# Patient Record
Sex: FEMALE | Race: WHITE | NOT HISPANIC OR LATINO | Employment: FULL TIME | URBAN - METROPOLITAN AREA
[De-identification: names, ages, dates, MRNs, and addresses within clinical notes are randomized per-mention and may not be internally consistent; named-entity substitution may affect disease eponyms.]

---

## 2017-01-28 DIAGNOSIS — M06.9 RHEUMATOID ARTHRITIS INVOLVING MULTIPLE SITES, UNSPECIFIED RHEUMATOID FACTOR PRESENCE: ICD-10-CM

## 2017-01-28 DIAGNOSIS — Z11.59 NEED FOR HEPATITIS C SCREENING TEST: ICD-10-CM

## 2017-01-28 LAB
ALT SERPL W P-5'-P-CCNC: 48 U/L (ref 0–50)
AST SERPL W P-5'-P-CCNC: 17 U/L (ref 0–45)
CHOLEST SERPL-MCNC: 184 MG/DL
CREAT SERPL-MCNC: 0.9 MG/DL (ref 0.52–1.04)
GFR SERPL CREATININE-BSD FRML MDRD: 65 ML/MIN/1.7M2
HDLC SERPL-MCNC: 60 MG/DL
LDLC SERPL CALC-MCNC: 101 MG/DL
NONHDLC SERPL-MCNC: 124 MG/DL
TRIGL SERPL-MCNC: 117 MG/DL

## 2017-01-28 PROCEDURE — 82565 ASSAY OF CREATININE: CPT | Performed by: FAMILY MEDICINE

## 2017-01-28 PROCEDURE — 86803 HEPATITIS C AB TEST: CPT | Performed by: FAMILY MEDICINE

## 2017-01-28 PROCEDURE — 84450 TRANSFERASE (AST) (SGOT): CPT | Performed by: FAMILY MEDICINE

## 2017-01-28 PROCEDURE — 36415 COLL VENOUS BLD VENIPUNCTURE: CPT | Performed by: FAMILY MEDICINE

## 2017-01-28 PROCEDURE — 80061 LIPID PANEL: CPT | Performed by: FAMILY MEDICINE

## 2017-01-28 PROCEDURE — 84460 ALANINE AMINO (ALT) (SGPT): CPT | Performed by: FAMILY MEDICINE

## 2017-01-30 LAB — HCV AB SERPL QL IA: NORMAL

## 2017-02-10 ENCOUNTER — TRANSFERRED RECORDS (OUTPATIENT)
Dept: HEALTH INFORMATION MANAGEMENT | Facility: CLINIC | Age: 57
End: 2017-02-10

## 2017-03-26 DIAGNOSIS — M06.9 RHEUMATOID ARTHRITIS INVOLVING MULTIPLE SITES, UNSPECIFIED RHEUMATOID FACTOR PRESENCE: Primary | ICD-10-CM

## 2017-03-29 RX ORDER — HYDROXYCHLOROQUINE SULFATE 200 MG/1
TABLET, FILM COATED ORAL
Qty: 450 TABLET | Refills: 3 | Status: SHIPPED | OUTPATIENT
Start: 2017-03-29 | End: 2019-05-10

## 2017-03-29 NOTE — TELEPHONE ENCOUNTER
hydroxychloroquine (PLAQUENIL) 200 MG tablet      Last Written Prescription Date:  12/20/16  Last Fill Quantity: 225,   # refills: 3  Last Office Visit with [facility], [facility] or [facility] prescribing provider: 12/19/16  Future Office visit:       Routing refill request to provider for review/approval because:  Drug not on the [facility], [facility] or [facility] refill protocol or controlled substance

## 2017-04-13 DIAGNOSIS — L44.8 TINEA AMIANTACEA: ICD-10-CM

## 2017-04-13 RX ORDER — KETOCONAZOLE 20 MG/ML
SHAMPOO TOPICAL
Qty: 120 ML | Refills: 0 | Status: SHIPPED | OUTPATIENT
Start: 2017-04-13 | End: 2017-05-22

## 2017-04-13 RX ORDER — FLUOCINOLONE ACETONIDE 0.11 MG/ML
5 OIL TOPICAL DAILY
Qty: 118 ML | Refills: 0 | Status: SHIPPED | OUTPATIENT
Start: 2017-04-13 | End: 2019-09-05

## 2017-04-13 NOTE — TELEPHONE ENCOUNTER
RN unable to refill as it has been >1 year since last visit.  Also, allergy/contraindication listed for ketoconazole and metronidazole.  Forwarding to Dr. Armijo to review.    Date of last OV: 4/6/16  Reason for visit: tinea amiantacea  Provider seen: Dr. Castrejon  Date last filled: 9/2016  When advised to RTC: 6 weeks.  No follow up appointment scheduled   Labs pertaining to requested refill: n/a  BP Readings from Last 2 Encounters:   12/28/16 105/80   12/19/16 118/64        Pending Prescriptions:                       Disp   Refills    Fluocinolone Acetonide (DERMA-SMOOTHE/FS *118 mL 0            Sig: Externally apply 5 mLs topically daily Please           schedule a follow up appointment for further           refills.    ketoconazole (NIZORAL) 2 % shampoo        120 mL 0            Sig: Apply to the affected area and wash off after 5           minutes.  Please schedule a follow up appointment           for further refills.      Radha Guevara RN

## 2017-04-27 DIAGNOSIS — E78.5 HYPERLIPIDEMIA LDL GOAL <130: ICD-10-CM

## 2017-04-27 NOTE — TELEPHONE ENCOUNTER
Atorvastatin     Last Written Prescription Date: 1/20/17  Last Fill Quantity: 90, # refills: 0  Last Office Visit with [facility], [facility] or [facility] prescribing provider: 12/19/16       Lab Results   Component Value Date    CHOL 184 01/28/2017     Lab Results   Component Value Date    HDL 60 01/28/2017     Lab Results   Component Value Date     01/28/2017     Lab Results   Component Value Date    TRIG 117 01/28/2017     Lab Results   Component Value Date    CHOLHDLRATIO 2.9 05/16/2015     Marni Turner MA 4/27/2017

## 2017-05-02 RX ORDER — ATORVASTATIN CALCIUM 40 MG/1
40 TABLET, FILM COATED ORAL DAILY
Qty: 90 TABLET | Refills: 2 | Status: SHIPPED | OUTPATIENT
Start: 2017-05-02 | End: 2018-01-31

## 2017-05-02 NOTE — TELEPHONE ENCOUNTER
Prescription approved per [facility] Refill Protocol..................QUINN Pacheco

## 2017-05-15 DIAGNOSIS — L44.8 TINEA AMIANTACEA: ICD-10-CM

## 2017-05-15 RX ORDER — FLUOCINOLONE ACETONIDE 0.11 MG/ML
5 OIL TOPICAL DAILY
Qty: 118 ML | Refills: 0 | Status: CANCELLED | OUTPATIENT
Start: 2017-05-15

## 2017-05-15 NOTE — TELEPHONE ENCOUNTER
Pt called regarding refill request below. Courtesy refill given on 4/17/17 with reminder that appt needed for future refills. No future appt noted. RN called pt to inquire if pt had requested refill and if so to notify pt that an appt is needed for further refills. No answer. No VM id. RN left general message to call the [facility] clinic back at [phone]..Erika Webb RN          Flucinolone Acetonide Scalp oil      Last Written Prescription Date: 4/17/17  Last Fill Quantity: 118ml   # refills: 0   Last Office Visit with [facility], [facility] or [facility] prescribing provider: 4/6/16

## 2017-05-22 ENCOUNTER — OFFICE VISIT (OUTPATIENT)
Dept: DERMATOLOGY | Facility: CLINIC | Age: 57
End: 2017-05-22
Payer: COMMERCIAL

## 2017-05-22 DIAGNOSIS — L65.9 ALOPECIA: Primary | ICD-10-CM

## 2017-05-22 DIAGNOSIS — L44.8 TINEA AMIANTACEA: ICD-10-CM

## 2017-05-22 PROCEDURE — 88305 TISSUE EXAM BY PATHOLOGIST: CPT | Performed by: DERMATOLOGY

## 2017-05-22 PROCEDURE — 99212 OFFICE O/P EST SF 10 MIN: CPT | Mod: 25 | Performed by: DERMATOLOGY

## 2017-05-22 PROCEDURE — 11100 HC BIOPSY SKIN/SUBQ/MUC MEM, SINGLE LESION: CPT | Performed by: DERMATOLOGY

## 2017-05-22 PROCEDURE — 11101 HC BIOPSY SKIN/SUBQ/MUC MEM, EACH ADDTL LESION: CPT | Performed by: DERMATOLOGY

## 2017-05-22 RX ORDER — LIDOCAINE HYDROCHLORIDE AND EPINEPHRINE 10; 10 MG/ML; UG/ML
3 INJECTION, SOLUTION INFILTRATION; PERINEURAL ONCE
Qty: 0.5 ML | Refills: 0 | OUTPATIENT
Start: 2017-05-22 | End: 2017-05-22

## 2017-05-22 RX ORDER — KETOCONAZOLE 20 MG/ML
SHAMPOO TOPICAL
Qty: 120 ML | Refills: 11 | Status: SHIPPED | OUTPATIENT
Start: 2017-05-22 | End: 2019-09-05

## 2017-05-22 NOTE — NURSING NOTE
Dermatology Rooming Note    Kesha Zacarias's goals for this visit include:   Chief Complaint   Patient presents with     RECHECK       Is a scribe okay for this visit:YES,     Are records needed for this visit(If yes, obtain release of information): No,      Vitals: LMP 11/22/2011    Referring Provider:  ESTABLISHED PATIENT  No address on file

## 2017-05-22 NOTE — PROGRESS NOTES
[facility] Dermatology Note      Dermatology Problem List:  1. Tinea amiantacea with underlying inflammatory rash. Double punch biopsy of R parietal scalp 5/22/17  -Previous Tx: Dermasmooth oil (initiated 4/6/2016), ketoconazole solution (initiated 4/6/2016)    Encounter Date: May 22, 2017    CC:  Chief Complaint   Patient presents with     RECHECK       History of Present Illness:  Ms. Kesha Zacarias is a 56 year old female who presents as a follow-up for tinea amiantacea. The patient was last seen 4/6/2016 by Dr. Castrejon when dermasmooth oil and ketoconazole solution were continued for tinea amiantacea. Today, the patient reports that she is using dermasmooth oil and ketoconazole shampoo. She used the topicals more frequently in the winter. Notes improvement when using the topical treatment. She washes her hair every 3-4 days. The lesions are primarily on the crown of the scalp and have moved to her ears. Denies a personal history of psoriasis. She regularly undergoes a procedure where she applies the dermasmooth oil and then slowly comb the scales out. She is running out of dermasmooth oil. She doesn't think this is curing the problem. The patient reports no other lesions of concern.    Past Medical History:   Patient Active Problem List   Diagnosis     Female infertility     Major depressive disorder, recurrent episode, mild (H)     Dysplasia of cervix (uteri)     Nonsenile cataract     Other specified glaucoma     HYPERLIPIDEMIA LDL GOAL <130     Menopausal syndrome (hot flashes)     Peroneal tendonitis     Counseling regarding advanced directives     Rheumatoid arthritis involving multiple sites, unspecified rheumatoid factor presence (H)     Past Medical History:   Diagnosis Date     Chronic depressive personality disorder      Dysplasia of cervix (uteri) 1988    Cryotherapy     Female infertility of unspecified origin      Past Surgical History:   Procedure Laterality Date     C  APPENDECTOMY  6-14-03     C REMV CATARACT INTRACAP,INSERT LENS  2-    right     HC INTRODUCE CATH FALLOPIAN TUBE, RE-OPEN/DIAGNOSIS       HERNIA REPAIR, INCISIONAL  11/11/09     Social History:  Not obtained.    Family History:  No family history of psoriasis that she knows of.  Medications:  Current Outpatient Prescriptions   Medication Sig Dispense Refill     atorvastatin (LIPITOR) 40 MG tablet Take 1 tablet (40 mg) by mouth daily 90 tablet 2     Fluocinolone Acetonide (DERMA-SMOOTHE/FS SCALP) 0.01 % OIL Externally apply 5 mLs topically daily Please schedule a follow up appointment for further refills. 118 mL 0     ketoconazole (NIZORAL) 2 % shampoo Apply to the affected area and wash off after 5 minutes.  Please schedule a follow up appointment for further refills. 120 mL 0     hydroxychloroquine (PLAQUENIL) 200 MG tablet TAKE 2 AND 1/2 TABLETS BY MOUTH EVERY  tablet 3     hydroxychloroquine (PLAQUENIL) 200 MG tablet TAKE 2 AND 1/2 TABLETS BY MOUTH EVERY  tablet 3     ibuprofen (ADVIL,MOTRIN) 800 MG tablet TAKE ONE TABLET BY MOUTH EVERY 6 HOURS AS NEEDED FOR PAIN 120 tablet 3     aspirin 81 MG tablet Take 81 mg by mouth daily       cyclobenzaprine (FLEXERIL) 10 MG tablet Take 1 tablet (10 mg) by mouth 3 times daily as needed for muscle spasms 90 tablet 1     COENZYME Q-10 PO Take 1 tablet by mouth every other day       Omega-3 Fatty Acids (FISH OIL) 1200 MG capsule Take 3 capsules by mouth daily.       Cholecalciferol (VITAMIN D) 2000 UNIT tablet Take 1 tablet by mouth daily. 100 tablet 12      Allergies   Allergen Reactions     Ciprofloxacin      hives and was on flagyl too     Metronidazole      hives and was on cipro too     Review of Systems:  -Skin: As above in HPI. No additional skin concerns.  -Const: No fever or chills      Physical exam:  LMP 11/22/2011  GEN: This is a well-nourished, well developed female in no acute distress  NEURO: Alert and oriented  PSYCH: in a pleasant mood,  appropriate affect    SKIN: Focused examination of the scalp was performed.  -Erythema and crusting on the parietal scalp and onto the occipital scalp. Less involvement on the frontal scalp.   -No other lesions of concern on areas examined.     Impression/Plan:  1. Alopecia with Tinea Amiantacea. The tinea amiantacea is a descriptor and doesn't help in figuring out the exam cause of the condition. Will do 2 punch biopsies with vertical and horizontal sectioning to diagnose.  Differential diagnosis includes psoriasis versus seborrheic dermatitis versus LPP versus lupus versus other, same history for both    Start Neutrogena T-Sal shampoo.     Hold dermasmooth oil pending biopsy results.     Continue ketoconazole 2% shampoo. Alternate with the Neutrogena T-Sal shampoo.     Punch biopsy:  After discussion of benefits and risks including but not limited to bleeding/bruising, pain/swelling, infection, scar, incomplete removal, nerve damage/numbness, recurrence, and non-diagnostic biopsy, written consent, verbal consent and photographs were obtained. Time-out was performed. The area was cleaned with isopropyl alcohol. 0.5 mL of 1% lidocaine with epinephrine was injected to obtain adequate anesthesia of the lesion on the right parietal scalp. Two 4 mm punch biopsies were performed. 4-0 prolene sutures were utilized to approximate the epidermal edges.  White petroleum jelly/VaselineTM and a bandage was applied to the wound.  Explicit verbal and written wound care instructions were provided.  The patient left the Dermatology Clinic in good condition. The patient was counseled to follow up for suture removal in approximately 7-10 days.    Follow up pending biopsy, earlier for new or changing lesions.     Staff Involved:  Scribe/Staff    Scribe Disclosure:   IMeghan, am serving as a scribe to document services personally performed by Dr. Rory Lambert, based on data collection and the provider's statements to me.          Provider Disclosure:   I have reviewed the documentation recorded by the scribe and have edited it as needed. I have personally performed the services documented here and the documentation accurately represents those services and the decisions made by me.     Rory Lambert MD, MS    Department of Dermatology  [facility]: Phone: [phone], Fax:[phone]  [facility]: Phone: [phone], Fax: [phone]

## 2017-05-22 NOTE — PATIENT INSTRUCTIONS

## 2017-05-22 NOTE — MR AVS SNAPSHOT
After Visit Summary   5/22/2017    Kesha Zacarias    MRN: [id number]           Patient Information     Date Of Birth          1960        Visit Information        Provider Department      5/22/2017 9:15 AM Rory Lambert MD [facility]        Today's Diagnoses     Alopecia    -  1    Tinea amiantacea          Care Instructions    Wound Care After a Biopsy    What is a skin biopsy?  A skin biopsy allows the doctor to examine a very small piece of tissue under the microscope to determine the diagnosis and the best treatment for the skin condition. A local anesthetic (numbing medicine)  is injected with a very small needle into the skin area to be tested. A small piece of skin is taken from the area. Sometimes a suture (stitch) is used.     What are the risks of a skin biopsy?  I will experience scar, bleeding, swelling, pain, crusting and redness. I may experience incomplete removal or recurrence. Risks of this procedure are excessive bleeding, bruising, infection, nerve damage, numbness, thick (hypertrophic or keloidal) scar and non-diagnostic biopsy.    How should I care for my wound for the first 24 hours?    Keep the wound dry and covered for 24 hours    If it bleeds, hold direct pressure on the area for 15 minutes. If bleeding does not stop then go to the emergency room    Avoid strenuous exercise the first 1-2 days or as your doctor instructs you    How should I care for the wound after 24 hours?    After 24 hours, remove the bandage    You may bathe or shower as normal    If you had a scalp biopsy, you can shampoo as usual and can use shower water to clean the biopsy site daily    Clean the wound twice a day with gentle soap and water    Do not scrub, be gentle    Apply white petroleum/Vaseline after cleaning the wound with a cotton swab or a clean finger, and keep the site covered with a Bandaid /bandage. Bandages are not necessary with a scalp biopsy    If you are  unable to cover the site with a Bandaid /bandage, re-apply ointment 2-3 times a day to keep the site moist. Moisture will help with healing    Avoid strenuous activity for first 1-2 days    Avoid lakes, rivers, pools, and oceans until the stitches are removed or the site is healed    How do I clean my wound?    Wash hands thoroughly with soap or use hand  before all wound care    Clean the wound with gentle soap and water    Apply white petroleum/Vaseline  to wound after it is clean    Replace the Bandaid /bandage to keep the wound covered for the first few days or as instructed by your doctor    If you had a scalp biopsy, warm shower water to the area on a daily basis should suffice    What should I use to clean my wound?     Cotton-tipped applicators (Qtips )    White petroleum jelly (Vaseline ). Use a clean new container and use Q-tips to apply.    Bandaids   as needed    Gentle soap     How should I care for my wound long term?    Do not get your wound dirty    Keep up with wound care for one week or until the area is healed.    A small scab will form and fall off by itself when the area is completely healed. The area will be red and will become pink in color as it heals. Sun protection is very important for how your scar will turn out. Sunscreen with an SPF 30 or greater is recommended once the area is healed.    If you have stitches, stitches need to be removed in 7-10 days. You may return to our clinic for this or you may have it done locally at your doctor s office.    You should have some soreness but it should be mild and slowly go away over several days. Talk to your doctor about using tylenol for pain,    When should I call my doctor?  If you have increased:     Pain or swelling    Pus or drainage (clear or slightly yellow drainage is ok)    Temperature over 100F    Spreading redness or warmth around wound    When will I hear about my results?  The biopsy results can take 2-3 weeks to come back.  The clinic will call you with the results, send you a [employer] message, or have you schedule a follow-up clinic or phone time to discuss the results. Contact our clinics if you do not hear from us in 3 weeks.     Who should I call with questions?    [facility]: [phone]     [facility]: [phone]    For urgent needs outside of business hours call the [facility] at [phone] and ask for the dermatology resident on call        Neutrogena T-Sal Shampoo      [email or web address]          Follow-ups after your visit        Your next 10 appointments already scheduled     Jun 05, 2017 11:00 AM CDT   Nurse Only with NURSE ONLY MG DERM   [facility]    [address] [place] [id number] [phone]              Who to contact     If you have questions or need follow up information about today's clinic visit or your schedule please contact [facility] directly at [phone].  Normal or non-critical lab and imaging results will be communicated to you by MyChart, letter or phone within 4 business days after the clinic has received the results. If you do not hear from us within 7 days, please contact the clinic through [employer] or phone. If you have a critical or abnormal lab result, we will notify you by phone as soon as possible.  Submit refill requests through [employer] or call your pharmacy and they will forward the refill request to us. Please allow 3 business days for your refill to be completed.          Additional Information About Your Visit        [employer] Information     [employer] gives you secure access to your electronic health record. If you see a primary care provider, you can also send messages to your care team and make appointments. If you have questions, please call your primary care clinic.   If you do not have a primary care provider, please call [phone] and they will assist you.      [employer] is an electronic gateway that provides easy, online access to your medical records. With [employer], you can request a clinic appointment, read your test results, renew a prescription or communicate with your care team.     To access your existing account, please contact your [facility] or call [phone] for assistance.        Care EveryWhere ID     This is your Care EveryWhere ID. This could be used by other organizations to access your [place] medical records  [id number]        Your Vitals Were     Last Period                   11/22/2011            Blood Pressure from Last 3 Encounters:   12/28/16 105/80   12/19/16 118/64   10/16/15 126/72    Weight from Last 3 Encounters:   12/28/16 97.5 kg (215 lb)   12/19/16 97.5 kg (215 lb)   10/16/15 102.5 kg (226 lb)              We Performed the Following     BIOPSY SKIN/SUBQ/MUC MEM, EACH ADDTL LESION     BIOPSY SKIN/SUBQ/MUC MEM, SINGLE LESION     Surgical pathology exam          Today's Medication Changes          These changes are accurate as of: 5/22/17  9:57 AM.  If you have any questions, ask your nurse or doctor.               Start taking these medicines.        Dose/Directions    lidocaine 1% with EPINEPHrine 1:100,000 1 %-1:964414 injection   Used for:  Alopecia, Tinea amiantacea        Dose:  3 mL   Inject 3 mLs into the skin once for 1 dose   Quantity:  0.5 mL   Refills:  0         These medicines have changed or have updated prescriptions.        Dose/Directions    ketoconazole 2 % shampoo   Commonly known as:  NIZORAL   This may have changed:  additional instructions   Used for:  Tinea amiantacea, Alopecia        Apply to the affected area and wash off after 5 minutes.  Alternate with T sal shampoo   Quantity:  120 mL   Refills:  11            Where to get your medicines      These medications were sent to [id number] - [place] - [address]  [address] [place] [address]    Hours:  test fax sent successfully 1/20/04  kr Phone:  [phone]     ketoconazole 2 % shampoo         Some of these will need a paper prescription and others can be bought over the counter.  Ask your nurse if you have questions.     You don't need a prescription for these medications     lidocaine 1% with EPINEPHrine 1:100,000 1 %-1:041122 injection                Primary Care Provider Office Phone # Fax #    Gregory G Schoen, [phone]       [facility] [address] [place] [address]        Thank you!     Thank you for choosing [facility]  for your care. Our goal is always to provide you with excellent care. Hearing back from our patients is one way we can continue to improve our services. Please take a few minutes to complete the written survey that you may receive in the mail after your visit with us. Thank you!             Your Updated Medication List - Protect others around you: Learn how to safely use, store and throw away your medicines at www.disposemymeds.org.          This list is accurate as of: 5/22/17  9:57 AM.  Always use your most recent med list.                   Brand Name Dispense Instructions for use    aspirin 81 MG tablet      Take 81 mg by mouth daily       atorvastatin 40 MG tablet    LIPITOR    90 tablet    Take 1 tablet (40 mg) by mouth daily       COENZYME Q-10 PO      Take 1 tablet by mouth every other day       cyclobenzaprine 10 MG tablet    FLEXERIL    90 tablet    Take 1 tablet (10 mg) by mouth 3 times daily as needed for muscle spasms       DERMA-SMOOTHE/FS SCALP 0.01 % Oil     118 mL    Externally apply 5 mLs topically daily Please schedule a follow up appointment for further refills.       * hydroxychloroquine 200 MG tablet    PLAQUENIL    225 tablet    TAKE 2 AND 1/2 TABLETS BY MOUTH EVERY DAY        * hydroxychloroquine 200 MG tablet    PLAQUENIL    450 tablet    TAKE 2 AND 1/2 TABLETS BY MOUTH EVERY DAY       ibuprofen 800 MG tablet    ADVIL/MOTRIN    120 tablet    TAKE ONE TABLET BY MOUTH EVERY 6 HOURS AS NEEDED FOR PAIN       ketoconazole 2 % shampoo    NIZORAL    120 mL    Apply to the affected area and wash off after 5 minutes.  Alternate with T sal shampoo       lidocaine 1% with EPINEPHrine 1:100,000 1 %-1:651156 injection     0.5 mL    Inject 3 mLs into the skin once for 1 dose       omega-3 fatty acids 1200 MG capsule      Take 3 capsules by mouth daily.       vitamin D 2000 UNITS tablet     100 tablet    Take 1 tablet by mouth daily.       * Notice:  This list has 2 medication(s) that are the same as other medications prescribed for you. Read the directions carefully, and ask your doctor or other care provider to review them with you.

## 2017-05-24 LAB — COPATH REPORT: NORMAL

## 2017-05-31 ENCOUNTER — ALLIED HEALTH/NURSE VISIT (OUTPATIENT)
Dept: NURSING | Facility: CLINIC | Age: 57
End: 2017-05-31
Payer: COMMERCIAL

## 2017-05-31 DIAGNOSIS — Z48.02 ENCOUNTER FOR REMOVAL OF SUTURES: Primary | ICD-10-CM

## 2017-05-31 PROCEDURE — 99207 ZZC NO CHARGE NURSE ONLY: CPT

## 2017-05-31 NOTE — NURSING NOTE
Kesha Zacarias comes into clinic today at the request of Dr. Lambert Ordering Provider for suture removal.    Kesha presents to the clinic today for  removal of sutures.  The patient has had the sutures in place for 9 days.    There has been no history of infection or drainage.    O: 2 sutures are seen located on the right scalp.  The wound is healing well with no signs of infection.    A: Suture removal.    P:  All sutures were easily removed today.  Vaseline applied.  Routine wound care discussed.  The patient will follow up as needed.    This service provided today was under the supervising provider of the day Dr. Lambert, who was available if needed.    Radha Guevara RN

## 2017-05-31 NOTE — MR AVS SNAPSHOT
After Visit Summary   5/31/2017    Kesha Zacarias    MRN: [id number]           Patient Information     Date Of Birth          1960        Visit Information        Provider Department      5/31/2017 11:30 AM NURSE ONLY [facility]        Today's Diagnoses     Encounter for removal of sutures    -  1       Follow-ups after your visit        Who to contact     If you have questions or need follow up information about today's clinic visit or your schedule please contact [facility] directly at [phone].  Normal or non-critical lab and imaging results will be communicated to you by [facility], letter or phone within 4 business days after the clinic has received the results. If you do not hear from us within 7 days, please contact the clinic through [employer] or phone. If you have a critical or abnormal lab result, we will notify you by phone as soon as possible.  Submit refill requests through [employer] or call your pharmacy and they will forward the refill request to us. Please allow 3 business days for your refill to be completed.          Additional Information About Your Visit        MyChart Information     [employer] gives you secure access to your electronic health record. If you see a primary care provider, you can also send messages to your care team and make appointments. If you have questions, please call your primary care clinic.  If you do not have a primary care provider, please call [phone] and they will assist you.      [employer] is an electronic gateway that provides easy, online access to your medical records. With [employer], you can request a clinic appointment, read your test results, renew a prescription or communicate with your care team.     To access your existing account, please contact your [facility] or call [phone] for assistance.        Care EveryWhere ID     This is your Care EveryWhere ID. This could  be used by other organizations to access your [place] medical records  [id number]        Your Vitals Were     Last Period                   11/22/2011            Blood Pressure from Last 3 Encounters:   12/28/16 105/80   12/19/16 118/64   10/16/15 126/72    Weight from Last 3 Encounters:   12/28/16 97.5 kg (215 lb)   12/19/16 97.5 kg (215 lb)   10/16/15 102.5 kg (226 lb)              Today, you had the following     No orders found for display       Primary Care Provider Office Phone # Fax #    Gregory G Schoen, [phone]       [facility] [address] [place] [address]        Thank you!     Thank you for choosing [facility]  for your care. Our goal is always to provide you with excellent care. Hearing back from our patients is one way we can continue to improve our services. Please take a few minutes to complete the written survey that you may receive in the mail after your visit with us. Thank you!             Your Updated Medication List - Protect others around you: Learn how to safely use, store and throw away your medicines at www.disposemymeds.org.          This list is accurate as of: 5/31/17 12:28 PM.  Always use your most recent med list.                   Brand Name Dispense Instructions for use    aspirin 81 MG tablet      Take 81 mg by mouth daily       atorvastatin 40 MG tablet    LIPITOR    90 tablet    Take 1 tablet (40 mg) by mouth daily       COENZYME Q-10 PO      Take 1 tablet by mouth every other day       cyclobenzaprine 10 MG tablet    FLEXERIL    90 tablet    Take 1 tablet (10 mg) by mouth 3 times daily as needed for muscle spasms       DERMA-SMOOTHE/FS SCALP 0.01 % Oil     118 mL    Externally apply 5 mLs topically daily Please schedule a follow up appointment for further refills.       * hydroxychloroquine 200 MG tablet    PLAQUENIL    225 tablet    TAKE 2 AND 1/2 TABLETS BY MOUTH EVERY DAY       * hydroxychloroquine 200 MG  tablet    PLAQUENIL    450 tablet    TAKE 2 AND 1/2 TABLETS BY MOUTH EVERY DAY       ibuprofen 800 MG tablet    ADVIL/MOTRIN    120 tablet    TAKE ONE TABLET BY MOUTH EVERY 6 HOURS AS NEEDED FOR PAIN       ketoconazole 2 % shampoo    NIZORAL    120 mL    Apply to the affected area and wash off after 5 minutes.  Alternate with T sal shampoo       omega-3 fatty acids 1200 MG capsule      Take 3 capsules by mouth daily.       vitamin D 2000 UNITS tablet     100 tablet    Take 1 tablet by mouth daily.       * Notice:  This list has 2 medication(s) that are the same as other medications prescribed for you. Read the directions carefully, and ask your doctor or other care provider to review them with you.

## 2017-06-02 ENCOUNTER — TELEPHONE (OUTPATIENT)
Dept: DERMATOLOGY | Facility: CLINIC | Age: 57
End: 2017-06-02

## 2017-06-02 DIAGNOSIS — L40.9 SCALP PSORIASIS: ICD-10-CM

## 2017-06-02 DIAGNOSIS — L40.9 SCALP PSORIASIS: Primary | ICD-10-CM

## 2017-06-02 RX ORDER — CLOBETASOL PROPIONATE 0.05 G/100ML
SHAMPOO TOPICAL
Qty: 1 BOTTLE | Refills: 11 | Status: SHIPPED | OUTPATIENT
Start: 2017-06-02 | End: 2017-06-05

## 2017-06-02 NOTE — TELEPHONE ENCOUNTER
"Writer called and spoke with patient regarding the pathology results received by Dr. Lambert. Patient stated the she has \"really good insurance\" and wants to try to purchase the clobetasol through her regular pharmacy. She asked the prescription to be sent to [facility]. Patient asked when she could expect to see results and after consulting with Dr. Bae who was in clinic, she would begin to notice a difference around the end of two weeks. Writer explained to patient that is possible that results will be seeing with cycles of using product and alternating with t sal shampoo. Writer also explained the [facility] and this would be another option. Patient understood and had no further questions at this time. Routing to Dr. Lambert.    Message  Received: Today       Rory Lambert MD  [facility]                   Please inform Ms. Zacarias that the biopsy shows that she has scalp psoriasis.     I would like to change her treatment to:   1) Clobetasol shampoo 3-4x a week. Apply to dry scalp, let sit for 15 min, then rinse off.   2) On other days, use the T sal shampoo   3) On days not using clobetasol she can use the dermasmooth oil.     This is where we start. We may have to go to a systemic treatment. Also, given the price of Clobetasol shampoo, I will be sending it to the [facility] which will make it more affordable as they have a deal with the . If she doesn't want that I can send it to any other pharmacy. Please let her know how the [facility] pharmacy works and provider her with the phone number so she can register with them.     Rory Lambert MD, MS      Department of Dermatology   [facility]: Phone: [phone], Fax:[phone]   [facility]: Phone: [phone], Fax: [phone]      Chantale Hull LPN    "

## 2017-06-02 NOTE — PROGRESS NOTES
Please inform Ms. Zacarias that the biopsy shows that she has scalp psoriasis.     I would like to change her treatment to:  1) Clobetasol shampoo 3-4x a week. Apply to dry scalp, let sit for 15 min, then rinse off.  2) On other days, use the T sal shampoo  3) On days not using clobetasol she can use the dermasmooth oil.    This is where we start. We may have to go to a systemic treatment. Also, given the price of Clobetasol shampoo, I will be sending it to the [facility] pharmacy which will make it more affordable as they have a deal with the . If she doesn't want that I can send it to any other pharmacy. Please let her know how the [facility] pharmacy works and provider her with the phone number so she can register with them.    Rory Lambert MD, MS    Department of Dermatology  [facility]: Phone: [phone], Fax:[phone]  [facility]: Phone: [phone], Fax: [phone]

## 2017-06-05 RX ORDER — CLOBETASOL PROPIONATE 0.05 G/100ML
SHAMPOO TOPICAL
Qty: 1 BOTTLE | Refills: 11 | Status: SHIPPED | OUTPATIENT
Start: 2017-06-05 | End: 2019-09-05

## 2017-07-05 ENCOUNTER — TRANSFERRED RECORDS (OUTPATIENT)
Dept: HEALTH INFORMATION MANAGEMENT | Facility: CLINIC | Age: 57
End: 2017-07-05

## 2017-07-10 ENCOUNTER — TRANSFERRED RECORDS (OUTPATIENT)
Dept: HEALTH INFORMATION MANAGEMENT | Facility: CLINIC | Age: 57
End: 2017-07-10

## 2017-08-23 ENCOUNTER — TELEPHONE (OUTPATIENT)
Dept: FAMILY MEDICINE | Facility: CLINIC | Age: 57
End: 2017-08-23

## 2017-08-23 NOTE — TELEPHONE ENCOUNTER
Reason for Call:  Same Day Appointment, Requested Provider:  Gregory G. Schoen, M.D.    PCP: Schoen, Gregory G    Reason for visit: Patient stated that she is having surgery on 9/18/17 and needs a pre-op     Duration of symptoms: n/a    Have you been treated for this in the past? No    Additional comments: patient dosen't want to see anyone else because he knows her history.     Can we leave a detailed message on this number? YES    Phone number patient can be reached at: Cell number on file:    Telephone Information:   Mobile [phone]       Best Time: any    Call taken on 8/23/2017 at 11:45 AM by Merari Saenz

## 2017-09-15 ENCOUNTER — OFFICE VISIT (OUTPATIENT)
Dept: FAMILY MEDICINE | Facility: CLINIC | Age: 57
End: 2017-09-15
Payer: COMMERCIAL

## 2017-09-15 VITALS
HEART RATE: 72 BPM | SYSTOLIC BLOOD PRESSURE: 119 MMHG | HEIGHT: 65 IN | RESPIRATION RATE: 16 BRPM | OXYGEN SATURATION: 98 % | WEIGHT: 225 LBS | TEMPERATURE: 96.4 F | DIASTOLIC BLOOD PRESSURE: 64 MMHG | BODY MASS INDEX: 37.49 KG/M2

## 2017-09-15 DIAGNOSIS — Z01.818 PREOP GENERAL PHYSICAL EXAM: Primary | ICD-10-CM

## 2017-09-15 DIAGNOSIS — Z01.810 PRE-OPERATIVE CARDIOVASCULAR EXAMINATION: ICD-10-CM

## 2017-09-15 PROCEDURE — 93000 ELECTROCARDIOGRAM COMPLETE: CPT | Performed by: FAMILY MEDICINE

## 2017-09-15 PROCEDURE — 99214 OFFICE O/P EST MOD 30 MIN: CPT | Performed by: FAMILY MEDICINE

## 2017-09-15 ASSESSMENT — PAIN SCALES - GENERAL: PAINLEVEL: NO PAIN (0)

## 2017-09-15 ASSESSMENT — PATIENT HEALTH QUESTIONNAIRE - PHQ9: SUM OF ALL RESPONSES TO PHQ QUESTIONS 1-9: 0

## 2017-09-15 NOTE — MR AVS SNAPSHOT
After Visit Summary   9/15/2017    Kesha Zacarias    MRN: [id number]           Patient Information     Date Of Birth          1960        Visit Information        Provider Department      9/15/2017 3:30 PM Schoen, Gregory G, MD [facility]        Today's Diagnoses     Preop general physical exam    -  1    Pre-operative cardiovascular examination          Care Instructions      Before Your Surgery      Call your surgeon if there is any change in your health. This includes signs of a cold or flu (such as a sore throat, runny nose, cough, rash or fever).    Do not smoke, drink alcohol or take over the counter medicine (unless your surgeon or primary care doctor tells you to) for the 24 hours before and after surgery.    If you take prescribed drugs: Follow your doctor s orders about which medicines to take and which to stop until after surgery.    Eating and drinking prior to surgery: follow the instructions from your surgeon    Take a shower or bath the night before surgery. Use the soap your surgeon gave you to gently clean your skin. If you do not have soap from your surgeon, use your regular soap. Do not shave or scrub the surgery site.  Wear clean pajamas and have clean sheets on your bed.           Follow-ups after your visit        Who to contact     If you have questions or need follow up information about today's clinic visit or your schedule please contact [facility] directly at [phone].  Normal or non-critical lab and imaging results will be communicated to you by MyChart, letter or phone within 4 business days after the clinic has received the results. If you do not hear from us within 7 days, please contact the clinic through MyChart or phone. If you have a critical or abnormal lab result, we will notify you by phone as soon as possible.  Submit refill requests through [employer] or call your pharmacy and they will forward the refill request to us.  "Please allow 3 business days for your refill to be completed.          Additional Information About Your Visit        MyChart Information     [employer] gives you secure access to your electronic health record. If you see a primary care provider, you can also send messages to your care team and make appointments. If you have questions, please call your primary care clinic.  If you do not have a primary care provider, please call [phone] and they will assist you.        Care EveryWhere ID     This is your Care EveryWhere ID. This could be used by other organizations to access your [place] medical records  [id number]        Your Vitals Were     Pulse Temperature Respirations Height Last Period Pulse Oximetry    72 96.4  F (35.8  C) (Temporal) 16 5' 5\" (1.651 m) 11/22/2011 98%    BMI (Body Mass Index)                   37.44 kg/m2            Blood Pressure from Last 3 Encounters:   09/15/17 119/64   12/28/16 105/80   12/19/16 118/64    Weight from Last 3 Encounters:   09/15/17 225 lb (102.1 kg)   12/28/16 215 lb (97.5 kg)   12/19/16 215 lb (97.5 kg)              We Performed the Following     EKG 12-lead complete w/read - Clinics        Primary Care Provider Office Phone # Fax #    Gregory G Schoen, [phone]       [address] [place] [address]        Equal Access to Services     [facility]: Hadii aad ku hadasho Soomaali, waaxda luqadaha, qaybta kaalmada adeegyada, claudio armstrong hayiesha thayer . So [facility] [phone].    ATENCIÓN: Si habla español, tiene a simeon disposición servicios gratuitos de asistencia lingüística. Llame al [phone].    We comply with applicable federal civil rights laws and [place] laws. We do not discriminate on the basis of race, color, national origin, age, disability sex, sexual orientation or gender identity.            Thank you!     Thank you for choosing [facility]  for your care. Our goal is always to provide you with " excellent care. Hearing back from our patients is one way we can continue to improve our services. Please take a few minutes to complete the written survey that you may receive in the mail after your visit with us. Thank you!             Your Updated Medication List - Protect others around you: Learn how to safely use, store and throw away your medicines at www.disposemymeds.org.          This list is accurate as of: 9/15/17  5:05 PM.  Always use your most recent med list.                   Brand Name Dispense Instructions for use Diagnosis    aspirin 81 MG tablet      Take 81 mg by mouth daily        atorvastatin 40 MG tablet    LIPITOR    90 tablet    Take 1 tablet (40 mg) by mouth daily    Hyperlipidemia LDL goal <130       clobetasol propionate 0.05 % Sham     1 Bottle    Apply sparingly to dry scalp, leave in place for 15 minutes, then lather and rinse thoroughly. Do 3-4x a week.    Scalp psoriasis       COENZYME Q-10 PO      Take 1 tablet by mouth every other day        cyclobenzaprine 10 MG tablet    FLEXERIL    90 tablet    Take 1 tablet (10 mg) by mouth 3 times daily as needed for muscle spasms    RA (rheumatoid arthritis) (H)       DERMA-SMOOTHE/FS SCALP 0.01 % Oil     118 mL    Externally apply 5 mLs topically daily Please schedule a follow up appointment for further refills.    Tinea amiantacea       hydroxychloroquine 200 MG tablet    PLAQUENIL    450 tablet    TAKE 2 AND 1/2 TABLETS BY MOUTH EVERY DAY    Rheumatoid arthritis involving multiple sites, unspecified rheumatoid factor presence (H)       ibuprofen 800 MG tablet    ADVIL/MOTRIN    120 tablet    TAKE ONE TABLET BY MOUTH EVERY 6 HOURS AS NEEDED FOR PAIN    RA (rheumatoid arthritis) (H)       ketoconazole 2 % shampoo    NIZORAL    120 mL    Apply to the affected area and wash off after 5 minutes.  Alternate with T sal shampoo    Tinea amiantacea, Alopecia       omega-3 fatty acids 1200 MG capsule      Take 3 capsules by mouth daily.         vitamin D 2000 UNITS tablet     100 tablet    Take 1 tablet by mouth daily.

## 2017-09-15 NOTE — PROGRESS NOTES
[address] [address]  [phone]  Dept: [phone]    PRE-OP EVALUATION:  Today's date: 9/15/2017    Kesha Zacarias (: 1960) presents for pre-operative evaluation assessment as requested by Dr. Jackson Hull.  She requires evaluation and anesthesia risk assessment prior to undergoing surgery/procedure for treatment of Left knee problem .  Proposed procedure: Left total knee replacement    Date of Surgery/ Procedure: 17  Time of Surgery/ Procedure: 9:40  Hospital/Surgical Facility: [facility]  Fax number for surgical facility: [phone]  Primary Physician: Schoen, Gregory G  Type of Anesthesia Anticipated: General    Patient has a Health Care Directive or Living Will:  NO    1. NO - Do you have a history of heart attack, stroke, stent, bypass or surgery on an artery in the head, neck, heart or legs?  2. NO - Do you ever have any pain or discomfort in your chest?  3. NO - Do you have a history of  Heart Failure?  4. NO - Are you troubled by shortness of breath when: walking on the level, up a slight hill or at night?  5. NO - Do you currently have a cold, bronchitis or other respiratory infection?  6. Yes- Do you have a cough, shortness of breath or wheezing?  7. NO - Do you sometimes get pains in the calves of your legs when you walk?  8. NO - Do you or anyone in your family have previous history of blood clots?  9. NO - Do you or does anyone in your family have a serious bleeding problem such as prolonged bleeding following surgeries or cuts?  10. NO - Have you ever had problems with anemia or been told to take iron pills?  11. NO - Have you had any abnormal blood loss such as black, tarry or bloody stools, or abnormal vaginal bleeding?  12. NO - Have you ever had a blood transfusion?  13. NO - Have you or any of your relatives ever had problems with anesthesia?  14.YES - Do you have sleep apnea, excessive snoring or daytime drowsiness? Snores; no  diagnosis of sleep apnea at  this time.   15. NO - Do you have any prosthetic heart valves?  16. NO - Do you have prosthetic joints?  17. NO - Is there any chance that you may be pregnant?        HPI:                                                      Brief HPI related to upcoming procedure: arthritis progressing over time. Left knee most severe, appropriate for replacement.           MEDICAL HISTORY:                                                    Patient Active Problem List    Diagnosis Date Noted     Rheumatoid arthritis involving multiple sites, unspecified rheumatoid factor presence (H) 03/29/2017     Priority: Medium     Counseling regarding advanced directives 12/19/2016     Priority: Medium     Peroneal tendonitis 08/15/2013     Priority: Medium     Menopausal syndrome (hot flashes) 11/23/2010     Priority: Medium     HYPERLIPIDEMIA LDL GOAL <130 10/31/2010     Priority: Medium     Nonsenile cataract 09/26/2003     Priority: Medium     Problem list name updated by automated process. Provider to review       Other specified glaucoma 09/26/2003     Priority: Medium     Dysplasia of cervix (uteri) 03/05/2003     Priority: Medium     Problem list name updated by automated process. Provider to review and confirm       Female infertility      Priority: Medium     Problem list name updated by automated process. Provider to review       Major depressive disorder, recurrent episode, mild (H)      Priority: Medium      Past Medical History:   Diagnosis Date     Chronic depressive personality disorder      Dysplasia of cervix (uteri) 1988    Cryotherapy     Female infertility of unspecified origin      Past Surgical History:   Procedure Laterality Date     C APPENDECTOMY  6-14-03     C REMV CATARACT INTRACAP,INSERT LENS  2-    right     HC INTRODUCE CATH FALLOPIAN TUBE, RE-OPEN/DIAGNOSIS       HERNIA REPAIR, INCISIONAL  11/11/09     Current Outpatient Prescriptions   Medication Sig Dispense Refill      clobetasol propionate 0.05 % SHAM Apply sparingly to dry scalp, leave in place for 15 minutes, then lather and rinse thoroughly. Do 3-4x a week. 1 Bottle 11     ketoconazole (NIZORAL) 2 % shampoo Apply to the affected area and wash off after 5 minutes.  Alternate with T sal shampoo 120 mL 11     atorvastatin (LIPITOR) 40 MG tablet Take 1 tablet (40 mg) by mouth daily 90 tablet 2     Fluocinolone Acetonide (DERMA-SMOOTHE/FS SCALP) 0.01 % OIL Externally apply 5 mLs topically daily Please schedule a follow up appointment for further refills. 118 mL 0     hydroxychloroquine (PLAQUENIL) 200 MG tablet TAKE 2 AND 1/2 TABLETS BY MOUTH EVERY  tablet 3     hydroxychloroquine (PLAQUENIL) 200 MG tablet TAKE 2 AND 1/2 TABLETS BY MOUTH EVERY  tablet 3     ibuprofen (ADVIL,MOTRIN) 800 MG tablet TAKE ONE TABLET BY MOUTH EVERY 6 HOURS AS NEEDED FOR PAIN 120 tablet 3     aspirin 81 MG tablet Take 81 mg by mouth daily       cyclobenzaprine (FLEXERIL) 10 MG tablet Take 1 tablet (10 mg) by mouth 3 times daily as needed for muscle spasms 90 tablet 1     COENZYME Q-10 PO Take 1 tablet by mouth every other day       Omega-3 Fatty Acids (FISH OIL) 1200 MG capsule Take 3 capsules by mouth daily.       Cholecalciferol (VITAMIN D) 2000 UNIT tablet Take 1 tablet by mouth daily. 100 tablet 12     OTC products: None, except as noted above    Allergies   Allergen Reactions     Ciprofloxacin      hives and was on flagyl too     Metronidazole      hives and was on cipro too      Latex Allergy: NO    Social History   Substance Use Topics     Smoking status: Current Every Day Smoker     Packs/day: 0.50     Years: 25.00     Smokeless tobacco: Never Used      Comment: 2000 quit Quit again 04/2009 started again      Alcohol use Yes      Comment: 1-2 weekly     History   Drug Use No       REVIEW OF SYSTEMS:                                                    C: NEGATIVE for fever, chills, change in weight  I: NEGATIVE for worrisome rashes,  moles or lesions  E: NEGATIVE for vision changes or irritation  E/M: NEGATIVE for ear, mouth and throat problems  R: NEGATIVE for significant cough or SOB  CV: NEGATIVE for chest pain, palpitations or peripheral edema  GI: NEGATIVE for nausea, abdominal pain, heartburn, or change in bowel habits  : NEGATIVE for frequency, dysuria, or hematuria  MUSCULOSKELETAL:knee pain as noted.   N: NEGATIVE for weakness, dizziness or paresthesias  E: NEGATIVE for temperature intolerance, skin/hair changes  H: NEGATIVE for bleeding problems  P: NEGATIVE for changes in mood or affect    EXAM:                                                    LMP 11/22/2011    GENERAL APPEARANCE: healthy, alert and no distress     EYES: EOMI, PERRL     HENT: ear canals and TM's normal and nose and mouth without ulcers or lesions     NECK: no adenopathy, no asymmetry, masses, or scars and thyroid normal to palpation     RESP: lungs clear to auscultation - no rales, rhonchi or wheezes     CV: regular rates and rhythm, normal S1 S2, no S3 or S4 and no murmur, click or rub     ABDOMEN:  soft, nontender, no HSM or masses and bowel sounds normal     MS: extremities normal- no gross deformities noted, no evidence of inflammation in joints, FROM in all extremities.     SKIN: no suspicious lesions or rashes     NEURO: Normal strength and tone, sensory exam grossly normal, mentation intact and speech normal     PSYCH: mentation appears normal. and affect normal/bright    DIAGNOSTICS:                                                    EKG: appears normal, NSR, normal axis, normal intervals, no acute ST/T changes c/w ischemia, no LVH by voltage criteria, unchanged from previous tracings, delayed R wave progression and inverted Twaves in V1/2 are unchanged from EKG in 2005 so no acute issues.  She is able to tolerate over 4 METS activity on a regular basis without any issues.     Recent Labs   Lab Test  01/28/17   0957  07/08/15   1635  07/12/13   6543   09/19/12   1720   HGB   --   13.5   --   13.4   PLT   --   225   --    --    NA   --   141  145*   --    POTASSIUM   --   3.8  4.2   --    CR  0.90  0.89  1.01   --       On no medications that affect renal function or potassium so labs not repeated.   IMPRESSION:                                                    Reason for surgery/procedure: arthritis, left knee with negative impact on quality of life/functionally limiting; assessment for tolerance of procedure and anesthesia  Diagnosis/reason for consult: left knee arthritis;    The proposed surgical procedure is considered INTERMEDIATE risk.    REVISED CARDIAC RISK INDEX  The patient has the following serious cardiovascular risks for perioperative complications such as (MI, PE, VFib and 3  AV Block):  No serious cardiac risks  INTERPRETATION: 0 risks: Class I (very low risk - 0.4% complication rate)    The patient has the following additional risks for perioperative complications:  No identified additional risks        RECOMMENDATIONS:                                                      --Consult hospital rounder / IM to assist post-op medical management; has history of intermittent hypotension    --Patient is to hold all scheduled medications on the day of surgery     APPROVAL GIVEN to proceed with proposed procedure, without further diagnostic evaluation as she is able to tolerate 4 METs activity without any symptoms to suggest angina.    Recommend DVT  prophylaxis per recommendation of Dr. Hull for TKA.        Signed Electronically by: Gregory G. Schoen, MD    Copy of this evaluation report is provided to requesting physician.    [facility] Preop Guidelines

## 2017-09-15 NOTE — NURSING NOTE
"Chief Complaint   Patient presents with     Pre-Op Exam       Initial /64 (BP Location: Right arm, Patient Position: Sitting, Cuff Size: Adult Large)  Pulse 72  Temp 96.4  F (35.8  C) (Temporal)  Resp 16  Ht 5' 5\" (1.651 m)  Wt 225 lb (102.1 kg)  LMP 11/22/2011  SpO2 98%  BMI 37.44 kg/m2 Estimated body mass index is 37.44 kg/(m^2) as calculated from the following:    Height as of this encounter: 5' 5\" (1.651 m).    Weight as of this encounter: 225 lb (102.1 kg).  Medication Reconciliation: complete  "

## 2017-10-05 ENCOUNTER — TRANSFERRED RECORDS (OUTPATIENT)
Dept: HEALTH INFORMATION MANAGEMENT | Facility: CLINIC | Age: 57
End: 2017-10-05

## 2017-11-02 ENCOUNTER — TRANSFERRED RECORDS (OUTPATIENT)
Dept: HEALTH INFORMATION MANAGEMENT | Facility: CLINIC | Age: 57
End: 2017-11-02

## 2017-11-02 DIAGNOSIS — M06.9 RA (RHEUMATOID ARTHRITIS) (H): ICD-10-CM

## 2017-11-02 NOTE — TELEPHONE ENCOUNTER
ibuprofen (ADVIL/MOTRIN) 800 MG tablet      Last Written Prescription Date: 9/8/16  Last Quantity: 120, # refills: 3  Last Office Visit with G, P or [facility] prescribing provider: 9/15/17       Creatinine   Date Value Ref Range Status   01/28/2017 0.90 0.52 - 1.04 mg/dL Final     Lab Results   Component Value Date    AST 17 01/28/2017     Lab Results   Component Value Date    ALT 48 01/28/2017     BP Readings from Last 3 Encounters:   09/15/17 119/64   12/28/16 105/80   12/19/16 118/64

## 2017-11-08 RX ORDER — IBUPROFEN 800 MG/1
TABLET, FILM COATED ORAL
Qty: 120 TABLET | Refills: 2 | Status: SHIPPED | OUTPATIENT
Start: 2017-11-08 | End: 2019-06-19

## 2017-11-08 NOTE — TELEPHONE ENCOUNTER
Prescription approved per [facility] Refill Protocol............QUINN Pacheco  Needs appt and Cr. , ALT check on or after 1/28/18 for cont med use.  Future order placed...........................QUINN Pacheco

## 2017-12-01 ENCOUNTER — HOSPITAL ENCOUNTER (OUTPATIENT)
Dept: MAMMOGRAPHY | Facility: CLINIC | Age: 57
Discharge: HOME | End: 2017-12-01
Attending: FAMILY MEDICINE | Admitting: FAMILY MEDICINE
Payer: COMMERCIAL

## 2017-12-01 DIAGNOSIS — Z12.31 VISIT FOR SCREENING MAMMOGRAM: ICD-10-CM

## 2017-12-01 PROCEDURE — G0202 SCR MAMMO BI INCL CAD: HCPCS

## 2017-12-01 PROCEDURE — 77063 BREAST TOMOSYNTHESIS BI: CPT

## 2018-01-31 DIAGNOSIS — E78.5 HYPERLIPIDEMIA LDL GOAL <130: ICD-10-CM

## 2018-01-31 NOTE — TELEPHONE ENCOUNTER
"Requested Prescriptions   Pending Prescriptions Disp Refills     atorvastatin (LIPITOR) 40 MG tablet [Pharmacy Med Name: ATORVASTATIN 40MG TABLETS] 90 tablet 0     Sig: TAKE 1 TABLET(40 MG) BY MOUTH DAILY    Statins Protocol Failed    1/31/2018  6:56 AM       Failed - LDL on file in past 12 months    Recent Labs   Lab Test  01/28/17   0957   LDL  101*            Passed - No abnormal creatine kinase in past 12 months    No lab results found.         Passed - Recent or future visit with authorizing provider    Patient had office visit in the last year or has a visit in the next 30 days with authorizing provider.  See \"Patient Info\" tab in inbasket, or \"Choose Columns\" in Meds & Orders section of the refill encounter.            Passed - Patient is age 18 or older       Passed - No active pregnancy on record       Passed - No positive pregnancy test in past 12 months          Last Written Prescription Date:  5/2/17  Last Fill Quantity: 90,  # refills: 2   Last Office Visit with [facility] or [facility] prescribing provider:  9-15-17   Future Office Visit:       "

## 2018-02-02 RX ORDER — ATORVASTATIN CALCIUM 40 MG/1
TABLET, FILM COATED ORAL
Qty: 90 TABLET | Refills: 3 | Status: SHIPPED | OUTPATIENT
Start: 2018-02-02 | End: 2019-04-20

## 2018-02-02 NOTE — TELEPHONE ENCOUNTER
Lipitor  Routing refill request to provider for review/approval because:  Drug interaction warning  Labs not current:  Fasting lipids, future labs ordered--routing to schedulers also    Chris Zhao RN, BSN

## 2018-02-03 DIAGNOSIS — M06.9 RA (RHEUMATOID ARTHRITIS) (H): ICD-10-CM

## 2018-02-03 DIAGNOSIS — E78.5 HYPERLIPIDEMIA LDL GOAL <130: ICD-10-CM

## 2018-02-03 LAB
ALT SERPL W P-5'-P-CCNC: 23 U/L (ref 0–50)
CHOLEST SERPL-MCNC: 193 MG/DL
CREAT SERPL-MCNC: 0.86 MG/DL (ref 0.52–1.04)
GFR SERPL CREATININE-BSD FRML MDRD: 68 ML/MIN/1.7M2
HDLC SERPL-MCNC: 80 MG/DL
LDLC SERPL CALC-MCNC: 89 MG/DL
NONHDLC SERPL-MCNC: 113 MG/DL
TRIGL SERPL-MCNC: 121 MG/DL

## 2018-02-03 PROCEDURE — 80061 LIPID PANEL: CPT | Performed by: FAMILY MEDICINE

## 2018-02-03 PROCEDURE — 84460 ALANINE AMINO (ALT) (SGPT): CPT | Performed by: FAMILY MEDICINE

## 2018-02-03 PROCEDURE — 82565 ASSAY OF CREATININE: CPT | Performed by: FAMILY MEDICINE

## 2018-02-03 PROCEDURE — 36415 COLL VENOUS BLD VENIPUNCTURE: CPT | Performed by: FAMILY MEDICINE

## 2018-02-13 ENCOUNTER — TRANSFERRED RECORDS (OUTPATIENT)
Dept: HEALTH INFORMATION MANAGEMENT | Facility: CLINIC | Age: 58
End: 2018-02-13

## 2018-09-28 DIAGNOSIS — M06.9 RHEUMATOID ARTHRITIS INVOLVING MULTIPLE SITES, UNSPECIFIED RHEUMATOID FACTOR PRESENCE: Primary | ICD-10-CM

## 2018-09-28 RX ORDER — HYDROXYCHLOROQUINE SULFATE 200 MG/1
TABLET, FILM COATED ORAL
Qty: 450 TABLET | Refills: 0 | Status: SHIPPED | OUTPATIENT
Start: 2018-09-28 | End: 2019-05-10

## 2018-09-28 NOTE — TELEPHONE ENCOUNTER
Hydroxychloroquine 200 MG       Last Written Prescription Date:  3/29/17  Last Fill Quantity: 450,   # refills: 3  Last Office Visit: 9-15-17  Future Office visit:       Routing refill request to provider for review/approval because:  Drug not on the [facility], P or [facility] refill protocol or controlled substance

## 2018-12-21 ENCOUNTER — HOSPITAL ENCOUNTER (OUTPATIENT)
Dept: MAMMOGRAPHY | Facility: CLINIC | Age: 58
Discharge: HOME | End: 2018-12-21
Attending: FAMILY MEDICINE | Admitting: FAMILY MEDICINE
Payer: COMMERCIAL

## 2018-12-21 DIAGNOSIS — Z12.31 VISIT FOR SCREENING MAMMOGRAM: ICD-10-CM

## 2018-12-21 PROCEDURE — 77067 SCR MAMMO BI INCL CAD: CPT

## 2019-03-22 ENCOUNTER — OFFICE VISIT (OUTPATIENT)
Dept: FAMILY MEDICINE | Facility: CLINIC | Age: 59
End: 2019-03-22
Payer: COMMERCIAL

## 2019-03-22 VITALS
DIASTOLIC BLOOD PRESSURE: 76 MMHG | HEIGHT: 65 IN | TEMPERATURE: 98.4 F | OXYGEN SATURATION: 97 % | RESPIRATION RATE: 14 BRPM | WEIGHT: 241.5 LBS | SYSTOLIC BLOOD PRESSURE: 102 MMHG | HEART RATE: 70 BPM | BODY MASS INDEX: 40.24 KG/M2

## 2019-03-22 DIAGNOSIS — R31.9 URINARY TRACT INFECTION WITH HEMATURIA, SITE UNSPECIFIED: ICD-10-CM

## 2019-03-22 DIAGNOSIS — N39.0 URINARY TRACT INFECTION WITH HEMATURIA, SITE UNSPECIFIED: ICD-10-CM

## 2019-03-22 DIAGNOSIS — R30.0 DYSURIA: Primary | ICD-10-CM

## 2019-03-22 DIAGNOSIS — N89.8 VAGINAL DISCHARGE: ICD-10-CM

## 2019-03-22 PROBLEM — E66.01 MORBID OBESITY (H): Status: ACTIVE | Noted: 2019-03-22

## 2019-03-22 LAB
ALBUMIN UR-MCNC: ABNORMAL MG/DL
APPEARANCE UR: CLEAR
BACTERIA #/AREA URNS HPF: ABNORMAL /HPF
BILIRUB UR QL STRIP: NEGATIVE
COLOR UR AUTO: YELLOW
GLUCOSE UR STRIP-MCNC: NEGATIVE MG/DL
HGB UR QL STRIP: NEGATIVE
KETONES UR STRIP-MCNC: NEGATIVE MG/DL
LEUKOCYTE ESTERASE UR QL STRIP: ABNORMAL
NITRATE UR QL: NEGATIVE
NON-SQ EPI CELLS #/AREA URNS LPF: ABNORMAL /LPF
PH UR STRIP: 5.5 PH (ref 5–7)
RBC #/AREA URNS AUTO: ABNORMAL /HPF
SOURCE: ABNORMAL
SP GR UR STRIP: >1.03 (ref 1–1.03)
SPECIMEN SOURCE: NORMAL
UROBILINOGEN UR STRIP-ACNC: 0.2 EU/DL (ref 0.2–1)
WBC #/AREA URNS AUTO: ABNORMAL /HPF
WET PREP SPEC: NORMAL

## 2019-03-22 PROCEDURE — 87210 SMEAR WET MOUNT SALINE/INK: CPT | Performed by: PHYSICIAN ASSISTANT

## 2019-03-22 PROCEDURE — 87086 URINE CULTURE/COLONY COUNT: CPT | Performed by: PHYSICIAN ASSISTANT

## 2019-03-22 PROCEDURE — 99214 OFFICE O/P EST MOD 30 MIN: CPT | Performed by: PHYSICIAN ASSISTANT

## 2019-03-22 PROCEDURE — 81001 URINALYSIS AUTO W/SCOPE: CPT | Performed by: PHYSICIAN ASSISTANT

## 2019-03-22 RX ORDER — NITROFURANTOIN 25; 75 MG/1; MG/1
100 CAPSULE ORAL 2 TIMES DAILY
Qty: 14 CAPSULE | Refills: 0 | Status: SHIPPED | OUTPATIENT
Start: 2019-03-22 | End: 2019-05-06

## 2019-03-22 ASSESSMENT — ANXIETY QUESTIONNAIRES
7. FEELING AFRAID AS IF SOMETHING AWFUL MIGHT HAPPEN: NOT AT ALL
5. BEING SO RESTLESS THAT IT IS HARD TO SIT STILL: NOT AT ALL
IF YOU CHECKED OFF ANY PROBLEMS ON THIS QUESTIONNAIRE, HOW DIFFICULT HAVE THESE PROBLEMS MADE IT FOR YOU TO DO YOUR WORK, TAKE CARE OF THINGS AT HOME, OR GET ALONG WITH OTHER PEOPLE: NOT DIFFICULT AT ALL
3. WORRYING TOO MUCH ABOUT DIFFERENT THINGS: NOT AT ALL
GAD7 TOTAL SCORE: 0
1. FEELING NERVOUS, ANXIOUS, OR ON EDGE: NOT AT ALL
2. NOT BEING ABLE TO STOP OR CONTROL WORRYING: NOT AT ALL
6. BECOMING EASILY ANNOYED OR IRRITABLE: NOT AT ALL

## 2019-03-22 ASSESSMENT — PATIENT HEALTH QUESTIONNAIRE - PHQ9
SUM OF ALL RESPONSES TO PHQ QUESTIONS 1-9: 0
5. POOR APPETITE OR OVEREATING: NOT AT ALL

## 2019-03-22 ASSESSMENT — MIFFLIN-ST. JEOR: SCORE: 1681.32

## 2019-03-22 NOTE — PATIENT INSTRUCTIONS
Patient Education     Urinary Tract Infections in Women    Urinary tract infections (UTIs) are most often caused by bacteria. These bacteria enter the urinary tract. The bacteria may come from outside the body. Or they may travel from the skin outside the rectum or vagina into the urethra. Female anatomy makes it easy for bacteria from the bowel to enter a woman s urinary tract, which is the most common source of UTI. This means women develop UTIs more often than men. Pain in or around the urinary tract is a common UTI symptom. But the only way to know for sure if you have a UTI for the healthcare provider to test your urine. The two tests that may be done are the urinalysis and urine culture.  Types of UTIs    Cystitis. A bladder infection (cystitis) is the most common UTI in women. You may have urgent or frequent urination. You may also have pain, burning when you urinate, and bloody urine.    Urethritis. This is an inflamed urethra, which is the tube that carries urine from the bladder to outside the body. You may have lower stomach or back pain. You may also have urgent or frequent urination.    Pyelonephritis. This is a kidney infection. If not treated, it can be serious and damage your kidneys. In severe cases, you may need to stay in the hospital. You may have a fever and lower back pain.  Medicines to treat a UTI  Most UTIs are treated with antibiotics. These kill the bacteria. The length of time you need to take them depends on the type of infection. It may be as short as 3 days. If you have repeated UTIs, you may need a low-dose antibiotic for several months. Take antibiotics exactly as directed. Don t stop taking them until all of the medicine is gone. If you stop taking the antibiotic too soon, the infection may not go away. You may also develop a resistance to the antibiotic. This can make it much harder to treat.  Lifestyle changes to treat and prevent UTIs  The lifestyle changes below will help get  rid of your UTI. They may also help prevent future UTIs.    Drink plenty of fluids. This includes water, juice, or other caffeine-free drinks. Fluids help flush bacteria out of your body.    Empty your bladder. Always empty your bladder when you feel the urge to urinate. And always urinate before going to sleep. Urine that stays in your bladder can lead to infection. Try to urinate before and after sex as well.    Practice good personal hygiene. Wipe yourself from front to back after using the toilet. This helps keep bacteria from getting into the urethra.    Use condoms during sex. These help prevent UTIs caused by sexually transmitted bacteria. Also don't use spermicides during sex. These can increase the risk for UTIs. Choose other forms of birth control instead. For women who tend to get UTIs after sex, a low-dose of a preventive antibiotic may be used. Be sure to discuss this option with your healthcare provider.    Follow up with your healthcare provider as directed. He or she may test to make sure the infection has cleared. If needed, more treatment may be started.  Date Last Reviewed: 1/1/2017 2000-2018 [employer]. [address] [place] [address], [place] [address]. All rights reserved. This information is not intended as a substitute for professional medical care. Always follow your healthcare professional's instructions.

## 2019-03-22 NOTE — PROGRESS NOTES
"  SUBJECTIVE:   Kesha Zacarias is a 58 year old female who presents to clinic today for the following health issues:      HPI  Vaginal Symptoms  Onset: 3 months     Description:  Vaginal Discharge: green discharge    Itching (Pruritis): no   Burning sensation:  YES  Odor: YES, painful     Accompanying Signs & Symptoms:  Pain with Urination: YES  Abdominal Pain: no   Fever: no     History:   Sexually active: YES  New Partner: no   Possibility of Pregnancy:  No    Precipitating factors:   Recent Antibiotic Use: no     Alleviating factors:  none    Therapies Tried and outcome: Monistat over the counter a few times   Problem list and histories reviewed & adjusted, as indicated.  Additional history: as documented    Patient has been having pain with urination as well as some greenish vaginal discharge. She says she has been having symptoms off and on for the last 3 months and in the last few days has had increased urinary symptoms.     BP Readings from Last 3 Encounters:   03/22/19 102/76   09/15/17 119/64   12/28/16 105/80    Wt Readings from Last 3 Encounters:   03/22/19 109.5 kg (241 lb 8 oz)   09/15/17 102.1 kg (225 lb)   12/28/16 97.5 kg (215 lb)        ROS:  Constitutional, HEENT, cardiovascular, pulmonary, gi and gu systems are negative, except as otherwise noted.    OBJECTIVE:     /76   Pulse 70   Temp 98.4  F (36.9  C) (Oral)   Resp 14   Ht 1.651 m (5' 5\")   Wt 109.5 kg (241 lb 8 oz)   LMP 11/22/2011 (Exact Date)   SpO2 97%   Breastfeeding? No   BMI 40.19 kg/m    Body mass index is 40.19 kg/m .  GENERAL: healthy, alert and no distress  CV: regular rates and rhythm, peripheral pulses strong and no peripheral edema   (female): normal female external genitalia, normal urethral meatus , vaginal discharge - scant and yellow and normal cervix, adnexae, and uterus without masses.  MS: no gross musculoskeletal defects noted, no edema  SKIN: no suspicious lesions or rashes    Diagnostic Test " Results:  Results for orders placed or performed in visit on 03/22/19   *UA reflex to Microscopic and Culture ([place] and [facility] (except [place] and [place])   Result Value Ref Range    Color Urine Yellow     Appearance Urine Clear     Glucose Urine Negative NEG^Negative mg/dL    Bilirubin Urine Negative NEG^Negative    Ketones Urine Negative NEG^Negative mg/dL    Specific Gravity Urine >1.030 1.003 - 1.035    Blood Urine Negative NEG^Negative    pH Urine 5.5 5.0 - 7.0 pH    Protein Albumin Urine Trace (A) NEG^Negative mg/dL    Urobilinogen Urine 0.2 0.2 - 1.0 EU/dL    Nitrite Urine Negative NEG^Negative    Leukocyte Esterase Urine Moderate (A) NEG^Negative    Source Urine    Urine Microscopic   Result Value Ref Range    WBC Urine 5-10 (A) OTO5^0 - 5 /HPF    RBC Urine O - 2 OTO2^O - 2 /HPF    Squamous Epithelial /LPF Urine Few FEW^Few /LPF    Bacteria Urine Few (A) NEG^Negative /HPF   Wet prep   Result Value Ref Range    Specimen Description Vagina     Wet Prep No Trichomonas seen     Wet Prep No clue cells seen     Wet Prep No yeast seen        ASSESSMENT/PLAN:       ICD-10-CM    1. Dysuria R30.0 *UA reflex to Microscopic and Culture ([place] and [facility] (except [place] and [place])     Urine Microscopic     Urine Culture Aerobic Bacterial   2. Urinary tract infection with hematuria, site unspecified N39.0 nitroFURantoin macrocrystal-monohydrate (MACROBID) 100 MG capsule    R31.9    3. Vaginal discharge N89.8 Wet prep       I will treat for UTI and have her follow up as needed if symptoms persist or worsen.   See Patient Instructions    Alma Resendez PA-C  [facility]

## 2019-03-23 LAB
BACTERIA SPEC CULT: NORMAL
Lab: NORMAL
SPECIMEN SOURCE: NORMAL

## 2019-03-23 ASSESSMENT — ANXIETY QUESTIONNAIRES: GAD7 TOTAL SCORE: 0

## 2019-04-20 DIAGNOSIS — E78.5 HYPERLIPIDEMIA LDL GOAL <130: ICD-10-CM

## 2019-04-20 DIAGNOSIS — M06.9 RHEUMATOID ARTHRITIS INVOLVING MULTIPLE SITES, UNSPECIFIED RHEUMATOID FACTOR PRESENCE: Primary | ICD-10-CM

## 2019-04-20 DIAGNOSIS — M06.9 RA (RHEUMATOID ARTHRITIS) (H): ICD-10-CM

## 2019-04-22 RX ORDER — ATORVASTATIN CALCIUM 40 MG/1
TABLET, FILM COATED ORAL
Qty: 90 TABLET | Refills: 0 | Status: SHIPPED | OUTPATIENT
Start: 2019-04-22 | End: 2019-07-18

## 2019-04-22 RX ORDER — HYDROXYCHLOROQUINE SULFATE 200 MG/1
TABLET, FILM COATED ORAL
Qty: 450 TABLET | Refills: 0 | Status: SHIPPED | OUTPATIENT
Start: 2019-04-22 | End: 2019-09-05

## 2019-04-22 NOTE — TELEPHONE ENCOUNTER
"Routing to PCP for refill if appropriate.   Routing to schedulers for appointment with PCP.     Hydroxychloroquine  Last Written Prescription Date:  09/28/2018  Last Fill Quantity: 450,  # refills: 0   Last office visit: 09/15/2017 with prescribing provider:  Schoen   Future Office Visit:  None  Routing refill request to provider for review/approval because:  Drug not on the [facility] refill protocol     Lipitor  Last Written Prescription Date:  02/02/2018  Last Fill Quantity: 90,  # refills: 3   Last office visit: 09/15/2017 with prescribing provider:  Schoen   Future Office Visit:  None  Routing refill request to provider for review/approval because:  Labs not current:  LDL    Requested Prescriptions   Pending Prescriptions Disp Refills     atorvastatin (LIPITOR) 40 MG tablet [Pharmacy Med Name: ATORVASTATIN 40MG TABLETS] 90 tablet 0     Sig: TAKE ONE TABLET BY MOUTH DAILY       Statins Protocol Failed - 4/20/2019  8:17 AM        Failed - LDL on file in past 12 months     Recent Labs   Lab Test 02/03/18  0818   LDL 89           Failed - Recent (12 mo) or future (30 days) visit within the authorizing provider's specialty     Patient had office visit in the last 12 months or has a visit in the next 30 days with authorizing provider or within the authorizing provider's specialty.  See \"Patient Info\" tab in inbasket, or \"Choose Columns\" in Meds & Orders section of the refill encounter.          Passed - No abnormal creatine kinase in past 12 months     No lab results found.         Passed - Medication is active on med list        Passed - Patient is age 18 or older        Passed - No active pregnancy on record        Passed - No positive pregnancy test in past 12 months        hydroxychloroquine (PLAQUENIL) 200 MG tablet [Pharmacy Med Name: HYDROXYCHLOROQUINE 200MGTABLETS] 450 tablet 0     Sig: TAKE 2 AND 1/2 TABLETS BY MOUTH EVERY DAY       There is no refill protocol information for this order      Ana Lilia Pablo RN   "

## 2019-04-29 ASSESSMENT — ENCOUNTER SYMPTOMS
DIARRHEA: 0
FREQUENCY: 0
HEARTBURN: 0
SHORTNESS OF BREATH: 0
HEMATURIA: 0
PARESTHESIAS: 0
BREAST MASS: 0
NAUSEA: 0
ARTHRALGIAS: 1
WEAKNESS: 0
NERVOUS/ANXIOUS: 0
ABDOMINAL PAIN: 0
CHILLS: 0
CONSTIPATION: 0
DIZZINESS: 0
MYALGIAS: 1
SORE THROAT: 0
PALPITATIONS: 0
FEVER: 0
COUGH: 0
DYSURIA: 0
JOINT SWELLING: 0
HEADACHES: 0
HEMATOCHEZIA: 0
EYE PAIN: 0

## 2019-05-06 ENCOUNTER — OFFICE VISIT (OUTPATIENT)
Dept: FAMILY MEDICINE | Facility: CLINIC | Age: 59
End: 2019-05-06
Payer: COMMERCIAL

## 2019-05-06 VITALS
TEMPERATURE: 97.1 F | DIASTOLIC BLOOD PRESSURE: 72 MMHG | OXYGEN SATURATION: 97 % | HEART RATE: 79 BPM | SYSTOLIC BLOOD PRESSURE: 116 MMHG | BODY MASS INDEX: 40.27 KG/M2 | WEIGHT: 242 LBS

## 2019-05-06 DIAGNOSIS — M06.9 RHEUMATOID ARTHRITIS INVOLVING MULTIPLE SITES, UNSPECIFIED RHEUMATOID FACTOR PRESENCE: ICD-10-CM

## 2019-05-06 DIAGNOSIS — L65.9 HAIR LOSS: ICD-10-CM

## 2019-05-06 DIAGNOSIS — Z00.00 WELL ADULT EXAM: Primary | ICD-10-CM

## 2019-05-06 DIAGNOSIS — E66.01 MORBID OBESITY (H): ICD-10-CM

## 2019-05-06 DIAGNOSIS — E78.5 HYPERLIPIDEMIA LDL GOAL <130: ICD-10-CM

## 2019-05-06 PROCEDURE — 99396 PREV VISIT EST AGE 40-64: CPT | Performed by: FAMILY MEDICINE

## 2019-05-06 ASSESSMENT — ENCOUNTER SYMPTOMS
PALPITATIONS: 0
JOINT SWELLING: 0
HEARTBURN: 0
HEMATURIA: 0
NERVOUS/ANXIOUS: 0
FREQUENCY: 0
DYSURIA: 0
BREAST MASS: 0
HEADACHES: 0
COUGH: 0
CHILLS: 0
WEAKNESS: 0
SHORTNESS OF BREATH: 0
DIARRHEA: 0
EYE PAIN: 0
DIZZINESS: 0
HEMATOCHEZIA: 0
FEVER: 0
ARTHRALGIAS: 1
NAUSEA: 0
ABDOMINAL PAIN: 0
MYALGIAS: 1
SORE THROAT: 0
PARESTHESIAS: 0
CONSTIPATION: 0

## 2019-05-06 ASSESSMENT — PAIN SCALES - GENERAL: PAINLEVEL: NO PAIN (0)

## 2019-05-06 NOTE — PROGRESS NOTES
SUBJECTIVE:   CC: Kesha Zacarias is an 58 year old woman who presents for preventive health visit.     Healthy Habits:     Getting at least 3 servings of Calcium per day:  Yes    Bi-annual eye exam:  Yes    Dental care twice a year:  NO    Sleep apnea or symptoms of sleep apnea:  Excessive snoring    Diet:  Low fat/cholesterol, Carbohydrate counting and Other    Frequency of exercise:  2-3 days/week    Duration of exercise:  30-45 minutes    Taking medications regularly:  Yes    Medication side effects:  Muscle aches    PHQ-2 Total Score: 0    Additional concerns today:  No    Long history of snoring. Doesn't always sleep through the night (leg cramps) and can be poorly rested the next day but if not awakened by cramps, she feels well the next day. She feels she has more of an issue with restless legs than a sleep disorder.    Otherwise states she is doing well overall.  She is frustrated with weight gain after she quit smoking.  States her joint aches are doing fine on current meds (plaquenil). She is also on lipitor and gets some aches from that but is also taking CoQ10 and this helps. She is concerned about thyroid disease as she notes some hair loss and brittle nails.     She is on a 5 year plan for colonoscopy with that being due in 2021.  Pap, as noted below, is due in 2020.  She chooses to get annual mammograms and will arrange for that when due.           Today's PHQ-2 Score:   PHQ-2 ( 1999 Pfizer) 4/29/2019   Q1: Little interest or pleasure in doing things 0   Q2: Feeling down, depressed or hopeless 0   PHQ-2 Score 0   Q1: Little interest or pleasure in doing things Not at all   Q2: Feeling down, depressed or hopeless Not at all   PHQ-2 Score 0       Abuse: Current or Past(Physical, Sexual or Emotional)- No  Do you feel safe in your environment? Yes    Social History     Tobacco Use     Smoking status: Former Smoker     Packs/day: 0.50     Years: 25.00     Pack years: 12.50     Smokeless tobacco: Never  Used     Tobacco comment: quit sept. 2017    Substance Use Topics     Alcohol use: Yes     Comment: 1-2 weekly         Alcohol Use 4/29/2019   Prescreen: >3 drinks/day or >7 drinks/week? No   Prescreen: >3 drinks/day or >7 drinks/week? -           Mammogram Screening: Patient over age 50, mutual decision to screen reflected in health maintenance.    Pertinent mammograms are reviewed under the imaging tab.  History of abnormal Pap smear: NO - age 30-65 PAP every 5 years with negative HPV co-testing recommended  PAP / HPV Latest Ref Rng & Units 10/16/2015 9/19/2012 9/2/2009   PAP - NIL NIL NIL   HPV 16 DNA NEG Negative - -   HPV 18 DNA NEG Negative - -   OTHER HR HPV NEG Negative - -     Reviewed and updated as needed this visit by clinical staff  Tobacco  Allergies  Meds  Med Hx  Surg Hx  Fam Hx  Soc Hx        Reviewed and updated as needed this visit by Provider            Review of Systems   Constitutional: Negative for chills and fever.   HENT: Negative for congestion, ear pain, hearing loss and sore throat.    Eyes: Negative for pain and visual disturbance.   Respiratory: Negative for cough and shortness of breath.    Cardiovascular: Positive for peripheral edema. Negative for chest pain and palpitations.   Gastrointestinal: Negative for abdominal pain, constipation, diarrhea, heartburn, hematochezia and nausea.   Breasts:  Negative for tenderness, breast mass and discharge.   Genitourinary: Positive for vaginal discharge. Negative for dysuria, frequency, genital sores, hematuria, pelvic pain, urgency and vaginal bleeding.   Musculoskeletal: Positive for arthralgias and myalgias. Negative for joint swelling.   Skin: Negative for rash.   Neurological: Negative for dizziness, weakness, headaches and paresthesias.   Psychiatric/Behavioral: Negative for mood changes. The patient is not nervous/anxious.      She has been tested on a couple of occasions for vaginosis with no findings of yeast, BV, etc.        OBJECTIVE:   /72 (Cuff Size: Adult Large)   Pulse 79   Temp 97.1  F (36.2  C) (Temporal)   Wt 109.8 kg (242 lb)   LMP 11/22/2011 (Exact Date)   SpO2 97%   BMI 40.27 kg/m    Physical Exam  GENERAL: healthy, alert and no distress  EYES: Eyes grossly normal to inspection, PERRL and conjunctivae and sclerae normal  HENT: ear canals and TM's normal, nose and mouth without ulcers or lesions  NECK: no adenopathy, no asymmetry, masses, or scars and thyroid normal to palpation  RESP: lungs clear to auscultation - no rales, rhonchi or wheezes  CV: regular rate and rhythm, normal S1 S2, no S3 or S4, no murmur, click or rub, no peripheral edema and peripheral pulses strong  ABDOMEN: soft, nontender, no hepatosplenomegaly, no masses and bowel sounds normal  MS: no gross musculoskeletal defects noted, no edema  SKIN: no suspicious lesions or rashes  NEURO: Normal strength and tone, mentation intact and speech normal  PSYCH: mentation appears normal, affect normal/bright        ASSESSMENT/PLAN:        Well adult exam  Hyperlipidemia LDL goal <130  Rheumatoid arthritis involving multiple sites, unspecified rheumatoid factor presence (H)  Hair loss  Morbid obesity (H)    As noted above, is doing well overall. We discussed diet/exercise and consideration of structured weight loss programs.  She will think about that.    No changes in her current medications.    Will check a TSH with her labs given her concerns about hair loss and brittle nails.    She is up to date on cancer and cervical cancer screening.  She is choosing annual mammography and will make her own arrangements.    DEXA scan recommended and she is scheduled for that.   Discussed shingles vaccine and she declined at this time.   She will return fasting for blood work to assess her lipids as well as check chemistry profile.   Will contact her with results when available.  Annual exams recommended.     COUNSELING:  Reviewed preventive health counseling, as  "reflected in patient instructions       Regular exercise       Healthy diet/nutrition    BP Readings from Last 1 Encounters:   05/06/19 116/72     Estimated body mass index is 40.27 kg/m  as calculated from the following:    Height as of 3/22/19: 1.651 m (5' 5\").    Weight as of this encounter: 109.8 kg (242 lb).      Weight management plan: Discussed healthy diet and exercise guidelines     reports that she has quit smoking. She has a 12.50 pack-year smoking history. She has never used smokeless tobacco.      Counseling Resources:  ATP IV Guidelines  Pooled Cohorts Equation Calculator  Breast Cancer Risk Calculator  FRAX Risk Assessment  ICSI Preventive Guidelines  Dietary Guidelines for Americans, 2010  USDA's MyPlate  ASA Prophylaxis  Lung CA Screening    Gregory G. Schoen, MD  [facility]  "

## 2019-05-09 ENCOUNTER — TELEPHONE (OUTPATIENT)
Dept: FAMILY MEDICINE | Facility: CLINIC | Age: 59
End: 2019-05-09

## 2019-05-09 DIAGNOSIS — G25.81 RESTLESS LEGS SYNDROME: Primary | ICD-10-CM

## 2019-05-09 RX ORDER — CYCLOBENZAPRINE HCL 5 MG
5 TABLET ORAL AT BEDTIME
Qty: 30 TABLET | Refills: 11 | Status: SHIPPED | OUTPATIENT
Start: 2019-05-09 | End: 2020-02-21

## 2019-05-09 NOTE — TELEPHONE ENCOUNTER
Reason for Call:  Other patient information    Detailed comments: patient calling states she saw Dr. Schoen on Monday and was told to start her FLEXERIL and let him know how she was doing. Patient states she has been taking 5 mg at night and said it was working well. Patient would like to get the Rx for 5mg tablets. Patient is aware that provider is out of the office today.    Phone Number Patient can be reached at: Cell number on file:    Telephone Information:   Mobile [phone]       Best Time: any    Can we leave a detailed message on this number? YES    Call taken on 5/9/2019 at 3:04 PM by Mae Lala

## 2019-05-11 DIAGNOSIS — L65.9 HAIR LOSS: ICD-10-CM

## 2019-05-11 DIAGNOSIS — M06.9 RHEUMATOID ARTHRITIS INVOLVING MULTIPLE SITES, UNSPECIFIED RHEUMATOID FACTOR PRESENCE: ICD-10-CM

## 2019-05-11 DIAGNOSIS — E78.5 HYPERLIPIDEMIA LDL GOAL <130: ICD-10-CM

## 2019-05-11 LAB
ALBUMIN SERPL-MCNC: 3.9 G/DL (ref 3.4–5)
ALP SERPL-CCNC: 75 U/L (ref 40–150)
ALT SERPL W P-5'-P-CCNC: 29 U/L (ref 0–50)
ANION GAP SERPL CALCULATED.3IONS-SCNC: 6 MMOL/L (ref 3–14)
AST SERPL W P-5'-P-CCNC: 17 U/L (ref 0–45)
BILIRUB SERPL-MCNC: 0.7 MG/DL (ref 0.2–1.3)
BUN SERPL-MCNC: 17 MG/DL (ref 7–30)
CALCIUM SERPL-MCNC: 8.9 MG/DL (ref 8.5–10.1)
CHLORIDE SERPL-SCNC: 107 MMOL/L (ref 94–109)
CHOLEST SERPL-MCNC: 162 MG/DL
CO2 SERPL-SCNC: 31 MMOL/L (ref 20–32)
CREAT SERPL-MCNC: 0.78 MG/DL (ref 0.52–1.04)
GFR SERPL CREATININE-BSD FRML MDRD: 83 ML/MIN/{1.73_M2}
GLUCOSE SERPL-MCNC: 98 MG/DL (ref 70–99)
HDLC SERPL-MCNC: 61 MG/DL
LDLC SERPL CALC-MCNC: 79 MG/DL
NONHDLC SERPL-MCNC: 101 MG/DL
POTASSIUM SERPL-SCNC: 4.1 MMOL/L (ref 3.4–5.3)
PROT SERPL-MCNC: 7.1 G/DL (ref 6.8–8.8)
SODIUM SERPL-SCNC: 144 MMOL/L (ref 133–144)
TRIGL SERPL-MCNC: 108 MG/DL
TSH SERPL DL<=0.005 MIU/L-ACNC: 1.44 MU/L (ref 0.4–4)

## 2019-05-11 PROCEDURE — 80061 LIPID PANEL: CPT | Performed by: FAMILY MEDICINE

## 2019-05-11 PROCEDURE — 36415 COLL VENOUS BLD VENIPUNCTURE: CPT | Performed by: FAMILY MEDICINE

## 2019-05-11 PROCEDURE — 80053 COMPREHEN METABOLIC PANEL: CPT | Performed by: FAMILY MEDICINE

## 2019-05-11 PROCEDURE — 84443 ASSAY THYROID STIM HORMONE: CPT | Performed by: FAMILY MEDICINE

## 2019-06-19 DIAGNOSIS — M06.9 RHEUMATOID ARTHRITIS INVOLVING MULTIPLE SITES, UNSPECIFIED RHEUMATOID FACTOR PRESENCE: Primary | ICD-10-CM

## 2019-06-19 DIAGNOSIS — M06.9 RA (RHEUMATOID ARTHRITIS) (H): ICD-10-CM

## 2019-06-21 NOTE — TELEPHONE ENCOUNTER
"Ibuprofen  Last Written Prescription Date:  11/08/2017  Last Fill Quantity: 120,  # refills: 2   Last office visit: 5/6/2019 with prescribing provider:  schoen   Future Office Visit:  None  Routing refill request to provider for review/approval because:  Labs not current:  CBC    Requested Prescriptions   Pending Prescriptions Disp Refills     ibuprofen (ADVIL/MOTRIN) 800 MG tablet [Pharmacy Med Name: IBUPROFEN 800MG TABLETS] 120 tablet 0     Sig: TAKE 1 TABLET BY MOUTH EVERY 6 HOURS AS NEEDED FOR PAIN       NSAID Medications Failed - 6/19/2019  8:09 PM        Failed - Normal CBC on file in past 12 months     Recent Labs   Lab Test 07/08/15  1635   WBC 6.1   RBC 4.58   HGB 13.5   HCT 41.7              Passed - Blood pressure under 140/90 in past 12 months     BP Readings from Last 3 Encounters:   05/06/19 116/72   03/22/19 102/76   09/15/17 119/64           Passed - Normal ALT on file in past 12 months     Recent Labs   Lab Test 05/11/19  0823   ALT 29           Passed - Normal AST on file in past 12 months     Recent Labs   Lab Test 05/11/19  0823   AST 17           Passed - Recent (12 mo) or future (30 days) visit within the authorizing provider's specialty     Patient had office visit in the last 12 months or has a visit in the next 30 days with authorizing provider or within the authorizing provider's specialty.  See \"Patient Info\" tab in inbasket, or \"Choose Columns\" in Meds & Orders section of the refill encounter.          Passed - Patient is age 6-64 years        Passed - Medication is active on med list        Passed - No active pregnancy on record        Passed - Normal serum creatinine on file in past 12 months     Recent Labs   Lab Test 05/11/19  0823   CR 0.78           Passed - No positive pregnancy test in past 12 months      Ana Lilia Pablo RN   "

## 2019-06-24 RX ORDER — IBUPROFEN 800 MG/1
TABLET, FILM COATED ORAL
Qty: 120 TABLET | Refills: 0 | Status: SHIPPED | OUTPATIENT
Start: 2019-06-24 | End: 2020-01-21

## 2019-06-27 ENCOUNTER — OFFICE VISIT (OUTPATIENT)
Dept: ORTHOPEDICS | Facility: OTHER | Age: 59
End: 2019-06-27
Payer: COMMERCIAL

## 2019-06-27 ENCOUNTER — ANCILLARY PROCEDURE (OUTPATIENT)
Dept: GENERAL RADIOLOGY | Facility: OTHER | Age: 59
End: 2019-06-27
Attending: ORTHOPAEDIC SURGERY
Payer: COMMERCIAL

## 2019-06-27 VITALS
BODY MASS INDEX: 39.82 KG/M2 | SYSTOLIC BLOOD PRESSURE: 104 MMHG | WEIGHT: 239 LBS | DIASTOLIC BLOOD PRESSURE: 60 MMHG | HEIGHT: 65 IN

## 2019-06-27 DIAGNOSIS — M70.61 TROCHANTERIC BURSITIS OF RIGHT HIP: Primary | ICD-10-CM

## 2019-06-27 DIAGNOSIS — M16.11 PRIMARY OSTEOARTHRITIS OF RIGHT HIP: ICD-10-CM

## 2019-06-27 DIAGNOSIS — M25.551 RIGHT HIP PAIN: ICD-10-CM

## 2019-06-27 PROCEDURE — 20610 DRAIN/INJ JOINT/BURSA W/O US: CPT | Mod: RT | Performed by: ORTHOPAEDIC SURGERY

## 2019-06-27 PROCEDURE — 99203 OFFICE O/P NEW LOW 30 MIN: CPT | Mod: 25 | Performed by: ORTHOPAEDIC SURGERY

## 2019-06-27 PROCEDURE — 73502 X-RAY EXAM HIP UNI 2-3 VIEWS: CPT

## 2019-06-27 RX ORDER — TRIAMCINOLONE ACETONIDE 40 MG/ML
40 INJECTION, SUSPENSION INTRA-ARTICULAR; INTRAMUSCULAR ONCE
Status: COMPLETED | OUTPATIENT
Start: 2019-06-27 | End: 2019-06-27

## 2019-06-27 RX ADMIN — TRIAMCINOLONE ACETONIDE 40 MG: 40 INJECTION, SUSPENSION INTRA-ARTICULAR; INTRAMUSCULAR at 18:01

## 2019-06-27 ASSESSMENT — PAIN SCALES - GENERAL: PAINLEVEL: SEVERE PAIN (6)

## 2019-06-27 ASSESSMENT — MIFFLIN-ST. JEOR: SCORE: 1664.98

## 2019-06-27 NOTE — LETTER
6/27/2019         RE: Kesha Zacarias  [address] [address] [place] [id number]        Dear Colleague,    Thank you for referring your patient, Kesha Zacarias, to the [facility]. Please see a copy of my visit note below.    Prior to injection, verified patient identity using patient's name and date of birth.  Due to injection administration, patient instructed to remain in clinic for 15 minutes  afterwards, and to report any adverse reaction to me immediately.    Joint injection was performed.      Drug Amount Wasted:  None.  Vial/Syringe: Single dose vial  Expiration Date:  1/2021    The following medication was given by Ridge Rendon, APRN, CNP, DNP:     MEDICATION: Kenalog 40mg/1ml  ROUTE: Joint Injection  SITE: right hip  DOSE: 1 mL  LOT #: [id number]  : [employer]  EXPIRATION DATE:  1/2021  NDC: [address]-6                  ORTHOPEDIC CONSULT      Chief Complaint: Kesha Zacarias is a 59 year old female who is being seen for Chief Complaint   Patient presents with     Musculoskeletal Problem     right hip pain     Consult     self       History of Present Illness:   Kesha Zacarias is a 59 year old female who is seen in consultation at the request of self for evaluation of right hip pain.  Mechanism of Injury: No trauma or inciting event. Started about 6-8 weeks ago mild deep hip and groin pain, then about 2-3 weeks got much worse after stepping in a hole.  The pain is described as a severe lateral deep groin pain, lower buttocks. Does not radiate down leg. Sharp aching pain. No numbness/tingling. Worst with laying in bed on that side, also with walking and activity.  Better with position changes, crutch use, activity modification, rest, tylenol flexeril, 800mg q8 IBU.   Treatments tried: position changes, partial weight bearing with crutch use, activity modification, rest, tylenol flexeril, 800mg q8 IBU.   Hx of left total knee replacement in distant past.  No  previous hip surgeries. No traumatic hx to hip.    Patient's past medical, surgical, social and family histories reviewed.     Past Medical History:   Diagnosis Date     Chronic depressive personality disorder      Dysplasia of cervix (uteri) 1988    Cryotherapy     Female infertility of unspecified origin        Past Surgical History:   Procedure Laterality Date     C APPENDECTOMY  6-14-03     C REMV CATARACT INTRACAP,INSERT LENS  2-    right     HC INTRODUCE CATH FALLOPIAN TUBE, RE-OPEN/DIAGNOSIS       HERNIA REPAIR, INCISIONAL  11/11/09       Medications:    Current Outpatient Medications on File Prior to Visit:  aspirin 81 MG tablet Take 81 mg by mouth daily   atorvastatin (LIPITOR) 40 MG tablet TAKE ONE TABLET BY MOUTH DAILY   Cholecalciferol (VITAMIN D) 2000 UNIT tablet Take 1 tablet by mouth daily.   COENZYME Q-10 PO Take 1 tablet by mouth every other day   cyclobenzaprine (FLEXERIL) 5 MG tablet Take 1 tablet (5 mg) by mouth At Bedtime   hydroxychloroquine (PLAQUENIL) 200 MG tablet TAKE 2 AND 1/2 TABLETS BY MOUTH EVERY DAY   ibuprofen (ADVIL/MOTRIN) 800 MG tablet TAKE 1 TABLET BY MOUTH EVERY 6 HOURS AS NEEDED FOR PAIN   Omega-3 Fatty Acids (FISH OIL) 1200 MG capsule Take 3 capsules by mouth daily.   clobetasol propionate 0.05 % SHAM Apply sparingly to dry scalp, leave in place for 15 minutes, then lather and rinse thoroughly. Do 3-4x a week. (Patient not taking: Reported on 6/27/2019)   Fluocinolone Acetonide (DERMA-SMOOTHE/FS SCALP) 0.01 % OIL Externally apply 5 mLs topically daily Please schedule a follow up appointment for further refills. (Patient not taking: Reported on 6/27/2019)   ketoconazole (NIZORAL) 2 % shampoo Apply to the affected area and wash off after 5 minutes.  Alternate with T sal shampoo (Patient not taking: Reported on 6/27/2019)     No current facility-administered medications on file prior to visit.     Allergies   Allergen Reactions     Ciprofloxacin      hives and was on flagyl  "too     Metronidazole      hives and was on cipro too       Social History     Occupational History     Not on file   Tobacco Use     Smoking status: Former Smoker     Packs/day: 0.50     Years: 25.00     Pack years: 12.50     Smokeless tobacco: Never Used     Tobacco comment: quit sept. 2017    Substance and Sexual Activity     Alcohol use: Yes     Comment: 1-2 weekly     Drug use: No     Sexual activity: Yes     Partners: Male     Birth control/protection: None       Family History   Problem Relation Age of Onset     Genitourinary Problems Father         prostate     Genetic Disorder Father         ulcer     Hypertension Father      Lipids Father      Heart Disease Mother      Lipids Mother      Heart Disease Maternal Grandmother      Cerebrovascular Disease Maternal Grandmother      Heart Disease Maternal Grandfather      Heart Disease Maternal Uncle      Heart Disease Maternal Uncle         x  3       REVIEW OF SYSTEMS  10 point review systems performed otherwise negative as noted as per history of present illness.    Physical Exam:  Vitals: /60   Ht 1.651 m (5' 5\")   Wt 108.4 kg (239 lb)   LMP 11/22/2011 (Exact Date)   BMI 39.77 kg/m     BMI= Body mass index is 39.77 kg/m .  Constitutional: healthy, alert and no acute distress   Psychiatric: mentation appears normal and affect normal/bright  NEURO: no focal deficits  RESP: Normal with easy respirations and no use of accessory muscles to breathe, no audible wheezing or retractions  CV: RLE: habitus versus edema         Regular rate and rhythm by palpation  SKIN: No erythema, rashes, excoriation, or breakdown. No evidence of infection.   JOINT/EXTREMITIES: right hip: flexion mildly decreased compared to left, no pain with this. Exquisite pain, recreates groin pain with external rotation and lesser with internal. Unable to fully test BLAS due to pain.  Straight leg raise negative.  Palpation to greater trochanteric recreates lateral hip pain but not " groin pain.  No SI joint tenderness.  Pain with sit to stand.  Distal neurovascular grossly intact.   Lymph: no appreciated RLE lymphedema  GAIT: antalgic and with assistive device (Crutch)    Diagnostic Modalities:  right hip X-ray: No fracture, dislocation and or lesion. Normal alignment. At least Mild joint space. Difficult to fully appreciate joint due to habitus. No appreciable soft tissue abnormality.   Independent visualization of the images was performed.      Impression: right hip osteoarthritis with greater trochanteric bursitis.     Plan:  All of the above pertinent physical exam and imaging modalities findings was reviewed with Kesha exam is consistent with intra-articular pathology and does have some narrowing and degenerative changes. Also having greater trochanteric bursitis. The bursitis is more painful and limiting then the groin type symptoms.  The lateral hip pain was before the groin pain. Discussed this all with the patient and explained bursitis is common with OA especially with abnormal gait.  Discussed options of greater trochanteric versus intra-articular injection, physical therapy.  She would like the greater trochanteric bursa injection and physical therapy.  If the injection does not provide good relief will have her call back in 2 weeks and order an intra-articular injection. Referral for physical therapy provided.      The patient was counseled about an  injection, including discussion of risks (including infection), contents of the injection, rationale for performing the injection, and expected benefits of the injection. The skin was prepped with alcohol and betadine and then utilizing sterile technique an injection of the right hip trochanteric bursa was performed. The injection consisted 1ml of Kenalog (40mg per 1 ml) mixed with 3ml of 0.5% Marcaine. The patient tolerated the injection well, and there were no complications. The injection site was covered with a Band-Aid. The  injection was performed by STEPAN Santiago, CNP, DNP      If any concerns for infection seek immediate medical evaluation either in the clinic or the ED.     Return to clinic call back in 2 weeks if no better, otherwise as needed., or sooner as needed for changes.  Re-x-ray on return: No    Scribed by:  STEPAN Santiago, CNP  7:06 PM  6/27/2019    I attest I have seen and evaluated the patient.  I agree with above impression and plan.  En Larson D.O.    Again, thank you for allowing me to participate in the care of your patient.        Sincerely,        Rubens Larson, DO

## 2019-06-27 NOTE — PROGRESS NOTES
ORTHOPEDIC CONSULT      Chief Complaint: Kesha Zacarias is a 59 year old female who is being seen for Chief Complaint   Patient presents with     Musculoskeletal Problem     right hip pain     Consult     self       History of Present Illness:   Kesha Zacarias is a 59 year old female who is seen in consultation at the request of self for evaluation of right hip pain.  Mechanism of Injury: No trauma or inciting event. Started about 6-8 weeks ago mild deep hip and groin pain, then about 2-3 weeks got much worse after stepping in a hole.  The pain is described as a severe lateral deep groin pain, lower buttocks. Does not radiate down leg. Sharp aching pain. No numbness/tingling. Worst with laying in bed on that side, also with walking and activity.  Better with position changes, crutch use, activity modification, rest, tylenol flexeril, 800mg q8 IBU.   Treatments tried: position changes, partial weight bearing with crutch use, activity modification, rest, tylenol flexeril, 800mg q8 IBU.   Hx of left total knee replacement in distant past.  No previous hip surgeries. No traumatic hx to hip.    Patient's past medical, surgical, social and family histories reviewed.     Past Medical History:   Diagnosis Date     Chronic depressive personality disorder      Dysplasia of cervix (uteri) 1988    Cryotherapy     Female infertility of unspecified origin        Past Surgical History:   Procedure Laterality Date     C APPENDECTOMY  6-14-03     C REMV CATARACT INTRACAP,INSERT LENS  2-    right     HC INTRODUCE CATH FALLOPIAN TUBE, RE-OPEN/DIAGNOSIS       HERNIA REPAIR, INCISIONAL  11/11/09       Medications:    Current Outpatient Medications on File Prior to Visit:  aspirin 81 MG tablet Take 81 mg by mouth daily   atorvastatin (LIPITOR) 40 MG tablet TAKE ONE TABLET BY MOUTH DAILY   Cholecalciferol (VITAMIN D) 2000 UNIT tablet Take 1 tablet by mouth daily.   COENZYME Q-10 PO Take 1 tablet by mouth every other day    cyclobenzaprine (FLEXERIL) 5 MG tablet Take 1 tablet (5 mg) by mouth At Bedtime   hydroxychloroquine (PLAQUENIL) 200 MG tablet TAKE 2 AND 1/2 TABLETS BY MOUTH EVERY DAY   ibuprofen (ADVIL/MOTRIN) 800 MG tablet TAKE 1 TABLET BY MOUTH EVERY 6 HOURS AS NEEDED FOR PAIN   Omega-3 Fatty Acids (FISH OIL) 1200 MG capsule Take 3 capsules by mouth daily.   clobetasol propionate 0.05 % SHAM Apply sparingly to dry scalp, leave in place for 15 minutes, then lather and rinse thoroughly. Do 3-4x a week. (Patient not taking: Reported on 6/27/2019)   Fluocinolone Acetonide (DERMA-SMOOTHE/FS SCALP) 0.01 % OIL Externally apply 5 mLs topically daily Please schedule a follow up appointment for further refills. (Patient not taking: Reported on 6/27/2019)   ketoconazole (NIZORAL) 2 % shampoo Apply to the affected area and wash off after 5 minutes.  Alternate with T sal shampoo (Patient not taking: Reported on 6/27/2019)     No current facility-administered medications on file prior to visit.     Allergies   Allergen Reactions     Ciprofloxacin      hives and was on flagyl too     Metronidazole      hives and was on cipro too       Social History     Occupational History     Not on file   Tobacco Use     Smoking status: Former Smoker     Packs/day: 0.50     Years: 25.00     Pack years: 12.50     Smokeless tobacco: Never Used     Tobacco comment: quit sept. 2017    Substance and Sexual Activity     Alcohol use: Yes     Comment: 1-2 weekly     Drug use: No     Sexual activity: Yes     Partners: Male     Birth control/protection: None       Family History   Problem Relation Age of Onset     Genitourinary Problems Father         prostate     Genetic Disorder Father         ulcer     Hypertension Father      Lipids Father      Heart Disease Mother      Lipids Mother      Heart Disease Maternal Grandmother      Cerebrovascular Disease Maternal Grandmother      Heart Disease Maternal Grandfather      Heart Disease Maternal Uncle      Heart  "Disease Maternal Uncle         x  3       REVIEW OF SYSTEMS  10 point review systems performed otherwise negative as noted as per history of present illness.    Physical Exam:  Vitals: /60   Ht 1.651 m (5' 5\")   Wt 108.4 kg (239 lb)   LMP 11/22/2011 (Exact Date)   BMI 39.77 kg/m    BMI= Body mass index is 39.77 kg/m .  Constitutional: healthy, alert and no acute distress   Psychiatric: mentation appears normal and affect normal/bright  NEURO: no focal deficits  RESP: Normal with easy respirations and no use of accessory muscles to breathe, no audible wheezing or retractions  CV: RLE: habitus versus edema         Regular rate and rhythm by palpation  SKIN: No erythema, rashes, excoriation, or breakdown. No evidence of infection.   JOINT/EXTREMITIES: right hip: flexion mildly decreased compared to left, no pain with this. Exquisite pain, recreates groin pain with external rotation and lesser with internal. Unable to fully test BLAS due to pain.  Straight leg raise negative.  Palpation to greater trochanteric recreates lateral hip pain but not groin pain.  No SI joint tenderness.  Pain with sit to stand.  Distal neurovascular grossly intact.   Lymph: no appreciated RLE lymphedema  GAIT: antalgic and with assistive device (Crutch)    Diagnostic Modalities:  right hip X-ray: No fracture, dislocation and or lesion. Normal alignment. At least Mild joint space. Difficult to fully appreciate joint due to habitus. No appreciable soft tissue abnormality.   Independent visualization of the images was performed.      Impression: right hip osteoarthritis with greater trochanteric bursitis.     Plan:  All of the above pertinent physical exam and imaging modalities findings was reviewed with Kesha exam is consistent with intra-articular pathology and does have some narrowing and degenerative changes. Also having greater trochanteric bursitis. The bursitis is more painful and limiting then the groin type symptoms.  The " lateral hip pain was before the groin pain. Discussed this all with the patient and explained bursitis is common with OA especially with abnormal gait.  Discussed options of greater trochanteric versus intra-articular injection, physical therapy.  She would like the greater trochanteric bursa injection and physical therapy.  If the injection does not provide good relief will have her call back in 2 weeks and order an intra-articular injection. Referral for physical therapy provided.      The patient was counseled about an  injection, including discussion of risks (including infection), contents of the injection, rationale for performing the injection, and expected benefits of the injection. The skin was prepped with alcohol and betadine and then utilizing sterile technique an injection of the right hip trochanteric bursa was performed. The injection consisted 1ml of Kenalog (40mg per 1 ml) mixed with 3ml of 0.5% Marcaine. The patient tolerated the injection well, and there were no complications. The injection site was covered with a Band-Aid. The injection was performed by STEPAN Santiago, CNP, DNP      If any concerns for infection seek immediate medical evaluation either in the clinic or the ED.     Return to clinic call back in 2 weeks if no better, otherwise as needed., or sooner as needed for changes.  Re-x-ray on return: No    Scribed by:  STEPAN Santiago, CNP  7:06 PM  6/27/2019    I attest I have seen and evaluated the patient.  I agree with above impression and plan.  En Larson D.O.

## 2019-06-27 NOTE — PROGRESS NOTES
Prior to injection, verified patient identity using patient's name and date of birth.  Due to injection administration, patient instructed to remain in clinic for 15 minutes  afterwards, and to report any adverse reaction to me immediately.    Joint injection was performed.      Drug Amount Wasted:  None.  Vial/Syringe: Single dose vial  Expiration Date:  1/2021    The following medication was given by Ridge Rendon, STEPAN, CNP, DNP:     MEDICATION: Kenalog 40mg/1ml  ROUTE: Joint Injection  SITE: right hip  DOSE: 1 mL  LOT #: [id number]  : [employer]  EXPIRATION DATE:  1/2021  NDC: [address]-1

## 2019-07-09 ENCOUNTER — TRANSFERRED RECORDS (OUTPATIENT)
Dept: HEALTH INFORMATION MANAGEMENT | Facility: CLINIC | Age: 59
End: 2019-07-09

## 2019-07-10 ENCOUNTER — TRANSFERRED RECORDS (OUTPATIENT)
Dept: HEALTH INFORMATION MANAGEMENT | Facility: CLINIC | Age: 59
End: 2019-07-10

## 2019-07-10 ENCOUNTER — TELEPHONE (OUTPATIENT)
Dept: ORTHOPEDICS | Facility: CLINIC | Age: 59
End: 2019-07-10

## 2019-07-10 NOTE — TELEPHONE ENCOUNTER
[place] physical therapy forms signed, faxed back to [facility].    Copy sent to scanning, copy placed in ortho drawer....................Sariah Lane CMA  ([facility])

## 2019-07-16 ENCOUNTER — TRANSFERRED RECORDS (OUTPATIENT)
Dept: HEALTH INFORMATION MANAGEMENT | Facility: CLINIC | Age: 59
End: 2019-07-16

## 2019-07-18 DIAGNOSIS — M06.9 RA (RHEUMATOID ARTHRITIS) (H): ICD-10-CM

## 2019-07-18 DIAGNOSIS — M06.9 RHEUMATOID ARTHRITIS INVOLVING MULTIPLE SITES, UNSPECIFIED RHEUMATOID FACTOR PRESENCE: Primary | ICD-10-CM

## 2019-07-18 DIAGNOSIS — E78.5 HYPERLIPIDEMIA LDL GOAL <130: ICD-10-CM

## 2019-07-18 RX ORDER — ATORVASTATIN CALCIUM 40 MG/1
TABLET, FILM COATED ORAL
Qty: 90 TABLET | Refills: 3 | Status: SHIPPED | OUTPATIENT
Start: 2019-07-18 | End: 2020-07-13

## 2019-07-18 RX ORDER — HYDROXYCHLOROQUINE SULFATE 200 MG/1
TABLET, FILM COATED ORAL
Qty: 450 TABLET | Refills: 1 | Status: SHIPPED | OUTPATIENT
Start: 2019-07-18 | End: 2020-07-29

## 2019-09-05 ENCOUNTER — OFFICE VISIT (OUTPATIENT)
Dept: ORTHOPEDICS | Facility: OTHER | Age: 59
End: 2019-09-05
Payer: COMMERCIAL

## 2019-09-05 VITALS
HEIGHT: 65 IN | DIASTOLIC BLOOD PRESSURE: 62 MMHG | SYSTOLIC BLOOD PRESSURE: 102 MMHG | BODY MASS INDEX: 39.32 KG/M2 | WEIGHT: 236 LBS

## 2019-09-05 DIAGNOSIS — M16.11 PRIMARY OSTEOARTHRITIS OF RIGHT HIP: Primary | ICD-10-CM

## 2019-09-05 PROCEDURE — 99213 OFFICE O/P EST LOW 20 MIN: CPT | Performed by: ORTHOPAEDIC SURGERY

## 2019-09-05 ASSESSMENT — MIFFLIN-ST. JEOR: SCORE: 1651.37

## 2019-09-05 ASSESSMENT — PAIN SCALES - GENERAL: PAINLEVEL: NO PAIN (1)

## 2019-09-05 NOTE — LETTER
9/5/2019         RE: Kesha Zacarias  [address] [address] [place] [phone]        Dear Colleague,    Thank you for referring your patient, Kehsa Zacarias, to the [facility]. Please see a copy of my visit note below.    Office Visit-Follow up    Chief Complaint: Kesha Zacarias is a 59 year old female who is being seen for   Chief Complaint   Patient presents with     RECHECK     right hip osteoarthritis with greater trochanteric bursitis.        History of Present Illness:   Today's visit:  Turns to discuss her right hip.  Much better than before.  Has some groin constant discomfort.  She also reports stiffness when sitting.  She has difficulties with her shoes and socks it.  On her last visit she received a bursal steroid injection which helped significantly to those symptoms.  She is been doing physical therapy.  Taking occasional ibuprofen.  Overall happy with her progress.    June 27, 2019 visit:  Kesha Zacarias is a 59 year old female who is seen in consultation at the request of self for evaluation of right hip pain.  Mechanism of Injury: No trauma or inciting event. Started about 6-8 weeks ago mild deep hip and groin pain, then about 2-3 weeks got much worse after stepping in a hole.  The pain is described as a severe lateral deep groin pain, lower buttocks. Does not radiate down leg. Sharp aching pain. No numbness/tingling. Worst with laying in bed on that side, also with walking and activity.  Better with position changes, crutch use, activity modification, rest, tylenol flexeril, 800mg q8 IBU.   Treatments tried: position changes, partial weight bearing with crutch use, activity modification, rest, tylenol flexeril, 800mg q8 IBU.   Hx of left total knee replacement in distant past.  No previous hip surgeries. No traumatic hx to hip.       REVIEW OF SYSTEMS  General: negative for, night sweats, dizziness, fatigue  Resp: No shortness of breath and no cough  CV: negative for chest  "pain, syncope or near-syncope  GI: negative for nausea, vomiting and diarrhea  : negative for dysuria and hematuria  Musculoskeletal: as above  Neurologic: negative for syncope   Hematologic: negative for bleeding disorder    Physical Exam:  Vitals: /62   Ht 1.651 m (5' 5\")   Wt 107 kg (236 lb)   LMP 11/22/2011 (Exact Date)   BMI 39.27 kg/m     BMI= Body mass index is 39.27 kg/m .  Constitutional: healthy, alert and no acute distress   Psychiatric: mentation appears normal and affect normal/bright  NEURO: no focal deficits  RESP: Normal with easy respirations and no use of accessory muscles to breathe, no audible wheezing or retractions  CV: RLE: no edema         SKIN: No erythema, rashes, excoriation, or breakdown. No evidence of infection.   JOINT/EXTREMITIES:right hip: No focal areas of tenderness.  Hip flexion to proximal 110 degrees.  No focal areas of weakness.  Internal rotation flexion does cause pain to the hip.  Negative straight leg  GAIT: not tested             Diagnostic Modalities:  None today.  Independent visualization of the images was performed.      Impression: right hip primary osteoarthritis    Plan:  All of the above pertinent physical exam and imaging modalities findings was reviewed with Kesha.    Overall very content with her progress.  Pain is much better although continues to have some stiffness and discomfort.  Current symptoms are related to her underlying hip arthritis.  At this point she is going to continue with over-the-counter medications as needed.  We also discussed weight loss.    I did bring up intra-articular fluoroscopically guided hip injection as well.  At this point she is going to think about it.  Certainly could call at any point and we can order a right hip intra-articular injection by radiology.      Return to clinic 3, month(s), PRN, or sooner as needed for changes.  Re-x-ray on return: No    En Larson D.O.          Again, thank you for allowing me to " participate in the care of your patient.        Sincerely,        Rubens Larson, DO

## 2019-09-06 NOTE — PROGRESS NOTES
Office Visit-Follow up    Chief Complaint: Kesha Zacarias is a 59 year old female who is being seen for   Chief Complaint   Patient presents with     RECHECK     right hip osteoarthritis with greater trochanteric bursitis.        History of Present Illness:   Today's visit:  Turns to discuss her right hip.  Much better than before.  Has some groin constant discomfort.  She also reports stiffness when sitting.  She has difficulties with her shoes and socks it.  On her last visit she received a bursal steroid injection which helped significantly to those symptoms.  She is been doing physical therapy.  Taking occasional ibuprofen.  Overall happy with her progress.    June 27, 2019 visit:  Kesha Zacarias is a 59 year old female who is seen in consultation at the request of self for evaluation of right hip pain.  Mechanism of Injury: No trauma or inciting event. Started about 6-8 weeks ago mild deep hip and groin pain, then about 2-3 weeks got much worse after stepping in a hole.  The pain is described as a severe lateral deep groin pain, lower buttocks. Does not radiate down leg. Sharp aching pain. No numbness/tingling. Worst with laying in bed on that side, also with walking and activity.  Better with position changes, crutch use, activity modification, rest, tylenol flexeril, 800mg q8 IBU.   Treatments tried: position changes, partial weight bearing with crutch use, activity modification, rest, tylenol flexeril, 800mg q8 IBU.   Hx of left total knee replacement in distant past.  No previous hip surgeries. No traumatic hx to hip.       REVIEW OF SYSTEMS  General: negative for, night sweats, dizziness, fatigue  Resp: No shortness of breath and no cough  CV: negative for chest pain, syncope or near-syncope  GI: negative for nausea, vomiting and diarrhea  : negative for dysuria and hematuria  Musculoskeletal: as above  Neurologic: negative for syncope   Hematologic: negative for bleeding disorder    Physical  "Exam:  Vitals: /62   Ht 1.651 m (5' 5\")   Wt 107 kg (236 lb)   LMP 11/22/2011 (Exact Date)   BMI 39.27 kg/m    BMI= Body mass index is 39.27 kg/m .  Constitutional: healthy, alert and no acute distress   Psychiatric: mentation appears normal and affect normal/bright  NEURO: no focal deficits  RESP: Normal with easy respirations and no use of accessory muscles to breathe, no audible wheezing or retractions  CV: RLE: no edema         SKIN: No erythema, rashes, excoriation, or breakdown. No evidence of infection.   JOINT/EXTREMITIES:right hip: No focal areas of tenderness.  Hip flexion to proximal 110 degrees.  No focal areas of weakness.  Internal rotation flexion does cause pain to the hip.  Negative straight leg  GAIT: not tested             Diagnostic Modalities:  None today.  Independent visualization of the images was performed.      Impression: right hip primary osteoarthritis    Plan:  All of the above pertinent physical exam and imaging modalities findings was reviewed with Kesha.    Overall very content with her progress.  Pain is much better although continues to have some stiffness and discomfort.  Current symptoms are related to her underlying hip arthritis.  At this point she is going to continue with over-the-counter medications as needed.  We also discussed weight loss.    I did bring up intra-articular fluoroscopically guided hip injection as well.  At this point she is going to think about it.  Certainly could call at any point and we can order a right hip intra-articular injection by radiology.      Return to clinic 3, month(s), PRN, or sooner as needed for changes.  Re-x-ray on return: No    En Larson D.O.        "

## 2019-09-27 ENCOUNTER — HEALTH MAINTENANCE LETTER (OUTPATIENT)
Age: 59
End: 2019-09-27

## 2019-10-22 ENCOUNTER — ALLIED HEALTH/NURSE VISIT (OUTPATIENT)
Dept: FAMILY MEDICINE | Facility: CLINIC | Age: 59
End: 2019-10-22
Payer: COMMERCIAL

## 2019-10-22 DIAGNOSIS — Z23 NEED FOR PROPHYLACTIC VACCINATION AND INOCULATION AGAINST INFLUENZA: Primary | ICD-10-CM

## 2019-10-22 DIAGNOSIS — Z23 NEED FOR VACCINATION: ICD-10-CM

## 2019-10-22 PROCEDURE — 99207 ZZC NO CHARGE NURSE ONLY: CPT

## 2019-10-22 PROCEDURE — 90471 IMMUNIZATION ADMIN: CPT

## 2019-10-22 PROCEDURE — 90715 TDAP VACCINE 7 YRS/> IM: CPT

## 2019-10-22 PROCEDURE — 90682 RIV4 VACC RECOMBINANT DNA IM: CPT

## 2019-10-22 PROCEDURE — 90472 IMMUNIZATION ADMIN EACH ADD: CPT

## 2019-10-22 NOTE — NURSING NOTE
Prior to immunization administration, verified patients identity using patient s name and date of birth. Please see Immunization Activity for additional information.     Screening Questionnaire for Adult Immunization    Are you sick today?   No   Do you have allergies to medications, food, a vaccine component or latex?   No   Have you ever had a serious reaction after receiving a vaccination?   No   Do you have a long-term health problem with heart disease, lung disease, asthma, kidney disease, metabolic disease (e.g. diabetes), anemia, or other blood disorder?   No   Do you have cancer, leukemia, HIV/AIDS, or any other immune system problem?   No   In the past 3 months, have you taken medications that affect  your immune system, such as prednisone, other steroids, or anticancer drugs; drugs for the treatment of rheumatoid arthritis, Crohn s disease, or psoriasis; or have you had radiation treatments?   No   Have you had a seizure, or a brain or other nervous system problem?   No   During the past year, have you received a transfusion of blood or blood     products, or been given immune (gamma) globulin or antiviral drug?   No   For women: Are you pregnant or is there a chance you could become        pregnant during the next month?   No   Have you received any vaccinations in the past 4 weeks?   No     Immunization questionnaire answers were all negative.        Per orders of Dr. Degroot, injection of Tdap and Flu given by Joan Chase MA. Patient instructed to remain in clinic for 15 minutes afterwards, and to report any adverse reaction to me immediately.       Screening performed by Joan Chase MA on 10/22/2019 at 1:12 PM.

## 2019-10-26 ENCOUNTER — HEALTH MAINTENANCE LETTER (OUTPATIENT)
Age: 59
End: 2019-10-26

## 2019-12-27 ENCOUNTER — HOSPITAL ENCOUNTER (OUTPATIENT)
Dept: MAMMOGRAPHY | Facility: CLINIC | Age: 59
Discharge: HOME | End: 2019-12-27
Attending: FAMILY MEDICINE | Admitting: FAMILY MEDICINE
Payer: COMMERCIAL

## 2019-12-27 DIAGNOSIS — Z12.31 VISIT FOR SCREENING MAMMOGRAM: ICD-10-CM

## 2019-12-27 PROCEDURE — 77063 BREAST TOMOSYNTHESIS BI: CPT

## 2020-02-17 ENCOUNTER — TRANSFERRED RECORDS (OUTPATIENT)
Dept: HEALTH INFORMATION MANAGEMENT | Facility: CLINIC | Age: 60
End: 2020-02-17

## 2020-02-20 DIAGNOSIS — G25.81 RESTLESS LEGS SYNDROME: ICD-10-CM

## 2020-02-21 RX ORDER — CYCLOBENZAPRINE HCL 5 MG
TABLET ORAL
Qty: 90 TABLET | Refills: 3 | Status: SHIPPED | OUTPATIENT
Start: 2020-02-21 | End: 2021-03-15

## 2020-02-21 NOTE — TELEPHONE ENCOUNTER
Requested Prescriptions   Pending Prescriptions Disp Refills     CYCLOBENZAPRINE 5 MG PO tablet [Pharmacy Med Name: CYCLOBENZAPRINE 5MG TABLETS] 90 tablet      Sig: TAKE 1 TABLET(5 MG) BY MOUTH AT BEDTIME     Last Written Prescription Date:  05/09/2019  Last Fill Quantity: 30,   # refills: 11  Last Office Visit: 05/06/2019  Future Office visit:       Routing refill request to provider for review/approval because:  Drug not on the [facility], P or [facility] refill protocol or controlled substance    Meena Alejandro MA

## 2020-04-06 ENCOUNTER — TELEPHONE (OUTPATIENT)
Dept: FAMILY MEDICINE | Facility: CLINIC | Age: 60
End: 2020-04-06

## 2020-04-06 NOTE — TELEPHONE ENCOUNTER
Central Prior Authorization Team   [phone]    PA Initiation    Medication: hydroxychloroquine (PLAQUENIL) 200 MG tablet  Insurance Company: [employer] - Phone [phone] Fax [phone]  Pharmacy Filling the Rx: [facility] #[id number] - [place], [place] - [address]  Filling Pharmacy Phone: [phone]  Filling Pharmacy Fax: [phone]  Start Date: 4/6/2020

## 2020-04-06 NOTE — TELEPHONE ENCOUNTER
Prior Authorization Retail Medication Request    Medication/Dose: hydroxychloroquine (PLAQUENIL) 200 MG tablet  ICD code (if different than what is on RX):    Previously Tried and Failed:    Rationale:      Insurance Name:  [employer]  Insurance ID:  [id number]      Pharmacy Information (if different than what is on RX)  Name:    Phone:

## 2020-04-08 NOTE — TELEPHONE ENCOUNTER
Prior Authorization Approval    Authorization Effective Date: 3/7/2020  Authorization Expiration Date: 4/7/2021  Medication: hydroxychloroquine (PLAQUENIL) 200 MG tablet-PA APPROVED   Approved Dose/Quantity:   Reference #: CASE # [id number]   Insurance Company: [employer] - Phone [phone] Fax [phone]  Expected CoPay:       CoPay Card Available:      Foundation Assistance Needed:    Which Pharmacy is filling the prescription (Not needed for infusion/clinic administered): [facility] #[id number] - [place], [place] - [address]  Pharmacy Notified: Yes-**Instructed pharmacy to notify patient when script is ready to /ship.**   Patient Notified: Yes

## 2020-07-12 DIAGNOSIS — E78.5 HYPERLIPIDEMIA LDL GOAL <130: ICD-10-CM

## 2020-07-13 RX ORDER — ATORVASTATIN CALCIUM 40 MG/1
TABLET, FILM COATED ORAL
Qty: 90 TABLET | Refills: 3 | Status: SHIPPED | OUTPATIENT
Start: 2020-07-13 | End: 2021-08-05

## 2020-07-13 NOTE — TELEPHONE ENCOUNTER
Routing refill request to provider for review/approval because:  Labs not current:  Lipids.     Ana Lilia Pablo RN

## 2020-07-27 DIAGNOSIS — M06.9 RHEUMATOID ARTHRITIS INVOLVING MULTIPLE SITES, UNSPECIFIED RHEUMATOID FACTOR PRESENCE: ICD-10-CM

## 2020-07-27 NOTE — TELEPHONE ENCOUNTER
[place]      Last Written Prescription Date:  07/18/2019  Last Fill Quantity: 450,   # refills: 1  Last Office Visit: 05/06/2019    Routing refill request to provider for review/approval because:  Drug not on the [facility], [facility] or [facility] refill protocol or controlled substance    Ana Lilia Pablo RN

## 2020-07-29 RX ORDER — HYDROXYCHLOROQUINE SULFATE 200 MG/1
TABLET, FILM COATED ORAL
Qty: 450 TABLET | Refills: 1 | Status: SHIPPED | OUTPATIENT
Start: 2020-07-29 | End: 2021-01-18

## 2020-10-20 ENCOUNTER — TELEPHONE (OUTPATIENT)
Dept: FAMILY MEDICINE | Facility: CLINIC | Age: 60
End: 2020-10-20

## 2020-10-20 DIAGNOSIS — M05.9 RHEUMATOID ARTHRITIS WITH POSITIVE RHEUMATOID FACTOR, INVOLVING UNSPECIFIED SITE (H): ICD-10-CM

## 2020-10-20 DIAGNOSIS — E78.5 HYPERLIPIDEMIA LDL GOAL <130: Primary | ICD-10-CM

## 2020-10-24 DIAGNOSIS — E78.5 HYPERLIPIDEMIA LDL GOAL <130: ICD-10-CM

## 2020-10-24 DIAGNOSIS — M05.9 RHEUMATOID ARTHRITIS WITH POSITIVE RHEUMATOID FACTOR, INVOLVING UNSPECIFIED SITE (H): ICD-10-CM

## 2020-10-24 LAB
ALBUMIN SERPL-MCNC: 3.7 G/DL (ref 3.4–5)
ALP SERPL-CCNC: 82 U/L (ref 40–150)
ALT SERPL W P-5'-P-CCNC: 26 U/L (ref 0–50)
ANION GAP SERPL CALCULATED.3IONS-SCNC: 6 MMOL/L (ref 3–14)
AST SERPL W P-5'-P-CCNC: 15 U/L (ref 0–45)
BILIRUB SERPL-MCNC: 0.6 MG/DL (ref 0.2–1.3)
BUN SERPL-MCNC: 20 MG/DL (ref 7–30)
CALCIUM SERPL-MCNC: 9.2 MG/DL (ref 8.5–10.1)
CHLORIDE SERPL-SCNC: 108 MMOL/L (ref 94–109)
CHOLEST SERPL-MCNC: 189 MG/DL
CO2 SERPL-SCNC: 29 MMOL/L (ref 20–32)
CREAT SERPL-MCNC: 0.86 MG/DL (ref 0.52–1.04)
ERYTHROCYTE [DISTWIDTH] IN BLOOD BY AUTOMATED COUNT: 13.4 % (ref 10–15)
GFR SERPL CREATININE-BSD FRML MDRD: 74 ML/MIN/{1.73_M2}
GLUCOSE SERPL-MCNC: 93 MG/DL (ref 70–99)
HCT VFR BLD AUTO: 41.5 % (ref 35–47)
HDLC SERPL-MCNC: 73 MG/DL
HGB BLD-MCNC: 12.9 G/DL (ref 11.7–15.7)
LDLC SERPL CALC-MCNC: 97 MG/DL
MCH RBC QN AUTO: 29.2 PG (ref 26.5–33)
MCHC RBC AUTO-ENTMCNC: 31.1 G/DL (ref 31.5–36.5)
MCV RBC AUTO: 94 FL (ref 78–100)
NONHDLC SERPL-MCNC: 116 MG/DL
PLATELET # BLD AUTO: 238 10E9/L (ref 150–450)
POTASSIUM SERPL-SCNC: 4.1 MMOL/L (ref 3.4–5.3)
PROT SERPL-MCNC: 7.3 G/DL (ref 6.8–8.8)
RBC # BLD AUTO: 4.42 10E12/L (ref 3.8–5.2)
SODIUM SERPL-SCNC: 143 MMOL/L (ref 133–144)
TRIGL SERPL-MCNC: 96 MG/DL
WBC # BLD AUTO: 5.7 10E9/L (ref 4–11)

## 2020-10-24 PROCEDURE — 36415 COLL VENOUS BLD VENIPUNCTURE: CPT | Performed by: FAMILY MEDICINE

## 2020-10-24 PROCEDURE — 85027 COMPLETE CBC AUTOMATED: CPT | Performed by: FAMILY MEDICINE

## 2020-10-24 PROCEDURE — 80061 LIPID PANEL: CPT | Performed by: FAMILY MEDICINE

## 2020-10-24 PROCEDURE — 80053 COMPREHEN METABOLIC PANEL: CPT | Performed by: FAMILY MEDICINE

## 2020-11-17 ENCOUNTER — OFFICE VISIT (OUTPATIENT)
Dept: FAMILY MEDICINE | Facility: CLINIC | Age: 60
End: 2020-11-17
Payer: COMMERCIAL

## 2020-11-17 VITALS
TEMPERATURE: 97.4 F | WEIGHT: 238 LBS | RESPIRATION RATE: 18 BRPM | OXYGEN SATURATION: 98 % | BODY MASS INDEX: 39.65 KG/M2 | HEART RATE: 83 BPM | SYSTOLIC BLOOD PRESSURE: 118 MMHG | DIASTOLIC BLOOD PRESSURE: 74 MMHG | HEIGHT: 65 IN

## 2020-11-17 DIAGNOSIS — Z23 NEED FOR PROPHYLACTIC VACCINATION AND INOCULATION AGAINST INFLUENZA: ICD-10-CM

## 2020-11-17 DIAGNOSIS — E78.5 HYPERLIPIDEMIA LDL GOAL <130: ICD-10-CM

## 2020-11-17 DIAGNOSIS — Z12.4 SCREENING FOR CERVICAL CANCER: ICD-10-CM

## 2020-11-17 DIAGNOSIS — Z00.00 WELL ADULT EXAM: Primary | ICD-10-CM

## 2020-11-17 DIAGNOSIS — N95.2 VAGINAL ATROPHY: ICD-10-CM

## 2020-11-17 DIAGNOSIS — M05.79 RHEUMATOID ARTHRITIS INVOLVING MULTIPLE SITES WITH POSITIVE RHEUMATOID FACTOR (H): ICD-10-CM

## 2020-11-17 PROBLEM — M06.9 RA (RHEUMATOID ARTHRITIS) (H): Status: RESOLVED | Noted: 2018-09-28 | Resolved: 2020-11-17

## 2020-11-17 PROCEDURE — 87624 HPV HI-RISK TYP POOLED RSLT: CPT | Performed by: FAMILY MEDICINE

## 2020-11-17 PROCEDURE — 90682 RIV4 VACC RECOMBINANT DNA IM: CPT | Performed by: FAMILY MEDICINE

## 2020-11-17 PROCEDURE — G0145 SCR C/V CYTO,THINLAYER,RESCR: HCPCS | Performed by: FAMILY MEDICINE

## 2020-11-17 PROCEDURE — 90471 IMMUNIZATION ADMIN: CPT | Performed by: FAMILY MEDICINE

## 2020-11-17 PROCEDURE — 99396 PREV VISIT EST AGE 40-64: CPT | Performed by: FAMILY MEDICINE

## 2020-11-17 ASSESSMENT — PAIN SCALES - GENERAL: PAINLEVEL: NO PAIN (0)

## 2020-11-17 ASSESSMENT — ENCOUNTER SYMPTOMS
NAUSEA: 0
HEMATOCHEZIA: 0
SHORTNESS OF BREATH: 0
MYALGIAS: 0
WEAKNESS: 0
HEARTBURN: 0
PARESTHESIAS: 1
DIARRHEA: 0
DIZZINESS: 0
BREAST MASS: 0
ABDOMINAL PAIN: 0
FEVER: 0
HEADACHES: 0
ARTHRALGIAS: 1
EYE PAIN: 0
NERVOUS/ANXIOUS: 0
COUGH: 0
CONSTIPATION: 0
SORE THROAT: 0
FREQUENCY: 0
PALPITATIONS: 0
DYSURIA: 0
JOINT SWELLING: 1
CHILLS: 0
HEMATURIA: 0

## 2020-11-17 ASSESSMENT — MIFFLIN-ST. JEOR: SCORE: 1655.44

## 2020-11-17 NOTE — PROGRESS NOTES
SUBJECTIVE:   CC: Kesha Zacarias is an 60 year old woman who presents for preventive health visit.       Patient has been advised of split billing requirements and indicates understanding: Yes  Healthy Habits:     Getting at least 3 servings of Calcium per day:  NO    Bi-annual eye exam:  Yes    Dental care twice a year:  NO    Sleep apnea or symptoms of sleep apnea:  Excessive snoring    Diet:  Regular (no restrictions)    Frequency of exercise:  1 day/week    Duration of exercise:  45-60 minutes    Taking medications regularly:  Yes    Medication side effects:  None    PHQ-2 Total Score: 0    Additional concerns today:  No      No specific concerns.   RA seems stable, right knee does flare with weather.  She is noting triggering in both thumbs as well.         Today's PHQ-2 Score:   PHQ-2 ( 1999 Pfizer) 11/17/2020   Q1: Little interest or pleasure in doing things 0   Q2: Feeling down, depressed or hopeless 0   PHQ-2 Score 0   Q1: Little interest or pleasure in doing things Not at all   Q2: Feeling down, depressed or hopeless Not at all   PHQ-2 Score 0       Abuse: Current or Past (Physical, Sexual or Emotional) - No  Do you feel safe in your environment? Yes        Social History     Tobacco Use     Smoking status: Former Smoker     Packs/day: 0.50     Years: 25.00     Pack years: 12.50     Smokeless tobacco: Never Used     Tobacco comment: quit sept. 2017    Substance Use Topics     Alcohol use: Yes     Comment: 1-2 weekly         Alcohol Use 11/17/2020   Prescreen: >3 drinks/day or >7 drinks/week? No   Prescreen: >3 drinks/day or >7 drinks/week? -       Reviewed orders with patient.  Reviewed health maintenance and updated orders accordingly -  Mammogram Screening: Patient over age 50, mutual decision to screen reflected in health maintenance.    Pertinent mammograms are reviewed under the imaging tab.  History of abnormal Pap smear: NO - age 30-65 PAP every 5 years with negative HPV co-testing  "recommended  PAP / HPV Latest Ref Rng & Units 10/16/2015 9/19/2012 9/2/2009   PAP - NIL NIL NIL   HPV 16 DNA NEG Negative - -   HPV 18 DNA NEG Negative - -   OTHER HR HPV NEG Negative - -     Reviewed and updated as needed this visit by clinical staff  Tobacco  Allergies  Meds   Med Hx  Surg Hx  Fam Hx  Soc Hx        Reviewed and updated as needed this visit by Provider                    Review of Systems   Constitutional: Negative for chills and fever.   HENT: Negative for congestion, ear pain, hearing loss and sore throat.    Eyes: Negative for pain and visual disturbance.   Respiratory: Negative for cough and shortness of breath.    Cardiovascular: Positive for peripheral edema. Negative for chest pain and palpitations.   Gastrointestinal: Negative for abdominal pain, constipation, diarrhea, heartburn, hematochezia and nausea.   Breasts:  Negative for tenderness, breast mass and discharge.   Genitourinary: Positive for vaginal discharge. Negative for dysuria, frequency, genital sores, hematuria, pelvic pain, urgency and vaginal bleeding.   Musculoskeletal: Positive for arthralgias and joint swelling. Negative for myalgias.   Skin: Negative for rash.   Neurological: Positive for paresthesias. Negative for dizziness, weakness and headaches.   Psychiatric/Behavioral: Negative for mood changes. The patient is not nervous/anxious.      Does note she has not been sexually active due to painful intercourse.       OBJECTIVE:   /74 (Cuff Size: Adult Large)   Pulse 83   Temp 97.4  F (36.3  C) (Temporal)   Resp 18   Ht 1.651 m (5' 5\")   Wt 108 kg (238 lb)   LMP 11/22/2011 (Exact Date)   SpO2 98%   BMI 39.61 kg/m    Physical Exam  GENERAL APPEARANCE: healthy, alert and no distress  EYES: Eyes grossly normal to inspection, PERRL and conjunctivae and sclerae normal  HENT: ear canals and TM's normal, nose and mouth without ulcers or lesions, oropharynx clear and oral mucous membranes moist  NECK: no " adenopathy, no asymmetry, masses, or scars and thyroid normal to palpation  RESP: lungs clear to auscultation - no rales, rhonchi or wheezes  CV: regular rate and rhythm, normal S1 S2, no S3 or S4, no murmur, click or rub, no peripheral edema and peripheral pulses strong  ABDOMEN: soft, nontender, no hepatosplenomegaly, no masses and bowel sounds normal   (female): body habitus made exam difficult.  External genitalia grossly normal.  Mild urethral caruncle present, vaginal mucosal atrophy noted and the introitus was very tight and would not admit comfortably a well lubricated medium speculum.  This was switched to a small speculum which allowed insertion but was very tight at the introitus and upon opening to visualize the posterior vaginal vault resulted in a stretch/tear in the right vaginal wall at the introitus.  Cervix tip only could be visualized with the smaller speculum inserted as deeply as possible and  it was extremely friable with obtaining pap with brush and broom.  Oozing stopped with simple pressure with a vaginal swab.  Findings were explained to the patient.    MS: no musculoskeletal defects are noted and gait is age appropriate without ataxia  SKIN: no suspicious lesions or rashes  NEURO: Normal strength and tone, sensory exam grossly normal, mentation intact and speech normal  PSYCH: mentation appears normal and affect normal/bright    Component      Latest Ref Rng & Units 10/24/2020   Sodium      133 - 144 mmol/L 143   Potassium      3.4 - 5.3 mmol/L 4.1   Chloride      94 - 109 mmol/L 108   Carbon Dioxide      20 - 32 mmol/L 29   Anion Gap      3 - 14 mmol/L 6   Glucose      70 - 99 mg/dL 93   Urea Nitrogen      7 - 30 mg/dL 20   Creatinine      0.52 - 1.04 mg/dL 0.86   GFR Estimate      >60 mL/min/1.73:m2 74   GFR Estimate If Black      >60 mL/min/1.73:m2 86   Calcium      8.5 - 10.1 mg/dL 9.2   Bilirubin Total      0.2 - 1.3 mg/dL 0.6   Albumin      3.4 - 5.0 g/dL 3.7   Protein Total       6.8 - 8.8 g/dL 7.3   Alkaline Phosphatase      40 - 150 U/L 82   ALT      0 - 50 U/L 26   AST      0 - 45 U/L 15   WBC      4.0 - 11.0 10e9/L 5.7   RBC Count      3.8 - 5.2 10e12/L 4.42   Hemoglobin      11.7 - 15.7 g/dL 12.9   Hematocrit      35.0 - 47.0 % 41.5   MCV      78 - 100 fl 94   MCH      26.5 - 33.0 pg 29.2   MCHC      31.5 - 36.5 g/dL 31.1 (L)   RDW      10.0 - 15.0 % 13.4   Platelet Count      150 - 450 10e9/L 238   Cholesterol      <200 mg/dL 189   Triglycerides      <150 mg/dL 96   HDL Cholesterol      >49 mg/dL 73   LDL Cholesterol Calculated      <100 mg/dL 97   Non HDL Cholesterol      <130 mg/dL 116         ASSESSMENT/PLAN:   (Z00.00) Well adult exam  (primary encounter diagnosis)  Comment: General health maintenance needs reviewed.  Discussed vaccines and she will give consideration to having Shingrix; did agree to flu shot.  Wishes to proceed with annual mammograms.  Due for pap this year.  Colonoscopy due in 2021 based on tubular adenoma in 12/16.    Plan: Annual exams recommended. Annual mammograms, q 5 year paps.     (M05.79) Rheumatoid arthritis involving multiple sites with positive rheumatoid factor (H)  Comment: Stable on current meds.    Plan: Continue on plaquenil, eye exams on regular basis, cbc and chem profile today normal.  She noted a trigger finger in thumbs by history today but did not appear to trigger today.  She will follow up with her orthopedic surgeon regarding this.     (Z12.4) Screening for cervical cancer/vaginal atrophy  Comment: See comments regarding very tight introitus and evidence of estrogen deficiency.    Plan: Discussed waiting for pap results to be sure things were okay with that and then consider topical estrogen cream as an option to manage this.       (E78.5) Hyperlipidemia LDL goal <130  Comment: Well controlled on statin, which she is tolerating without side effect issues. ALT was normal.   Plan: Continue statin therapy. She is on CoQ-10 and wonders if she  "needs to continue that. We discussed a trial off it to see if there was any return of myalgias.  She is noted to be on cyclobenzaprine for restless legs which she states works very well.      Patient has been advised of split billing requirements and indicates understanding: [facility]ANCO  COUNSELING:  Reviewed preventive health counseling, as reflected in patient instructions       Regular exercise       Healthy diet/nutrition    Estimated body mass index is 39.61 kg/m  as calculated from the following:    Height as of this encounter: 1.651 m (5' 5\").    Weight as of this encounter: 108 kg (238 lb).        She reports that she has quit smoking. She has a 12.50 pack-year smoking history. She has never used smokeless tobacco.      Counseling Resources:  ATP IV Guidelines  Pooled Cohorts Equation Calculator  Breast Cancer Risk Calculator  BRCA-Related Cancer Risk Assessment: FHS-7 Tool  FRAX Risk Assessment  ICSI Preventive Guidelines  Dietary Guidelines for Americans, 2010  [employer]'s MyPlate  ASA Prophylaxis  Lung CA Screening    Gregory G. Schoen, MD  [facility]    11/19/2020  Pap report was negative with significant atrophy and did not identify transition zone. All paps since 2000 have been nil.  I feel it is safe to proceed with estrogen cream. There is no family history of breast, uterine and ovarian cancer and should be safe to use low dose estrogen cream without significant risk.  Will have her start with one dose daily for a week and then one dose twice weekly vaginally for a few months and see how she responds.     She does note that when she got home from her appointment her  was ill with fever, cough, shortness of breath and believes he has covid. She remains asymptomatic at this time but has a high risk exposure.  It would be quite unlikely that she would have seroconverted from exposure on the day she was in clinic and shedding and staff as well as self were wearing proper PPE. Discussed " that testing in her right now would be unhelpful in absence of symptoms and recommended isolating at home for two weeks.      Electronically signed by Greg Schoen, MD

## 2020-11-19 LAB
COPATH REPORT: NORMAL
PAP: NORMAL

## 2020-11-23 LAB
FINAL DIAGNOSIS: NORMAL
HPV HR 12 DNA CVX QL NAA+PROBE: NEGATIVE
HPV16 DNA SPEC QL NAA+PROBE: NEGATIVE
HPV18 DNA SPEC QL NAA+PROBE: NEGATIVE
SPECIMEN DESCRIPTION: NORMAL
SPECIMEN SOURCE CVX/VAG CYTO: NORMAL

## 2020-11-24 PROBLEM — Z12.4 CERVICAL CANCER SCREENING: Status: ACTIVE | Noted: 2020-11-24

## 2020-12-16 ENCOUNTER — TRANSFERRED RECORDS (OUTPATIENT)
Dept: HEALTH INFORMATION MANAGEMENT | Facility: CLINIC | Age: 60
End: 2020-12-16

## 2021-01-17 DIAGNOSIS — M05.79 RHEUMATOID ARTHRITIS INVOLVING MULTIPLE SITES WITH POSITIVE RHEUMATOID FACTOR (H): Primary | ICD-10-CM

## 2021-01-18 ENCOUNTER — HOSPITAL ENCOUNTER (OUTPATIENT)
Dept: MAMMOGRAPHY | Facility: CLINIC | Age: 61
Discharge: HOME | End: 2021-01-18
Attending: FAMILY MEDICINE | Admitting: FAMILY MEDICINE
Payer: COMMERCIAL

## 2021-01-18 DIAGNOSIS — Z12.31 VISIT FOR SCREENING MAMMOGRAM: ICD-10-CM

## 2021-01-18 PROCEDURE — 77063 BREAST TOMOSYNTHESIS BI: CPT

## 2021-01-18 RX ORDER — HYDROXYCHLOROQUINE SULFATE 200 MG/1
TABLET, FILM COATED ORAL
Qty: 225 TABLET | Refills: 3 | Status: SHIPPED | OUTPATIENT
Start: 2021-01-18 | End: 2022-02-24

## 2021-01-18 NOTE — TELEPHONE ENCOUNTER
Requested Prescriptions   Pending Prescriptions Disp Refills     hydroxychloroquine (PLAQUENIL) 200 MG tablet [Pharmacy Med Name: HYDROXYCHLOROQUINE 200MG TABLETS] 225 tablet      Sig: TAKE 2 AND 1/2 TABLETS BY MOUTH EVERY DAY     Last Written Prescription Date:  07/29/2020  Last Fill Quantity: 450,   # refills: 1  Last Office Visit: 11/17/2020  Future Office visit:       Routing refill request to provider for review/approval because:  Drug not on the [facility], [facility] or [facility] refill protocol or controlled substance    Meena Alejandro MA

## 2021-03-14 DIAGNOSIS — G25.81 RESTLESS LEGS SYNDROME: ICD-10-CM

## 2021-03-15 RX ORDER — CYCLOBENZAPRINE HCL 5 MG
TABLET ORAL
Qty: 90 TABLET | Refills: 3 | Status: SHIPPED | OUTPATIENT
Start: 2021-03-15 | End: 2022-04-21

## 2021-03-15 NOTE — TELEPHONE ENCOUNTER
Flexeril      Last Written Prescription Date:  2/21/2020  Last Fill Quantity: 90,   # refills: 3  Last Office Visit: 11/17/2020  Future Office visit:       Routing refill request to provider for review/approval because:  Drug not on the [facility], [facility] or [facility] refill protocol or controlled substance

## 2021-08-04 DIAGNOSIS — E78.5 HYPERLIPIDEMIA LDL GOAL <130: ICD-10-CM

## 2021-08-05 RX ORDER — ATORVASTATIN CALCIUM 40 MG/1
TABLET, FILM COATED ORAL
Qty: 90 TABLET | Refills: 0 | Status: SHIPPED | OUTPATIENT
Start: 2021-08-05 | End: 2021-11-23

## 2021-10-23 ENCOUNTER — HEALTH MAINTENANCE LETTER (OUTPATIENT)
Age: 61
End: 2021-10-23

## 2021-10-29 ENCOUNTER — TELEPHONE (OUTPATIENT)
Dept: FAMILY MEDICINE | Facility: CLINIC | Age: 61
End: 2021-10-29

## 2021-10-29 DIAGNOSIS — E78.5 HYPERLIPIDEMIA LDL GOAL <130: Primary | ICD-10-CM

## 2021-10-29 DIAGNOSIS — M05.79 RHEUMATOID ARTHRITIS INVOLVING MULTIPLE SITES WITH POSITIVE RHEUMATOID FACTOR (H): ICD-10-CM

## 2021-10-29 NOTE — TELEPHONE ENCOUNTER
Please notify Kesha that lab orders have been placed.   Electronically signed by Greg Schoen, MD

## 2021-10-29 NOTE — TELEPHONE ENCOUNTER
Patient is calling and requesting all labs be placed for scheduled 12/10/2021 yearly physical prior to visit.  Patient is requesting a message left to know when orders are placed and if needing fasting labs.    Will forward to PCP for review.    Please send all replies to your team pool    Alysia Reyes RN

## 2021-10-31 ENCOUNTER — LAB (OUTPATIENT)
Dept: LAB | Facility: CLINIC | Age: 61
End: 2021-10-31
Payer: COMMERCIAL

## 2021-10-31 DIAGNOSIS — E78.5 HYPERLIPIDEMIA LDL GOAL <130: ICD-10-CM

## 2021-10-31 DIAGNOSIS — M05.79 RHEUMATOID ARTHRITIS INVOLVING MULTIPLE SITES WITH POSITIVE RHEUMATOID FACTOR (H): ICD-10-CM

## 2021-10-31 LAB
ALBUMIN SERPL-MCNC: 3.7 G/DL (ref 3.4–5)
ALP SERPL-CCNC: 68 U/L (ref 40–150)
ALT SERPL W P-5'-P-CCNC: 27 U/L (ref 0–50)
ANION GAP SERPL CALCULATED.3IONS-SCNC: 1 MMOL/L (ref 3–14)
AST SERPL W P-5'-P-CCNC: 14 U/L (ref 0–45)
BILIRUB SERPL-MCNC: 0.5 MG/DL (ref 0.2–1.3)
BUN SERPL-MCNC: 12 MG/DL (ref 7–30)
CALCIUM SERPL-MCNC: 8.9 MG/DL (ref 8.5–10.1)
CHLORIDE BLD-SCNC: 111 MMOL/L (ref 94–109)
CHOLEST SERPL-MCNC: 147 MG/DL
CO2 SERPL-SCNC: 32 MMOL/L (ref 20–32)
CREAT SERPL-MCNC: 0.73 MG/DL (ref 0.52–1.04)
ERYTHROCYTE [DISTWIDTH] IN BLOOD BY AUTOMATED COUNT: 13.2 % (ref 10–15)
FASTING STATUS PATIENT QL REPORTED: YES
GFR SERPL CREATININE-BSD FRML MDRD: 90 ML/MIN/1.73M2
GLUCOSE BLD-MCNC: 100 MG/DL (ref 70–99)
HCT VFR BLD AUTO: 41.4 % (ref 35–47)
HDLC SERPL-MCNC: 61 MG/DL
HGB BLD-MCNC: 12.8 G/DL (ref 11.7–15.7)
LDLC SERPL CALC-MCNC: 76 MG/DL
MCH RBC QN AUTO: 29.3 PG (ref 26.5–33)
MCHC RBC AUTO-ENTMCNC: 30.9 G/DL (ref 31.5–36.5)
MCV RBC AUTO: 95 FL (ref 78–100)
NONHDLC SERPL-MCNC: 86 MG/DL
PLATELET # BLD AUTO: 219 10E3/UL (ref 150–450)
POTASSIUM BLD-SCNC: 4.3 MMOL/L (ref 3.4–5.3)
PROT SERPL-MCNC: 6.6 G/DL (ref 6.8–8.8)
RBC # BLD AUTO: 4.37 10E6/UL (ref 3.8–5.2)
SODIUM SERPL-SCNC: 144 MMOL/L (ref 133–144)
TRIGL SERPL-MCNC: 50 MG/DL
WBC # BLD AUTO: 4.7 10E3/UL (ref 4–11)

## 2021-10-31 PROCEDURE — 80053 COMPREHEN METABOLIC PANEL: CPT

## 2021-10-31 PROCEDURE — 85027 COMPLETE CBC AUTOMATED: CPT

## 2021-10-31 PROCEDURE — 36415 COLL VENOUS BLD VENIPUNCTURE: CPT

## 2021-10-31 PROCEDURE — 80061 LIPID PANEL: CPT

## 2021-11-23 DIAGNOSIS — E78.5 HYPERLIPIDEMIA LDL GOAL <130: ICD-10-CM

## 2021-11-23 RX ORDER — ATORVASTATIN CALCIUM 40 MG/1
TABLET, FILM COATED ORAL
Qty: 90 TABLET | Refills: 0 | Status: SHIPPED | OUTPATIENT
Start: 2021-11-23 | End: 2022-03-08

## 2021-12-09 ASSESSMENT — ENCOUNTER SYMPTOMS
FREQUENCY: 0
NAUSEA: 0
COUGH: 0
PARESTHESIAS: 1
FEVER: 0
DIZZINESS: 0
EYE PAIN: 0
SHORTNESS OF BREATH: 0
PALPITATIONS: 0
HEMATURIA: 0
ARTHRALGIAS: 0
HEMATOCHEZIA: 0
DYSURIA: 0
CONSTIPATION: 1
WEAKNESS: 0
MYALGIAS: 1
HEARTBURN: 0
JOINT SWELLING: 0
SORE THROAT: 0
HEADACHES: 0
CHILLS: 0
BREAST MASS: 0
NERVOUS/ANXIOUS: 0
ABDOMINAL PAIN: 0
DIARRHEA: 0

## 2021-12-10 ENCOUNTER — OFFICE VISIT (OUTPATIENT)
Dept: FAMILY MEDICINE | Facility: CLINIC | Age: 61
End: 2021-12-10
Payer: COMMERCIAL

## 2021-12-10 VITALS
SYSTOLIC BLOOD PRESSURE: 110 MMHG | DIASTOLIC BLOOD PRESSURE: 68 MMHG | HEIGHT: 66 IN | HEART RATE: 80 BPM | BODY MASS INDEX: 28.77 KG/M2 | WEIGHT: 179 LBS | TEMPERATURE: 98.4 F | OXYGEN SATURATION: 97 %

## 2021-12-10 DIAGNOSIS — E78.5 HYPERLIPIDEMIA LDL GOAL <130: ICD-10-CM

## 2021-12-10 DIAGNOSIS — Z00.00 ROUTINE GENERAL MEDICAL EXAMINATION AT A HEALTH CARE FACILITY: Primary | ICD-10-CM

## 2021-12-10 DIAGNOSIS — M05.79 RHEUMATOID ARTHRITIS INVOLVING MULTIPLE SITES WITH POSITIVE RHEUMATOID FACTOR (H): ICD-10-CM

## 2021-12-10 DIAGNOSIS — Z87.891 PERSONAL HISTORY OF TOBACCO USE: ICD-10-CM

## 2021-12-10 DIAGNOSIS — Z12.11 SPECIAL SCREENING FOR MALIGNANT NEOPLASMS, COLON: ICD-10-CM

## 2021-12-10 DIAGNOSIS — R63.4 WEIGHT REDUCTION: ICD-10-CM

## 2021-12-10 DIAGNOSIS — L21.9 SEBORRHEIC DERMATITIS: ICD-10-CM

## 2021-12-10 PROCEDURE — 99396 PREV VISIT EST AGE 40-64: CPT | Performed by: FAMILY MEDICINE

## 2021-12-10 RX ORDER — BETAMETHASONE DIPROPIONATE 0.5 MG/G
LOTION TOPICAL 2 TIMES DAILY
Qty: 60 ML | Refills: 3 | Status: SHIPPED | OUTPATIENT
Start: 2021-12-10

## 2021-12-10 ASSESSMENT — ENCOUNTER SYMPTOMS
DYSURIA: 0
ABDOMINAL PAIN: 0
EYE PAIN: 0
FREQUENCY: 0
CHILLS: 0
PALPITATIONS: 0
DIZZINESS: 0
HEMATOCHEZIA: 0
WEAKNESS: 0
FEVER: 0
CONSTIPATION: 1
NAUSEA: 0
MYALGIAS: 1
PARESTHESIAS: 1
SORE THROAT: 0
COUGH: 0
HEARTBURN: 0
HEMATURIA: 0
DIARRHEA: 0
SHORTNESS OF BREATH: 0
NERVOUS/ANXIOUS: 0
HEADACHES: 0
JOINT SWELLING: 0
ARTHRALGIAS: 0
BREAST MASS: 0

## 2021-12-10 ASSESSMENT — MIFFLIN-ST. JEOR: SCORE: 1398.69

## 2021-12-10 ASSESSMENT — PAIN SCALES - GENERAL: PAINLEVEL: NO PAIN (0)

## 2021-12-10 NOTE — PROGRESS NOTES
SUBJECTIVE:   CC: Kesha Zacarias is an 61 year old woman who presents for preventive health visit.   Patient has been advised of split billing requirements and indicates understanding: Yes  Healthy Habits:     Getting at least 3 servings of Calcium per day:  Yes    Bi-annual eye exam:  Yes    Dental care twice a year:  NO    Sleep apnea or symptoms of sleep apnea:  None    Diet:  Low fat/cholesterol    Frequency of exercise:  4-5 days/week    Duration of exercise:  30-45 minutes    Taking medications regularly:  Yes    Medication side effects:  Muscle aches    PHQ-2 Total Score: 0    Additional concerns today:  Yes    Doing well.  States she has lost 50 pounds in 9 months and still on a weight loss program with [employer]. Is very happy with her progress.      Also reports she has been using a steroid lotion on her scalp prn when she develops scaly lesions.  The last refill came from [facility] several years ago but would like a refill if possible.     Has not been vaccinated for COVID 19 and remains non-interested.  I did discuss risks of vaccine/side effects and risks of COVID infection to provide factual information on which to make a decision.  She does not wish to vaccinate at this time.     Current Outpatient Medications   Medication Sig Dispense Refill     aspirin 81 MG tablet Take 81 mg by mouth daily       atorvastatin (LIPITOR) 40 MG tablet TAKE ONE TABLET BY MOUTH DAILY 90 tablet 0     betamethasone dipropionate (DIPROSONE) 0.05 % external lotion Apply topically 2 times daily 60 mL 3     Cholecalciferol (VITAMIN D) 2000 UNIT tablet Take 1 tablet by mouth daily. 100 tablet 12     COENZYME Q-10 PO Take 1 tablet by mouth every other day       cyclobenzaprine (FLEXERIL) 5 MG tablet TAKE 1 TABLET(5 MG) BY MOUTH AT BEDTIME 90 tablet 3     hydroxychloroquine (PLAQUENIL) 200 MG tablet TAKE 2 AND 1/2 TABLETS BY MOUTH EVERY  tablet 3     ibuprofen (ADVIL/MOTRIN) 800 MG tablet TAKE 1 TABLET BY MOUTH EVERY  6 HOURS AS NEEDED FOR PAIN 120 tablet 3     Multiple Minerals-Vitamins (CALCIUM-MAGNESIUM-ZINC-D3 PO)              Today's PHQ-2 Score:   PHQ-2 (  [employer]) 2021   Q1: Little interest or pleasure in doing things 0   Q2: Feeling down, depressed or hopeless 0   PHQ-2 Score 0   PHQ-2 Total Score (12-17 Years)- Positive if 3 or more points; Administer PHQ-A if positive -   Q1: Little interest or pleasure in doing things Not at all   Q2: Feeling down, depressed or hopeless Not at all   PHQ-2 Score 0       Abuse: Current or Past (Physical, Sexual or Emotional) - No  Do you feel safe in your environment? Yes    Have you ever done Advance Care Planning? (For example, a Health Directive, POLST, or a discussion with a medical provider or your loved ones about your wishes): No, advance care planning information given to patient to review.  Advanced care planning was discussed at today's visit.    Social History     Tobacco Use     Smoking status: Former Smoker     Packs/day: 0.50     Years: 25.00     Pack years: 12.50     Quit date: 2017     Years since quittin.2     Smokeless tobacco: Never Used     Tobacco comment: quit 2017    Substance Use Topics     Alcohol use: Yes     Comment: 1-2 weekly         Alcohol Use 2021   Prescreen: >3 drinks/day or >7 drinks/week? No   Prescreen: >3 drinks/day or >7 drinks/week? -       Breast Cancer Screening:    Breast CA Risk Assessment (FHS-7) 2021   Do you have a family history of breast, colon, or ovarian cancer? No / Unknown     Last mammogram  and normal.  Annual follow ups recommended.     Pertinent mammograms are reviewed under the imaging tab.    History of abnormal Pap smear: NO - age 30-65 PAP every 5 years with negative HPV co-testing recommended  PAP / HPV Latest Ref Rng & Units 2020 10/16/2015 2012   PAP (Historical) - NIL NIL NIL   HPV16 NEG:Negative Negative Negative -   HPV18 NEG:Negative Negative Negative -   HRHPV  "NEG:Negative Negative Negative -     Reviewed and updated as needed this visit by clinical staff  Tobacco  Allergies  Meds   Med Hx  Surg Hx  Fam Hx  Soc Hx       Reviewed and updated as needed this visit by Provider                   Review of Systems   Constitutional: Negative for chills and fever.   HENT: Negative for congestion, ear pain, hearing loss and sore throat.    Eyes: Negative for pain and visual disturbance.   Respiratory: Negative for cough and shortness of breath.    Cardiovascular: Negative for chest pain, palpitations and peripheral edema.   Gastrointestinal: Positive for constipation. Negative for abdominal pain, diarrhea, heartburn, hematochezia and nausea.   Breasts:  Negative for tenderness, breast mass and discharge.   Genitourinary: Negative for dysuria, frequency, genital sores, hematuria, pelvic pain, urgency, vaginal bleeding and vaginal discharge.   Musculoskeletal: Positive for myalgias. Negative for arthralgias and joint swelling.   Skin: Negative for rash.   Neurological: Positive for paresthesias. Negative for dizziness, weakness and headaches.   Psychiatric/Behavioral: Negative for mood changes. The patient is not nervous/anxious.           OBJECTIVE:   /68   Pulse 80   Temp 98.4  F (36.9  C) (Temporal)   Ht 1.676 m (5' 6\")   Wt 81.2 kg (179 lb)   LMP 11/22/2011 (Exact Date)   SpO2 97%   BMI 28.89 kg/m    Physical Exam   GENERAL APPEARANCE: healthy, alert and no distress  EYES: Eyes with some irritation of the right lids, otherwise normal to inspection, PERRL and conjunctivae and sclerae normal, fundi are clear  HENT: ear canals and TM's normal, nose and mouth without ulcers or lesions, oropharynx clear and oral mucous membranes moist  NECK: no adenopathy, no asymmetry, masses, or scars and thyroid normal to palpation  RESP: lungs clear to auscultation - no rales, rhonchi or wheezes  CV: regular rate and rhythm, normal S1 S2, no S3 or S4, no murmur, click or rub, " no peripheral edema and peripheral pulses strong  ABDOMEN: soft, nontender, no hepatosplenomegaly, no masses and bowel sounds normal  MS: no musculoskeletal defects are noted and gait is age appropriate without ataxia  SKIN: no suspicious lesions or rashes  NEURO: Normal strength and tone, sensory exam grossly normal, mentation intact and speech normal  PSYCH: mentation appears normal and affect normal/bright      ASSESSMENT/PLAN:   (Z00.00) Routine general medical examination at a health care facility  (primary encounter diagnosis)  Comment: Overall doing well. Has had a very successful weightloss plan to date and has moved from morbid obesity level to overweight category.   Thinking about flu shot but will wait on that.  Declines COVID vaccine.  Pros/cons and risks of vaccine vs risks of infection discussed.   Plan: Annual exams.  Commended for her weight loss.  Will do comprehensive chem panel and CBC to ensure there are no significant nutritional deficiencies from this weight reduction.     (Z87.891) Personal history of tobacco use  Comment: Patient meets criteria for lung cancer screening based on > 30 pack years and less than 15 years since last cigarette.  However, after we proceeded with entering data in the risk tool for lung cancer, result was less than 1% and patient decided against doing lung cancer screening at this time.   Plan: No CT at this time based on shared decision making discussion.    (M05.79) Rheumatoid arthritis involving multiple sites with positive rheumatoid factor (H)  Comment: States is doing well on hydroxychloroquine. No changes indicated. Does get annual eye exams.   Plan: Due for blood work and annual eye exam; labs ordered and she will follow up with optometrist    (E78.5) Hyperlipidemia LDL goal <130  Comment: tolerating atorvastatin without side effects.   Plan: Will check lipid profile and liver tests    (R63.4) Weight reduction  Comment: 50 pounds in 9 months as noted, within  "the 1-2 pound weekly recommendation.   Plan: Check labs as noted above.           (Z12.11) Special screening for malignant neoplasms, colon  Comment: Due for colonoscopy; referral placed.   Plan: Adult Gastro Ref - Procedure Only            (L21.9) Seborrheic dermatitis  Comment: Chronic scalp dermatitis and his done well with prn use of a steroid lotion.   Plan: betamethasone dipropionate (DIPROSONE) 0.05 %         external lotion        refilled.       Patient has been advised of split billing requirements and indicates understanding: Yes  COUNSELING:  Reviewed preventive health counseling, as reflected in patient instructions       Regular exercise       Healthy diet/nutrition       Vision screening       Colon cancer screening       Consider lung cancer screening for ages 55-80 years and 30 pack-year smoking history     Estimated body mass index is 28.89 kg/m  as calculated from the following:    Height as of this encounter: 1.676 m (5' 6\").    Weight as of this encounter: 81.2 kg (179 lb).    Weight management plan: Discussed healthy diet and exercise guidelines    She reports that she quit smoking about 4 years ago. She has a 12.50 pack-year smoking history. She has never used smokeless tobacco.      Counseling Resources:  ATP IV Guidelines  Pooled Cohorts Equation Calculator  Breast Cancer Risk Calculator  BRCA-Related Cancer Risk Assessment: FHS-7 Tool  FRAX Risk Assessment  ICSI Preventive Guidelines  Dietary Guidelines for Americans, 2010  USDA's MyPlate  ASA Prophylaxis  Lung CA Screening    Gregory G. Schoen, MD  [facility]  Lung Cancer Screening Shared Decision Making Visit     Kesha Zacarias is eligible for lung cancer screening on the basis of the information provided in my signed lung cancer screening order.     I have discussed with patient the risks and benefits of screening for lung cancer with low-dose CT.     The risks include:  radiation exposure: one low dose chest CT " has as much ionizing radiation as about 15 chest x-rays or 6 months of background radiation living in [place]    false positives: 96% of positive findings/nodules are NOT cancer, but some might still require additional diagnostic evaluation, including biopsy  over-diagnosis: some slow growing cancers that might never have been clinically significant will be detected and treated unnecessarily     The benefit of early detection of lung cancer is contingent upon adherence to annual screening or more frequent follow up if indicated.     Furthermore, reaping the benefits of screening requires Keshahollis OrtizDeann to be willing and physically able to undergo diagnostic procedures, if indicated. Although no specific guide is available for determining severity of comorbidities, it is reasonable to withhold screening in patients who have greater mortality risk from other diseases.     We did discuss that the only way to prevent lung cancer is to not smoke. Smoking cessation counseling was not given.      I did offer risk estimation using a calculator such as this one:    ShouldIScreen

## 2021-12-10 NOTE — PATIENT INSTRUCTIONS
Preventive Health Recommendations  Female Ages 50 - 64    Yearly exam: See your health care provider every year in order to  o Review health changes.   o Discuss preventive care.    o Review your medicines if your doctor has prescribed any.      Get a Pap test every three years (unless you have an abnormal result and your provider advises testing more often).    If you get Pap tests with HPV test, you only need to test every 5 years, unless you have an abnormal result.     You do not need a Pap test if your uterus was removed (hysterectomy) and you have not had cancer.    You should be tested each year for STDs (sexually transmitted diseases) if you're at risk.     Have a mammogram every 1 to 2 years.    Have a colonoscopy at age 50, or have a yearly FIT test (stool test). These exams screen for colon cancer.      Have a cholesterol test every 5 years, or more often if advised.    Have a diabetes test (fasting glucose) every three years. If you are at risk for diabetes, you should have this test more often.     If you are at risk for osteoporosis (brittle bone disease), think about having a bone density scan (DEXA).    Shots: Get a flu shot each year. Get a tetanus shot every 10 years.    Nutrition:     Eat at least 5 servings of fruits and vegetables each day.    Eat whole-grain bread, whole-wheat pasta and brown rice instead of white grains and rice.    Get adequate Calcium and Vitamin D.     Lifestyle    Exercise at least 150 minutes a week (30 minutes a day, 5 days a week). This will help you control your weight and prevent disease.    Limit alcohol to one drink per day.    No smoking.     Wear sunscreen to prevent skin cancer.     See your dentist every six months for an exam and cleaning.    See your eye doctor every 1 to 2 years.    Lung Cancer Screening   Frequently Asked Questions  If you are at high-risk for lung cancer, getting screened with low-dose computed tomography (LDCT) every year can help save  your life. This handout offers answers to some of the most common questions about lung cancer screening. If you have other questions, please call [phone] ([phone]).     What is it?  Lung cancer screening uses special X-ray technology to create an image of your lung tissue. The exam is quick and easy and takes less than 10 seconds. We don t give you any medicine or use any needles. You can eat before and after the exam. You don t need to change your clothes as long as the clothing on your chest doesn t contain metal. But, you do need to be able to hold your breath for at least 6 seconds during the exam.    What is the goal of lung cancer screening?  The goal of lung cancer screening is to save lives. Many times, lung cancer is not found until a person starts having physical symptoms. Lung cancer screening can help detect lung cancer in the earliest stages when it may be easier to treat.    Who should be screened for lung cancer?  We suggest lung cancer screening for anyone who is at high-risk for lung cancer. You are in the high-risk group if you:      are between the ages of 55 and 79, and    have smoked at least 1 pack of cigarettes a day for 30 or more years, and    still smoke or have quit within the past 15 years.    However, if you have a new cough or shortness of breath, you should talk to your doctor before being screened.    Some national lung health advocacy groups also recommend screening for people ages 50 to 79 who have smoked an average of 1 pack of cigarettes a day for 20 years. They must also have at least 1 other risk factor for lung cancer, not including exposure to secondhand smoke. Other risk factors are having had cancer in the past, emphysema, pulmonary fibrosis, COPD, a family history of lung cancer, or exposure to certain materials such as arsenic, asbestos, beryllium, cadmium, chromium, diesel fumes, nickel, radon or silica. Your care team can help you know if you have one of  these risk factors.     Why does it matter if I have symptoms?  Certain symptoms can be a sign that you have a condition in your lungs that should be checked and treated by your doctor. These symptoms include fever, chest pain, a new or changing cough, shortness of breath that you have never felt before, coughing up blood or unexplained weight loss. Having any of these symptoms can greatly affect the results of lung cancer screening.       Should all smokers get an LDCT lung cancer screening exam?  It depends. Lung cancer screening is for a very specific group of men and women who have a history of heavy smoking over a long period of time (see  Who should be screened for lung cancer  above).  I am in the high-risk group, but have been diagnosed with cancer in the past. Is LDCT lung cancer screening right for me?  In some cases, you should not have LDCT lung screening, such as when your doctor is already following your cancer with CT scan studies. Your doctor will help you decide if LDCT lung screening is right for you.  Do I need to have a screening exam every year?  Yes. If you are in the high-risk group described earlier, you should get an LDCT lung cancer screening exam every year until you are 79, or are no longer willing or able to undergo screening and possible procedures to diagnose and treat lung cancer.  How effective is LDCT at preventing death from lung cancer?  Studies have shown that LDCT lung cancer screening can lower the risk of death from lung cancer by 20 percent in people who are at high-risk.  What are the risks?  There are some risks and limitations of LDCT lung cancer screening. We want to make sure you understand the risks and benefits, so please let us know if you have any questions. Your doctor may want to talk with you more about these risks.    Radiation exposure: As with any exam that uses radiation, there is a very small increased risk of cancer. The amount of radiation in LDCT is  small--about the same amount a person would get from a mammogram. Your doctor orders the exam when he or she feels the potential benefits outweigh the risks.    False negatives: No test is perfect, including LDCT. It is possible that you may have a medical condition, including lung cancer, that is not found during your exam. This is called a false negative result.    False positives and more testing: LDCT very often finds something in the lung that could be cancer, but in fact is not. This is called a false positive result. False positive tests often cause anxiety. To make sure these findings are not cancer, you may need to have more tests. These tests will be done only if you give us permission. Sometimes patients need a treatment that can have side effects, such as a biopsy. For more information on false positives, see  What can I expect from the results?     Findings not related to lung cancer: Your LDCT exam also takes pictures of areas of your body next to your lungs. In a very small number of cases, the CT scan will show an abnormal finding in one of these areas, such as your kidneys, adrenal glands, liver or thyroid. This finding may not be serious, but you may need more tests. Your doctor can help you decide what other tests you may need, if any.  What can I expect from the results?  About 1 out of 4 LDCT exams will find something that may need more tests. Most of the time, these findings are lung nodules. Lung nodules are very small collections of tissue in the lung. These nodules are very common, and the vast majority--more than 97 percent--are not cancer (benign). Most are normal lymph nodes or small areas of scarring from past infections.  But, if a small lung nodule is found to be cancer, the cancer can be cured more than 90 percent of the time. To know if the nodule is cancer, we may need to get more images before your next yearly screening exam. If the nodule has suspicious features (for example, it  is large, has an odd shape or grows over time), we will refer you to a specialist for further testing.  Will my doctor also get the results?  Yes. Your doctor will get a copy of your results.  Is it okay to keep smoking now that there s a cancer screening exam?  No. Tobacco is one of the strongest cancer-causing agents. It causes not only lung cancer, but other cancers and cardiovascular (heart) diseases as well. The damage caused by smoking builds over time. This means that the longer you smoke, the higher your risk of disease. While it is never too late to quit, the sooner you quit, the better.  Where can I find help to quit smoking?  The best way to prevent lung cancer is to stop smoking. If you have already quit smoking, congratulations and keep it up! For help on quitting smoking, please call [employer] at 9-034-968-OYHO ([phone]) or the American Cancer Society at 1-459.870.6064 to find local resources near you.  One-on-one health coaching:  If you d prefer to work individually with a health care provider on tobacco cessation, we offer:      Medication Therapy Management:  Our specially trained pharmacists work closely with you and your doctor to help you quit smoking.  Call [phone] or [phone] (toll free).     Can Do: Health coaching offered by [facility].  [email or web address]

## 2021-12-12 PROBLEM — E66.01 MORBID OBESITY (H): Status: RESOLVED | Noted: 2019-03-22 | Resolved: 2021-12-12

## 2021-12-15 ENCOUNTER — TELEPHONE (OUTPATIENT)
Dept: FAMILY MEDICINE | Facility: CLINIC | Age: 61
End: 2021-12-15
Payer: COMMERCIAL

## 2021-12-16 ENCOUNTER — TELEPHONE (OUTPATIENT)
Dept: GASTROENTEROLOGY | Facility: CLINIC | Age: 61
End: 2021-12-16
Payer: COMMERCIAL

## 2021-12-16 NOTE — TELEPHONE ENCOUNTER
GI made one attempt to schedule patient for colonoscopy and was not successful. Please contact patient and work with GI to set up a colonoscopy.    Electronically signed by Greg Schoen, MD

## 2021-12-16 NOTE — TELEPHONE ENCOUNTER
Screening Questions  1. Are you active on mychart? Y    2. What insurance is in the chart? [employer]    2.  Ordering/Referring Provider: Schoen, Gregory G,    3. BMI 28.9 , If greater than 40 review exclusion criteria    4.  Respiratory Screening (If yes to any of the following Hospital setting only):     Do you use daily home oxygen? N  Do you have mod to severe Obstructive Sleep Apnea? N   Do you have Pulmonary Hypertension? N   Do you have UNCONTROLLED asthma? N    5. Have you had a heart or lung transplant (If yes, please review exclusion criteria) ? N    6. Are you currently on dialysis or have chronic kidney disease? N    7. Have you had a stroke or Transient ischemic attack (TIA) within 6 months? N    8. In the past 6 months, have you had any heart related issues including cardiomyopathy or heart attack? N                 If yes, did it require cardiac stenting or other implantable device?N      9. Do you have any implantable devices in your body (pacemaker, defib, LVAD)? N    10. Do you take nitroglycerin? If yes, how often? N    11. Are you currently taking any blood thinners?N    12. Are you a diabetic? N    13. (Females) Are you currently pregnant? N  If yes, how many weeks?      15. Are you taking any prescription pain medications on a routine schedule? N If yes, MAC sedation.    16. Do you have any chemical dependencies such as alcohol, street drugs, or methadone? N If yes, MAC sedation.    17. Do you have any history of post-traumatic stress syndrome, severe anxiety or history of psychosis? N    18. Do you transfer independently? Y    19.  Do you have any issues with constipation? N    20. Preferred Pharmacy for Pre Prescription [id number] - [place]    Scheduling Details    Which Colonoscopy Prep was Sent?: Miralax  Procedure Scheduled: Colon  Surgeon: Constantine  Date of Procedure: 1-14  Location:   Caller (Please ask for phone number if not scheduled by patient): Kesha      Sedation  Type: MAC  Conscious Sedation- Needs  for 6 hours after the procedure  MAC/General-Needs  for 24 hours after procedure    Pre-op Required at [facility] [place], [place] and OR for MAC sedation:   (if yes advise patient they will need a pre-op prior to procedure)      Is patient on blood thinners? -N (If yes- inform patient to follow up with PCP or provider for follow up instructions)     Informed patient they will need an adult  Y  Cannot take any type of public or medical transportation alone    Pre-Procedure Covid test to be completed at [facility] or Externally: Y  lab 1-11     Confirmed Nurse will call to complete assessment Y    Additional comments:

## 2021-12-20 DIAGNOSIS — Z11.59 ENCOUNTER FOR SCREENING FOR OTHER VIRAL DISEASES: ICD-10-CM

## 2022-01-11 ENCOUNTER — LAB (OUTPATIENT)
Dept: LAB | Facility: CLINIC | Age: 62
End: 2022-01-11
Payer: COMMERCIAL

## 2022-01-11 DIAGNOSIS — Z11.59 ENCOUNTER FOR SCREENING FOR OTHER VIRAL DISEASES: ICD-10-CM

## 2022-01-11 PROCEDURE — U0005 INFEC AGEN DETEC AMPLI PROBE: HCPCS

## 2022-01-11 PROCEDURE — U0003 INFECTIOUS AGENT DETECTION BY NUCLEIC ACID (DNA OR RNA); SEVERE ACUTE RESPIRATORY SYNDROME CORONAVIRUS 2 (SARS-COV-2) (CORONAVIRUS DISEASE [COVID-19]), AMPLIFIED PROBE TECHNIQUE, MAKING USE OF HIGH THROUGHPUT TECHNOLOGIES AS DESCRIBED BY CMS-2020-01-R: HCPCS

## 2022-01-12 LAB — SARS-COV-2 RNA RESP QL NAA+PROBE: NEGATIVE

## 2022-01-13 ENCOUNTER — ANESTHESIA EVENT (OUTPATIENT)
Dept: GASTROENTEROLOGY | Facility: CLINIC | Age: 62
End: 2022-01-13
Payer: COMMERCIAL

## 2022-01-13 ASSESSMENT — LIFESTYLE VARIABLES: TOBACCO_USE: 1

## 2022-01-13 NOTE — H&P
[facility] History and Physical    Kesha Zacarias MRN# [id number]   Age: 61 year old YOB: 1960     Date of Admission:  (Not on file)    Home clinic: [facility]  Primary care provider: Schoen, Gregory G          Impression and Plan:   Impression:   Special screening for malignant neoplasms, colon [Z12.11]  Last colonoscopy 2016-multiple polyps and evidence of a cecal resection ( perf appendicitis).      Plan:   Proceed to Colonoscopy as planned.  The procedure, risks(bleeding, perforation), benefits and alternatives were discussed and the patient agrees to proceed. Cleared for Anesthesia             Chief Complaint:   Special screening for malignant neoplasms, colon [Z12.11]    History is obtained from the patient          History of Present Illness:   This 61 year old female is being seen at this time for evaluation for colonoscopy. No complaints or family hx of colon ca.           Past Medical History:     Past Medical History:   Diagnosis Date     Arthritis      Chronic depressive personality disorder      Dysplasia of cervix (uteri) 1988    Cryotherapy     Female infertility of unspecified origin             Past Surgical History:     Past Surgical History:   Procedure Laterality Date     COLONOSCOPY       [facility] INTRODUCE CATH FALLOPIAN TUBE, RE-OPEN/DIAGNOSIS       HERNIA REPAIR, INCISIONAL  09     ORTHOPEDIC SURGERY  2017     [facility] APPENDECTOMY  03     [facility] REMV CATARACT INTRACAP,INSERT LENS  2003    right            Social History:     Social History     Tobacco Use     Smoking status: Former Smoker     Packs/day: 0.50     Years: 25.00     Pack years: 12.50     Quit date: 2017     Years since quittin.3     Smokeless tobacco: Never Used     Tobacco comment: quit 2017    Substance Use Topics     Alcohol use: Yes     Comment: 1-2 weekly            Family History:     Family History   Problem Relation Age of Onset      Genitourinary Problems Father         prostate     Genetic Disorder Father         ulcer     Hypertension Father      Lipids Father      Prostate Cancer Father      Heart Disease Mother      Lipids Mother      Hyperlipidemia Mother      Heart Disease Maternal Grandmother      Cerebrovascular Disease Maternal Grandmother      Heart Disease Maternal Grandfather      Heart Disease Maternal Uncle      Heart Disease Maternal Uncle         x  3     Hyperlipidemia Sister      Hyperlipidemia Brother      Other Cancer Brother             Immunizations:     VACCINE/DOSE   Diptheria   DPT   DTAP   HBIG   Hepatitis A   Hepatitis B   HIB   Influenza   Measles   Meningococcal   MMR   Mumps   Pneumococcal   Polio   Rubella   Small Pox   TDAP   Varicella   Zoster            Allergies:     Allergies   Allergen Reactions     Ciprofloxacin      hives and was on flagyl too     Metronidazole      hives and was on cipro too            Medications:     No current facility-administered medications for this encounter.     Current Outpatient Medications   Medication Sig     aspirin 81 MG tablet Take 81 mg by mouth daily     atorvastatin (LIPITOR) 40 MG tablet TAKE ONE TABLET BY MOUTH DAILY     betamethasone dipropionate (DIPROSONE) 0.05 % external lotion Apply topically 2 times daily     Cholecalciferol (VITAMIN D) 2000 UNIT tablet Take 1 tablet by mouth daily.     COENZYME Q-10 PO Take 1 tablet by mouth every other day     conjugated estrogens (PREMARIN) 0.625 MG/GM vaginal cream Place 0.5 g vaginally twice a week     cyclobenzaprine (FLEXERIL) 5 MG tablet TAKE 1 TABLET(5 MG) BY MOUTH AT BEDTIME     hydroxychloroquine (PLAQUENIL) 200 MG tablet TAKE 2 AND 1/2 TABLETS BY MOUTH EVERY DAY     ibuprofen (ADVIL/MOTRIN) 800 MG tablet TAKE 1 TABLET BY MOUTH EVERY 6 HOURS AS NEEDED FOR PAIN     Multiple Minerals-Vitamins (CALCIUM-MAGNESIUM-ZINC-D3 PO)              Review of Systems:   The review of systems was positive for the following findings.   None.  The remainder of the review of systems was unremarkable.          Physical Exam:   All vitals have been reviewed  Last menstrual period 11/22/2011, not currently breastfeeding.  No intake or output data in the 24 hours ending 01/13/22 0748  SHEENT examination revealed NC/AT, EOMI.  Examination of the chest revealed CTA.  Examination of the heart revealed RRR.  Examination of the abdomen revealed soft, non tender.  The neuromuscular examination was NL.          Data:   All laboratory data reviewed  No results found for any visits on 01/14/22.  -     Gagandeep Patterson MD, FACS

## 2022-01-14 ENCOUNTER — ANESTHESIA (OUTPATIENT)
Dept: GASTROENTEROLOGY | Facility: CLINIC | Age: 62
End: 2022-01-14
Payer: COMMERCIAL

## 2022-01-14 ENCOUNTER — HOSPITAL ENCOUNTER (OUTPATIENT)
Facility: CLINIC | Age: 62
Discharge: HOME | End: 2022-01-14
Attending: SPECIALIST | Admitting: SPECIALIST
Payer: COMMERCIAL

## 2022-01-14 VITALS
WEIGHT: 174 LBS | TEMPERATURE: 98.5 F | BODY MASS INDEX: 27.97 KG/M2 | RESPIRATION RATE: 16 BRPM | HEIGHT: 66 IN | DIASTOLIC BLOOD PRESSURE: 34 MMHG | OXYGEN SATURATION: 98 % | SYSTOLIC BLOOD PRESSURE: 82 MMHG

## 2022-01-14 LAB — COLONOSCOPY: NORMAL

## 2022-01-14 PROCEDURE — 88305 TISSUE EXAM BY PATHOLOGIST: CPT | Mod: TC | Performed by: SPECIALIST

## 2022-01-14 PROCEDURE — 258N000003 HC RX IP 258 OP 636: Performed by: NURSE ANESTHETIST, CERTIFIED REGISTERED

## 2022-01-14 PROCEDURE — 250N000011 HC RX IP 250 OP 636: Performed by: NURSE ANESTHETIST, CERTIFIED REGISTERED

## 2022-01-14 PROCEDURE — 45380 COLONOSCOPY AND BIOPSY: CPT | Mod: PT | Performed by: SPECIALIST

## 2022-01-14 PROCEDURE — 250N000009 HC RX 250: Performed by: NURSE ANESTHETIST, CERTIFIED REGISTERED

## 2022-01-14 PROCEDURE — 370N000017 HC ANESTHESIA TECHNICAL FEE, PER MIN: Performed by: SPECIALIST

## 2022-01-14 RX ORDER — LIDOCAINE HYDROCHLORIDE 20 MG/ML
INJECTION, SOLUTION INFILTRATION; PERINEURAL PRN
Status: DISCONTINUED | OUTPATIENT
Start: 2022-01-14 | End: 2022-01-14

## 2022-01-14 RX ORDER — SODIUM CHLORIDE, SODIUM LACTATE, POTASSIUM CHLORIDE, CALCIUM CHLORIDE 600; 310; 30; 20 MG/100ML; MG/100ML; MG/100ML; MG/100ML
INJECTION, SOLUTION INTRAVENOUS CONTINUOUS PRN
Status: DISCONTINUED | OUTPATIENT
Start: 2022-01-14 | End: 2022-01-14

## 2022-01-14 RX ORDER — SODIUM CHLORIDE, SODIUM LACTATE, POTASSIUM CHLORIDE, CALCIUM CHLORIDE 600; 310; 30; 20 MG/100ML; MG/100ML; MG/100ML; MG/100ML
INJECTION, SOLUTION INTRAVENOUS CONTINUOUS
Status: DISCONTINUED | OUTPATIENT
Start: 2022-01-14 | End: 2022-01-14 | Stop reason: HOSPADM

## 2022-01-14 RX ORDER — LIDOCAINE 40 MG/G
CREAM TOPICAL
Status: DISCONTINUED | OUTPATIENT
Start: 2022-01-14 | End: 2022-01-14 | Stop reason: HOSPADM

## 2022-01-14 RX ORDER — PROPOFOL 10 MG/ML
INJECTION, EMULSION INTRAVENOUS PRN
Status: DISCONTINUED | OUTPATIENT
Start: 2022-01-14 | End: 2022-01-14

## 2022-01-14 RX ORDER — PROPOFOL 10 MG/ML
INJECTION, EMULSION INTRAVENOUS CONTINUOUS PRN
Status: DISCONTINUED | OUTPATIENT
Start: 2022-01-14 | End: 2022-01-14

## 2022-01-14 RX ADMIN — LIDOCAINE HYDROCHLORIDE 60 MG: 20 INJECTION, SOLUTION INFILTRATION; PERINEURAL at 12:23

## 2022-01-14 RX ADMIN — PROPOFOL 150 MCG/KG/MIN: 10 INJECTION, EMULSION INTRAVENOUS at 12:23

## 2022-01-14 RX ADMIN — PROPOFOL 100 MG: 10 INJECTION, EMULSION INTRAVENOUS at 12:23

## 2022-01-14 RX ADMIN — SODIUM CHLORIDE, POTASSIUM CHLORIDE, SODIUM LACTATE AND CALCIUM CHLORIDE: 600; 310; 30; 20 INJECTION, SOLUTION INTRAVENOUS at 11:50

## 2022-01-14 RX ADMIN — SODIUM CHLORIDE, SODIUM LACTATE, POTASSIUM CHLORIDE, CALCIUM CHLORIDE: 600; 310; 30; 20 INJECTION, SOLUTION INTRAVENOUS at 12:19

## 2022-01-14 RX ADMIN — PROPOFOL 100 MG: 10 INJECTION, EMULSION INTRAVENOUS at 12:34

## 2022-01-14 ASSESSMENT — MIFFLIN-ST. JEOR: SCORE: 1376.01

## 2022-01-14 NOTE — DISCHARGE INSTRUCTIONS
[facility]    Home Care Following Endoscopy          Activity:    You have just undergone an endoscopic procedure usually performed with conscious sedation.  Do not work or operate machinery (including a car) for at least 12 hours.      I encourage you to walk and attempt to pass this air as soon as possible.    Diet:    Return to the diet you were on before your procedure but eat lightly for the first 12-24 hours.    Drink plenty of water.    Resume any regular medications unless otherwise advised by your physician.  Please begin any new medication prescribed as a result of your procedure as directed by your physician.     If you had any biopsy or polyp removed please refrain from aspirin or aspirin products for 2 days.  If on Coumadin please restart as instructed by your physician.   Pain:    You may take Tylenol as needed for pain.  Expected Recovery:    You can expect some mild abdominal fullness and/or discomfort due to the air used to inflate your intestinal tract. It is also normal to have a mild sore throat after upper endoscopy.    Call Your Physician if You Have:  o     After Colonoscopy:  o Worsening persisting abdominal pain which is worse with activity.  o Fevers (>101 degrees F), chills or shakes.  o Passage of continued blood with bowel movements.   Any questions or concerns about your recovery, please call [phone] or after hours [phone] Nurse Advice Line.    Follow-up Care:    You should receive a call or letter with your results within 1 week. Please call if you have not received a notification of your results.  If asked to return to clinic please make an appointment 1 week after your procedure.  Call [phone].

## 2022-01-14 NOTE — ANESTHESIA CARE TRANSFER NOTE
Patient: Kesha Zacarias    Procedure: Procedure(s):  COLONOSCOPY, WITH POLYPECTOMY AND BIOPSY       Diagnosis: Special screening for malignant neoplasms, colon [Z12.11]  Diagnosis Additional Information: No value filed.    Anesthesia Type:   MAC     Note:    Oropharynx: spontaneously breathing  Level of Consciousness: awake  Oxygen Supplementation: room air    Independent Airway: airway patency satisfactory and stable  Dentition: dentition unchanged  Vital Signs Stable: post-procedure vital signs reviewed and stable  Report to RN Given: handoff report given  Patient transferred to: Phase II    Handoff Report: Identifed the Patient, Identified the Reponsible Provider, Reviewed the pertinent medical history, Discussed the surgical course, Reviewed Intra-OP anesthesia mangement and issues during anesthesia, Set expectations for post-procedure period and Allowed opportunity for questions and acknowledgement of understanding      Vitals:  Vitals Value Taken Time   BP 82/34 01/14/22 1254   Temp     Pulse 65 01/14/22 1254   Resp     SpO2 95 % 01/14/22 1256   Vitals shown include unvalidated device data.    Electronically Signed By: STEPAN Duque CRNA  January 14, 2022  12:58 PM

## 2022-01-14 NOTE — ANESTHESIA POSTPROCEDURE EVALUATION
Patient: Kesha Zacarias    Procedure: Procedure(s):  COLONOSCOPY, WITH POLYPECTOMY AND BIOPSY       Diagnosis:Special screening for malignant neoplasms, colon [Z12.11]  Diagnosis Additional Information: No value filed.    Anesthesia Type:  MAC    Note:  Disposition: Outpatient   Postop Pain Control: Uneventful            Sign Out: Well controlled pain   PONV: No   Neuro/Psych: Uneventful            Sign Out: Acceptable/Baseline neuro status   Airway/Respiratory: Uneventful            Sign Out: AIRWAY IN SITU/Resp. Support   CV/Hemodynamics: Uneventful            Sign Out: Acceptable CV status   Other NRE: NONE   DID A NON-ROUTINE EVENT OCCUR? No    Event details/Postop Comments:  Pt was happy with anesthesia care.  No complications.  I will follow up with the pt if needed.           Last vitals:  Vitals Value Taken Time   BP 82/34 01/14/22 1254   Temp     Pulse 65 01/14/22 1254   Resp     SpO2 95 % 01/14/22 1256   Vitals shown include unvalidated device data.    Electronically Signed By: STEPAN Duque CRNA  January 14, 2022  12:58 PM

## 2022-01-14 NOTE — ANESTHESIA PREPROCEDURE EVALUATION
Anesthesia Pre-Procedure Evaluation    Patient: Kesha Zacarias   MRN: [id number] : 1960        Preoperative Diagnosis: Special screening for malignant neoplasms, colon [Z12.11]    Procedure : Procedure(s):  COLONOSCOPY          Past Medical History:   Diagnosis Date     Arthritis 2006     Chronic depressive personality disorder      Dysplasia of cervix (uteri)     Cryotherapy     Female infertility of unspecified origin       Past Surgical History:   Procedure Laterality Date     COLONOSCOPY       [facility] INTRODUCE CATH FALLOPIAN TUBE, RE-OPEN/DIAGNOSIS       HERNIA REPAIR, INCISIONAL  09     ORTHOPEDIC SURGERY  2017     [facility] APPENDECTOMY  03     [facility] REMV CATARACT INTRACAP,INSERT LENS  2003    right      Allergies   Allergen Reactions     Ciprofloxacin      hives and was on flagyl too     Metronidazole      hives and was on cipro too      Social History     Tobacco Use     Smoking status: Former Smoker     Packs/day: 0.50     Years: 25.00     Pack years: 12.50     Quit date: 2017     Years since quittin.3     Smokeless tobacco: Never Used     Tobacco comment: quit 2017    Substance Use Topics     Alcohol use: Yes     Comment: 1-2 weekly      Wt Readings from Last 1 Encounters:   12/10/21 81.2 kg (179 lb)        Anesthesia Evaluation   Pt has had prior anesthetic. Type: MAC and General.    No history of anesthetic complications       ROS/MED HX  ENT/Pulmonary: Comment: Quit Smokin17    (+) tobacco use, Past use, 13  Pack-Year Hx,      Neurologic:  - neg neurologic ROS     Cardiovascular:  - neg cardiovascular ROS     METS/Exercise Tolerance:     Hematologic:  - neg hematologic  ROS     Musculoskeletal: Comment: Rheumatoid arthritis   (+) arthritis,     GI/Hepatic:  - neg GI/hepatic ROS     Renal/Genitourinary:  - neg Renal ROS     Endo:  - neg endo ROS     Psychiatric/Substance Use:     (+) psychiatric history depression     Infectious Disease:  - neg  infectious disease ROS     Malignancy:  - neg malignancy ROS     Other:  - neg other ROS          Physical Exam    Airway  airway exam normal      Mallampati: II   TM distance: > 3 FB   Neck ROM: full   Mouth opening: > 3 cm    Respiratory Devices and Support         Dental       (+) upper dentures and lower dentures      Cardiovascular   cardiovascular exam normal       Rhythm and rate: regular and normal     Pulmonary   pulmonary exam normal        breath sounds clear to auscultation           OUTSIDE LABS:  CBC:   Lab Results   Component Value Date    WBC 4.7 10/31/2021    WBC 5.7 10/24/2020    HGB 12.8 10/31/2021    HGB 12.9 10/24/2020    HCT 41.4 10/31/2021    HCT 41.5 10/24/2020     10/31/2021     10/24/2020     BMP:   Lab Results   Component Value Date     10/31/2021     10/24/2020    POTASSIUM 4.3 10/31/2021    POTASSIUM 4.1 10/24/2020    CHLORIDE 111 (H) 10/31/2021    CHLORIDE 108 10/24/2020    CO2 32 10/31/2021    CO2 29 10/24/2020    BUN 12 10/31/2021    BUN 20 10/24/2020    CR 0.73 10/31/2021    CR 0.86 10/24/2020     (H) 10/31/2021    GLC 93 10/24/2020     COAGS:   Lab Results   Component Value Date    PTT 25 05/27/2005    INR 1.00 05/27/2005     POC:   Lab Results   Component Value Date    HCG  11/11/2009     Negative   This test provides a presumptive diagnosis of pregnancy or non-pregnancy. A   confirmed pregnancy diagnosis should only be made by a physician after all   clinical and laboratory findings have been evaluated.     HEPATIC:   Lab Results   Component Value Date    ALBUMIN 3.7 10/31/2021    PROTTOTAL 6.6 (L) 10/31/2021    ALT 27 10/31/2021    AST 14 10/31/2021    ALKPHOS 68 10/31/2021    BILITOTAL 0.5 10/31/2021    BILIDIRECT 0.1 02/17/2003     OTHER:   Lab Results   Component Value Date    PH 8.0 (H) 03/05/2003    NITISH 8.9 10/31/2021    MAG 1.9 02/03/2005    LIPASE 105 02/03/2005    TSH 1.44 05/11/2019    SED 11 07/31/2009       Anesthesia Plan    ASA  Status:  3   NPO Status:  NPO Appropriate    Anesthesia Type: MAC.     - Reason for MAC: straight local not clinically adequate   Induction: Intravenous, Propofol.   Maintenance: TIVA.        Consents    Anesthesia Plan(s) and associated risks, benefits, and realistic alternatives discussed. Questions answered and patient/representative(s) expressed understanding.    - Discussed:     - Discussed with:  Patient      - Extended Intubation/Ventilatory Support Discussed: No.      - Patient is DNR/DNI Status: No    Use of blood products discussed: No .     Postoperative Care    Pain management: IV analgesics.        Comments:    Other Comments: The risks and benefits of anesthesia, and the alternatives where applicable, have been discussed with the patient, and they wish to proceed.            STEPAN Duque CRNA

## 2022-01-18 LAB
PATH REPORT.COMMENTS IMP SPEC: NORMAL
PATH REPORT.COMMENTS IMP SPEC: NORMAL
PATH REPORT.FINAL DX SPEC: NORMAL
PATH REPORT.GROSS SPEC: NORMAL
PATH REPORT.MICROSCOPIC SPEC OTHER STN: NORMAL
PHOTO IMAGE: NORMAL

## 2022-01-18 PROCEDURE — 88305 TISSUE EXAM BY PATHOLOGIST: CPT | Mod: 26 | Performed by: PATHOLOGY

## 2022-01-31 ENCOUNTER — HOSPITAL ENCOUNTER (OUTPATIENT)
Dept: MAMMOGRAPHY | Facility: CLINIC | Age: 62
Discharge: HOME | End: 2022-01-31
Attending: FAMILY MEDICINE | Admitting: FAMILY MEDICINE
Payer: COMMERCIAL

## 2022-01-31 DIAGNOSIS — Z12.31 VISIT FOR SCREENING MAMMOGRAM: ICD-10-CM

## 2022-01-31 PROCEDURE — 77067 SCR MAMMO BI INCL CAD: CPT

## 2022-02-17 PROBLEM — M06.9 RHEUMATOID ARTHRITIS INVOLVING MULTIPLE SITES (H): Status: ACTIVE | Noted: 2017-03-29

## 2022-02-22 DIAGNOSIS — M05.79 RHEUMATOID ARTHRITIS INVOLVING MULTIPLE SITES WITH POSITIVE RHEUMATOID FACTOR (H): ICD-10-CM

## 2022-02-23 NOTE — TELEPHONE ENCOUNTER
Pending Prescriptions:                       Disp   Refills    hydroxychloroquine (PLAQUENIL) 200 MG tabl*225 ta*3        Sig: TAKE 2 AND 1/2 TABLETS BY MOUTH EVERY DAY    Routing refill request to provider for review/approval because:  Drug not on the [facility] refill protocol     hydroxychloroquine (PLAQUENIL) 200 MG tablet 225 tablet 3 1/18/2021  No   Sig: TAKE 2 AND 1/2 TABLETS BY MOUTH EVERY DAY   Sent to pharmacy as: Hydroxychloroquine Sulfate 200 MG Oral Tablet (PLAQUENIL)   Class: E-Prescribe   Order: [id number]   E-Prescribing Status: Receipt confirmed by pharmacy (1/18/2021  2:00 PM CST)

## 2022-02-24 RX ORDER — HYDROXYCHLOROQUINE SULFATE 200 MG/1
TABLET, FILM COATED ORAL
Qty: 225 TABLET | Refills: 3 | Status: SHIPPED | OUTPATIENT
Start: 2022-02-24 | End: 2023-02-27

## 2022-03-08 DIAGNOSIS — E78.5 HYPERLIPIDEMIA LDL GOAL <130: ICD-10-CM

## 2022-03-08 RX ORDER — ATORVASTATIN CALCIUM 40 MG/1
TABLET, FILM COATED ORAL
Qty: 90 TABLET | Refills: 1 | Status: SHIPPED | OUTPATIENT
Start: 2022-03-08 | End: 2022-10-07

## 2022-04-20 DIAGNOSIS — G25.81 RESTLESS LEGS SYNDROME: ICD-10-CM

## 2022-04-21 RX ORDER — CYCLOBENZAPRINE HCL 5 MG
TABLET ORAL
Qty: 90 TABLET | Refills: 3 | Status: SHIPPED | OUTPATIENT
Start: 2022-04-21 | End: 2023-05-17

## 2022-04-21 NOTE — TELEPHONE ENCOUNTER
Cyclobenzaprine      Last Written Prescription Date:  03/15/2021  Last Fill Quantity: 90,   # refills: 3  Last Office Visit: 12/10/2021  Future Office visit:   None  Routing refill request to provider for review/approval because:  Drug not on the [facility], [facility] or [facility] refill protocol or controlled substance    Ana Lilia Pablo RN

## 2022-08-30 DIAGNOSIS — E78.5 HYPERLIPIDEMIA LDL GOAL <130: ICD-10-CM

## 2022-09-02 RX ORDER — ATORVASTATIN CALCIUM 40 MG/1
TABLET, FILM COATED ORAL
Qty: 90 TABLET | Refills: 1 | OUTPATIENT
Start: 2022-09-02

## 2022-09-02 NOTE — TELEPHONE ENCOUNTER
Rx was sent 3/8/2022 for 3 months and 1 refills. Patient should have medication available through 10/2022  Pharmacy notified via E-prescribe refusal

## 2022-10-05 DIAGNOSIS — E78.5 HYPERLIPIDEMIA LDL GOAL <130: ICD-10-CM

## 2022-10-07 RX ORDER — ATORVASTATIN CALCIUM 40 MG/1
TABLET, FILM COATED ORAL
Qty: 90 TABLET | Refills: 0 | Status: SHIPPED | OUTPATIENT
Start: 2022-10-07 | End: 2023-01-17

## 2022-10-09 ENCOUNTER — HEALTH MAINTENANCE LETTER (OUTPATIENT)
Age: 62
End: 2022-10-09

## 2023-02-12 ENCOUNTER — HEALTH MAINTENANCE LETTER (OUTPATIENT)
Age: 63
End: 2023-02-12

## 2023-02-26 DIAGNOSIS — M05.79 RHEUMATOID ARTHRITIS INVOLVING MULTIPLE SITES WITH POSITIVE RHEUMATOID FACTOR (H): ICD-10-CM

## 2023-02-27 RX ORDER — HYDROXYCHLOROQUINE SULFATE 200 MG/1
TABLET, FILM COATED ORAL
Qty: 225 TABLET | Refills: 3 | Status: SHIPPED | OUTPATIENT
Start: 2023-02-27 | End: 2024-02-09

## 2023-03-13 ENCOUNTER — HOSPITAL ENCOUNTER (OUTPATIENT)
Dept: MAMMOGRAPHY | Facility: CLINIC | Age: 63
Discharge: HOME | End: 2023-03-13
Attending: FAMILY MEDICINE | Admitting: FAMILY MEDICINE
Payer: COMMERCIAL

## 2023-03-13 DIAGNOSIS — Z12.31 VISIT FOR SCREENING MAMMOGRAM: ICD-10-CM

## 2023-03-13 PROCEDURE — 77067 SCR MAMMO BI INCL CAD: CPT

## 2023-05-16 DIAGNOSIS — G25.81 RESTLESS LEGS SYNDROME: ICD-10-CM

## 2023-05-17 RX ORDER — CYCLOBENZAPRINE HCL 5 MG
TABLET ORAL
Qty: 90 TABLET | Refills: 3 | Status: SHIPPED | OUTPATIENT
Start: 2023-05-17 | End: 2024-04-28

## 2023-08-25 ENCOUNTER — TELEPHONE (OUTPATIENT)
Dept: FAMILY MEDICINE | Facility: CLINIC | Age: 63
End: 2023-08-25
Payer: COMMERCIAL

## 2023-08-25 NOTE — TELEPHONE ENCOUNTER
Reason for Call:  Appointment Request    Patient requesting this type of appt:  OV    Requested provider: Schoen, Gregory G    Reason patient unable to be scheduled: Not within requested timeframe    When does patient want to be seen/preferred time: Same day    Comments: Patient was in the shower and felt a mass next to her nipple, patient scheduled an appt with Dr. Boykin on 09-13-23 but would like to be seen as soon as possible with any provider.  Possible change of appearance      Could we send this information to you in [place] or would you prefer to receive a phone call?:   Patient would prefer a phone call   Okay to leave a detailed message?: Yes at Home number on file [phone] (home)    Call taken on 8/25/2023 at 7:40 AM by Kristin Gage

## 2023-08-28 ENCOUNTER — OFFICE VISIT (OUTPATIENT)
Dept: FAMILY MEDICINE | Facility: CLINIC | Age: 63
End: 2023-08-28
Payer: COMMERCIAL

## 2023-08-28 VITALS
TEMPERATURE: 98.5 F | HEIGHT: 66 IN | SYSTOLIC BLOOD PRESSURE: 118 MMHG | BODY MASS INDEX: 30.7 KG/M2 | OXYGEN SATURATION: 100 % | RESPIRATION RATE: 18 BRPM | HEART RATE: 78 BPM | DIASTOLIC BLOOD PRESSURE: 72 MMHG | WEIGHT: 191 LBS

## 2023-08-28 DIAGNOSIS — N63.42 SUBAREOLAR MASS OF LEFT BREAST: Primary | ICD-10-CM

## 2023-08-28 DIAGNOSIS — M05.79 RHEUMATOID ARTHRITIS INVOLVING MULTIPLE SITES WITH POSITIVE RHEUMATOID FACTOR (H): ICD-10-CM

## 2023-08-28 DIAGNOSIS — E78.5 HYPERLIPIDEMIA LDL GOAL <130: ICD-10-CM

## 2023-08-28 PROCEDURE — 99213 OFFICE O/P EST LOW 20 MIN: CPT | Performed by: FAMILY MEDICINE

## 2023-08-28 ASSESSMENT — PAIN SCALES - GENERAL: PAINLEVEL: NO PAIN (0)

## 2023-08-28 NOTE — PROGRESS NOTES
Assessment & Plan     Subareolar mass of left breast  This is a new occurrence in the past month, with patient not noting this with exam last month.  Use of low dose estrogen cream vaginally should not elevate risk factors for breast cancer but noting that she has been this for about 20 months.  No other risk factors identified.  Will proceed with diagnostic mammogram and discussed with patient that radiologist would be involved at that visit.  Follow up pending those results and request to proceed with next steps as directed by radiology.   - MA Diagnostic Digital Left; Future    Hyperlipidemia LDL goal <130  Due for labs in October so order placed at this time. Patient prefers to get draw while fasting for lipids and ALT.   - Lipid panel reflex to direct LDL Fasting; Future  - ALT; Future    Rheumatoid arthritis involving multiple sites with positive rheumatoid factor (H)  On plaquenil.  Will check labs.    - CBC with platelets and differential; Future  - Basic metabolic panel  (Ca, Cl, CO2, Creat, Gluc, K, Na, BUN); Future       Gregory G. Schoen, MD  [facility]        Yonatan Rebolledo is a 63 year old, presenting for the following health issues:  Breast Mass (Left)      8/28/2023     1:09 PM   Additional Questions   Roomed by Marni EUCEDA       History of Present Illness       Reason for visit:  Abnormal breast lump  Symptom onset:  1-3 days ago  Symptoms include:  Observed difference  Symptom intensity:  Mild  Symptom progression:  Staying the same  Had these symptoms before:  No  What makes it worse:  No  What makes it better:  NoShe consumes 0 sweetened beverage(s) daily.She exercises with enough effort to increase her heart rate 20 to 29 minutes per day.  She exercises with enough effort to increase her heart rate 4 days per week. She is missing 1 dose(s) of medications per week.  She is not taking prescribed medications regularly due to remembering to take.     Patient states she  "checks self monthly and did not have any findings but last Thursday noted a lump under the left nipple that was tender and caused the nipple to be more indented.  No discharge noted and did not have any abnormalities on her annual mammogram on 3/13/23.  No redness, fever or chills.  No family history of breast cancer and no prior abnormalities on mammography. She is post-menopausal and still has hot flashes.  She does use premarin vaginal cream twice weekly for vaginosis since  12/22/21.      Current Outpatient Medications   Medication Sig Dispense Refill    aspirin 81 MG tablet Take 81 mg by mouth daily      atorvastatin (LIPITOR) 40 MG tablet TAKE 1 TABLET BY MOUTH DAILY 90 tablet 3    betamethasone dipropionate (DIPROSONE) 0.05 % external lotion Apply topically 2 times daily 60 mL 3    COENZYME Q-10 PO Take 1 tablet by mouth every other day      conjugated estrogens (PREMARIN) 0.625 MG/GM vaginal cream Place 0.5 g vaginally twice a week 30 g 1    cyclobenzaprine (FLEXERIL) 5 MG tablet TAKE 1 TABLET(5 MG) BY MOUTH AT BEDTIME 90 tablet 3    hydroxychloroquine (PLAQUENIL) 200 MG tablet TAKE 2 AND 1/2 TABLETS BY MOUTH EVERY  tablet 3    Multiple Minerals-Vitamins (CALCIUM-MAGNESIUM-ZINC-D3 PO)       Cholecalciferol (VITAMIN D) 2000 UNIT tablet Take 1 tablet by mouth daily. (Patient not taking: Reported on 8/28/2023) 100 tablet 12    ibuprofen (ADVIL/MOTRIN) 800 MG tablet TAKE 1 TABLET BY MOUTH EVERY 6 HOURS AS NEEDED FOR PAIN (Patient not taking: Reported on 8/28/2023) 120 tablet 3             Objective    /72   Pulse 78   Temp 98.5  F (36.9  C) (Temporal)   Resp 18   Ht 1.664 m (5' 5.5\")   Wt 86.6 kg (191 lb)   LMP 11/22/2011 (Exact Date)   SpO2 100%   BMI 31.30 kg/m    Body mass index is 31.3 kg/m .  Physical Exam   Exam limited to breast exam.  Chaperone present in room for exam.   Left breast shows flattening/retraction of the areola and scaling of the area immediately adjacent to the nipple " from 3-6 oclock.  There is a firmness to the tissue beneath the areola that is about 2.5 x 2.5cm in size and is adherent to the overlying skin/nipple/areola but freely mobile to the underlying breast tissue and chest wall.  There is also some tenderness noted in the area inferomedial to this without any palpable abnormalities.  No discharge could be expressed.  Axillary tail and axilla showed no abnormalities.  Right breast exam was completely normal.

## 2023-09-13 ENCOUNTER — HOSPITAL ENCOUNTER (OUTPATIENT)
Dept: ULTRASOUND IMAGING | Facility: CLINIC | Age: 63
Discharge: HOME | End: 2023-09-13
Attending: FAMILY MEDICINE
Payer: COMMERCIAL

## 2023-09-13 ENCOUNTER — HOSPITAL ENCOUNTER (OUTPATIENT)
Dept: MAMMOGRAPHY | Facility: CLINIC | Age: 63
Discharge: HOME | End: 2023-09-13
Attending: FAMILY MEDICINE
Payer: COMMERCIAL

## 2023-09-13 ENCOUNTER — LAB (OUTPATIENT)
Dept: LAB | Facility: CLINIC | Age: 63
End: 2023-09-13
Payer: COMMERCIAL

## 2023-09-13 DIAGNOSIS — N63.42 SUBAREOLAR MASS OF LEFT BREAST: ICD-10-CM

## 2023-09-13 DIAGNOSIS — N63.42 SUBAREOLAR MASS OF LEFT BREAST: Primary | ICD-10-CM

## 2023-09-13 DIAGNOSIS — M05.79 RHEUMATOID ARTHRITIS INVOLVING MULTIPLE SITES WITH POSITIVE RHEUMATOID FACTOR (H): ICD-10-CM

## 2023-09-13 DIAGNOSIS — E78.5 HYPERLIPIDEMIA LDL GOAL <130: ICD-10-CM

## 2023-09-13 LAB
ALT SERPL W P-5'-P-CCNC: 20 U/L (ref 0–50)
ANION GAP SERPL CALCULATED.3IONS-SCNC: 8 MMOL/L (ref 7–15)
BASOPHILS # BLD AUTO: 0 10E3/UL (ref 0–0.2)
BASOPHILS NFR BLD AUTO: 1 %
BUN SERPL-MCNC: 21.3 MG/DL (ref 8–23)
CALCIUM SERPL-MCNC: 9.2 MG/DL (ref 8.8–10.2)
CHLORIDE SERPL-SCNC: 102 MMOL/L (ref 98–107)
CHOLEST SERPL-MCNC: 172 MG/DL
CREAT SERPL-MCNC: 1.01 MG/DL (ref 0.51–0.95)
DEPRECATED HCO3 PLAS-SCNC: 29 MMOL/L (ref 22–29)
EGFRCR SERPLBLD CKD-EPI 2021: 63 ML/MIN/1.73M2
EOSINOPHIL # BLD AUTO: 0.1 10E3/UL (ref 0–0.7)
EOSINOPHIL NFR BLD AUTO: 3 %
ERYTHROCYTE [DISTWIDTH] IN BLOOD BY AUTOMATED COUNT: 13.3 % (ref 10–15)
GLUCOSE SERPL-MCNC: 97 MG/DL (ref 70–99)
HCT VFR BLD AUTO: 41 % (ref 35–47)
HDLC SERPL-MCNC: 67 MG/DL
HGB BLD-MCNC: 13 G/DL (ref 11.7–15.7)
IMM GRANULOCYTES # BLD: 0 10E3/UL
IMM GRANULOCYTES NFR BLD: 0 %
LDLC SERPL CALC-MCNC: 84 MG/DL
LYMPHOCYTES # BLD AUTO: 2.2 10E3/UL (ref 0.8–5.3)
LYMPHOCYTES NFR BLD AUTO: 42 %
MCH RBC QN AUTO: 29.3 PG (ref 26.5–33)
MCHC RBC AUTO-ENTMCNC: 31.7 G/DL (ref 31.5–36.5)
MCV RBC AUTO: 92 FL (ref 78–100)
MONOCYTES # BLD AUTO: 0.5 10E3/UL (ref 0–1.3)
MONOCYTES NFR BLD AUTO: 9 %
NEUTROPHILS # BLD AUTO: 2.4 10E3/UL (ref 1.6–8.3)
NEUTROPHILS NFR BLD AUTO: 45 %
NONHDLC SERPL-MCNC: 105 MG/DL
NRBC # BLD AUTO: 0 10E3/UL
NRBC BLD AUTO-RTO: 0 /100
PLATELET # BLD AUTO: 187 10E3/UL (ref 150–450)
POTASSIUM SERPL-SCNC: 4.6 MMOL/L (ref 3.4–5.3)
RBC # BLD AUTO: 4.44 10E6/UL (ref 3.8–5.2)
SODIUM SERPL-SCNC: 139 MMOL/L (ref 136–145)
TRIGL SERPL-MCNC: 103 MG/DL
WBC # BLD AUTO: 5.2 10E3/UL (ref 4–11)

## 2023-09-13 PROCEDURE — 80048 BASIC METABOLIC PNL TOTAL CA: CPT

## 2023-09-13 PROCEDURE — 84460 ALANINE AMINO (ALT) (SGPT): CPT

## 2023-09-13 PROCEDURE — 88377 M/PHMTRC ALYS ISHQUANT/SEMIQ: CPT | Performed by: FAMILY MEDICINE

## 2023-09-13 PROCEDURE — 88377 M/PHMTRC ALYS ISHQUANT/SEMIQ: CPT | Mod: 26 | Performed by: MEDICAL GENETICS

## 2023-09-13 PROCEDURE — 77065 DX MAMMO INCL CAD UNI: CPT | Mod: LT

## 2023-09-13 PROCEDURE — 76642 ULTRASOUND BREAST LIMITED: CPT | Mod: LT

## 2023-09-13 PROCEDURE — 88341 IMHCHEM/IMCYTCHM EA ADD ANTB: CPT | Mod: 26 | Performed by: STUDENT IN AN ORGANIZED HEALTH CARE EDUCATION/TRAINING PROGRAM

## 2023-09-13 PROCEDURE — 88342 IMHCHEM/IMCYTCHM 1ST ANTB: CPT | Mod: TC,XU | Performed by: FAMILY MEDICINE

## 2023-09-13 PROCEDURE — 80061 LIPID PANEL: CPT

## 2023-09-13 PROCEDURE — 88305 TISSUE EXAM BY PATHOLOGIST: CPT | Mod: 26 | Performed by: STUDENT IN AN ORGANIZED HEALTH CARE EDUCATION/TRAINING PROGRAM

## 2023-09-13 PROCEDURE — A4648 IMPLANTABLE TISSUE MARKER: HCPCS

## 2023-09-13 PROCEDURE — 38505 NEEDLE BIOPSY LYMPH NODES: CPT | Mod: LT

## 2023-09-13 PROCEDURE — 88360 TUMOR IMMUNOHISTOCHEM/MANUAL: CPT | Mod: 26 | Performed by: STUDENT IN AN ORGANIZED HEALTH CARE EDUCATION/TRAINING PROGRAM

## 2023-09-13 PROCEDURE — 250N000009 HC RX 250: Performed by: FAMILY MEDICINE

## 2023-09-13 PROCEDURE — 77061 BREAST TOMOSYNTHESIS UNI: CPT | Mod: LT

## 2023-09-13 PROCEDURE — 88342 IMHCHEM/IMCYTCHM 1ST ANTB: CPT | Mod: 26 | Performed by: STUDENT IN AN ORGANIZED HEALTH CARE EDUCATION/TRAINING PROGRAM

## 2023-09-13 PROCEDURE — 85025 COMPLETE CBC W/AUTO DIFF WBC: CPT

## 2023-09-13 PROCEDURE — 36415 COLL VENOUS BLD VENIPUNCTURE: CPT

## 2023-09-13 PROCEDURE — 999N000065 MA POST PROCEDURE LEFT

## 2023-09-13 RX ORDER — LIDOCAINE HYDROCHLORIDE 10 MG/ML
10 INJECTION, SOLUTION EPIDURAL; INFILTRATION; INTRACAUDAL; PERINEURAL ONCE
Status: COMPLETED | OUTPATIENT
Start: 2023-09-13 | End: 2023-09-13

## 2023-09-13 RX ADMIN — LIDOCAINE HYDROCHLORIDE 5 ML: 10 INJECTION, SOLUTION EPIDURAL; INFILTRATION; INTRACAUDAL; PERINEURAL at 14:08

## 2023-09-18 DIAGNOSIS — C50.912 MALIGNANT NEOPLASM OF LEFT BREAST IN FEMALE, ESTROGEN RECEPTOR POSITIVE, UNSPECIFIED SITE OF BREAST (H): Primary | ICD-10-CM

## 2023-09-18 DIAGNOSIS — Z17.0 MALIGNANT NEOPLASM OF LEFT BREAST IN FEMALE, ESTROGEN RECEPTOR POSITIVE, UNSPECIFIED SITE OF BREAST (H): Primary | ICD-10-CM

## 2023-09-18 NOTE — PROGRESS NOTES
Malignant Path:  Pathology report reviewed with our breast radiologist Dr. Dedrick Aguilar, who confirmed the recent breast imaging is concordant with the final surgical pathology results below.    I phoned Ms. Kesha Zacarias, confirmed her full name, date of birth, and notified patient of Ultrasound Guided Left Breast Biopsy results showing Lobular Carcinoma in Situ (LCIS), no evidence of invasive carcinoma.  Ultrasound Guided Left Axillary Lymph Node Needle Biopsy shows (+)Positive for Metastatic Carcinoma.  Estrogen/ Progesterone Receptors are (+), and HER2 is still pending.    Patient states no problems with biopsy sites.  Recommended follow up is Medical Oncology and Surgery Consultations.    I have placed an order for referral to Medical Oncology and Surgery for consultations and a Cancer Care Team  will be reaching out to patient within the next 24 - 48 hours to assist in getting her scheduled at the [facility] location.    Patient has contact phone number if needed.    Questions were answered and she has my phone number if she has further questions.  Patient verbalized understanding and agrees with the plan of care.  Ordering provider- Dr. Gregory Schoen has been notified of the results, recommendations for follow up: medical oncology consultation and scheduled surgical consultation.  I will forward this note, along with the pathology results.   Cris Jennings RN, BSN  Breast Care Nurse Coordinator  [facility] [place]  [phone]        Kesha Zacarias [id number]  F, 1960  Surgical Pathology Report (Final result) [id number]  Authorizing Provider: Schoen, Gregory G, MD Ordering Provider: Schoen, Gregory G, MD  Ordering Location: [facility]  Imaging  Collected: 09/13/2023 02:04 PM  Pathologist: Michelle Feldman MD Received: 09/13/2023 02:56 PM  .  Specimens  A Breast, Left  B Lymph Node(s), Axillary,  Left  .  .  Final Diagnosis  A. Breast, left, 12:00, biopsy:  -Lobular carcinoma in situ.  -Invasive carcinoma is not identified.  B. Lymph node, left axillary, biopsy:  -Positive for metastatic carcinoma, see comment.  -Largest metastatic focus is 7 mm.  -Negative for extranodal extension.  -Breast ancillary testing:  -Estrogen receptor: Positive (91 to 100%, strong)  -Progesterone receptor: Positive (91 to 100%, strong)  -HER2 IHC: Equivocal (score 2+)  -HER2 FISH: Pending, will be reported by cytogenetics.  Electronically signed by Michelle Feldman MD on 9/18/2023 at 10:28 AM

## 2023-09-19 ENCOUNTER — PATIENT OUTREACH (OUTPATIENT)
Dept: ONCOLOGY | Facility: CLINIC | Age: 63
End: 2023-09-19
Payer: COMMERCIAL

## 2023-09-19 NOTE — PROGRESS NOTES
New Patient Oncology Nurse Navigator Note     Referring provider: Gregory Schoen MD      Referring Clinic/Organization: [facility]      Referred to (specialty:) Medical Oncology and Cancer Surgery    Please call patient to schedule consults at [facility].    Requested provider (if applicable): NA     Date Referral Received: September 18, 2023     Evaluation for:    C50.912, Z17.0 (ICD-10-CM) - Malignant neoplasm of left breast in female, estrogen receptor positive, unspecified site of breast (H)     Clinical History (per Nurse review of records provided):      Patient had palpable abnormality felt in the retro areolar region of the left breast.     Diagnostic Mammogram/Ultrasound was done on 9/13/23 which showed:  FINDINGS:    Left diagnostic mammogram with tomosynthesis: At the site of the palpable abnormality there is focal dense tissue with some likely mild architectural distortion. Some anterior skin thickening is noted as well. Ultrasound will be obtained.  Left breast ultrasound: At the site of the palpable abnormality there is an ill-defined shadowing hypoechoic mass measuring 3.0 x 1.7 x 3.9 cm. Tissue diagnosis is recommended. In the left axilla there are a few small lymph nodes. One normal-sized lymph node shows cortical thickening and the node itself measures roughly 7 x 6 mm. Tissue diagnosis is recommended for this.     IMPRESSION:   Core biopsy recommended for hypoechoic mass at the site  of the palpable abnormality as well as for a normal-sized left  axillary lymph node that shows cortical thickening.    Biopsy was done showing:  Final Diagnosis   A.  Breast, left, 12:00, biopsy:  -Lobular carcinoma in situ.  -Invasive carcinoma is not identified.     B.  Lymph node, left axillary, biopsy:  -Positive for metastatic carcinoma, see comment.  -Largest metastatic focus is 7 mm.  -Negative for extranodal extension.  -Breast ancillary testing:                 -Estrogen receptor: Positive (91 to 100%, strong)               -Progesterone receptor: Positive (91 to 100%, strong)                -HER2 IHC: Equivocal (score 2+)                -HER2 FISH: Pending, will be reported by cytogenetics.   Electronically signed by Michelle Feldman MD on 9/18/2023 at 10:28 AM       Records Location: See Bookmarked material     Records Needed: HER2 is pending      Additional testing needed prior to consult: NA    Payor: [employer] / Plan: [employer] OPEN ACCESS / Product Type: [facility] [place] /     September 19, 2023    Called patient to introduced myself and role as nurse navigator with [facility] Hematology/Oncology department and to inform them that we have received the referral for a diagnosis of breast cancer from Dr Schoen. Patient confirms they are aware of the referral and ready to schedule. Patient is able to do video consult if necessary.  Provided them with contact information for NPS and encourage them to call with any questions. Patient verbalized understanding of plan. Patient transferred to NPS to schedule.     Haley Peraza, RN, BSN  [facility] Hematology/Oncology Nurse Navigator  [phone]

## 2023-09-21 LAB — INTERPRETATION: NORMAL

## 2023-09-26 ENCOUNTER — OFFICE VISIT (OUTPATIENT)
Dept: SURGERY | Facility: CLINIC | Age: 63
End: 2023-09-26
Attending: FAMILY MEDICINE
Payer: COMMERCIAL

## 2023-09-26 VITALS
WEIGHT: 192 LBS | DIASTOLIC BLOOD PRESSURE: 72 MMHG | OXYGEN SATURATION: 97 % | BODY MASS INDEX: 30.86 KG/M2 | SYSTOLIC BLOOD PRESSURE: 120 MMHG | RESPIRATION RATE: 18 BRPM | HEART RATE: 82 BPM | TEMPERATURE: 97.3 F | HEIGHT: 66 IN

## 2023-09-26 DIAGNOSIS — Z17.0 MALIGNANT NEOPLASM OF LEFT BREAST IN FEMALE, ESTROGEN RECEPTOR POSITIVE, UNSPECIFIED SITE OF BREAST (H): Primary | ICD-10-CM

## 2023-09-26 DIAGNOSIS — C50.912 MALIGNANT NEOPLASM OF LEFT BREAST IN FEMALE, ESTROGEN RECEPTOR POSITIVE, UNSPECIFIED SITE OF BREAST (H): Primary | ICD-10-CM

## 2023-09-26 PROCEDURE — 99205 OFFICE O/P NEW HI 60 MIN: CPT | Performed by: SURGERY

## 2023-09-26 ASSESSMENT — PAIN SCALES - GENERAL: PAINLEVEL: NO PAIN (0)

## 2023-09-26 NOTE — PROGRESS NOTES
"  Assessment & Plan   Problem List Items Addressed This Visit    None  Visit Diagnoses       Malignant neoplasm of left breast in female, estrogen receptor positive, unspecified site of breast (H)    -  Primary    Relevant Orders    MR Breast Bilateral w/o & w Contrast    BMI 31.0-31.9,adult               62 yo F with biopsy proven left breast LCIS with no invasive portion noted on pathology but noted ILC on lymph node biopsy   reviewed the imaging, diagnosis, staging, and management (including surgery, chemotherapy, radiation therapy, and endocrine therapy) of invasive lobular carcinoma with Kesha and her SO (Bandar). A copy of the biopsy pathology report was also provided.    I explained the surgical options: breast conservative therapy vs simple mastectomy with sentinel lymph node biopsy.    The treatment for resectable breast cancer is surgical resection, in the form of either breast conservation (segmental mastectomy plus radiation) or mastectomy.  We reviewed that the two strategies are equivalent in terms of overall survival.  The advantages and disadvantages of each were discussed.       Kesha is a candidate for breast conservation therapy.  We discussed that this involves two necessary components: the lumpectomy (or \"segmental mastectomy\"), and several weeks of whole breast radiation therapy.  We discussed that the overall survival after breast conservation therapy is identical to mastectomy and that local recurrence rates are significantly higher if segmental mastectomy was performed without subsequent radiation.  We also discussed the significance of clear margins and that a subsequent procedure may be necessary to achieve this.     Alternatively, we also discussed the various types of mastectomy, including total, skin-sparing, and nipple-sparing mastectomy.  Kesha is a candidate for a simple mastectomy or skin-sparing mastectomy due to the subareolar location of the cancer.      Discussed that that lymph node " biopsy is recommended whenever we are dealing with invasive breast cancer and described the procedure for sentinel lymph node biopsy.  This is recommended even though we already know a single lymph node is positive for cancer.  We talked about the risk for lymphedema (7%) which is small with removal of only a few nodes.    We talked about level 1 and level 2 axillary lymph node dissection; risk for lymphedema is up to 20-30%%.  We talked about the indications for the axillary dissection.  I reviewed the data regarding lumpectomy and radiation vs mastectomy that shows that the local recurrence risk is slightly higher for lumpectomy and radiation vs mastectomy (5-10% vs. 1-2%), but the survival at 20 years is the same.     There is a 1-2% chance of patients whose sentinel lymph nodes do not map despite dual tracer (radiocolloid and lymphazurin). Should this be the case, we discussed that I would proceed with an axillary lymph node dissection at the index procedure.  The higher risks of an axillary lymph node dissection were also reviewed, including lymphedema (20-30%), bleeding, wound infection, wound dehiscence, seroma formation, nerve injury, limited arm range of motion and paresthesias. We discussed that a drain would be placed intra-operatively should an axillary lymph node dissection be performed.      We explain additional steps if patient were to undergo BCT : 1 MRI since this is a lobular carcinoma and the fact that her initial LCIS and no invasive cancer.      Pt is not ready to make any decisions regarding her surgery today.  She has a breast MRI scheduled for 10/1/2023 at 8AM.  She will come back to see me next Tuesday to discuss the MRI result and her surgical decisions.      Face to Face/patient Contact total time: 50 minutes  Pre Charting time: 10 minutes; Post charting time, communication and other activities: 20 minutes;   Total time:  80 minutes       BMI:   Estimated body mass index is 31.46 kg/m  as  "calculated from the following:    Height as of this encounter: 1.664 m (5' 5.5\").    Weight as of this encounter: 87.1 kg (192 lb).       No follow-ups on file.    Zackery Urbina MD  [facility] DAYA Rebolledo is a 63 year old, presenting for the following health issues:  Consult    Screening in march; was negative at the time  But found a palpable mass 3-4 weeks ago  Some discomfort; no nipple discharge; the nipple is hard and rough which is different from other side.    Noted that when she noted the mass.  The skin around mass is not pulled or thickened  Went to see Dr. Schoen, and more images were ordered;  Thus got to this point.   Last mammogram was in 2023 - read as BIRAD 1 at the time.    Intermittent cigar use.   Not on blood thinner; never MI; never CVA.      BREAST-SPECIFIC HISTORY:  Prior breast biopsies: none  Prior breast surgeries: none  Prior radiation history: none  Hormone replacement therapy: estrogen cream for at least 5 years; but not used daily.   Bra size: 38C  Dominant hand: right     GYN HISTORY:  [id number]; one adopted daughter  Age at 1st pregnancy: none  Age at menarche: 13  Breastfeeding history: none  Menopausal? post     Reproductive PSH includes: none    Cancer history in self: none      FAMILY HISTORY:  Breast ca: none  Ovarian ca: none  Pancreatic ca: none  Gastric ca: none   Melanoma: none  Colon ca: none  Other cancer: neck cancer (HPV), brother 66; prostate cancer dad, 58              Review of Systems   Constitutional, HEENT, cardiovascular, pulmonary, GI, , musculoskeletal, neuro, skin, endocrine and psych systems are negative, except as otherwise noted.      Objective    /72   Pulse 82   Temp 97.3  F (36.3  C) (Temporal)   Resp 18   Ht 1.664 m (5' 5.5\")   Wt 87.1 kg (192 lb)   LMP 2011 (Exact Date)   SpO2 97%   BMI 31.46 kg/m    Body mass index is 31.46 kg/m .  Physical Exam  Vitals reviewed.   Eyes:      " Conjunctiva/sclera: Conjunctivae normal.   Pulmonary:      Effort: Pulmonary effort is normal.   Chest:   Breasts:     Right: Normal.      Left: Mass and skin change present. No swelling, bleeding, inverted nipple or nipple discharge.          Comments: Palpable firmness.  No skin changes.  The NAC is somewhat taught and more retracted compared to the right.  Noted ecchymosis at the previous biopsy site.  Area palpable about 4-5cm at the subareolar.    Abdominal:      Palpations: Abdomen is soft.   Musculoskeletal:         General: Normal range of motion.   Lymphadenopathy:      Upper Body:      Right upper body: No supraclavicular, axillary or pectoral adenopathy.      Left upper body: No supraclavicular, axillary or pectoral adenopathy.   Skin:     General: Skin is warm.   Neurological:      General: No focal deficit present.      Mental Status: She is alert.   Psychiatric:         Mood and Affect: Mood normal.        ULTRASOUND-GUIDED LEFT BREAST CORE BIOPSY;   CLIP PLACEMENT;   POSTPROCEDURE DIGITAL MAMMOGRAM LEFT BREAST  9/13/2023 2:37 PM     INDICATION FOR PROCEDURE: Ill-defined hypoechoic mass in the 12:00  position of the left breast.     PROCEDURE: Approximately 5 mL lidocaine without epinephrine was  infiltrated for local anesthetic and a 13-gauge trocar was introduced  via lateral approach.  The needle tip was placed adjacent to the  lesion. A series of 4 samples were obtained with a 14-gauge  core-cutting needle. A clip was then deployed to sergio the lesion.   There was less than 5 cc of blood loss.     Postbiopsy unilateral digital mammogram of the left breast showed the  clip to be at the expected biopsy site.  The patient tolerated the  procedure without difficulty and there was no significant pain or  immediate complication at the end of the procedure.                                                                       IMPRESSION: Successful left breast ultrasound-guided core biopsy and  clip  placement.  Final pathology is pending.       JEANETTE MARTEL MD     DIAGNOSTIC MAMMOGRAM, LEFT, WITH CHAD;   ULTRASOUND LEFT BREAST LIMITED 1-3 QUADRANTS  9/13/2023 9:59 AM     HISTORY:  Palpable abnormality felt in the retroareolar region of the  left breast.     COMPARISON:  3/13/2023, 1/31/2022, and 1/18/2021.     BREAST DENSITY: Scattered fibroglandular densities.     FINDINGS:    Left diagnostic mammogram with tomosynthesis: At the site of the  palpable abnormality there is focal dense tissue with some likely mild  architectural distortion. Some anterior skin thickening is noted as  well. Ultrasound will be obtained.     Left breast ultrasound: At the site of the palpable abnormality there  is an ill-defined shadowing hypoechoic mass measuring 3.0 x 1.7 x 3.9  cm. Tissue diagnosis is recommended. In the left axilla there are a  few small lymph nodes. One normal-sized lymph node shows cortical  thickening and the node itself measures roughly 7 x 6 mm. Tissue  diagnosis is recommended for this.                                                                      IMPRESSION:  Core biopsy recommended for hypoechoic mass at the site  of the palpable abnormality as well as for a normal-sized left  axillary lymph node that shows cortical thickening.     BI-RADS CATEGORY: 4 - Suspicious.     RECOMMENDED FOLLOW-UP: Core biopsy of the left breast and left axilla.     JEANETTE MARTEL MD         This FISH analysis is performed in follow up to the reported equivocal (2+) HER2 findings by immunohistochemistry ([id number]).     RESULTS:     Ratio of HER2/CÉSAR-17 signals  Kesha Zacarias:  1.0 (EVER Negative)                              Avg. number HER2 signals/nucleus:  1.9                                                                                    Avg. number CÉSAR-17 signals/nucleus:  1.8     Addendum   This addendum is to report E-cadherin immunohistochemical stain.  Immunohistochemical stain for E-cadherin is  performed on block B1 with appropriate control and shows loss of membranous staining supporting a diagnosis of metastatic lobular carcinoma.   Addendum electronically signed by Michelle Feldman MD on 9/19/2023 at 10:16 AM   Final Diagnosis   A.  Breast, left, 12:00, biopsy:  -Lobular carcinoma in situ.  -Invasive carcinoma is not identified.     B.  Lymph node, left axillary, biopsy:  -Positive for metastatic carcinoma, see comment.  -Largest metastatic focus is 7 mm.  -Negative for extranodal extension.  -Breast ancillary testing:                -Estrogen receptor: Positive (91 to 100%, strong)               -Progesterone receptor: Positive (91 to 100%, strong)                -HER2 IHC: Equivocal (score 2+)                -HER2 FISH: Pending, will be reported by cytogenetics.

## 2023-09-26 NOTE — LETTER
"    9/26/2023         RE: Kesha Zacarias  [address] [address] [place] [id number]        Dear Colleague,    Thank you for referring your patient, Kesha Zacarias, to the [facility]. Please see a copy of my visit note below.      Assessment & Plan   Problem List Items Addressed This Visit    None  Visit Diagnoses       Malignant neoplasm of left breast in female, estrogen receptor positive, unspecified site of breast (H)    -  Primary    Relevant Orders    MR Breast Bilateral w/o & w Contrast    BMI 31.0-31.9,adult               64 yo F with biopsy proven left breast LCIS with no invasive portion noted on pathology but noted ILC on lymph node biopsy   reviewed the imaging, diagnosis, staging, and management (including surgery, chemotherapy, radiation therapy, and endocrine therapy) of invasive lobular carcinoma with Kesha and her SO (Bandar). A copy of the biopsy pathology report was also provided.    I explained the surgical options: breast conservative therapy vs simple mastectomy with sentinel lymph node biopsy.    The treatment for resectable breast cancer is surgical resection, in the form of either breast conservation (segmental mastectomy plus radiation) or mastectomy.  We reviewed that the two strategies are equivalent in terms of overall survival.  The advantages and disadvantages of each were discussed.       Kesha is a candidate for breast conservation therapy.  We discussed that this involves two necessary components: the lumpectomy (or \"segmental mastectomy\"), and several weeks of whole breast radiation therapy.  We discussed that the overall survival after breast conservation therapy is identical to mastectomy and that local recurrence rates are significantly higher if segmental mastectomy was performed without subsequent radiation.  We also discussed the significance of clear margins and that a subsequent procedure may be necessary to achieve this.     Alternatively, we also " discussed the various types of mastectomy, including total, skin-sparing, and nipple-sparing mastectomy.  Kesha is a candidate for a simple mastectomy or skin-sparing mastectomy due to the subareolar location of the cancer.      Discussed that that lymph node biopsy is recommended whenever we are dealing with invasive breast cancer and described the procedure for sentinel lymph node biopsy.  This is recommended even though we already know a single lymph node is positive for cancer.  We talked about the risk for lymphedema (7%) which is small with removal of only a few nodes.    We talked about level 1 and level 2 axillary lymph node dissection; risk for lymphedema is up to 20-30%%.  We talked about the indications for the axillary dissection.  I reviewed the data regarding lumpectomy and radiation vs mastectomy that shows that the local recurrence risk is slightly higher for lumpectomy and radiation vs mastectomy (5-10% vs. 1-2%), but the survival at 20 years is the same.     There is a 1-2% chance of patients whose sentinel lymph nodes do not map despite dual tracer (radiocolloid and lymphazurin). Should this be the case, we discussed that I would proceed with an axillary lymph node dissection at the index procedure.  The higher risks of an axillary lymph node dissection were also reviewed, including lymphedema (20-30%), bleeding, wound infection, wound dehiscence, seroma formation, nerve injury, limited arm range of motion and paresthesias. We discussed that a drain would be placed intra-operatively should an axillary lymph node dissection be performed.      We explain additional steps if patient were to undergo BCT : 1 MRI since this is a lobular carcinoma and the fact that her initial LCIS and no invasive cancer.      Pt is not ready to make any decisions regarding her surgery today.  She has a breast MRI scheduled for 10/1/2023 at 8AM.  She will come back to see me next Tuesday to discuss the MRI result and her  "surgical decisions.      Face to Face/patient Contact total time: 50 minutes  Pre Charting time: 10 minutes; Post charting time, communication and other activities: 20 minutes;   Total time:  80 minutes       BMI:   Estimated body mass index is 31.46 kg/m  as calculated from the following:    Height as of this encounter: 1.664 m (5' 5.5\").    Weight as of this encounter: 87.1 kg (192 lb).       No follow-ups on file.    Zackery Urbina MD  [facility]    Yonatan Rebolledo is a 63 year old, presenting for the following health issues:  Consult    Screening in march; was negative at the time  But found a palpable mass 3-4 weeks ago  Some discomfort; no nipple discharge; the nipple is hard and rough which is different from other side.    Noted that when she noted the mass.  The skin around mass is not pulled or thickened  Went to see Dr. Schoen, and more images were ordered;  Thus got to this point.   Last mammogram was in 2023 - read as BIRAD 1 at the time.    Intermittent cigar use.   Not on blood thinner; never MI; never CVA.      BREAST-SPECIFIC HISTORY:  Prior breast biopsies: none  Prior breast surgeries: none  Prior radiation history: none  Hormone replacement therapy: estrogen cream for at least 5 years; but not used daily.   Bra size: 38C  Dominant hand: right     GYN HISTORY:  [id number]; one adopted daughter  Age at 1st pregnancy: none  Age at menarche: 13  Breastfeeding history: none  Menopausal? post     Reproductive PSH includes: none    Cancer history in self: none      FAMILY HISTORY:  Breast ca: none  Ovarian ca: none  Pancreatic ca: none  Gastric ca: none   Melanoma: none  Colon ca: none  Other cancer: neck cancer (HPV), brother 66; prostate cancer dad, 58              Review of Systems   Constitutional, HEENT, cardiovascular, pulmonary, GI, , musculoskeletal, neuro, skin, endocrine and psych systems are negative, except as otherwise noted.      Objective    /72   " "Pulse 82   Temp 97.3  F (36.3  C) (Temporal)   Resp 18   Ht 1.664 m (5' 5.5\")   Wt 87.1 kg (192 lb)   LMP 11/22/2011 (Exact Date)   SpO2 97%   BMI 31.46 kg/m    Body mass index is 31.46 kg/m .  Physical Exam  Vitals reviewed.   Eyes:      Conjunctiva/sclera: Conjunctivae normal.   Pulmonary:      Effort: Pulmonary effort is normal.   Chest:   Breasts:     Right: Normal.      Left: Mass and skin change present. No swelling, bleeding, inverted nipple or nipple discharge.          Comments: Palpable firmness.  No skin changes.  The NAC is somewhat taught and more retracted compared to the right.  Noted ecchymosis at the previous biopsy site.  Area palpable about 4-5cm at the subareolar.    Abdominal:      Palpations: Abdomen is soft.   Musculoskeletal:         General: Normal range of motion.   Lymphadenopathy:      Upper Body:      Right upper body: No supraclavicular, axillary or pectoral adenopathy.      Left upper body: No supraclavicular, axillary or pectoral adenopathy.   Skin:     General: Skin is warm.   Neurological:      General: No focal deficit present.      Mental Status: She is alert.   Psychiatric:         Mood and Affect: Mood normal.        ULTRASOUND-GUIDED LEFT BREAST CORE BIOPSY;   CLIP PLACEMENT;   POSTPROCEDURE DIGITAL MAMMOGRAM LEFT BREAST  9/13/2023 2:37 PM     INDICATION FOR PROCEDURE: Ill-defined hypoechoic mass in the 12:00  position of the left breast.     PROCEDURE: Approximately 5 mL lidocaine without epinephrine was  infiltrated for local anesthetic and a 13-gauge trocar was introduced  via lateral approach.  The needle tip was placed adjacent to the  lesion. A series of 4 samples were obtained with a 14-gauge  core-cutting needle. A clip was then deployed to sergio the lesion.   There was less than 5 cc of blood loss.     Postbiopsy unilateral digital mammogram of the left breast showed the  clip to be at the expected biopsy site.  The patient tolerated the  procedure without " difficulty and there was no significant pain or  immediate complication at the end of the procedure.                                                                       IMPRESSION: Successful left breast ultrasound-guided core biopsy and  clip placement.  Final pathology is pending.       JEANETTE MARTEL MD     DIAGNOSTIC MAMMOGRAM, LEFT, WITH CHAD;   ULTRASOUND LEFT BREAST LIMITED 1-3 QUADRANTS  9/13/2023 9:59 AM     HISTORY:  Palpable abnormality felt in the retroareolar region of the  left breast.     COMPARISON:  3/13/2023, 1/31/2022, and 1/18/2021.     BREAST DENSITY: Scattered fibroglandular densities.     FINDINGS:    Left diagnostic mammogram with tomosynthesis: At the site of the  palpable abnormality there is focal dense tissue with some likely mild  architectural distortion. Some anterior skin thickening is noted as  well. Ultrasound will be obtained.     Left breast ultrasound: At the site of the palpable abnormality there  is an ill-defined shadowing hypoechoic mass measuring 3.0 x 1.7 x 3.9  cm. Tissue diagnosis is recommended. In the left axilla there are a  few small lymph nodes. One normal-sized lymph node shows cortical  thickening and the node itself measures roughly 7 x 6 mm. Tissue  diagnosis is recommended for this.                                                                      IMPRESSION:  Core biopsy recommended for hypoechoic mass at the site  of the palpable abnormality as well as for a normal-sized left  axillary lymph node that shows cortical thickening.     BI-RADS CATEGORY: 4 - Suspicious.     RECOMMENDED FOLLOW-UP: Core biopsy of the left breast and left axilla.     JEANETTE MARTEL MD         This FISH analysis is performed in follow up to the reported equivocal (2+) HER2 findings by immunohistochemistry ([id number]).     RESULTS:     Ratio of HER2/CÉSAR-17 signals  Kesha Zacarias:  1.0 (EVER Negative)                              Avg. number HER2 signals/nucleus:  1.9                                                                                     Avg. number CÉSAR-17 signals/nucleus:  1.8     Addendum   This addendum is to report E-cadherin immunohistochemical stain.  Immunohistochemical stain for E-cadherin is performed on block B1 with appropriate control and shows loss of membranous staining supporting a diagnosis of metastatic lobular carcinoma.   Addendum electronically signed by Michelle Feldman MD on 9/19/2023 at 10:16 AM   Final Diagnosis   A.  Breast, left, 12:00, biopsy:  -Lobular carcinoma in situ.  -Invasive carcinoma is not identified.     B.  Lymph node, left axillary, biopsy:  -Positive for metastatic carcinoma, see comment.  -Largest metastatic focus is 7 mm.  -Negative for extranodal extension.  -Breast ancillary testing:                -Estrogen receptor: Positive (91 to 100%, strong)               -Progesterone receptor: Positive (91 to 100%, strong)                -HER2 IHC: Equivocal (score 2+)                -HER2 FISH: Pending, will be reported by cytogenetics.         Again, thank you for allowing me to participate in the care of your patient.        Sincerely,        Zackery Urbina MD

## 2023-10-01 ENCOUNTER — ANCILLARY PROCEDURE (OUTPATIENT)
Dept: MRI IMAGING | Facility: CLINIC | Age: 63
End: 2023-10-01
Attending: SURGERY
Payer: COMMERCIAL

## 2023-10-01 DIAGNOSIS — C50.912 MALIGNANT NEOPLASM OF LEFT BREAST IN FEMALE, ESTROGEN RECEPTOR POSITIVE, UNSPECIFIED SITE OF BREAST (H): ICD-10-CM

## 2023-10-01 DIAGNOSIS — Z17.0 MALIGNANT NEOPLASM OF LEFT BREAST IN FEMALE, ESTROGEN RECEPTOR POSITIVE, UNSPECIFIED SITE OF BREAST (H): ICD-10-CM

## 2023-10-01 PROCEDURE — A9585 GADOBUTROL INJECTION: HCPCS | Mod: JZ | Performed by: RADIOLOGY

## 2023-10-01 PROCEDURE — 77049 MRI BREAST C-+ W/CAD BI: CPT | Performed by: RADIOLOGY

## 2023-10-01 RX ORDER — GADOBUTROL 604.72 MG/ML
10 INJECTION INTRAVENOUS ONCE
Status: COMPLETED | OUTPATIENT
Start: 2023-10-01 | End: 2023-10-01

## 2023-10-01 RX ADMIN — GADOBUTROL 9 ML: 604.72 INJECTION INTRAVENOUS at 08:35

## 2023-10-01 NOTE — DISCHARGE INSTRUCTIONS
MRI Contrast Discharge Instructions    The IV contrast you received today will pass out of your body in your  urine. This will happen in the next 24 hours. You will not feel this process.  Your urine will not change color.    Drink at least 4 extra glasses of water or juice today (unless your doctor  has restricted your fluids). This reduces the stress on your kidneys.  You may take your regular medicines.    If you are on dialysis: It is best to have dialysis today.    If you have a reaction: Most reactions happen right away. If you have  any new symptoms after leaving the hospital (such as hives or swelling),  call your hospital at the correct number below. Or call your family doctor.  If you have breathing distress or wheezing, call 911.    Special instructions: ***    I have read and understand the above information.    Signature:______________________________________ Date:___________    Staff:__________________________________________ Date:___________     Time:__________    [facility] Departments:    ___[place]: [phone]  ___[facility]: [phone]  ___[place]: [phone] ___[facility]: [phone]  ___[facility]: [phone]  ___[facility]: [phone]  ___[phone]  ___[facility]: [phone]  ___[phone]

## 2023-10-02 ENCOUNTER — TELEPHONE (OUTPATIENT)
Dept: MAMMOGRAPHY | Facility: CLINIC | Age: 63
End: 2023-10-02
Payer: COMMERCIAL

## 2023-10-02 NOTE — TELEPHONE ENCOUNTER
Left a message for Kesha regarding the need for a biopsy based on her MRI done 10/1/2023.  Awaiting return phone call. Call back number left was [phone] or [phone].

## 2023-10-03 ENCOUNTER — OFFICE VISIT (OUTPATIENT)
Dept: SURGERY | Facility: CLINIC | Age: 63
End: 2023-10-03
Payer: COMMERCIAL

## 2023-10-03 VITALS
WEIGHT: 192 LBS | SYSTOLIC BLOOD PRESSURE: 116 MMHG | HEART RATE: 88 BPM | DIASTOLIC BLOOD PRESSURE: 70 MMHG | HEIGHT: 66 IN | BODY MASS INDEX: 30.86 KG/M2 | OXYGEN SATURATION: 100 % | RESPIRATION RATE: 18 BRPM | TEMPERATURE: 97.5 F

## 2023-10-03 DIAGNOSIS — C50.912 INVASIVE LOBULAR CARCINOMA OF BREAST, STAGE 3, LEFT (H): Primary | ICD-10-CM

## 2023-10-03 PROCEDURE — 99215 OFFICE O/P EST HI 40 MIN: CPT | Performed by: SURGERY

## 2023-10-03 ASSESSMENT — PAIN SCALES - GENERAL: PAINLEVEL: NO PAIN (0)

## 2023-10-03 NOTE — TELEPHONE ENCOUNTER
RECORDS STATUS - BREAST    RECORDS REQUESTED FROM: [facility] - Internal Records   DATE REQUESTED: 10/3

## 2023-10-03 NOTE — PROGRESS NOTES
Video-Visit Details     Video Start Time: 10:47AM     Type of service:  Video Visit     Video End Time: 11:29AM    Originating Location (pt. Location): Home     Distant Location (provider location):   [employer] off site     Platform used for Video Visit: Pixtr     [facility]    Hematology/Oncology New Patient Note      Today's Date: October 4, 2023     Referring provider:Schoen, Gregory G, MD   Reason for Consultation: treatment plan for metastatic Left Breast cancer.  (+) Lymph node biopsy.  Left Breast Biopsy:  LCIS, no invasive CA.     HISTORY OF PRESENT ILLNESS: Kesha Zacarias is a 63 year old female who was referred to the Hematology/Oncology Clinic for treatment plan for metastatic Left Breast cancer.  (+) Lymph node biopsy.  Left Breast Biopsy:  LCIS, no invasive CA.     Patient has medical history including rheumatoid arthritis, hyperlipidemia, osteoarthritis, bilateral cataracts and glaucoma s/p surgery, endocervical polyp s/p resection, vaginal atrophy with conjugated estrogen vaginal cream use, colon polyp (hyperplastic polyp and tubular adenoma), seasonal depression, history of tobacco use ([age] y/o, about 1 ppd).    Regarding RA:  -dx over a decade ago, on plaquenil, managed by PCP  - arthritis is most severe in hands>knees, intermittent       - 3/23 bilateral screening mammogram [facility]  - a few months ago, noted nipple retraction  - 9/23 patient palpated mass in retroareolar region of left breast and w/nipple retraction, no discharge, skin thickening, no dimpling, no erythema  - 9/23 diagnostic LEFT breast mammogram: Focal dense tissue with some likely mild architectural distortion, some anterior skin thickening is noted  - 9/23 targeted LEFT breast ultrasound: Ill-defined shadowing hypoechoic mass, 3.0 x 1.7 x 3.9 cm.  In left axilla-few small lymph nodes, 1 normal-sized lymph node with cortical thickening, 0.7 x 0.6 cm.  -9/23 ultrasound-guided LEFT breast core biopsy  "of 12:00 lesion with clip placement, \"Postbiopsy unilateral digital mammogram of the left breast showed the clip to be at the expected biopsy site\" AND ultrasound-guided left axillary lymph node biopsy of lymph node with cortical thickening  PATHOLOGY  A.  Breast, left, 12:00, biopsy:  -Lobular carcinoma in situ.-Multiple foci  -Invasive carcinoma is not identified.     B.  Lymph node, left axillary, biopsy:  -Positive for metastatic lobular carcinoma, grade 2  -Largest metastatic focus is 7 mm.  -Negative for extranodal extension.  -Breast ancillary testing:  -Estrogen receptor: Positive (91 to 100%, strong)  -Progesterone receptor: Positive (91 to 100%, strong)  -HER2 2+ IHC, FISH negative    -10/23 MRI bilateral breast:  LEFT breast:  - Heterogeneously enhancing irregular mass in the subareolar left breast, 5.0 x 4.9 x 4.9 cm, with associated nipple retraction and nipple/areolar involvement.  This mass extends superiorly through 11: positions, from anterior to mid depth.  The HydroMARK breast biopsy clip (which showed LCIS) is 1.5 cm posterosuperior to this mass.  - Multiple suspicious left level 1 axillary lymph node noted, including biopsy-proven metastatic node with indwelling biopsy marker, biopsied node measures 1.3 x 1.0 cm  - Heterogeneous marrow appearance of sternum and ribs with heterogeneous enhancement and a peripheral enhancing focus within the mid body.  IMPRESSION:  1. Upon further review of previous imaging and pathology results,  recommend repeat ultrasound guided large core-needle biopsy of the  discordant dominant left breast mass given metastatic left axillary  lymph node and superoposteriorly displaced left breast biopsy marker.   2. Nonspecific heterogeneous enhancement of the sternum and ribs.  Consider nuclear medicine bone scan    Lifetime estrogen exposure:  Menarche: 13   Last menstrual period: 49   Age of first pregnancy: 18   Number of pregnancies: 2 (no living children) "   Weight gain: 20lb weight gain within a year  Exposure to exogenous estrogen: vaginal atrophy with conjugated estrogen vaginal cream use x4 years (intermittent), has not used it since 9/23. Birth control for about 13-15 years from her 20s-30s.      Family history of:  1.  Breast cancer including male breast cancer: negative   2. Ovarian cancer: negative  3.  Pancreatic cancer: negative  4.  Prostate cancer: father- ?in his 50s, other details unknown  5. Diffuse gastric cancer (if lobular breast CA): negative  6. Uterine cancer: negative  7. Colon cancer:  negative  6. Brother- head and neck, HPV +, had surgery, clinical trial and now cancer free      Pt reports 55lb weight loss in 1.5 years, this was intentional, was following weight watchers program (23 points available per day) 230lb->170lb->190lb (gained about 20lbs). Pt was walking on the treadmill and outside daily, about 30 mins to 1.5 miles.          REVIEW OF SYSTEMS:   A 14 point ROS was reviewed with pertinent positives and negatives in the HPI.        HOME MEDICATIONS:  Current Outpatient Medications   Medication Sig Dispense Refill    aspirin 81 MG tablet Take 81 mg by mouth daily      atorvastatin (LIPITOR) 40 MG tablet TAKE 1 TABLET BY MOUTH DAILY 90 tablet 3    betamethasone dipropionate (DIPROSONE) 0.05 % external lotion Apply topically 2 times daily 60 mL 3    Cholecalciferol (VITAMIN D) 2000 UNIT tablet Take 1 tablet by mouth daily 100 tablet 12    COENZYME Q-10 PO Take 1 tablet by mouth every other day      conjugated estrogens (PREMARIN) 0.625 MG/GM vaginal cream Place 0.5 g vaginally twice a week 30 g 1    cyclobenzaprine (FLEXERIL) 5 MG tablet TAKE 1 TABLET(5 MG) BY MOUTH AT BEDTIME 90 tablet 3    hydroxychloroquine (PLAQUENIL) 200 MG tablet TAKE 2 AND 1/2 TABLETS BY MOUTH EVERY  tablet 3    ibuprofen (ADVIL/MOTRIN) 800 MG tablet TAKE 1 TABLET BY MOUTH EVERY 6 HOURS AS NEEDED FOR PAIN 120 tablet 3    Multiple Minerals-Vitamins  (CALCIUM-MAGNESIUM-ZINC-D3 PO)            ALLERGIES:  Allergies   Allergen Reactions    Ciprofloxacin      hives and was on flagyl too    Metronidazole      hives and was on cipro too         PAST MEDICAL HISTORY:  Past Medical History:   Diagnosis Date    Arthritis 2006    Chronic depressive personality disorder     Dysplasia of cervix (uteri)     Cryotherapy    Female infertility of unspecified origin          PAST SURGICAL HISTORY:  Past Surgical History:   Procedure Laterality Date    COLONOSCOPY      COLONOSCOPY N/A 2022    Procedure: COLONOSCOPY, WITH POLYPECTOMY AND BIOPSY;  Surgeon: Gagandeep Patterson MD;  Location:  GI    HC INTRODUCE CATH FALLOPIAN TUBE, RE-OPEN/DIAGNOSIS      HERNIA REPAIR, INCISIONAL  09    ORTHOPEDIC SURGERY  2017    ZZC APPENDECTOMY  03    ZZC REMV CATARACT INTRACAP,INSERT LENS  2003    right         SOCIAL HISTORY:  Social History     Socioeconomic History    Marital status:      Spouse name: Bandar    Number of children: 1    Years of education: Not on file    Highest education level: Not on file   Occupational History    Not on file   Tobacco Use    Smoking status: Former     Packs/day: 0.50     Years: 25.00     Pack years: 12.50     Types: Cigarettes     Quit date: 2017     Years since quittin.0    Smokeless tobacco: Never    Tobacco comments:     quit 2017    Vaping Use    Vaping Use: Never used   Substance and Sexual Activity    Alcohol use: Yes     Comment: 1-2 weekly    Drug use: No    Sexual activity: Yes     Partners: Male     Birth control/protection: None   Other Topics Concern    Parent/sibling w/ CABG, MI or angioplasty before 65F 55M? Yes   Social History Narrative    Not on file     Social Determinants of Health     Financial Resource Strain: Not on file   Food Insecurity: Not on file   Transportation Needs: Not on file   Physical Activity: Not on file   Stress: Not on file   Social Connections: Not on file    Interpersonal Safety: Not on file   Housing Stability: Not on file         FAMILY HISTORY:  Family History   Problem Relation Age of Onset    Genitourinary Problems Father         prostate    Genetic Disorder Father         ulcer    Hypertension Father     Lipids Father     Prostate Cancer Father     Heart Disease Mother     Lipids Mother     Hyperlipidemia Mother     Heart Disease Maternal Grandmother     Cerebrovascular Disease Maternal Grandmother     Heart Disease Maternal Grandfather     Heart Disease Maternal Uncle     Heart Disease Maternal Uncle         x  3    Hyperlipidemia Sister     Hyperlipidemia Brother     Other Cancer Brother          PHYSICAL EXAM:  Vital signs:  LMP 11/22/2011 (Exact Date)        LABS:   Latest Reference Range & Units 09/13/23 08:27   Sodium 136 - 145 mmol/L 139   Potassium 3.4 - 5.3 mmol/L 4.6   Chloride 98 - 107 mmol/L 102   Carbon Dioxide (CO2) 22 - 29 mmol/L 29   Urea Nitrogen 8.0 - 23.0 mg/dL 21.3   Creatinine 0.51 - 0.95 mg/dL 1.01 (H)   GFR Estimate >60 mL/min/1.73m2 63   Calcium 8.8 - 10.2 mg/dL 9.2   Anion Gap 7 - 15 mmol/L 8   ALT 0 - 50 U/L 20   Cholesterol <200 mg/dL 172   Glucose 70 - 99 mg/dL 97   HDL Cholesterol >=50 mg/dL 67   LDL Cholesterol Calculated <=100 mg/dL 84   Non HDL Cholesterol <130 mg/dL 105   Triglycerides <150 mg/dL 103   WBC 4.0 - 11.0 10e3/uL 5.2   Hemoglobin 11.7 - 15.7 g/dL 13.0   Hematocrit 35.0 - 47.0 % 41.0   Platelet Count 150 - 450 10e3/uL 187   RBC Count 3.80 - 5.20 10e6/uL 4.44   MCV 78 - 100 fL 92   MCH 26.5 - 33.0 pg 29.3   MCHC 31.5 - 36.5 g/dL 31.7   RDW 10.0 - 15.0 % 13.3   % Neutrophils % 45   % Lymphocytes % 42   % Monocytes % 9   % Eosinophils % 3   % Basophils % 1   Absolute Basophils 0.0 - 0.2 10e3/uL 0.0   Absolute Eosinophils 0.0 - 0.7 10e3/uL 0.1   Absolute Immature Granulocytes <=0.4 10e3/uL 0.0   Absolute Lymphocytes 0.8 - 5.3 10e3/uL 2.2   Absolute Monocytes 0.0 - 1.3 10e3/uL 0.5   % Immature Granulocytes % 0   Absolute  "Neutrophils 1.6 - 8.3 10e3/uL 2.4   Absolute NRBCs 10e3/uL 0.0   NRBCs per 100 WBC <1 /100 0   (H): Data is abnormally high    PATHOLOGY:  As above    IMAGING:  As above    ASSESSMENT/PLAN:  Kesha Zacarias is a 63 year old female with:    # clinical stage 2A left breast invasive lobular carcinoma, ER positive, [place] positive, her2 negative on FISH  -9/23 diagnostic left breast mammogram, left breast targeted ultrasound showed 3.0 x 1.7 x 3.9 ill-defined shadowing hypoechoic mass, few small lymph nodes in the left axilla, one with cortical thickening measuring 0.7 x 0.6 cm  -9/23 ultrasound-guided LEFT breast core biopsy of 12:00 lesion with clip placement, \"Postbiopsy unilateral digital mammogram of the left breast showed the clip to be at the expected biopsy site\" AND ultrasound-guided left axillary lymph node biopsy of lymph node with cortical thickening, PATHOLOGY:  A.  Breast, left, 12:00, biopsy:Lobular carcinoma in situ, Multiple foci. Invasive carcinoma is not identified.   B.  Lymph node, left axillary, biopsy:  -Positive for metastatic lobular carcinoma, grade 2, 0.7 cm, negative GREGG, Estrogen receptor: Positive (91 to 100%, strong), Progesterone receptor: Positive (91 to 100%, strong), HER2 2+ IHC, FISH negative  -10/23 MRI bilateral breast:  - Heterogeneously enhancing irregular mass in the subareolar left breast, 5.0 x 4.9 x 4.9 cm, with associated nipple retraction and nipple/areolar involvement.  This mass extends superiorly through 11: positions, from anterior to mid depth.  The [employer] breast biopsy clip (which showed LCIS) is 1.5 cm posterosuperior to this mass.  - Multiple suspicious left level 1 axillary lymph node noted, including biopsy-proven metastatic node with indwelling biopsy marker, biopsied node measures 1.3 x 1.0 cm  - Heterogeneous marrow appearance of sternum and ribs with heterogeneous enhancement and a peripheral enhancing focus within the mid body.  -AJCC 8th clinical " prognostic staging stage 2A cT2 (5cm) cN1     PLAN:  -The left breast biopsy was negative for invasive carcinoma and the associated marker was 1.5 cm posterior superior to the mass that was suspicious.  I do not think that biopsying the left breast mass would add more information to what we have available at this time given the left axillary lymph node biopsy showing metastatic lobular carcinoma.  I discussed this with the patient and Dr Urbina, we will hold off on additional breast biopsy at this time  - Check baseline CMP, CA 15-3  - Check PET/CT STAT to confirm stage, rule out metastatic disease given MRI findings of heterogeneous enhancement related to sternum and ribs and biopsy-proven involvement of lymph nodes  - Genetic counseling     #post menopausal  #vaginal dryness  - Pts breast biopsy pathology results explained in detail. Pt is aware her tumor is estrogen positive. Pt educated to avoid all estrogen-containing products including but not limited to birth control, vaginal creams etc.     #RA  - on plaquenil       RTC 7-10 days for follow-up with me (after PET completed)-next visit must be in person so I can examine this patient      Mary Maravilla DO  Hematology/Oncology  [facility]    Future Appointments   Date Time Provider Department Center   10/3/2023  3:45 PM Zackery Urbina MD [facility]   10/4/2023 10:45 AM Mary Maravilla DO [facility]

## 2023-10-03 NOTE — LETTER
"    10/3/2023         RE: Kesha Zacarias  [address] [address] [place] [id number]        Dear Colleague,    Thank you for referring your patient, Kesha Zacarias, to the [facility]. Please see a copy of my visit note below.      Assessment & Plan  Problem List Items Addressed This Visit    None  Visit Diagnoses       Invasive lobular carcinoma of breast, stage 3, left (H)    -  Primary    BMI 30.0-30.9,adult               64 yo F with locally advance left breast cancer based on MRI.   -no surgical intervention for now  -pt will be meeting with med/onc for further work-up to r/o metastatic disease  -Patient will likely need a powerport for neoadjuvant chemotherapy  -I will keep in touch with Dr. Maravilla for the next week.   -all of her questions were answered.       Face to Face/patient Contact total time: 20 minutes  Pre Charting time: 5 minutes; Post charting time, communication and other activities: 15 minutes;   Total time:  40 minutes       BMI:   Estimated body mass index is 31.46 kg/m  as calculated from the following:    Height as of this encounter: 1.664 m (5' 5.5\").    Weight as of this encounter: 87.1 kg (192 lb).       No follow-ups on file.    Zackery Urbina MD  [facility]    Subjective  Kesha is a 63 year old, presenting for the following health issues:  RECHECK    Pt s/p MRI.    Here to discuss result  No changes otherwise  She has an appointment with med/onc soon.         Review of Systems   Constitutional, HEENT, cardiovascular, pulmonary, gi and gu systems are negative, except as otherwise noted.      Objective   /70   Pulse 88   Temp 97.5  F (36.4  C) (Temporal)   Resp 18   Ht 1.664 m (5' 5.5\")   Wt 87.1 kg (192 lb)   LMP 11/22/2011 (Exact Date)   SpO2 100%   BMI 31.46 kg/m    Body mass index is 31.46 kg/m .  Physical Exam   N/a as this is a discussion appt.     EXAMINATION: MR BREAST BILATERAL W/O & W CONTRAST, 10/1/2023  8:54 AM    "   HISTORY/FAMILY HISTORY:  Left breast LCIS with positive left axillary  node containing invasive lobular carcinoma.     COMPARISON: 9/13/2023, 3/13/2023, 12/1/2017     TECHNIQUE: Axial T2 images with fat suppression and axial T1 without  fat saturation images were obtained of both breasts prior to  gadolinium administration. Following the uneventful administration of  weight-based gadolinium intravenously, high-resolution dynamic imaging  of both breasts was performed in the axial plane. Dynamic images are  reviewed with subtraction technique. Axial and coronal maximal  intensity projection images are displayed.  Kinetic analysis was  performed using a separate station. Contrast: 9 ml Gadavist     FINDINGS:  Breast composition: Heterogeneous fibroglandular tissue  Background parenchymal enhancement: Mild     Heterogeneously enhancing irregular mass in the subareolar left breast  measuring approximately 5.0 x 4.9 x 4.9 cm with associated nipple  retraction and nipple/areolar involvement. This mass extends  superiorly through the 11:00-2:30 positions from anterior to mid  depth. The [employer] breast biopsy clip (LCIS) is approximately 1.5 cm  posterosuperior to this mass. This mass demonstrates plateau and  washout kinetics.     No convincing suspicious right breast enhancement.     Multiple suspicious left level 1 axillary lymph nodes are noted,  including the biopsy-proven metastatic node with indwelling biopsy  marker (for example series 7 image 129, sagittal image 135).  The  biopsied noed measures 1.3 x 1 cm.     Heterogeneous marrow appearance of the sternum and ribs with  heterogeneous enhancement and a peripherally-enhancing focus within  the mid body (for example series 14 image 82).                                                                      IMPRESSION: BI-RADS CATEGORY: 6 - Known Biopsy-Proven Malignancy.     RECOMMENDED FOLLOW-UP: Biopsy.  1. Upon further review of previous imaging and pathology  results,  recommend repeat ultrasound guided large core-needle biopsy of the  discordant dominant left breast mass given metastatic left axillary  lymph node and superoposteriorly displaced left breast biopsy marker.   2. Nonspecific heterogeneous enhancement of the sternum and ribs.  Consider nuclear medicine bone scan.     I have personally reviewed the examination and initial interpretation  and I agree with the findings.     MADAN VELEZ MD                    Again, thank you for allowing me to participate in the care of your patient.        Sincerely,        RamoneJono Urbina MD

## 2023-10-04 ENCOUNTER — VIRTUAL VISIT (OUTPATIENT)
Dept: ONCOLOGY | Facility: CLINIC | Age: 63
End: 2023-10-04
Attending: FAMILY MEDICINE
Payer: COMMERCIAL

## 2023-10-04 ENCOUNTER — PATIENT OUTREACH (OUTPATIENT)
Dept: ONCOLOGY | Facility: CLINIC | Age: 63
End: 2023-10-04

## 2023-10-04 ENCOUNTER — PRE VISIT (OUTPATIENT)
Dept: ONCOLOGY | Facility: CLINIC | Age: 63
End: 2023-10-04

## 2023-10-04 ENCOUNTER — TELEPHONE (OUTPATIENT)
Dept: ONCOLOGY | Facility: CLINIC | Age: 63
End: 2023-10-04

## 2023-10-04 VITALS — WEIGHT: 192 LBS | HEIGHT: 66 IN | BODY MASS INDEX: 30.86 KG/M2

## 2023-10-04 DIAGNOSIS — C50.912 MALIGNANT NEOPLASM OF LEFT BREAST IN FEMALE, ESTROGEN RECEPTOR POSITIVE, UNSPECIFIED SITE OF BREAST (H): Primary | ICD-10-CM

## 2023-10-04 DIAGNOSIS — M06.9 RHEUMATOID ARTHRITIS INVOLVING MULTIPLE SITES, UNSPECIFIED WHETHER RHEUMATOID FACTOR PRESENT (H): ICD-10-CM

## 2023-10-04 DIAGNOSIS — N95.1 MENOPAUSAL SYNDROME (HOT FLASHES): ICD-10-CM

## 2023-10-04 DIAGNOSIS — Z17.0 MALIGNANT NEOPLASM OF LEFT BREAST IN FEMALE, ESTROGEN RECEPTOR POSITIVE, UNSPECIFIED SITE OF BREAST (H): Primary | ICD-10-CM

## 2023-10-04 PROCEDURE — 99204 OFFICE O/P NEW MOD 45 MIN: CPT | Mod: VID | Performed by: INTERNAL MEDICINE

## 2023-10-04 ASSESSMENT — PAIN SCALES - GENERAL: PAINLEVEL: NO PAIN (0)

## 2023-10-04 NOTE — LETTER
10/4/2023         RE: Kesha Zacarias  [address] [place] [id number]        Dear Colleague,    Thank you for referring your patient, Kesha Zacarias, [facility]. Please see a copy of my visit note below.    Video-Visit Details     Video Start Time: 10:47AM     Type of service:  Video Visit     Video End Time: 11:29AM    Originating Location (pt. Location): Home     Distant Location (provider location[facility]     Platform used for Video Visit: Well     [facility]    Hematology/Oncology New Patient Note      Today's Date: October 4, 2023     Referring provider:Schoen, Gregory G, MD   Reason for Consultation: treatment plan for metastatic Left Breast cancer.  (+) Lymph node biopsy.  Left Breast Biopsy:  LCIS, no invasive CA.     HISTORY OF PRESENT ILLNESS: Kesha Zacarias is a 63 year old female who was referred to the Hematology/Oncology Clinic for treatment plan for metastatic Left Breast cancer.  (+) Lymph node biopsy.  Left Breast Biopsy:  LCIS, no invasive CA.     Patient has medical history including rheumatoid arthritis, hyperlipidemia, osteoarthritis, bilateral cataracts and glaucoma s/p surgery, endocervical polyp s/p resection, vaginal atrophy with conjugated estrogen vaginal cream use, colon polyp (hyperplastic polyp and tubular adenoma), seasonal depression, history of tobacco use ([age] y/o, about 1 ppd).    Regarding RA:  -dx over a decade ago, on plaquenil, managed by PCP  - arthritis is most severe in hands>knees, intermittent       - 3/23 bilateral screening mammogram [facility]  - a few months ago, noted nipple retraction  - 9/23 patient palpated mass in retroareolar region of left breast and w/nipple retraction, no discharge, skin thickening, no dimpling, no erythema  - 9/23 diagnostic LEFT breast mammogram: Focal dense tissue with some likely mild architectural distortion, some anterior skin thickening  "is noted  - 9/23 targeted LEFT breast ultrasound: Ill-defined shadowing hypoechoic mass, 3.0 x 1.7 x 3.9 cm.  In left axilla-few small lymph nodes, 1 normal-sized lymph node with cortical thickening, 0.7 x 0.6 cm.  -9/23 ultrasound-guided LEFT breast core biopsy of 12:00 lesion with clip placement, \"Postbiopsy unilateral digital mammogram of the left breast showed the clip to be at the expected biopsy site\" AND ultrasound-guided left axillary lymph node biopsy of lymph node with cortical thickening  PATHOLOGY  A.  Breast, left, 12:00, biopsy:  -Lobular carcinoma in situ.-Multiple foci  -Invasive carcinoma is not identified.     B.  Lymph node, left axillary, biopsy:  -Positive for metastatic lobular carcinoma, grade 2  -Largest metastatic focus is 7 mm.  -Negative for extranodal extension.  -Breast ancillary testing:  -Estrogen receptor: Positive (91 to 100%, strong)  -Progesterone receptor: Positive (91 to 100%, strong)  -HER2 2+ IHC, FISH negative    -10/23 MRI bilateral breast:  LEFT breast:  - Heterogeneously enhancing irregular mass in the subareolar left breast, 5.0 x 4.9 x 4.9 cm, with associated nipple retraction and nipple/areolar involvement.  This mass extends superiorly through 11: positions, from anterior to mid depth.  The HydroMARK breast biopsy clip (which showed LCIS) is 1.5 cm posterosuperior to this mass.  - Multiple suspicious left level 1 axillary lymph node noted, including biopsy-proven metastatic node with indwelling biopsy marker, biopsied node measures 1.3 x 1.0 cm  - Heterogeneous marrow appearance of sternum and ribs with heterogeneous enhancement and a peripheral enhancing focus within the mid body.  IMPRESSION:  1. Upon further review of previous imaging and pathology results,  recommend repeat ultrasound guided large core-needle biopsy of the  discordant dominant left breast mass given metastatic left axillary  lymph node and superoposteriorly displaced left breast biopsy marker. "   2. Nonspecific heterogeneous enhancement of the sternum and ribs.  Consider nuclear medicine bone scan    Lifetime estrogen exposure:  Menarche: 13   Last menstrual period: 49   Age of first pregnancy: 18   Number of pregnancies: 2 (no living children)   Weight gain: 20lb weight gain within a year  Exposure to exogenous estrogen: vaginal atrophy with conjugated estrogen vaginal cream use x4 years (intermittent), has not used it since 9/23. Birth control for about 13-15 years from her 20s-30s.      Family history of:  1.  Breast cancer including male breast cancer: negative   2. Ovarian cancer: negative  3.  Pancreatic cancer: negative  4.  Prostate cancer: father- ?in his 50s, other details unknown  5. Diffuse gastric cancer (if lobular breast CA): negative  6. Uterine cancer: negative  7. Colon cancer:  negative  6. Brother- head and neck, HPV +, had surgery, clinical trial and now cancer free      Pt reports 55lb weight loss in 1.5 years, this was intentional, was following weight watchers program (23 points available per day) 230lb->170lb->190lb (gained about 20lbs). Pt was walking on the treadmill and outside daily, about 30 mins to 1.5 miles.          REVIEW OF SYSTEMS:   A 14 point ROS was reviewed with pertinent positives and negatives in the HPI.        HOME MEDICATIONS:  Current Outpatient Medications   Medication Sig Dispense Refill     aspirin 81 MG tablet Take 81 mg by mouth daily       atorvastatin (LIPITOR) 40 MG tablet TAKE 1 TABLET BY MOUTH DAILY 90 tablet 3     betamethasone dipropionate (DIPROSONE) 0.05 % external lotion Apply topically 2 times daily 60 mL 3     Cholecalciferol (VITAMIN D) 2000 UNIT tablet Take 1 tablet by mouth daily 100 tablet 12     COENZYME Q-10 PO Take 1 tablet by mouth every other day       conjugated estrogens (PREMARIN) 0.625 MG/GM vaginal cream Place 0.5 g vaginally twice a week 30 g 1     cyclobenzaprine (FLEXERIL) 5 MG tablet TAKE 1 TABLET(5 MG) BY MOUTH AT BEDTIME 90  tablet 3     hydroxychloroquine (PLAQUENIL) 200 MG tablet TAKE 2 AND 1/2 TABLETS BY MOUTH EVERY  tablet 3     ibuprofen (ADVIL/MOTRIN) 800 MG tablet TAKE 1 TABLET BY MOUTH EVERY 6 HOURS AS NEEDED FOR PAIN 120 tablet 3     Multiple Minerals-Vitamins (CALCIUM-MAGNESIUM-ZINC-D3 PO)            ALLERGIES:  Allergies   Allergen Reactions     Ciprofloxacin      hives and was on flagyl too     Metronidazole      hives and was on cipro too         PAST MEDICAL HISTORY:  Past Medical History:   Diagnosis Date     Arthritis      Chronic depressive personality disorder      Dysplasia of cervix (uteri)     Cryotherapy     Female infertility of unspecified origin          PAST SURGICAL HISTORY:  Past Surgical History:   Procedure Laterality Date     COLONOSCOPY       COLONOSCOPY N/A 2022    Procedure: COLONOSCOPY, WITH POLYPECTOMY AND BIOPSY;  Surgeon: Gagandeep Patterson MD;  Location: [facility]     [facility] INTRODUCE CATH FALLOPIAN TUBE, RE-OPEN/DIAGNOSIS       HERNIA REPAIR, INCISIONAL  09     ORTHOPEDIC SURGERY  2017     ZZC APPENDECTOMY  03     ZZC REMV CATARACT INTRACAP,INSERT LENS  2003    right         SOCIAL HISTORY:  Social History     Socioeconomic History     Marital status:      Spouse name: Bandar     Number of children: 1     Years of education: Not on file     Highest education level: Not on file   Occupational History     Not on file   Tobacco Use     Smoking status: Former     Packs/day: 0.50     Years: 25.00     Pack years: 12.50     Types: Cigarettes     Quit date: 2017     Years since quittin.0     Smokeless tobacco: Never     Tobacco comments:     quit 2017    Vaping Use     Vaping Use: Never used   Substance and Sexual Activity     Alcohol use: Yes     Comment: 1-2 weekly     Drug use: No     Sexual activity: Yes     Partners: Male     Birth control/protection: None   Other Topics Concern     Parent/sibling w/ CABG, MI or angioplasty before 65F 55M? Yes    Social History Narrative     Not on file     Social Determinants of Health     Financial Resource Strain: Not on file   Food Insecurity: Not on file   Transportation Needs: Not on file   Physical Activity: Not on file   Stress: Not on file   Social Connections: Not on file   Interpersonal Safety: Not on file   Housing Stability: Not on file         FAMILY HISTORY:  Family History   Problem Relation Age of Onset     Genitourinary Problems Father         prostate     Genetic Disorder Father         ulcer     Hypertension Father      Lipids Father      Prostate Cancer Father      Heart Disease Mother      Lipids Mother      Hyperlipidemia Mother      Heart Disease Maternal Grandmother      Cerebrovascular Disease Maternal Grandmother      Heart Disease Maternal Grandfather      Heart Disease Maternal Uncle      Heart Disease Maternal Uncle         x  3     Hyperlipidemia Sister      Hyperlipidemia Brother      Other Cancer Brother          PHYSICAL EXAM:  Vital signs:  LMP 11/22/2011 (Exact Date)        LABS:   Latest Reference Range & Units 09/13/23 08:27   Sodium 136 - 145 mmol/L 139   Potassium 3.4 - 5.3 mmol/L 4.6   Chloride 98 - 107 mmol/L 102   Carbon Dioxide (CO2) 22 - 29 mmol/L 29   Urea Nitrogen 8.0 - 23.0 mg/dL 21.3   Creatinine 0.51 - 0.95 mg/dL 1.01 (H)   GFR Estimate >60 mL/min/1.73m2 63   Calcium 8.8 - 10.2 mg/dL 9.2   Anion Gap 7 - 15 mmol/L 8   ALT 0 - 50 U/L 20   Cholesterol <200 mg/dL 172   Glucose 70 - 99 mg/dL 97   HDL Cholesterol >=50 mg/dL 67   LDL Cholesterol Calculated <=100 mg/dL 84   Non HDL Cholesterol <130 mg/dL 105   Triglycerides <150 mg/dL 103   WBC 4.0 - 11.0 10e3/uL 5.2   Hemoglobin 11.7 - 15.7 g/dL 13.0   Hematocrit 35.0 - 47.0 % 41.0   Platelet Count 150 - 450 10e3/uL 187   RBC Count 3.80 - 5.20 10e6/uL 4.44   MCV 78 - 100 fL 92   MCH 26.5 - 33.0 pg 29.3   MCHC 31.5 - 36.5 g/dL 31.7   RDW 10.0 - 15.0 % 13.3   % Neutrophils % 45   % Lymphocytes % 42   % Monocytes % 9   % Eosinophils  "% 3   % Basophils % 1   Absolute Basophils 0.0 - 0.2 10e3/uL 0.0   Absolute Eosinophils 0.0 - 0.7 10e3/uL 0.1   Absolute Immature Granulocytes <=0.4 10e3/uL 0.0   Absolute Lymphocytes 0.8 - 5.3 10e3/uL 2.2   Absolute Monocytes 0.0 - 1.3 10e3/uL 0.5   % Immature Granulocytes % 0   Absolute Neutrophils 1.6 - 8.3 10e3/uL 2.4   Absolute NRBCs 10e3/uL 0.0   NRBCs per 100 WBC <1 /100 0   (H): Data is abnormally high    PATHOLOGY:  As above    IMAGING:  As above    ASSESSMENT/PLAN:  Kesha Zacarias is a 63 year old female with:    # clinical stage 2A left breast invasive lobular carcinoma, ER positive, IL positive, her2 negative on FISH  -9/23 diagnostic left breast mammogram, left breast targeted ultrasound showed 3.0 x 1.7 x 3.9 ill-defined shadowing hypoechoic mass, few small lymph nodes in the left axilla, one with cortical thickening measuring 0.7 x 0.6 cm  -9/23 ultrasound-guided LEFT breast core biopsy of 12:00 lesion with clip placement, \"Postbiopsy unilateral digital mammogram of the left breast showed the clip to be at the expected biopsy site\" AND ultrasound-guided left axillary lymph node biopsy of lymph node with cortical thickening, PATHOLOGY:  A.  Breast, left, 12:00, biopsy:Lobular carcinoma in situ, Multiple foci. Invasive carcinoma is not identified.   B.  Lymph node, left axillary, biopsy:  -Positive for metastatic lobular carcinoma, grade 2, 0.7 cm, negative GREGG, Estrogen receptor: Positive (91 to 100%, strong), Progesterone receptor: Positive (91 to 100%, strong), HER2 2+ IHC, FISH negative  -10/23 MRI bilateral breast:  - Heterogeneously enhancing irregular mass in the subareolar left breast, 5.0 x 4.9 x 4.9 cm, with associated nipple retraction and nipple/areolar involvement.  This mass extends superiorly through 11: positions, from anterior to mid depth.  The HydroMARK breast biopsy clip (which showed LCIS) is 1.5 cm posterosuperior to this mass.  - Multiple suspicious left level 1 axillary " lymph node noted, including biopsy-proven metastatic node with indwelling biopsy marker, biopsied node measures 1.3 x 1.0 cm  - Heterogeneous marrow appearance of sternum and ribs with heterogeneous enhancement and a peripheral enhancing focus within the mid body.  -AJCC 8th clinical prognostic staging stage 2A cT2 (5cm) cN1     PLAN:  -The left breast biopsy was negative for invasive carcinoma and the associated marker was 1.5 cm posterior superior to the mass that was suspicious.  I do not think that biopsying the left breast mass would add more information to what we have available at this time given the left axillary lymph node biopsy showing metastatic lobular carcinoma.  I discussed this with the patient and Dr Urbina, we will hold off on additional breast biopsy at this time  - Check baseline CMP, CA 15-3  - Check PET/CT STAT to confirm stage, rule out metastatic disease given MRI findings of heterogeneous enhancement related to sternum and ribs and biopsy-proven involvement of lymph nodes  - Genetic counseling     #post menopausal  #vaginal dryness  - Pts breast biopsy pathology results explained in detail. Pt is aware her tumor is estrogen positive. Pt educated to avoid all estrogen-containing products including but not limited to birth control, vaginal creams etc.     #RA  - on plaquenil       RTC 7-10 days for follow-up with me (after PET completed)-next visit must be in person so I can examine this patient      Mary Maravilla DO  Hematology/Oncology  [facility]    Future Appointments   Date Time Provider Department Center   10/3/2023  3:45 PM Zackery Urbina MD [facility]   10/4/2023 10:45 AM Mary Maravilla DO [facility]        Video-Visit Details     Video Start Time: 10:47AM     Type of service:  Video Visit     Video End Time: 11:29AM    Originating Location (pt. Location): Home     Distant Location (provider location):  "[employer] [place] off site     Platform  used for Video Visit: AmWell       Again, thank you for allowing me to participate in the care of your patient.        Sincerely,        MILLER ALTAMIRANO, DO

## 2023-10-04 NOTE — TELEPHONE ENCOUNTER
I have attempted to contact this patient by phone with the following results: left message to return my call on answering machine.    I scheduled the pet scan that Dr. Maravilla ordered. That was the next available appt. Pt needs instructions relayed to her. She also needs appt with phoebe for follow up on 10/12 at  to review results (in person).    Jackelyn RAMIREZ [facility]  [phone]

## 2023-10-04 NOTE — NURSING NOTE
Is the patient currently in the state of [place]? YES    Visit mode:VIDEO    If the visit is dropped, the patient can be reconnected by: VIDEO VISIT: Text to cell phone:   Telephone Information:   Mobile [phone]       Will anyone else be joining the visit? NO  (If patient encounters technical issues they should call [phone]    How would you like to obtain your AVS? MyChart    Are changes needed to the allergy or medication list? Pt stated no changes to allergies and Pt stated no med changes    Reason for visit: Oncology Clinic Visit (Breast ca- new pt (records in epic)), Video Visit, and Consult    Danielle RIVERO

## 2023-10-04 NOTE — NURSING NOTE
DISCHARGE PLAN:  Next appointments: See patient instruction section  Departure Mode: video  Accompanied by:    minutes for nursing discharge (face to face time)     Kesha Zacarias is here today for video onc follow up.  Patient was not seen by writing nurse at time of appointment.   Appointments scheduled for everything requested. I called pt and coordinated appts and informed her prep instructions for Pet scan. She is aware of all of her appts and looks at mychart. See patient instructions and Oncologist's Progress note for further details. Questions and concerns addressed to patient's satisfaction. Patient verbalized and demonstrated understanding of plan.  Contact information provided and patient is encouraged to call with any that arise in the interim of care.    Jackelyn San  [facility]  [phone]  10/4/2023, 3:18 PM

## 2023-10-04 NOTE — PROGRESS NOTES
Video-Visit Details     Video Start Time: 10:47AM     Type of service:  Video Visit     Video End Time: 11:29AM    Originating Location (pt. Location): Home     Distant Location (provider location):  [facility] off site     Platform used for Video Visit: Negrita

## 2023-10-04 NOTE — PROGRESS NOTES
Writer received referral to Cancer Risk Management/Genetic Counseling.    Referred for:     Personal hx of breast cancer; father and brother w/ cancer        Referral reviewed for appropriate plan, and sent to New Patient Scheduling for completion.    Anna Dawson, RN, BSN  Oncology New Patient Nurse Navigator   [facility]  [phone]

## 2023-10-04 NOTE — TELEPHONE ENCOUNTER
Pt called back and I relayed all information for her.    Jackelyn RAMIREZ [facility]  [phone]

## 2023-10-06 ENCOUNTER — HOSPITAL ENCOUNTER (OUTPATIENT)
Dept: PET IMAGING | Facility: CLINIC | Age: 63
Setting detail: NUCLEAR MEDICINE
Discharge: HOME | End: 2023-10-06
Attending: INTERNAL MEDICINE | Admitting: INTERNAL MEDICINE
Payer: COMMERCIAL

## 2023-10-06 DIAGNOSIS — C50.912 MALIGNANT NEOPLASM OF LEFT BREAST IN FEMALE, ESTROGEN RECEPTOR POSITIVE, UNSPECIFIED SITE OF BREAST (H): ICD-10-CM

## 2023-10-06 DIAGNOSIS — Z17.0 MALIGNANT NEOPLASM OF LEFT BREAST IN FEMALE, ESTROGEN RECEPTOR POSITIVE, UNSPECIFIED SITE OF BREAST (H): ICD-10-CM

## 2023-10-06 PROCEDURE — 78815 PET IMAGE W/CT SKULL-THIGH: CPT | Mod: 26 | Performed by: RADIOLOGY

## 2023-10-06 PROCEDURE — A9552 F18 FDG: HCPCS | Performed by: INTERNAL MEDICINE

## 2023-10-06 PROCEDURE — 78815 PET IMAGE W/CT SKULL-THIGH: CPT | Mod: PI

## 2023-10-06 PROCEDURE — 71260 CT THORAX DX C+: CPT | Mod: 26 | Performed by: RADIOLOGY

## 2023-10-06 PROCEDURE — 250N000011 HC RX IP 250 OP 636: Performed by: INTERNAL MEDICINE

## 2023-10-06 PROCEDURE — 343N000001 HC RX 343: Performed by: INTERNAL MEDICINE

## 2023-10-06 PROCEDURE — 74177 CT ABD & PELVIS W/CONTRAST: CPT | Mod: 26 | Performed by: RADIOLOGY

## 2023-10-06 RX ORDER — IOPAMIDOL 755 MG/ML
1-135 INJECTION, SOLUTION INTRAVASCULAR ONCE
Status: COMPLETED | OUTPATIENT
Start: 2023-10-06 | End: 2023-10-06

## 2023-10-06 RX ADMIN — IOPAMIDOL 118 ML: 755 INJECTION, SOLUTION INTRAVENOUS at 15:15

## 2023-10-06 RX ADMIN — FLUDEOXYGLUCOSE F-18 13.9 MILLICURIE: 500 INJECTION, SOLUTION INTRAVENOUS at 14:11

## 2023-10-10 NOTE — PROGRESS NOTES
"  Assessment & Plan   Problem List Items Addressed This Visit    None  Visit Diagnoses       Invasive lobular carcinoma of breast, stage 3, left (H)    -  Primary    BMI 30.0-30.9,adult               64 yo F with locally advance left breast cancer based on MRI.   -no surgical intervention for now  -pt will be meeting with med/onc for further work-up to r/o metastatic disease  -Patient will likely need a powerport for neoadjuvant chemotherapy  -I will keep in touch with Dr. Maravilla for the next week.   -all of her questions were answered.       Face to Face/patient Contact total time: 20 minutes  Pre Charting time: 5 minutes; Post charting time, communication and other activities: 15 minutes;   Total time:  40 minutes       BMI:   Estimated body mass index is 31.46 kg/m  as calculated from the following:    Height as of this encounter: 1.664 m (5' 5.5\").    Weight as of this encounter: 87.1 kg (192 lb).       No follow-ups on file.    Ramone-Francy Urbina MD  [facility]    Yonatan Rebolledo is a 63 year old, presenting for the following health issues:  RECHECK    Pt s/p MRI.    Here to discuss result  No changes otherwise  She has an appointment with med/onc soon.         Review of Systems   Constitutional, HEENT, cardiovascular, pulmonary, gi and gu systems are negative, except as otherwise noted.      Objective    /70   Pulse 88   Temp 97.5  F (36.4  C) (Temporal)   Resp 18   Ht 1.664 m (5' 5.5\")   Wt 87.1 kg (192 lb)   LMP 11/22/2011 (Exact Date)   SpO2 100%   BMI 31.46 kg/m    Body mass index is 31.46 kg/m .  Physical Exam   N/a as this is a discussion appt.     EXAMINATION: MR BREAST BILATERAL W/O & W CONTRAST, 10/1/2023  8:54 AM      HISTORY/FAMILY HISTORY:  Left breast LCIS with positive left axillary  node containing invasive lobular carcinoma.     COMPARISON: 9/13/2023, 3/13/2023, 12/1/2017     TECHNIQUE: Axial T2 images with fat suppression and axial T1 without  fat saturation " images were obtained of both breasts prior to  gadolinium administration. Following the uneventful administration of  weight-based gadolinium intravenously, high-resolution dynamic imaging  of both breasts was performed in the axial plane. Dynamic images are  reviewed with subtraction technique. Axial and coronal maximal  intensity projection images are displayed.  Kinetic analysis was  performed using a separate station. Contrast: 9 ml Gadavist     FINDINGS:  Breast composition: Heterogeneous fibroglandular tissue  Background parenchymal enhancement: Mild     Heterogeneously enhancing irregular mass in the subareolar left breast  measuring approximately 5.0 x 4.9 x 4.9 cm with associated nipple  retraction and nipple/areolar involvement. This mass extends  superiorly through the 11:00-2:30 positions from anterior to mid  depth. The Touch[employer] breast biopsy clip (LCIS) is approximately 1.5 cm  posterosuperior to this mass. This mass demonstrates plateau and  washout kinetics.     No convincing suspicious right breast enhancement.     Multiple suspicious left level 1 axillary lymph nodes are noted,  including the biopsy-proven metastatic node with indwelling biopsy  marker (for example series 7 image 129, sagittal image 135).  The  biopsied noed measures 1.3 x 1 cm.     Heterogeneous marrow appearance of the sternum and ribs with  heterogeneous enhancement and a peripherally-enhancing focus within  the mid body (for example series 14 image 82).                                                                      IMPRESSION: BI-RADS CATEGORY: 6 - Known Biopsy-Proven Malignancy.     RECOMMENDED FOLLOW-UP: Biopsy.  1. Upon further review of previous imaging and pathology results,  recommend repeat ultrasound guided large core-needle biopsy of the  discordant dominant left breast mass given metastatic left axillary  lymph node and superoposteriorly displaced left breast biopsy marker.   2. Nonspecific heterogeneous  enhancement of the sternum and ribs.  Consider nuclear medicine bone scan.     I have personally reviewed the examination and initial interpretation  and I agree with the findings.     MADAN VELEZ MD

## 2023-10-12 ENCOUNTER — ONCOLOGY VISIT (OUTPATIENT)
Dept: ONCOLOGY | Facility: CLINIC | Age: 63
End: 2023-10-12
Payer: COMMERCIAL

## 2023-10-12 VITALS
HEIGHT: 65 IN | RESPIRATION RATE: 18 BRPM | DIASTOLIC BLOOD PRESSURE: 72 MMHG | SYSTOLIC BLOOD PRESSURE: 112 MMHG | WEIGHT: 192.4 LBS | HEART RATE: 63 BPM | OXYGEN SATURATION: 97 % | BODY MASS INDEX: 32.06 KG/M2

## 2023-10-12 DIAGNOSIS — C79.51 CARCINOMA OF LEFT BREAST METASTATIC TO BONE (H): Primary | ICD-10-CM

## 2023-10-12 DIAGNOSIS — Z17.0 MALIGNANT NEOPLASM OF LEFT BREAST IN FEMALE, ESTROGEN RECEPTOR POSITIVE, UNSPECIFIED SITE OF BREAST (H): ICD-10-CM

## 2023-10-12 DIAGNOSIS — R74.01 ELEVATED ALANINE AMINOTRANSFERASE (ALT) LEVEL: ICD-10-CM

## 2023-10-12 DIAGNOSIS — C50.912 MALIGNANT NEOPLASM OF LEFT BREAST IN FEMALE, ESTROGEN RECEPTOR POSITIVE, UNSPECIFIED SITE OF BREAST (H): ICD-10-CM

## 2023-10-12 DIAGNOSIS — C50.912 CARCINOMA OF LEFT BREAST METASTATIC TO BONE (H): Primary | ICD-10-CM

## 2023-10-12 DIAGNOSIS — R74.01 ELEVATED AST (SGOT): ICD-10-CM

## 2023-10-12 PROBLEM — C50.919 BREAST CANCER METASTASIZED TO BONE (H): Status: ACTIVE | Noted: 2023-10-12

## 2023-10-12 PROCEDURE — 99214 OFFICE O/P EST MOD 30 MIN: CPT | Performed by: INTERNAL MEDICINE

## 2023-10-12 RX ORDER — LACTOBACILLUS RHAMNOSUS GG 10B CELL
1 CAPSULE ORAL 2 TIMES DAILY
COMMUNITY
End: 2024-01-05

## 2023-10-12 RX ORDER — MULTIVITAMIN WITH IRON
1 TABLET ORAL DAILY
COMMUNITY

## 2023-10-12 RX ORDER — MULTIVIT-MIN/IRON/FOLIC ACID/K 18-600-40
CAPSULE ORAL
COMMUNITY
End: 2023-10-31

## 2023-10-12 RX ORDER — CHLORAL HYDRATE 500 MG
2 CAPSULE ORAL DAILY
COMMUNITY

## 2023-10-12 ASSESSMENT — PAIN SCALES - GENERAL: PAINLEVEL: NO PAIN (0)

## 2023-10-12 NOTE — NURSING NOTE
"Oncology Rooming Note    October 12, 2023 4:15 PM   Kesha Zacarias is a 63 year old female who presents for:    Chief Complaint   Patient presents with    Oncology Clinic Visit     Follow up- PET results     Initial Vitals: /72 (BP Location: Left arm)   Pulse 63   Resp 18   Ht 1.651 m (5' 5\")   Wt 87.3 kg (192 lb 6.4 oz)   LMP 11/22/2011 (Exact Date)   SpO2 97%   BMI 32.02 kg/m   Estimated body mass index is 32.02 kg/m  as calculated from the following:    Height as of this encounter: 1.651 m (5' 5\").    Weight as of this encounter: 87.3 kg (192 lb 6.4 oz). Body surface area is 2 meters squared.  No Pain (0) Comment: Data Unavailable   Patient's last menstrual period was 11/22/2011 (exact date).  Allergies reviewed: Yes  Medications reviewed: Yes    Medications: Medication refills not needed today.  Pharmacy name entered into [facility]:    [facility] [place], [place] - [address] [facility] [place], [place] - [address] [id number] - [place] [address]  [id number] - [place] - [address] AT [facility] [address]  [facility] - [facility] [address]    Clinical concerns: results       Zuleima Guidry LPN              "

## 2023-10-12 NOTE — LETTER
"    10/12/2023         RE: Kesha Zacarias  [address] [address] [place] [id number]        Dear Colleague,    Thank you for referring your patient, Kesha Zacarias, to the [facility]. Please see a copy of my visit note below.    [facility]    Hematology/Oncology Established Patient Follow Up Note      Today's Date: October 12, 2023     Reason for follow up: breast cancer    HISTORY OF PRESENT ILLNESS: Kesha Zacarias is a 63 year old female who presents for follow up    Patient has medical history including rheumatoid arthritis, hyperlipidemia, osteoarthritis, bilateral cataracts and glaucoma s/p surgery, endocervical polyp s/p resection, vaginal atrophy with conjugated estrogen vaginal cream use, colon polyp (hyperplastic polyp and tubular adenoma), seasonal depression, history of tobacco use ([age] y/o, about 1 ppd).    Regarding RA:  -dx over a decade ago, on plaquenil, managed by PCP  - arthritis is most severe in hands>knees, intermittent       - 3/23 bilateral screening mammogram [facility]  - a few months ago, noted nipple retraction  - 9/23 patient palpated mass in retroareolar region of left breast and w/nipple retraction, no discharge, skin thickening, no dimpling, no erythema  - 9/23 diagnostic LEFT breast mammogram: Focal dense tissue with some likely mild architectural distortion, some anterior skin thickening is noted  - 9/23 targeted LEFT breast ultrasound: Ill-defined shadowing hypoechoic mass, 3.0 x 1.7 x 3.9 cm.  In left axilla-few small lymph nodes, 1 normal-sized lymph node with cortical thickening, 0.7 x 0.6 cm.  -9/23 ultrasound-guided LEFT breast core biopsy of 12:00 lesion with clip placement, \"Postbiopsy unilateral digital mammogram of the left breast showed the clip to be at the expected biopsy site\" AND ultrasound-guided left axillary lymph node biopsy of lymph node with cortical thickening  PATHOLOGY  A.  Breast, left, 12:00, " biopsy:  -Lobular carcinoma in situ.-Multiple foci  -Invasive carcinoma is not identified.     B.  Lymph node, left axillary, biopsy:  -Positive for metastatic lobular carcinoma, grade 2  -Largest metastatic focus is 7 mm.  -Negative for extranodal extension.  -Breast ancillary testing:  -Estrogen receptor: Positive (91 to 100%, strong)  -Progesterone receptor: Positive (91 to 100%, strong)  -HER2 2+ IHC, FISH negative    -10/23 MRI bilateral breast:  LEFT breast:  - Heterogeneously enhancing irregular mass in the subareolar left breast, 5.0 x 4.9 x 4.9 cm, with associated nipple retraction and nipple/areolar involvement.  This mass extends superiorly through 11: positions, from anterior to mid depth.  The AV [employer] breast biopsy clip (which showed LCIS) is 1.5 cm posterosuperior to this mass.  - Multiple suspicious left level 1 axillary lymph node noted, including biopsy-proven metastatic node with indwelling biopsy marker, biopsied node measures 1.3 x 1.0 cm  - Heterogeneous marrow appearance of sternum and ribs with heterogeneous enhancement and a peripheral enhancing focus within the mid body.  IMPRESSION:  1. Upon further review of previous imaging and pathology results,  recommend repeat ultrasound guided large core-needle biopsy of the  discordant dominant left breast mass given metastatic left axillary  lymph node and superoposteriorly displaced left breast biopsy marker.   2. Nonspecific heterogeneous enhancement of the sternum and ribs.  Consider nuclear medicine bone scan    Lifetime estrogen exposure:  Menarche: 13   Last menstrual period: 49   Age of first pregnancy: 18   Number of pregnancies: 2 (no living children)   Weight gain: 20lb weight gain within a year  Exposure to exogenous estrogen: vaginal atrophy with conjugated estrogen vaginal cream use x4 years (intermittent), has not used it since 9/23. Birth control for about 13-15 years from her 20s-30s.      Pt reports 55lb weight loss in 1.5  years, this was intentional, was following weight watchers program (23 points available per day) 230lb->170lb->190lb (gained about 20lbs). Pt was walking on the treadmill and outside daily, about 30 mins to 1.5 miles.      INTERIM HISTORY:    10/23 labs:  AST 79, ALT 91,   CA 15-3 261    10/23 PET/CT:  Innumerable foci of FDG avid lesions are seen throughout the osseous  structures of the head and neck, most pronounced on the calvarium and  cervical spine, with prominently sclerotic appearance on CT correlate.  For example:  -Right frontal bone, SUV max 5.31.  -Left lateral mass of the atlas, SUV max 15.0  -Angle of the left mandible, SUV max 9.6  -C7 vertebral body, SUV max 17.7    Innumerable variable sized FDG avid lesions throughout the axial and  appendicular spine, including but not limited to the cervical,  thoracic, and lumbar spine, multilevel bilateral ribs, sternum,  bilateral scapula, bilateral humeri, clavicles, pelvis, and bilateral  femurs. A few of these lesions are described below:  -Large FDG avid sclerotic 3.4 cm lesion within the proximal left  humeral head, SUV max 17.24  -Sternal body, SUV max 20.35  -L2 vertebral body, SUV max 14.3 without  -Large ill-defined sclerotic lesion in the sacral body measuring up to  at least 5.9 cm, SUV max 16.84  -FDG avid focus within the left femoral head, SUV  max 13.65 without CT correlate.    Large irregular soft tissue FDG avid mass within the left breast   measuring approximately 3.2 x 2.7 cm with  extension into the superficial soft tissues and associated left nipple  retraction, SUV max 12.36 additional FDG avid. Left axillary lymph  nodes, not definitively enlarged by short axis size criteria, for  example, SUV max 5.93.    The liver is unremarkable.     Solid 3 mm non-FDG avid pulmonary nodule within the  right middle lobe    REVIEW OF SYSTEMS:   A 14 point ROS was reviewed with pertinent positives and negatives in the HPI.        HOME  MEDICATIONS:  Current Outpatient Medications   Medication Sig Dispense Refill     aspirin 81 MG tablet Take 81 mg by mouth daily       betamethasone dipropionate (DIPROSONE) 0.05 % external lotion Apply topically 2 times daily 60 mL 3     COENZYME Q-10 PO Take 1 tablet by mouth every other day       cyclobenzaprine (FLEXERIL) 5 MG tablet TAKE 1 TABLET(5 MG) BY MOUTH AT BEDTIME 90 tablet 3     fish oil-omega-3 fatty acids 1000 MG capsule Take 2 g by mouth daily       hydroxychloroquine (PLAQUENIL) 200 MG tablet TAKE 2 AND 1/2 TABLETS BY MOUTH EVERY  tablet 3     lactobacillus rhamnosus, GG, (CULTURELL) capsule Take 1 capsule by mouth 2 times daily       magnesium 250 MG tablet Take 1 tablet by mouth daily       docusate sodium (COLACE) 100 MG capsule Take 1 capsule (100 mg) by mouth 3 times daily as needed for constipation 90 capsule 3     letrozole (FEMARA) 2.5 MG tablet Take 1 tablet (2.5 mg) by mouth every morning for 28 days 28 tablet 11     loperamide (IMODIUM A-D) 2 MG tablet When diarrhea starts take 2 tabs (4mg), then with each additional episode of diarrhea take 1 additional tab and if needing more than 8 tabs in 24 hrs call oncology 30 tablet 3     ondansetron (ZOFRAN) 4 MG tablet Take 1 tablet (4 mg) by mouth every 6 hours as needed for nausea 30 tablet 3     prochlorperazine (COMPAZINE) 10 MG tablet Take 1 tablet (10 mg) by mouth every 6 hours as needed for nausea or vomiting 30 tablet 2     ribociclib (KISQALI) 200 MG tablet Take 3 tablets (600 mg) by mouth every morning for 21 days , then off for 7 days. 63 tablet 0     sennosides (SENOKOT) 8.6 MG tablet Take 1 tablet by mouth daily 60 tablet 3         ALLERGIES:  Allergies   Allergen Reactions     Ciprofloxacin      hives and was on flagyl too     Metronidazole      hives and was on cipro too         PAST MEDICAL HISTORY:  Past Medical History:   Diagnosis Date     Arthritis 2006     Breast cancer metastasized to bone (H) 10/12/2023     Chronic  depressive personality disorder      Dysplasia of cervix (uteri)     Cryotherapy     Female infertility of unspecified origin          PAST SURGICAL HISTORY:  Past Surgical History:   Procedure Laterality Date     COLONOSCOPY       COLONOSCOPY N/A 2022    Procedure: COLONOSCOPY, WITH POLYPECTOMY AND BIOPSY;  Surgeon: Gagandeep Patterson MD;  Location:  GI     HC INTRODUCE CATH FALLOPIAN TUBE, RE-OPEN/DIAGNOSIS       HERNIA REPAIR, INCISIONAL  09     ORTHOPEDIC SURGERY  2017     ZZC APPENDECTOMY  03     ZC REMV CATARACT INTRACAP,INSERT LENS  2003    right         SOCIAL HISTORY:  Social History     Socioeconomic History     Marital status:      Spouse name: Bandar     Number of children: 1     Years of education: Not on file     Highest education level: Not on file   Occupational History     Not on file   Tobacco Use     Smoking status: Former     Packs/day: 0.50     Years: 25.00     Additional pack years: 0.00     Total pack years: 12.50     Types: Cigarettes     Quit date: 2017     Years since quittin.1     Smokeless tobacco: Never     Tobacco comments:     quit 2017    Vaping Use     Vaping Use: Never used   Substance and Sexual Activity     Alcohol use: Yes     Comment: 1-2 weekly     Drug use: No     Sexual activity: Yes     Partners: Male     Birth control/protection: None   Other Topics Concern     Parent/sibling w/ CABG, MI or angioplasty before 65F 55M? Yes   Social History Narrative     Not on file     Social Determinants of Health     Financial Resource Strain: Not on file   Food Insecurity: Not on file   Transportation Needs: Not on file   Physical Activity: Not on file   Stress: Not on file   Social Connections: Not on file   Interpersonal Safety: Not on file   Housing Stability: Not on file         FAMILY HISTORY:  Family History   Problem Relation Age of Onset     Genitourinary Problems Father         prostate     Genetic Disorder Father          "ulcer     Hypertension Father      Lipids Father      Prostate Cancer Father      Heart Disease Mother      Lipids Mother      Hyperlipidemia Mother      Heart Disease Maternal Grandmother      Cerebrovascular Disease Maternal Grandmother      Heart Disease Maternal Grandfather      Heart Disease Maternal Uncle      Heart Disease Maternal Uncle         x  3     Hyperlipidemia Sister      Hyperlipidemia Brother      Other Cancer Brother        Family history of:  1.  Breast cancer including male breast cancer: negative   2. Ovarian cancer: negative  3.  Pancreatic cancer: negative  4.  Prostate cancer: father- ?in his 50s, other details unknown  5. Diffuse gastric cancer (if lobular breast CA): negative  6. Uterine cancer: negative  7. Colon cancer:  negative  6. Brother- head and neck, HPV +, had surgery, clinical trial and now cancer free      PHYSICAL EXAM:  Vital signs:  /72 (BP Location: Left arm)   Pulse 63   Resp 18   Ht 1.651 m (5' 5\")   Wt 87.3 kg (192 lb 6.4 oz)   LMP 2011 (Exact Date)   SpO2 97%   BMI 32.02 kg/m         ECO  GENERAL/CONSTITUTIONAL: tearful  EYES: Pupils are equal and round. Extraocular movements intact without nystagmus.  No scleral icterus.  RESPIRATORY: Equal chest rise.   MUSCULOSKELETAL: Warm and well-perfused, no cyanosis  NEUROLOGIC: Cranial nerves are grossly intact. Alert, oriented to person, place and time, answers questions appropriately.  INTEGUMENTARY: No rashes or jaundice.  GAIT: Steady, does not use assistive device    LABS:   Latest Reference Range & Units 10/07/23 09:15   Sodium 135 - 145 mmol/L 140   Potassium 3.4 - 5.3 mmol/L 4.6   Chloride 98 - 107 mmol/L 100   Carbon Dioxide (CO2) 22 - 29 mmol/L 25   Urea Nitrogen 8.0 - 23.0 mg/dL 20.7   Creatinine 0.51 - 0.95 mg/dL 1.03 (H)   GFR Estimate >60 mL/min/1.73m2 61   Calcium 8.8 - 10.2 mg/dL 9.6   Anion Gap 7 - 15 mmol/L 15   Albumin 3.5 - 5.2 g/dL 4.4   Protein Total 6.4 - 8.3 g/dL 7.5   Alkaline " Phosphatase 35 - 104 U/L 116 (H)   ALT 0 - 50 U/L 91 (H)   AST 0 - 45 U/L 79 (H)   Bilirubin Total <=1.2 mg/dL 0.5   Cancer Antigen 15-3 <=25 U/mL 261 (H)   Glucose 70 - 99 mg/dL 100 (H)   (H): Data is abnormally high    PATHOLOGY:    IMAGING:  Combined Report of: PET and CT on 10/6/2023 3:49 PM:     1. PET of the neck, chest, abdomen, and pelvis.  2. PET CT Fusion for Attenuation Correction and Anatomical  Localization.  3. Diagnostic CT of the chest, abdomen and pelvis with intravenous  contrast obtained for diagnostic interpretation.  4. 3D MIP and PET-CT fused images were processed on an independent  workstation and archived to PACS and reviewed by a radiologist.     Technique:     1. PET: The patient received 13.9 mCi of F-18-FDG. The serum glucose  was 89 md/dL prior to administration. Body weight was 87.1 kg. Images  were evaluated in the axial, sagittal, and coronal planes as well as  the rotational whole body MIP. Images were acquired from the cranial  vertex to the feet.     UPTAKE WAS MEASURED AT 68 MINUTES.      BACKGROUND: Liver SUV max = , Aorta Blood SUV Max = .      2. CT: Volumetric acquisition for clinical interpretation of the  chest, abdomen, and pelvis acquired at 3 mm sections. The chest,  abdomen, and pelvis were evaluated at 5 mm sections in bone, soft  tissue, and lung windows.     Contrast and Medications:  IV contrast: 118 mL of Isovue 370 intravenously.  PO contrast: none.  Additional Medications: None.     3. 3D MIP and PET-CT fused images were processed on an independent  workstation and archived to PACS and reviewed by a radiologist.     INDICATION: lobular breast cancer w/mets to LN, MRI breast showed  indeterminate bone lesions in sternum and ribs. staging CT, r/o  metastatic disease.; Malignant neoplasm of left breast in female,  estrogen receptor positive, unspecified site of breast (H); Malignant  neoplasm of left breast in female, estrogen receptor positive,  unspecified site of  breast (H).     ADDITIONAL INFORMATION OBTAINED FROM EMR: 63-year-old female with new  diagnosis of metastatic lobular carcinoma left breast cancer,  9/23/2023 with positive breast and left axillary lymph node biopsy.  PET for staging.     COMPARISON: MR breast 10/1/2023     FINDINGS:      HEAD/NECK:  Innumerable foci of FDG avid lesions are seen throughout the osseous  structures of the head and neck, most pronounced on the calvarium and  cervical spine, with prominently sclerotic appearance on CT correlate.  For example:  -Right frontal bone (series 4 image 14), SUV max 5.31.  -Left lateral mass of the atlas (series 4 image 57), SUV max 15.0  -Angle of the left mandible (series 4 image 55), SUV max 9.6  -C7 vertebral body (series 4 image 82), SUV max 17.7     Partially aplastic appearance the left maxillary sinus. Remainder the  paranasal sinuses are clear. The mastoid air cells are clear.      No hypermetabolic lymph nodes are demonstrated in the neck.      The mucosal and deep spaces of the neck are unremarkable. The major  salivary glands are unremarkable. The thyroid is unremarkable. The  major vasculature of the neck are patent.     CHEST:  Large irregular soft tissue FDG avid mass within the left breast  (series 4 image 137) measuring approximately 3.2 x 2.7 cm with  extension into the superficial soft tissues and associated left nipple  retraction, SUV max 12.36 additional FDG avid. Left axillary lymph  nodes, not definitively enlarged by short axis size criteria, for  example, (series 4 image 114), SUV max 5.93.     The central tracheobronchial tree is clear. No pleural effusion or  pneumothorax. Solid 3 mm non-FDG avid pulmonary nodule within the  right middle lobe (series 7 image 55). Subsegmental atelectasis in the  left lower lobe.     No hypermetabolic lymph nodes are demonstrated in the chest.     Heart size is within normal limits. No pericardial effusion. Thoracic  aorta is dilated measuring up to  4.1 cm. Main pulmonary artery is  dilated measuring up to 3.8 cm. The esophagus is unremarkable.      ABDOMEN AND PELVIS:  Postsurgical changes of the ventral abdominal wall with mesh placement  with mildly FDG avid appearance, for example (series 4 image 144), SUV  max 3.78, likely postsurgical. Diastases of the rectus abdominis  musculature.     The liver is unremarkable. The gallbladder is unremarkable. No  intrahepatic or extrahepatic biliary ductal dilatation. The pancreas  is unremarkable. No pancreatic ductal dilatation. The spleen is  unremarkable. The adrenal glands are unremarkable.     Symmetric enhancement of the kidneys. No hydronephrosis. The urinary  bladder is unremarkable. The reproductive organs are unremarkable.     No hypermetabolic lymph nodes are demonstrated in the abdomen or  pelvis.     Normal caliber of the small and large bowel. FDG avid uptake  throughout the small and large bowel, presumably physiologic. No free  air, free fluid, or fluid collection. Normal caliber of the abdominal  aorta.     LOWER EXTREMITIES:   Innumerable FDG avid lesions seen throughout the chest portions of the  lower extremities. Please see further details and bone section.     BONES:   Innumerable variable sized FDG avid lesions throughout the axial and  appendicular spine, including but not limited to the cervical,  thoracic, and lumbar spine, multilevel bilateral ribs, sternum,  bilateral scapula, bilateral humeri, clavicles, pelvis, and bilateral  femurs. A few of these lesions are described below:  -Large FDG avid sclerotic 3.4 cm lesion within the proximal left  humeral head (series 4 image 92), SUV max 17.24  -Sternal body (series 4 image 135), SUV max 20.35  -L2 vertebral body (series 4 image 184), SUV max 14.3 without  -Large ill-defined sclerotic lesion in the sacral body measuring up to  at least 5.9 cm (series 4 image 229), SUV max 16.84  -FDG avid focus within the left femoral head (series 4 image  "267), SUV  max 13.65 without CT correlate.     Context: patient with lobular left breast cancer                                                                      IMPRESSION: In summary: Hypermetabolic breast mass, axillary nodes,  and diffuse bone metastases.     1. Large irregular soft tissue FDG avid 3.2 cm mass within the left  breast with extension into the superficial soft tissues and associated  left nipple retraction, consistent with biopsy-proven invasive lobular  carcinoma.  2. Innumerable diffuse FDG avid lesions throughout the axial and  appendicular spine and FDG avid left axillary lymph nodes, consistent  with metastatic disease.     I have personally reviewed the examination and initial interpretation  and I agree with the findings.    ASSESSMENT/PLAN:  Kesha Zacarias is a 63 year old female with:    # metastatic left breast invasive lobular carcinoma, ER positive, IL positive, her2 negative on FISH  -9/23 diagnostic left breast mammogram, left breast targeted ultrasound showed 3.0 x 1.7 x 3.9 ill-defined shadowing hypoechoic mass, few small lymph nodes in the left axilla, one with cortical thickening measuring 0.7 x 0.6 cm  -9/23 ultrasound-guided LEFT breast core biopsy of 12:00 lesion with clip placement, \"Postbiopsy unilateral digital mammogram of the left breast showed the clip to be at the expected biopsy site\" AND ultrasound-guided left axillary lymph node biopsy of lymph node with cortical thickening, PATHOLOGY:  A.  Breast, left, 12:00, biopsy:Lobular carcinoma in situ, Multiple foci. Invasive carcinoma is not identified.   B.  Lymph node, left axillary, biopsy:  -Positive for metastatic lobular carcinoma, grade 2, 0.7 cm, negative GREGG, Estrogen receptor: Positive (91 to 100%, strong), Progesterone receptor: Positive (91 to 100%, strong), HER2 2+ IHC, FISH negative  -10/23 MRI bilateral breast:  - Heterogeneously enhancing irregular mass in the subareolar left breast, 5.0 x 4.9 x 4.9 cm, " with associated nipple retraction and nipple/areolar involvement.  This mass extends superiorly through 11: positions, from anterior to mid depth.  The [employer] breast biopsy clip (which showed LCIS) is 1.5 cm posterosuperior to this mass.  - Multiple suspicious left level 1 axillary lymph node noted, including biopsy-proven metastatic node with indwelling biopsy marker, biopsied node measures 1.3 x 1.0 cm  - Heterogeneous marrow appearance of sternum and ribs with heterogeneous enhancement and a peripheral enhancing focus within the mid body.    - 10/23 PET/CT:  Innumerable foci of FDG avid lesions are seen throughout the osseous structures of the head and neck, most pronounced on the calvarium and cervical spine, with prominently sclerotic appearance on CT correlate.  For example:  -Right frontal bone, SUV max 5.31.  -Left lateral mass of the atlas, SUV max 15.0  -Angle of the left mandible, SUV max 9.6  -C7 vertebral body, SUV max 17.7    Innumerable variable sized FDG avid lesions throughout the axial and appendicular spine, including but not limited to the cervical,  thoracic, and lumbar spine, multilevel bilateral ribs, sternum,  bilateral scapula, bilateral humeri, clavicles, pelvis, and bilateral  femurs. A few of these lesions are described below:  -Large FDG avid sclerotic 3.4 cm lesion within the proximal left  humeral head, SUV max 17.24  -Sternal body, SUV max 20.35  -L2 vertebral body, SUV max 14.3 without  -Large ill-defined sclerotic lesion in the sacral body measuring up to  at least 5.9 cm, SUV max 16.84  -FDG avid focus within the left femoral head, SUV  max 13.65 without CT correlate.    Large irregular soft tissue FDG avid mass within the left breast   measuring approximately 3.2 x 2.7 cm with extension into the superficial soft tissues and associated left nipple  retraction, SUV max 12.36 additional FDG avid. Left axillary lymph  nodes, not definitively enlarged by short axis size  criteria, for  example, SUV max 5.93.      - 10/23 labs:  AST 79, ALT 91,  (No liver mets on PET CT)  CA 15-3 261    PLAN:  - pt has de tavon metastatic invasive lobular breast cancer, ER positive, OK positive, her2 negative. Pt does not have visceral crisis.  - check MRI brain STAT  - orthopedic surgery consultation to determine stability of left femur and fracture risk given presence of mets in left femoral head   - will start letrozole and ribociclib, reading materials to be sent via [employer]. I have contacted our pharmacy team about this to expedite. Risks, benefits, expected AE, survival data discussed, pt would like to proceed with treatment.  - check baseline EKG today   - check baseline DEXA  - plan to start zometa q4 weeks after dental clearance, once disease stabilized will transition to 4mg q12 weeks  - Genetic counseling referral, pt had initially not scheduled but will call back now to schedule appt    #post menopausal  #vaginal dryness  - Pts breast biopsy pathology results explained in detail. Pt is aware her tumor is estrogen positive. Pt educated to avoid all estrogen-containing products including but not limited to birth control, vaginal creams etc.     #RA  - on plaquenil       RTC 2 weeks for follow up with me      Miller Altamirano DO  Hematology/Oncology  [facility]      Again, thank you for allowing me to participate in the care of your patient.        Sincerely,        MILLER ALTAMIRANO DO

## 2023-10-12 NOTE — PROGRESS NOTES
"[facility]    Hematology/Oncology Established Patient Follow Up Note      Today's Date: October 12, 2023     Reason for follow up: breast cancer    HISTORY OF PRESENT ILLNESS: Kesha Zacarias is a 63 year old female who presents for follow up    Patient has medical history including rheumatoid arthritis, hyperlipidemia, osteoarthritis, bilateral cataracts and glaucoma s/p surgery, endocervical polyp s/p resection, vaginal atrophy with conjugated estrogen vaginal cream use, colon polyp (hyperplastic polyp and tubular adenoma), seasonal depression, history of tobacco use ([age] y/o, about 1 ppd).    Regarding RA:  -dx over a decade ago, on plaquenil, managed by PCP  - arthritis is most severe in hands>knees, intermittent       - 3/23 bilateral screening mammogram [facility]  - a few months ago, noted nipple retraction  - 9/23 patient palpated mass in retroareolar region of left breast and w/nipple retraction, no discharge, skin thickening, no dimpling, no erythema  - 9/23 diagnostic LEFT breast mammogram: Focal dense tissue with some likely mild architectural distortion, some anterior skin thickening is noted  - 9/23 targeted LEFT breast ultrasound: Ill-defined shadowing hypoechoic mass, 3.0 x 1.7 x 3.9 cm.  In left axilla-few small lymph nodes, 1 normal-sized lymph node with cortical thickening, 0.7 x 0.6 cm.  -9/23 ultrasound-guided LEFT breast core biopsy of 12:00 lesion with clip placement, \"Postbiopsy unilateral digital mammogram of the left breast showed the clip to be at the expected biopsy site\" AND ultrasound-guided left axillary lymph node biopsy of lymph node with cortical thickening  PATHOLOGY  A.  Breast, left, 12:00, biopsy:  -Lobular carcinoma in situ.-Multiple foci  -Invasive carcinoma is not identified.     B.  Lymph node, left axillary, biopsy:  -Positive for metastatic lobular carcinoma, grade 2  -Largest metastatic focus is 7 mm.  -Negative for extranodal extension.  -Breast " ancillary testing:  -Estrogen receptor: Positive (91 to 100%, strong)  -Progesterone receptor: Positive (91 to 100%, strong)  -HER2 2+ IHC, FISH negative    -10/23 MRI bilateral breast:  LEFT breast:  - Heterogeneously enhancing irregular mass in the subareolar left breast, 5.0 x 4.9 x 4.9 cm, with associated nipple retraction and nipple/areolar involvement.  This mass extends superiorly through 11: positions, from anterior to mid depth.  The HydroMARK breast biopsy clip (which showed LCIS) is 1.5 cm posterosuperior to this mass.  - Multiple suspicious left level 1 axillary lymph node noted, including biopsy-proven metastatic node with indwelling biopsy marker, biopsied node measures 1.3 x 1.0 cm  - Heterogeneous marrow appearance of sternum and ribs with heterogeneous enhancement and a peripheral enhancing focus within the mid body.  IMPRESSION:  1. Upon further review of previous imaging and pathology results,  recommend repeat ultrasound guided large core-needle biopsy of the  discordant dominant left breast mass given metastatic left axillary  lymph node and superoposteriorly displaced left breast biopsy marker.   2. Nonspecific heterogeneous enhancement of the sternum and ribs.  Consider nuclear medicine bone scan    Lifetime estrogen exposure:  Menarche: 13   Last menstrual period: 49   Age of first pregnancy: 18   Number of pregnancies: 2 (no living children)   Weight gain: 20lb weight gain within a year  Exposure to exogenous estrogen: vaginal atrophy with conjugated estrogen vaginal cream use x4 years (intermittent), has not used it since 9/23. Birth control for about 13-15 years from her 20s-30s.      Pt reports 55lb weight loss in 1.5 years, this was intentional, was following weight watchers program (23 points available per day) 230lb->170lb->190lb (gained about 20lbs). Pt was walking on the treadmill and outside daily, about 30 mins to 1.5 miles.      INTERIM HISTORY:    10/23 labs:  AST 79, ALT  91,   CA 15-3 261    10/23 PET/CT:  Innumerable foci of FDG avid lesions are seen throughout the osseous  structures of the head and neck, most pronounced on the calvarium and  cervical spine, with prominently sclerotic appearance on CT correlate.  For example:  -Right frontal bone, SUV max 5.31.  -Left lateral mass of the atlas, SUV max 15.0  -Angle of the left mandible, SUV max 9.6  -C7 vertebral body, SUV max 17.7    Innumerable variable sized FDG avid lesions throughout the axial and  appendicular spine, including but not limited to the cervical,  thoracic, and lumbar spine, multilevel bilateral ribs, sternum,  bilateral scapula, bilateral humeri, clavicles, pelvis, and bilateral  femurs. A few of these lesions are described below:  -Large FDG avid sclerotic 3.4 cm lesion within the proximal left  humeral head, SUV max 17.24  -Sternal body, SUV max 20.35  -L2 vertebral body, SUV max 14.3 without  -Large ill-defined sclerotic lesion in the sacral body measuring up to  at least 5.9 cm, SUV max 16.84  -FDG avid focus within the left femoral head, SUV  max 13.65 without CT correlate.    Large irregular soft tissue FDG avid mass within the left breast   measuring approximately 3.2 x 2.7 cm with  extension into the superficial soft tissues and associated left nipple  retraction, SUV max 12.36 additional FDG avid. Left axillary lymph  nodes, not definitively enlarged by short axis size criteria, for  example, SUV max 5.93.    The liver is unremarkable.     Solid 3 mm non-FDG avid pulmonary nodule within the  right middle lobe    REVIEW OF SYSTEMS:   A 14 point ROS was reviewed with pertinent positives and negatives in the HPI.        HOME MEDICATIONS:  Current Outpatient Medications   Medication Sig Dispense Refill    aspirin 81 MG tablet Take 81 mg by mouth daily      betamethasone dipropionate (DIPROSONE) 0.05 % external lotion Apply topically 2 times daily 60 mL 3    COENZYME Q-10 PO Take 1 tablet by mouth  every other day      cyclobenzaprine (FLEXERIL) 5 MG tablet TAKE 1 TABLET(5 MG) BY MOUTH AT BEDTIME 90 tablet 3    fish oil-omega-3 fatty acids 1000 MG capsule Take 2 g by mouth daily      hydroxychloroquine (PLAQUENIL) 200 MG tablet TAKE 2 AND 1/2 TABLETS BY MOUTH EVERY  tablet 3    lactobacillus rhamnosus, GG, (CULTURELL) capsule Take 1 capsule by mouth 2 times daily      magnesium 250 MG tablet Take 1 tablet by mouth daily      docusate sodium (COLACE) 100 MG capsule Take 1 capsule (100 mg) by mouth 3 times daily as needed for constipation 90 capsule 3    letrozole (FEMARA) 2.5 MG tablet Take 1 tablet (2.5 mg) by mouth every morning for 28 days 28 tablet 11    loperamide (IMODIUM A-D) 2 MG tablet When diarrhea starts take 2 tabs (4mg), then with each additional episode of diarrhea take 1 additional tab and if needing more than 8 tabs in 24 hrs call oncology 30 tablet 3    ondansetron (ZOFRAN) 4 MG tablet Take 1 tablet (4 mg) by mouth every 6 hours as needed for nausea 30 tablet 3    prochlorperazine (COMPAZINE) 10 MG tablet Take 1 tablet (10 mg) by mouth every 6 hours as needed for nausea or vomiting 30 tablet 2    ribociclib (KISQALI) 200 MG tablet Take 3 tablets (600 mg) by mouth every morning for 21 days , then off for 7 days. 63 tablet 0    sennosides (SENOKOT) 8.6 MG tablet Take 1 tablet by mouth daily 60 tablet 3         ALLERGIES:  Allergies   Allergen Reactions    Ciprofloxacin      hives and was on flagyl too    Metronidazole      hives and was on cipro too         PAST MEDICAL HISTORY:  Past Medical History:   Diagnosis Date    Arthritis 2006    Breast cancer metastasized to bone (H) 10/12/2023    Chronic depressive personality disorder     Dysplasia of cervix (uteri) 1988    Cryotherapy    Female infertility of unspecified origin          PAST SURGICAL HISTORY:  Past Surgical History:   Procedure Laterality Date    COLONOSCOPY      COLONOSCOPY N/A 1/14/2022    Procedure: COLONOSCOPY, WITH  POLYPECTOMY AND BIOPSY;  Surgeon: Gagandeep Patterson MD;  Location: [facility]    [facility] INTRODUCE CATH FALLOPIAN TUBE, RE-OPEN/DIAGNOSIS      HERNIA REPAIR, INCISIONAL  09    ORTHOPEDIC SURGERY  2017    [facility] APPENDECTOMY  03    [facility] REMV CATARACT INTRACAP,INSERT LENS  2003    right         SOCIAL HISTORY:  Social History     Socioeconomic History    Marital status:      Spouse name: Bandar    Number of children: 1    Years of education: Not on file    Highest education level: Not on file   Occupational History    Not on file   Tobacco Use    Smoking status: Former     Packs/day: 0.50     Years: 25.00     Additional pack years: 0.00     Total pack years: 12.50     Types: Cigarettes     Quit date: 2017     Years since quittin.1    Smokeless tobacco: Never    Tobacco comments:     quit 2017    Vaping Use    Vaping Use: Never used   Substance and Sexual Activity    Alcohol use: Yes     Comment: 1-2 weekly    Drug use: No    Sexual activity: Yes     Partners: Male     Birth control/protection: None   Other Topics Concern    Parent/sibling w/ CABG, MI or angioplasty before 65F 55M? Yes   Social History Narrative    Not on file     Social Determinants of Health     Financial Resource Strain: Not on file   Food Insecurity: Not on file   Transportation Needs: Not on file   Physical Activity: Not on file   Stress: Not on file   Social Connections: Not on file   Interpersonal Safety: Not on file   Housing Stability: Not on file         FAMILY HISTORY:  Family History   Problem Relation Age of Onset    Genitourinary Problems Father         prostate    Genetic Disorder Father         ulcer    Hypertension Father     Lipids Father     Prostate Cancer Father     Heart Disease Mother     Lipids Mother     Hyperlipidemia Mother     Heart Disease Maternal Grandmother     Cerebrovascular Disease Maternal Grandmother     Heart Disease Maternal Grandfather     Heart Disease Maternal Uncle     Heart  "Disease Maternal Uncle         x  3    Hyperlipidemia Sister     Hyperlipidemia Brother     Other Cancer Brother        Family history of:  1.  Breast cancer including male breast cancer: negative   2. Ovarian cancer: negative  3.  Pancreatic cancer: negative  4.  Prostate cancer: father- ?in his 50s, other details unknown  5. Diffuse gastric cancer (if lobular breast CA): negative  6. Uterine cancer: negative  7. Colon cancer:  negative  6. Brother- head and neck, HPV +, had surgery, clinical trial and now cancer free      PHYSICAL EXAM:  Vital signs:  /72 (BP Location: Left arm)   Pulse 63   Resp 18   Ht 1.651 m (5' 5\")   Wt 87.3 kg (192 lb 6.4 oz)   LMP 2011 (Exact Date)   SpO2 97%   BMI 32.02 kg/m         ECO  GENERAL/CONSTITUTIONAL: tearful  EYES: Pupils are equal and round. Extraocular movements intact without nystagmus.  No scleral icterus.  RESPIRATORY: Equal chest rise.   MUSCULOSKELETAL: Warm and well-perfused, no cyanosis  NEUROLOGIC: Cranial nerves are grossly intact. Alert, oriented to person, place and time, answers questions appropriately.  INTEGUMENTARY: No rashes or jaundice.  GAIT: Steady, does not use assistive device    LABS:   Latest Reference Range & Units 10/07/23 09:15   Sodium 135 - 145 mmol/L 140   Potassium 3.4 - 5.3 mmol/L 4.6   Chloride 98 - 107 mmol/L 100   Carbon Dioxide (CO2) 22 - 29 mmol/L 25   Urea Nitrogen 8.0 - 23.0 mg/dL 20.7   Creatinine 0.51 - 0.95 mg/dL 1.03 (H)   GFR Estimate >60 mL/min/1.73m2 61   Calcium 8.8 - 10.2 mg/dL 9.6   Anion Gap 7 - 15 mmol/L 15   Albumin 3.5 - 5.2 g/dL 4.4   Protein Total 6.4 - 8.3 g/dL 7.5   Alkaline Phosphatase 35 - 104 U/L 116 (H)   ALT 0 - 50 U/L 91 (H)   AST 0 - 45 U/L 79 (H)   Bilirubin Total <=1.2 mg/dL 0.5   Cancer Antigen 15-3 <=25 U/mL 261 (H)   Glucose 70 - 99 mg/dL 100 (H)   (H): Data is abnormally high    PATHOLOGY:    IMAGING:  Combined Report of: PET and CT on 10/6/2023 3:49 PM:     1. PET of the neck, chest, " abdomen, and pelvis.  2. PET CT Fusion for Attenuation Correction and Anatomical  Localization.  3. Diagnostic CT of the chest, abdomen and pelvis with intravenous  contrast obtained for diagnostic interpretation.  4. 3D MIP and PET-CT fused images were processed on an independent  workstation and archived to PACS and reviewed by a radiologist.     Technique:     1. PET: The patient received 13.9 mCi of F-18-FDG. The serum glucose  was 89 md/dL prior to administration. Body weight was 87.1 kg. Images  were evaluated in the axial, sagittal, and coronal planes as well as  the rotational whole body MIP. Images were acquired from the cranial  vertex to the feet.     UPTAKE WAS MEASURED AT 68 MINUTES.      BACKGROUND: Liver SUV max = , Aorta Blood SUV Max = .      2. CT: Volumetric acquisition for clinical interpretation of the  chest, abdomen, and pelvis acquired at 3 mm sections. The chest,  abdomen, and pelvis were evaluated at 5 mm sections in bone, soft  tissue, and lung windows.     Contrast and Medications:  IV contrast: 118 mL of Isovue 370 intravenously.  PO contrast: none.  Additional Medications: None.     3. 3D MIP and PET-CT fused images were processed on an independent  workstation and archived to PACS and reviewed by a radiologist.     INDICATION: lobular breast cancer w/mets to LN, MRI breast showed  indeterminate bone lesions in sternum and ribs. staging CT, r/o  metastatic disease.; Malignant neoplasm of left breast in female,  estrogen receptor positive, unspecified site of breast (H); Malignant  neoplasm of left breast in female, estrogen receptor positive,  unspecified site of breast (H).     ADDITIONAL INFORMATION OBTAINED FROM EMR: 63-year-old female with new  diagnosis of metastatic lobular carcinoma left breast cancer,  9/23/2023 with positive breast and left axillary lymph node biopsy.  PET for staging.     COMPARISON: MR breast 10/1/2023     FINDINGS:      HEAD/NECK:  Innumerable foci of FDG  avid lesions are seen throughout the osseous  structures of the head and neck, most pronounced on the calvarium and  cervical spine, with prominently sclerotic appearance on CT correlate.  For example:  -Right frontal bone (series 4 image 14), SUV max 5.31.  -Left lateral mass of the atlas (series 4 image 57), SUV max 15.0  -Angle of the left mandible (series 4 image 55), SUV max 9.6  -C7 vertebral body (series 4 image 82), SUV max 17.7     Partially aplastic appearance the left maxillary sinus. Remainder the  paranasal sinuses are clear. The mastoid air cells are clear.      No hypermetabolic lymph nodes are demonstrated in the neck.      The mucosal and deep spaces of the neck are unremarkable. The major  salivary glands are unremarkable. The thyroid is unremarkable. The  major vasculature of the neck are patent.     CHEST:  Large irregular soft tissue FDG avid mass within the left breast  (series 4 image 137) measuring approximately 3.2 x 2.7 cm with  extension into the superficial soft tissues and associated left nipple  retraction, SUV max 12.36 additional FDG avid. Left axillary lymph  nodes, not definitively enlarged by short axis size criteria, for  example, (series 4 image 114), SUV max 5.93.     The central tracheobronchial tree is clear. No pleural effusion or  pneumothorax. Solid 3 mm non-FDG avid pulmonary nodule within the  right middle lobe (series 7 image 55). Subsegmental atelectasis in the  left lower lobe.     No hypermetabolic lymph nodes are demonstrated in the chest.     Heart size is within normal limits. No pericardial effusion. Thoracic  aorta is dilated measuring up to 4.1 cm. Main pulmonary artery is  dilated measuring up to 3.8 cm. The esophagus is unremarkable.      ABDOMEN AND PELVIS:  Postsurgical changes of the ventral abdominal wall with mesh placement  with mildly FDG avid appearance, for example (series 4 image 144), SUV  max 3.78, likely postsurgical. Diastases of the rectus  abdominis  musculature.     The liver is unremarkable. The gallbladder is unremarkable. No  intrahepatic or extrahepatic biliary ductal dilatation. The pancreas  is unremarkable. No pancreatic ductal dilatation. The spleen is  unremarkable. The adrenal glands are unremarkable.     Symmetric enhancement of the kidneys. No hydronephrosis. The urinary  bladder is unremarkable. The reproductive organs are unremarkable.     No hypermetabolic lymph nodes are demonstrated in the abdomen or  pelvis.     Normal caliber of the small and large bowel. FDG avid uptake  throughout the small and large bowel, presumably physiologic. No free  air, free fluid, or fluid collection. Normal caliber of the abdominal  aorta.     LOWER EXTREMITIES:   Innumerable FDG avid lesions seen throughout the chest portions of the  lower extremities. Please see further details and bone section.     BONES:   Innumerable variable sized FDG avid lesions throughout the axial and  appendicular spine, including but not limited to the cervical,  thoracic, and lumbar spine, multilevel bilateral ribs, sternum,  bilateral scapula, bilateral humeri, clavicles, pelvis, and bilateral  femurs. A few of these lesions are described below:  -Large FDG avid sclerotic 3.4 cm lesion within the proximal left  humeral head (series 4 image 92), SUV max 17.24  -Sternal body (series 4 image 135), SUV max 20.35  -L2 vertebral body (series 4 image 184), SUV max 14.3 without  -Large ill-defined sclerotic lesion in the sacral body measuring up to  at least 5.9 cm (series 4 image 229), SUV max 16.84  -FDG avid focus within the left femoral head (series 4 image 267), SUV  max 13.65 without CT correlate.     Context: patient with lobular left breast cancer                                                                      IMPRESSION: In summary: Hypermetabolic breast mass, axillary nodes,  and diffuse bone metastases.     1. Large irregular soft tissue FDG avid 3.2 cm mass within  "the left  breast with extension into the superficial soft tissues and associated  left nipple retraction, consistent with biopsy-proven invasive lobular  carcinoma.  2. Innumerable diffuse FDG avid lesions throughout the axial and  appendicular spine and FDG avid left axillary lymph nodes, consistent  with metastatic disease.     I have personally reviewed the examination and initial interpretation  and I agree with the findings.    ASSESSMENT/PLAN:  Kesha Zacarias is a 63 year old female with:    # metastatic left breast invasive lobular carcinoma, ER positive, MD positive, her2 negative on FISH  -9/23 diagnostic left breast mammogram, left breast targeted ultrasound showed 3.0 x 1.7 x 3.9 ill-defined shadowing hypoechoic mass, few small lymph nodes in the left axilla, one with cortical thickening measuring 0.7 x 0.6 cm  -9/23 ultrasound-guided LEFT breast core biopsy of 12:00 lesion with clip placement, \"Postbiopsy unilateral digital mammogram of the left breast showed the clip to be at the expected biopsy site\" AND ultrasound-guided left axillary lymph node biopsy of lymph node with cortical thickening, PATHOLOGY:  A.  Breast, left, 12:00, biopsy:Lobular carcinoma in situ, Multiple foci. Invasive carcinoma is not identified.   B.  Lymph node, left axillary, biopsy:  -Positive for metastatic lobular carcinoma, grade 2, 0.7 cm, negative GREGG, Estrogen receptor: Positive (91 to 100%, strong), Progesterone receptor: Positive (91 to 100%, strong), HER2 2+ IHC, FISH negative  -10/23 MRI bilateral breast:  - Heterogeneously enhancing irregular mass in the subareolar left breast, 5.0 x 4.9 x 4.9 cm, with associated nipple retraction and nipple/areolar involvement.  This mass extends superiorly through 11: positions, from anterior to mid depth.  The go[employer]MARK breast biopsy clip (which showed LCIS) is 1.5 cm posterosuperior to this mass.  - Multiple suspicious left level 1 axillary lymph node noted, including " biopsy-proven metastatic node with indwelling biopsy marker, biopsied node measures 1.3 x 1.0 cm  - Heterogeneous marrow appearance of sternum and ribs with heterogeneous enhancement and a peripheral enhancing focus within the mid body.    - 10/23 PET/CT:  Innumerable foci of FDG avid lesions are seen throughout the osseous structures of the head and neck, most pronounced on the calvarium and cervical spine, with prominently sclerotic appearance on CT correlate.  For example:  -Right frontal bone, SUV max 5.31.  -Left lateral mass of the atlas, SUV max 15.0  -Angle of the left mandible, SUV max 9.6  -C7 vertebral body, SUV max 17.7    Innumerable variable sized FDG avid lesions throughout the axial and appendicular spine, including but not limited to the cervical,  thoracic, and lumbar spine, multilevel bilateral ribs, sternum,  bilateral scapula, bilateral humeri, clavicles, pelvis, and bilateral  femurs. A few of these lesions are described below:  -Large FDG avid sclerotic 3.4 cm lesion within the proximal left  humeral head, SUV max 17.24  -Sternal body, SUV max 20.35  -L2 vertebral body, SUV max 14.3 without  -Large ill-defined sclerotic lesion in the sacral body measuring up to  at least 5.9 cm, SUV max 16.84  -FDG avid focus within the left femoral head, SUV  max 13.65 without CT correlate.    Large irregular soft tissue FDG avid mass within the left breast   measuring approximately 3.2 x 2.7 cm with extension into the superficial soft tissues and associated left nipple  retraction, SUV max 12.36 additional FDG avid. Left axillary lymph  nodes, not definitively enlarged by short axis size criteria, for  example, SUV max 5.93.      - 10/23 labs:  AST 79, ALT 91,  (No liver mets on PET CT)  CA 15-3 261    PLAN:  - pt has de tavon metastatic invasive lobular breast cancer, ER positive, MD positive, her2 negative. Pt does not have visceral crisis.  - check MRI brain STAT  - orthopedic surgery consultation  to determine stability of left femur and fracture risk given presence of mets in left femoral head   - will start letrozole and ribociclib, reading materials to be sent via [facility]. I have contacted our pharmacy team about this to expedite. Risks, benefits, expected AE, survival data discussed, pt would like to proceed with treatment.  - check baseline EKG today   - check baseline DEXA  - plan to start zometa q4 weeks after dental clearance, once disease stabilized will transition to 4mg q12 weeks  - Genetic counseling referral, pt had initially not scheduled but will call back now to schedule appt    #post menopausal  #vaginal dryness  - Pts breast biopsy pathology results explained in detail. Pt is aware her tumor is estrogen positive. Pt educated to avoid all estrogen-containing products including but not limited to birth control, vaginal creams etc.     #RA  - on plaquenil       RTC 2 weeks for follow up with chanda Maravilla DO  Hematology/Oncology  [facility]

## 2023-10-13 ENCOUNTER — TELEPHONE (OUTPATIENT)
Dept: ONCOLOGY | Facility: CLINIC | Age: 63
End: 2023-10-13
Payer: COMMERCIAL

## 2023-10-13 ENCOUNTER — TELEPHONE (OUTPATIENT)
Dept: PHARMACY | Facility: CLINIC | Age: 63
End: 2023-10-13
Payer: COMMERCIAL

## 2023-10-13 ENCOUNTER — TELEPHONE (OUTPATIENT)
Dept: ORTHOPEDICS | Facility: CLINIC | Age: 63
End: 2023-10-13
Payer: COMMERCIAL

## 2023-10-13 NOTE — TELEPHONE ENCOUNTER
[facility] Call Center    Phone Message    May a detailed message be left on voicemail: yes     Reason for Call: Other: Red flag needs visit     Action Taken: Other: csc    Travel Screening: Not Applicable

## 2023-10-13 NOTE — TELEPHONE ENCOUNTER
PA Initiation    Medication: KISQALI 200 DOSE PO  Ref.# [id number]  Insurance Company: [employer] - Phone [phone] Fax [phone]  Pharmacy Filling the Rx: [facility] - [place], [place] - [address]  Filling Pharmacy Phone:    Filling Pharmacy Fax:    Start Date: 10/13/2023

## 2023-10-13 NOTE — ORAL ONC MGMT
"Oral Chemotherapy Monitoring Program    Subjective/Objective:  Kesha Zacarias is a 63 year old female contacted by phone for an initial visit for oral chemotherapy education.           No data to display                Last PHQ-2 Score on record:       8/27/2023     5:34 PM 12/9/2021    11:17 AM   PHQ-2 ( 1999 [employer])   Q1: Little interest or pleasure in doing things 0 0   Q2: Feeling down, depressed or hopeless 0 0   PHQ-2 Score 0 0   Q1: Little interest or pleasure in doing things Not at all Not at all   Q2: Feeling down, depressed or hopeless Not at all Not at all   PHQ-2 Score 0 0       Vitals:  BP:   BP Readings from Last 1 Encounters:   10/12/23 112/72     Wt Readings from Last 1 Encounters:   10/12/23 87.3 kg (192 lb 6.4 oz)     Estimated body surface area is 2 meters squared as calculated from the following:    Height as of 10/12/23: 1.651 m (5' 5\").    Weight as of 10/12/23: 87.3 kg (192 lb 6.4 oz).    Labs:  _  Result Component Current Result Ref Range   Sodium 140 (10/7/2023) 135 - 145 mmol/L     _  Result Component Current Result Ref Range   Potassium 4.6 (10/7/2023) 3.4 - 5.3 mmol/L     _  Result Component Current Result Ref Range   Calcium 9.6 (10/7/2023) 8.8 - 10.2 mg/dL     No results found for Mag within last 30 days.     No results found for Phos within last 30 days.     _  Result Component Current Result Ref Range   Albumin 4.4 (10/7/2023) 3.5 - 5.2 g/dL     _  Result Component Current Result Ref Range   Urea Nitrogen 20.7 (10/7/2023) 8.0 - 23.0 mg/dL     _  Result Component Current Result Ref Range   Creatinine 1.03 (H) (10/7/2023) 0.51 - 0.95 mg/dL     _  Result Component Current Result Ref Range   AST 79 (H) (10/7/2023) 0 - 45 U/L     _  Result Component Current Result Ref Range   ALT 91 (H) (10/7/2023) 0 - 50 U/L     _  Result Component Current Result Ref Range   Bilirubin Total 0.5 (10/7/2023) <=1.2 mg/dL     No results found for WBC within last 30 days.     No results found for HGB within " last 30 days.     No results found for PLT within last 30 days.     No results found for ANC within last 30 days.     No results found for ANC within last 30 days.        Assessment:  Patient is appropriate to start therapy.    Plan:  Basic chemotherapy teaching was reviewed with the patient including indication, start date of therapy, dose, administration, adverse effects, missed doses, food and drug interactions, monitoring, side effect management, office contact information, and safe handling. Written materials were provided and all questions answered.    Follow-Up:  1 week following therapy start     Audie [facility]  Oncology Pharmacy Resident  October 13, 2023

## 2023-10-16 ENCOUNTER — HOSPITAL ENCOUNTER (OUTPATIENT)
Dept: BONE DENSITY | Facility: CLINIC | Age: 63
Discharge: HOME | End: 2023-10-16
Attending: INTERNAL MEDICINE | Admitting: INTERNAL MEDICINE
Payer: COMMERCIAL

## 2023-10-16 DIAGNOSIS — C50.912 CARCINOMA OF LEFT BREAST METASTATIC TO BONE (H): Primary | ICD-10-CM

## 2023-10-16 DIAGNOSIS — C50.912 CARCINOMA OF LEFT BREAST METASTATIC TO BONE (H): ICD-10-CM

## 2023-10-16 DIAGNOSIS — C79.51 CARCINOMA OF LEFT BREAST METASTATIC TO BONE (H): ICD-10-CM

## 2023-10-16 DIAGNOSIS — C79.51 CARCINOMA OF LEFT BREAST METASTATIC TO BONE (H): Primary | ICD-10-CM

## 2023-10-16 PROCEDURE — 77080 DXA BONE DENSITY AXIAL: CPT

## 2023-10-16 RX ORDER — LETROZOLE 2.5 MG/1
2.5 TABLET, FILM COATED ORAL EVERY MORNING
Qty: 28 TABLET | Refills: 11 | Status: SHIPPED | OUTPATIENT
Start: 2023-10-19 | End: 2023-12-22

## 2023-10-16 RX ORDER — PROCHLORPERAZINE MALEATE 10 MG
10 TABLET ORAL EVERY 6 HOURS PRN
Qty: 30 TABLET | Refills: 2 | Status: SHIPPED | OUTPATIENT
Start: 2023-10-18

## 2023-10-16 NOTE — TELEPHONE ENCOUNTER
Prior Authorization Approval    Medication: KISQALI 200 DOSE PO  Authorization Effective Date: 9/13/2023  Authorization Expiration Date: 4/13/2024  Approved Dose/Quantity: 63/21 days  Reference #: [id number]   Insurance Company: [employer] - Phone [phone] Fax [phone]  Expected CoPay: $ 40.64  CoPay Card Available:      Financial Assistance Needed: not needed  Which Pharmacy is filling the prescription: [facility] - [place], [place] - [address]  Pharmacy Notified: yes  Patient Notified: yes

## 2023-10-19 ENCOUNTER — PATIENT OUTREACH (OUTPATIENT)
Dept: ONCOLOGY | Facility: CLINIC | Age: 63
End: 2023-10-19
Payer: COMMERCIAL

## 2023-10-19 ENCOUNTER — MYC MEDICAL ADVICE (OUTPATIENT)
Dept: ONCOLOGY | Facility: CLINIC | Age: 63
End: 2023-10-19
Payer: COMMERCIAL

## 2023-10-23 ENCOUNTER — HOSPITAL ENCOUNTER (OUTPATIENT)
Dept: MRI IMAGING | Facility: CLINIC | Age: 63
Discharge: HOME | End: 2023-10-23
Attending: INTERNAL MEDICINE | Admitting: INTERNAL MEDICINE
Payer: COMMERCIAL

## 2023-10-23 DIAGNOSIS — C50.912 CARCINOMA OF LEFT BREAST METASTATIC TO BONE (H): ICD-10-CM

## 2023-10-23 DIAGNOSIS — C79.51 CARCINOMA OF LEFT BREAST METASTATIC TO BONE (H): ICD-10-CM

## 2023-10-23 PROCEDURE — 255N000002 HC RX 255 OP 636: Mod: JZ | Performed by: INTERNAL MEDICINE

## 2023-10-23 PROCEDURE — A9585 GADOBUTROL INJECTION: HCPCS | Mod: JZ | Performed by: INTERNAL MEDICINE

## 2023-10-23 PROCEDURE — 70553 MRI BRAIN STEM W/O & W/DYE: CPT

## 2023-10-23 RX ORDER — GADOBUTROL 604.72 MG/ML
10 INJECTION INTRAVENOUS ONCE
Status: COMPLETED | OUTPATIENT
Start: 2023-10-23 | End: 2023-10-23

## 2023-10-23 RX ADMIN — GADOBUTROL 8.5 ML: 604.72 INJECTION INTRAVENOUS at 08:56

## 2023-10-25 NOTE — TELEPHONE ENCOUNTER
DIAGNOSIS: Left Femoral Head  Carcinoma of left breast metastatic to bone (H) [C50.912, C79.51]  - Primary   APPOINTMENT DATE: 10/31/2023   NOTES STATUS DETAILS   OFFICE NOTE from referring provider Internal Maravilla, Mary DO - [facility]     OPERATIVE REPORT Care Everywhere 09/18/2017 - LT TKA   MEDICATION LIST Internal    DEXA PACS Internal   PET/CT SCAN PACS Internal   XRAYS (IMAGES & REPORTS) PACS Internal

## 2023-10-26 ENCOUNTER — ONCOLOGY VISIT (OUTPATIENT)
Dept: ONCOLOGY | Facility: CLINIC | Age: 63
End: 2023-10-26
Payer: COMMERCIAL

## 2023-10-26 VITALS
BODY MASS INDEX: 31.99 KG/M2 | WEIGHT: 192 LBS | DIASTOLIC BLOOD PRESSURE: 76 MMHG | RESPIRATION RATE: 18 BRPM | HEART RATE: 66 BPM | HEIGHT: 65 IN | OXYGEN SATURATION: 97 % | SYSTOLIC BLOOD PRESSURE: 116 MMHG

## 2023-10-26 DIAGNOSIS — R79.89 LFT ELEVATION: ICD-10-CM

## 2023-10-26 DIAGNOSIS — C50.912 CARCINOMA OF LEFT BREAST METASTATIC TO BONE (H): Primary | ICD-10-CM

## 2023-10-26 DIAGNOSIS — C79.51 CARCINOMA OF LEFT BREAST METASTATIC TO BONE (H): Primary | ICD-10-CM

## 2023-10-26 DIAGNOSIS — C79.51 MALIGNANT NEOPLASM METASTATIC TO BONE (H): ICD-10-CM

## 2023-10-26 LAB
ALBUMIN SERPL BCG-MCNC: 4.3 G/DL (ref 3.5–5.2)
ALP SERPL-CCNC: 152 U/L (ref 35–104)
ALT SERPL W P-5'-P-CCNC: 277 U/L (ref 0–50)
ANION GAP SERPL CALCULATED.3IONS-SCNC: 13 MMOL/L (ref 7–15)
AST SERPL W P-5'-P-CCNC: 215 U/L (ref 0–45)
BASOPHILS # BLD AUTO: 0 10E3/UL (ref 0–0.2)
BASOPHILS NFR BLD AUTO: 1 %
BILIRUB SERPL-MCNC: 0.5 MG/DL
BUN SERPL-MCNC: 24.3 MG/DL (ref 8–23)
CALCIUM SERPL-MCNC: 9.6 MG/DL (ref 8.8–10.2)
CHLORIDE SERPL-SCNC: 101 MMOL/L (ref 98–107)
CREAT SERPL-MCNC: 1.17 MG/DL (ref 0.51–0.95)
DEPRECATED HCO3 PLAS-SCNC: 26 MMOL/L (ref 22–29)
EGFRCR SERPLBLD CKD-EPI 2021: 53 ML/MIN/1.73M2
EOSINOPHIL # BLD AUTO: 0.1 10E3/UL (ref 0–0.7)
EOSINOPHIL NFR BLD AUTO: 2 %
ERYTHROCYTE [DISTWIDTH] IN BLOOD BY AUTOMATED COUNT: 13.1 % (ref 10–15)
GLUCOSE SERPL-MCNC: 106 MG/DL (ref 70–99)
HCT VFR BLD AUTO: 37.9 % (ref 35–47)
HGB BLD-MCNC: 12.2 G/DL (ref 11.7–15.7)
IMM GRANULOCYTES # BLD: 0.1 10E3/UL
IMM GRANULOCYTES NFR BLD: 1 %
LYMPHOCYTES # BLD AUTO: 1.9 10E3/UL (ref 0.8–5.3)
LYMPHOCYTES NFR BLD AUTO: 28 %
MAGNESIUM SERPL-MCNC: 2.4 MG/DL (ref 1.7–2.3)
MCH RBC QN AUTO: 29.7 PG (ref 26.5–33)
MCHC RBC AUTO-ENTMCNC: 32.2 G/DL (ref 31.5–36.5)
MCV RBC AUTO: 92 FL (ref 78–100)
MONOCYTES # BLD AUTO: 0.6 10E3/UL (ref 0–1.3)
MONOCYTES NFR BLD AUTO: 9 %
NEUTROPHILS # BLD AUTO: 4.2 10E3/UL (ref 1.6–8.3)
NEUTROPHILS NFR BLD AUTO: 59 %
NRBC # BLD AUTO: 0 10E3/UL
NRBC BLD AUTO-RTO: 0 /100
PLATELET # BLD AUTO: 155 10E3/UL (ref 150–450)
POTASSIUM SERPL-SCNC: 4.3 MMOL/L (ref 3.4–5.3)
PROT SERPL-MCNC: 7.6 G/DL (ref 6.4–8.3)
RBC # BLD AUTO: 4.11 10E6/UL (ref 3.8–5.2)
SODIUM SERPL-SCNC: 140 MMOL/L (ref 135–145)
WBC # BLD AUTO: 7 10E3/UL (ref 4–11)

## 2023-10-26 PROCEDURE — 80053 COMPREHEN METABOLIC PANEL: CPT | Performed by: INTERNAL MEDICINE

## 2023-10-26 PROCEDURE — 36415 COLL VENOUS BLD VENIPUNCTURE: CPT | Performed by: INTERNAL MEDICINE

## 2023-10-26 PROCEDURE — 85025 COMPLETE CBC W/AUTO DIFF WBC: CPT | Performed by: INTERNAL MEDICINE

## 2023-10-26 PROCEDURE — 83735 ASSAY OF MAGNESIUM: CPT | Performed by: INTERNAL MEDICINE

## 2023-10-26 PROCEDURE — 99214 OFFICE O/P EST MOD 30 MIN: CPT | Performed by: INTERNAL MEDICINE

## 2023-10-26 RX ORDER — ONDANSETRON 4 MG/1
4 TABLET, FILM COATED ORAL EVERY 6 HOURS PRN
Qty: 30 TABLET | Refills: 3 | Status: SHIPPED | OUTPATIENT
Start: 2023-10-26

## 2023-10-26 RX ORDER — DOCUSATE SODIUM 100 MG/1
100 CAPSULE, LIQUID FILLED ORAL 3 TIMES DAILY PRN
Qty: 90 CAPSULE | Refills: 3 | Status: SHIPPED | OUTPATIENT
Start: 2023-10-26 | End: 2024-07-11

## 2023-10-26 RX ORDER — LOPERAMIDE HYDROCHLORIDE 2 MG/1
TABLET ORAL
Qty: 30 TABLET | Refills: 3 | Status: SHIPPED | OUTPATIENT
Start: 2023-10-26 | End: 2024-07-11

## 2023-10-26 RX ORDER — SENNOSIDES 8.6 MG
1 TABLET ORAL DAILY
Qty: 60 TABLET | Refills: 3 | Status: SHIPPED | OUTPATIENT
Start: 2023-10-26 | End: 2023-12-15

## 2023-10-26 ASSESSMENT — PAIN SCALES - GENERAL: PAINLEVEL: NO PAIN (0)

## 2023-10-26 NOTE — NURSING NOTE
"Oncology Rooming Note    October 26, 2023 3:11 PM   Kesha Zacarias is a 63 year old female who presents for:    Chief Complaint   Patient presents with    Oncology Clinic Visit     Follow up     Initial Vitals: /76 (BP Location: Left arm)   Pulse 66   Resp 18   Ht 1.651 m (5' 5\")   Wt 87.1 kg (192 lb)   LMP 11/22/2011 (Exact Date)   SpO2 97%   BMI 31.95 kg/m   Estimated body mass index is 31.95 kg/m  as calculated from the following:    Height as of this encounter: 1.651 m (5' 5\").    Weight as of this encounter: 87.1 kg (192 lb). Body surface area is 2 meters squared.  No Pain (0) Comment: Data Unavailable   Patient's last menstrual period was 11/22/2011 (exact date).  Allergies reviewed: Yes  Medications reviewed: Yes    Medications: Medication refills not needed today.  Pharmacy name entered into [facility]:    [facility] [place], [place] - [address] [facility] [place], [place] [address] [id number] [place] - [address] [facility] [address]  & [address] [place] [address] [facility] ST[id number] [place] - [address] [facility] OF  & MA[address]  [facility] - [place], [place] - [address]  [facility] - [place], [place] - [address]    Clinical concerns: headaches x 1 month. But does not have one today. Did have 1 dizzy spell when she bent over to pick something up off the floor. Notices that joints and muscles are more sore when waking in the morning.      Zuleima Guidry LPN              "

## 2023-10-26 NOTE — PROGRESS NOTES
"[facility]    Hematology/Oncology Established Patient Follow Up Note      Today's Date: October 26, 2023     Reason for follow up: breast cancer    HISTORY OF PRESENT ILLNESS: Kesha Zacarias is a 63 year old female who presents for follow up    Patient has medical history including rheumatoid arthritis, hyperlipidemia, osteoarthritis, bilateral cataracts and glaucoma s/p surgery, endocervical polyp s/p resection, vaginal atrophy with conjugated estrogen vaginal cream use, colon polyp (hyperplastic polyp and tubular adenoma), seasonal depression, history of tobacco use ([age] y/o, about 1 ppd).    Regarding RA:  -dx over a decade ago, on plaquenil, managed by PCP  - arthritis is most severe in hands>knees, intermittent       - 3/23 bilateral screening mammogram [facility]  - a few months ago, noted nipple retraction  - 9/23 patient palpated mass in retroareolar region of left breast and w/nipple retraction, no discharge, skin thickening, no dimpling, no erythema  - 9/23 diagnostic LEFT breast mammogram: Focal dense tissue with some likely mild architectural distortion, some anterior skin thickening is noted  - 9/23 targeted LEFT breast ultrasound: Ill-defined shadowing hypoechoic mass, 3.0 x 1.7 x 3.9 cm.  In left axilla-few small lymph nodes, 1 normal-sized lymph node with cortical thickening, 0.7 x 0.6 cm.  -9/23 ultrasound-guided LEFT breast core biopsy of 12:00 lesion with clip placement, \"Postbiopsy unilateral digital mammogram of the left breast showed the clip to be at the expected biopsy site\" AND ultrasound-guided left axillary lymph node biopsy of lymph node with cortical thickening  PATHOLOGY  A.  Breast, left, 12:00, biopsy:  -Lobular carcinoma in situ.-Multiple foci  -Invasive carcinoma is not identified.     B.  Lymph node, left axillary, biopsy:  -Positive for metastatic lobular carcinoma, grade 2  -Largest metastatic focus is 7 mm.  -Negative for extranodal extension.  -Breast " ancillary testing:  -Estrogen receptor: Positive (91 to 100%, strong)  -Progesterone receptor: Positive (91 to 100%, strong)  -HER2 2+ IHC, FISH negative    -10/23 MRI bilateral breast:  LEFT breast:  - Heterogeneously enhancing irregular mass in the subareolar left breast, 5.0 x 4.9 x 4.9 cm, with associated nipple retraction and nipple/areolar involvement.  This mass extends superiorly through 11: positions, from anterior to mid depth.  The HydroMARK breast biopsy clip (which showed LCIS) is 1.5 cm posterosuperior to this mass.  - Multiple suspicious left level 1 axillary lymph node noted, including biopsy-proven metastatic node with indwelling biopsy marker, biopsied node measures 1.3 x 1.0 cm  - Heterogeneous marrow appearance of sternum and ribs with heterogeneous enhancement and a peripheral enhancing focus within the mid body.  IMPRESSION:  1. Upon further review of previous imaging and pathology results,  recommend repeat ultrasound guided large core-needle biopsy of the  discordant dominant left breast mass given metastatic left axillary  lymph node and superoposteriorly displaced left breast biopsy marker.   2. Nonspecific heterogeneous enhancement of the sternum and ribs.  Consider nuclear medicine bone scan    Lifetime estrogen exposure:  Menarche: 13   Last menstrual period: 49   Age of first pregnancy: 18   Number of pregnancies: 2 (no living children)   Weight gain: 20lb weight gain within a year  Exposure to exogenous estrogen: vaginal atrophy with conjugated estrogen vaginal cream use x4 years (intermittent), has not used it since 9/23. Birth control for about 13-15 years from her 20s-30s.      Pt reports 55lb weight loss in 1.5 years, this was intentional, was following weight watchers program (23 points available per day) 230lb->170lb->190lb (gained about 20lbs). Pt was walking on the treadmill and outside daily, about 30 mins to 1.5 miles.    10/23 labs:  AST 79, ALT 91,   CA 15-3  261    10/23 PET/CT:  Innumerable foci of FDG avid lesions are seen throughout the osseous  structures of the head and neck, most pronounced on the calvarium and  cervical spine, with prominently sclerotic appearance on CT correlate.  For example:  -Right frontal bone, SUV max 5.31.  -Left lateral mass of the atlas, SUV max 15.0  -Angle of the left mandible, SUV max 9.6  -C7 vertebral body, SUV max 17.7    Innumerable variable sized FDG avid lesions throughout the axial and  appendicular spine, including but not limited to the cervical,  thoracic, and lumbar spine, multilevel bilateral ribs, sternum,  bilateral scapula, bilateral humeri, clavicles, pelvis, and bilateral  femurs. A few of these lesions are described below:  -Large FDG avid sclerotic 3.4 cm lesion within the proximal left  humeral head, SUV max 17.24  -Sternal body, SUV max 20.35  -L2 vertebral body, SUV max 14.3 without  -Large ill-defined sclerotic lesion in the sacral body measuring up to  at least 5.9 cm, SUV max 16.84  -FDG avid focus within the left femoral head, SUV  max 13.65 without CT correlate.    Large irregular soft tissue FDG avid mass within the left breast   measuring approximately 3.2 x 2.7 cm with  extension into the superficial soft tissues and associated left nipple  retraction, SUV max 12.36 additional FDG avid. Left axillary lymph  nodes, not definitively enlarged by short axis size criteria, for  example, SUV max 5.93.    The liver is unremarkable.     Solid 3 mm non-FDG avid pulmonary nodule within the  right middle lobe    INTERIM HISTORY:  - 10/23 MRI brain:  - extensive osseous metastatic disease involving the calvarium, skull base w/involvement of occipital condyles, C1 and C2 vertebral bodies, and likely the mandible  -  Dominant calvarial lesions are located in the right frontal calvarium and right temporal calvarium without definite breach of the  inner or outer tables of the calvarium. There is a suggestion of mild  asymmetric linear extra-axial enhancement deep to the dominant right temporal calvarial lesion along the dural margin, though this may be vascular in etiology.  - No abnormal parenchymal or leptomeningeal enhancement.   - sequelae of mild chronic microvascular ischemic disease. Mild generalized cerebral atrophy.     Pt has received her ribociclib 10/19/23   Pt is having daily HA, mostly b/l temporal regions, no sensitivity to light or sound, no N/V, takes tylenol w/o improvement  Reports blurry vision b/l, last saw eye doctor 3/23 and has trifocal glasses. She has dry eyes and uses lubricating eye drops.     REVIEW OF SYSTEMS:   A 14 point ROS was reviewed with pertinent positives and negatives in the HPI.        HOME MEDICATIONS:  Current Outpatient Medications   Medication Sig Dispense Refill    Ascorbic Acid (VITAMIN C) 500 MG CAPS       aspirin 81 MG tablet Take 81 mg by mouth daily      atorvastatin (LIPITOR) 40 MG tablet TAKE 1 TABLET BY MOUTH DAILY 90 tablet 3    betamethasone dipropionate (DIPROSONE) 0.05 % external lotion Apply topically 2 times daily 60 mL 3    COENZYME Q-10 PO Take 1 tablet by mouth every other day      cyclobenzaprine (FLEXERIL) 5 MG tablet TAKE 1 TABLET(5 MG) BY MOUTH AT BEDTIME 90 tablet 3    fish oil-omega-3 fatty acids 1000 MG capsule Take 2 g by mouth daily      hydroxychloroquine (PLAQUENIL) 200 MG tablet TAKE 2 AND 1/2 TABLETS BY MOUTH EVERY  tablet 3    ibuprofen (ADVIL/MOTRIN) 800 MG tablet TAKE 1 TABLET BY MOUTH EVERY 6 HOURS AS NEEDED FOR PAIN 120 tablet 3    lactobacillus rhamnosus, GG, (CULTURELL) capsule Take 1 capsule by mouth 2 times daily      letrozole (FEMARA) 2.5 MG tablet Take 1 tablet (2.5 mg) by mouth every morning for 28 days 28 tablet 11    magnesium 250 MG tablet Take 1 tablet by mouth daily      prochlorperazine (COMPAZINE) 10 MG tablet Take 1 tablet (10 mg) by mouth every 6 hours as needed for nausea or vomiting 30 tablet 2    ribociclib (KISQALI) 200  MG tablet Take 3 tablets (600 mg) by mouth every morning for 21 days , then off for 7 days. 63 tablet 0    Cholecalciferol (VITAMIN D) 2000 UNIT tablet Take 1 tablet by mouth daily (Patient not taking: Reported on 10/12/2023) 100 tablet 12    Multiple Minerals-Vitamins (CALCIUM-MAGNESIUM-ZINC-D3 PO)            ALLERGIES:  Allergies   Allergen Reactions    Ciprofloxacin      hives and was on flagyl too    Metronidazole      hives and was on cipro too         PAST MEDICAL HISTORY:  Past Medical History:   Diagnosis Date    Arthritis     Breast cancer metastasized to bone (H) 10/12/2023    Chronic depressive personality disorder     Dysplasia of cervix (uteri)     Cryotherapy    Female infertility of unspecified origin          PAST SURGICAL HISTORY:  Past Surgical History:   Procedure Laterality Date    COLONOSCOPY      COLONOSCOPY N/A 2022    Procedure: COLONOSCOPY, WITH POLYPECTOMY AND BIOPSY;  Surgeon: Gagandeep Patterson MD;  Location:  GI    HC INTRODUCE CATH FALLOPIAN TUBE, RE-OPEN/DIAGNOSIS      HERNIA REPAIR, INCISIONAL  09    ORTHOPEDIC SURGERY  2017    ZZC APPENDECTOMY  03    [facility] REMV CATARACT INTRACAP,INSERT LENS  2003    right         SOCIAL HISTORY:  Social History     Socioeconomic History    Marital status:      Spouse name: Bandar    Number of children: 1    Years of education: Not on file    Highest education level: Not on file   Occupational History    Not on file   Tobacco Use    Smoking status: Former     Packs/day: 0.50     Years: 25.00     Additional pack years: 0.00     Total pack years: 12.50     Types: Cigarettes     Quit date: 2017     Years since quittin.1    Smokeless tobacco: Never    Tobacco comments:     quit 2017    Vaping Use    Vaping Use: Never used   Substance and Sexual Activity    Alcohol use: Yes     Comment: 1-2 weekly    Drug use: No    Sexual activity: Yes     Partners: Male     Birth control/protection: None   Other  "Topics Concern    Parent/sibling w/ CABG, MI or angioplasty before 65F 55M? Yes   Social History Narrative    Not on file     Social Determinants of Health     Financial Resource Strain: Not on file   Food Insecurity: Not on file   Transportation Needs: Not on file   Physical Activity: Not on file   Stress: Not on file   Social Connections: Not on file   Interpersonal Safety: Not on file   Housing Stability: Not on file         FAMILY HISTORY:  Family History   Problem Relation Age of Onset    Genitourinary Problems Father         prostate    Genetic Disorder Father         ulcer    Hypertension Father     Lipids Father     Prostate Cancer Father     Heart Disease Mother     Lipids Mother     Hyperlipidemia Mother     Heart Disease Maternal Grandmother     Cerebrovascular Disease Maternal Grandmother     Heart Disease Maternal Grandfather     Heart Disease Maternal Uncle     Heart Disease Maternal Uncle         x  3    Hyperlipidemia Sister     Hyperlipidemia Brother     Other Cancer Brother        Family history of:  1.  Breast cancer including male breast cancer: negative   2. Ovarian cancer: negative  3.  Pancreatic cancer: negative  4.  Prostate cancer: father- ?in his 50s, other details unknown  5. Diffuse gastric cancer (if lobular breast CA): negative  6. Uterine cancer: negative  7. Colon cancer:  negative  6. Brother- head and neck, HPV +, had surgery, clinical trial and now cancer free      PHYSICAL EXAM:  Vital signs:  /76 (BP Location: Left arm)   Pulse 66   Resp 18   Ht 1.651 m (5' 5\")   Wt 87.1 kg (192 lb)   LMP 2011 (Exact Date)   SpO2 97%   BMI 31.95 kg/m         ECO  GENERAL/CONSTITUTIONAL: No acute distress.  EYES: Pupils are equal and round. Extraocular movements intact without nystagmus.  No scleral icterus.  RESPIRATORY: Equal chest rise.   MUSCULOSKELETAL: Warm and well-perfused, no cyanosis, clubbing, or edema.   NEUROLOGIC: Cranial nerves are grossly intact. Alert, " oriented to person, place and time, answers questions appropriately.  INTEGUMENTARY: No rashes or jaundice.  GAIT: Steady, does not use assistive device    LABS:  Pending   PATHOLOGY:    IMAGING:  Narrative & Impression   EXAM: MR BRAIN W/O & W CONTRAST  10/23/2023 9:35 AM      HISTORY: de tavon metastatic breast cancer, r/o brain mets (PET CT  showed skull mets); Carcinoma of left breast metastatic to bone (H);  Carcinoma of left breast metastatic to bone (H)        COMPARISON: PET CT: 10/6/2023.     TECHNIQUE: Multiplanar, multisequence MR imaging of the head obtained  prior to, and after, intravenous contrast administration     CONTRAST: 8.5 mL Gadavist.     FINDINGS:     Calvarium/skull base: Numerous T1 hypointense and low diffusivity  lesions are seen throughout the calvarium, many of which demonstrate  faint contrast enhancement. The most conspicuous lesions are located  in the right frontal calvarium (series 3/image 74) as well as the  right temporal calvarium (series 3/image 62), which correspond with  areas of hypermetabolic uptake on prior PET/CT. No definite breach of  the inner or outer tables of the calvarium. There is a suggestion of  mild asymmetric linear enhancement/thickening deep to the dominant  right temporal calvarial lesion (for example series 100/image 53 and  series 101/image 48). Additionally, there are abnormal T1 hypointense  marrow replacing lesions involving the skull base with involvement of  the occipital condyles. There is also abnormal T1 hypointense signal  and faint enhancement involving the C1 and C2 vertebral bodies. Areas  of T1 hypointense signal within the left mandibular condyle and  bilateral mandibular rami, which may represent additional sites of  disease.     Orbits: Within normal limits accounting for technique.     Paranasal sinuses: No substantial paranasal sinus disease.     Brain: No restricted diffusion involving the parenchyma. No evidence  of acute intracranial  "hemorrhage. No hydrocephalus. Basal cisterns are  patent. Normal positioning and morphology of the cerebellar tonsils.  No confluent parenchymal edema. Scattered tiny T2/FLAIR hyperintense  lesions within the periventricular and subcortical white matter of  both cerebral hemispheres, most likely representing sequelae of mild  chronic microvascular ischemic disease. Mild generalized cerebral  atrophy. Normal flow related signal within the major intracranial  arteries and venous structures. No pathologic parenchymal or  leptomeningeal enhancement.                                                                      IMPRESSION:     1.  Findings suggestive of extensive osseous metastatic disease  involving the calvarium, skull base, C1 and C2 vertebral bodies, and  likely the mandible, further described above.  2.  Dominant calvarial lesions are located in the right frontal  calvarium and right temporal calvarium without definite breach of the  inner or outer tables of the calvarium. There is a suggestion of mild  asymmetric linear extra-axial enhancement deep to the dominant right  temporal calvarial lesion along the dural margin, though this may be  vascular in etiology. Close attention is advised on follow-up imaging.  3.  No abnormal parenchymal or leptomeningeal enhancement.  4.  No acute intracranial abnormality.     DEBBIE PAGE MD      ASSESSMENT/PLAN:  Kesha Zacarias is a 63 year old female with:    # metastatic left breast invasive lobular carcinoma, ER positive, CT positive, her2 negative on FISH  -9/23 diagnostic left breast mammogram, left breast targeted ultrasound showed 3.0 x 1.7 x 3.9 ill-defined shadowing hypoechoic mass, few small lymph nodes in the left axilla, one with cortical thickening measuring 0.7 x 0.6 cm  -9/23 ultrasound-guided LEFT breast core biopsy of 12:00 lesion with clip placement, \"Postbiopsy unilateral digital mammogram of the left breast showed the clip to be at the expected " "biopsy site\" AND ultrasound-guided left axillary lymph node biopsy of lymph node with cortical thickening, PATHOLOGY:  A.  Breast, left, 12:00, biopsy:Lobular carcinoma in situ, Multiple foci. Invasive carcinoma is not identified.   B.  Lymph node, left axillary, biopsy:  -Positive for metastatic lobular carcinoma, grade 2, 0.7 cm, negative GREGG, Estrogen receptor: Positive (91 to 100%, strong), Progesterone receptor: Positive (91 to 100%, strong), HER2 2+ IHC, FISH negative  -10/23 MRI bilateral breast:  - Heterogeneously enhancing irregular mass in the subareolar left breast, 5.0 x 4.9 x 4.9 cm, with associated nipple retraction and nipple/areolar involvement.  This mass extends superiorly through 11: positions, from anterior to mid depth.  The InnoPadMARK breast biopsy clip (which showed LCIS) is 1.5 cm posterosuperior to this mass.  - Multiple suspicious left level 1 axillary lymph node noted, including biopsy-proven metastatic node with indwelling biopsy marker, biopsied node measures 1.3 x 1.0 cm  - Heterogeneous marrow appearance of sternum and ribs with heterogeneous enhancement and a peripheral enhancing focus within the mid body.    - 10/23 PET/CT:  Innumerable foci of FDG avid lesions are seen throughout the osseous structures of the head and neck, most pronounced on the calvarium and cervical spine, with prominently sclerotic appearance on CT correlate.  For example:  -Right frontal bone, SUV max 5.31.  -Left lateral mass of the atlas, SUV max 15.0  -Angle of the left mandible, SUV max 9.6  -C7 vertebral body, SUV max 17.7    Innumerable variable sized FDG avid lesions throughout the axial and appendicular spine, including but not limited to the cervical, thoracic, and lumbar spine, multilevel bilateral ribs, sternum, bilateral scapula, bilateral humeri, clavicles, pelvis, and bilateral femurs. A few of these lesions are described below:  -Large FDG avid sclerotic 3.4 cm lesion within the proximal " left humeral head, SUV max 17.24  -Sternal body, SUV max 20.35  -L2 vertebral body, SUV max 14.3 without  -Large ill-defined sclerotic lesion in the sacral body measuring up to at least 5.9 cm, SUV max 16.84  -FDG avid focus within the left femoral head, SUV max 13.65 without CT correlate.    Large irregular soft tissue FDG avid mass within the left breast measuring approximately 3.2 x 2.7 cm with  extension into the superficial soft tissues and associated left nipple retraction, SUV max 12.36 additional FDG avid. Left axillary lymph nodes, not definitively enlarged by short axis size criteria, for example, SUV max 5.93.    - 10/23 MRI brain:  - extensive osseous metastatic disease involving the calvarium, skull base w/involvement of occipital condyles, C1 and C2 vertebral bodies, and likely the mandible  -  Dominant calvarial lesions are located in the right frontal calvarium and right temporal calvarium without definite breach of the inner or outer tables of the calvarium. There is a suggestion of mild asymmetric linear extra-axial enhancement deep to the dominant right temporal calvarial lesion along the dural margin, though this may be vascular in etiology.  - No abnormal parenchymal or leptomeningeal enhancement.   - sequelae of mild chronic microvascular ischemic disease. Mild generalized cerebral atrophy.     -10/23 DEXA: osteopenia (T score -2.0 lumbar spine, -2.0 left hip femoral neck, The 10 year probability of major osteoporotic fracture is 12.5%, and of hip fracture is 1.8%, based on left femoral neck BMD)    - 10/23 labs:  AST 79, ALT 91,  (No liver mets on PET CT)  CA 15-3 261    REGIMEN:  Ribociclib+letrozole    Ribociclib 600mg PO daily day 1-21 q28 days  Letrozole 2.5mg PO daily  Emetic risk:  Febrile neutropenia risk: neutropenia (69% to 78%; grade 3: 46% to 55%; grade 4: 7% to 10%), Febrile neutropenia (1%)    C1D1 planned for 10/30/23 pending labs today     PLAN:  - pt has de tavon  metastatic invasive lobular breast cancer, ER positive, [place] positive, her2 negative. Pt does not have visceral crisis, she mainly has extensive bone metastases and LN metastases   -  10/31/23 orthopedic surgery consultation to determine stability of left femur and fracture risk given presence of mets in left femoral head   - plan to start zometa q4 weeks after dental clearance, once disease stabilized will transition to 4mg q12 weeks (pt will see dentist next week)  - Genetic counseling referral, pt had initially not scheduled but will call back now to schedule appt  - repeat labs and EKG in q2 weeks for cycle 1 and 2 (pt on plaquenil, watch Qtc closely)  - repeat PET in 3 months- to be ordered at next visit     #post menopausal  #vaginal dryness  - Pts breast biopsy pathology results explained in detail. Pt is aware her tumor is estrogen positive. Pt educated to avoid all estrogen-containing products including but not limited to birth control, vaginal creams etc.     #RA  - on plaquenil     RTC 1st week of November for follow up with JUAN (toxicity check)  RTC 11/13/23 for labs, EKG  RTC 11/27/23 for follow up with JUAN, labs  RTC 12/22/23 for follow up with me, labs     ADDENDUM:  10/26/23- pt has not started tx yet, LFTS have increased  10/7/23 AST 79->10/26/23   10/7/23 ALT 91->10/26/23   10/7/23 alk phos 116->10/26/23 alk phos 152  10/7/23 t bili 0.5, same on 10/26/23    10/6/23 PET negative for liver mets    Plan was for pt to start ribociclib 10/30/23  Will have pt come in for labs 10/30/23 AM at [facility], repeat CMP, check coags to make sure not lab error  If LFTs <3x ULN, start with ribociclib 400mg instead of 600mg and repeat LFTs on Friday  If LFTs >3x ULN, do not start ribociclib yet, await liver MRI results  STAT liver MRI ordered  Stop atorvastatin   Either way, pt should start letrozole  I called the patient and explained the above, she knows not to take ribociclib until she gets a call  from pharmacy, she has no new concerning sx  I called pharmacy and we discussed the above, they will call the patient with instructions on Monday  I have made both Chavo and  [facility]CC aware and MG scheduling team aware    Mary Maravilla DO  Hematology/Oncology  [facility]

## 2023-10-26 NOTE — LETTER
"    10/26/2023         RE: Kesah Zacarias  [id number] [address] [place] [id number]        Dear Colleague,    Thank you for referring your patient, Kesha Zacarias, to the [facility]. Please see a copy of my visit note below.    [facility]    Hematology/Oncology Established Patient Follow Up Note      Today's Date: October 26, 2023     Reason for follow up: breast cancer    HISTORY OF PRESENT ILLNESS: Kesha Zacarias is a 63 year old female who presents for follow up    Patient has medical history including rheumatoid arthritis, hyperlipidemia, osteoarthritis, bilateral cataracts and glaucoma s/p surgery, endocervical polyp s/p resection, vaginal atrophy with conjugated estrogen vaginal cream use, colon polyp (hyperplastic polyp and tubular adenoma), seasonal depression, history of tobacco use ([age] y/o, about 1 ppd).    Regarding RA:  -dx over a decade ago, on plaquenil, managed by PCP  - arthritis is most severe in hands>knees, intermittent       - 3/23 bilateral screening mammogram [facility]  - a few months ago, noted nipple retraction  - 9/23 patient palpated mass in retroareolar region of left breast and w/nipple retraction, no discharge, skin thickening, no dimpling, no erythema  - 9/23 diagnostic LEFT breast mammogram: Focal dense tissue with some likely mild architectural distortion, some anterior skin thickening is noted  - 9/23 targeted LEFT breast ultrasound: Ill-defined shadowing hypoechoic mass, 3.0 x 1.7 x 3.9 cm.  In left axilla-few small lymph nodes, 1 normal-sized lymph node with cortical thickening, 0.7 x 0.6 cm.  -9/23 ultrasound-guided LEFT breast core biopsy of 12:00 lesion with clip placement, \"Postbiopsy unilateral digital mammogram of the left breast showed the clip to be at the expected biopsy site\" AND ultrasound-guided left axillary lymph node biopsy of lymph node with cortical thickening  PATHOLOGY  A.  Breast, left, 12:00, " biopsy:  -Lobular carcinoma in situ.-Multiple foci  -Invasive carcinoma is not identified.     B.  Lymph node, left axillary, biopsy:  -Positive for metastatic lobular carcinoma, grade 2  -Largest metastatic focus is 7 mm.  -Negative for extranodal extension.  -Breast ancillary testing:  -Estrogen receptor: Positive (91 to 100%, strong)  -Progesterone receptor: Positive (91 to 100%, strong)  -HER2 2+ IHC, FISH negative    -10/23 MRI bilateral breast:  LEFT breast:  - Heterogeneously enhancing irregular mass in the subareolar left breast, 5.0 x 4.9 x 4.9 cm, with associated nipple retraction and nipple/areolar involvement.  This mass extends superiorly through 11: positions, from anterior to mid depth.  The KlickThruMARK breast biopsy clip (which showed LCIS) is 1.5 cm posterosuperior to this mass.  - Multiple suspicious left level 1 axillary lymph node noted, including biopsy-proven metastatic node with indwelling biopsy marker, biopsied node measures 1.3 x 1.0 cm  - Heterogeneous marrow appearance of sternum and ribs with heterogeneous enhancement and a peripheral enhancing focus within the mid body.  IMPRESSION:  1. Upon further review of previous imaging and pathology results,  recommend repeat ultrasound guided large core-needle biopsy of the  discordant dominant left breast mass given metastatic left axillary  lymph node and superoposteriorly displaced left breast biopsy marker.   2. Nonspecific heterogeneous enhancement of the sternum and ribs.  Consider nuclear medicine bone scan    Lifetime estrogen exposure:  Menarche: 13   Last menstrual period: 49   Age of first pregnancy: 18   Number of pregnancies: 2 (no living children)   Weight gain: 20lb weight gain within a year  Exposure to exogenous estrogen: vaginal atrophy with conjugated estrogen vaginal cream use x4 years (intermittent), has not used it since 9/23. Birth control for about 13-15 years from her 20s-30s.      Pt reports 55lb weight loss in 1.5  years, this was intentional, was following weight watchers program (23 points available per day) 230lb->170lb->190lb (gained about 20lbs). Pt was walking on the treadmill and outside daily, about 30 mins to 1.5 miles.    10/23 labs:  AST 79, ALT 91,   CA 15-3 261    10/23 PET/CT:  Innumerable foci of FDG avid lesions are seen throughout the osseous  structures of the head and neck, most pronounced on the calvarium and  cervical spine, with prominently sclerotic appearance on CT correlate.  For example:  -Right frontal bone, SUV max 5.31.  -Left lateral mass of the atlas, SUV max 15.0  -Angle of the left mandible, SUV max 9.6  -C7 vertebral body, SUV max 17.7    Innumerable variable sized FDG avid lesions throughout the axial and  appendicular spine, including but not limited to the cervical,  thoracic, and lumbar spine, multilevel bilateral ribs, sternum,  bilateral scapula, bilateral humeri, clavicles, pelvis, and bilateral  femurs. A few of these lesions are described below:  -Large FDG avid sclerotic 3.4 cm lesion within the proximal left  humeral head, SUV max 17.24  -Sternal body, SUV max 20.35  -L2 vertebral body, SUV max 14.3 without  -Large ill-defined sclerotic lesion in the sacral body measuring up to  at least 5.9 cm, SUV max 16.84  -FDG avid focus within the left femoral head, SUV  max 13.65 without CT correlate.    Large irregular soft tissue FDG avid mass within the left breast   measuring approximately 3.2 x 2.7 cm with  extension into the superficial soft tissues and associated left nipple  retraction, SUV max 12.36 additional FDG avid. Left axillary lymph  nodes, not definitively enlarged by short axis size criteria, for  example, SUV max 5.93.    The liver is unremarkable.     Solid 3 mm non-FDG avid pulmonary nodule within the  right middle lobe    INTERIM HISTORY:  - 10/23 MRI brain:  - extensive osseous metastatic disease involving the calvarium, skull base w/involvement of occipital  condyles, C1 and C2 vertebral bodies, and likely the mandible  -  Dominant calvarial lesions are located in the right frontal calvarium and right temporal calvarium without definite breach of the  inner or outer tables of the calvarium. There is a suggestion of mild asymmetric linear extra-axial enhancement deep to the dominant right temporal calvarial lesion along the dural margin, though this may be vascular in etiology.  - No abnormal parenchymal or leptomeningeal enhancement.   - sequelae of mild chronic microvascular ischemic disease. Mild generalized cerebral atrophy.     Pt has received her ribociclib 10/19/23   Pt is having daily HA, mostly b/l temporal regions, no sensitivity to light or sound, no N/V, takes tylenol w/o improvement  Reports blurry vision b/l, last saw eye doctor 3/23 and has trifocal glasses. She has dry eyes and uses lubricating eye drops.     REVIEW OF SYSTEMS:   A 14 point ROS was reviewed with pertinent positives and negatives in the HPI.        HOME MEDICATIONS:  Current Outpatient Medications   Medication Sig Dispense Refill     Ascorbic Acid (VITAMIN C) 500 MG CAPS        aspirin 81 MG tablet Take 81 mg by mouth daily       atorvastatin (LIPITOR) 40 MG tablet TAKE 1 TABLET BY MOUTH DAILY 90 tablet 3     betamethasone dipropionate (DIPROSONE) 0.05 % external lotion Apply topically 2 times daily 60 mL 3     COENZYME Q-10 PO Take 1 tablet by mouth every other day       cyclobenzaprine (FLEXERIL) 5 MG tablet TAKE 1 TABLET(5 MG) BY MOUTH AT BEDTIME 90 tablet 3     fish oil-omega-3 fatty acids 1000 MG capsule Take 2 g by mouth daily       hydroxychloroquine (PLAQUENIL) 200 MG tablet TAKE 2 AND 1/2 TABLETS BY MOUTH EVERY  tablet 3     ibuprofen (ADVIL/MOTRIN) 800 MG tablet TAKE 1 TABLET BY MOUTH EVERY 6 HOURS AS NEEDED FOR PAIN 120 tablet 3     lactobacillus rhamnosus, GG, (CULTURELL) capsule Take 1 capsule by mouth 2 times daily       letrozole (FEMARA) 2.5 MG tablet Take 1 tablet  (2.5 mg) by mouth every morning for 28 days 28 tablet 11     magnesium 250 MG tablet Take 1 tablet by mouth daily       prochlorperazine (COMPAZINE) 10 MG tablet Take 1 tablet (10 mg) by mouth every 6 hours as needed for nausea or vomiting 30 tablet 2     ribociclib (KISQALI) 200 MG tablet Take 3 tablets (600 mg) by mouth every morning for 21 days , then off for 7 days. 63 tablet 0     Cholecalciferol (VITAMIN D) 2000 UNIT tablet Take 1 tablet by mouth daily (Patient not taking: Reported on 10/12/2023) 100 tablet 12     Multiple Minerals-Vitamins (CALCIUM-MAGNESIUM-ZINC-D3 PO)            ALLERGIES:  Allergies   Allergen Reactions     Ciprofloxacin      hives and was on flagyl too     Metronidazole      hives and was on cipro too         PAST MEDICAL HISTORY:  Past Medical History:   Diagnosis Date     Arthritis 2006     Breast cancer metastasized to bone (H) 10/12/2023     Chronic depressive personality disorder      Dysplasia of cervix (uteri) 1988    Cryotherapy     Female infertility of unspecified origin          PAST SURGICAL HISTORY:  Past Surgical History:   Procedure Laterality Date     COLONOSCOPY       COLONOSCOPY N/A 1/14/2022    Procedure: COLONOSCOPY, WITH POLYPECTOMY AND BIOPSY;  Surgeon: Gagandeep Patterson MD;  Location:  GI      INTRODUCE CATH FALLOPIAN TUBE, RE-OPEN/DIAGNOSIS       HERNIA REPAIR, INCISIONAL  11/11/09     ORTHOPEDIC SURGERY  September 2017     [facility] APPENDECTOMY  6-14-03     [facility] REMV CATARACT INTRACAP,INSERT LENS  2-    right         SOCIAL HISTORY:  Social History     Socioeconomic History     Marital status:      Spouse name: Bandar     Number of children: 1     Years of education: Not on file     Highest education level: Not on file   Occupational History     Not on file   Tobacco Use     Smoking status: Former     Packs/day: 0.50     Years: 25.00     Additional pack years: 0.00     Total pack years: 12.50     Types: Cigarettes     Quit date: 9/18/2017     Years since  "quittin.1     Smokeless tobacco: Never     Tobacco comments:     quit 2017    Vaping Use     Vaping Use: Never used   Substance and Sexual Activity     Alcohol use: Yes     Comment: 1-2 weekly     Drug use: No     Sexual activity: Yes     Partners: Male     Birth control/protection: None   Other Topics Concern     Parent/sibling w/ CABG, MI or angioplasty before 65F 55M? Yes   Social History Narrative     Not on file     Social Determinants of Health     Financial Resource Strain: Not on file   Food Insecurity: Not on file   Transportation Needs: Not on file   Physical Activity: Not on file   Stress: Not on file   Social Connections: Not on file   Interpersonal Safety: Not on file   Housing Stability: Not on file         FAMILY HISTORY:  Family History   Problem Relation Age of Onset     Genitourinary Problems Father         prostate     Genetic Disorder Father         ulcer     Hypertension Father      Lipids Father      Prostate Cancer Father      Heart Disease Mother      Lipids Mother      Hyperlipidemia Mother      Heart Disease Maternal Grandmother      Cerebrovascular Disease Maternal Grandmother      Heart Disease Maternal Grandfather      Heart Disease Maternal Uncle      Heart Disease Maternal Uncle         x  3     Hyperlipidemia Sister      Hyperlipidemia Brother      Other Cancer Brother        Family history of:  1.  Breast cancer including male breast cancer: negative   2. Ovarian cancer: negative  3.  Pancreatic cancer: negative  4.  Prostate cancer: father- ?in his 50s, other details unknown  5. Diffuse gastric cancer (if lobular breast CA): negative  6. Uterine cancer: negative  7. Colon cancer:  negative  6. Brother- head and neck, HPV +, had surgery, clinical trial and now cancer free      PHYSICAL EXAM:  Vital signs:  /76 (BP Location: Left arm)   Pulse 66   Resp 18   Ht 1.651 m (5' 5\")   Wt 87.1 kg (192 lb)   LMP 2011 (Exact Date)   SpO2 97%   BMI 31.95 kg/m   "       ECO  GENERAL/CONSTITUTIONAL: No acute distress.  EYES: Pupils are equal and round. Extraocular movements intact without nystagmus.  No scleral icterus.  RESPIRATORY: Equal chest rise.   MUSCULOSKELETAL: Warm and well-perfused, no cyanosis, clubbing, or edema.   NEUROLOGIC: Cranial nerves are grossly intact. Alert, oriented to person, place and time, answers questions appropriately.  INTEGUMENTARY: No rashes or jaundice.  GAIT: Steady, does not use assistive device    LABS:  Pending   PATHOLOGY:    IMAGING:  Narrative & Impression   EXAM: MR BRAIN W/O & W CONTRAST  10/23/2023 9:35 AM      HISTORY: de tavon metastatic breast cancer, r/o brain mets (PET CT  showed skull mets); Carcinoma of left breast metastatic to bone (H);  Carcinoma of left breast metastatic to bone (H)        COMPARISON: PET CT: 10/6/2023.     TECHNIQUE: Multiplanar, multisequence MR imaging of the head obtained  prior to, and after, intravenous contrast administration     CONTRAST: 8.5 mL Gadavist.     FINDINGS:     Calvarium/skull base: Numerous T1 hypointense and low diffusivity  lesions are seen throughout the calvarium, many of which demonstrate  faint contrast enhancement. The most conspicuous lesions are located  in the right frontal calvarium (series 3/image 74) as well as the  right temporal calvarium (series 3/image 62), which correspond with  areas of hypermetabolic uptake on prior PET/CT. No definite breach of  the inner or outer tables of the calvarium. There is a suggestion of  mild asymmetric linear enhancement/thickening deep to the dominant  right temporal calvarial lesion (for example series 100/image 53 and  series 101/image 48). Additionally, there are abnormal T1 hypointense  marrow replacing lesions involving the skull base with involvement of  the occipital condyles. There is also abnormal T1 hypointense signal  and faint enhancement involving the C1 and C2 vertebral bodies. Areas  of T1 hypointense signal within the  left mandibular condyle and  bilateral mandibular rami, which may represent additional sites of  disease.     Orbits: Within normal limits accounting for technique.     Paranasal sinuses: No substantial paranasal sinus disease.     Brain: No restricted diffusion involving the parenchyma. No evidence  of acute intracranial hemorrhage. No hydrocephalus. Basal cisterns are  patent. Normal positioning and morphology of the cerebellar tonsils.  No confluent parenchymal edema. Scattered tiny T2/FLAIR hyperintense  lesions within the periventricular and subcortical white matter of  both cerebral hemispheres, most likely representing sequelae of mild  chronic microvascular ischemic disease. Mild generalized cerebral  atrophy. Normal flow related signal within the major intracranial  arteries and venous structures. No pathologic parenchymal or  leptomeningeal enhancement.                                                                      IMPRESSION:     1.  Findings suggestive of extensive osseous metastatic disease  involving the calvarium, skull base, C1 and C2 vertebral bodies, and  likely the mandible, further described above.  2.  Dominant calvarial lesions are located in the right frontal  calvarium and right temporal calvarium without definite breach of the  inner or outer tables of the calvarium. There is a suggestion of mild  asymmetric linear extra-axial enhancement deep to the dominant right  temporal calvarial lesion along the dural margin, though this may be  vascular in etiology. Close attention is advised on follow-up imaging.  3.  No abnormal parenchymal or leptomeningeal enhancement.  4.  No acute intracranial abnormality.     DEBBIE PAGE MD      ASSESSMENT/PLAN:  Kesha Zacarias is a 63 year old female with:    # metastatic left breast invasive lobular carcinoma, ER positive, [place] positive, her2 negative on FISH  -9/23 diagnostic left breast mammogram, left breast targeted ultrasound showed 3.0 x 1.7  "x 3.9 ill-defined shadowing hypoechoic mass, few small lymph nodes in the left axilla, one with cortical thickening measuring 0.7 x 0.6 cm  -9/23 ultrasound-guided LEFT breast core biopsy of 12:00 lesion with clip placement, \"Postbiopsy unilateral digital mammogram of the left breast showed the clip to be at the expected biopsy site\" AND ultrasound-guided left axillary lymph node biopsy of lymph node with cortical thickening, PATHOLOGY:  A.  Breast, left, 12:00, biopsy:Lobular carcinoma in situ, Multiple foci. Invasive carcinoma is not identified.   B.  Lymph node, left axillary, biopsy:  -Positive for metastatic lobular carcinoma, grade 2, 0.7 cm, negative GREGG, Estrogen receptor: Positive (91 to 100%, strong), Progesterone receptor: Positive (91 to 100%, strong), HER2 2+ IHC, FISH negative  -10/23 MRI bilateral breast:  - Heterogeneously enhancing irregular mass in the subareolar left breast, 5.0 x 4.9 x 4.9 cm, with associated nipple retraction and nipple/areolar involvement.  This mass extends superiorly through 11: positions, from anterior to mid depth.  The HydroMARK breast biopsy clip (which showed LCIS) is 1.5 cm posterosuperior to this mass.  - Multiple suspicious left level 1 axillary lymph node noted, including biopsy-proven metastatic node with indwelling biopsy marker, biopsied node measures 1.3 x 1.0 cm  - Heterogeneous marrow appearance of sternum and ribs with heterogeneous enhancement and a peripheral enhancing focus within the mid body.    - 10/23 PET/CT:  Innumerable foci of FDG avid lesions are seen throughout the osseous structures of the head and neck, most pronounced on the calvarium and cervical spine, with prominently sclerotic appearance on CT correlate.  For example:  -Right frontal bone, SUV max 5.31.  -Left lateral mass of the atlas, SUV max 15.0  -Angle of the left mandible, SUV max 9.6  -C7 vertebral body, SUV max 17.7    Innumerable variable sized FDG avid lesions throughout the " axial and appendicular spine, including but not limited to the cervical, thoracic, and lumbar spine, multilevel bilateral ribs, sternum, bilateral scapula, bilateral humeri, clavicles, pelvis, and bilateral femurs. A few of these lesions are described below:  -Large FDG avid sclerotic 3.4 cm lesion within the proximal left humeral head, SUV max 17.24  -Sternal body, SUV max 20.35  -L2 vertebral body, SUV max 14.3 without  -Large ill-defined sclerotic lesion in the sacral body measuring up to at least 5.9 cm, SUV max 16.84  -FDG avid focus within the left femoral head, SUV max 13.65 without CT correlate.    Large irregular soft tissue FDG avid mass within the left breast measuring approximately 3.2 x 2.7 cm with  extension into the superficial soft tissues and associated left nipple retraction, SUV max 12.36 additional FDG avid. Left axillary lymph nodes, not definitively enlarged by short axis size criteria, for example, SUV max 5.93.    - 10/23 MRI brain:  - extensive osseous metastatic disease involving the calvarium, skull base w/involvement of occipital condyles, C1 and C2 vertebral bodies, and likely the mandible  -  Dominant calvarial lesions are located in the right frontal calvarium and right temporal calvarium without definite breach of the inner or outer tables of the calvarium. There is a suggestion of mild asymmetric linear extra-axial enhancement deep to the dominant right temporal calvarial lesion along the dural margin, though this may be vascular in etiology.  - No abnormal parenchymal or leptomeningeal enhancement.   - sequelae of mild chronic microvascular ischemic disease. Mild generalized cerebral atrophy.     -10/23 DEXA: osteopenia (T score -2.0 lumbar spine, -2.0 left hip femoral neck, The 10 year probability of major osteoporotic fracture is 12.5%, and of hip fracture is 1.8%, based on left femoral neck BMD)    - 10/23 labs:  AST 79, ALT 91,  (No liver mets on PET CT)  CA 15-3  261    REGIMEN:  Ribociclib+letrozole    Ribociclib 600mg PO daily day 1-21 q28 days  Letrozole 2.5mg PO daily  Emetic risk:  Febrile neutropenia risk: neutropenia (69% to 78%; grade 3: 46% to 55%; grade 4: 7% to 10%), Febrile neutropenia (1%)    C1D1 planned for 10/30/23 pending labs today     PLAN:  - pt has de tavon metastatic invasive lobular breast cancer, ER positive, [place] positive, her2 negative. Pt does not have visceral crisis, she mainly has extensive bone metastases and LN metastases   -  10/31/23 orthopedic surgery consultation to determine stability of left femur and fracture risk given presence of mets in left femoral head   - plan to start zometa q4 weeks after dental clearance, once disease stabilized will transition to 4mg q12 weeks (pt will see dentist next week)  - Genetic counseling referral, pt had initially not scheduled but will call back now to schedule appt  - repeat labs and EKG in q2 weeks for cycle 1 and 2 (pt on plaquenil, watch Qtc closely)  - repeat PET in 3 months- to be ordered at next visit     #post menopausal  #vaginal dryness  - Pts breast biopsy pathology results explained in detail. Pt is aware her tumor is estrogen positive. Pt educated to avoid all estrogen-containing products including but not limited to birth control, vaginal creams etc.     #RA  - on plaquenil     RTC 1st week of November for follow up with JUAN (toxicity check)  RTC 11/13/23 for labs, EKG  RTC 11/27/23 for follow up with JUAN, labs  RTC 12/22/23 for follow up with me, labs       Miller Altamirano DO  Hematology/Oncology  [facility]      Again, thank you for allowing me to participate in the care of your patient.        Sincerely,        MILLER ALTAMIRANO DO

## 2023-10-30 ENCOUNTER — LAB (OUTPATIENT)
Dept: LAB | Facility: CLINIC | Age: 63
End: 2023-10-30
Payer: COMMERCIAL

## 2023-10-30 ENCOUNTER — TELEPHONE (OUTPATIENT)
Dept: ONCOLOGY | Facility: CLINIC | Age: 63
End: 2023-10-30

## 2023-10-30 DIAGNOSIS — C79.51 CARCINOMA OF LEFT BREAST METASTATIC TO BONE (H): ICD-10-CM

## 2023-10-30 DIAGNOSIS — C50.912 CARCINOMA OF LEFT BREAST METASTATIC TO BONE (H): ICD-10-CM

## 2023-10-30 DIAGNOSIS — R79.89 LFT ELEVATION: ICD-10-CM

## 2023-10-30 LAB
ALBUMIN SERPL BCG-MCNC: 4.1 G/DL (ref 3.5–5.2)
ALP SERPL-CCNC: 178 U/L (ref 35–104)
ALT SERPL W P-5'-P-CCNC: 395 U/L (ref 0–50)
ANION GAP SERPL CALCULATED.3IONS-SCNC: 14 MMOL/L (ref 7–15)
APTT PPP: 24 SECONDS (ref 22–38)
AST SERPL W P-5'-P-CCNC: 189 U/L (ref 0–45)
BASOPHILS # BLD AUTO: 0.1 10E3/UL (ref 0–0.2)
BASOPHILS NFR BLD AUTO: 1 %
BILIRUB SERPL-MCNC: 0.5 MG/DL
BUN SERPL-MCNC: 26 MG/DL (ref 8–23)
CALCIUM SERPL-MCNC: 9 MG/DL (ref 8.8–10.2)
CHLORIDE SERPL-SCNC: 100 MMOL/L (ref 98–107)
CREAT SERPL-MCNC: 1.27 MG/DL (ref 0.51–0.95)
DEPRECATED HCO3 PLAS-SCNC: 25 MMOL/L (ref 22–29)
EGFRCR SERPLBLD CKD-EPI 2021: 48 ML/MIN/1.73M2
EOSINOPHIL # BLD AUTO: 0.2 10E3/UL (ref 0–0.7)
EOSINOPHIL NFR BLD AUTO: 3 %
ERYTHROCYTE [DISTWIDTH] IN BLOOD BY AUTOMATED COUNT: 13.2 % (ref 10–15)
FIBRINOGEN PPP-MCNC: 594 MG/DL (ref 170–490)
GLUCOSE SERPL-MCNC: 127 MG/DL (ref 70–99)
HCT VFR BLD AUTO: 38.7 % (ref 35–47)
HGB BLD-MCNC: 12.3 G/DL (ref 11.7–15.7)
IMM GRANULOCYTES # BLD: 0 10E3/UL
IMM GRANULOCYTES NFR BLD: 1 %
INR PPP: 0.99 (ref 0.85–1.15)
LYMPHOCYTES # BLD AUTO: 2.3 10E3/UL (ref 0.8–5.3)
LYMPHOCYTES NFR BLD AUTO: 38 %
MAGNESIUM SERPL-MCNC: 2.2 MG/DL (ref 1.7–2.3)
MCH RBC QN AUTO: 29.2 PG (ref 26.5–33)
MCHC RBC AUTO-ENTMCNC: 31.8 G/DL (ref 31.5–36.5)
MCV RBC AUTO: 92 FL (ref 78–100)
MONOCYTES # BLD AUTO: 0.5 10E3/UL (ref 0–1.3)
MONOCYTES NFR BLD AUTO: 9 %
NEUTROPHILS # BLD AUTO: 2.9 10E3/UL (ref 1.6–8.3)
NEUTROPHILS NFR BLD AUTO: 48 %
NRBC # BLD AUTO: 0 10E3/UL
NRBC BLD AUTO-RTO: 0 /100
PHOSPHATE SERPL-MCNC: 4.3 MG/DL (ref 2.5–4.5)
PLATELET # BLD AUTO: 178 10E3/UL (ref 150–450)
POTASSIUM SERPL-SCNC: 4.2 MMOL/L (ref 3.4–5.3)
PROT SERPL-MCNC: 7.1 G/DL (ref 6.4–8.3)
RBC # BLD AUTO: 4.21 10E6/UL (ref 3.8–5.2)
SODIUM SERPL-SCNC: 139 MMOL/L (ref 135–145)
WBC # BLD AUTO: 5.9 10E3/UL (ref 4–11)

## 2023-10-30 PROCEDURE — 84100 ASSAY OF PHOSPHORUS: CPT | Performed by: INTERNAL MEDICINE

## 2023-10-30 PROCEDURE — 80053 COMPREHEN METABOLIC PANEL: CPT | Performed by: INTERNAL MEDICINE

## 2023-10-30 PROCEDURE — 85025 COMPLETE CBC W/AUTO DIFF WBC: CPT | Performed by: INTERNAL MEDICINE

## 2023-10-30 PROCEDURE — 85610 PROTHROMBIN TIME: CPT | Performed by: INTERNAL MEDICINE

## 2023-10-30 PROCEDURE — 85384 FIBRINOGEN ACTIVITY: CPT | Performed by: INTERNAL MEDICINE

## 2023-10-30 PROCEDURE — 83735 ASSAY OF MAGNESIUM: CPT | Performed by: INTERNAL MEDICINE

## 2023-10-30 PROCEDURE — 36415 COLL VENOUS BLD VENIPUNCTURE: CPT

## 2023-10-30 PROCEDURE — 85730 THROMBOPLASTIN TIME PARTIAL: CPT | Performed by: INTERNAL MEDICINE

## 2023-10-30 NOTE — ORAL ONC MGMT
Oral Chemotherapy Program    Placed call to patient in follow up of repeat CMP. Labs are remarkable for elevated ALT (grade 3, up from 277 on 10/26/23) and AST (grade 2, down from 215 on 10/26/23). Per plan discussed with Dr. Maravilla, will HOLD ribociclib but continue letrozole (started 10/27/23).      Joshua Brown, PharmD  Hematology/Oncology Clinical Pharmacist  [facility]  [phone]

## 2023-10-31 ENCOUNTER — PRE VISIT (OUTPATIENT)
Dept: ORTHOPEDICS | Facility: CLINIC | Age: 63
End: 2023-10-31

## 2023-10-31 ENCOUNTER — VIRTUAL VISIT (OUTPATIENT)
Dept: ORTHOPEDICS | Facility: CLINIC | Age: 63
End: 2023-10-31
Payer: COMMERCIAL

## 2023-10-31 VITALS — HEIGHT: 65 IN | BODY MASS INDEX: 31.99 KG/M2 | WEIGHT: 192 LBS

## 2023-10-31 DIAGNOSIS — C79.51 CARCINOMA OF LEFT BREAST METASTATIC TO BONE (H): ICD-10-CM

## 2023-10-31 DIAGNOSIS — C50.912 CARCINOMA OF LEFT BREAST METASTATIC TO BONE (H): ICD-10-CM

## 2023-10-31 PROCEDURE — 99203 OFFICE O/P NEW LOW 30 MIN: CPT | Mod: VID | Performed by: ORTHOPAEDIC SURGERY

## 2023-10-31 ASSESSMENT — PAIN SCALES - GENERAL: PAINLEVEL: NO PAIN (0)

## 2023-10-31 NOTE — NURSING NOTE
Is the patient currently in the state of [place]? YES    Visit mode:VIDEO    If the visit is dropped, the patient can be reconnected by: VIDEO VISIT: Text to cell phone:   Telephone Information:   Mobile [phone]       Will anyone else be joining the visit? NO  (If patient encounters technical issues they should call [phone]    How would you like to obtain your AVS? MyChart    Are changes needed to the allergy or medication list? No    Reason for visit: Consult (Referral from Dr. Mary Maravilla)      Meena RIVERO

## 2023-10-31 NOTE — PATIENT INSTRUCTIONS
Assessment: Newly diagnosed breast cancer with widely metastatic skeletal disease.    Plan: 1.  She is currently asymptomatic and shows no imaging characteristics of impending fracture we will not recommend any surgery at this time.  2.  I reassured her that we are here in the future if she does develop skeletal pain or her imaging studies change demonstrating that she is at increased risk for fracture.

## 2023-10-31 NOTE — LETTER
10/31/2023         RE: Kesha Zacarias  [id number] [address] [place] [id number]        Dear Colleague,    Thank you for referring your patient, Kesha Zacarias, to the [facility] [place]. Please see a copy of my visit note below.    Virtual Visit Details    Type of service:  Video Visit     Assessment: Newly diagnosed breast cancer with widely metastatic skeletal disease.    Plan: 1.  She is currently asymptomatic and shows no imaging characteristics of impending fracture we will not recommend any surgery at this time.  2.  I reassured her that we are here in the future if she does develop skeletal pain or her imaging studies change demonstrating that she is at increased risk for fracture.    Patient is a 63-year-old female who recently in September felt of breast nodule.  She subsequently diagnosed with metastatic breast cancer with extensive involvement of the skeleton.    On today's interview she denies any symptoms of skeletal pain.  She reports she has had joint pain throughout her life but this has not changed recently significantly.  She is active and that she is walking on a treadmill for exercise and experiences no discomfort.    I reviewed the bony portion of her body CT as well as her PET/CT.  As described by the radiologist she has involvement of essentially her entire skeleton with PET avid tumors.  With the CT I do not see any areas of impending cortical erosion or sites of high risk for fracture.  To provide more detail she had involvement of essentially every vertebral body, every rib, both hips, both sides of her pelvis, both femoral and both proximal humeri.    We will proceed with observation as outlined above.    This was a video visit with the patient at home in [place] and the provider's office on the medical [facility] campus.  The visit began at 2:34 PM and ended at 2:54 PM total visit was 20 minutes.        Naman Mead MD

## 2023-10-31 NOTE — PROGRESS NOTES
Relevant Problems   No relevant active problems       Anesthetic History   No history of anesthetic complications            Review of Systems / Medical History  Patient summary reviewed and pertinent labs reviewed    Pulmonary          Shortness of breath         Neuro/Psych   Within defined limits           Cardiovascular            Dysrhythmias : atrial fibrillation and atrial flutter      Exercise tolerance: <4 METS  Comments: EF 55%  LVH   GI/Hepatic/Renal               Comments: H/O PANCREATITIS Endo/Other        Morbid obesity     Other Findings              Physical Exam    Airway  Mallampati: III  TM Distance: > 6 cm  Neck ROM: normal range of motion   Mouth opening: Normal     Cardiovascular  Regular rate and rhythm,  S1 and S2 normal,  no murmur, click, rub, or gallop             Dental  No notable dental hx       Pulmonary  Breath sounds clear to auscultation               Abdominal  GI exam deferred       Other Findings            Anesthetic Plan    ASA: 4  Anesthesia type: general      Post-op pain plan if not by surgeon: IV PCA    Induction: Intravenous  Anesthetic plan and risks discussed with: Patient Virtual Visit Details    Type of service:  Video Visit     Assessment: Newly diagnosed breast cancer with widely metastatic skeletal disease.    Plan: 1.  She is currently asymptomatic and shows no imaging characteristics of impending fracture we will not recommend any surgery at this time.  2.  I reassured her that we are here in the future if she does develop skeletal pain or her imaging studies change demonstrating that she is at increased risk for fracture.    Patient is a 63-year-old female who recently in September felt of breast nodule.  She subsequently diagnosed with metastatic breast cancer with extensive involvement of the skeleton.    On today's interview she denies any symptoms of skeletal pain.  She reports she has had joint pain throughout her life but this has not changed recently significantly.  She is active and that she is walking on a treadmill for exercise and experiences no discomfort.    I reviewed the bony portion of her body CT as well as her PET/CT.  As described by the radiologist she has involvement of essentially her entire skeleton with PET avid tumors.  With the CT I do not see any areas of impending cortical erosion or sites of high risk for fracture.  To provide more detail she had involvement of essentially every vertebral body, every rib, both hips, both sides of her pelvis, both femoral and both proximal humeri.    We will proceed with observation as outlined above.    This was a video visit with the patient at home in [place] and the provider's office [facility] the medical [facility] campus.  The visit began at 2:34 PM and ended at 2:54 PM total visit was 20 minutes.

## 2023-11-07 ENCOUNTER — LAB (OUTPATIENT)
Dept: LAB | Facility: CLINIC | Age: 63
End: 2023-11-07
Payer: COMMERCIAL

## 2023-11-07 ENCOUNTER — HOSPITAL ENCOUNTER (OUTPATIENT)
Dept: MRI IMAGING | Facility: CLINIC | Age: 63
Discharge: HOME | End: 2023-11-07
Attending: INTERNAL MEDICINE | Admitting: INTERNAL MEDICINE
Payer: COMMERCIAL

## 2023-11-07 DIAGNOSIS — C79.51 CARCINOMA OF LEFT BREAST METASTATIC TO BONE (H): ICD-10-CM

## 2023-11-07 DIAGNOSIS — C50.912 CARCINOMA OF LEFT BREAST METASTATIC TO BONE (H): ICD-10-CM

## 2023-11-07 DIAGNOSIS — R79.89 LFT ELEVATION: ICD-10-CM

## 2023-11-07 LAB
ALBUMIN SERPL BCG-MCNC: 4 G/DL (ref 3.5–5.2)
ALP SERPL-CCNC: 169 U/L (ref 35–104)
ALT SERPL W P-5'-P-CCNC: 216 U/L (ref 0–50)
ANION GAP SERPL CALCULATED.3IONS-SCNC: 13 MMOL/L (ref 7–15)
AST SERPL W P-5'-P-CCNC: 96 U/L (ref 0–45)
BASOPHILS # BLD AUTO: 0.1 10E3/UL (ref 0–0.2)
BASOPHILS NFR BLD AUTO: 1 %
BILIRUB DIRECT SERPL-MCNC: <0.2 MG/DL (ref 0–0.3)
BILIRUB SERPL-MCNC: 0.4 MG/DL
BUN SERPL-MCNC: 22.6 MG/DL (ref 8–23)
CALCIUM SERPL-MCNC: 9.1 MG/DL (ref 8.8–10.2)
CHLORIDE SERPL-SCNC: 103 MMOL/L (ref 98–107)
CREAT SERPL-MCNC: 1.16 MG/DL (ref 0.51–0.95)
DEPRECATED HCO3 PLAS-SCNC: 25 MMOL/L (ref 22–29)
EGFRCR SERPLBLD CKD-EPI 2021: 53 ML/MIN/1.73M2
EOSINOPHIL # BLD AUTO: 0.2 10E3/UL (ref 0–0.7)
EOSINOPHIL NFR BLD AUTO: 3 %
ERYTHROCYTE [DISTWIDTH] IN BLOOD BY AUTOMATED COUNT: 13.7 % (ref 10–15)
GLUCOSE SERPL-MCNC: 99 MG/DL (ref 70–99)
HCT VFR BLD AUTO: 38.5 % (ref 35–47)
HGB BLD-MCNC: 12.1 G/DL (ref 11.7–15.7)
IMM GRANULOCYTES # BLD: 0 10E3/UL
IMM GRANULOCYTES NFR BLD: 0 %
LYMPHOCYTES # BLD AUTO: 2.3 10E3/UL (ref 0.8–5.3)
LYMPHOCYTES NFR BLD AUTO: 47 %
MCH RBC QN AUTO: 29.2 PG (ref 26.5–33)
MCHC RBC AUTO-ENTMCNC: 31.4 G/DL (ref 31.5–36.5)
MCV RBC AUTO: 93 FL (ref 78–100)
MONOCYTES # BLD AUTO: 0.4 10E3/UL (ref 0–1.3)
MONOCYTES NFR BLD AUTO: 9 %
NEUTROPHILS # BLD AUTO: 2 10E3/UL (ref 1.6–8.3)
NEUTROPHILS NFR BLD AUTO: 40 %
NRBC # BLD AUTO: 0 10E3/UL
NRBC BLD AUTO-RTO: 0 /100
PLATELET # BLD AUTO: 190 10E3/UL (ref 150–450)
POTASSIUM SERPL-SCNC: 4.3 MMOL/L (ref 3.4–5.3)
PROT SERPL-MCNC: 6.8 G/DL (ref 6.4–8.3)
RBC # BLD AUTO: 4.14 10E6/UL (ref 3.8–5.2)
SODIUM SERPL-SCNC: 141 MMOL/L (ref 135–145)
WBC # BLD AUTO: 5 10E3/UL (ref 4–11)

## 2023-11-07 PROCEDURE — 80053 COMPREHEN METABOLIC PANEL: CPT | Performed by: INTERNAL MEDICINE

## 2023-11-07 PROCEDURE — 255N000002 HC RX 255 OP 636: Mod: JZ | Performed by: INTERNAL MEDICINE

## 2023-11-07 PROCEDURE — A9585 GADOBUTROL INJECTION: HCPCS | Mod: JZ | Performed by: INTERNAL MEDICINE

## 2023-11-07 PROCEDURE — 74183 MRI ABD W/O CNTR FLWD CNTR: CPT

## 2023-11-07 PROCEDURE — 85025 COMPLETE CBC W/AUTO DIFF WBC: CPT | Performed by: INTERNAL MEDICINE

## 2023-11-07 PROCEDURE — 82248 BILIRUBIN DIRECT: CPT | Performed by: INTERNAL MEDICINE

## 2023-11-07 PROCEDURE — 36415 COLL VENOUS BLD VENIPUNCTURE: CPT

## 2023-11-07 RX ORDER — GADOBUTROL 604.72 MG/ML
10 INJECTION INTRAVENOUS ONCE
Status: COMPLETED | OUTPATIENT
Start: 2023-11-07 | End: 2023-11-07

## 2023-11-07 RX ADMIN — GADOBUTROL 8.5 ML: 604.72 INJECTION INTRAVENOUS at 08:16

## 2023-11-09 ENCOUNTER — VIRTUAL VISIT (OUTPATIENT)
Dept: ONCOLOGY | Facility: CLINIC | Age: 63
End: 2023-11-09
Attending: INTERNAL MEDICINE
Payer: COMMERCIAL

## 2023-11-09 VITALS — WEIGHT: 191 LBS | BODY MASS INDEX: 31.82 KG/M2 | HEIGHT: 65 IN

## 2023-11-09 DIAGNOSIS — C50.912 CARCINOMA OF LEFT BREAST METASTATIC TO BONE (H): Primary | ICD-10-CM

## 2023-11-09 DIAGNOSIS — R79.89 LFT ELEVATION: ICD-10-CM

## 2023-11-09 DIAGNOSIS — C79.51 CARCINOMA OF LEFT BREAST METASTATIC TO BONE (H): Primary | ICD-10-CM

## 2023-11-09 PROCEDURE — 99214 OFFICE O/P EST MOD 30 MIN: CPT | Mod: VID | Performed by: INTERNAL MEDICINE

## 2023-11-09 NOTE — PROGRESS NOTES
Video-Visit Details     Video Start Time: 3:22PM     Type of service:  Video Visit     Video End Time: 3:40PM    Originating Location (pt. Location): Home     Distant Location (provider location):  [facility] on site     Platform used for Video Visit: Negr[employer]

## 2023-11-09 NOTE — NURSING NOTE
Is the patient currently in the state of [place]? YES    Visit mode:VIDEO    If the visit is dropped, the patient can be reconnected by: VIDEO VISIT: Send to e-mail at: [email or web address]    Will anyone else be joining the visit?   (If patient encounters technical issues they should call [phone]    How would you like to obtain your AVS? MyChart    Are changes needed to the allergy or medication list? No    Reason for visit: Video Visit (Follow Up )    Elena RIVERO

## 2023-11-09 NOTE — PROGRESS NOTES
"Video-Visit Details     Video Start Time: 3:22PM     Type of service:  Video Visit     Video End Time: 3:40PM    Originating Location (pt. Location): Home     Distant Location (provider location):  [facility] [place] on site     Platform used for Video Visit: [employer]gr[employer]     [facility]    Hematology/Oncology Established Patient Follow Up Note      Today's Date: 11/9/2023     Reason for follow up: breast cancer    HISTORY OF PRESENT ILLNESS: Kesha Zacarias is a 63 year old female who presents for follow up    Patient has medical history including rheumatoid arthritis, hyperlipidemia, osteoarthritis, bilateral cataracts and glaucoma s/p surgery, endocervical polyp s/p resection, vaginal atrophy with conjugated estrogen vaginal cream use, colon polyp (hyperplastic polyp and tubular adenoma), seasonal depression, history of tobacco use ([age] y/o, about 1 ppd).    Regarding RA:  -dx over a decade ago, on plaquenil, managed by PCP  - arthritis is most severe in hands>knees, intermittent       - 3/23 bilateral screening mammogram [facility]  - a few months ago, noted nipple retraction  - 9/23 patient palpated mass in retroareolar region of left breast and w/nipple retraction, no discharge, skin thickening, no dimpling, no erythema  - 9/23 diagnostic LEFT breast mammogram: Focal dense tissue with some likely mild architectural distortion, some anterior skin thickening is noted  - 9/23 targeted LEFT breast ultrasound: Ill-defined shadowing hypoechoic mass, 3.0 x 1.7 x 3.9 cm.  In left axilla-few small lymph nodes, 1 normal-sized lymph node with cortical thickening, 0.7 x 0.6 cm.  -9/23 ultrasound-guided LEFT breast core biopsy of 12:00 lesion with clip placement, \"Postbiopsy unilateral digital mammogram of the left breast showed the clip to be at the expected biopsy site\" AND ultrasound-guided left axillary lymph node biopsy of lymph node with cortical thickening  PATHOLOGY  A.  Breast, left, 12:00, " biopsy:  -Lobular carcinoma in situ.-Multiple foci  -Invasive carcinoma is not identified.     B.  Lymph node, left axillary, biopsy:  -Positive for metastatic lobular carcinoma, grade 2  -Largest metastatic focus is 7 mm.  -Negative for extranodal extension.  -Breast ancillary testing:  -Estrogen receptor: Positive (91 to 100%, strong)  -Progesterone receptor: Positive (91 to 100%, strong)  -HER2 2+ IHC, FISH negative    -10/23 MRI bilateral breast:  LEFT breast:  - Heterogeneously enhancing irregular mass in the subareolar left breast, 5.0 x 4.9 x 4.9 cm, with associated nipple retraction and nipple/areolar involvement.  This mass extends superiorly through 11: positions, from anterior to mid depth.  [employer] breast biopsy clip (which showed LCIS) is 1.5 cm posterosuperior to this mass.  - Multiple suspicious left level 1 axillary lymph node noted, including biopsy-proven metastatic node with indwelling biopsy marker, biopsied node measures 1.3 x 1.0 cm  - Heterogeneous marrow appearance of sternum and ribs with heterogeneous enhancement and a peripheral enhancing focus within the mid body.  IMPRESSION:  1. Upon further review of previous imaging and pathology results,  recommend repeat ultrasound guided large core-needle biopsy of the  discordant dominant left breast mass given metastatic left axillary  lymph node and superoposteriorly displaced left breast biopsy marker.   2. Nonspecific heterogeneous enhancement of the sternum and ribs.  Consider nuclear medicine bone scan    Lifetime estrogen exposure:  Menarche: 13   Last menstrual period: 49   Age of first pregnancy: 18   Number of pregnancies: 2 (no living children)   Weight gain: 20lb weight gain within a year  Exposure to exogenous estrogen: vaginal atrophy with conjugated estrogen vaginal cream use x4 years (intermittent), has not used it since 9/23. Birth control for about 13-15 years from her 20s-30s.      Pt reports 55lb weight loss in 1.5  years, this was intentional, was following weight watchers program (23 points available per day) 230lb->170lb->190lb (gained about 20lbs). Pt was walking on the treadmill and outside daily, about 30 mins to 1.5 miles.    10/23 labs:  AST 79, ALT 91,   CA 15-3 261    10/23 PET/CT:  Innumerable foci of FDG avid lesions are seen throughout the osseous  structures of the head and neck, most pronounced on the calvarium and  cervical spine, with prominently sclerotic appearance on CT correlate.  For example:  -Right frontal bone, SUV max 5.31.  -Left lateral mass of the atlas, SUV max 15.0  -Angle of the left mandible, SUV max 9.6  -C7 vertebral body, SUV max 17.7    Innumerable variable sized FDG avid lesions throughout the axial and  appendicular spine, including but not limited to the cervical,  thoracic, and lumbar spine, multilevel bilateral ribs, sternum,  bilateral scapula, bilateral humeri, clavicles, pelvis, and bilateral  femurs. A few of these lesions are described below:  -Large FDG avid sclerotic 3.4 cm lesion within the proximal left  humeral head, SUV max 17.24  -Sternal body, SUV max 20.35  -L2 vertebral body, SUV max 14.3 without  -Large ill-defined sclerotic lesion in the sacral body measuring up to  at least 5.9 cm, SUV max 16.84  -FDG avid focus within the left femoral head, SUV  max 13.65 without CT correlate.    Large irregular soft tissue FDG avid mass within the left breast   measuring approximately 3.2 x 2.7 cm with  extension into the superficial soft tissues and associated left nipple  retraction, SUV max 12.36 additional FDG avid. Left axillary lymph  nodes, not definitively enlarged by short axis size criteria, for  example, SUV max 5.93.    The liver is unremarkable.     Solid 3 mm non-FDG avid pulmonary nodule within the  right middle lobe    - 10/23 MRI brain:  - extensive osseous metastatic disease involving the calvarium, skull base w/involvement of occipital condyles, C1 and C2  vertebral bodies, and likely the mandible  -  Dominant calvarial lesions are located in the right frontal calvarium and right temporal calvarium without definite breach of the  inner or outer tables of the calvarium. There is a suggestion of mild asymmetric linear extra-axial enhancement deep to the dominant right temporal calvarial lesion along the dural margin, though this may be vascular in etiology.  - No abnormal parenchymal or leptomeningeal enhancement.   - sequelae of mild chronic microvascular ischemic disease. Mild generalized cerebral atrophy.     INTERIM HISTORY:  Pt has received her ribociclib 10/19/23 and was supposed to start ribociclib 10/30/23 however, noted to have significantly elevated LFTS (10/26/23 , , alk phos 152)    10/17/23: AST 79, ALT 91, alk phos 116, t bili 0.5  10/26/23 , , alk phos 152  10/30/23: , , alk phos 178, coags WNL, ribociclib was not started, letrozole started, atorvastatin held  11/7/23: AST 96, , alk phos 169    11/7/23 MRI liver: Suggestion of mild hepatic steatosis. No focal hepatic lesion identified. Postcontrast images show no specific abnormality of the liver.     Pt does report that she had some kind of viral infection between 10/17/23 and 10/26/23, was taking dayquil and nyquil and tylenol of headache infrequently, did not exceed even 2000mg of acetaminophen/day. She is taking probiotics for constipation and Mg for leg cramps. No other new meds.     Pt is having daily HA, mostly b/l temporal regions, no sensitivity to light or sound, no N/V, takes tylenol w/o improvement  Reports blurry vision b/l, last saw eye doctor 3/23 and has trifocal glasses. She has dry eyes and uses lubricating eye drops.     - 11/23 orthopedic consult to determine stability of left femur and fracture risk given presence of mets in left femoral head: do not see any areas of impending cortical erosion or sites of high risk for fracture,  observe    REVIEW OF SYSTEMS:   A 14 point ROS was reviewed with pertinent positives and negatives in the HPI.        HOME MEDICATIONS:  Current Outpatient Medications   Medication Sig Dispense Refill    aspirin 81 MG tablet Take 81 mg by mouth daily      betamethasone dipropionate (DIPROSONE) 0.05 % external lotion Apply topically 2 times daily 60 mL 3    COENZYME Q-10 PO Take 1 tablet by mouth every other day      cyclobenzaprine (FLEXERIL) 5 MG tablet TAKE 1 TABLET(5 MG) BY MOUTH AT BEDTIME 90 tablet 3    docusate sodium (COLACE) 100 MG capsule Take 1 capsule (100 mg) by mouth 3 times daily as needed for constipation 90 capsule 3    fish oil-omega-3 fatty acids 1000 MG capsule Take 2 g by mouth daily      hydroxychloroquine (PLAQUENIL) 200 MG tablet TAKE 2 AND 1/2 TABLETS BY MOUTH EVERY  tablet 3    lactobacillus rhamnosus, GG, (CULTURELL) capsule Take 1 capsule by mouth 2 times daily      letrozole (FEMARA) 2.5 MG tablet Take 1 tablet (2.5 mg) by mouth every morning for 28 days 28 tablet 11    loperamide (IMODIUM A-D) 2 MG tablet When diarrhea starts take 2 tabs (4mg), then with each additional episode of diarrhea take 1 additional tab and if needing more than 8 tabs in 24 hrs call oncology 30 tablet 3    magnesium 250 MG tablet Take 1 tablet by mouth daily      ondansetron (ZOFRAN) 4 MG tablet Take 1 tablet (4 mg) by mouth every 6 hours as needed for nausea 30 tablet 3    prochlorperazine (COMPAZINE) 10 MG tablet Take 1 tablet (10 mg) by mouth every 6 hours as needed for nausea or vomiting 30 tablet 2    ribociclib (KISQALI) 200 MG tablet Take 3 tablets (600 mg) by mouth every morning for 21 days , then off for 7 days. 63 tablet 0    sennosides (SENOKOT) 8.6 MG tablet Take 1 tablet by mouth daily 60 tablet 3         ALLERGIES:  Allergies   Allergen Reactions    Ciprofloxacin      hives and was on flagyl too    Metronidazole      hives and was on cipro too         PAST MEDICAL HISTORY:  Past Medical  History:   Diagnosis Date    Arthritis 2006    Breast cancer metastasized to bone (H) 10/12/2023    Chronic depressive personality disorder     Dysplasia of cervix (uteri)     Cryotherapy    Female infertility of unspecified origin          PAST SURGICAL HISTORY:  Past Surgical History:   Procedure Laterality Date    COLONOSCOPY      COLONOSCOPY N/A 2022    Procedure: COLONOSCOPY, WITH POLYPECTOMY AND BIOPSY;  Surgeon: Gagandeep Patterson MD;  Location:  GI    HC INTRODUCE CATH FALLOPIAN TUBE, RE-OPEN/DIAGNOSIS      HERNIA REPAIR, INCISIONAL  09    ORTHOPEDIC SURGERY  2017    ZZC APPENDECTOMY  03    [facility] REMV CATARACT INTRACAP,INSERT LENS  2003    right         SOCIAL HISTORY:  Social History     Socioeconomic History    Marital status:      Spouse name: Bandar    Number of children: 1    Years of education: Not on file    Highest education level: Not on file   Occupational History    Not on file   Tobacco Use    Smoking status: Former     Packs/day: 0.50     Years: 25.00     Additional pack years: 0.00     Total pack years: 12.50     Types: Cigarettes     Quit date: 2017     Years since quittin.1    Smokeless tobacco: Never    Tobacco comments:     quit 2017    Vaping Use    Vaping Use: Never used   Substance and Sexual Activity    Alcohol use: Yes     Comment: 1-2 weekly    Drug use: No    Sexual activity: Yes     Partners: Male     Birth control/protection: None   Other Topics Concern    Parent/sibling w/ CABG, MI or angioplasty before 65F 55M? Yes   Social History Narrative    Not on file     Social Determinants of Health     Financial Resource Strain: Not on file   Food Insecurity: Not on file   Transportation Needs: Not on file   Physical Activity: Not on file   Stress: Not on file   Social Connections: Not on file   Interpersonal Safety: Not on file   Housing Stability: Not on file         FAMILY HISTORY:  Family History   Problem Relation Age of Onset     Genitourinary Problems Father         prostate    Genetic Disorder Father         ulcer    Hypertension Father     Lipids Father     Prostate Cancer Father     Heart Disease Mother     Lipids Mother     Hyperlipidemia Mother     Heart Disease Maternal Grandmother     Cerebrovascular Disease Maternal Grandmother     Heart Disease Maternal Grandfather     Heart Disease Maternal Uncle     Heart Disease Maternal Uncle         x  3    Hyperlipidemia Sister     Hyperlipidemia Brother     Other Cancer Brother        Family history of:  1.  Breast cancer including male breast cancer: negative   2. Ovarian cancer: negative  3.  Pancreatic cancer: negative  4.  Prostate cancer: father- ?in his 50s, other details unknown  5. Diffuse gastric cancer (if lobular breast CA): negative  6. Uterine cancer: negative  7. Colon cancer:  negative  6. Brother- head and neck, HPV +, had surgery, clinical trial and now cancer free      PHYSICAL EXAM:  Vital signs:  LMP 11/22/2011 (Exact Date)        LABS:   Latest Reference Range & Units 10/26/23 16:32 10/30/23 07:24 11/07/23 07:30   Sodium 135 - 145 mmol/L 140 139 141   Potassium 3.4 - 5.3 mmol/L 4.3 4.2 4.3   Chloride 98 - 107 mmol/L 101 100 103   Carbon Dioxide (CO2) 22 - 29 mmol/L 26 25 25   Urea Nitrogen 8.0 - 23.0 mg/dL 24.3 (H) 26.0 (H) 22.6   Creatinine 0.51 - 0.95 mg/dL 1.17 (H) 1.27 (H) 1.16 (H)   GFR Estimate >60 mL/min/1.73m2 53 (L) 48 (L) 53 (L)   Calcium 8.8 - 10.2 mg/dL 9.6 9.0 9.1   Anion Gap 7 - 15 mmol/L 13 14 13   Magnesium 1.7 - 2.3 mg/dL 2.4 (H) 2.2    Phosphorus 2.5 - 4.5 mg/dL  4.3    Albumin 3.5 - 5.2 g/dL 4.3 4.1 4.0   Protein Total 6.4 - 8.3 g/dL 7.6 7.1 6.8   Alkaline Phosphatase 35 - 104 U/L 152 (H) 178 (H) 169 (H)   ALT 0 - 50 U/L 277 (H) 395 (H) 216 (H)   AST 0 - 45 U/L 215 (H) 189 (H) 96 (H)   Bilirubin Direct 0.00 - 0.30 mg/dL   <0.20   Bilirubin Total <=1.2 mg/dL 0.5 0.5 0.4   Glucose 70 - 99 mg/dL 106 (H) 127 (H) 99   WBC 4.0 - 11.0 10e3/uL 7.0 5.9 5.0  "  Hemoglobin 11.7 - 15.7 g/dL 12.2 12.3 12.1   Hematocrit 35.0 - 47.0 % 37.9 38.7 38.5   Platelet Count 150 - 450 10e3/uL 155 178 190   RBC Count 3.80 - 5.20 10e6/uL 4.11 4.21 4.14   MCV 78 - 100 fL 92 92 93   MCH 26.5 - 33.0 pg 29.7 29.2 29.2   MCHC 31.5 - 36.5 g/dL 32.2 31.8 31.4 (L)   RDW 10.0 - 15.0 % 13.1 13.2 13.7   % Neutrophils % 59 48 40   % Lymphocytes % 28 38 47   % Monocytes % 9 9 9   % Eosinophils % 2 3 3   % Basophils % 1 1 1   Absolute Basophils 0.0 - 0.2 10e3/uL 0.0 0.1 0.1   Absolute Eosinophils 0.0 - 0.7 10e3/uL 0.1 0.2 0.2   Absolute Immature Granulocytes <=0.4 10e3/uL 0.1 0.0 0.0   Absolute Lymphocytes 0.8 - 5.3 10e3/uL 1.9 2.3 2.3   Absolute Monocytes 0.0 - 1.3 10e3/uL 0.6 0.5 0.4   % Immature Granulocytes % 1 1 0   Absolute Neutrophils 1.6 - 8.3 10e3/uL 4.2 2.9 2.0   Absolute NRBCs 10e3/uL 0.0 0.0 0.0   NRBCs per 100 WBC <1 /100 0 0 0   INR 0.85 - 1.15   0.99    PTT 22 - 38 Seconds  24    Fibrinogen 170 - 490 mg/dL  594 (H)    (H): Data is abnormally high  (L): Data is abnormally low  PATHOLOGY:    IMAGING:    ASSESSMENT/PLAN:  Kesha Zacarias is a 63 year old female with:    # metastatic left breast invasive lobular carcinoma, ER positive, NV positive, her2 negative on FISH  -9/23 diagnostic left breast mammogram, left breast targeted ultrasound showed 3.0 x 1.7 x 3.9 ill-defined shadowing hypoechoic mass, few small lymph nodes in the left axilla, one with cortical thickening measuring 0.7 x 0.6 cm  -9/23 ultrasound-guided LEFT breast core biopsy of 12:00 lesion with clip placement, \"Postbiopsy unilateral digital mammogram of the left breast showed the clip to be at the expected biopsy site\" AND ultrasound-guided left axillary lymph node biopsy of lymph node with cortical thickening, PATHOLOGY:  A.  Breast, left, 12:00, biopsy:Lobular carcinoma in situ, Multiple foci. Invasive carcinoma is not identified.   B.  Lymph node, left axillary, biopsy:  -Positive for metastatic lobular carcinoma, " grade 2, 0.7 cm, negative GREGG, Estrogen receptor: Positive (91 to 100%, strong), Progesterone receptor: Positive (91 to 100%, strong), HER2 2+ IHC, FISH negative  -10/23 MRI bilateral breast:  - Heterogeneously enhancing irregular mass in the subareolar left breast, 5.0 x 4.9 x 4.9 cm, with associated nipple retraction and nipple/areolar involvement.  This mass extends superiorly through 11: positions, from anterior to mid depth.  The [employer] breast biopsy clip (which showed LCIS) is 1.5 cm posterosuperior to this mass.  - Multiple suspicious left level 1 axillary lymph node noted, including biopsy-proven metastatic node with indwelling biopsy marker, biopsied node measures 1.3 x 1.0 cm  - Heterogeneous marrow appearance of sternum and ribs with heterogeneous enhancement and a peripheral enhancing focus within the mid body.    - 10/23 PET/CT:  Innumerable foci of FDG avid lesions are seen throughout the osseous structures of the head and neck, most pronounced on the calvarium and cervical spine, with prominently sclerotic appearance on CT correlate.  For example:  -Right frontal bone, SUV max 5.31.  -Left lateral mass of the atlas, SUV max 15.0  -Angle of the left mandible, SUV max 9.6  -C7 vertebral body, SUV max 17.7    Innumerable variable sized FDG avid lesions throughout the axial and appendicular spine, including but not limited to the cervical, thoracic, and lumbar spine, multilevel bilateral ribs, sternum, bilateral scapula, bilateral humeri, clavicles, pelvis, and bilateral femurs. A few of these lesions are described below:  -Large FDG avid sclerotic 3.4 cm lesion within the proximal left humeral head, SUV max 17.24  -Sternal body, SUV max 20.35  -L2 vertebral body, SUV max 14.3 without  -Large ill-defined sclerotic lesion in the sacral body measuring up to at least 5.9 cm, SUV max 16.84  -FDG avid focus within the left femoral head, SUV max 13.65 without CT correlate.    Large irregular soft  tissue FDG avid mass within the left breast measuring approximately 3.2 x 2.7 cm with  extension into the superficial soft tissues and associated left nipple retraction, SUV max 12.36 additional FDG avid. Left axillary lymph nodes, not definitively enlarged by short axis size criteria, for example, SUV max 5.93.    - 10/23 MRI brain:  - extensive osseous metastatic disease involving the calvarium, skull base w/involvement of occipital condyles, C1 and C2 vertebral bodies, and likely the mandible  -  Dominant calvarial lesions are located in the right frontal calvarium and right temporal calvarium without definite breach of the inner or outer tables of the calvarium. There is a suggestion of mild asymmetric linear extra-axial enhancement deep to the dominant right temporal calvarial lesion along the dural margin, though this may be vascular in etiology.  - No abnormal parenchymal or leptomeningeal enhancement.   - sequelae of mild chronic microvascular ischemic disease. Mild generalized cerebral atrophy.     -10/23 DEXA: osteopenia (T score -2.0 lumbar spine, -2.0 left hip femoral neck, The 10 year probability of major osteoporotic fracture is 12.5%, and of hip fracture is 1.8%, based on left femoral neck BMD)    - 11/23 orthopedic consult to determine stability of left femur and fracture risk given presence of mets in left femoral head: do not see any areas of impending cortical erosion or sites of high risk for fracture, observe    - 10/23 labs:  AST 79, ALT 91,  (No liver mets on PET CT)  CA 15-3 261    REGIMEN:  Ribociclib+letrozole    Ribociclib 600mg PO daily day 1-21 q28 days- ON HOLD, PENDING IMPROVEMENT OF LFTS  Letrozole 2.5mg PO daily (started 10/30/23)  Emetic risk:  Febrile neutropenia risk: neutropenia (69% to 78%; grade 3: 46% to 55%; grade 4: 7% to 10%), Febrile neutropenia (1%)    -supposed to start ribociclib 10/30/23 however, noted to have significantly elevated LFTS (10/26/23 , ALT  227, alk phos 152)    10/17/23: AST 79, ALT 91, alk phos 116, t bili 0.5  10/26/23 , , alk phos 152  10/30/23: , , alk phos 178, coags WNL, ribociclib was not started, letrozole started, atorvastatin held  11/7/23: AST 96, , alk phos 169    11/7/23 MRI liver: Suggestion of mild hepatic steatosis. No focal hepatic lesion identified. Postcontrast images show no specific abnormality of the liver.     PLAN:  - pt has de tavon metastatic invasive lobular breast cancer, ER positive, OH positive, her2 negative. Pt does not have visceral crisis, she mainly has extensive bone metastases and LN metastases   - thankfully MRI liver did not show mets, LFTs improving spontaneously (may have spiked 2/2 viral illness)  - when LFTs <3x ULN, will start ribociclib 400mg daily instead of 600mg daily (orders updated for 11/20/23)  - plan to start zometa q4 weeks after dental clearance, once disease stabilized will transition to 4mg q12 weeks (pt has upcoming dental work next Wednesday)   - Genetic counseling referral, pt had initially not scheduled but will call back now to schedule appt  - repeat labs and EKG in q2 weeks for cycle 1 and 2 (watch LFTs closely; pt on plaquenil, watch Qtc closely)  - repeat PET in 3 months- to be ordered at next visit     #post menopausal  #vaginal dryness  - Pts breast biopsy pathology results explained in detail. Pt is aware her tumor is estrogen positive. Pt educated to avoid all estrogen-containing products including but not limited to birth control, vaginal creams etc.     #RA  - on plaquenil       Cancel 11/13/23 for labs, EKG  RTC 11/20/23 for follow up with JUAN, labs, EKG  RTC 12/4 for labs, EKG  RTC 12/15/23 for follow up with me, labs, EKG      Mary Maravilla DO  Hematology/Oncology  [facility]

## 2023-11-09 NOTE — LETTER
11/9/2023         RE: Kesha Zacarias  [address] [address] [place] [id number]        Dear Colleague,    Thank you for referring your patient, Kesha Zacarias, to the [facility] [facility]. Please see a copy of my visit note below.    Video-Visit Details     Video Start Time: 3:22PM     Type of service:  Video Visit     Video End Time: 3:40PM    Originating Location (pt. Location): Home     Distant Location (provider location):  [facility] on site     Platform used for Video Visit: [facility]    Hematology/Oncology Established Patient Follow Up Note      Today's Date: 11/9/2023     Reason for follow up: breast cancer    HISTORY OF PRESENT ILLNESS: Kesha Zacarias is a 63 year old female who presents for follow up    Patient has medical history including rheumatoid arthritis, hyperlipidemia, osteoarthritis, bilateral cataracts and glaucoma s/p surgery, endocervical polyp s/p resection, vaginal atrophy with conjugated estrogen vaginal cream use, colon polyp (hyperplastic polyp and tubular adenoma), seasonal depression, history of tobacco use ([age] y/o, about 1 ppd).    Regarding RA:  -dx over a decade ago, on plaquenil, managed by PCP  - arthritis is most severe in hands>knees, intermittent       - 3/23 bilateral screening mammogram [facility]  - a few months ago, noted nipple retraction  - 9/23 patient palpated mass in retroareolar region of left breast and w/nipple retraction, no discharge, skin thickening, no dimpling, no erythema  - 9/23 diagnostic LEFT breast mammogram: Focal dense tissue with some likely mild architectural distortion, some anterior skin thickening is noted  - 9/23 targeted LEFT breast ultrasound: Ill-defined shadowing hypoechoic mass, 3.0 x 1.7 x 3.9 cm.  In left axilla-few small lymph nodes, 1 normal-sized lymph node with cortical thickening, 0.7 x 0.6 cm.  -9/23 ultrasound-guided LEFT breast core biopsy of 12:00 lesion with clip  "placement, \"Postbiopsy unilateral digital mammogram of the left breast showed the clip to be at the expected biopsy site\" AND ultrasound-guided left axillary lymph node biopsy of lymph node with cortical thickening  PATHOLOGY  A.  Breast, left, 12:00, biopsy:  -Lobular carcinoma in situ.-Multiple foci  -Invasive carcinoma is not identified.     B.  Lymph node, left axillary, biopsy:  -Positive for metastatic lobular carcinoma, grade 2  -Largest metastatic focus is 7 mm.  -Negative for extranodal extension.  -Breast ancillary testing:  -Estrogen receptor: Positive (91 to 100%, strong)  -Progesterone receptor: Positive (91 to 100%, strong)  -HER2 2+ IHC, FISH negative    -10/23 MRI bilateral breast:  LEFT breast:  - Heterogeneously enhancing irregular mass in the subareolar left breast, 5.0 x 4.9 x 4.9 cm, with associated nipple retraction and nipple/areolar involvement.  This mass extends superiorly through 11: positions, from anterior to mid depth.  The HydroMARK breast biopsy clip (which showed LCIS) is 1.5 cm posterosuperior to this mass.  - Multiple suspicious left level 1 axillary lymph node noted, including biopsy-proven metastatic node with indwelling biopsy marker, biopsied node measures 1.3 x 1.0 cm  - Heterogeneous marrow appearance of sternum and ribs with heterogeneous enhancement and a peripheral enhancing focus within the mid body.  IMPRESSION:  1. Upon further review of previous imaging and pathology results,  recommend repeat ultrasound guided large core-needle biopsy of the  discordant dominant left breast mass given metastatic left axillary  lymph node and superoposteriorly displaced left breast biopsy marker.   2. Nonspecific heterogeneous enhancement of the sternum and ribs.  Consider nuclear medicine bone scan    Lifetime estrogen exposure:  Menarche: 13   Last menstrual period: 49   Age of first pregnancy: 18   Number of pregnancies: 2 (no living children)   Weight gain: 20lb weight gain " within a year  Exposure to exogenous estrogen: vaginal atrophy with conjugated estrogen vaginal cream use x4 years (intermittent), has not used it since 9/23. Birth control for about 13-15 years from her 20s-30s.      Pt reports 55lb weight loss in 1.5 years, this was intentional, was following weight watchers program (23 points available per day) 230lb->170lb->190lb (gained about 20lbs). Pt was walking on the treadmill and outside daily, about 30 mins to 1.5 miles.    10/23 labs:  AST 79, ALT 91,   CA 15-3 261    10/23 PET/CT:  Innumerable foci of FDG avid lesions are seen throughout the osseous  structures of the head and neck, most pronounced on the calvarium and  cervical spine, with prominently sclerotic appearance on CT correlate.  For example:  -Right frontal bone, SUV max 5.31.  -Left lateral mass of the atlas, SUV max 15.0  -Angle of the left mandible, SUV max 9.6  -C7 vertebral body, SUV max 17.7    Innumerable variable sized FDG avid lesions throughout the axial and  appendicular spine, including but not limited to the cervical,  thoracic, and lumbar spine, multilevel bilateral ribs, sternum,  bilateral scapula, bilateral humeri, clavicles, pelvis, and bilateral  femurs. A few of these lesions are described below:  -Large FDG avid sclerotic 3.4 cm lesion within the proximal left  humeral head, SUV max 17.24  -Sternal body, SUV max 20.35  -L2 vertebral body, SUV max 14.3 without  -Large ill-defined sclerotic lesion in the sacral body measuring up to  at least 5.9 cm, SUV max 16.84  -FDG avid focus within the left femoral head, SUV  max 13.65 without CT correlate.    Large irregular soft tissue FDG avid mass within the left breast   measuring approximately 3.2 x 2.7 cm with  extension into the superficial soft tissues and associated left nipple  retraction, SUV max 12.36 additional FDG avid. Left axillary lymph  nodes, not definitively enlarged by short axis size criteria, for  example, SUV max  5.93.    The liver is unremarkable.     Solid 3 mm non-FDG avid pulmonary nodule within the  right middle lobe    - 10/23 MRI brain:  - extensive osseous metastatic disease involving the calvarium, skull base w/involvement of occipital condyles, C1 and C2 vertebral bodies, and likely the mandible  -  Dominant calvarial lesions are located in the right frontal calvarium and right temporal calvarium without definite breach of the  inner or outer tables of the calvarium. There is a suggestion of mild asymmetric linear extra-axial enhancement deep to the dominant right temporal calvarial lesion along the dural margin, though this may be vascular in etiology.  - No abnormal parenchymal or leptomeningeal enhancement.   - sequelae of mild chronic microvascular ischemic disease. Mild generalized cerebral atrophy.     INTERIM HISTORY:  Pt has received her ribociclib 10/19/23 and was supposed to start ribociclib 10/30/23 however, noted to have significantly elevated LFTS (10/26/23 , , alk phos 152)    10/17/23: AST 79, ALT 91, alk phos 116, t bili 0.5  10/26/23 , , alk phos 152  10/30/23: , , alk phos 178, coags WNL, ribociclib was not started, letrozole started, atorvastatin held  11/7/23: AST 96, , alk phos 169    11/7/23 MRI liver: Suggestion of mild hepatic steatosis. No focal hepatic lesion identified. Postcontrast images show no specific abnormality of the liver.     Pt does report that she had some kind of viral infection between 10/17/23 and 10/26/23, was taking dayquil and nyquil and tylenol of headache infrequently, did not exceed even 2000mg of acetaminophen/day. She is taking probiotics for constipation and Mg for leg cramps. No other new meds.     Pt is having daily HA, mostly b/l temporal regions, no sensitivity to light or sound, no N/V, takes tylenol w/o improvement  Reports blurry vision b/l, last saw eye doctor 3/23 and has trifocal glasses. She has dry eyes  and uses lubricating eye drops.     - 11/23 orthopedic consult to determine stability of left femur and fracture risk given presence of mets in left femoral head: do not see any areas of impending cortical erosion or sites of high risk for fracture, observe    REVIEW OF SYSTEMS:   A 14 point ROS was reviewed with pertinent positives and negatives in the HPI.        HOME MEDICATIONS:  Current Outpatient Medications   Medication Sig Dispense Refill     aspirin 81 MG tablet Take 81 mg by mouth daily       betamethasone dipropionate (DIPROSONE) 0.05 % external lotion Apply topically 2 times daily 60 mL 3     COENZYME Q-10 PO Take 1 tablet by mouth every other day       cyclobenzaprine (FLEXERIL) 5 MG tablet TAKE 1 TABLET(5 MG) BY MOUTH AT BEDTIME 90 tablet 3     docusate sodium (COLACE) 100 MG capsule Take 1 capsule (100 mg) by mouth 3 times daily as needed for constipation 90 capsule 3     fish oil-omega-3 fatty acids 1000 MG capsule Take 2 g by mouth daily       hydroxychloroquine (PLAQUENIL) 200 MG tablet TAKE 2 AND 1/2 TABLETS BY MOUTH EVERY  tablet 3     lactobacillus rhamnosus, GG, (CULTURELL) capsule Take 1 capsule by mouth 2 times daily       letrozole (FEMARA) 2.5 MG tablet Take 1 tablet (2.5 mg) by mouth every morning for 28 days 28 tablet 11     loperamide (IMODIUM A-D) 2 MG tablet When diarrhea starts take 2 tabs (4mg), then with each additional episode of diarrhea take 1 additional tab and if needing more than 8 tabs in 24 hrs call oncology 30 tablet 3     magnesium 250 MG tablet Take 1 tablet by mouth daily       ondansetron (ZOFRAN) 4 MG tablet Take 1 tablet (4 mg) by mouth every 6 hours as needed for nausea 30 tablet 3     prochlorperazine (COMPAZINE) 10 MG tablet Take 1 tablet (10 mg) by mouth every 6 hours as needed for nausea or vomiting 30 tablet 2     ribociclib (KISQALI) 200 MG tablet Take 3 tablets (600 mg) by mouth every morning for 21 days , then off for 7 days. 63 tablet 0     sennosides  (SENOKOT) 8.6 MG tablet Take 1 tablet by mouth daily 60 tablet 3         ALLERGIES:  Allergies   Allergen Reactions     Ciprofloxacin      hives and was on flagyl too     Metronidazole      hives and was on cipro too         PAST MEDICAL HISTORY:  Past Medical History:   Diagnosis Date     Arthritis 2006     Breast cancer metastasized to bone (H) 10/12/2023     Chronic depressive personality disorder      Dysplasia of cervix (uteri)     Cryotherapy     Female infertility of unspecified origin          PAST SURGICAL HISTORY:  Past Surgical History:   Procedure Laterality Date     COLONOSCOPY       COLONOSCOPY N/A 2022    Procedure: COLONOSCOPY, WITH POLYPECTOMY AND BIOPSY;  Surgeon: Gagandeep Patterson MD;  Location:  GI     [facility] INTRODUCE CATH FALLOPIAN TUBE, RE-OPEN/DIAGNOSIS       HERNIA REPAIR, INCISIONAL  09     ORTHOPEDIC SURGERY  2017     [facility] APPENDECTOMY  03     [facility] REMV CATARACT INTRACAP,INSERT LENS  2003    right         SOCIAL HISTORY:  Social History     Socioeconomic History     Marital status:      Spouse name: Bandar     Number of children: 1     Years of education: Not on file     Highest education level: Not on file   Occupational History     Not on file   Tobacco Use     Smoking status: Former     Packs/day: 0.50     Years: 25.00     Additional pack years: 0.00     Total pack years: 12.50     Types: Cigarettes     Quit date: 2017     Years since quittin.1     Smokeless tobacco: Never     Tobacco comments:     quit 2017    Vaping Use     Vaping Use: Never used   Substance and Sexual Activity     Alcohol use: Yes     Comment: 1-2 weekly     Drug use: No     Sexual activity: Yes     Partners: Male     Birth control/protection: None   Other Topics Concern     Parent/sibling w/ CABG, MI or angioplasty before 65F 55M? Yes   Social History Narrative     Not on file     Social Determinants of Health     Financial Resource Strain: Not on file   Food  Insecurity: Not on file   Transportation Needs: Not on file   Physical Activity: Not on file   Stress: Not on file   Social Connections: Not on file   Interpersonal Safety: Not on file   Housing Stability: Not on file         FAMILY HISTORY:  Family History   Problem Relation Age of Onset     Genitourinary Problems Father         prostate     Genetic Disorder Father         ulcer     Hypertension Father      Lipids Father      Prostate Cancer Father      Heart Disease Mother      Lipids Mother      Hyperlipidemia Mother      Heart Disease Maternal Grandmother      Cerebrovascular Disease Maternal Grandmother      Heart Disease Maternal Grandfather      Heart Disease Maternal Uncle      Heart Disease Maternal Uncle         x  3     Hyperlipidemia Sister      Hyperlipidemia Brother      Other Cancer Brother        Family history of:  1.  Breast cancer including male breast cancer: negative   2. Ovarian cancer: negative  3.  Pancreatic cancer: negative  4.  Prostate cancer: father- ?in his 50s, other details unknown  5. Diffuse gastric cancer (if lobular breast CA): negative  6. Uterine cancer: negative  7. Colon cancer:  negative  6. Brother- head and neck, HPV +, had surgery, clinical trial and now cancer free      PHYSICAL EXAM:  Vital signs:  LMP 11/22/2011 (Exact Date)        LABS:   Latest Reference Range & Units 10/26/23 16:32 10/30/23 07:24 11/07/23 07:30   Sodium 135 - 145 mmol/L 140 139 141   Potassium 3.4 - 5.3 mmol/L 4.3 4.2 4.3   Chloride 98 - 107 mmol/L 101 100 103   Carbon Dioxide (CO2) 22 - 29 mmol/L 26 25 25   Urea Nitrogen 8.0 - 23.0 mg/dL 24.3 (H) 26.0 (H) 22.6   Creatinine 0.51 - 0.95 mg/dL 1.17 (H) 1.27 (H) 1.16 (H)   GFR Estimate >60 mL/min/1.73m2 53 (L) 48 (L) 53 (L)   Calcium 8.8 - 10.2 mg/dL 9.6 9.0 9.1   Anion Gap 7 - 15 mmol/L 13 14 13   Magnesium 1.7 - 2.3 mg/dL 2.4 (H) 2.2    Phosphorus 2.5 - 4.5 mg/dL  4.3    Albumin 3.5 - 5.2 g/dL 4.3 4.1 4.0   Protein Total 6.4 - 8.3 g/dL 7.6 7.1 6.8  "  Alkaline Phosphatase 35 - 104 U/L 152 (H) 178 (H) 169 (H)   ALT 0 - 50 U/L 277 (H) 395 (H) 216 (H)   AST 0 - 45 U/L 215 (H) 189 (H) 96 (H)   Bilirubin Direct 0.00 - 0.30 mg/dL   <0.20   Bilirubin Total <=1.2 mg/dL 0.5 0.5 0.4   Glucose 70 - 99 mg/dL 106 (H) 127 (H) 99   WBC 4.0 - 11.0 10e3/uL 7.0 5.9 5.0   Hemoglobin 11.7 - 15.7 g/dL 12.2 12.3 12.1   Hematocrit 35.0 - 47.0 % 37.9 38.7 38.5   Platelet Count 150 - 450 10e3/uL 155 178 190   RBC Count 3.80 - 5.20 10e6/uL 4.11 4.21 4.14   MCV 78 - 100 fL 92 92 93   MCH 26.5 - 33.0 pg 29.7 29.2 29.2   MCHC 31.5 - 36.5 g/dL 32.2 31.8 31.4 (L)   RDW 10.0 - 15.0 % 13.1 13.2 13.7   % Neutrophils % 59 48 40   % Lymphocytes % 28 38 47   % Monocytes % 9 9 9   % Eosinophils % 2 3 3   % Basophils % 1 1 1   Absolute Basophils 0.0 - 0.2 10e3/uL 0.0 0.1 0.1   Absolute Eosinophils 0.0 - 0.7 10e3/uL 0.1 0.2 0.2   Absolute Immature Granulocytes <=0.4 10e3/uL 0.1 0.0 0.0   Absolute Lymphocytes 0.8 - 5.3 10e3/uL 1.9 2.3 2.3   Absolute Monocytes 0.0 - 1.3 10e3/uL 0.6 0.5 0.4   % Immature Granulocytes % 1 1 0   Absolute Neutrophils 1.6 - 8.3 10e3/uL 4.2 2.9 2.0   Absolute NRBCs 10e3/uL 0.0 0.0 0.0   NRBCs per 100 WBC <1 /100 0 0 0   INR 0.85 - 1.15   0.99    PTT 22 - 38 Seconds  24    Fibrinogen 170 - 490 mg/dL  594 (H)    (H): Data is abnormally high  (L): Data is abnormally low  PATHOLOGY:    IMAGING:    ASSESSMENT/PLAN:  Kesha Zacarias is a 63 year old female with:    # metastatic left breast invasive lobular carcinoma, ER positive, HI positive, her2 negative on FISH  -9/23 diagnostic left breast mammogram, left breast targeted ultrasound showed 3.0 x 1.7 x 3.9 ill-defined shadowing hypoechoic mass, few small lymph nodes in the left axilla, one with cortical thickening measuring 0.7 x 0.6 cm  -9/23 ultrasound-guided LEFT breast core biopsy of 12:00 lesion with clip placement, \"Postbiopsy unilateral digital mammogram of the left breast showed the clip to be at the expected biopsy site\" " AND ultrasound-guided left axillary lymph node biopsy of lymph node with cortical thickening, PATHOLOGY:  A.  Breast, left, 12:00, biopsy:Lobular carcinoma in situ, Multiple foci. Invasive carcinoma is not identified.   B.  Lymph node, left axillary, biopsy:  -Positive for metastatic lobular carcinoma, grade 2, 0.7 cm, negative GREGG, Estrogen receptor: Positive (91 to 100%, strong), Progesterone receptor: Positive (91 to 100%, strong), HER2 2+ IHC, FISH negative  -10/23 MRI bilateral breast:  - Heterogeneously enhancing irregular mass in the subareolar left breast, 5.0 x 4.9 x 4.9 cm, with associated nipple retraction and nipple/areolar involvement.  This mass extends superiorly through 11: positions, from anterior to mid depth.  The HydroMARK breast biopsy clip (which showed LCIS) is 1.5 cm posterosuperior to this mass.  - Multiple suspicious left level 1 axillary lymph node noted, including biopsy-proven metastatic node with indwelling biopsy marker, biopsied node measures 1.3 x 1.0 cm  - Heterogeneous marrow appearance of sternum and ribs with heterogeneous enhancement and a peripheral enhancing focus within the mid body.    - 10/23 PET/CT:  Innumerable foci of FDG avid lesions are seen throughout the osseous structures of the head and neck, most pronounced on the calvarium and cervical spine, with prominently sclerotic appearance on CT correlate.  For example:  -Right frontal bone, SUV max 5.31.  -Left lateral mass of the atlas, SUV max 15.0  -Angle of the left mandible, SUV max 9.6  -C7 vertebral body, SUV max 17.7    Innumerable variable sized FDG avid lesions throughout the axial and appendicular spine, including but not limited to the cervical, thoracic, and lumbar spine, multilevel bilateral ribs, sternum, bilateral scapula, bilateral humeri, clavicles, pelvis, and bilateral femurs. A few of these lesions are described below:  -Large FDG avid sclerotic 3.4 cm lesion within the proximal left humeral  head, SUV max 17.24  -Sternal body, SUV max 20.35  -L2 vertebral body, SUV max 14.3 without  -Large ill-defined sclerotic lesion in the sacral body measuring up to at least 5.9 cm, SUV max 16.84  -FDG avid focus within the left femoral head, SUV max 13.65 without CT correlate.    Large irregular soft tissue FDG avid mass within the left breast measuring approximately 3.2 x 2.7 cm with  extension into the superficial soft tissues and associated left nipple retraction, SUV max 12.36 additional FDG avid. Left axillary lymph nodes, not definitively enlarged by short axis size criteria, for example, SUV max 5.93.    - 10/23 MRI brain:  - extensive osseous metastatic disease involving the calvarium, skull base w/involvement of occipital condyles, C1 and C2 vertebral bodies, and likely the mandible  -  Dominant calvarial lesions are located in the right frontal calvarium and right temporal calvarium without definite breach of the inner or outer tables of the calvarium. There is a suggestion of mild asymmetric linear extra-axial enhancement deep to the dominant right temporal calvarial lesion along the dural margin, though this may be vascular in etiology.  - No abnormal parenchymal or leptomeningeal enhancement.   - sequelae of mild chronic microvascular ischemic disease. Mild generalized cerebral atrophy.     -10/23 DEXA: osteopenia (T score -2.0 lumbar spine, -2.0 left hip femoral neck, The 10 year probability of major osteoporotic fracture is 12.5%, and of hip fracture is 1.8%, based on left femoral neck BMD)    - 11/23 orthopedic consult to determine stability of left femur and fracture risk given presence of mets in left femoral head: do not see any areas of impending cortical erosion or sites of high risk for fracture, observe    - 10/23 labs:  AST 79, ALT 91,  (No liver mets on PET CT)  CA 15-3 261    REGIMEN:  Ribociclib+letrozole    Ribociclib 600mg PO daily day 1-21 q28 days- ON HOLD, PENDING IMPROVEMENT  OF LFTS  Letrozole 2.5mg PO daily (started 10/30/23)  Emetic risk:  Febrile neutropenia risk: neutropenia (69% to 78%; grade 3: 46% to 55%; grade 4: 7% to 10%), Febrile neutropenia (1%)    -supposed to start ribociclib 10/30/23 however, noted to have significantly elevated LFTS (10/26/23 , , alk phos 152)    10/17/23: AST 79, ALT 91, alk phos 116, t bili 0.5  10/26/23 , , alk phos 152  10/30/23: , , alk phos 178, coags WNL, ribociclib was not started, letrozole started, atorvastatin held  11/7/23: AST 96, , alk phos 169    11/7/23 MRI liver: Suggestion of mild hepatic steatosis. No focal hepatic lesion identified. Postcontrast images show no specific abnormality of the liver.     PLAN:  - pt has de tavon metastatic invasive lobular breast cancer, ER positive, GA positive, her2 negative. Pt does not have visceral crisis, she mainly has extensive bone metastases and LN metastases   - thankfully MRI liver did not show mets, LFTs improving spontaneously (may have spiked 2/2 viral illness)  - when LFTs <3x ULN, will start ribociclib 400mg daily instead of 600mg daily (orders updated for 11/20/23)  - plan to start zometa q4 weeks after dental clearance, once disease stabilized will transition to 4mg q12 weeks (pt has upcoming dental work next Wednesday)   - Genetic counseling referral, pt had initially not scheduled but will call back now to schedule appt  - repeat labs and EKG in q2 weeks for cycle 1 and 2 (watch LFTs closely; pt on plaquenil, watch Qtc closely)  - repeat PET in 3 months- to be ordered at next visit     #post menopausal  #vaginal dryness  - Pts breast biopsy pathology results explained in detail. Pt is aware her tumor is estrogen positive. Pt educated to avoid all estrogen-containing products including but not limited to birth control, vaginal creams etc.     #RA  - on plaquenil       Cancel 11/13/23 for labs, EKG  RTC 11/20/23 for follow up with JUAN, labs,  EKG  RTC 12/4 for labs, EKG  RTC 12/15/23 for follow up with me, labs, EKG      Miller Altamirano DO  Hematology/Oncology  [facility]    Video-Visit Details     Video Start Time: 3:22PM     Type of service:  Video Visit     Video End Time: 3:40PM    Originating Location (pt. Location): Home     Distant Location (provider location):  [facility] on site     Platform used for Video Visit: [employer]       Again, thank you for allowing me to participate in the care of your patient.        Sincerely,        MILLER ALTAMIRANO DO

## 2023-11-13 DIAGNOSIS — C79.51 CARCINOMA OF LEFT BREAST METASTATIC TO BONE (H): Primary | ICD-10-CM

## 2023-11-13 DIAGNOSIS — C50.912 CARCINOMA OF LEFT BREAST METASTATIC TO BONE (H): Primary | ICD-10-CM

## 2023-11-14 ENCOUNTER — PATIENT OUTREACH (OUTPATIENT)
Dept: ONCOLOGY | Facility: CLINIC | Age: 63
End: 2023-11-14
Payer: COMMERCIAL

## 2023-11-14 NOTE — PROGRESS NOTES
Spoke to patient and relayed recommendations below. She is to see her dentist week. Fax number provided. Will confirm with Dr. Maravilla prior to starting bisphosphonate therapy.    Krissy Flores, RN, BSN  RN Care Coordinator - Oncology  [facility]

## 2023-11-14 NOTE — TELEPHONE ENCOUNTER
----- Message from Mary Maravilla DO sent at 11/14/2023 11:34 AM CST -----  Krissy, would you feel comfortable calling her?  Please have the pt continue her letrozole  She is not to start her ribociclib until she sees JUAN on 11/20 and has repeat LFTs done    TY  SK      ----- Message -----  From: Kandace Hilliard  Sent: 11/14/2023  10:57 AM CST  To: Mary Maravilla DO; Adele Flores RN    Hi Kesha Todd has some questions about her oral therapy and when to start it. She is finishing up dental work on Wed. Can you please call her?? [phone].    Thanks,   Kandace

## 2023-11-16 DIAGNOSIS — C50.919 BREAST CANCER METASTASIZED TO BONE (H): Primary | ICD-10-CM

## 2023-11-16 DIAGNOSIS — C79.51 BREAST CANCER METASTASIZED TO BONE (H): Primary | ICD-10-CM

## 2023-11-17 ENCOUNTER — LAB (OUTPATIENT)
Dept: LAB | Facility: CLINIC | Age: 63
End: 2023-11-17
Payer: COMMERCIAL

## 2023-11-17 ENCOUNTER — HOSPITAL ENCOUNTER (OUTPATIENT)
Dept: CARDIOLOGY | Facility: CLINIC | Age: 63
Discharge: HOME | End: 2023-11-17
Attending: FAMILY MEDICINE | Admitting: FAMILY MEDICINE
Payer: COMMERCIAL

## 2023-11-17 DIAGNOSIS — C50.912 CARCINOMA OF LEFT BREAST METASTATIC TO BONE (H): ICD-10-CM

## 2023-11-17 DIAGNOSIS — C79.51 CARCINOMA OF LEFT BREAST METASTATIC TO BONE (H): ICD-10-CM

## 2023-11-17 LAB
ALBUMIN SERPL BCG-MCNC: 4.2 G/DL (ref 3.5–5.2)
ALP SERPL-CCNC: 163 U/L (ref 40–150)
ALT SERPL W P-5'-P-CCNC: 158 U/L (ref 0–50)
ANION GAP SERPL CALCULATED.3IONS-SCNC: 10 MMOL/L (ref 7–15)
AST SERPL W P-5'-P-CCNC: 81 U/L (ref 0–45)
BASOPHILS # BLD AUTO: 0.1 10E3/UL (ref 0–0.2)
BASOPHILS NFR BLD AUTO: 1 %
BILIRUB SERPL-MCNC: 0.3 MG/DL
BUN SERPL-MCNC: 21 MG/DL (ref 8–23)
CALCIUM SERPL-MCNC: 9 MG/DL (ref 8.8–10.2)
CHLORIDE SERPL-SCNC: 103 MMOL/L (ref 98–107)
CREAT SERPL-MCNC: 1.18 MG/DL (ref 0.51–0.95)
DEPRECATED HCO3 PLAS-SCNC: 27 MMOL/L (ref 22–29)
EGFRCR SERPLBLD CKD-EPI 2021: 52 ML/MIN/1.73M2
EOSINOPHIL # BLD AUTO: 0.1 10E3/UL (ref 0–0.7)
EOSINOPHIL NFR BLD AUTO: 3 %
ERYTHROCYTE [DISTWIDTH] IN BLOOD BY AUTOMATED COUNT: 13.8 % (ref 10–15)
GLUCOSE SERPL-MCNC: 90 MG/DL (ref 70–99)
HCT VFR BLD AUTO: 38.9 % (ref 35–47)
HGB BLD-MCNC: 12.2 G/DL (ref 11.7–15.7)
IMM GRANULOCYTES # BLD: 0 10E3/UL
IMM GRANULOCYTES NFR BLD: 0 %
LYMPHOCYTES # BLD AUTO: 1.9 10E3/UL (ref 0.8–5.3)
LYMPHOCYTES NFR BLD AUTO: 39 %
MAGNESIUM SERPL-MCNC: 2.1 MG/DL (ref 1.7–2.3)
MCH RBC QN AUTO: 29.5 PG (ref 26.5–33)
MCHC RBC AUTO-ENTMCNC: 31.4 G/DL (ref 31.5–36.5)
MCV RBC AUTO: 94 FL (ref 78–100)
MONOCYTES # BLD AUTO: 0.5 10E3/UL (ref 0–1.3)
MONOCYTES NFR BLD AUTO: 11 %
NEUTROPHILS # BLD AUTO: 2.2 10E3/UL (ref 1.6–8.3)
NEUTROPHILS NFR BLD AUTO: 46 %
NRBC # BLD AUTO: 0 10E3/UL
NRBC BLD AUTO-RTO: 0 /100
PHOSPHATE SERPL-MCNC: 4.1 MG/DL (ref 2.5–4.5)
PLATELET # BLD AUTO: 184 10E3/UL (ref 150–450)
POTASSIUM SERPL-SCNC: 4.3 MMOL/L (ref 3.4–5.3)
PROT SERPL-MCNC: 7 G/DL (ref 6.4–8.3)
RBC # BLD AUTO: 4.14 10E6/UL (ref 3.8–5.2)
SODIUM SERPL-SCNC: 140 MMOL/L (ref 135–145)
WBC # BLD AUTO: 4.8 10E3/UL (ref 4–11)

## 2023-11-17 PROCEDURE — 85025 COMPLETE CBC W/AUTO DIFF WBC: CPT | Performed by: INTERNAL MEDICINE

## 2023-11-17 PROCEDURE — 93005 ELECTROCARDIOGRAM TRACING: CPT | Performed by: INTERNAL MEDICINE

## 2023-11-17 PROCEDURE — 36415 COLL VENOUS BLD VENIPUNCTURE: CPT

## 2023-11-17 PROCEDURE — 84100 ASSAY OF PHOSPHORUS: CPT | Performed by: INTERNAL MEDICINE

## 2023-11-17 PROCEDURE — 80053 COMPREHEN METABOLIC PANEL: CPT | Performed by: INTERNAL MEDICINE

## 2023-11-17 PROCEDURE — 83735 ASSAY OF MAGNESIUM: CPT | Performed by: INTERNAL MEDICINE

## 2023-11-20 ENCOUNTER — VIRTUAL VISIT (OUTPATIENT)
Dept: ONCOLOGY | Facility: CLINIC | Age: 63
End: 2023-11-20
Attending: PHYSICIAN ASSISTANT
Payer: COMMERCIAL

## 2023-11-20 ENCOUNTER — MYC MEDICAL ADVICE (OUTPATIENT)
Dept: ONCOLOGY | Facility: CLINIC | Age: 63
End: 2023-11-20

## 2023-11-20 ENCOUNTER — TELEPHONE (OUTPATIENT)
Dept: ONCOLOGY | Facility: CLINIC | Age: 63
End: 2023-11-20
Payer: COMMERCIAL

## 2023-11-20 VITALS — WEIGHT: 191 LBS | BODY MASS INDEX: 31.78 KG/M2

## 2023-11-20 DIAGNOSIS — R79.89 LFT ELEVATION: ICD-10-CM

## 2023-11-20 DIAGNOSIS — C79.51 CARCINOMA OF LEFT BREAST METASTATIC TO BONE (H): Primary | ICD-10-CM

## 2023-11-20 DIAGNOSIS — C50.912 CARCINOMA OF LEFT BREAST METASTATIC TO BONE (H): Primary | ICD-10-CM

## 2023-11-20 DIAGNOSIS — M54.42 ACUTE LEFT-SIDED LOW BACK PAIN WITH LEFT-SIDED SCIATICA: ICD-10-CM

## 2023-11-20 PROCEDURE — 99215 OFFICE O/P EST HI 40 MIN: CPT | Mod: VID | Performed by: PHYSICIAN ASSISTANT

## 2023-11-20 ASSESSMENT — PAIN SCALES - GENERAL: PAINLEVEL: SEVERE PAIN (7)

## 2023-11-20 NOTE — LETTER
11/20/2023         RE: Kesha Zacarias  [address] [address] [place] [id number]        Dear Colleague,    Thank you for referring your patient, Kesha Zacarias, to the [facility]. Please see a copy of my visit note below.    Oncology/Hematology Visit Note    Nov 20, 2023    Reason for visit: Follow up metastatic breast cancer    Oncology HPI: Kesha Zacarias is a 63 year old female with left-sided breast cancer.  In September 2023, she noticed a mass with nipple retraction and mammogram revealed focal dense tissue with anterior skin thickening.  Ultrasound with 3 x 1.7 x 3.9 cm mass, biopsy with multifocal lobular carcinoma in situ and left axillary lymph node positive for metastatic lobular carcinoma.  ER/AK positive, HER2 negative.  PET scan in October 23 with multiple FDG avid lesions in the bony structures of the head and neck.  Brain MRI with calvarial lesions.    Dr. Maravilla recommended ribociclib 400 mg daily with Zometa monthly.  Video visit today for close follow-up, EKG and labs.    Interval History: Kesha is over video today and she has had a few rough days with back pain and left leg pain.  She was doing well until Friday, she was walking through the grocery store and had a significant pain in her back and now the pain is shooting down her left leg.  She is able to walk, but it is painful.  She has been sitting on a heating pad with some relief.  She took some ibuprofen yesterday, but she is trying to limit ibuprofen and Tylenol due to her kidney and liver function.  No recent fall or injury.  She has arrival cyclo-40 mg at home and waiting to get the greenlight to restart.  She is pretty anxious and bummed that she has not been able to restart lately.  No other complaints.    Review of Systems: See interval hx. Denies fevers, chills, HA, dizziness, changes in vision, cough, sore throat, CP, SOB, abdominal pain, N/V, diarrhea, bleeding, bruising.     PMHx and Social  Hx reviewed per EPIC.      Medications:  Current Outpatient Medications   Medication Sig Dispense Refill     aspirin 81 MG tablet Take 81 mg by mouth daily       betamethasone dipropionate (DIPROSONE) 0.05 % external lotion Apply topically 2 times daily 60 mL 3     COENZYME Q-10 PO Take 1 tablet by mouth every other day       cyclobenzaprine (FLEXERIL) 5 MG tablet TAKE 1 TABLET(5 MG) BY MOUTH AT BEDTIME 90 tablet 3     docusate sodium (COLACE) 100 MG capsule Take 1 capsule (100 mg) by mouth 3 times daily as needed for constipation 90 capsule 3     fish oil-omega-3 fatty acids 1000 MG capsule Take 2 g by mouth daily       hydroxychloroquine (PLAQUENIL) 200 MG tablet TAKE 2 AND 1/2 TABLETS BY MOUTH EVERY  tablet 3     lactobacillus rhamnosus, GG, (CULTURELL) capsule Take 1 capsule by mouth 2 times daily       letrozole (FEMARA) 2.5 MG tablet Take 1 tablet (2.5 mg) by mouth every morning for 28 days 28 tablet 11     loperamide (IMODIUM A-D) 2 MG tablet When diarrhea starts take 2 tabs (4mg), then with each additional episode of diarrhea take 1 additional tab and if needing more than 8 tabs in 24 hrs call oncology 30 tablet 3     magnesium 250 MG tablet Take 1 tablet by mouth daily       ondansetron (ZOFRAN) 4 MG tablet Take 1 tablet (4 mg) by mouth every 6 hours as needed for nausea 30 tablet 3     prochlorperazine (COMPAZINE) 10 MG tablet Take 1 tablet (10 mg) by mouth every 6 hours as needed for nausea or vomiting 30 tablet 2     [START ON 11/20/2023] ribociclib (KISQALI) 200 MG tablet Take 2 tablets (400 mg) by mouth every morning for 21 days , then off for 7 days. 42 tablet 0     sennosides (SENOKOT) 8.6 MG tablet Take 1 tablet by mouth daily 60 tablet 3       Allergies   Allergen Reactions     Ciprofloxacin      hives and was on flagyl too     Metronidazole      hives and was on cipro too         EXAM:    LMP 11/22/2011 (Exact Date)  Video, no VS.     GENERAL:  Female, in no acute distress.  Alert and  oriented x3. Well groomed.   HEENT:  Normocephalic, atraumatic. No conjunctival injection or eye swelling.   LUNGS:  Nonlabored breathing, no cough or audible wheezing, able to speak full sentences.  MSK: Full ROM UE.    SKIN: No rash on exposed skin.   NEURO: CN grossly intact, speech normal  PSYCH: Mentation appears normal, insight and judgement intact      Labs:    11/17/23 11:52   Sodium 140   Potassium 4.3   Chloride 103   Carbon Dioxide (CO2) 27   Urea Nitrogen 21.0   Creatinine 1.18 (H)   GFR Estimate 52 (L)   Calcium 9.0   Anion Gap 10   Magnesium 2.1   Phosphorus 4.1   Albumin 4.2   Protein Total 7.0   Alkaline Phosphatase 163 (H)    (H)   AST 81 (H)   Bilirubin Total 0.3     Imaging: [email or web address]    Impression/Plan: Kesha Zacarias is a 63 year old female with metastatic breast cancer, ER/FL positive, HER2 negative, osseous lesions and will be starting ribociclib.    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  Dr. Maravilla recommended starting 600 mg of ribociclib, but LFTs were high, therefore dose of ribociclib was reduced to 400 mg, but she has not started yet.  We are waiting for her LFTs to improve.  Today, , still slightly above 3 times ULN and spoke to [facility], we will continue to hold for another week and then recheck.  EKG reviewed and QTc okay. Hopefully next week, we will be able to start ribociclib 400 mg daily.  -Repeat CMP in 1 week  -12/15 Dr. Maravilla    Back pain: PET scan on 10/6/2023 with multiple lesions in the spine, largest sclerotic lesion in the sacral body.  She now has new back pain with radiation down the left leg, concerning for sciatica, but given her history, I will obtain a stat MRI of the lumbar spine.  We will continue to limit ibuprofen and Tylenol due to her renal and liver function.    LFTs: Improving, see  plan above.      Chart documentation with Dragon Voice recognition Software. Although reviewed after completion, some words and grammatical errors may remain.    40 minutes spent on the date of the encounter doing chart review, review of test results, interpretation of tests, patient visit, documentation, and discussion with other provider(s)     Aurora Coker PA-C  Hematology/Oncology  [facility]                  Again, thank you for allowing me to participate in the care of your patient.        Sincerely,        Aurora Coker PA-C

## 2023-11-20 NOTE — NURSING NOTE
Is the patient currently in the state of [place]? YES    Visit mode:VIDEO    If the visit is dropped, the patient can be reconnected by: VIDEO VISIT: Text to cell phone:   Telephone Information:   Mobile [phone]       Will anyone else be joining the visit? NO  (If patient encounters technical issues they should call [phone]    How would you like to obtain your AVS? MyChart    Are changes needed to the allergy or medication list? Pt stated no med changes    Reason for visit: RECHECK    No other vitals to report per pt    Wanda Llanes VVF

## 2023-11-20 NOTE — PROGRESS NOTES
Oncology/Hematology Visit Note    Nov 20, 2023    Reason for visit: Follow up metastatic breast cancer    Oncology HPI: Kesha Zacarias is a 63 year old female with left-sided breast cancer.  In September 2023, she noticed a mass with nipple retraction and mammogram revealed focal dense tissue with anterior skin thickening.  Ultrasound with 3 x 1.7 x 3.9 cm mass, biopsy with multifocal lobular carcinoma in situ and left axillary lymph node positive for metastatic lobular carcinoma.  ER/IA positive, HER2 negative.  PET scan in October 23 with multiple FDG avid lesions in the bony structures of the head and neck.  Brain MRI with calvarial lesions.    Dr. Maravilla recommended ribociclib 400 mg daily with Zometa monthly.  Video visit today for close follow-up, EKG and labs.    Interval History: Kesha is over video today and she has had a few rough days with back pain and left leg pain.  She was doing well until Friday, she was walking through the grocery store and had a significant pain in her back and now the pain is shooting down her left leg.  She is able to walk, but it is painful.  She has been sitting on a heating pad with some relief.  She took some ibuprofen yesterday, but she is trying to limit ibuprofen and Tylenol due to her kidney and liver function.  No recent fall or injury.  She has arrival cyclo-40 mg at home and waiting to get the greenlight to restart.  She is pretty anxious and bummed that she has not been able to restart lately.  No other complaints.    Review of Systems: See interval hx. Denies fevers, chills, HA, dizziness, changes in vision, cough, sore throat, CP, SOB, abdominal pain, N/V, diarrhea, bleeding, bruising.     PMHx and Social Hx reviewed per EPIC.      Medications:  Current Outpatient Medications   Medication Sig Dispense Refill    aspirin 81 MG tablet Take 81 mg by mouth daily      betamethasone dipropionate (DIPROSONE) 0.05 % external lotion Apply topically 2 times daily 60 mL 3     COENZYME Q-10 PO Take 1 tablet by mouth every other day      cyclobenzaprine (FLEXERIL) 5 MG tablet TAKE 1 TABLET(5 MG) BY MOUTH AT BEDTIME 90 tablet 3    docusate sodium (COLACE) 100 MG capsule Take 1 capsule (100 mg) by mouth 3 times daily as needed for constipation 90 capsule 3    fish oil-omega-3 fatty acids 1000 MG capsule Take 2 g by mouth daily      hydroxychloroquine (PLAQUENIL) 200 MG tablet TAKE 2 AND 1/2 TABLETS BY MOUTH EVERY  tablet 3    lactobacillus rhamnosus, GG, (CULTURELL) capsule Take 1 capsule by mouth 2 times daily      letrozole (FEMARA) 2.5 MG tablet Take 1 tablet (2.5 mg) by mouth every morning for 28 days 28 tablet 11    loperamide (IMODIUM A-D) 2 MG tablet When diarrhea starts take 2 tabs (4mg), then with each additional episode of diarrhea take 1 additional tab and if needing more than 8 tabs in 24 hrs call oncology 30 tablet 3    magnesium 250 MG tablet Take 1 tablet by mouth daily      ondansetron (ZOFRAN) 4 MG tablet Take 1 tablet (4 mg) by mouth every 6 hours as needed for nausea 30 tablet 3    prochlorperazine (COMPAZINE) 10 MG tablet Take 1 tablet (10 mg) by mouth every 6 hours as needed for nausea or vomiting 30 tablet 2    [START ON 11/20/2023] ribociclib (KISQALI) 200 MG tablet Take 2 tablets (400 mg) by mouth every morning for 21 days , then off for 7 days. 42 tablet 0    sennosides (SENOKOT) 8.6 MG tablet Take 1 tablet by mouth daily 60 tablet 3       Allergies   Allergen Reactions    Ciprofloxacin      hives and was on flagyl too    Metronidazole      hives and was on cipro too         EXAM:    LMP 11/22/2011 (Exact Date)  Video, no VS.     GENERAL:  Female, in no acute distress.  Alert and oriented x3. Well groomed.   HEENT:  Normocephalic, atraumatic. No conjunctival injection or eye swelling.   LUNGS:  Nonlabored breathing, no cough or audible wheezing, able to speak full sentences.  MSK: Full ROM UE.    SKIN: No rash on exposed skin.   NEURO: CN grossly intact,  speech normal  PSYCH: Mentation appears normal, insight and judgement intact      Labs:    11/17/23 11:52   Sodium 140   Potassium 4.3   Chloride 103   Carbon Dioxide (CO2) 27   Urea Nitrogen 21.0   Creatinine 1.18 (H)   GFR Estimate 52 (L)   Calcium 9.0   Anion Gap 10   Magnesium 2.1   Phosphorus 4.1   Albumin 4.2   Protein Total 7.0   Alkaline Phosphatase 163 (H)    (H)   AST 81 (H)   Bilirubin Total 0.3     Imaging:   EXAM: MR LUMBAR SPINE W/O and W CONTRAST  LOCATION: [facility]  DATE: 11/30/2023     INDICATION: low back pain, radiating left leg pain, known breast cancer and bony metastasis  COMPARISON: None.  CONTRAST: 8.5mL Gadavist  TECHNIQUE: Routine Lumbar Spine MRI without and with IV contrast.     FINDINGS:   Nomenclature is based on 5 lumbar type vertebral bodies. Diffuse osseous metastatic disease. L1 vertebral body mild to moderate chronic compression deformity possibly pathologic. Remaining vertebral body heights within normal limits.  Mild presacral   enhancement measuring 4 mm suspect extraosseous extension of bone metastases. No extraosseous extension into the spinal canal or neural foramina. Normal distal spinal cord and cauda equina with conus medullaris at L2-L3. No intrathecal pathologic   enhancement.  No significant subluxations.     Multilevel degenerative disc disease. Disc space heights are essentially maintained. Multilevel facet arthropathy.     T12-L1: No significant bulge or posterior disc protrusion. No significant thecal sac stenosis. No significant neural foraminal stenosis.       L1-L2: Mild bulge. No significant thecal sac stenosis. No significant neural foraminal stenosis.     L2-L3: Mild bulge. No significant thecal sac stenosis. No significant neural foraminal stenosis.     L3-L4: Mild bulge. Mild hypertrophic facet arthropathy. No significant thecal sac stenosis. No significant neural foraminal stenosis.     L4-L5: Mild bulge.  Hypertrophic facet arthropathy. Mild to moderate thecal sac stenosis. Mild bilateral foraminal stenosis.     L5-S1: Mild bulge. Hypertrophic facet arthropathy. No significant thecal sac stenosis. Mild bilateral foraminal stenosis.     EXTRASPINAL FINDINGS:  No significant findings.                                                                       IMPRESSION:  1.  Diffuse osseous metastatic disease.      2.  L1 vertebral body mild to moderate chronic compression deformity possibly pathologic.      3.  Remaining vertebral body heights within normal limits.      4.  Mild presacral enhancement suspect extraosseous extension of bone metastases.      5.  No extraosseous extension into the spinal canal or neural foramina.      6.  No intrathecal pathologic enhancement.      7.  Multilevel spondylosis described above.     8.  No critical thecal sac stenosis.     9.  No severe high-grade neural foraminal stenosis.    Impression/Plan: Kesha Zacarias is a 63 year old female with metastatic breast cancer, ER/VT positive, HER2 negative, osseous lesions and will be starting ribociclib.    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  Dr. Maravilla recommended starting 600 mg of ribociclib, but LFTs were high, therefore dose of ribociclib was reduced to 400 mg, but she has not started yet.  We are waiting for her LFTs to improve.  Today, , still slightly above 3 times ULN and spoke to [facility], we will continue to hold for another week and then recheck.  EKG reviewed and QTc okay. Hopefully next week, we will be able to start ribociclib 400 mg daily.  -Repeat CMP in 1 week  -12/15 Dr. Maravilla    Back pain: PET scan on 10/6/2023 with multiple lesions in the spine, largest sclerotic lesion in the sacral body.  She now has new back pain with radiation down the left leg,  concerning for sciatica, but given her history, I will obtain a stat MRI of the lumbar spine.  We will continue to limit ibuprofen and Tylenol due to her renal and liver function.    Addendum 12/1: MRI lumbar spine on 11/30/2023 with diffuse osseous metastatic disease.  Possible pathologic compression deformity at L1.  I will put in radiation oncology and neurosurgery consults and have updated the patient via [employer].    LFTs: Improving, see plan above.      Chart documentation with Dragon Voice recognition Software. Although reviewed after completion, some words and grammatical errors may remain.    40 minutes spent on the date of the encounter doing chart review, review of test results, interpretation of tests, patient visit, documentation, and discussion with other provider(s)     Aurora Coker PA-C  Hematology/Oncology  [facility]

## 2023-11-20 NOTE — LETTER
11/20/2023         RE: Kesha Zacarias  [address] [address] [place] [id number]        Dear Colleague,    Thank you for referring your patient, Kesha Zacarias, to the [facility]. Please see a copy of my visit note below.    Oncology/Hematology Visit Note    Nov 20, 2023    Reason for visit: Follow up metastatic breast cancer    Oncology HPI: Kesha Zacarias is a 63 year old female with left-sided breast cancer.  In September 2023, she noticed a mass with nipple retraction and mammogram revealed focal dense tissue with anterior skin thickening.  Ultrasound with 3 x 1.7 x 3.9 cm mass, biopsy with multifocal lobular carcinoma in situ and left axillary lymph node positive for metastatic lobular carcinoma.  ER/MO positive, HER2 negative.  PET scan in October 23 with multiple FDG avid lesions in the bony structures of the head and neck.  Brain MRI with calvarial lesions.    Dr. Maravilla recommended ribociclib 400 mg daily with Zometa monthly.  Video visit today for close follow-up, EKG and labs.    Interval History: Kesha is over video today and she has had a few rough days with back pain and left leg pain.  She was doing well until Friday, she was walking through the grocery store and had a significant pain in her back and now the pain is shooting down her left leg.  She is able to walk, but it is painful.  She has been sitting on a heating pad with some relief.  She took some ibuprofen yesterday, but she is trying to limit ibuprofen and Tylenol due to her kidney and liver function.  No recent fall or injury.  She has arrival cyclo-40 mg at home and waiting to get the greenlight to restart.  She is pretty anxious and bummed that she has not been able to restart lately.  No other complaints.    Review of Systems: See interval hx. Denies fevers, chills, HA, dizziness, changes in vision, cough, sore throat, CP, SOB, abdominal pain, N/V, diarrhea, bleeding, bruising.     PMHx and Social  Hx reviewed per EPIC.      Medications:  Current Outpatient Medications   Medication Sig Dispense Refill     aspirin 81 MG tablet Take 81 mg by mouth daily       betamethasone dipropionate (DIPROSONE) 0.05 % external lotion Apply topically 2 times daily 60 mL 3     COENZYME Q-10 PO Take 1 tablet by mouth every other day       cyclobenzaprine (FLEXERIL) 5 MG tablet TAKE 1 TABLET(5 MG) BY MOUTH AT BEDTIME 90 tablet 3     docusate sodium (COLACE) 100 MG capsule Take 1 capsule (100 mg) by mouth 3 times daily as needed for constipation 90 capsule 3     fish oil-omega-3 fatty acids 1000 MG capsule Take 2 g by mouth daily       hydroxychloroquine (PLAQUENIL) 200 MG tablet TAKE 2 AND 1/2 TABLETS BY MOUTH EVERY  tablet 3     lactobacillus rhamnosus, GG, (CULTURELL) capsule Take 1 capsule by mouth 2 times daily       letrozole (FEMARA) 2.5 MG tablet Take 1 tablet (2.5 mg) by mouth every morning for 28 days 28 tablet 11     loperamide (IMODIUM A-D) 2 MG tablet When diarrhea starts take 2 tabs (4mg), then with each additional episode of diarrhea take 1 additional tab and if needing more than 8 tabs in 24 hrs call oncology 30 tablet 3     magnesium 250 MG tablet Take 1 tablet by mouth daily       ondansetron (ZOFRAN) 4 MG tablet Take 1 tablet (4 mg) by mouth every 6 hours as needed for nausea 30 tablet 3     prochlorperazine (COMPAZINE) 10 MG tablet Take 1 tablet (10 mg) by mouth every 6 hours as needed for nausea or vomiting 30 tablet 2     [START ON 11/20/2023] ribociclib (KISQALI) 200 MG tablet Take 2 tablets (400 mg) by mouth every morning for 21 days , then off for 7 days. 42 tablet 0     sennosides (SENOKOT) 8.6 MG tablet Take 1 tablet by mouth daily 60 tablet 3       Allergies   Allergen Reactions     Ciprofloxacin      hives and was on flagyl too     Metronidazole      hives and was on cipro too         EXAM:    LMP 11/22/2011 (Exact Date)  Video, no VS.     GENERAL:  Female, in no acute distress.  Alert and  oriented x3. Well groomed.   HEENT:  Normocephalic, atraumatic. No conjunctival injection or eye swelling.   LUNGS:  Nonlabored breathing, no cough or audible wheezing, able to speak full sentences.  MSK: Full ROM UE.    SKIN: No rash on exposed skin.   NEURO: CN grossly intact, speech normal  PSYCH: Mentation appears normal, insight and judgement intact      Labs:    11/17/23 11:52   Sodium 140   Potassium 4.3   Chloride 103   Carbon Dioxide (CO2) 27   Urea Nitrogen 21.0   Creatinine 1.18 (H)   GFR Estimate 52 (L)   Calcium 9.0   Anion Gap 10   Magnesium 2.1   Phosphorus 4.1   Albumin 4.2   Protein Total 7.0   Alkaline Phosphatase 163 (H)    (H)   AST 81 (H)   Bilirubin Total 0.3     Imaging: [email or web address]    Impression/Plan: Kesha Zacarias is a 63 year old female with metastatic breast cancer, ER/[place] positive, HER2 negative, osseous lesions and will be starting ribociclib.    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  Dr. Maravilla recommended starting 600 mg of ribociclib, but LFTs were high, therefore dose of ribociclib was reduced to 400 mg, but she has not started yet.  We are waiting for her LFTs to improve.  Today, , still slightly above 3 times ULN and spoke to [facility], we will continue to hold for another week and then recheck.  EKG reviewed and QTc okay. Hopefully next week, we will be able to start ribociclib 400 mg daily.  -Repeat CMP in 1 week  -12/15 Dr. Maravilla    Back pain: PET scan on 10/6/2023 with multiple lesions in the spine, largest sclerotic lesion in the sacral body.  She now has new back pain with radiation down the left leg, concerning for sciatica, but given her history, I will obtain a stat MRI of the lumbar spine.  We will continue to limit ibuprofen and Tylenol due to her renal and liver function.    LFTs: Improving, see  plan above.      Chart documentation with Dragon Voice recognition Software. Although reviewed after completion, some words and grammatical errors may remain.    40 minutes spent on the date of the encounter doing chart review, review of test results, interpretation of tests, patient visit, documentation, and discussion with other provider(s)     Aurora Coker PA-C  Hematology/Oncology  [facility]                  Again, thank you for allowing me to participate in the care of your patient.        Sincerely,        Aurora Coker PA-C

## 2023-11-20 NOTE — TELEPHONE ENCOUNTER
Aurora Hastings PA-C  You; Adele Flores, RNJust now (1:55 PM)     AK  If she is ok with waiting to discuss at our visit, I think it's fine. If she is in severe pain and needs to be evaluated in person, then she should go to the ED.     Patient will wait to discuss at appointment with Aurora. She will go to the ED in the meantime if pain becomes more severe or is intolerable.

## 2023-11-20 NOTE — TELEPHONE ENCOUNTER
Patient called to report that she is having sharp lower back pain on the left side. When she is sitting she is fine, but when she gets up to walk she would rate it 8/10. Using a heating pad which is helping a little. The pain shoots down her left butt cheek and left leg, and she is having difficulty using left leg to walk. She has never had anything like this before. Pain started on Friday but wasn't as severe at first. It has gotten progressively worse over the weekend. She took 600 mg ibuprofen yesterday, which helped, but she has been avoiding ibuprofen and Tylenol because of elevated liver enzymes. After she took it yesterday her pain went down to a 4/10. Denies numbness or tingling in left leg, but states that when she lifts her left the pain shoots down into but cheek and the leg doesn't want to move. Denies weakness in the leg. Denies loss of control of bowels or bladder, chest pain, or numbness/tingling in groin area. States she had some tingling in the left but cheek yesterday, but not today.     The pain is not bothering her much at night. She takes Flexeril at night.     She has an appointment later today with Aurora.    Will route to Aurora for recommendations.    Alayna Arana, RN  Triage Nurse Advisor  [facility]

## 2023-11-21 ENCOUNTER — TELEPHONE (OUTPATIENT)
Dept: ONCOLOGY | Facility: CLINIC | Age: 63
End: 2023-11-21
Payer: COMMERCIAL

## 2023-11-21 ENCOUNTER — PATIENT OUTREACH (OUTPATIENT)
Dept: CARE COORDINATION | Facility: CLINIC | Age: 63
End: 2023-11-21
Payer: COMMERCIAL

## 2023-11-21 NOTE — TELEPHONE ENCOUNTER
Spoke with patient regarding MR lumbar spine availability. Next available was 11/30/23. This RN provided imaging scheduling number to move appointment per her schedule due to the holiday week/weekend and patient going out of town. This RN arranged lab only appointment on Monday 11/27/23. No further questions or concerns.  Felisha Cueto RNCC

## 2023-11-21 NOTE — PROGRESS NOTES
Social Work - Distress Screen Intervention  [facility]    Identified Concern and Score from Distress Screenin. How concerned are you about your ability to eat? 0     2. How concerned are you about unintended weight loss or your current weight? 0     3. How concerned are you about feeling depressed or very sad?  0     4. How concerned are you about feeling anxious or very scared?  0     5. Do you struggle with the loss of meaning and marilu in your life?  Not at all     6. How concerned are you about work and home life issues that may be affected by your cancer?  0     7. How concerned are you about knowing what resources are available to help you?  (!) 10     8. Do you currently have what you would describe as Islam or spiritual struggles? Not at all     9. If you want to be contacted by one of our professionals, I can send a message to them right now.  Oncology Social Worker       Date of Distress Screen:  23  Data: At time of last visit, patient scored positive on distress screening.  outreached to patient today to follow up on elevated distress and introduce psychosocial services and support.  Intervention/Education provided:  contacted patient by phone to discuss distress screening results.  Pt indicated an interest in oncology emotional support resources and financial resources.  Pt currently works and has [employer] insurance.  SW discussed/provided pt with the following resources via [employer]:    Financial:  [employer] [phone]      https://[place]angel.org/financialassistance/     General Sav: Up to $850 (one time only)     Purnima carvalho Fund: $200 (one time only)     Financial Cancer Care: Participate in webinar and receive $250 gift cards for gas or groceries     The [employer] [place] can be used for rent/mortgage payment, home/cell phone bill, gas, electric or water bill, cub food gift cards, gas gift cards.       Pay It Forward Fund  [phone]      https://www.payitforwardfund.net/apply-for-help     Available to patients with a diagnosis of Breast, Ovarian, Uterine, Cervical, and/or Endometrial Cancer. Funds can be used for rent/mortgage payment, home/cell phone bill, gas, electric or water bill.     Hope [employer] ([phone]     https://www.[employer]/get-help/emergency-assistance-grants     Patient s must be a resident of [place] and in active treatment. Hope Chest can assist with basic living expenses such as, mortgage, rent, utility bills, car payments, and licensed childcare.     [employer] Treatment Assistance [phone]     https://www.[employer].f4samurai/treatment-assistance-program/     Patients with Metastatic (stage IV) Breast Cancer will receive $500 and patients with earlier stage Breast Cancer (Stage 0 to III) will receive $300. Awards can be used for rent/housing, utilities/bills, side-effect management medication (pain, anti-nausea, etc.), childcare/elder care, oral treatment medication (chemotherapy, hormone therapy, etc.), transportation to and from treatment, durable medical equipment (oxygen tank, walker, etc.), lymphedema care and supplies, food/groceries, palliative care, home health care    Support Resources:  [employer] [facility] ([phone])     Cancer Support at [facility] ([facility]org)     Social and emotional support for anyone that has been impacted by cancer     Support groups, educational workshops, and variety of activities are held     Provides both in person and virtual programing      See [employer] brochure for more information      Tatum Newsome (1-490.418.9288)     Tatum Newsome     One on one mentoring program     Available to cancer patients and caregivers     Can request mentor online or by phone call     See Tatum Newsome brochure for more information      [employer] (1-751.857.2231)     [employer] Mentoring Program (cc.org)     One on one mentoring  program      Available to cancer patients and caregivers     Can request mentor online or by phone call     [employer] ([phone])     Breast Cancer Mentors: The [employer]     SW also provided pt with ALTON Paiz, contact/availability information.   .   Follow-up Required:   Pt will contact ALTON when she has begun her cancer treatment as to complete [employer] scot application.  ALTON will be available to assist pt with cancer support/resources as needed.    Rom Melvin, Gasper DANG, for ALTON Paiz  [facility]  [phone]

## 2023-11-27 ENCOUNTER — TELEPHONE (OUTPATIENT)
Dept: ONCOLOGY | Facility: CLINIC | Age: 63
End: 2023-11-27

## 2023-11-27 ENCOUNTER — LAB (OUTPATIENT)
Dept: LAB | Facility: CLINIC | Age: 63
End: 2023-11-27
Payer: COMMERCIAL

## 2023-11-27 DIAGNOSIS — C79.51 CARCINOMA OF LEFT BREAST METASTATIC TO BONE (H): ICD-10-CM

## 2023-11-27 DIAGNOSIS — R79.89 LFT ELEVATION: ICD-10-CM

## 2023-11-27 DIAGNOSIS — C50.912 CARCINOMA OF LEFT BREAST METASTATIC TO BONE (H): ICD-10-CM

## 2023-11-27 LAB
ALBUMIN SERPL BCG-MCNC: 3.9 G/DL (ref 3.5–5.2)
ALP SERPL-CCNC: 128 U/L (ref 40–150)
ALT SERPL W P-5'-P-CCNC: 90 U/L (ref 0–50)
ANION GAP SERPL CALCULATED.3IONS-SCNC: 10 MMOL/L (ref 7–15)
AST SERPL W P-5'-P-CCNC: 55 U/L (ref 0–45)
BILIRUB SERPL-MCNC: 0.3 MG/DL
BUN SERPL-MCNC: 28.4 MG/DL (ref 8–23)
CALCIUM SERPL-MCNC: 8.9 MG/DL (ref 8.8–10.2)
CHLORIDE SERPL-SCNC: 105 MMOL/L (ref 98–107)
CREAT SERPL-MCNC: 1.13 MG/DL (ref 0.51–0.95)
DEPRECATED HCO3 PLAS-SCNC: 27 MMOL/L (ref 22–29)
EGFRCR SERPLBLD CKD-EPI 2021: 55 ML/MIN/1.73M2
GLUCOSE SERPL-MCNC: 103 MG/DL (ref 70–99)
POTASSIUM SERPL-SCNC: 4.3 MMOL/L (ref 3.4–5.3)
PROT SERPL-MCNC: 6.4 G/DL (ref 6.4–8.3)
SODIUM SERPL-SCNC: 142 MMOL/L (ref 135–145)

## 2023-11-27 PROCEDURE — 80053 COMPREHEN METABOLIC PANEL: CPT

## 2023-11-27 PROCEDURE — 36415 COLL VENOUS BLD VENIPUNCTURE: CPT

## 2023-11-27 NOTE — TELEPHONE ENCOUNTER
Oral Chemotherapy Program  Lab review     Reviewed labs from 11/27/2023.     Labs are unremarkable, LFTs have improved to <3x ULN and patient may start ribociclib. Patient was contacted via telephone and informed that she may start, she plans to do so today.      Follow-up/plan  12/4/23: One week follow-up call and labs     Yasmin Etienne PharmD, MPH, BCPS  November 27, 2023

## 2023-11-27 NOTE — TELEPHONE ENCOUNTER
Writer called pt to inform her of recommendations from Aurora MARTINEZ.     Aurora Hastings, JULIUS  You; Adele Flores, RN7 minutes ago (9:43 AM)     AK  I wonder why it has taken so long for her to get the stat MRI?    Looks like she has Flexeril at home, she can take that 3 times a day.  I can also send a few tablets of oxycodone for now, if she prefers.     Pt verbalized understanding but stated that at this point she does not want to take either flexeril or oxycodone as they both make her tired. She would like to continue using minimal amounts of ibuprofen at this time as her creatinine levels were down this morning from previous levels. She understands to continue to minimize use of tylenol and ibuprofen. Advised to continue to use heating pad as well. Also offered to assist pt in getting to imaging scheduling line to possibly move MRI sooner if there was a cancellation. Pt is not interested in that at this time. Pt will call back if she changes her mind regarding flexeril or oxycodone.    Will route to provider to update.

## 2023-11-30 ENCOUNTER — HOSPITAL ENCOUNTER (OUTPATIENT)
Dept: MRI IMAGING | Facility: CLINIC | Age: 63
Discharge: HOME | End: 2023-11-30
Attending: PHYSICIAN ASSISTANT | Admitting: PHYSICIAN ASSISTANT
Payer: COMMERCIAL

## 2023-11-30 DIAGNOSIS — M54.42 ACUTE LEFT-SIDED LOW BACK PAIN WITH LEFT-SIDED SCIATICA: ICD-10-CM

## 2023-11-30 PROCEDURE — A9585 GADOBUTROL INJECTION: HCPCS | Mod: JZ | Performed by: PHYSICIAN ASSISTANT

## 2023-11-30 PROCEDURE — 255N000002 HC RX 255 OP 636: Mod: JZ | Performed by: PHYSICIAN ASSISTANT

## 2023-11-30 PROCEDURE — 72158 MRI LUMBAR SPINE W/O & W/DYE: CPT

## 2023-11-30 RX ORDER — GADOBUTROL 604.72 MG/ML
10 INJECTION INTRAVENOUS ONCE
Status: COMPLETED | OUTPATIENT
Start: 2023-11-30 | End: 2023-11-30

## 2023-11-30 RX ADMIN — GADOBUTROL 8.5 ML: 604.72 INJECTION INTRAVENOUS at 17:01

## 2023-12-01 ENCOUNTER — MYC MEDICAL ADVICE (OUTPATIENT)
Dept: ONCOLOGY | Facility: CLINIC | Age: 63
End: 2023-12-01
Payer: COMMERCIAL

## 2023-12-01 ENCOUNTER — PATIENT OUTREACH (OUTPATIENT)
Dept: ONCOLOGY | Facility: CLINIC | Age: 63
End: 2023-12-01
Payer: COMMERCIAL

## 2023-12-01 NOTE — PROGRESS NOTES
New Patient Oncology Nurse Navigator Note     Referring provider: Aurora Coker PA-C      Referring Clinic/Organization: [facility]     Referred to (specialty:) Radiation Oncology     Requested provider (if applicable): NA -Location [place]     Date Referral Received: December 1, 2023     Evaluation for:    M54.42 (ICD-10-CM) - Acute left-sided low back pain with left-sided sciatica   C50.912, C79.51 (ICD-10-CM) - Carcinoma of left breast metastatic to bone (H)     Clinical History (per Nurse review of records provided):      Patient seen in follow up for metastatic breast cancer on 11/20/23.     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  Dr. Maravilla recommended starting 600 mg of ribociclib, but LFTs were high, therefore dose of ribociclib was reduced to 400 mg, but she has not started yet.  We are waiting for her LFTs to improve.  Today, , still slightly above 3 times ULN and spoke to [place] pharmacy, we will continue to hold for another week and then recheck.  EKG reviewed and QTc okay. Hopefully next week, we will be able to start ribociclib 400 mg daily.  -Repeat CMP in 1 week  -12/15 Dr. Maravilla     Back pain: PET scan on 10/6/2023 with multiple lesions in the spine, largest sclerotic lesion in the sacral body.  She now has new back pain with radiation down the left leg, concerning for sciatica, but given her history, I will obtain a stat MRI of the lumbar spine.  We will continue to limit ibuprofen and Tylenol due to her renal and liver function.     Addendum 12/1: MRI lumbar spine on 11/30/2023 with diffuse osseous metastatic disease.  Possible pathologic compression deformity at L1.  I will put in radiation oncology and neurosurgery consults and have updated the patient via  [employer].     Records Location: See Bookmarked material     Records Needed: NA     Additional testing needed prior to consult: NA    Payor: Enbridge / Plan: Enbridge OPEN ACCESS / Product Type: O /     December 1, 2023  Referral received and reviewed.   Transferred to NPS to schedule.     Haley CHEATHAM, RN   Oncology Nurse Navigator   [facility]   [phone] / [phone]     12/27 - Patient informed New Patient Scheduling she wanted to wait to schedule radiation oncology consult until after she met with neurosurgeon. She is scheduled to meet with Martínez Phillips MD on 12/28. Sury Leiva RN on 12/27/2023 at 11:01 AM    12/28 - Telephoned and spoke with Kesha. She would like confirmation Dr. Maravilla wants her to meet with radiation oncology before proceeding with scheduling. Dr. Maravilla is currently out of clinic until 1/2. Ordering provider Aurora Coker is also out of clinic until 1/2. Referral message sent to both providers to confirm rad onc consult still needed. A hold remains on rad onc consult with Dr. Mandujano at  on 1/5.

## 2023-12-04 ENCOUNTER — LAB (OUTPATIENT)
Dept: LAB | Facility: CLINIC | Age: 63
End: 2023-12-04
Payer: COMMERCIAL

## 2023-12-04 ENCOUNTER — HOSPITAL ENCOUNTER (OUTPATIENT)
Dept: CARDIOLOGY | Facility: CLINIC | Age: 63
Discharge: HOME | End: 2023-12-04
Attending: PHYSICIAN ASSISTANT | Admitting: PHYSICIAN ASSISTANT
Payer: COMMERCIAL

## 2023-12-04 ENCOUNTER — TELEPHONE (OUTPATIENT)
Dept: ONCOLOGY | Facility: CLINIC | Age: 63
End: 2023-12-04

## 2023-12-04 DIAGNOSIS — R79.89 LFT ELEVATION: ICD-10-CM

## 2023-12-04 DIAGNOSIS — C79.51 MALIGNANT NEOPLASM METASTATIC TO BONE (H): ICD-10-CM

## 2023-12-04 DIAGNOSIS — C79.51 CARCINOMA OF LEFT BREAST METASTATIC TO BONE (H): ICD-10-CM

## 2023-12-04 DIAGNOSIS — C50.919 CARCINOMA OF BREAST METASTATIC TO BONE, UNSPECIFIED LATERALITY (H): Primary | ICD-10-CM

## 2023-12-04 DIAGNOSIS — C79.51 CARCINOMA OF LEFT BREAST METASTATIC TO BONE (H): Primary | ICD-10-CM

## 2023-12-04 DIAGNOSIS — C50.912 CARCINOMA OF LEFT BREAST METASTATIC TO BONE (H): ICD-10-CM

## 2023-12-04 DIAGNOSIS — M54.42 ACUTE LEFT-SIDED LOW BACK PAIN WITH LEFT-SIDED SCIATICA: ICD-10-CM

## 2023-12-04 DIAGNOSIS — C79.51 CARCINOMA OF BREAST METASTATIC TO BONE, UNSPECIFIED LATERALITY (H): Primary | ICD-10-CM

## 2023-12-04 DIAGNOSIS — C50.912 CARCINOMA OF LEFT BREAST METASTATIC TO BONE (H): Primary | ICD-10-CM

## 2023-12-04 LAB
ALBUMIN SERPL BCG-MCNC: 4 G/DL (ref 3.5–5.2)
ALP SERPL-CCNC: 118 U/L (ref 40–150)
ALT SERPL W P-5'-P-CCNC: 35 U/L (ref 0–50)
ANION GAP SERPL CALCULATED.3IONS-SCNC: 11 MMOL/L (ref 7–15)
AST SERPL W P-5'-P-CCNC: 26 U/L (ref 0–45)
BASOPHILS # BLD AUTO: 0 10E3/UL (ref 0–0.2)
BASOPHILS NFR BLD AUTO: 1 %
BILIRUB SERPL-MCNC: 0.4 MG/DL
BUN SERPL-MCNC: 21.4 MG/DL (ref 8–23)
CALCIUM SERPL-MCNC: 9.1 MG/DL (ref 8.8–10.2)
CHLORIDE SERPL-SCNC: 106 MMOL/L (ref 98–107)
CREAT SERPL-MCNC: 1.52 MG/DL (ref 0.51–0.95)
DEPRECATED HCO3 PLAS-SCNC: 26 MMOL/L (ref 22–29)
EGFRCR SERPLBLD CKD-EPI 2021: 38 ML/MIN/1.73M2
EOSINOPHIL # BLD AUTO: 0.1 10E3/UL (ref 0–0.7)
EOSINOPHIL NFR BLD AUTO: 3 %
ERYTHROCYTE [DISTWIDTH] IN BLOOD BY AUTOMATED COUNT: 14.3 % (ref 10–15)
GLUCOSE SERPL-MCNC: 99 MG/DL (ref 70–99)
HCT VFR BLD AUTO: 37 % (ref 35–47)
HGB BLD-MCNC: 11.7 G/DL (ref 11.7–15.7)
IMM GRANULOCYTES # BLD: 0 10E3/UL
IMM GRANULOCYTES NFR BLD: 0 %
LYMPHOCYTES # BLD AUTO: 1.5 10E3/UL (ref 0.8–5.3)
LYMPHOCYTES NFR BLD AUTO: 44 %
MAGNESIUM SERPL-MCNC: 2.2 MG/DL (ref 1.7–2.3)
MCH RBC QN AUTO: 29.5 PG (ref 26.5–33)
MCHC RBC AUTO-ENTMCNC: 31.6 G/DL (ref 31.5–36.5)
MCV RBC AUTO: 93 FL (ref 78–100)
MONOCYTES # BLD AUTO: 0.2 10E3/UL (ref 0–1.3)
MONOCYTES NFR BLD AUTO: 5 %
NEUTROPHILS # BLD AUTO: 1.6 10E3/UL (ref 1.6–8.3)
NEUTROPHILS NFR BLD AUTO: 47 %
NRBC # BLD AUTO: 0 10E3/UL
NRBC BLD AUTO-RTO: 0 /100
PHOSPHATE SERPL-MCNC: 3.7 MG/DL (ref 2.5–4.5)
PLATELET # BLD AUTO: 203 10E3/UL (ref 150–450)
POTASSIUM SERPL-SCNC: 4.7 MMOL/L (ref 3.4–5.3)
PROT SERPL-MCNC: 6.6 G/DL (ref 6.4–8.3)
RBC # BLD AUTO: 3.96 10E6/UL (ref 3.8–5.2)
SODIUM SERPL-SCNC: 143 MMOL/L (ref 135–145)
WBC # BLD AUTO: 3.5 10E3/UL (ref 4–11)

## 2023-12-04 PROCEDURE — 85025 COMPLETE CBC W/AUTO DIFF WBC: CPT | Performed by: INTERNAL MEDICINE

## 2023-12-04 PROCEDURE — 36415 COLL VENOUS BLD VENIPUNCTURE: CPT

## 2023-12-04 PROCEDURE — 80053 COMPREHEN METABOLIC PANEL: CPT | Performed by: INTERNAL MEDICINE

## 2023-12-04 PROCEDURE — 93005 ELECTROCARDIOGRAM TRACING: CPT | Performed by: REHABILITATION PRACTITIONER

## 2023-12-04 PROCEDURE — 83735 ASSAY OF MAGNESIUM: CPT | Performed by: INTERNAL MEDICINE

## 2023-12-04 PROCEDURE — 84100 ASSAY OF PHOSPHORUS: CPT | Performed by: INTERNAL MEDICINE

## 2023-12-04 RX ORDER — OXYCODONE HYDROCHLORIDE 5 MG/1
5 TABLET ORAL EVERY 6 HOURS PRN
Qty: 30 TABLET | Refills: 0 | Status: SHIPPED | OUTPATIENT
Start: 2023-12-04 | End: 2024-01-26

## 2023-12-04 NOTE — ORAL ONC MGMT
"Oral Chemotherapy Monitoring Program    Primary Oncologist: Dr. Maravilla  Drug: Ribociclib (Kisqali) 400 mg daily, days 1-21 of 28  Start Date: 11/27/23    Subjective/Objective:  Kesha Zacarias is a 63 year old female contacted by phone for a follow-up visit for oral chemotherapy. Kesha is doing well on ribociclib so far with minimal side effects. She has had some constipation and upset stomach but hasn't felt the need to use supportive medications for either of these. She is happy to hear that her LFTs are normal on today's labs.         12/4/2023    10:00 AM   ORAL CHEMOTHERAPY   Assessment Type Initial Follow up   Diagnosis Code Breast Cancer   Providers Dr. Maravilla   Clinic Name/Location [facility]   Drug Name Kisqali (ribociclib)   Dose 400 mg   Current Schedule Daily   Cycle Details 3 weeks on, 1 week off   Start Date of Last Cycle 11/27/2023   Planned next cycle start date 12/25/2023   Doses missed in last 2 weeks 0   Adherence Assessment Adherent       Vitals:  BP:   BP Readings from Last 1 Encounters:   10/26/23 116/76     Wt Readings from Last 1 Encounters:   11/20/23 86.6 kg (191 lb)     Estimated body surface area is 1.99 meters squared as calculated from the following:    Height as of 11/9/23: 1.651 m (5' 5\").    Weight as of 11/20/23: 86.6 kg (191 lb).    Labs:  _  Result Component Current Result Ref Range   Sodium 143 (12/4/2023) 135 - 145 mmol/L     _  Result Component Current Result Ref Range   Potassium 4.7 (12/4/2023) 3.4 - 5.3 mmol/L     _  Result Component Current Result Ref Range   Calcium 9.1 (12/4/2023) 8.8 - 10.2 mg/dL     _  Result Component Current Result Ref Range   Magnesium 2.2 (12/4/2023) 1.7 - 2.3 mg/dL     _  Result Component Current Result Ref Range   Phosphorus 3.7 (12/4/2023) 2.5 - 4.5 mg/dL     _  Result Component Current Result Ref Range   Albumin 4.0 (12/4/2023) 3.5 - 5.2 g/dL     _  Result Component Current Result Ref Range   Urea Nitrogen 21.4 (12/4/2023) 8.0 - 23.0 mg/dL "     _  Result Component Current Result Ref Range   Creatinine 1.52 (H) (12/4/2023) 0.51 - 0.95 mg/dL       _  Result Component Current Result Ref Range   AST 26 (12/4/2023) 0 - 45 U/L     _  Result Component Current Result Ref Range   ALT 35 (12/4/2023) 0 - 50 U/L     _  Result Component Current Result Ref Range   Bilirubin Total 0.4 (12/4/2023) <=1.2 mg/dL       _  Result Component Current Result Ref Range   WBC Count 3.5 (L) (12/4/2023) 4.0 - 11.0 10e3/uL     _  Result Component Current Result Ref Range   Hemoglobin 11.7 (12/4/2023) 11.7 - 15.7 g/dL     _  Result Component Current Result Ref Range   Platelet Count 203 (12/4/2023) 150 - 450 10e3/uL     No results found for ANC within last 30 days.       Assessment/Plan:  Tolerating ribociclib well, will continue current dose for now pending follow up with Dr. Maravilla.     Follow-Up:  12/15: Taiwo appt, labs, EKG    Joshua Brown, PharmD  Hematology/Oncology Clinical Pharmacist  [facility]  [phone]

## 2023-12-04 NOTE — PROGRESS NOTES
Kesha sent a [employer] message and this morning stating that her back pain has been pretty bad over the weekend.  Radiation oncology and neurosurgery referrals were placed on 12/1/2023.  I will send a prescription for oxycodone 5 mg # 30 tablets to see if this helps for pain.  Recommended she also take a stool softener.

## 2023-12-05 NOTE — TELEPHONE ENCOUNTER
RECORDS STATUS - BREAST    RECORDS REQUESTED FROM: [facility] - Internal Records   DATE REQUESTED: 12/5

## 2023-12-05 NOTE — TELEPHONE ENCOUNTER
SPINE PATIENTS - NEW PROTOCOL PREVISIT    RECORDS RECEIVED FROM: Internal   REASON FOR VISIT: back pain, breast cancer with bone mets, compression deformity L1    Date of Appt: 12/28/2023   NOTES (FOR ALL VISITS) STATUS DETAILS   OFFICE NOTE from referring provider Internal 12/04/2023 Dr John Paul Berkowitz [facility]   11/20/2023 Dr John Paul Berkowitz [facility]    OFFICE NOTE from other specialist N/A    DISCHARGE SUMMARY from hospital N/A    DISCHARGE REPORT from ER N/A    OPERATIVE REPORT N/A    EMG REPORT N/A    MEDICATION LIST N/A    IMAGING  (FOR ALL VISITS)     MRI (HEAD, NECK, SPINE) Internal 11/30/2023 lumbar spine   XRAY (SPINE) *NEUROSURGERY* N/A    CT (HEAD, NECK, SPINE) N/A

## 2023-12-11 ENCOUNTER — TRANSFERRED RECORDS (OUTPATIENT)
Dept: HEALTH INFORMATION MANAGEMENT | Facility: CLINIC | Age: 63
End: 2023-12-11
Payer: COMMERCIAL

## 2023-12-15 ENCOUNTER — INFUSION THERAPY VISIT (OUTPATIENT)
Dept: INFUSION THERAPY | Facility: CLINIC | Age: 63
End: 2023-12-15
Payer: COMMERCIAL

## 2023-12-15 ENCOUNTER — ONCOLOGY VISIT (OUTPATIENT)
Dept: ONCOLOGY | Facility: CLINIC | Age: 63
End: 2023-12-15
Attending: INTERNAL MEDICINE
Payer: COMMERCIAL

## 2023-12-15 ENCOUNTER — LAB (OUTPATIENT)
Dept: LAB | Facility: CLINIC | Age: 63
End: 2023-12-15
Payer: COMMERCIAL

## 2023-12-15 VITALS
OXYGEN SATURATION: 100 % | BODY MASS INDEX: 33.49 KG/M2 | HEIGHT: 65 IN | SYSTOLIC BLOOD PRESSURE: 155 MMHG | HEART RATE: 63 BPM | WEIGHT: 201 LBS | DIASTOLIC BLOOD PRESSURE: 73 MMHG | TEMPERATURE: 97.6 F

## 2023-12-15 DIAGNOSIS — C79.51 CARCINOMA OF LEFT BREAST METASTATIC TO BONE (H): ICD-10-CM

## 2023-12-15 DIAGNOSIS — T45.1X5A ANTINEOPLASTIC CHEMOTHERAPY INDUCED PANCYTOPENIA (H): ICD-10-CM

## 2023-12-15 DIAGNOSIS — S32.010S COMPRESSION FRACTURE OF L1 VERTEBRA, SEQUELA: ICD-10-CM

## 2023-12-15 DIAGNOSIS — C50.919 CARCINOMA OF BREAST METASTATIC TO BONE, UNSPECIFIED LATERALITY (H): Primary | ICD-10-CM

## 2023-12-15 DIAGNOSIS — C79.51 CARCINOMA OF BREAST METASTATIC TO BONE, UNSPECIFIED LATERALITY (H): Primary | ICD-10-CM

## 2023-12-15 DIAGNOSIS — F41.1 ANXIETY ASSOCIATED WITH CANCER DIAGNOSIS (H): ICD-10-CM

## 2023-12-15 DIAGNOSIS — C80.1 ANXIETY ASSOCIATED WITH CANCER DIAGNOSIS (H): ICD-10-CM

## 2023-12-15 DIAGNOSIS — M54.42 ACUTE LEFT-SIDED LOW BACK PAIN WITH LEFT-SIDED SCIATICA: ICD-10-CM

## 2023-12-15 DIAGNOSIS — C50.912 CARCINOMA OF LEFT BREAST METASTATIC TO BONE (H): ICD-10-CM

## 2023-12-15 DIAGNOSIS — G89.3 CANCER ASSOCIATED PAIN: ICD-10-CM

## 2023-12-15 DIAGNOSIS — D61.810 ANTINEOPLASTIC CHEMOTHERAPY INDUCED PANCYTOPENIA (H): ICD-10-CM

## 2023-12-15 LAB
ALBUMIN SERPL BCG-MCNC: 4 G/DL (ref 3.5–5.2)
ALP SERPL-CCNC: 92 U/L (ref 40–150)
ALT SERPL W P-5'-P-CCNC: 19 U/L (ref 0–50)
ANION GAP SERPL CALCULATED.3IONS-SCNC: 8 MMOL/L (ref 7–15)
AST SERPL W P-5'-P-CCNC: 27 U/L (ref 0–45)
BASOPHILS # BLD AUTO: 0 10E3/UL (ref 0–0.2)
BASOPHILS NFR BLD AUTO: 1 %
BILIRUB SERPL-MCNC: 0.2 MG/DL
BUN SERPL-MCNC: 21.2 MG/DL (ref 8–23)
CALCIUM SERPL-MCNC: 8.7 MG/DL (ref 8.8–10.2)
CHLORIDE SERPL-SCNC: 106 MMOL/L (ref 98–107)
CREAT SERPL-MCNC: 1.22 MG/DL (ref 0.51–0.95)
DEPRECATED HCO3 PLAS-SCNC: 27 MMOL/L (ref 22–29)
EGFRCR SERPLBLD CKD-EPI 2021: 50 ML/MIN/1.73M2
EOSINOPHIL # BLD AUTO: 0.1 10E3/UL (ref 0–0.7)
EOSINOPHIL NFR BLD AUTO: 2 %
ERYTHROCYTE [DISTWIDTH] IN BLOOD BY AUTOMATED COUNT: 14.7 % (ref 10–15)
GLUCOSE SERPL-MCNC: 96 MG/DL (ref 70–99)
HCT VFR BLD AUTO: 33.5 % (ref 35–47)
HGB BLD-MCNC: 10.7 G/DL (ref 11.7–15.7)
IMM GRANULOCYTES # BLD: 0 10E3/UL
IMM GRANULOCYTES NFR BLD: 0 %
LYMPHOCYTES # BLD AUTO: 1.5 10E3/UL (ref 0.8–5.3)
LYMPHOCYTES NFR BLD AUTO: 44 %
MAGNESIUM SERPL-MCNC: 2.5 MG/DL (ref 1.7–2.3)
MCH RBC QN AUTO: 30.2 PG (ref 26.5–33)
MCHC RBC AUTO-ENTMCNC: 31.9 G/DL (ref 31.5–36.5)
MCV RBC AUTO: 95 FL (ref 78–100)
MONOCYTES # BLD AUTO: 0.2 10E3/UL (ref 0–1.3)
MONOCYTES NFR BLD AUTO: 5 %
NEUTROPHILS # BLD AUTO: 1.6 10E3/UL (ref 1.6–8.3)
NEUTROPHILS NFR BLD AUTO: 48 %
NRBC # BLD AUTO: 0 10E3/UL
NRBC BLD AUTO-RTO: 0 /100
PHOSPHATE SERPL-MCNC: 3.8 MG/DL (ref 2.5–4.5)
PLATELET # BLD AUTO: 143 10E3/UL (ref 150–450)
POTASSIUM SERPL-SCNC: 4.2 MMOL/L (ref 3.4–5.3)
PROT SERPL-MCNC: 6.7 G/DL (ref 6.4–8.3)
RBC # BLD AUTO: 3.54 10E6/UL (ref 3.8–5.2)
SODIUM SERPL-SCNC: 141 MMOL/L (ref 135–145)
WBC # BLD AUTO: 3.4 10E3/UL (ref 4–11)

## 2023-12-15 PROCEDURE — 36415 COLL VENOUS BLD VENIPUNCTURE: CPT

## 2023-12-15 PROCEDURE — 84100 ASSAY OF PHOSPHORUS: CPT

## 2023-12-15 PROCEDURE — 80053 COMPREHEN METABOLIC PANEL: CPT

## 2023-12-15 PROCEDURE — 99214 OFFICE O/P EST MOD 30 MIN: CPT | Mod: 25 | Performed by: INTERNAL MEDICINE

## 2023-12-15 PROCEDURE — 85025 COMPLETE CBC W/AUTO DIFF WBC: CPT

## 2023-12-15 PROCEDURE — 93005 ELECTROCARDIOGRAM TRACING: CPT

## 2023-12-15 PROCEDURE — 83735 ASSAY OF MAGNESIUM: CPT

## 2023-12-15 RX ORDER — SENNOSIDES 8.6 MG
1 TABLET ORAL 2 TIMES DAILY PRN
Qty: 60 TABLET | Refills: 3 | Status: SHIPPED | OUTPATIENT
Start: 2023-12-15 | End: 2024-01-05

## 2023-12-15 ASSESSMENT — PAIN SCALES - GENERAL: PAINLEVEL: SEVERE PAIN (6)

## 2023-12-15 NOTE — NURSING NOTE
"Oncology Rooming Note    December 15, 2023 3:46 PM   Kesha Zacarias is a 63 year old female who presents for:    Chief Complaint   Patient presents with    Nurse Visit     EKG     Initial Vitals: BP (!) 155/73   Pulse 63   Temp 97.6  F (36.4  C)   Wt 91.2 kg (201 lb)   LMP 11/22/2011 (Exact Date)   SpO2 100%   BMI 33.45 kg/m   Estimated body mass index is 33.45 kg/m  as calculated from the following:    Height as of 11/9/23: 1.651 m (5' 5\").    Weight as of this encounter: 91.2 kg (201 lb). Body surface area is 2.05 meters squared.  Severe Pain (6) Comment: Data Unavailable   Patient's last menstrual period was 11/22/2011 (exact date).  Allergies reviewed: Yes  Medications reviewed: Yes    Medications: Medication refills not needed today.  Pharmacy name entered into EPIC:    [facility] [place], [place] - [address] [facility] [place] [place] - [address] [id number] [place] - [address] [facility] [address]  [id number] [place] - [address] AT [facility] [address]  [address]  [facility] - [place], [place] - [address]  [facility] - [place], [place] - [address]    Clinical concerns: No Concerns       Negrita Hull MA            "

## 2023-12-15 NOTE — NURSING NOTE
"Oncology Rooming Note    December 15, 2023 4:05 PM   Kesha Zacarias is a 63 year old female who presents for:    Chief Complaint   Patient presents with    Oncology Clinic Visit     Initial Vitals: BP (!) 155/73   Pulse 63   Temp 97.6  F (36.4  C)   Ht 1.651 m (5' 5\")   Wt 91.2 kg (201 lb)   LMP 11/22/2011 (Exact Date)   SpO2 100%   BMI 33.45 kg/m   Estimated body mass index is 33.45 kg/m  as calculated from the following:    Height as of this encounter: 1.651 m (5' 5\").    Weight as of this encounter: 91.2 kg (201 lb). Body surface area is 2.05 meters squared.  Severe Pain (6) Comment: Data Unavailable   Patient's last menstrual period was 11/22/2011 (exact date).  Allergies reviewed: Yes  Medications reviewed: Yes    Medications: Medication refills not needed today.  Pharmacy name entered into [facility]:    [facility] [place], [place] - [address]   [facility] [place], [place] - [address] [id number]  [place] [address] [facility] [address]  [id number] [place] [address] [facility] [address]  [facility] - [place], [place] - [address]  [facility] - [place], [place] - [address]    Clinical concerns: Patient will discuss concerns with provider.        Negrita Hull MA            "

## 2023-12-15 NOTE — LETTER
"    12/15/2023         RE: Kesha Zacarias  [id number] [address] [place] [id number]        Dear Colleague,    Thank you for referring your patient, Kesha Zacarias, to the [facility]. Please see a copy of my visit note below.    [facility]    Hematology/Oncology Established Patient Follow Up Note      Today's Date: 12/15/2023     Reason for follow up: breast cancer    HISTORY OF PRESENT ILLNESS: Kesha Zacarias is a 63 year old female who presents for follow up    Patient has medical history including rheumatoid arthritis, hyperlipidemia, osteoarthritis, bilateral cataracts and glaucoma s/p surgery, endocervical polyp s/p resection, vaginal atrophy with conjugated estrogen vaginal cream use, colon polyp (hyperplastic polyp and tubular adenoma), seasonal depression, history of tobacco use ([age] y/o, about 1 ppd).    Regarding RA:  -dx over a decade ago, on plaquenil, managed by PCP  - arthritis is most severe in hands>knees, intermittent       - 3/23 bilateral screening mammogram [facility]  - a few months ago, noted nipple retraction  - 9/23 patient palpated mass in retroareolar region of left breast and w/nipple retraction, no discharge, skin thickening, no dimpling, no erythema  - 9/23 diagnostic LEFT breast mammogram: Focal dense tissue with some likely mild architectural distortion, some anterior skin thickening is noted  - 9/23 targeted LEFT breast ultrasound: Ill-defined shadowing hypoechoic mass, 3.0 x 1.7 x 3.9 cm.  In left axilla-few small lymph nodes, 1 normal-sized lymph node with cortical thickening, 0.7 x 0.6 cm.  -9/23 ultrasound-guided LEFT breast core biopsy of 12:00 lesion with clip placement, \"Postbiopsy unilateral digital mammogram of the left breast showed the clip to be at the expected biopsy site\" AND ultrasound-guided left axillary lymph node biopsy of lymph node with cortical thickening  PATHOLOGY  A.  Breast, left, 12:00, " biopsy:  -Lobular carcinoma in situ.-Multiple foci  -Invasive carcinoma is not identified.     B.  Lymph node, left axillary, biopsy:  -Positive for metastatic lobular carcinoma, grade 2  -Largest metastatic focus is 7 mm.  -Negative for extranodal extension.  -Breast ancillary testing:  -Estrogen receptor: Positive (91 to 100%, strong)  -Progesterone receptor: Positive (91 to 100%, strong)  -HER2 2+ IHC, FISH negative    -10/23 MRI bilateral breast:  LEFT breast:  - Heterogeneously enhancing irregular mass in the subareolar left breast, 5.0 x 4.9 x 4.9 cm, with associated nipple retraction and nipple/areolar involvement.  This mass extends superiorly through 11: positions, from anterior to mid depth.  The [employer] breast biopsy clip (which showed LCIS) is 1.5 cm posterosuperior to this mass.  - Multiple suspicious left level 1 axillary lymph node noted, including biopsy-proven metastatic node with indwelling biopsy marker, biopsied node measures 1.3 x 1.0 cm  - Heterogeneous marrow appearance of sternum and ribs with heterogeneous enhancement and a peripheral enhancing focus within the mid body.  IMPRESSION:  1. Upon further review of previous imaging and pathology results,  recommend repeat ultrasound guided large core-needle biopsy of the  discordant dominant left breast mass given metastatic left axillary  lymph node and superoposteriorly displaced left breast biopsy marker.   2. Nonspecific heterogeneous enhancement of the sternum and ribs.  Consider nuclear medicine bone scan    Lifetime estrogen exposure:  Menarche: 13   Last menstrual period: 49   Age of first pregnancy: 18   Number of pregnancies: 2 (no living children)   Weight gain: 20lb weight gain within a year  Exposure to exogenous estrogen: vaginal atrophy with conjugated estrogen vaginal cream use x4 years (intermittent), has not used it since 9/23. Birth control for about 13-15 years from her 20s-30s.      Pt reports 55lb weight loss in 1.5  years, this was intentional, was following weight watchers program (23 points available per day) 230lb->170lb->190lb (gained about 20lbs). Pt was walking on the treadmill and outside daily, about 30 mins to 1.5 miles.    10/23 labs:  AST 79, ALT 91,   CA 15-3 261    10/23 PET/CT:  Innumerable foci of FDG avid lesions are seen throughout the osseous  structures of the head and neck, most pronounced on the calvarium and  cervical spine, with prominently sclerotic appearance on CT correlate.  For example:  -Right frontal bone, SUV max 5.31.  -Left lateral mass of the atlas, SUV max 15.0  -Angle of the left mandible, SUV max 9.6  -C7 vertebral body, SUV max 17.7    Innumerable variable sized FDG avid lesions throughout the axial and  appendicular spine, including but not limited to the cervical,  thoracic, and lumbar spine, multilevel bilateral ribs, sternum,  bilateral scapula, bilateral humeri, clavicles, pelvis, and bilateral  femurs. A few of these lesions are described below:  -Large FDG avid sclerotic 3.4 cm lesion within the proximal left  humeral head, SUV max 17.24  -Sternal body, SUV max 20.35  -L2 vertebral body, SUV max 14.3 without  -Large ill-defined sclerotic lesion in the sacral body measuring up to  at least 5.9 cm, SUV max 16.84  -FDG avid focus within the left femoral head, SUV  max 13.65 without CT correlate.    Large irregular soft tissue FDG avid mass within the left breast   measuring approximately 3.2 x 2.7 cm with  extension into the superficial soft tissues and associated left nipple  retraction, SUV max 12.36 additional FDG avid. Left axillary lymph  nodes, not definitively enlarged by short axis size criteria, for  example, SUV max 5.93.    The liver is unremarkable.     Solid 3 mm non-FDG avid pulmonary nodule within the  right middle lobe    - 10/23 MRI brain:  - extensive osseous metastatic disease involving the calvarium, skull base w/involvement of occipital condyles, C1 and C2  vertebral bodies, and likely the mandible  -  Dominant calvarial lesions are located in the right frontal calvarium and right temporal calvarium without definite breach of the  inner or outer tables of the calvarium. There is a suggestion of mild asymmetric linear extra-axial enhancement deep to the dominant right temporal calvarial lesion along the dural margin, though this may be vascular in etiology.  - No abnormal parenchymal or leptomeningeal enhancement.   - sequelae of mild chronic microvascular ischemic disease. Mild generalized cerebral atrophy.     INTERIM HISTORY:    -supposed to start ribociclib 10/30/23 however, noted to have significantly elevated LFTS (10/26/23 , , alk phos 152)    10/17/23: AST 79, ALT 91, alk phos 116, t bili 0.5  10/26/23 , , alk phos 152  10/30/23: , , alk phos 178, coags WNL, ribociclib was not started, letrozole started, atorvastatin held  11/7/23: AST 96, , alk phos 169  11/17/23: AST 81, , alk phos 163  11/27/23: AST 55, ALT 90, alk phos 128- started ribociclib 400mg  12/4/23: AST 26, ALT 35, alk phos 118    REGIMEN:  Ribociclib+letrozole    Ribociclib 600mg PO daily day 1-21 q28 days (11/27/23 started 400mg PO daily when LFTs improved to <3x ULN, 12/18/23 started 600mg PO daily when LFTs normal)  Letrozole 2.5mg PO daily (started 10/30/23)  Febrile neutropenia risk: neutropenia (69% to 78%; grade 3: 46% to 55%; grade 4: 7% to 10%), Febrile neutropenia (1%)    C1D19 12/15/23  C2D1 12/25/23    Treatment related AE:  - elevated LFTs- improved 11/27/23 to <3x ULN (AST 55, ALT 90, alk phos 128)  to be able to start ribociclib 400mg, 11/23 MRI liver negative for mets, ?cause- possibly viral infection between 10/17/23 and 10/26/23; 12/4/23 LFTs normal, next cycle (cycle 2) ribociclib 600mg rxed  - daily HA- prior to starting tx- mostly b/l temporal regions, no sensitivity to light or sound, no N/V, takes tylenol w/o  "improvement; 10/23 MRI brain: extensive osseous mets w/dominant calvarial lesions w/enhancement deep to dominant right temporal calvarial lesion along dural margin (?vascular etiology), no parenchymal or leptomeningeal enhancement  - blurry vision b/l- last saw eye doctor 3/23 and has trifocal glasses. She has dry eyes and uses lubricating eye drops., 10/23 MRI brain as above  - constipation- probiotics, senna prn, has not used colace  - leg cramps- Mg  - back pain- left lower back, sharp, radiates to left buttock and leg making it hard to walk, worse w/walking, improved w/heating pad and ibuprofen 600mg BID, flexeril 5mg qHS, oxycodone 5mg q6hr prn #30 tabs rx 12/4/23 with stool softner- pt is using at bedtime due feeling \"loopy\" during day- this combination allows pt to be functional (10/10 previously, now 6/10), 11/30/23 MRI lumbar spine with diffuse osseous metastatic disease.  Possible pathologic compression deformity at L1. Rad on consult 12/18/23 and neurosurgery consults 12/28/23 pending  - anxiety- expressed anger at dx and pain, tearful today, has support of family and  who are great advocates for her care          REVIEW OF SYSTEMS:   A 14 point ROS was reviewed with pertinent positives and negatives in the HPI.        HOME MEDICATIONS:  Current Outpatient Medications   Medication Sig Dispense Refill     aspirin 81 MG tablet Take 81 mg by mouth daily       betamethasone dipropionate (DIPROSONE) 0.05 % external lotion Apply topically 2 times daily 60 mL 3     COENZYME Q-10 PO Take 1 tablet by mouth every other day       cyclobenzaprine (FLEXERIL) 5 MG tablet TAKE 1 TABLET(5 MG) BY MOUTH AT BEDTIME 90 tablet 3     fish oil-omega-3 fatty acids 1000 MG capsule Take 2 g by mouth daily       hydroxychloroquine (PLAQUENIL) 200 MG tablet TAKE 2 AND 1/2 TABLETS BY MOUTH EVERY  tablet 3     letrozole (FEMARA) 2.5 MG tablet Take 1 tablet (2.5 mg) by mouth every morning for 28 days 28 tablet 11     " magnesium 250 MG tablet Take 1 tablet by mouth daily       oxyCODONE (ROXICODONE) 5 MG tablet Take 1 tablet (5 mg) by mouth every 6 hours as needed for pain 30 tablet 0     sennosides (SENOKOT) 8.6 MG tablet Take 1 tablet by mouth 2 times daily as needed for constipation 60 tablet 3     docusate sodium (COLACE) 100 MG capsule Take 1 capsule (100 mg) by mouth 3 times daily as needed for constipation 90 capsule 3     lactobacillus rhamnosus, GG, (CULTURELL) capsule Take 1 capsule by mouth 2 times daily       loperamide (IMODIUM A-D) 2 MG tablet When diarrhea starts take 2 tabs (4mg), then with each additional episode of diarrhea take 1 additional tab and if needing more than 8 tabs in 24 hrs call oncology 30 tablet 3     ondansetron (ZOFRAN) 4 MG tablet Take 1 tablet (4 mg) by mouth every 6 hours as needed for nausea 30 tablet 3     prochlorperazine (COMPAZINE) 10 MG tablet Take 1 tablet (10 mg) by mouth every 6 hours as needed for nausea or vomiting 30 tablet 2     [START ON 12/25/2023] ribociclib (KISQALI) 200 MG tablet Take 3 tablets (600 mg) by mouth every morning for 21 days , then off for 7 days. 42 tablet 0         ALLERGIES:  Allergies   Allergen Reactions     Ciprofloxacin      hives and was on flagyl too     Metronidazole      hives and was on cipro too         PAST MEDICAL HISTORY:  Past Medical History:   Diagnosis Date     Arthritis 2006     Breast cancer metastasized to bone (H) 10/12/2023     Chronic depressive personality disorder      Dysplasia of cervix (uteri) 1988    Cryotherapy     Female infertility of unspecified origin          PAST SURGICAL HISTORY:  Past Surgical History:   Procedure Laterality Date     COLONOSCOPY       COLONOSCOPY N/A 1/14/2022    Procedure: COLONOSCOPY, WITH POLYPECTOMY AND BIOPSY;  Surgeon: Gagandeep Patterson MD;  Location:  GI     [facility] INTRODUCE CATH FALLOPIAN TUBE, RE-OPEN/DIAGNOSIS       HERNIA REPAIR, INCISIONAL  11/11/09     ORTHOPEDIC SURGERY  September 2017     [facility]  APPENDECTOMY  03     C REMV CATARACT INTRACAP,INSERT LENS  2003    right         SOCIAL HISTORY:  Social History     Socioeconomic History     Marital status:      Spouse name: Bandar     Number of children: 1     Years of education: Not on file     Highest education level: Not on file   Occupational History     Not on file   Tobacco Use     Smoking status: Former     Packs/day: 0.50     Years: 25.00     Additional pack years: 0.00     Total pack years: 12.50     Types: Cigarettes     Quit date: 2017     Years since quittin.2     Smokeless tobacco: Never     Tobacco comments:     quit 2017    Vaping Use     Vaping Use: Never used   Substance and Sexual Activity     Alcohol use: Yes     Comment: 1-2 weekly     Drug use: No     Sexual activity: Yes     Partners: Male     Birth control/protection: None   Other Topics Concern     Parent/sibling w/ CABG, MI or angioplasty before 65F 55M? Yes   Social History Narrative     Not on file     Social Determinants of Health     Financial Resource Strain: Not on file   Food Insecurity: Not on file   Transportation Needs: Not on file   Physical Activity: Not on file   Stress: Not on file   Social Connections: Not on file   Interpersonal Safety: Not on file   Housing Stability: Not on file         FAMILY HISTORY:  Family History   Problem Relation Age of Onset     Genitourinary Problems Father         prostate     Genetic Disorder Father         ulcer     Hypertension Father      Lipids Father      Prostate Cancer Father      Heart Disease Mother      Lipids Mother      Hyperlipidemia Mother      Heart Disease Maternal Grandmother      Cerebrovascular Disease Maternal Grandmother      Heart Disease Maternal Grandfather      Heart Disease Maternal Uncle      Heart Disease Maternal Uncle         x  3     Hyperlipidemia Sister      Hyperlipidemia Brother      Other Cancer Brother        Family history of:  1.  Breast cancer including male breast  "cancer: negative   2. Ovarian cancer: negative  3.  Pancreatic cancer: negative  4.  Prostate cancer: father- ?in his 50s, other details unknown  5. Diffuse gastric cancer (if lobular breast CA): negative  6. Uterine cancer: negative  7. Colon cancer:  negative  6. Brother- head and neck, HPV +, had surgery, clinical trial and now cancer free      PHYSICAL EXAM:  Vital signs:  BP (!) 155/73   Pulse 63   Temp 97.6  F (36.4  C)   Ht 1.651 m (5' 5\")   Wt 91.2 kg (201 lb)   LMP 11/22/2011 (Exact Date)   SpO2 100%   BMI 33.45 kg/m       GENERAL/CONSTITUTIONAL: appears overwhelmed, expresses anger, tearful throughout visit  EYES: Pupils are equal and round. Extraocular movements intact without nystagmus.  No scleral icterus.  RESPIRATORY: Equal chest rise.   MUSCULOSKELETAL: Warm and well-perfused, no cyanosis, clubbing  NEUROLOGIC: Cranial nerves are grossly intact. Alert, oriented to person, place and time, answers questions appropriately.  INTEGUMENTARY: No rashes or jaundice.      LABS:   Latest Reference Range & Units 12/15/23 15:39   Sodium 135 - 145 mmol/L 141   Potassium 3.4 - 5.3 mmol/L 4.2   Chloride 98 - 107 mmol/L 106   Carbon Dioxide (CO2) 22 - 29 mmol/L 27   Urea Nitrogen 8.0 - 23.0 mg/dL 21.2   Creatinine 0.51 - 0.95 mg/dL 1.22 (H)   GFR Estimate >60 mL/min/1.73m2 50 (L)   Calcium 8.8 - 10.2 mg/dL 8.7 (L)   Anion Gap 7 - 15 mmol/L 8   Magnesium 1.7 - 2.3 mg/dL 2.5 (H)   Phosphorus 2.5 - 4.5 mg/dL 3.8   Albumin 3.5 - 5.2 g/dL 4.0   Protein Total 6.4 - 8.3 g/dL 6.7   Alkaline Phosphatase 40 - 150 U/L 92   ALT 0 - 50 U/L 19   AST 0 - 45 U/L 27   Bilirubin Total <=1.2 mg/dL 0.2   Glucose 70 - 99 mg/dL 96   WBC 4.0 - 11.0 10e3/uL 3.4 (L)   Hemoglobin 11.7 - 15.7 g/dL 10.7 (L)   Hematocrit 35.0 - 47.0 % 33.5 (L)   Platelet Count 150 - 450 10e3/uL 143 (L)   RBC Count 3.80 - 5.20 10e6/uL 3.54 (L)   MCV 78 - 100 fL 95   MCH 26.5 - 33.0 pg 30.2   MCHC 31.5 - 36.5 g/dL 31.9   RDW 10.0 - 15.0 % 14.7   % " "Neutrophils % 48   % Lymphocytes % 44   % Monocytes % 5   % Eosinophils % 2   % Basophils % 1   Absolute Basophils 0.0 - 0.2 10e3/uL 0.0   Absolute Eosinophils 0.0 - 0.7 10e3/uL 0.1   Absolute Immature Granulocytes <=0.4 10e3/uL 0.0   Absolute Lymphocytes 0.8 - 5.3 10e3/uL 1.5   Absolute Monocytes 0.0 - 1.3 10e3/uL 0.2   % Immature Granulocytes % 0   Absolute Neutrophils 1.6 - 8.3 10e3/uL 1.6   Absolute NRBCs 10e3/uL 0.0   NRBCs per 100 WBC <1 /100 0   (H): Data is abnormally high  (L): Data is abnormally low    PATHOLOGY:    IMAGING:    ASSESSMENT/PLAN:  Kesha Zacarias is a 63 year old female with:    # de tavon metastatic left breast invasive lobular carcinoma, ER positive, IN positive, her2 negative on FISH  - pt has de tavon metastatic invasive lobular breast cancer, ER positive, IN positive, her2 negative. Pt does not have visceral crisis, she mainly has extensive bone metastases and LN metastases   -9/23 diagnostic left breast mammogram, left breast targeted ultrasound showed 3.0 x 1.7 x 3.9 ill-defined shadowing hypoechoic mass, few small lymph nodes in the left axilla, one with cortical thickening measuring 0.7 x 0.6 cm  -9/23 ultrasound-guided LEFT breast core biopsy of 12:00 lesion with clip placement, \"Postbiopsy unilateral digital mammogram of the left breast showed the clip to be at the expected biopsy site\" AND ultrasound-guided left axillary lymph node biopsy of lymph node with cortical thickening, PATHOLOGY:  A.  Breast, left, 12:00, biopsy:Lobular carcinoma in situ, Multiple foci. Invasive carcinoma is not identified.   B.  Lymph node, left axillary, biopsy:  -Positive for metastatic lobular carcinoma, grade 2, 0.7 cm, negative GREGG, Estrogen receptor: Positive (91 to 100%, strong), Progesterone receptor: Positive (91 to 100%, strong), HER2 2+ IHC, FISH negative  -10/23 MRI bilateral breast:  - Heterogeneously enhancing irregular mass in the subareolar left breast, 5.0 x 4.9 x 4.9 cm, with associated " nipple retraction and nipple/areolar involvement.  This mass extends superiorly through 11: positions, from anterior to mid depth.  The [employer] breast biopsy clip (which showed LCIS) is 1.5 cm posterosuperior to this mass.  - Multiple suspicious left level 1 axillary lymph node noted, including biopsy-proven metastatic node with indwelling biopsy marker, biopsied node measures 1.3 x 1.0 cm  - Heterogeneous marrow appearance of sternum and ribs with heterogeneous enhancement and a peripheral enhancing focus within the mid body.    - 10/23 PET/CT:  Innumerable foci of FDG avid lesions are seen throughout the osseous structures of the head and neck, most pronounced on the calvarium and cervical spine, with prominently sclerotic appearance on CT correlate.  For example:  -Right frontal bone, SUV max 5.31.  -Left lateral mass of the atlas, SUV max 15.0  -Angle of the left mandible, SUV max 9.6  -C7 vertebral body, SUV max 17.7    Innumerable variable sized FDG avid lesions throughout the axial and appendicular spine, including but not limited to the cervical, thoracic, and lumbar spine, multilevel bilateral ribs, sternum, bilateral scapula, bilateral humeri, clavicles, pelvis, and bilateral femurs. A few of these lesions are described below:  -Large FDG avid sclerotic 3.4 cm lesion within the proximal left humeral head, SUV max 17.24  -Sternal body, SUV max 20.35  -L2 vertebral body, SUV max 14.3 without  -Large ill-defined sclerotic lesion in the sacral body measuring up to at least 5.9 cm, SUV max 16.84  -FDG avid focus within the left femoral head, SUV max 13.65 without CT correlate.    Large irregular soft tissue FDG avid mass within the left breast measuring approximately 3.2 x 2.7 cm with  extension into the superficial soft tissues and associated left nipple retraction, SUV max 12.36 additional FDG avid. Left axillary lymph nodes, not definitively enlarged by short axis size criteria, for example, SUV max  5.93.    - 10/23 MRI brain:  - extensive osseous metastatic disease involving the calvarium, skull base w/involvement of occipital condyles, C1 and C2 vertebral bodies, and likely the mandible  -  Dominant calvarial lesions are located in the right frontal calvarium and right temporal calvarium without definite breach of the inner or outer tables of the calvarium. There is a suggestion of mild asymmetric linear extra-axial enhancement deep to the dominant right temporal calvarial lesion along the dural margin, though this may be vascular in etiology.  - No abnormal parenchymal or leptomeningeal enhancement.   - sequelae of mild chronic microvascular ischemic disease. Mild generalized cerebral atrophy.     -10/23 DEXA: osteopenia (T score -2.0 lumbar spine, -2.0 left hip femoral neck, The 10 year probability of major osteoporotic fracture is 12.5%, and of hip fracture is 1.8%, based on left femoral neck BMD)    - 11/23 orthopedic consult to determine stability of left femur and fracture risk given presence of mets in left femoral head: do not see any areas of impending cortical erosion or sites of high risk for fracture, observe      REGIMEN:  Ribociclib+letrozole    Ribociclib 600mg PO daily day 1-21 q28 days (start delayed 2/2 acutely elevated LFTs, 11/27/23 started 400mg PO daily when LFTs improved to <3x ULN, 12/18/23 started 600mg PO daily when LFTs normal)  Letrozole 2.5mg PO daily (started 10/30/23)  Febrile neutropenia risk: neutropenia (69% to 78%; grade 3: 46% to 55%; grade 4: 7% to 10%), Febrile neutropenia (1%)    C1D19 12/15/23  C2D1 12/25/23 pending labs and follow up with me on 12/22/23    Treatment related AE:  - elevated LFTs- normalized spontaneously, 11/23 MRI liver negative for mets, will increase ribociclib from 400mg to 600mg for next cycle  - back pain- increase ibuprofen 600mg BID to TID w/food (GI bleed alarm sx discussed), add half oxycodone (2.5mg) q6 hrs during day and 5mg at bedtime-  refill not needed today, continue flexeril prn, ok to use heating pad for short periods of time, will push back rad onc appt to be after neurosurgery consult as pt has compression fracture, I discussed this with Dr. Mandujano   - constipation- ok to continue senna- increase to BID, start colace if needed, if no improvement can use miralax, monitor for diarrhea  - HA- stable, preceeded tx and unchanged since then, 10/23 MRI brain negative, alarm sx discussed, low threshold for repeat imaging w/ special attention to right temporal calvarium at dural margin (?vascular etiology)  - blurred vision- stable, no alarms x  - leg cramps- focus on hydration, fine to take Mg prn  - anxiety- defers psychology consult   - pancytopenia- 2/2 tx, monitor    - labs noted  - EKG noted   - repeat CA 15-3 pending prior to cycle 2 (10/23 CA 15-3= 261)  - dental clearance obtained, will start zometa q4 weeks (starting dose 3.5mg 2/2 GFR 50) and calcium and vitamin D, once disease stabilized will transition to 4mg q12 weeks   - Genetic counseling referral, pt had initially not scheduled but will call back now to schedule appt  - repeat labs and EKG in q2 weeks for cycle 1 and 2 (watch LFTs closely; pt on plaquenil, watch Qtc closely)  - repeat PET after 3 months of tx, due end of 2/2024-ordered today    #post menopausal  #vaginal dryness  - Pts breast biopsy pathology results explained in detail. Pt is aware her tumor is estrogen positive. Pt educated to avoid all estrogen-containing products including but not limited to birth control, vaginal creams etc.     #RA  - on plaquenil     RTC 12/22/23 for follow up with me, labs and EKG prior visit (labs and EKG at [place])- virtual visit  RTC 1/8/24 labs, EKG at [place]  RT 1/19/24 follow up with me, labs and EKG prior visit (labs and EKG at [place])- virtual visit      Mary Maravilla DO  Hematology/Oncology  [facility]      Again, thank you for allowing me to  participate in the care of your patient.        Sincerely,        MILLER ALTAMIRANO, DO

## 2023-12-15 NOTE — PROGRESS NOTES
12 lead EKG performed without incident. Results forwarded to ordering provider's inbasket for review. Results available for review in Epic.

## 2023-12-15 NOTE — PROGRESS NOTES
"[facility]    Hematology/Oncology Established Patient Follow Up Note      Today's Date: 12/15/2023     Reason for follow up: breast cancer    HISTORY OF PRESENT ILLNESS: Kesha Zacarias is a 63 year old female who presents for follow up    Patient has medical history including rheumatoid arthritis, hyperlipidemia, osteoarthritis, bilateral cataracts and glaucoma s/p surgery, endocervical polyp s/p resection, vaginal atrophy with conjugated estrogen vaginal cream use, colon polyp (hyperplastic polyp and tubular adenoma), seasonal depression, history of tobacco use ([age] y/o, about 1 ppd).    Regarding RA:  -dx over a decade ago, on plaquenil, managed by PCP  - arthritis is most severe in hands>knees, intermittent       - 3/23 bilateral screening mammogram [facility]  - a few months ago, noted nipple retraction  - 9/23 patient palpated mass in retroareolar region of left breast and w/nipple retraction, no discharge, skin thickening, no dimpling, no erythema  - 9/23 diagnostic LEFT breast mammogram: Focal dense tissue with some likely mild architectural distortion, some anterior skin thickening is noted  - 9/23 targeted LEFT breast ultrasound: Ill-defined shadowing hypoechoic mass, 3.0 x 1.7 x 3.9 cm.  In left axilla-few small lymph nodes, 1 normal-sized lymph node with cortical thickening, 0.7 x 0.6 cm.  -9/23 ultrasound-guided LEFT breast core biopsy of 12:00 lesion with clip placement, \"Postbiopsy unilateral digital mammogram of the left breast showed the clip to be at the expected biopsy site\" AND ultrasound-guided left axillary lymph node biopsy of lymph node with cortical thickening  PATHOLOGY  A.  Breast, left, 12:00, biopsy:  -Lobular carcinoma in situ.-Multiple foci  -Invasive carcinoma is not identified.     B.  Lymph node, left axillary, biopsy:  -Positive for metastatic lobular carcinoma, grade 2  -Largest metastatic focus is 7 mm.  -Negative for extranodal extension.  -Breast ancillary " testing:  -Estrogen receptor: Positive (91 to 100%, strong)  -Progesterone receptor: Positive (91 to 100%, strong)  -HER2 2+ IHC, FISH negative    -10/23 MRI bilateral breast:  LEFT breast:  - Heterogeneously enhancing irregular mass in the subareolar left breast, 5.0 x 4.9 x 4.9 cm, with associated nipple retraction and nipple/areolar involvement.  This mass extends superiorly through 11: positions, from anterior to mid depth.  The HydroMARK breast biopsy clip (which showed LCIS) is 1.5 cm posterosuperior to this mass.  - Multiple suspicious left level 1 axillary lymph node noted, including biopsy-proven metastatic node with indwelling biopsy marker, biopsied node measures 1.3 x 1.0 cm  - Heterogeneous marrow appearance of sternum and ribs with heterogeneous enhancement and a peripheral enhancing focus within the mid body.  IMPRESSION:  1. Upon further review of previous imaging and pathology results,  recommend repeat ultrasound guided large core-needle biopsy of the  discordant dominant left breast mass given metastatic left axillary  lymph node and superoposteriorly displaced left breast biopsy marker.   2. Nonspecific heterogeneous enhancement of the sternum and ribs.  Consider nuclear medicine bone scan    Lifetime estrogen exposure:  Menarche: 13   Last menstrual period: 49   Age of first pregnancy: 18   Number of pregnancies: 2 (no living children)   Weight gain: 20lb weight gain within a year  Exposure to exogenous estrogen: vaginal atrophy with conjugated estrogen vaginal cream use x4 years (intermittent), has not used it since 9/23. Birth control for about 13-15 years from her 20s-30s.      Pt reports 55lb weight loss in 1.5 years, this was intentional, was following weight watchers program (23 points available per day) 230lb->170lb->190lb (gained about 20lbs). Pt was walking on the treadmill and outside daily, about 30 mins to 1.5 miles.    10/23 labs:  AST 79, ALT 91,   CA 15-3 261    10/23  PET/CT:  Innumerable foci of FDG avid lesions are seen throughout the osseous  structures of the head and neck, most pronounced on the calvarium and  cervical spine, with prominently sclerotic appearance on CT correlate.  For example:  -Right frontal bone, SUV max 5.31.  -Left lateral mass of the atlas, SUV max 15.0  -Angle of the left mandible, SUV max 9.6  -C7 vertebral body, SUV max 17.7    Innumerable variable sized FDG avid lesions throughout the axial and  appendicular spine, including but not limited to the cervical,  thoracic, and lumbar spine, multilevel bilateral ribs, sternum,  bilateral scapula, bilateral humeri, clavicles, pelvis, and bilateral  femurs. A few of these lesions are described below:  -Large FDG avid sclerotic 3.4 cm lesion within the proximal left  humeral head, SUV max 17.24  -Sternal body, SUV max 20.35  -L2 vertebral body, SUV max 14.3 without  -Large ill-defined sclerotic lesion in the sacral body measuring up to  at least 5.9 cm, SUV max 16.84  -FDG avid focus within the left femoral head, SUV  max 13.65 without CT correlate.    Large irregular soft tissue FDG avid mass within the left breast   measuring approximately 3.2 x 2.7 cm with  extension into the superficial soft tissues and associated left nipple  retraction, SUV max 12.36 additional FDG avid. Left axillary lymph  nodes, not definitively enlarged by short axis size criteria, for  example, SUV max 5.93.    The liver is unremarkable.     Solid 3 mm non-FDG avid pulmonary nodule within the  right middle lobe    - 10/23 MRI brain:  - extensive osseous metastatic disease involving the calvarium, skull base w/involvement of occipital condyles, C1 and C2 vertebral bodies, and likely the mandible  -  Dominant calvarial lesions are located in the right frontal calvarium and right temporal calvarium without definite breach of the  inner or outer tables of the calvarium. There is a suggestion of mild asymmetric linear extra-axial  enhancement deep to the dominant right temporal calvarial lesion along the dural margin, though this may be vascular in etiology.  - No abnormal parenchymal or leptomeningeal enhancement.   - sequelae of mild chronic microvascular ischemic disease. Mild generalized cerebral atrophy.     INTERIM HISTORY:    -supposed to start ribociclib 10/30/23 however, noted to have significantly elevated LFTS (10/26/23 , , alk phos 152)    10/17/23: AST 79, ALT 91, alk phos 116, t bili 0.5  10/26/23 , , alk phos 152  10/30/23: , , alk phos 178, coags WNL, ribociclib was not started, letrozole started, atorvastatin held  11/7/23: AST 96, , alk phos 169  11/17/23: AST 81, , alk phos 163  11/27/23: AST 55, ALT 90, alk phos 128- started ribociclib 400mg  12/4/23: AST 26, ALT 35, alk phos 118    REGIMEN:  Ribociclib+letrozole    Ribociclib 600mg PO daily day 1-21 q28 days (11/27/23 started 400mg PO daily when LFTs improved to <3x ULN, 12/18/23 started 600mg PO daily when LFTs normal)  Letrozole 2.5mg PO daily (started 10/30/23)  Febrile neutropenia risk: neutropenia (69% to 78%; grade 3: 46% to 55%; grade 4: 7% to 10%), Febrile neutropenia (1%)    C1D19 12/15/23  C2D1 12/25/23    Treatment related AE:  - elevated LFTs- improved 11/27/23 to <3x ULN (AST 55, ALT 90, alk phos 128)  to be able to start ribociclib 400mg, 11/23 MRI liver negative for mets, ?cause- possibly viral infection between 10/17/23 and 10/26/23; 12/4/23 LFTs normal, next cycle (cycle 2) ribociclib 600mg rxed  - daily HA- prior to starting tx- mostly b/l temporal regions, no sensitivity to light or sound, no N/V, takes tylenol w/o improvement; 10/23 MRI brain: extensive osseous mets w/dominant calvarial lesions w/enhancement deep to dominant right temporal calvarial lesion along dural margin (?vascular etiology), no parenchymal or leptomeningeal enhancement  - blurry vision b/l- last saw eye doctor 3/23 and has  "trifocal glasses. She has dry eyes and uses lubricating eye drops., 10/23 MRI brain as above  - constipation- probiotics, senna prn, has not used colace  - leg cramps- Mg  - back pain- left lower back, sharp, radiates to left buttock and leg making it hard to walk, worse w/walking, improved w/heating pad and ibuprofen 600mg BID, flexeril 5mg qHS, oxycodone 5mg q6hr prn #30 tabs rx 12/4/23 with stool softner- pt is using at bedtime due feeling \"loopy\" during day- this combination allows pt to be functional (10/10 previously, now 6/10), 11/30/23 MRI lumbar spine with diffuse osseous metastatic disease.  Possible pathologic compression deformity at L1. Rad on consult 12/18/23 and neurosurgery consults 12/28/23 pending  - anxiety- expressed anger at dx and pain, tearful today, has support of family and  who are great advocates for her care          REVIEW OF SYSTEMS:   A 14 point ROS was reviewed with pertinent positives and negatives in the HPI.        HOME MEDICATIONS:  Current Outpatient Medications   Medication Sig Dispense Refill    aspirin 81 MG tablet Take 81 mg by mouth daily      betamethasone dipropionate (DIPROSONE) 0.05 % external lotion Apply topically 2 times daily 60 mL 3    COENZYME Q-10 PO Take 1 tablet by mouth every other day      cyclobenzaprine (FLEXERIL) 5 MG tablet TAKE 1 TABLET(5 MG) BY MOUTH AT BEDTIME 90 tablet 3    fish oil-omega-3 fatty acids 1000 MG capsule Take 2 g by mouth daily      hydroxychloroquine (PLAQUENIL) 200 MG tablet TAKE 2 AND 1/2 TABLETS BY MOUTH EVERY  tablet 3    letrozole (FEMARA) 2.5 MG tablet Take 1 tablet (2.5 mg) by mouth every morning for 28 days 28 tablet 11    magnesium 250 MG tablet Take 1 tablet by mouth daily      oxyCODONE (ROXICODONE) 5 MG tablet Take 1 tablet (5 mg) by mouth every 6 hours as needed for pain 30 tablet 0    sennosides (SENOKOT) 8.6 MG tablet Take 1 tablet by mouth 2 times daily as needed for constipation 60 tablet 3    docusate " sodium (COLACE) 100 MG capsule Take 1 capsule (100 mg) by mouth 3 times daily as needed for constipation 90 capsule 3    lactobacillus rhamnosus, GG, (CULTURELL) capsule Take 1 capsule by mouth 2 times daily      loperamide (IMODIUM A-D) 2 MG tablet When diarrhea starts take 2 tabs (4mg), then with each additional episode of diarrhea take 1 additional tab and if needing more than 8 tabs in 24 hrs call oncology 30 tablet 3    ondansetron (ZOFRAN) 4 MG tablet Take 1 tablet (4 mg) by mouth every 6 hours as needed for nausea 30 tablet 3    prochlorperazine (COMPAZINE) 10 MG tablet Take 1 tablet (10 mg) by mouth every 6 hours as needed for nausea or vomiting 30 tablet 2    [START ON 12/25/2023] ribociclib (KISQALI) 200 MG tablet Take 3 tablets (600 mg) by mouth every morning for 21 days , then off for 7 days. 42 tablet 0         ALLERGIES:  Allergies   Allergen Reactions    Ciprofloxacin      hives and was on flagyl too    Metronidazole      hives and was on cipro too         PAST MEDICAL HISTORY:  Past Medical History:   Diagnosis Date    Arthritis 2006    Breast cancer metastasized to bone (H) 10/12/2023    Chronic depressive personality disorder     Dysplasia of cervix (uteri) 1988    Cryotherapy    Female infertility of unspecified origin          PAST SURGICAL HISTORY:  Past Surgical History:   Procedure Laterality Date    COLONOSCOPY      COLONOSCOPY N/A 1/14/2022    Procedure: COLONOSCOPY, WITH POLYPECTOMY AND BIOPSY;  Surgeon: Gagandeep Patterson MD;  Location:  GI    [facility] INTRODUCE CATH FALLOPIAN TUBE, RE-OPEN/DIAGNOSIS      HERNIA REPAIR, INCISIONAL  11/11/09    ORTHOPEDIC SURGERY  September 2017    [facility] APPENDECTOMY  6-14-03    [facility] REMV CATARACT INTRACAP,INSERT LENS  2-    right         SOCIAL HISTORY:  Social History     Socioeconomic History    Marital status:      Spouse name: Bandar    Number of children: 1    Years of education: Not on file    Highest education level: Not on file   Occupational  History    Not on file   Tobacco Use    Smoking status: Former     Packs/day: 0.50     Years: 25.00     Additional pack years: 0.00     Total pack years: 12.50     Types: Cigarettes     Quit date: 2017     Years since quittin.2    Smokeless tobacco: Never    Tobacco comments:     quit 2017    Vaping Use    Vaping Use: Never used   Substance and Sexual Activity    Alcohol use: Yes     Comment: 1-2 weekly    Drug use: No    Sexual activity: Yes     Partners: Male     Birth control/protection: None   Other Topics Concern    Parent/sibling w/ CABG, MI or angioplasty before 65F 55M? Yes   Social History Narrative    Not on file     Social Determinants of Health     Financial Resource Strain: Not on file   Food Insecurity: Not on file   Transportation Needs: Not on file   Physical Activity: Not on file   Stress: Not on file   Social Connections: Not on file   Interpersonal Safety: Not on file   Housing Stability: Not on file         FAMILY HISTORY:  Family History   Problem Relation Age of Onset    Genitourinary Problems Father         prostate    Genetic Disorder Father         ulcer    Hypertension Father     Lipids Father     Prostate Cancer Father     Heart Disease Mother     Lipids Mother     Hyperlipidemia Mother     Heart Disease Maternal Grandmother     Cerebrovascular Disease Maternal Grandmother     Heart Disease Maternal Grandfather     Heart Disease Maternal Uncle     Heart Disease Maternal Uncle         x  3    Hyperlipidemia Sister     Hyperlipidemia Brother     Other Cancer Brother        Family history of:  1.  Breast cancer including male breast cancer: negative   2. Ovarian cancer: negative  3.  Pancreatic cancer: negative  4.  Prostate cancer: father- ?in his 50s, other details unknown  5. Diffuse gastric cancer (if lobular breast CA): negative  6. Uterine cancer: negative  7. Colon cancer:  negative  6. Brother- head and neck, HPV +, had surgery, clinical trial and now cancer free   "    PHYSICAL EXAM:  Vital signs:  BP (!) 155/73   Pulse 63   Temp 97.6  F (36.4  C)   Ht 1.651 m (5' 5\")   Wt 91.2 kg (201 lb)   LMP 11/22/2011 (Exact Date)   SpO2 100%   BMI 33.45 kg/m       GENERAL/CONSTITUTIONAL: appears overwhelmed, expresses anger, tearful throughout visit  EYES: Pupils are equal and round. Extraocular movements intact without nystagmus.  No scleral icterus.  RESPIRATORY: Equal chest rise.   MUSCULOSKELETAL: Warm and well-perfused, no cyanosis, clubbing  NEUROLOGIC: Cranial nerves are grossly intact. Alert, oriented to person, place and time, answers questions appropriately.  INTEGUMENTARY: No rashes or jaundice.      LABS:   Latest Reference Range & Units 12/15/23 15:39   Sodium 135 - 145 mmol/L 141   Potassium 3.4 - 5.3 mmol/L 4.2   Chloride 98 - 107 mmol/L 106   Carbon Dioxide (CO2) 22 - 29 mmol/L 27   Urea Nitrogen 8.0 - 23.0 mg/dL 21.2   Creatinine 0.51 - 0.95 mg/dL 1.22 (H)   GFR Estimate >60 mL/min/1.73m2 50 (L)   Calcium 8.8 - 10.2 mg/dL 8.7 (L)   Anion Gap 7 - 15 mmol/L 8   Magnesium 1.7 - 2.3 mg/dL 2.5 (H)   Phosphorus 2.5 - 4.5 mg/dL 3.8   Albumin 3.5 - 5.2 g/dL 4.0   Protein Total 6.4 - 8.3 g/dL 6.7   Alkaline Phosphatase 40 - 150 U/L 92   ALT 0 - 50 U/L 19   AST 0 - 45 U/L 27   Bilirubin Total <=1.2 mg/dL 0.2   Glucose 70 - 99 mg/dL 96   WBC 4.0 - 11.0 10e3/uL 3.4 (L)   Hemoglobin 11.7 - 15.7 g/dL 10.7 (L)   Hematocrit 35.0 - 47.0 % 33.5 (L)   Platelet Count 150 - 450 10e3/uL 143 (L)   RBC Count 3.80 - 5.20 10e6/uL 3.54 (L)   MCV 78 - 100 fL 95   MCH 26.5 - 33.0 pg 30.2   MCHC 31.5 - 36.5 g/dL 31.9   RDW 10.0 - 15.0 % 14.7   % Neutrophils % 48   % Lymphocytes % 44   % Monocytes % 5   % Eosinophils % 2   % Basophils % 1   Absolute Basophils 0.0 - 0.2 10e3/uL 0.0   Absolute Eosinophils 0.0 - 0.7 10e3/uL 0.1   Absolute Immature Granulocytes <=0.4 10e3/uL 0.0   Absolute Lymphocytes 0.8 - 5.3 10e3/uL 1.5   Absolute Monocytes 0.0 - 1.3 10e3/uL 0.2   % Immature Granulocytes % 0 " "  Absolute Neutrophils 1.6 - 8.3 10e3/uL 1.6   Absolute NRBCs 10e3/uL 0.0   NRBCs per 100 WBC <1 /100 0   (H): Data is abnormally high  (L): Data is abnormally low    PATHOLOGY:    IMAGING:    ASSESSMENT/PLAN:  Kesha Zacarias is a 63 year old female with:    # de tavon metastatic left breast invasive lobular carcinoma, ER positive, AK positive, her2 negative on FISH  - pt has de tavon metastatic invasive lobular breast cancer, ER positive, AK positive, her2 negative. Pt does not have visceral crisis, she mainly has extensive bone metastases and LN metastases   -9/23 diagnostic left breast mammogram, left breast targeted ultrasound showed 3.0 x 1.7 x 3.9 ill-defined shadowing hypoechoic mass, few small lymph nodes in the left axilla, one with cortical thickening measuring 0.7 x 0.6 cm  -9/23 ultrasound-guided LEFT breast core biopsy of 12:00 lesion with clip placement, \"Postbiopsy unilateral digital mammogram of the left breast showed the clip to be at the expected biopsy site\" AND ultrasound-guided left axillary lymph node biopsy of lymph node with cortical thickening, PATHOLOGY:  A.  Breast, left, 12:00, biopsy:Lobular carcinoma in situ, Multiple foci. Invasive carcinoma is not identified.   B.  Lymph node, left axillary, biopsy:  -Positive for metastatic lobular carcinoma, grade 2, 0.7 cm, negative GREGG, Estrogen receptor: Positive (91 to 100%, strong), Progesterone receptor: Positive (91 to 100%, strong), HER2 2+ IHC, FISH negative  -10/23 MRI bilateral breast:  - Heterogeneously enhancing irregular mass in the subareolar left breast, 5.0 x 4.9 x 4.9 cm, with associated nipple retraction and nipple/areolar involvement.  This mass extends superiorly through 11: positions, from anterior to mid depth.  The HydroMARK breast biopsy clip (which showed LCIS) is 1.5 cm posterosuperior to this mass.  - Multiple suspicious left level 1 axillary lymph node noted, including biopsy-proven metastatic node with indwelling " biopsy marker, biopsied node measures 1.3 x 1.0 cm  - Heterogeneous marrow appearance of sternum and ribs with heterogeneous enhancement and a peripheral enhancing focus within the mid body.    - 10/23 PET/CT:  Innumerable foci of FDG avid lesions are seen throughout the osseous structures of the head and neck, most pronounced on the calvarium and cervical spine, with prominently sclerotic appearance on CT correlate.  For example:  -Right frontal bone, SUV max 5.31.  -Left lateral mass of the atlas, SUV max 15.0  -Angle of the left mandible, SUV max 9.6  -C7 vertebral body, SUV max 17.7    Innumerable variable sized FDG avid lesions throughout the axial and appendicular spine, including but not limited to the cervical, thoracic, and lumbar spine, multilevel bilateral ribs, sternum, bilateral scapula, bilateral humeri, clavicles, pelvis, and bilateral femurs. A few of these lesions are described below:  -Large FDG avid sclerotic 3.4 cm lesion within the proximal left humeral head, SUV max 17.24  -Sternal body, SUV max 20.35  -L2 vertebral body, SUV max 14.3 without  -Large ill-defined sclerotic lesion in the sacral body measuring up to at least 5.9 cm, SUV max 16.84  -FDG avid focus within the left femoral head, SUV max 13.65 without CT correlate.    Large irregular soft tissue FDG avid mass within the left breast measuring approximately 3.2 x 2.7 cm with  extension into the superficial soft tissues and associated left nipple retraction, SUV max 12.36 additional FDG avid. Left axillary lymph nodes, not definitively enlarged by short axis size criteria, for example, SUV max 5.93.    - 10/23 MRI brain:  - extensive osseous metastatic disease involving the calvarium, skull base w/involvement of occipital condyles, C1 and C2 vertebral bodies, and likely the mandible  -  Dominant calvarial lesions are located in the right frontal calvarium and right temporal calvarium without definite breach of the inner or outer tables  of the calvarium. There is a suggestion of mild asymmetric linear extra-axial enhancement deep to the dominant right temporal calvarial lesion along the dural margin, though this may be vascular in etiology.  - No abnormal parenchymal or leptomeningeal enhancement.   - sequelae of mild chronic microvascular ischemic disease. Mild generalized cerebral atrophy.     -10/23 DEXA: osteopenia (T score -2.0 lumbar spine, -2.0 left hip femoral neck, The 10 year probability of major osteoporotic fracture is 12.5%, and of hip fracture is 1.8%, based on left femoral neck BMD)    - 11/23 orthopedic consult to determine stability of left femur and fracture risk given presence of mets in left femoral head: do not see any areas of impending cortical erosion or sites of high risk for fracture, observe      REGIMEN:  Ribociclib+letrozole    Ribociclib 600mg PO daily day 1-21 q28 days (start delayed 2/2 acutely elevated LFTs, 11/27/23 started 400mg PO daily when LFTs improved to <3x ULN, 12/18/23 started 600mg PO daily when LFTs normal)  Letrozole 2.5mg PO daily (started 10/30/23)  Febrile neutropenia risk: neutropenia (69% to 78%; grade 3: 46% to 55%; grade 4: 7% to 10%), Febrile neutropenia (1%)    C1D19 12/15/23  C2D1 12/25/23 pending labs and follow up with me on 12/22/23    Treatment related AE:  - elevated LFTs- normalized spontaneously, 11/23 MRI liver negative for mets, will increase ribociclib from 400mg to 600mg for next cycle  - back pain- increase ibuprofen 600mg BID to TID w/food (GI bleed alarm sx discussed), add half oxycodone (2.5mg) q6 hrs during day and 5mg at bedtime- refill not needed today, continue flexeril prn, ok to use heating pad for short periods of time, will push back rad onc appt to be after neurosurgery consult as pt has compression fracture, I discussed this with Dr. Mandujano   - constipation- ok to continue senna- increase to BID, start colace if needed, if no improvement can use miralax, monitor for  diarrhea  - HA- stable, preceeded tx and unchanged since then, 10/23 MRI brain negative, alarm sx discussed, low threshold for repeat imaging w/ special attention to right temporal calvarium at dural margin (?vascular etiology)  - blurred vision- stable, no alarms x  - leg cramps- focus on hydration, fine to take Mg prn  - anxiety- defers psychology consult   - pancytopenia- 2/2 tx, monitor    - labs noted  - EKG noted   - repeat CA 15-3 pending prior to cycle 2 (10/23 CA 15-3= 261)  - dental clearance obtained, will start zometa q4 weeks (starting dose 3.5mg 2/2 GFR 50) and calcium and vitamin D, once disease stabilized will transition to 4mg q12 weeks   - Genetic counseling referral, pt had initially not scheduled but will call back now to schedule appt  - repeat labs and EKG in q2 weeks for cycle 1 and 2 (watch LFTs closely; pt on plaquenil, watch Qtc closely)  - repeat PET after 3 months of tx, due end of 2/2024-ordered today    #post menopausal  #vaginal dryness  - Pts breast biopsy pathology results explained in detail. Pt is aware her tumor is estrogen positive. Pt educated to avoid all estrogen-containing products including but not limited to birth control, vaginal creams etc.     #RA  - on plaquenil     RTC 12/22/23 for follow up with me, labs and EKG prior visit (labs and EKG at [place])- virtual visit  RTC 1/8/24 labs, EKG at [place]  RT 1/19/24 follow up with me, labs and EKG prior visit (labs and EKG at [place])- virtual visit      Mary Maravilla DO  Hematology/Oncology  [facility]

## 2023-12-18 ENCOUNTER — PRE VISIT (OUTPATIENT)
Dept: RADIATION ONCOLOGY | Facility: CLINIC | Age: 63
End: 2023-12-18

## 2023-12-18 DIAGNOSIS — C50.912 CARCINOMA OF LEFT BREAST METASTATIC TO BONE (H): Primary | ICD-10-CM

## 2023-12-18 DIAGNOSIS — C79.51 CARCINOMA OF LEFT BREAST METASTATIC TO BONE (H): Primary | ICD-10-CM

## 2023-12-18 RX ORDER — HEPARIN SODIUM,PORCINE 10 UNIT/ML
5-20 VIAL (ML) INTRAVENOUS DAILY PRN
Status: CANCELLED | OUTPATIENT
Start: 2023-12-22

## 2023-12-18 RX ORDER — HEPARIN SODIUM (PORCINE) LOCK FLUSH IV SOLN 100 UNIT/ML 100 UNIT/ML
5 SOLUTION INTRAVENOUS
Status: CANCELLED | OUTPATIENT
Start: 2023-12-22

## 2023-12-20 ENCOUNTER — TELEPHONE (OUTPATIENT)
Dept: NEUROSURGERY | Facility: CLINIC | Age: 63
End: 2023-12-20
Payer: COMMERCIAL

## 2023-12-20 NOTE — TELEPHONE ENCOUNTER
----- Message from Vikram Andrews MD sent at 12/20/2023 10:50 AM CST -----  Regarding: Surgical consultation needed  Hello team.  This patient was mis-scheduled and was intended for a surgical consultation.  Please reschedule with one of the surgeons.  This can be either spine or neurosurgery.

## 2023-12-20 NOTE — TELEPHONE ENCOUNTER
I called patient and rescheduled with Dr. Phillips on 12/28/2023 at 920am. MRI was been completed.

## 2023-12-20 NOTE — TELEPHONE ENCOUNTER
SPINE PATIENTS - NEW PROTOCOL PREVISIT    RECORDS RECEIVED FROM: Internal   REASON FOR VISIT: back pain, breast cancer with bone mets, compression deformity L1    Date of Appt: 12/28/2023   NOTES (FOR ALL VISITS) STATUS DETAILS   OFFICE NOTE from referring provider Internal 12/04/2023 Dr John Paul Berkowitz [facility]   11/20/2023 Dr John Paul Berkowitz [facility]   OFFICE NOTE from other specialist N/A    DISCHARGE SUMMARY from hospital N/A    DISCHARGE REPORT from ER N/A    OPERATIVE REPORT N/A    EMG REPORT N/A    MEDICATION LIST N/A    IMAGING  (FOR ALL VISITS)     MRI (HEAD, NECK, SPINE) Internal 11/30/2023 lumbar spine    XRAY (SPINE) *NEUROSURGERY* N/A    CT (HEAD, NECK, SPINE) N/A

## 2023-12-21 ENCOUNTER — MYC REFILL (OUTPATIENT)
Dept: ONCOLOGY | Facility: CLINIC | Age: 63
End: 2023-12-21

## 2023-12-21 DIAGNOSIS — C79.51 MALIGNANT NEOPLASM METASTATIC TO BONE (H): ICD-10-CM

## 2023-12-21 DIAGNOSIS — C79.52 SECONDARY MALIGNANT NEOPLASM OF BONE AND BONE MARROW (H): Primary | ICD-10-CM

## 2023-12-21 DIAGNOSIS — C79.51 SECONDARY MALIGNANT NEOPLASM OF BONE AND BONE MARROW (H): Primary | ICD-10-CM

## 2023-12-21 DIAGNOSIS — M54.42 ACUTE LEFT-SIDED LOW BACK PAIN WITH LEFT-SIDED SCIATICA: ICD-10-CM

## 2023-12-21 DIAGNOSIS — C50.912 CARCINOMA OF LEFT BREAST METASTATIC TO BONE (H): ICD-10-CM

## 2023-12-21 DIAGNOSIS — C79.51 CARCINOMA OF LEFT BREAST METASTATIC TO BONE (H): ICD-10-CM

## 2023-12-21 PROCEDURE — 93010 ELECTROCARDIOGRAM REPORT: CPT | Performed by: INTERNAL MEDICINE

## 2023-12-21 RX ORDER — OXYCODONE HYDROCHLORIDE 5 MG/1
5 TABLET ORAL EVERY 6 HOURS PRN
Qty: 30 TABLET | Refills: 0 | Status: CANCELLED | OUTPATIENT
Start: 2023-12-21

## 2023-12-22 ENCOUNTER — LAB (OUTPATIENT)
Dept: LAB | Facility: CLINIC | Age: 63
End: 2023-12-22
Payer: COMMERCIAL

## 2023-12-22 ENCOUNTER — HOSPITAL ENCOUNTER (OUTPATIENT)
Dept: CARDIOLOGY | Facility: CLINIC | Age: 63
Discharge: HOME | End: 2023-12-22
Attending: FAMILY MEDICINE | Admitting: FAMILY MEDICINE
Payer: COMMERCIAL

## 2023-12-22 ENCOUNTER — DOCUMENTATION ONLY (OUTPATIENT)
Dept: ONCOLOGY | Facility: CLINIC | Age: 63
End: 2023-12-22

## 2023-12-22 ENCOUNTER — VIRTUAL VISIT (OUTPATIENT)
Dept: ONCOLOGY | Facility: CLINIC | Age: 63
End: 2023-12-22
Attending: INTERNAL MEDICINE
Payer: COMMERCIAL

## 2023-12-22 VITALS — BODY MASS INDEX: 32.14 KG/M2 | WEIGHT: 200 LBS | HEIGHT: 66 IN

## 2023-12-22 DIAGNOSIS — C50.919 CARCINOMA OF BREAST METASTATIC TO BONE, UNSPECIFIED LATERALITY (H): ICD-10-CM

## 2023-12-22 DIAGNOSIS — C79.51 CARCINOMA OF LEFT BREAST METASTATIC TO BONE (H): Primary | ICD-10-CM

## 2023-12-22 DIAGNOSIS — C50.912 CARCINOMA OF LEFT BREAST METASTATIC TO BONE (H): Primary | ICD-10-CM

## 2023-12-22 DIAGNOSIS — S32.010S COMPRESSION FRACTURE OF L1 VERTEBRA, SEQUELA: ICD-10-CM

## 2023-12-22 DIAGNOSIS — G89.3 CANCER ASSOCIATED PAIN: ICD-10-CM

## 2023-12-22 DIAGNOSIS — C79.51 CARCINOMA OF BREAST METASTATIC TO BONE, UNSPECIFIED LATERALITY (H): ICD-10-CM

## 2023-12-22 LAB
ALBUMIN SERPL BCG-MCNC: 4.2 G/DL (ref 3.5–5.2)
ALP SERPL-CCNC: 97 U/L (ref 40–150)
ALT SERPL W P-5'-P-CCNC: 19 U/L (ref 0–50)
ANION GAP SERPL CALCULATED.3IONS-SCNC: 11 MMOL/L (ref 7–15)
AST SERPL W P-5'-P-CCNC: 24 U/L (ref 0–45)
BASOPHILS # BLD AUTO: 0 10E3/UL (ref 0–0.2)
BASOPHILS NFR BLD AUTO: 1 %
BILIRUB SERPL-MCNC: 0.3 MG/DL
BUN SERPL-MCNC: 25.3 MG/DL (ref 8–23)
CALCIUM SERPL-MCNC: 8.9 MG/DL (ref 8.8–10.2)
CANCER AG15-3 SERPL-ACNC: 553 U/ML
CHLORIDE SERPL-SCNC: 108 MMOL/L (ref 98–107)
CREAT SERPL-MCNC: 1.1 MG/DL (ref 0.51–0.95)
DEPRECATED HCO3 PLAS-SCNC: 26 MMOL/L (ref 22–29)
EGFRCR SERPLBLD CKD-EPI 2021: 57 ML/MIN/1.73M2
EOSINOPHIL # BLD AUTO: 0.1 10E3/UL (ref 0–0.7)
EOSINOPHIL NFR BLD AUTO: 2 %
ERYTHROCYTE [DISTWIDTH] IN BLOOD BY AUTOMATED COUNT: 15 % (ref 10–15)
GLUCOSE SERPL-MCNC: 103 MG/DL (ref 70–99)
HCT VFR BLD AUTO: 35.5 % (ref 35–47)
HGB BLD-MCNC: 11.4 G/DL (ref 11.7–15.7)
IMM GRANULOCYTES # BLD: 0 10E3/UL
IMM GRANULOCYTES NFR BLD: 0 %
LYMPHOCYTES # BLD AUTO: 1.6 10E3/UL (ref 0.8–5.3)
LYMPHOCYTES NFR BLD AUTO: 49 %
MAGNESIUM SERPL-MCNC: 2 MG/DL (ref 1.7–2.3)
MCH RBC QN AUTO: 30.2 PG (ref 26.5–33)
MCHC RBC AUTO-ENTMCNC: 32.1 G/DL (ref 31.5–36.5)
MCV RBC AUTO: 94 FL (ref 78–100)
MONOCYTES # BLD AUTO: 0.3 10E3/UL (ref 0–1.3)
MONOCYTES NFR BLD AUTO: 8 %
NEUTROPHILS # BLD AUTO: 1.3 10E3/UL (ref 1.6–8.3)
NEUTROPHILS NFR BLD AUTO: 40 %
NRBC # BLD AUTO: 0 10E3/UL
NRBC BLD AUTO-RTO: 0 /100
PHOSPHATE SERPL-MCNC: 3.7 MG/DL (ref 2.5–4.5)
PLATELET # BLD AUTO: 181 10E3/UL (ref 150–450)
POTASSIUM SERPL-SCNC: 4.6 MMOL/L (ref 3.4–5.3)
PROT SERPL-MCNC: 6.7 G/DL (ref 6.4–8.3)
RBC # BLD AUTO: 3.78 10E6/UL (ref 3.8–5.2)
SODIUM SERPL-SCNC: 145 MMOL/L (ref 135–145)
WBC # BLD AUTO: 3.3 10E3/UL (ref 4–11)

## 2023-12-22 PROCEDURE — 80053 COMPREHEN METABOLIC PANEL: CPT | Performed by: INTERNAL MEDICINE

## 2023-12-22 PROCEDURE — 83735 ASSAY OF MAGNESIUM: CPT | Performed by: INTERNAL MEDICINE

## 2023-12-22 PROCEDURE — 85025 COMPLETE CBC W/AUTO DIFF WBC: CPT | Performed by: INTERNAL MEDICINE

## 2023-12-22 PROCEDURE — 36415 COLL VENOUS BLD VENIPUNCTURE: CPT

## 2023-12-22 PROCEDURE — 93005 ELECTROCARDIOGRAM TRACING: CPT | Performed by: INTERNAL MEDICINE

## 2023-12-22 PROCEDURE — 99214 OFFICE O/P EST MOD 30 MIN: CPT | Mod: VID | Performed by: INTERNAL MEDICINE

## 2023-12-22 PROCEDURE — 86300 IMMUNOASSAY TUMOR CA 15-3: CPT | Performed by: INTERNAL MEDICINE

## 2023-12-22 PROCEDURE — 84100 ASSAY OF PHOSPHORUS: CPT | Performed by: INTERNAL MEDICINE

## 2023-12-22 RX ORDER — LETROZOLE 2.5 MG/1
2.5 TABLET, FILM COATED ORAL EVERY MORNING
Qty: 90 TABLET | Refills: 3 | Status: SHIPPED | OUTPATIENT
Start: 2023-12-22 | End: 2024-06-11

## 2023-12-22 RX ORDER — NAPROXEN 500 MG/1
500 TABLET ORAL 2 TIMES DAILY WITH MEALS
Qty: 60 TABLET | Refills: 1 | Status: SHIPPED | OUTPATIENT
Start: 2023-12-22 | End: 2024-01-22

## 2023-12-22 RX ORDER — OXYCODONE HYDROCHLORIDE 5 MG/1
5 TABLET ORAL EVERY 6 HOURS PRN
Qty: 120 TABLET | Refills: 0 | Status: SHIPPED | OUTPATIENT
Start: 2023-12-22 | End: 2024-01-05 | Stop reason: DRUGHIGH

## 2023-12-22 ASSESSMENT — PAIN SCALES - GENERAL: PAINLEVEL: SEVERE PAIN (6)

## 2023-12-22 NOTE — LETTER
12/22/2023         RE: Kesha Zacarias  [address] [address] [place] [id number]        Dear Colleague,    Thank you for referring your patient, Kesha Zacarias, to the [facility]. Please see a copy of my visit note below.    Video-Visit Details     Video Start Time: 3:51PM     Type of service:  Video Visit     Video End Time: 4:19PM    Originating Location (pt. Location): Home     Distant Location (provider location):  [facility] on site      Platform used for Video Visit: [facility]    Hematology/Oncology Established Patient Follow Up Note      Today's Date: 12/22/2023     Reason for follow up: breast cancer    HISTORY OF PRESENT ILLNESS: Kesha Zacarias is a 63 year old female who presents for follow up    Patient has medical history including rheumatoid arthritis, hyperlipidemia, osteoarthritis, bilateral cataracts and glaucoma s/p surgery, endocervical polyp s/p resection, vaginal atrophy with conjugated estrogen vaginal cream use, colon polyp (hyperplastic polyp and tubular adenoma), seasonal depression, history of tobacco use ([age] y/o, about 1 ppd).    Regarding RA:  -dx over a decade ago, on plaquenil, managed by PCP  - arthritis is most severe in hands>knees, intermittent       - 3/23 bilateral screening mammogram [facility]  - a few months ago, noted nipple retraction  - 9/23 patient palpated mass in retroareolar region of left breast and w/nipple retraction, no discharge, skin thickening, no dimpling, no erythema  - 9/23 diagnostic LEFT breast mammogram: Focal dense tissue with some likely mild architectural distortion, some anterior skin thickening is noted  - 9/23 targeted LEFT breast ultrasound: Ill-defined shadowing hypoechoic mass, 3.0 x 1.7 x 3.9 cm.  In left axilla-few small lymph nodes, 1 normal-sized lymph node with cortical thickening, 0.7 x 0.6 cm.  -9/23 ultrasound-guided LEFT breast core biopsy of 12:00 lesion  "with clip placement, \"Postbiopsy unilateral digital mammogram of the left breast showed the clip to be at the expected biopsy site\" AND ultrasound-guided left axillary lymph node biopsy of lymph node with cortical thickening  PATHOLOGY  A.  Breast, left, 12:00, biopsy:  -Lobular carcinoma in situ.-Multiple foci  -Invasive carcinoma is not identified.     B.  Lymph node, left axillary, biopsy:  -Positive for metastatic lobular carcinoma, grade 2  -Largest metastatic focus is 7 mm.  -Negative for extranodal extension.  -Breast ancillary testing:  -Estrogen receptor: Positive (91 to 100%, strong)  -Progesterone receptor: Positive (91 to 100%, strong)  -HER2 2+ IHC, FISH negative    -10/23 MRI bilateral breast:  LEFT breast:  - Heterogeneously enhancing irregular mass in the subareolar left breast, 5.0 x 4.9 x 4.9 cm, with associated nipple retraction and nipple/areolar involvement.  This mass extends superiorly through 11: positions, from anterior to mid depth.  The CynergenMARK breast biopsy clip (which showed LCIS) is 1.5 cm posterosuperior to this mass.  - Multiple suspicious left level 1 axillary lymph node noted, including biopsy-proven metastatic node with indwelling biopsy marker, biopsied node measures 1.3 x 1.0 cm  - Heterogeneous marrow appearance of sternum and ribs with heterogeneous enhancement and a peripheral enhancing focus within the mid body.  IMPRESSION:  1. Upon further review of previous imaging and pathology results,  recommend repeat ultrasound guided large core-needle biopsy of the  discordant dominant left breast mass given metastatic left axillary  lymph node and superoposteriorly displaced left breast biopsy marker.   2. Nonspecific heterogeneous enhancement of the sternum and ribs.  Consider nuclear medicine bone scan    Lifetime estrogen exposure:  Menarche: 13   Last menstrual period: 49   Age of first pregnancy: 18   Number of pregnancies: 2 (no living children)   Weight gain: 20lb " weight gain within a year  Exposure to exogenous estrogen: vaginal atrophy with conjugated estrogen vaginal cream use x4 years (intermittent), has not used it since 9/23. Birth control for about 13-15 years from her 20s-30s.      Pt reports 55lb weight loss in 1.5 years, this was intentional, was following weight watchers program (23 points available per day) 230lb->170lb->190lb (gained about 20lbs). Pt was walking on the treadmill and outside daily, about 30 mins to 1.5 miles.    10/23 labs:  AST 79, ALT 91,   CA 15-3 261    10/23 PET/CT:  Innumerable foci of FDG avid lesions are seen throughout the osseous  structures of the head and neck, most pronounced on the calvarium and  cervical spine, with prominently sclerotic appearance on CT correlate.  For example:  -Right frontal bone, SUV max 5.31.  -Left lateral mass of the atlas, SUV max 15.0  -Angle of the left mandible, SUV max 9.6  -C7 vertebral body, SUV max 17.7    Innumerable variable sized FDG avid lesions throughout the axial and  appendicular spine, including but not limited to the cervical,  thoracic, and lumbar spine, multilevel bilateral ribs, sternum,  bilateral scapula, bilateral humeri, clavicles, pelvis, and bilateral  femurs. A few of these lesions are described below:  -Large FDG avid sclerotic 3.4 cm lesion within the proximal left  humeral head, SUV max 17.24  -Sternal body, SUV max 20.35  -L2 vertebral body, SUV max 14.3 without  -Large ill-defined sclerotic lesion in the sacral body measuring up to  at least 5.9 cm, SUV max 16.84  -FDG avid focus within the left femoral head, SUV  max 13.65 without CT correlate.    Large irregular soft tissue FDG avid mass within the left breast   measuring approximately 3.2 x 2.7 cm with  extension into the superficial soft tissues and associated left nipple  retraction, SUV max 12.36 additional FDG avid. Left axillary lymph  nodes, not definitively enlarged by short axis size criteria, for  example,  SUV max 5.93.    The liver is unremarkable.     Solid 3 mm non-FDG avid pulmonary nodule within the  right middle lobe    - 10/23 MRI brain:  - extensive osseous metastatic disease involving the calvarium, skull base w/involvement of occipital condyles, C1 and C2 vertebral bodies, and likely the mandible  -  Dominant calvarial lesions are located in the right frontal calvarium and right temporal calvarium without definite breach of the  inner or outer tables of the calvarium. There is a suggestion of mild asymmetric linear extra-axial enhancement deep to the dominant right temporal calvarial lesion along the dural margin, though this may be vascular in etiology.  - No abnormal parenchymal or leptomeningeal enhancement.   - sequelae of mild chronic microvascular ischemic disease. Mild generalized cerebral atrophy.     INTERIM HISTORY:    -supposed to start ribociclib 10/30/23 however, noted to have significantly elevated LFTS (10/26/23 , , alk phos 152)    10/17/23: AST 79, ALT 91, alk phos 116, t bili 0.5  10/26/23 , , alk phos 152  10/30/23: , , alk phos 178, coags WNL, ribociclib was not started, letrozole started, atorvastatin held  11/7/23: AST 96, , alk phos 169  11/17/23: AST 81, , alk phos 163  11/27/23: AST 55, ALT 90, alk phos 128- started ribociclib 400mg  12/4/23: AST 26, ALT 35, alk phos 118    REGIMEN:  Ribociclib+letrozole    Ribociclib 600mg PO daily day 1-21 q28 days (11/27/23 started 400mg PO daily when LFTs improved to <3x ULN)  Letrozole 2.5mg PO daily (started 10/30/23)  Febrile neutropenia risk: neutropenia (69% to 78%; grade 3: 46% to 55%; grade 4: 7% to 10%), Febrile neutropenia (1%)    C1D25 12/22/23  C2D1 12/25/23 planned with full dose ribociclib 600mg    Treatment related AE:  - elevated LFTs- improved 11/27/23 to <3x ULN (AST 55, ALT 90, alk phos 128)  to be able to start ribociclib 400mg, 11/23 MRI liver negative for mets, ?cause-  "possibly viral infection between 10/17/23 and 10/26/23; 12/22/23 LFTs normal, next cycle (cycle 2) ribociclib 600mg rxed  - elevated Cr- increased water to 60oz/day  - daily HA- prior to starting tx- mostly b/l temporal regions, no sensitivity to light or sound, no N/V, takes tylenol w/o improvement; 10/23 MRI brain: extensive osseous mets w/dominant calvarial lesions w/enhancement deep to dominant right temporal calvarial lesion along dural margin (?vascular etiology), no parenchymal or leptomeningeal enhancement. resolved  - blurry vision b/l- last saw eye doctor 3/23 and has trifocal glasses. She has dry eyes and uses lubricating eye drops., 10/23 MRI brain as above. stable  - constipation- probiotics, using senna BID and colace TID; has not added miralax yet, having BM every other day   - leg cramps- Mg  - back pain- left lower back, sharp, radiates to left buttock and leg making it hard to walk, worse w/walking, improved w/heating pad and ibuprofen 800mg BID, flexeril 5mg qHS, oxycodone 5mg q6hr prn #30 tabs rx 12/4/23 with stool softner- pt is using at bedtime due feeling \"loopy\" during day- this combination allows pt to be functional (10/10 previously, now 6/10)->12/23 half oxycodone (2.5mg) q6 hrs during day and 5mg at bedtime and ibuprofen 800mg BID AM and afternoon- without significant change (still 6/10), 11/30/23 MRI lumbar spine with diffuse osseous metastatic disease.  Possible pathologic compression deformity at L1. will push back rad onc appt to be after neurosurgery consult 12/28/23 as pt has compression fracture, I discussed this with Dr. Mandujano   - anxiety- expressed anger at dx and pain, tearful today, has support of family and  who are great advocates for her care  - pancytopenia- thrombocytopenia improved 143K->181;               REVIEW OF SYSTEMS:   A 14 point ROS was reviewed with pertinent positives and negatives in the HPI.        HOME MEDICATIONS:  Current Outpatient Medications "   Medication Sig Dispense Refill     aspirin 81 MG tablet Take 81 mg by mouth daily       betamethasone dipropionate (DIPROSONE) 0.05 % external lotion Apply topically 2 times daily 60 mL 3     COENZYME Q-10 PO Take 1 tablet by mouth every other day       cyclobenzaprine (FLEXERIL) 5 MG tablet TAKE 1 TABLET(5 MG) BY MOUTH AT BEDTIME 90 tablet 3     docusate sodium (COLACE) 100 MG capsule Take 1 capsule (100 mg) by mouth 3 times daily as needed for constipation 90 capsule 3     fish oil-omega-3 fatty acids 1000 MG capsule Take 2 g by mouth daily       hydroxychloroquine (PLAQUENIL) 200 MG tablet TAKE 2 AND 1/2 TABLETS BY MOUTH EVERY  tablet 3     lactobacillus rhamnosus, GG, (CULTURELL) capsule Take 1 capsule by mouth 2 times daily       letrozole (FEMARA) 2.5 MG tablet Take 1 tablet (2.5 mg) by mouth every morning for 28 days 28 tablet 11     loperamide (IMODIUM A-D) 2 MG tablet When diarrhea starts take 2 tabs (4mg), then with each additional episode of diarrhea take 1 additional tab and if needing more than 8 tabs in 24 hrs call oncology 30 tablet 3     magnesium 250 MG tablet Take 1 tablet by mouth daily       ondansetron (ZOFRAN) 4 MG tablet Take 1 tablet (4 mg) by mouth every 6 hours as needed for nausea 30 tablet 3     oxyCODONE (ROXICODONE) 5 MG tablet Take 1 tablet (5 mg) by mouth every 6 hours as needed for pain 30 tablet 0     prochlorperazine (COMPAZINE) 10 MG tablet Take 1 tablet (10 mg) by mouth every 6 hours as needed for nausea or vomiting 30 tablet 2     [START ON 12/25/2023] ribociclib (KISQALI) 200 MG tablet Take 3 tablets (600 mg) by mouth every morning for 21 days , then off for 7 days. 42 tablet 0     sennosides (SENOKOT) 8.6 MG tablet Take 1 tablet by mouth 2 times daily as needed for constipation 60 tablet 3         ALLERGIES:  Allergies   Allergen Reactions     Ciprofloxacin      hives and was on flagyl too     Metronidazole      hives and was on cipro too         PAST MEDICAL  HISTORY:  Past Medical History:   Diagnosis Date     Arthritis 2006     Breast cancer metastasized to bone (H) 10/12/2023     Chronic depressive personality disorder      Dysplasia of cervix (uteri) 1988    Cryotherapy     Female infertility of unspecified origin          PAST SURGICAL HISTORY:  Past Surgical History:   Procedure Laterality Date     COLONOSCOPY       COLONOSCOPY N/A 2022    Procedure: COLONOSCOPY, WITH POLYPECTOMY AND BIOPSY;  Surgeon: Gagandeep Patterson MD;  Location:  GI     HC INTRODUCE CATH FALLOPIAN TUBE, RE-OPEN/DIAGNOSIS       HERNIA REPAIR, INCISIONAL  09     ORTHOPEDIC SURGERY  2017     ZC APPENDECTOMY  03     [facility] REMV CATARACT INTRACAP,INSERT LENS  2003    right         SOCIAL HISTORY:  Social History     Socioeconomic History     Marital status:      Spouse name: Bandar     Number of children: 1     Years of education: Not on file     Highest education level: Not on file   Occupational History     Not on file   Tobacco Use     Smoking status: Former     Packs/day: 0.50     Years: 25.00     Additional pack years: 0.00     Total pack years: 12.50     Types: Cigarettes     Quit date: 2017     Years since quittin.2     Smokeless tobacco: Never     Tobacco comments:     quit 2017    Vaping Use     Vaping Use: Never used   Substance and Sexual Activity     Alcohol use: Yes     Comment: 1-2 weekly     Drug use: No     Sexual activity: Yes     Partners: Male     Birth control/protection: None   Other Topics Concern     Parent/sibling w/ CABG, MI or angioplasty before 65F 55M? Yes   Social History Narrative     Not on file     Social Determinants of Health     Financial Resource Strain: Not on file   Food Insecurity: Not on file   Transportation Needs: Not on file   Physical Activity: Not on file   Stress: Not on file   Social Connections: Not on file   Interpersonal Safety: Not on file   Housing Stability: Not on file         FAMILY  HISTORY:  Family History   Problem Relation Age of Onset     Genitourinary Problems Father         prostate     Genetic Disorder Father         ulcer     Hypertension Father      Lipids Father      Prostate Cancer Father      Heart Disease Mother      Lipids Mother      Hyperlipidemia Mother      Heart Disease Maternal Grandmother      Cerebrovascular Disease Maternal Grandmother      Heart Disease Maternal Grandfather      Heart Disease Maternal Uncle      Heart Disease Maternal Uncle         x  3     Hyperlipidemia Sister      Hyperlipidemia Brother      Other Cancer Brother        Family history of:  1.  Breast cancer including male breast cancer: negative   2. Ovarian cancer: negative  3.  Pancreatic cancer: negative  4.  Prostate cancer: father- ?in his 50s, other details unknown  5. Diffuse gastric cancer (if lobular breast CA): negative  6. Uterine cancer: negative  7. Colon cancer:  negative  6. Brother- head and neck, HPV +, had surgery, clinical trial and now cancer free      PHYSICAL EXAM:  Vital signs:  LMP 11/22/2011 (Exact Date)            LABS:   Latest Reference Range & Units 12/22/23 07:29   Sodium 135 - 145 mmol/L 145   Potassium 3.4 - 5.3 mmol/L 4.6   Chloride 98 - 107 mmol/L 108 (H)   Carbon Dioxide (CO2) 22 - 29 mmol/L 26   Urea Nitrogen 8.0 - 23.0 mg/dL 25.3 (H)   Creatinine 0.51 - 0.95 mg/dL 1.10 (H)   GFR Estimate >60 mL/min/1.73m2 57 (L)   Calcium 8.8 - 10.2 mg/dL 8.9   Anion Gap 7 - 15 mmol/L 11   Magnesium 1.7 - 2.3 mg/dL 2.0   Phosphorus 2.5 - 4.5 mg/dL 3.7   Albumin 3.5 - 5.2 g/dL 4.2   Protein Total 6.4 - 8.3 g/dL 6.7   Alkaline Phosphatase 40 - 150 U/L 97   ALT 0 - 50 U/L 19   AST 0 - 45 U/L 24   Bilirubin Total <=1.2 mg/dL 0.3   Glucose 70 - 99 mg/dL 103 (H)   WBC 4.0 - 11.0 10e3/uL 3.3 (L)   Hemoglobin 11.7 - 15.7 g/dL 11.4 (L)   Hematocrit 35.0 - 47.0 % 35.5   Platelet Count 150 - 450 10e3/uL 181   RBC Count 3.80 - 5.20 10e6/uL 3.78 (L)   MCV 78 - 100 fL 94   MCH 26.5 - 33.0 pg 30.2  "  MCHC 31.5 - 36.5 g/dL 32.1   RDW 10.0 - 15.0 % 15.0   % Neutrophils % 40   % Lymphocytes % 49   % Monocytes % 8   % Eosinophils % 2   % Basophils % 1   Absolute Basophils 0.0 - 0.2 10e3/uL 0.0   Absolute Eosinophils 0.0 - 0.7 10e3/uL 0.1   Absolute Immature Granulocytes <=0.4 10e3/uL 0.0   Absolute Lymphocytes 0.8 - 5.3 10e3/uL 1.6   Absolute Monocytes 0.0 - 1.3 10e3/uL 0.3   % Immature Granulocytes % 0   Absolute Neutrophils 1.6 - 8.3 10e3/uL 1.3 (L)   Absolute NRBCs 10e3/uL 0.0   NRBCs per 100 WBC <1 /100 0   (H): Data is abnormally high  (L): Data is abnormally low      PATHOLOGY:    IMAGING:    ASSESSMENT/PLAN:  Kesha Zacarias is a 63 year old female with:    # de tavon metastatic left breast invasive lobular carcinoma, ER positive, OR positive, her2 negative on FISH  - pt has de tavon metastatic invasive lobular breast cancer, ER positive, OR positive, her2 negative. Pt does not have visceral crisis, she mainly has extensive bone metastases and LN metastases   -9/23 diagnostic left breast mammogram, left breast targeted ultrasound showed 3.0 x 1.7 x 3.9 ill-defined shadowing hypoechoic mass, few small lymph nodes in the left axilla, one with cortical thickening measuring 0.7 x 0.6 cm  -9/23 ultrasound-guided LEFT breast core biopsy of 12:00 lesion with clip placement, \"Postbiopsy unilateral digital mammogram of the left breast showed the clip to be at the expected biopsy site\" AND ultrasound-guided left axillary lymph node biopsy of lymph node with cortical thickening, PATHOLOGY:  A.  Breast, left, 12:00, biopsy:Lobular carcinoma in situ, Multiple foci. Invasive carcinoma is not identified.   B.  Lymph node, left axillary, biopsy:  -Positive for metastatic lobular carcinoma, grade 2, 0.7 cm, negative GREGG, Estrogen receptor: Positive (91 to 100%, strong), Progesterone receptor: Positive (91 to 100%, strong), HER2 2+ IHC, FISH negative  -10/23 MRI bilateral breast:  - Heterogeneously enhancing irregular mass in " the subareolar left breast, 5.0 x 4.9 x 4.9 cm, with associated nipple retraction and nipple/areolar involvement.  This mass extends superiorly through 11: positions, from anterior to mid depth.  The HydroMARK breast biopsy clip (which showed LCIS) is 1.5 cm posterosuperior to this mass.  - Multiple suspicious left level 1 axillary lymph node noted, including biopsy-proven metastatic node with indwelling biopsy marker, biopsied node measures 1.3 x 1.0 cm  - Heterogeneous marrow appearance of sternum and ribs with heterogeneous enhancement and a peripheral enhancing focus within the mid body.    - 10/23 PET/CT:  Innumerable foci of FDG avid lesions are seen throughout the osseous structures of the head and neck, most pronounced on the calvarium and cervical spine, with prominently sclerotic appearance on CT correlate.  For example:  -Right frontal bone, SUV max 5.31.  -Left lateral mass of the atlas, SUV max 15.0  -Angle of the left mandible, SUV max 9.6  -C7 vertebral body, SUV max 17.7    Innumerable variable sized FDG avid lesions throughout the axial and appendicular spine, including but not limited to the cervical, thoracic, and lumbar spine, multilevel bilateral ribs, sternum, bilateral scapula, bilateral humeri, clavicles, pelvis, and bilateral femurs. A few of these lesions are described below:  -Large FDG avid sclerotic 3.4 cm lesion within the proximal left humeral head, SUV max 17.24  -Sternal body, SUV max 20.35  -L2 vertebral body, SUV max 14.3 without  -Large ill-defined sclerotic lesion in the sacral body measuring up to at least 5.9 cm, SUV max 16.84  -FDG avid focus within the left femoral head, SUV max 13.65 without CT correlate.    Large irregular soft tissue FDG avid mass within the left breast measuring approximately 3.2 x 2.7 cm with  extension into the superficial soft tissues and associated left nipple retraction, SUV max 12.36 additional FDG avid. Left axillary lymph nodes, not  definitively enlarged by short axis size criteria, for example, SUV max 5.93.    - 10/23 MRI brain:  - extensive osseous metastatic disease involving the calvarium, skull base w/involvement of occipital condyles, C1 and C2 vertebral bodies, and likely the mandible  -  Dominant calvarial lesions are located in the right frontal calvarium and right temporal calvarium without definite breach of the inner or outer tables of the calvarium. There is a suggestion of mild asymmetric linear extra-axial enhancement deep to the dominant right temporal calvarial lesion along the dural margin, though this may be vascular in etiology.  - No abnormal parenchymal or leptomeningeal enhancement.   - sequelae of mild chronic microvascular ischemic disease. Mild generalized cerebral atrophy.     -10/23 DEXA: osteopenia (T score -2.0 lumbar spine, -2.0 left hip femoral neck, The 10 year probability of major osteoporotic fracture is 12.5%, and of hip fracture is 1.8%, based on left femoral neck BMD)    - 11/23 orthopedic consult to determine stability of left femur and fracture risk given presence of mets in left femoral head: do not see any areas of impending cortical erosion or sites of high risk for fracture, observe      REGIMEN:  Ribociclib+letrozole    Ribociclib 600mg PO daily day 1-21 q28 days (11/27/23 started 400mg PO daily when LFTs improved to <3x ULN)  Letrozole 2.5mg PO daily (started 10/30/23)  Febrile neutropenia risk: neutropenia (69% to 78%; grade 3: 46% to 55%; grade 4: 7% to 10%), Febrile neutropenia (1%)    C1D25 12/22/23  C2D1 12/25/23 planned with full dose ribociclib 600mg    Treatment related AE:  - elevated LFTs- normalized spontaneously, 11/23 MRI liver negative for mets, 12/22/23 LFTs normal, will increase ribociclib from 400mg to 600mg for next cycle/cycle 2  -elevated Cr- improved with increased PO intake, continue oral hydration  - back pain- rx naproxen 500mg BID with food (GI bleed alarm sx discussed),  oxycodone 5mg q6hr prn #120- refilled 12/22/23 and to be refilled 1/19/24,  continue flexeril prn, ok to use heating pad for short periods of time, will push back rad onc appt to be after neurosurgery consult 12/28/23 as pt has compression fracture, I discussed this with Dr. Mandujano   - constipation- continue colace TID and senna BID prn, if no improvement can use miralax, monitor for diarrhea  - HA- stable, preceeded tx and unchanged since then, 10/23 MRI brain negative, alarm sx discussed, low threshold for repeat imaging w/ special attention to right temporal calvarium at dural margin (?vascular etiology)  - blurred vision- stable, no alarms x  - leg cramps- focus on hydration, fine to take Mg prn  - anxiety- defers psychology consult   - pancytopenia- 2/2 tx, plt WNL, ANC 1.3; neutropenic fever precautions discussed, hgb 11.4, monitor    - labs noted  - EKG noted Qtc 445  - repeat CA 15-3 pending prior to cycle 2 (10/23 CA 15-3= 261)  - dental clearance obtained, will start zometa q4 weeks (starting dose 3.5mg 2/2 GFR 50) and calcium and vitamin D, once disease stabilized will transition to 4mg q12 weeks   - Genetic counseling referral, pt had initially not scheduled but will call back now to schedule appt  - repeat labs and EKG in q2 weeks for cycle 1 and 2 (watch LFTs closely; pt on plaquenil, watch Qtc closely)  - repeat PET after 3 months of tx, due end of 2/2024-ordered previously    #post menopausal  #vaginal dryness  - Pts breast biopsy pathology results explained in detail. Pt is aware her tumor is estrogen positive. Pt educated to avoid all estrogen-containing products including but not limited to birth control, vaginal creams etc.     #RA  - on plaquenil       RTC 1/8/24 labs, EKG at [facility]  RT 1/19/24 follow up with me, labs and EKG prior visit (labs and EKG at [facility])- virtual visit      Mary DO Taiwo  Hematology/Oncology  [facility]      Again, thank you for allowing  me to participate in the care of your patient.        Sincerely,        MILLER ALTAMIRANO, DO

## 2023-12-22 NOTE — NURSING NOTE
Received letter from patient's dental office clearing her for bisphosphonate therapy. Dr. Maravilla notified, document placed in scanning.    Krissy Flores, RN, BSN  RN Care Coordinator - Oncology/Hematology  [facility]

## 2023-12-22 NOTE — NURSING NOTE
Is the patient currently in the state of [place]? YES    Visit mode:VIDEO    If the visit is dropped, the patient can be reconnected by: VIDEO VISIT: Text to cell phone:   Telephone Information:   Mobile [phone]       Will anyone else be joining the visit? NO  (If patient encounters technical issues they should call [phone]    How would you like to obtain your AVS? MyChart    Are changes needed to the allergy or medication list? Yes Pt states she is still taking letrozole  and also taking docusate.    Reason for visit: TC CATESF

## 2023-12-22 NOTE — PROGRESS NOTES
"Video-Visit Details     Video Start Time: 3:51PM     Type of service:  Video Visit     Video End Time: 4:19PM    Originating Location (pt. Location): Home     Distant Location (provider location):  [employer] [place] on site      Platform used for Video Visit: Negrita           [facility]    Hematology/Oncology Established Patient Follow Up Note      Today's Date: 12/22/2023     Reason for follow up: breast cancer    HISTORY OF PRESENT ILLNESS: Kesha Zacarias is a 63 year old female who presents for follow up    Patient has medical history including rheumatoid arthritis, hyperlipidemia, osteoarthritis, bilateral cataracts and glaucoma s/p surgery, endocervical polyp s/p resection, vaginal atrophy with conjugated estrogen vaginal cream use, colon polyp (hyperplastic polyp and tubular adenoma), seasonal depression, history of tobacco use ([age] y/o, about 1 ppd).    Regarding RA:  -dx over a decade ago, on plaquenil, managed by PCP  - arthritis is most severe in hands>knees, intermittent       - 3/23 bilateral screening mammogram [facility]  - a few months ago, noted nipple retraction  - 9/23 patient palpated mass in retroareolar region of left breast and w/nipple retraction, no discharge, skin thickening, no dimpling, no erythema  - 9/23 diagnostic LEFT breast mammogram: Focal dense tissue with some likely mild architectural distortion, some anterior skin thickening is noted  - 9/23 targeted LEFT breast ultrasound: Ill-defined shadowing hypoechoic mass, 3.0 x 1.7 x 3.9 cm.  In left axilla-few small lymph nodes, 1 normal-sized lymph node with cortical thickening, 0.7 x 0.6 cm.  -9/23 ultrasound-guided LEFT breast core biopsy of 12:00 lesion with clip placement, \"Postbiopsy unilateral digital mammogram of the left breast showed the clip to be at the expected biopsy site\" AND ultrasound-guided left axillary lymph node biopsy of lymph node with cortical thickening  PATHOLOGY  A.  Breast, left, 12:00, " biopsy:  -Lobular carcinoma in situ.-Multiple foci  -Invasive carcinoma is not identified.     B.  Lymph node, left axillary, biopsy:  -Positive for metastatic lobular carcinoma, grade 2  -Largest metastatic focus is 7 mm.  -Negative for extranodal extension.  -Breast ancillary testing:  -Estrogen receptor: Positive (91 to 100%, strong)  -Progesterone receptor: Positive (91 to 100%, strong)  -HER2 2+ IHC, FISH negative    -10/23 MRI bilateral breast:  LEFT breast:  - Heterogeneously enhancing irregular mass in the subareolar left breast, 5.0 x 4.9 x 4.9 cm, with associated nipple retraction and nipple/areolar involvement.  This mass extends superiorly through 11: positions, from anterior to mid depth.  The [employer] breast biopsy clip (which showed LCIS) is 1.5 cm posterosuperior to this mass.  - Multiple suspicious left level 1 axillary lymph node noted, including biopsy-proven metastatic node with indwelling biopsy marker, biopsied node measures 1.3 x 1.0 cm  - Heterogeneous marrow appearance of sternum and ribs with heterogeneous enhancement and a peripheral enhancing focus within the mid body.  IMPRESSION:  1. Upon further review of previous imaging and pathology results,  recommend repeat ultrasound guided large core-needle biopsy of the  discordant dominant left breast mass given metastatic left axillary  lymph node and superoposteriorly displaced left breast biopsy marker.   2. Nonspecific heterogeneous enhancement of the sternum and ribs.  Consider nuclear medicine bone scan    Lifetime estrogen exposure:  Menarche: 13   Last menstrual period: 49   Age of first pregnancy: 18   Number of pregnancies: 2 (no living children)   Weight gain: 20lb weight gain within a year  Exposure to exogenous estrogen: vaginal atrophy with conjugated estrogen vaginal cream use x4 years (intermittent), has not used it since 9/23. Birth control for about 13-15 years from her 20s-30s.      Pt reports 55lb weight loss in 1.5  years, this was intentional, was following weight watchers program (23 points available per day) 230lb->170lb->190lb (gained about 20lbs). Pt was walking on the treadmill and outside daily, about 30 mins to 1.5 miles.    10/23 labs:  AST 79, ALT 91,   CA 15-3 261    10/23 PET/CT:  Innumerable foci of FDG avid lesions are seen throughout the osseous  structures of the head and neck, most pronounced on the calvarium and  cervical spine, with prominently sclerotic appearance on CT correlate.  For example:  -Right frontal bone, SUV max 5.31.  -Left lateral mass of the atlas, SUV max 15.0  -Angle of the left mandible, SUV max 9.6  -C7 vertebral body, SUV max 17.7    Innumerable variable sized FDG avid lesions throughout the axial and  appendicular spine, including but not limited to the cervical,  thoracic, and lumbar spine, multilevel bilateral ribs, sternum,  bilateral scapula, bilateral humeri, clavicles, pelvis, and bilateral  femurs. A few of these lesions are described below:  -Large FDG avid sclerotic 3.4 cm lesion within the proximal left  humeral head, SUV max 17.24  -Sternal body, SUV max 20.35  -L2 vertebral body, SUV max 14.3 without  -Large ill-defined sclerotic lesion in the sacral body measuring up to  at least 5.9 cm, SUV max 16.84  -FDG avid focus within the left femoral head, SUV  max 13.65 without CT correlate.    Large irregular soft tissue FDG avid mass within the left breast   measuring approximately 3.2 x 2.7 cm with  extension into the superficial soft tissues and associated left nipple  retraction, SUV max 12.36 additional FDG avid. Left axillary lymph  nodes, not definitively enlarged by short axis size criteria, for  example, SUV max 5.93.    The liver is unremarkable.     Solid 3 mm non-FDG avid pulmonary nodule within the  right middle lobe    - 10/23 MRI brain:  - extensive osseous metastatic disease involving the calvarium, skull base w/involvement of occipital condyles, C1 and C2  vertebral bodies, and likely the mandible  -  Dominant calvarial lesions are located in the right frontal calvarium and right temporal calvarium without definite breach of the  inner or outer tables of the calvarium. There is a suggestion of mild asymmetric linear extra-axial enhancement deep to the dominant right temporal calvarial lesion along the dural margin, though this may be vascular in etiology.  - No abnormal parenchymal or leptomeningeal enhancement.   - sequelae of mild chronic microvascular ischemic disease. Mild generalized cerebral atrophy.     INTERIM HISTORY:    -supposed to start ribociclib 10/30/23 however, noted to have significantly elevated LFTS (10/26/23 , , alk phos 152)    10/17/23: AST 79, ALT 91, alk phos 116, t bili 0.5  10/26/23 , , alk phos 152  10/30/23: , , alk phos 178, coags WNL, ribociclib was not started, letrozole started, atorvastatin held  11/7/23: AST 96, , alk phos 169  11/17/23: AST 81, , alk phos 163  11/27/23: AST 55, ALT 90, alk phos 128- started ribociclib 400mg  12/4/23: AST 26, ALT 35, alk phos 118    REGIMEN:  Ribociclib+letrozole    Ribociclib 600mg PO daily day 1-21 q28 days (11/27/23 started 400mg PO daily when LFTs improved to <3x ULN)  Letrozole 2.5mg PO daily (started 10/30/23)  Febrile neutropenia risk: neutropenia (69% to 78%; grade 3: 46% to 55%; grade 4: 7% to 10%), Febrile neutropenia (1%)    C1D25 12/22/23  C2D1 12/25/23 planned with full dose ribociclib 600mg    Treatment related AE:  - elevated LFTs- improved 11/27/23 to <3x ULN (AST 55, ALT 90, alk phos 128)  to be able to start ribociclib 400mg, 11/23 MRI liver negative for mets, ?cause- possibly viral infection between 10/17/23 and 10/26/23; 12/22/23 LFTs normal, next cycle (cycle 2) ribociclib 600mg rxed  - elevated Cr- increased water to 60oz/day  - daily HA- prior to starting tx- mostly b/l temporal regions, no sensitivity to light or sound, no  "N/V, takes tylenol w/o improvement; 10/23 MRI brain: extensive osseous mets w/dominant calvarial lesions w/enhancement deep to dominant right temporal calvarial lesion along dural margin (?vascular etiology), no parenchymal or leptomeningeal enhancement. resolved  - blurry vision b/l- last saw eye doctor 3/23 and has trifocal glasses. She has dry eyes and uses lubricating eye drops., 10/23 MRI brain as above. stable  - constipation- probiotics, using senna BID and colace TID; has not added miralax yet, having BM every other day   - leg cramps- Mg  - back pain- left lower back, sharp, radiates to left buttock and leg making it hard to walk, worse w/walking, improved w/heating pad and ibuprofen 800mg BID, flexeril 5mg qHS, oxycodone 5mg q6hr prn #30 tabs rx 12/4/23 with stool softner- pt is using at bedtime due feeling \"loopy\" during day- this combination allows pt to be functional (10/10 previously, now 6/10)->12/23 half oxycodone (2.5mg) q6 hrs during day and 5mg at bedtime and ibuprofen 800mg BID AM and afternoon- without significant change (still 6/10), 11/30/23 MRI lumbar spine with diffuse osseous metastatic disease.  Possible pathologic compression deformity at L1. will push back rad onc appt to be after neurosurgery consult 12/28/23 as pt has compression fracture, I discussed this with Dr. Mandujano   - anxiety- expressed anger at dx and pain, tearful today, has support of family and  who are great advocates for her care  - pancytopenia- thrombocytopenia improved 143K->181;               REVIEW OF SYSTEMS:   A 14 point ROS was reviewed with pertinent positives and negatives in the HPI.        HOME MEDICATIONS:  Current Outpatient Medications   Medication Sig Dispense Refill    aspirin 81 MG tablet Take 81 mg by mouth daily      betamethasone dipropionate (DIPROSONE) 0.05 % external lotion Apply topically 2 times daily 60 mL 3    COENZYME Q-10 PO Take 1 tablet by mouth every other day      cyclobenzaprine " (FLEXERIL) 5 MG tablet TAKE 1 TABLET(5 MG) BY MOUTH AT BEDTIME 90 tablet 3    docusate sodium (COLACE) 100 MG capsule Take 1 capsule (100 mg) by mouth 3 times daily as needed for constipation 90 capsule 3    fish oil-omega-3 fatty acids 1000 MG capsule Take 2 g by mouth daily      hydroxychloroquine (PLAQUENIL) 200 MG tablet TAKE 2 AND 1/2 TABLETS BY MOUTH EVERY  tablet 3    lactobacillus rhamnosus, GG, (CULTURELL) capsule Take 1 capsule by mouth 2 times daily      letrozole (FEMARA) 2.5 MG tablet Take 1 tablet (2.5 mg) by mouth every morning for 28 days 28 tablet 11    loperamide (IMODIUM A-D) 2 MG tablet When diarrhea starts take 2 tabs (4mg), then with each additional episode of diarrhea take 1 additional tab and if needing more than 8 tabs in 24 hrs call oncology 30 tablet 3    magnesium 250 MG tablet Take 1 tablet by mouth daily      ondansetron (ZOFRAN) 4 MG tablet Take 1 tablet (4 mg) by mouth every 6 hours as needed for nausea 30 tablet 3    oxyCODONE (ROXICODONE) 5 MG tablet Take 1 tablet (5 mg) by mouth every 6 hours as needed for pain 30 tablet 0    prochlorperazine (COMPAZINE) 10 MG tablet Take 1 tablet (10 mg) by mouth every 6 hours as needed for nausea or vomiting 30 tablet 2    [START ON 12/25/2023] ribociclib (KISQALI) 200 MG tablet Take 3 tablets (600 mg) by mouth every morning for 21 days , then off for 7 days. 42 tablet 0    sennosides (SENOKOT) 8.6 MG tablet Take 1 tablet by mouth 2 times daily as needed for constipation 60 tablet 3         ALLERGIES:  Allergies   Allergen Reactions    Ciprofloxacin      hives and was on flagyl too    Metronidazole      hives and was on cipro too         PAST MEDICAL HISTORY:  Past Medical History:   Diagnosis Date    Arthritis 2006    Breast cancer metastasized to bone (H) 10/12/2023    Chronic depressive personality disorder     Dysplasia of cervix (uteri) 1988    Cryotherapy    Female infertility of unspecified origin          PAST SURGICAL  HISTORY:  Past Surgical History:   Procedure Laterality Date    COLONOSCOPY      COLONOSCOPY N/A 2022    Procedure: COLONOSCOPY, WITH POLYPECTOMY AND BIOPSY;  Surgeon: Gagandeep Patterson MD;  Location:  GI    [facility] INTRODUCE CATH FALLOPIAN TUBE, RE-OPEN/DIAGNOSIS      HERNIA REPAIR, INCISIONAL  09    ORTHOPEDIC SURGERY  2017    [facility] APPENDECTOMY  03    [facility] REMV CATARACT INTRACAP,INSERT LENS  2003    right         SOCIAL HISTORY:  Social History     Socioeconomic History    Marital status:      Spouse name: Bandar    Number of children: 1    Years of education: Not on file    Highest education level: Not on file   Occupational History    Not on file   Tobacco Use    Smoking status: Former     Packs/day: 0.50     Years: 25.00     Additional pack years: 0.00     Total pack years: 12.50     Types: Cigarettes     Quit date: 2017     Years since quittin.2    Smokeless tobacco: Never    Tobacco comments:     quit 2017    Vaping Use    Vaping Use: Never used   Substance and Sexual Activity    Alcohol use: Yes     Comment: 1-2 weekly    Drug use: No    Sexual activity: Yes     Partners: Male     Birth control/protection: None   Other Topics Concern    Parent/sibling w/ CABG, MI or angioplasty before 65F 55M? Yes   Social History Narrative    Not on file     Social Determinants of Health     Financial Resource Strain: Not on file   Food Insecurity: Not on file   Transportation Needs: Not on file   Physical Activity: Not on file   Stress: Not on file   Social Connections: Not on file   Interpersonal Safety: Not on file   Housing Stability: Not on file         FAMILY HISTORY:  Family History   Problem Relation Age of Onset    Genitourinary Problems Father         prostate    Genetic Disorder Father         ulcer    Hypertension Father     Lipids Father     Prostate Cancer Father     Heart Disease Mother     Lipids Mother     Hyperlipidemia Mother     Heart Disease Maternal  Grandmother     Cerebrovascular Disease Maternal Grandmother     Heart Disease Maternal Grandfather     Heart Disease Maternal Uncle     Heart Disease Maternal Uncle         x  3    Hyperlipidemia Sister     Hyperlipidemia Brother     Other Cancer Brother        Family history of:  1.  Breast cancer including male breast cancer: negative   2. Ovarian cancer: negative  3.  Pancreatic cancer: negative  4.  Prostate cancer: father- ?in his 50s, other details unknown  5. Diffuse gastric cancer (if lobular breast CA): negative  6. Uterine cancer: negative  7. Colon cancer:  negative  6. Brother- head and neck, HPV +, had surgery, clinical trial and now cancer free      PHYSICAL EXAM:  Vital signs:  LMP 11/22/2011 (Exact Date)            LABS:   Latest Reference Range & Units 12/22/23 07:29   Sodium 135 - 145 mmol/L 145   Potassium 3.4 - 5.3 mmol/L 4.6   Chloride 98 - 107 mmol/L 108 (H)   Carbon Dioxide (CO2) 22 - 29 mmol/L 26   Urea Nitrogen 8.0 - 23.0 mg/dL 25.3 (H)   Creatinine 0.51 - 0.95 mg/dL 1.10 (H)   GFR Estimate >60 mL/min/1.73m2 57 (L)   Calcium 8.8 - 10.2 mg/dL 8.9   Anion Gap 7 - 15 mmol/L 11   Magnesium 1.7 - 2.3 mg/dL 2.0   Phosphorus 2.5 - 4.5 mg/dL 3.7   Albumin 3.5 - 5.2 g/dL 4.2   Protein Total 6.4 - 8.3 g/dL 6.7   Alkaline Phosphatase 40 - 150 U/L 97   ALT 0 - 50 U/L 19   AST 0 - 45 U/L 24   Bilirubin Total <=1.2 mg/dL 0.3   Glucose 70 - 99 mg/dL 103 (H)   WBC 4.0 - 11.0 10e3/uL 3.3 (L)   Hemoglobin 11.7 - 15.7 g/dL 11.4 (L)   Hematocrit 35.0 - 47.0 % 35.5   Platelet Count 150 - 450 10e3/uL 181   RBC Count 3.80 - 5.20 10e6/uL 3.78 (L)   MCV 78 - 100 fL 94   MCH 26.5 - 33.0 pg 30.2   MCHC 31.5 - 36.5 g/dL 32.1   RDW 10.0 - 15.0 % 15.0   % Neutrophils % 40   % Lymphocytes % 49   % Monocytes % 8   % Eosinophils % 2   % Basophils % 1   Absolute Basophils 0.0 - 0.2 10e3/uL 0.0   Absolute Eosinophils 0.0 - 0.7 10e3/uL 0.1   Absolute Immature Granulocytes <=0.4 10e3/uL 0.0   Absolute Lymphocytes 0.8 - 5.3  "10e3/uL 1.6   Absolute Monocytes 0.0 - 1.3 10e3/uL 0.3   % Immature Granulocytes % 0   Absolute Neutrophils 1.6 - 8.3 10e3/uL 1.3 (L)   Absolute NRBCs 10e3/uL 0.0   NRBCs per 100 WBC <1 /100 0   (H): Data is abnormally high  (L): Data is abnormally low      PATHOLOGY:    IMAGING:    ASSESSMENT/PLAN:  Kesha Zacarias is a 63 year old female with:    # de tavon metastatic left breast invasive lobular carcinoma, ER positive, WA positive, her2 negative on FISH  - pt has de tavon metastatic invasive lobular breast cancer, ER positive, WA positive, her2 negative. Pt does not have visceral crisis, she mainly has extensive bone metastases and LN metastases   -9/23 diagnostic left breast mammogram, left breast targeted ultrasound showed 3.0 x 1.7 x 3.9 ill-defined shadowing hypoechoic mass, few small lymph nodes in the left axilla, one with cortical thickening measuring 0.7 x 0.6 cm  -9/23 ultrasound-guided LEFT breast core biopsy of 12:00 lesion with clip placement, \"Postbiopsy unilateral digital mammogram of the left breast showed the clip to be at the expected biopsy site\" AND ultrasound-guided left axillary lymph node biopsy of lymph node with cortical thickening, PATHOLOGY:  A.  Breast, left, 12:00, biopsy:Lobular carcinoma in situ, Multiple foci. Invasive carcinoma is not identified.   B.  Lymph node, left axillary, biopsy:  -Positive for metastatic lobular carcinoma, grade 2, 0.7 cm, negative GREGG, Estrogen receptor: Positive (91 to 100%, strong), Progesterone receptor: Positive (91 to 100%, strong), HER2 2+ IHC, FISH negative  -10/23 MRI bilateral breast:  - Heterogeneously enhancing irregular mass in the subareolar left breast, 5.0 x 4.9 x 4.9 cm, with associated nipple retraction and nipple/areolar involvement.  This mass extends superiorly through 11: positions, from anterior to mid depth.  The [employer] breast biopsy clip (which showed LCIS) is 1.5 cm posterosuperior to this mass.  - Multiple suspicious left " level 1 axillary lymph node noted, including biopsy-proven metastatic node with indwelling biopsy marker, biopsied node measures 1.3 x 1.0 cm  - Heterogeneous marrow appearance of sternum and ribs with heterogeneous enhancement and a peripheral enhancing focus within the mid body.    - 10/23 PET/CT:  Innumerable foci of FDG avid lesions are seen throughout the osseous structures of the head and neck, most pronounced on the calvarium and cervical spine, with prominently sclerotic appearance on CT correlate.  For example:  -Right frontal bone, SUV max 5.31.  -Left lateral mass of the atlas, SUV max 15.0  -Angle of the left mandible, SUV max 9.6  -C7 vertebral body, SUV max 17.7    Innumerable variable sized FDG avid lesions throughout the axial and appendicular spine, including but not limited to the cervical, thoracic, and lumbar spine, multilevel bilateral ribs, sternum, bilateral scapula, bilateral humeri, clavicles, pelvis, and bilateral femurs. A few of these lesions are described below:  -Large FDG avid sclerotic 3.4 cm lesion within the proximal left humeral head, SUV max 17.24  -Sternal body, SUV max 20.35  -L2 vertebral body, SUV max 14.3 without  -Large ill-defined sclerotic lesion in the sacral body measuring up to at least 5.9 cm, SUV max 16.84  -FDG avid focus within the left femoral head, SUV max 13.65 without CT correlate.    Large irregular soft tissue FDG avid mass within the left breast measuring approximately 3.2 x 2.7 cm with  extension into the superficial soft tissues and associated left nipple retraction, SUV max 12.36 additional FDG avid. Left axillary lymph nodes, not definitively enlarged by short axis size criteria, for example, SUV max 5.93.    - 10/23 MRI brain:  - extensive osseous metastatic disease involving the calvarium, skull base w/involvement of occipital condyles, C1 and C2 vertebral bodies, and likely the mandible  -  Dominant calvarial lesions are located in the right frontal  calvarium and right temporal calvarium without definite breach of the inner or outer tables of the calvarium. There is a suggestion of mild asymmetric linear extra-axial enhancement deep to the dominant right temporal calvarial lesion along the dural margin, though this may be vascular in etiology.  - No abnormal parenchymal or leptomeningeal enhancement.   - sequelae of mild chronic microvascular ischemic disease. Mild generalized cerebral atrophy.     -10/23 DEXA: osteopenia (T score -2.0 lumbar spine, -2.0 left hip femoral neck, The 10 year probability of major osteoporotic fracture is 12.5%, and of hip fracture is 1.8%, based on left femoral neck BMD)    - 11/23 orthopedic consult to determine stability of left femur and fracture risk given presence of mets in left femoral head: do not see any areas of impending cortical erosion or sites of high risk for fracture, observe      REGIMEN:  Ribociclib+letrozole    Ribociclib 600mg PO daily day 1-21 q28 days (11/27/23 started 400mg PO daily when LFTs improved to <3x ULN)  Letrozole 2.5mg PO daily (started 10/30/23)  Febrile neutropenia risk: neutropenia (69% to 78%; grade 3: 46% to 55%; grade 4: 7% to 10%), Febrile neutropenia (1%)    C1D25 12/22/23  C2D1 12/25/23 planned with full dose ribociclib 600mg    Treatment related AE:  - elevated LFTs- normalized spontaneously, 11/23 MRI liver negative for mets, 12/22/23 LFTs normal, will increase ribociclib from 400mg to 600mg for next cycle/cycle 2  -elevated Cr- improved with increased PO intake, continue oral hydration  - back pain- rx naproxen 500mg BID with food (GI bleed alarm sx discussed), oxycodone 5mg q6hr prn #120- refilled 12/22/23 and to be refilled 1/19/24,  continue flexeril prn, ok to use heating pad for short periods of time, will push back rad onc appt to be after neurosurgery consult 12/28/23 as pt has compression fracture, I discussed this with Dr. Mandujano   - constipation- continue colace TID and senna  BID prn, if no improvement can use miralax, monitor for diarrhea  - HA- stable, preceeded tx and unchanged since then, 10/23 MRI brain negative, alarm sx discussed, low threshold for repeat imaging w/ special attention to right temporal calvarium at dural margin (?vascular etiology)  - blurred vision- stable, no alarms x  - leg cramps- focus on hydration, fine to take Mg prn  - anxiety- defers psychology consult   - pancytopenia- 2/2 tx, plt WNL, ANC 1.3; neutropenic fever precautions discussed, hgb 11.4, monitor    - labs noted  - EKG noted Qtc 445  - repeat CA 15-3 pending prior to cycle 2 (10/23 CA 15-3= 261)  - dental clearance obtained, will start zometa q4 weeks (starting dose 3.5mg 2/2 GFR 50) and calcium and vitamin D, once disease stabilized will transition to 4mg q12 weeks   - Genetic counseling referral, pt had initially not scheduled but will call back now to schedule appt  - repeat labs and EKG in q2 weeks for cycle 1 and 2 (watch LFTs closely; pt on plaquenil, watch Qtc closely)  - repeat PET after 3 months of tx, due end of 2/2024-ordered previously    #post menopausal  #vaginal dryness  - Pts breast biopsy pathology results explained in detail. Pt is aware her tumor is estrogen positive. Pt educated to avoid all estrogen-containing products including but not limited to birth control, vaginal creams etc.     #RA  - on plaquenil       RTC 1/8/24 labs, EKG at [place]  RT 1/19/24 follow up with me, labs and EKG prior visit (labs and EKG at [place])- virtual visit      Mary DO Taiwo  Hematology/Oncology  [facility]

## 2023-12-26 DIAGNOSIS — M54.42 ACUTE LEFT-SIDED LOW BACK PAIN WITH LEFT-SIDED SCIATICA: Primary | ICD-10-CM

## 2023-12-28 ENCOUNTER — ANCILLARY PROCEDURE (OUTPATIENT)
Dept: GENERAL RADIOLOGY | Facility: CLINIC | Age: 63
End: 2023-12-28
Attending: STUDENT IN AN ORGANIZED HEALTH CARE EDUCATION/TRAINING PROGRAM
Payer: COMMERCIAL

## 2023-12-28 ENCOUNTER — PRE VISIT (OUTPATIENT)
Dept: NEUROSURGERY | Facility: CLINIC | Age: 63
End: 2023-12-28

## 2023-12-28 ENCOUNTER — OFFICE VISIT (OUTPATIENT)
Dept: NEUROSURGERY | Facility: CLINIC | Age: 63
End: 2023-12-28
Attending: PHYSICIAN ASSISTANT
Payer: COMMERCIAL

## 2023-12-28 VITALS
HEIGHT: 66 IN | WEIGHT: 200 LBS | BODY MASS INDEX: 32.14 KG/M2 | HEART RATE: 62 BPM | DIASTOLIC BLOOD PRESSURE: 61 MMHG | SYSTOLIC BLOOD PRESSURE: 118 MMHG

## 2023-12-28 DIAGNOSIS — M54.42 ACUTE LEFT-SIDED LOW BACK PAIN WITH LEFT-SIDED SCIATICA: ICD-10-CM

## 2023-12-28 DIAGNOSIS — C50.912 CARCINOMA OF LEFT BREAST METASTATIC TO BONE (H): ICD-10-CM

## 2023-12-28 DIAGNOSIS — C79.51 CARCINOMA OF LEFT BREAST METASTATIC TO BONE (H): ICD-10-CM

## 2023-12-28 PROCEDURE — 99204 OFFICE O/P NEW MOD 45 MIN: CPT | Performed by: STUDENT IN AN ORGANIZED HEALTH CARE EDUCATION/TRAINING PROGRAM

## 2023-12-28 PROCEDURE — 72082 X-RAY EXAM ENTIRE SPI 2/3 VW: CPT | Performed by: RADIOLOGY

## 2023-12-28 PROCEDURE — 72120 X-RAY BEND ONLY L-S SPINE: CPT | Mod: XS | Performed by: RADIOLOGY

## 2023-12-28 ASSESSMENT — PAIN SCALES - GENERAL: PAINLEVEL: SEVERE PAIN (6)

## 2023-12-28 NOTE — NURSING NOTE
"Kesha Zacarias's goals for this visit include:   Chief Complaint   Patient presents with    New Patient     Compression deformity L1       She requests these members of her care team be copied on today's visit information: yes    PCP: Schoen, Gregory G    Referring Provider:  Aurora Berkowitz PA-C  [address] [place] [id number]    /61   Pulse 62   Ht 1.676 m (5' 6\")   Wt 90.7 kg (200 lb)   LMP 11/22/2011 (Exact Date)   BMI 32.28 kg/m      Do you need any medication refills at today's visit? No  LORETO Chang, KEESHA ([facility])      "

## 2023-12-28 NOTE — NURSING NOTE
DISCHARGE PLAN:  Next appointments: See patient instruction section  Departure Mode:   Accompanied by:    minutes for medical assistant discharge (face to face time)     Kesha Zacarias is here today for oncology follow up.  Patient was not seen by medical assistant at time of the appointment.   Appointments scheduled for everything requested. Pt fine with coming 01/04 and 01/05 per Christen. Pt is aware of her appts, Christen called her. See patient instructions and Oncologist's Progress note for further details. Questions and concerns addressed to patient's satisfaction. Patient verbalized and demonstrated understanding of plan.  Contact information provided and patient is encouraged to call with any that arise in the interim of care.    Jackelyn San  [facility]  [phone]  12/28/2023, 11:35 AM

## 2023-12-28 NOTE — LETTER
"    12/28/2023         RE: Kesha Zacarias  [id number] [address] [place] [id number]        Dear Colleague,    Thank you for referring your patient, Kesha Zacarias, to the [facility] [place]. Please see a copy of my visit note below.    HPI:  63-year-old female with new onset of low back pain starting about a week prior to Thanksgiving.  Pain started on the left side in the low back and has now transitioned over the right side.  It is worse with standing and laying down and relieved by sitting.  She denies any radicular radiation into the legs.  She does note anti-inflammatory medications significantly helped the pain.  Current Outpatient Medications   Medication     aspirin 81 MG tablet     betamethasone dipropionate (DIPROSONE) 0.05 % external lotion     COENZYME Q-10 PO     cyclobenzaprine (FLEXERIL) 5 MG tablet     docusate sodium (COLACE) 100 MG capsule     fish oil-omega-3 fatty acids 1000 MG capsule     hydroxychloroquine (PLAQUENIL) 200 MG tablet     letrozole (FEMARA) 2.5 MG tablet     magnesium 250 MG tablet     naproxen (NAPROSYN) 500 MG tablet     ondansetron (ZOFRAN) 4 MG tablet     oxyCODONE (ROXICODONE) 5 MG tablet     ribociclib (KISQALI) 200 MG tablet     lactobacillus rhamnosus, GG, (CULTURELL) capsule     loperamide (IMODIUM A-D) 2 MG tablet     oxyCODONE (ROXICODONE) 5 MG tablet     prochlorperazine (COMPAZINE) 10 MG tablet     sennosides (SENOKOT) 8.6 MG tablet     No current facility-administered medications for this visit.      Physical Exam:  Vital signs:      BP: 118/61 Pulse: 62           Height: 167.6 cm (5' 6\") Weight: 90.7 kg (200 lb)  Estimated body mass index is 32.28 kg/m  as calculated from the following:    Height as of this encounter: 1.676 m (5' 6\").    Weight as of this encounter: 90.7 kg (200 lb).  She is full-strength in her bilateral lower extremities.  Sensation is intact light touch throughout.  Patella reflexes on the right are 1+ and " absent due to knee replacement on the left.  Results Reviewed:  I personally viewed the images of an MRI of the lumbar spine with and without contrast.  This shows diffuse osseous mets from metastatic breast cancer.  There is a chronic appearing L1 fracture which shows no current STIR signal change indicating this is completely chronic with no active component.  There is diffuse enhancement in the SI joint more on the right than the left as well.  Standing films show no evidence of significant change at the L1 fracture.  No evidence of dynamic instability.  Assessment:  63-year-old female with low back pain with a chronic appearing L1 fracture.  I suspect this is been longstanding and may not even be related to her diffuse osseous mets.  I think her current pain symptoms are most likely related to myofascial pain or SI joint pain.  With them getting significantly better with anti-inflammatories and where she localizes the pain I do suspect that the SI joint is the primary cause.  Plan:  We discussed conservative management options going forward.  This would entail NSAIDs which may or may not be able to be used.  I will contact her oncologist to see if a prescription for Mobic would be okay.  I will also prescribe physical therapy for her today.  Otherwise we will also refer her for right SI joint injection to see if this improves the pain and also diagnosed it as the site of the pain if it does get better.  For her L1 fracture I do not believe she needs any surgery as this is a completely chronic fracture and is not likely related to any of her current symptoms.  She can otherwise follow-up with neurosurgery as needed going forward.      Again, thank you for allowing me to participate in the care of your patient.        Sincerely,        Martínez Phillips MD

## 2023-12-28 NOTE — PROGRESS NOTES
"HPI:  63-year-old female with new onset of low back pain starting about a week prior to Thanksgiving.  Pain started on the left side in the low back and has now transitioned over the right side.  It is worse with standing and laying down and relieved by sitting.  She denies any radicular radiation into the legs.  She does note anti-inflammatory medications significantly helped the pain.  Current Outpatient Medications   Medication    aspirin 81 MG tablet    betamethasone dipropionate (DIPROSONE) 0.05 % external lotion    COENZYME Q-10 PO    cyclobenzaprine (FLEXERIL) 5 MG tablet    docusate sodium (COLACE) 100 MG capsule    fish oil-omega-3 fatty acids 1000 MG capsule    hydroxychloroquine (PLAQUENIL) 200 MG tablet    letrozole (FEMARA) 2.5 MG tablet    magnesium 250 MG tablet    naproxen (NAPROSYN) 500 MG tablet    ondansetron (ZOFRAN) 4 MG tablet    oxyCODONE (ROXICODONE) 5 MG tablet    ribociclib (KISQALI) 200 MG tablet    lactobacillus rhamnosus, GG, (CULTURELL) capsule    loperamide (IMODIUM A-D) 2 MG tablet    oxyCODONE (ROXICODONE) 5 MG tablet    prochlorperazine (COMPAZINE) 10 MG tablet    sennosides (SENOKOT) 8.6 MG tablet     No current facility-administered medications for this visit.      Physical Exam:  Vital signs:      BP: 118/61 Pulse: 62           Height: 167.6 cm (5' 6\") Weight: 90.7 kg (200 lb)  Estimated body mass index is 32.28 kg/m  as calculated from the following:    Height as of this encounter: 1.676 m (5' 6\").    Weight as of this encounter: 90.7 kg (200 lb).  She is full-strength in her bilateral lower extremities.  Sensation is intact light touch throughout.  Patella reflexes on the right are 1+ and absent due to knee replacement on the left.  Results Reviewed:  I personally viewed the images of an MRI of the lumbar spine with and without contrast.  This shows diffuse osseous mets from metastatic breast cancer.  There is a chronic appearing L1 fracture which shows no current STIR signal " change indicating this is completely chronic with no active component.  There is diffuse enhancement in the SI joint more on the right than the left as well.  Standing films show no evidence of significant change at the L1 fracture.  No evidence of dynamic instability.  Assessment:  63-year-old female with low back pain with a chronic appearing L1 fracture.  I suspect this is been longstanding and may not even be related to her diffuse osseous mets.  I think her current pain symptoms are most likely related to myofascial pain or SI joint pain.  With them getting significantly better with anti-inflammatories and where she localizes the pain I do suspect that the SI joint is the primary cause.  Plan:  We discussed conservative management options going forward.  This would entail NSAIDs which may or may not be able to be used.  I will contact her oncologist to see if a prescription for Mobic would be okay.  I will also prescribe physical therapy for her today.  Otherwise we will also refer her for right SI joint injection to see if this improves the pain and also diagnosed it as the site of the pain if it does get better.  For her L1 fracture I do not believe she needs any surgery as this is a completely chronic fracture and is not likely related to any of her current symptoms.  She can otherwise follow-up with neurosurgery as needed going forward.

## 2024-01-02 ENCOUNTER — TELEPHONE (OUTPATIENT)
Dept: PALLIATIVE MEDICINE | Facility: CLINIC | Age: 64
End: 2024-01-02

## 2024-01-02 DIAGNOSIS — M16.11 PRIMARY OSTEOARTHRITIS OF RIGHT HIP: Primary | ICD-10-CM

## 2024-01-02 RX ORDER — MELOXICAM 7.5 MG/1
7.5 TABLET ORAL DAILY
Qty: 30 TABLET | Refills: 1 | Status: SHIPPED | OUTPATIENT
Start: 2024-01-02 | End: 2024-01-09

## 2024-01-02 NOTE — TELEPHONE ENCOUNTER
Flags:     Awaiting response from Michelle regarding insurance.     Is patient actively being treated for cancer or immunocompromised? Yes - Breast cancer stage 4     Patient is requesting SI joint injection.     Okay to proceed with immunocompromised stauts?     Routing to provider to advise.     Holly Quevedo RN  [facility] [place]  [phone]

## 2024-01-02 NOTE — TELEPHONE ENCOUNTER
"Screening Questions for Radiology Injections:    Injection to be done at which interventional clinic site? [facility] [place]    If choosing [facility] for location, please inform patient:  \"[facility] is a Hospital based clinic. Before your visit, you should check with your insurance about how it covers the charges for facility services in a hospital-based clinic.     Procedure ordered by     Procedure ordered? Right SI joint injection   Transforaminal Cervical DR - Send to [facility] ([facility]) - No [facility] Site providers perform this procedure    What insurance would patient like us to bill for this procedure? [employer]   IF SCHEDULING IN [place] PAIN OR SPINE PLEASE SCHEDULE AT LEAST 7-10 BUSINESS DAYS OUT SO A PA CAN BE OBTAINED  Worker's comp or MVA (motor vehicle accident) -Any injection DO NOT SCHEDULE and route to Corinne Varags.    [employer] insurance - For SI joint injections, DO NOT SCHEDULE and route to Michelle Ye.     ALL BCBS, [employer] and [employer] - DO NOT SCHEDULE and route to Michelle Ye  MEDICA- facet joint injections, route to Michelle Ye    Is patient scheduled at [facility]?    If YES, route every encounter to [facility] SPINE CENTER CARE NAVIGATION POOL [[phone]]    Is an  needed? No     Patient has a  home? (Review Grid) YES: Informed     Any chance of pregnancy? Not Applicable   If YES, do NOT schedule and route to RN pool  - Dr. Perez route to Sena Pruett and PM&R Nurse  [[id number]]      Is patient actively being treated for cancer or immunocompromised? Yes - Breast cancer stage 4  If YES, do NOT schedule and route to RN pool/ Dr. Perez's Team    Does the patient have a bleeding or clotting disorder? No   If YES, okay to schedule AND route to QUINN garcia / Dr. Perez's Team   (For any patients with platelet count <100, RN must forward to provider)    Is patient taking any Blood Thinners OR Antiplatelet medication?  No   If hold " needed, do NOT schedule, route to RN pool/ Dr. Perez's Team  Examples:   Blood Thinners: (Coumadin, Warfarin, Jantoven, Pradaxa, Xarelto, Eliquis, Edoxaban, Enoxaparin, Lovenox, Heparin, Arixtra, Fondaparinux or Fragmin)  Antiplatelet Medications: (Plavix, Brilinta or Effient)     Is patient taking any aspirin products (includes Excedrin and Fiorinal)? No   If more than 325mg/day, OK to schedule; Instruct Pt to decrease to less than 325 mg for 7 days AND route to RN pool/ Dr. Perez's Team   For CERVICAL procedures, hold all aspirin products for 6 days.   Tell Pt that if aspirin product is not held for 6 days, the procedure WILL BE cancelled.     Any allergies to contrast dye, iodine, shellfish, or numbing and steroid medications? No  If YES, schedule and add allergy information to appointment notes AND route to the RN pool/ Dr. Perez's Team  If RD and Contrast Dye / Iodine Allergy? DO NOT SCHEDULE, route to RN pool/ Dr. Perez's Team  Allergies: Ciprofloxacin and Metronidazole     Does patient have an active infection or treated for one within the past week? No  Is patient currently taking any antibiotics or steroid medications?  No   For patients on chronic, preventative, or prophylactic antibiotics, procedures may be scheduled.   For patients on antibiotics for active or recent infection, schedule 4 days after completed.  For patients on steroid medications, schedule 4 days after completed.     Has the patient had a flu shot or any other vaccinations within the past 7 days? No  If yes, explain that for the vaccine to work best they need to:     wait 1 week before and 1 week after getting any Vaccine  wait 1 week before and 2 weeks after getting any Covid Vaccine   If patient has concerns about the timing, send to RN pool/ Dr. Perez's Team    Does patient have an MRI/CT?  Not Applicable Include Date and Check Procedure Scheduling Grid to see if required.  Was the MRI/CT done within the last 3 years?  NA   If  no route to QUINN Mckeon/ Dr. Perez's Team  If yes, where was the MRI/CT done?    Refer to PACS Transmissions list for approved external locations and route to RN Pool High Priority/ Dr. Montillas Team  If MRI was not done at approved external location do NOT schedule and route to RN pool/ Dr. Montillas Team    If patient has an imaging disc, the injection MAY be scheduled but patient must bring disc to appt or appt will be cancelled.    Is patient able to transfer to a procedure table with minimal or no assistance? Yes   If no, do NOT schedule and route to RN Pool/ Dr. Montillas Team    Procedure Specific Instructions:  If celiac plexus block, informed patient NPO for 6 hours and that it is okay to take medications with sips of water, especially blood pressure medications Not Applicable       If this is for a cervical procedure, informed patient that aspirin needs to be held for 6 days.   Not Applicable    Sedation, If Sedation is ordered for any procedure, patient must be NPO for 6 hours prior to procedure Not Applicable    If IV needed:  Do not schedule procedures requiring IV placement in the first appointment of the day or first appointment after lunch. Do NOT schedule at 0745, 0815 or 1245.   Instructed patient to arrive 30 minutes early for IV start if required. (Check Procedure Scheduling Grid)  Not Applicable    Reminders:  If you are started on any steroids or antibiotics between now and your appointment, you must contact us because the procedure may need to be cancelled.  Yes    As a reminder, receiving steroids can decrease your body's ability to fight infection.   Would you still like to move forward with scheduling the injection?  Yes    IV Sedation is not provided for procedures. If oral anti-anxiety medication is needed, the patient should request this from their referring provider.    Instruct patient to arrive as directed prior to the scheduled appointment time:  If IV needed 30 minutes before appointment  time     For patients 85 or older we recommend having an adult stay w/ them for the remainder of the day.     If the patient is Diabetic, remind them to bring their glucometer.      Does the patient have any questions?  NO  Shirley Shultz  [facility] [place]

## 2024-01-02 NOTE — TELEPHONE ENCOUNTER
Communicated PT needs to patient and indicated that referral was placed by Dr. Phillips for this on same date as referral for injection.   Writer provides patient with contact information for scheduling and requests that she update this clinic once scheduled with PT.  Patient agreeable to this and verbalizes understanding.     Holly Quevedo RN  [facility]  [phone]

## 2024-01-02 NOTE — TELEPHONE ENCOUNTER
[facility] SI Joint Medical Policy - Effective Date- 03/2023  Indications that are covered    Initial sacroiliac joint injection    Coverage  Sacroiliac joint injections are generally covered subject to the indications listed below and per your plan documents.  Minimally invasive sacroiliac joint fusion surgery is generally covered subject to the indications listed below and per your plan documents.    Indications that are covered  Initial sacroiliac joint injection  Sacroiliac joint injections (unilateral or bilateral) are considered medically necessary to diagnose or treat sacroiliac (SI) joint pain when the following criteria are met:    Pain limiting activities of daily living for at least 3 months despite physical therapy and other conservative treatments (such as exercise, activity modification or chiropractic care). Documentation of conservative treatments must correspond to the current episode of pain (within 6 months).  Conservative treatments must include physical therapy (PT), at least 4 visits over a course of 6 weeks or less, completed within the current episode of pain (i.e., within the last six months, as noted above). Active muscle conditioning is required as part of physical therapy.  Physical therapist's notes must be submitted, or there must be a physician's statement in the clinical documents that explains why physical therapy is contraindicated.  If a member is unable to complete physical therapy due to progressively worsening pain and disability, documentation in the physical therapist's notes demonstrating this is required.  The requirement for physical therapy will not be met if there is a failure to complete prescribed physical therapy for non-clinical reasons        Routing to review and advise if patient meets criteria above. I dont see PT requirements have been met      Michelle Doan   [facility]

## 2024-01-02 NOTE — TELEPHONE ENCOUNTER
Dedrick Flannery MD  You12 minutes ago (11:45 AM)     JS  Ok to proceed       Awaiting response from Michelle regarding insurance prior to scheduling.     Holly Quevedo RN  [facility]  [phone]

## 2024-01-02 NOTE — PROGRESS NOTES
Telephoned and spoke with Kesha. Dr. Maravilla would like for her to proceed with radiation oncology consult. Writer received referral, reviewed for appropriate plan, and call warm transferred to New Patient Scheduling for completion.

## 2024-01-03 NOTE — TELEPHONE ENCOUNTER
RECORDS STATUS - ALL OTHER DIAGNOSIS      RECORDS RECEIVED FROM: [facility] - Internal Records   DATE RECEIVED: 1/3

## 2024-01-04 ENCOUNTER — INFUSION THERAPY VISIT (OUTPATIENT)
Dept: INFUSION THERAPY | Facility: CLINIC | Age: 64
End: 2024-01-04
Attending: INTERNAL MEDICINE
Payer: COMMERCIAL

## 2024-01-04 VITALS
OXYGEN SATURATION: 97 % | BODY MASS INDEX: 34.49 KG/M2 | RESPIRATION RATE: 16 BRPM | HEART RATE: 58 BPM | TEMPERATURE: 96.5 F | WEIGHT: 207 LBS | DIASTOLIC BLOOD PRESSURE: 44 MMHG | SYSTOLIC BLOOD PRESSURE: 124 MMHG | HEIGHT: 65 IN

## 2024-01-04 DIAGNOSIS — C79.51 CARCINOMA OF BREAST METASTATIC TO BONE, UNSPECIFIED LATERALITY (H): Primary | ICD-10-CM

## 2024-01-04 DIAGNOSIS — C50.919 CARCINOMA OF BREAST METASTATIC TO BONE, UNSPECIFIED LATERALITY (H): Primary | ICD-10-CM

## 2024-01-04 PROCEDURE — 96365 THER/PROPH/DIAG IV INF INIT: CPT

## 2024-01-04 PROCEDURE — 250N000011 HC RX IP 250 OP 636: Mod: JZ | Performed by: INTERNAL MEDICINE

## 2024-01-04 PROCEDURE — 258N000003 HC RX IP 258 OP 636: Performed by: INTERNAL MEDICINE

## 2024-01-04 RX ORDER — ZOLEDRONIC ACID 0.04 MG/ML
4 INJECTION, SOLUTION INTRAVENOUS ONCE
Status: COMPLETED | OUTPATIENT
Start: 2024-01-04 | End: 2024-01-04

## 2024-01-04 RX ORDER — HEPARIN SODIUM (PORCINE) LOCK FLUSH IV SOLN 100 UNIT/ML 100 UNIT/ML
5 SOLUTION INTRAVENOUS
Status: DISCONTINUED | OUTPATIENT
Start: 2024-01-04 | End: 2024-01-04 | Stop reason: HOSPADM

## 2024-01-04 RX ADMIN — ZOLEDRONIC ACID 4 MG: 0.04 INJECTION, SOLUTION INTRAVENOUS at 08:55

## 2024-01-04 RX ADMIN — SODIUM CHLORIDE 250 ML: 9 INJECTION, SOLUTION INTRAVENOUS at 08:54

## 2024-01-04 NOTE — PROGRESS NOTES
Infusion Nursing Note:  Kesha Zacarias presents today for Zometa infusion.    Patient seen by provider today: No   present during visit today: Not Applicable.    Note: Pt here today for her first Zometa infusion.Reviewed with patient possible side effects of medication, written information also sent home with patient.  Patient has chronic low back pain from a fracture in her lower spine.   Pt is taking calcium and Vit D at home.        Intravenous Access:  Peripheral IV placed.    Treatment Conditions:  Lab Results   Component Value Date    HGB 11.4 (L) 12/22/2023    WBC 3.3 (L) 12/22/2023    ANEU 3.7 11/21/2005    ANEUTAUTO 1.3 (L) 12/22/2023     12/22/2023        Lab Results   Component Value Date     12/22/2023    POTASSIUM 4.6 12/22/2023    MAG 2.0 12/22/2023    CR 1.10 (H) 12/22/2023    NITISH 8.9 12/22/2023    BILITOTAL 0.3 12/22/2023    ALBUMIN 4.2 12/22/2023    ALT 19 12/22/2023    AST 24 12/22/2023     Labs met parameters for treatment today.       Post Infusion Assessment:  Patient tolerated infusion without incident.  Site patent and intact, free from redness, edema or discomfort.  Access discontinued per protocol.       Discharge Plan:   Patient discharged in stable condition accompanied by: self.  Departure Mode: Ambulatory.      Poly Holt RN

## 2024-01-04 NOTE — PROGRESS NOTES
Dear Colleagues,  Today Kesha Zacarias was seen in consultation.    IDENTIFICATION: This is a 63-year-old woman prior smoker with RA on Plaquenil recently diagnosed with metastatic breast cancer to the bones with painful upper pelvic pain referred for palliative radiotherapy.      HISTORY OF PRESENT ILLNESS:  Kesha Zacarias is a 63-year-old woman prior smoker with RA on Plaquenil recently diagnosed with metastatic breast cancer to the bones.  She has been on Plaquenil for approximately 10 years administered through her PCP.  Her relevant medical history begins this past summer when she first noted progressive nipple retraction.  By September 2023 she palpated a mass and imaging revealed a left breast mass with associated left axillary lymph nodes.  On September 13, 2023 biopsy of the left axilla was consistent with metastatic hormone receptor positive HER2/jacques negative lobular carcinoma. Breast MRI findings also included nonspecific enhancement in the sternum and ribs.    On October 6, 2023 PET/CT showed a large irregular soft tissue FDG avid 3.2 cm mass within the left breast with extension into the superficial soft tissues and associated left nipple retraction, consistent with biopsy-proven invasive lobular carcinoma. Innumerable diffuse FDG avid lesions throughout the axial and appendicular spine and FDG avid left axillary lymph nodes, consistent with metastatic disease.    On November 27, 2023 she was started on Ribociclib+letrozole.      By mid November 2023 patient started having increasing lower back pain radiating down to the midline pelvis and bilateral buttock.  There is no hip pain.  Pain ranges from 3-10/10 depending on her medications.  She is currently on oxycodone 5mg every 6 hours, mobic 7.5mg QAM.  She doesn't tolerate narcotics well and has had significant constipation despite senna.  Of note she is also on flexoril at night for chronic leg cramps. She also takes marijuana to help with pain  management. She is being seen here today for consideration of palliative radiotherapy.    REVIEW OF SYSTEMS: As per HPI, a 14-point review of system is otherwise negative.  PAST RADIATION THERAPY:  Denies  PAST CTD/PACEMAKER: Denies    Past Medical History:   Diagnosis Date    Arthritis 2006    Breast cancer metastasized to bone (H) 10/12/2023    Chronic depressive personality disorder     Dysplasia of cervix (uteri)     Cryotherapy    Female infertility of unspecified origin     Glaucoma        Past Surgical History:   Procedure Laterality Date    COLONOSCOPY      COLONOSCOPY N/A 2022    Procedure: COLONOSCOPY, WITH POLYPECTOMY AND BIOPSY;  Surgeon: Gagandeep Patterson MD;  Location: [facility]    HC INTRODUCE CATH FALLOPIAN TUBE, RE-OPEN/DIAGNOSIS      HERNIA REPAIR, INCISIONAL  2009    JOINT REPLACEMENT Right 2017    TONSILLECTOMY      ZZC APPENDECTOMY  2003    ZZC REMV CATARACT INTRACAP,INSERT LENS  2003    right       Family History   Problem Relation Age of Onset    Genitourinary Problems Father         prostate    Genetic Disorder Father         ulcer    Hypertension Father     Lipids Father     Prostate Cancer Father     Heart Disease Mother     Lipids Mother     Hyperlipidemia Mother     Heart Disease Maternal Grandmother     Cerebrovascular Disease Maternal Grandmother     Heart Disease Maternal Grandfather     Heart Disease Maternal Uncle     Heart Disease Maternal Uncle         x  3    Hyperlipidemia Sister     Hyperlipidemia Brother     Other Cancer Brother        Social History     Tobacco Use    Smoking status: Former     Packs/day: 0.50     Years: 25.00     Additional pack years: 0.00     Total pack years: 12.50     Types: Cigarettes     Quit date: 2017     Years since quittin.3    Smokeless tobacco: Never   Substance Use Topics    Alcohol use: Not Currently       Allergies   Allergen Reactions    Ciprofloxacin      hives and was on flagyl too    Metronidazole       hives and was on cipro too         PHYSICAL EXAMINATION:  /77 (BP Location: Right arm, Patient Position: Chair, Cuff Size: Adult Large)   Pulse 60   Temp 98.6  F (37  C) (Oral)   Resp 18   Wt 93.9 kg (207 lb)   LMP 11/22/2011 (Exact Date)   SpO2 94%   BMI 34.45 kg/m    GENERAL Well-appearing woman in no acute distress.  HEENT Normocephalic, atraumatic.  Sclerae anicteric.  CVR  Regular rate and rhythm.  No murmurs, rubs, or gallops.  LUNGS Clear to auscultation bilaterally.  ABDOMEN Soft.    EXT  No CCE.    NEURO No gross  MSK  Good ROM.   SKIN  Warm and well perfused.    PSYCH Alert and oriented x 3    ECOG PERFORMANCE STATUS: 1        IMAGING: I personally reviewed the relevant imaging for this case as stated in the HPI.      IMPRESSION/PLAN:  Kesha Zacarias is a 63-year-old woman prior smoker with RA on Plaquenil recently diagnosed with metastatic breast cancer to the bones with painful upper pelvic pain referred for palliative radiotherapy.  She is currently on Ribociclib+letrozole.  I recommend a palliative dose of radiation therapy to the sacrum to improve local control and decrease the patient's pain symptoms.    The risks, benefits, treatment rationale and regimen of radiation therapy to the pelvis were discussed. Risks include but are not limited to fatigue, skin erythema/changes, nausea, vomiting, erratic stools,  symptoms such as urgency or dysuria, bony fracture, nerve damage and secondary malignancy. Pt signed consent and will start treatment within the next week. 10 day treatment (30Gy in 10fx).    Additional problem list to be addressed in the following manner:    Systemic Treatment: Ribociclib+letrozole. Data shows that concurrent treatment with ribociclib and radiation can cause neutropenia, excess diarrhea and vomiting (Pb MCNAMARA et al The Breast 2018; Jenna et al Breast 2019).  Will hold Ribociclib while on treatment.    Ok to continue with plaquenil while receiving  XRT    There was ample time for questions and all were answered to the patient's satisfaction. Thank you for allowing me to participate in the care of this pleasant patient. If you have any questions, please do not hesitate to contact my office.     Sincerely,  Landon Mandujano MD    [phone] Pager   [phone] [facility]   [phone] Cinthia Alarcon  [phone] [place]

## 2024-01-05 ENCOUNTER — PRE VISIT (OUTPATIENT)
Dept: RADIATION ONCOLOGY | Facility: CLINIC | Age: 64
End: 2024-01-05

## 2024-01-05 ENCOUNTER — ONCOLOGY VISIT (OUTPATIENT)
Dept: ONCOLOGY | Facility: CLINIC | Age: 64
End: 2024-01-05

## 2024-01-05 ENCOUNTER — OFFICE VISIT (OUTPATIENT)
Dept: RADIATION ONCOLOGY | Facility: CLINIC | Age: 64
End: 2024-01-05
Attending: PHYSICIAN ASSISTANT
Payer: COMMERCIAL

## 2024-01-05 ENCOUNTER — APPOINTMENT (OUTPATIENT)
Dept: RADIATION ONCOLOGY | Facility: CLINIC | Age: 64
End: 2024-01-05
Payer: COMMERCIAL

## 2024-01-05 ENCOUNTER — HOSPITAL ENCOUNTER (OUTPATIENT)
Dept: CARDIOLOGY | Facility: CLINIC | Age: 64
Discharge: HOME | End: 2024-01-05
Attending: FAMILY MEDICINE | Admitting: FAMILY MEDICINE
Payer: COMMERCIAL

## 2024-01-05 ENCOUNTER — LAB (OUTPATIENT)
Dept: LAB | Facility: CLINIC | Age: 64
End: 2024-01-05
Payer: COMMERCIAL

## 2024-01-05 VITALS
WEIGHT: 207 LBS | TEMPERATURE: 98.6 F | OXYGEN SATURATION: 94 % | SYSTOLIC BLOOD PRESSURE: 114 MMHG | RESPIRATION RATE: 18 BRPM | BODY MASS INDEX: 34.45 KG/M2 | DIASTOLIC BLOOD PRESSURE: 77 MMHG | HEART RATE: 60 BPM

## 2024-01-05 DIAGNOSIS — C50.912 CARCINOMA OF LEFT BREAST METASTATIC TO BONE (H): ICD-10-CM

## 2024-01-05 DIAGNOSIS — C79.51 MALIGNANT NEOPLASM METASTATIC TO BONE (H): ICD-10-CM

## 2024-01-05 DIAGNOSIS — C79.51 CARCINOMA OF LEFT BREAST METASTATIC TO BONE (H): ICD-10-CM

## 2024-01-05 DIAGNOSIS — C79.51 MALIGNANT NEOPLASM METASTATIC TO BONE (H): Primary | ICD-10-CM

## 2024-01-05 DIAGNOSIS — C79.52 SECONDARY MALIGNANT NEOPLASM OF BONE AND BONE MARROW (H): Primary | ICD-10-CM

## 2024-01-05 DIAGNOSIS — M54.42 ACUTE LEFT-SIDED LOW BACK PAIN WITH LEFT-SIDED SCIATICA: ICD-10-CM

## 2024-01-05 DIAGNOSIS — C79.51 SECONDARY MALIGNANT NEOPLASM OF BONE AND BONE MARROW (H): Primary | ICD-10-CM

## 2024-01-05 LAB
ALBUMIN SERPL BCG-MCNC: 4 G/DL (ref 3.5–5.2)
ALP SERPL-CCNC: 93 U/L (ref 40–150)
ALT SERPL W P-5'-P-CCNC: 16 U/L (ref 0–50)
AST SERPL W P-5'-P-CCNC: 23 U/L (ref 0–45)
BASOPHILS # BLD AUTO: 0 10E3/UL (ref 0–0.2)
BASOPHILS NFR BLD AUTO: 1 %
BILIRUB DIRECT SERPL-MCNC: <0.2 MG/DL (ref 0–0.3)
BILIRUB SERPL-MCNC: 0.5 MG/DL
CANCER AG15-3 SERPL-ACNC: 480 U/ML
EOSINOPHIL # BLD AUTO: 0 10E3/UL (ref 0–0.7)
EOSINOPHIL NFR BLD AUTO: 1 %
ERYTHROCYTE [DISTWIDTH] IN BLOOD BY AUTOMATED COUNT: 15.9 % (ref 10–15)
HCT VFR BLD AUTO: 34 % (ref 35–47)
HGB BLD-MCNC: 11.1 G/DL (ref 11.7–15.7)
IMM GRANULOCYTES # BLD: 0 10E3/UL
IMM GRANULOCYTES NFR BLD: 0 %
LYMPHOCYTES # BLD AUTO: 1 10E3/UL (ref 0.8–5.3)
LYMPHOCYTES NFR BLD AUTO: 29 %
MCH RBC QN AUTO: 31.7 PG (ref 26.5–33)
MCHC RBC AUTO-ENTMCNC: 32.6 G/DL (ref 31.5–36.5)
MCV RBC AUTO: 97 FL (ref 78–100)
MONOCYTES # BLD AUTO: 0.1 10E3/UL (ref 0–1.3)
MONOCYTES NFR BLD AUTO: 3 %
NEUTROPHILS # BLD AUTO: 2.3 10E3/UL (ref 1.6–8.3)
NEUTROPHILS NFR BLD AUTO: 66 %
NRBC # BLD AUTO: 0 10E3/UL
NRBC BLD AUTO-RTO: 0 /100
PLATELET # BLD AUTO: 249 10E3/UL (ref 150–450)
PROT SERPL-MCNC: 6.3 G/DL (ref 6.4–8.3)
RBC # BLD AUTO: 3.5 10E6/UL (ref 3.8–5.2)
WBC # BLD AUTO: 3.4 10E3/UL (ref 4–11)

## 2024-01-05 PROCEDURE — 36415 COLL VENOUS BLD VENIPUNCTURE: CPT

## 2024-01-05 PROCEDURE — 80076 HEPATIC FUNCTION PANEL: CPT | Performed by: INTERNAL MEDICINE

## 2024-01-05 PROCEDURE — 77263 THER RADIOLOGY TX PLNG CPLX: CPT | Performed by: RADIOLOGY

## 2024-01-05 PROCEDURE — 77290 THER RAD SIMULAJ FIELD CPLX: CPT | Performed by: RADIOLOGY

## 2024-01-05 PROCEDURE — 86300 IMMUNOASSAY TUMOR CA 15-3: CPT | Performed by: INTERNAL MEDICINE

## 2024-01-05 PROCEDURE — 93005 ELECTROCARDIOGRAM TRACING: CPT

## 2024-01-05 PROCEDURE — 99205 OFFICE O/P NEW HI 60 MIN: CPT | Mod: 25 | Performed by: RADIOLOGY

## 2024-01-05 PROCEDURE — 85025 COMPLETE CBC W/AUTO DIFF WBC: CPT | Performed by: INTERNAL MEDICINE

## 2024-01-05 PROCEDURE — 93010 ELECTROCARDIOGRAM REPORT: CPT | Performed by: INTERNAL MEDICINE

## 2024-01-05 ASSESSMENT — PAIN SCALES - GENERAL: PAINLEVEL: SEVERE PAIN (7)

## 2024-01-05 NOTE — PATIENT INSTRUCTIONS
Please contact [facility] RN with questions or concerns following         today's appointment: [phone] (Judie).    Please feel free to leave a detailed message if your call is not answered.    If your call is not received before 3:00 PM, it may not be returned until the following business day.    If you are receiving radiation treatment and need assistance after 3:00 PM or on the weekends, please call [phone] and ask to speak to the radiation oncologist on-call.    Thank you!    Le BALL

## 2024-01-05 NOTE — LETTER
"    1/5/2024         RE: Kesha Zacarias  [address] [address] [place] [phone]        Dear Colleague,    Thank you for referring your patient, Kesha Zacarias, to the [facility]. Please see a copy of my visit note below.    Medical Oncology Update:    I called the patient on 1/4/24 to discuss her lower back pain, persistent, most day 8/10 and when I spoke to her she was having a \"good day\" with constant pain at 4/10. Taking naproxen 500mg BID (thinks it is less effective than ibuprofen 800mg TID), oxycodone 5mg q6 prn but requiring q6, still using heating pad every night and prn. Has not started mobic yet, knows to stop naproxen when starting mobic. Open to pursuing PT and steroid injection recommended by neurosurgery team.     D/w Dr. Mandujano  Plan to start palliative RT to sacrum 1/9-1/22/24, 10 fractions total  Due to potentially worsening AE including GI AE, will hold ribociclib now (received 12 days out of 21 days for cycle 2) and for duration of RT and restart a few days after RT is completed. She should continue her letrozole during this time. Dr Mandujano is calling the pt to tell her this now  I am scheduled to see the patient 1/19/24, I will ask for this to be rescheduled to 1/26/24 and plan to restart ribociclib 1/29/24  RNCC, scheduling team, pharmacy notified    Repeat CA 15-3 from today pending    Miller Altamirano D.O.  Hematology/Oncology  [facility]      Again, thank you for allowing me to participate in the care of your patient.        Sincerely,        MILLER ALTAMIRANO, DO  "

## 2024-01-05 NOTE — LETTER
1/5/2024         RE: Kesha Zacarias  [address] [address] [place] [id number]        Dear Colleague,    Thank you for referring your patient, Kesha Zacarias, to the [facility]COL[facility] [place]. Please see a copy of my visit note below.    Dear Colleagues,  Today Kesha Zacarias was seen in consultation.    IDENTIFICATION: This is a 63-year-old woman prior smoker with RA on Plaquenil recently diagnosed with metastatic breast cancer to the bones with painful upper pelvic pain referred for palliative radiotherapy.      HISTORY OF PRESENT ILLNESS:  Kesha Zacarias is a 63-year-old woman prior smoker with RA on Plaquenil recently diagnosed with metastatic breast cancer to the bones.  She has been on Plaquenil for approximately 10 years administered through her PCP.  Her relevant medical history begins this past summer when she first noted progressive nipple retraction.  By September 2023 she palpated a mass and imaging revealed a left breast mass with associated left axillary lymph nodes.  On September 13, 2023 biopsy of the left axilla was consistent with metastatic hormone receptor positive HER2/ajcques negative lobular carcinoma. Breast MRI findings also included nonspecific enhancement in the sternum and ribs.    On October 6, 2023 PET/CT showed a large irregular soft tissue FDG avid 3.2 cm mass within the left breast with extension into the superficial soft tissues and associated left nipple retraction, consistent with biopsy-proven invasive lobular carcinoma. Innumerable diffuse FDG avid lesions throughout the axial and appendicular spine and FDG avid left axillary lymph nodes, consistent with metastatic disease.    On November 27, 2023 she was started on Ribociclib+letrozole.      By mid November 2023 patient started having increasing lower back pain radiating down to the midline pelvis and bilateral buttock.  There is no hip pain.  Pain ranges from 3-10/10 depending on her medications.   She is currently on oxycodone 5mg every 6 hours, mobic 7.5mg QAM.  She doesn't tolerate narcotics well and has had significant constipation despite senna.  Of note she is also on flexoril at night for chronic leg cramps. She also takes marijuana to help with pain management. She is being seen here today for consideration of palliative radiotherapy.    REVIEW OF SYSTEMS: As per HPI, a 14-point review of system is otherwise negative.  PAST RADIATION THERAPY:  Denies  PAST CTD/PACEMAKER: Denies    Past Medical History:   Diagnosis Date     Arthritis 2006     Breast cancer metastasized to bone (H) 10/12/2023     Chronic depressive personality disorder      Dysplasia of cervix (uteri) 1988    Cryotherapy     Female infertility of unspecified origin      Glaucoma        Past Surgical History:   Procedure Laterality Date     COLONOSCOPY       COLONOSCOPY N/A 01/14/2022    Procedure: COLONOSCOPY, WITH POLYPECTOMY AND BIOPSY;  Surgeon: Gagandeep Patterson MD;  Location: [facility]     HC INTRODUCE CATH FALLOPIAN TUBE, RE-OPEN/DIAGNOSIS       HERNIA REPAIR, INCISIONAL  11/11/2009     JOINT REPLACEMENT Right 09/2017     TONSILLECTOMY       ZZC APPENDECTOMY  06/14/2003     ZZC REMV CATARACT INTRACAP,INSERT LENS  02/13/2003    right       Family History   Problem Relation Age of Onset     Genitourinary Problems Father         prostate     Genetic Disorder Father         ulcer     Hypertension Father      Lipids Father      Prostate Cancer Father      Heart Disease Mother      Lipids Mother      Hyperlipidemia Mother      Heart Disease Maternal Grandmother      Cerebrovascular Disease Maternal Grandmother      Heart Disease Maternal Grandfather      Heart Disease Maternal Uncle      Heart Disease Maternal Uncle         x  3     Hyperlipidemia Sister      Hyperlipidemia Brother      Other Cancer Brother        Social History     Tobacco Use     Smoking status: Former     Packs/day: 0.50     Years: 25.00     Additional pack years: 0.00      Total pack years: 12.50     Types: Cigarettes     Quit date: 2017     Years since quittin.3     Smokeless tobacco: Never   Substance Use Topics     Alcohol use: Not Currently       Allergies   Allergen Reactions     Ciprofloxacin      hives and was on flagyl too     Metronidazole      hives and was on cipro too         PHYSICAL EXAMINATION:  /77 (BP Location: Right arm, Patient Position: Chair, Cuff Size: Adult Large)   Pulse 60   Temp 98.6  F (37  C) (Oral)   Resp 18   Wt 93.9 kg (207 lb)   LMP 2011 (Exact Date)   SpO2 94%   BMI 34.45 kg/m    GENERAL Well-appearing woman in no acute distress.  HEENT Normocephalic, atraumatic.  Sclerae anicteric.  CVR  Regular rate and rhythm.  No murmurs, rubs, or gallops.  LUNGS Clear to auscultation bilaterally.  ABDOMEN Soft.    EXT  No CCE.    NEURO No gross  MSK  Good ROM.   SKIN  Warm and well perfused.    PSYCH Alert and oriented x 3    ECOG PERFORMANCE STATUS: 1        IMAGING: I personally reviewed the relevant imaging for this case as stated in the HPI.      IMPRESSION/PLAN:  Kesha Zacarias is a 63-year-old woman prior smoker with RA on Plaquenil recently diagnosed with metastatic breast cancer to the bones with painful upper pelvic pain referred for palliative radiotherapy.  She is currently on Ribociclib+letrozole.  I recommend a palliative dose of radiation therapy to the sacrum to improve local control and decrease the patient's pain symptoms.    The risks, benefits, treatment rationale and regimen of radiation therapy to the pelvis were discussed. Risks include but are not limited to fatigue, skin erythema/changes, nausea, vomiting, erratic stools,  symptoms such as urgency or dysuria, bony fracture, nerve damage and secondary malignancy. Pt signed consent and will start treatment within the next week. 10 day treatment (30Gy in 10fx).    Additional problem list to be addressed in the following manner:    Systemic Treatment:  Ribociclib+letrozole. Data shows that concurrent treatment with ribociclib and radiation can cause neutropenia, excess diarrhea and vomiting (Pb I et al The Breast 2018; Jenna et al Breast 2019).  Will hold Ribociclib while on treatment.    Ok to continue with plaquenil while receiving XRT    There was ample time for questions and all were answered to the patient's satisfaction. Thank you for allowing me to participate in the care of this pleasant patient. If you have any questions, please do not hesitate to contact my office.     Sincerely,  Landon Mandujano MD    [phone] Pager   [phone] [facility]   [phone] Cinthia Alarcon  [phone] [place]      Again, thank you for allowing me to participate in the care of your patient.        Sincerely,        LOBO Mandujano MD

## 2024-01-05 NOTE — NURSING NOTE
"INITIAL PATIENT ASSESSMENT      Diagnosis: metastatic breast cancer    Prior radiation therapy: None    Prior chemotherapy/hormonal therapy: patient reports currently taking Ribociclib (started 11/27/2023) and Letrozole as prescribed by Dr. Maravilla.      Pain Eval:  Current history of pain associated with this visit:   Intensity: 7/10  Current: dull, aching, sharp, and shooting  Location: patient reports low back pain \"at waist line\" of both right and left side with radiation of pain into bilateral buttock and bilateral hips.  Treatment: patient reports taking Meloxicam 7.5 mg tablet po one time daily, reports started taking Meloxicam today, 1/5/2024 and Oxycodone 5 mg po one tablet every six hours.  Patient reports taking Flexeril po one tablet at night \"for years\" due to chronic leg cramps.  Patient also reports recently started smoking marijuana for pain relief and anxiety.      Patient reports prior to starting Meloxicam today, was taking Naproxen po one tablet two times daily.    Psychosocial  Living arrangements: lives at home in [place], patient reports she works in [employer]  Fall Risk: independent, patient reports intermittent use of cane with increased pain  MIPS Falls Risk Screening Completed: Yes Result: Negative   referral needs: Not needed    Advanced Directive: No, patient declined information packet  Implantable Cardiac Device: No  Authorization To Share Protected Health Information: YES - Date: 8/28/2023      Onset of menopause: patient reports menopause at age 47  Abnormal vaginal bleeding/discharge: No  Are you pregnant? No  Urine Pregnancy Testing Needed: No      Review of Systems     Constitutional: Negative.    HENT: Negative.     Eyes:  Positive for blurred vision. Negative for double vision, photophobia, pain, discharge and redness.        Patient reports blurred vision at baseline related to glaucoma.   Respiratory: Negative.     Cardiovascular: Negative.    Gastrointestinal:  " Positive for constipation and nausea. Negative for abdominal pain, blood in stool, diarrhea, heartburn, melena and vomiting.        Patient reports intermittent nausea and constipation, patient reports taking Zofran as needed for nausea control and Docusate for constipation.   Genitourinary: Negative.    Musculoskeletal:  Positive for back pain. Negative for falls, joint pain, myalgias and neck pain.        See pain note.   Skin: Negative.    Neurological:  Positive for headaches. Negative for dizziness, tingling, tremors, sensory change, speech change, focal weakness, seizures, loss of consciousness and weakness.        Patient reports intermittent generalized headaches.   Endo/Heme/Allergies: Negative.    Psychiatric/Behavioral:  Negative for depression, hallucinations, memory loss, substance abuse and suicidal ideas. The patient is nervous/anxious. The patient does not have insomnia.         Patient reports nervousness/anxiety with recent diagnosis, reports strong support system and was provided resources through medical oncology and social work.     Nurse face-to-face time: Level 5:  over 15 min face to face time.    Le Felton RN BSN OCN CBCN

## 2024-01-05 NOTE — PROGRESS NOTES
"Medical Oncology Update:    I called the patient on 1/4/24 to discuss her lower back pain, persistent, most day 8/10 and when I spoke to her she was having a \"good day\" with constant pain at 4/10. Taking naproxen 500mg BID (thinks it is less effective than ibuprofen 800mg TID), oxycodone 5mg q6 prn but requiring q6, still using heating pad every night and prn. Has not started mobic yet, knows to stop naproxen when starting mobic. Open to pursuing PT and steroid injection recommended by neurosurgery team.     D/w Dr. Mandujano  Plan to start palliative RT to sacrum 1/9-1/22/24, 10 fractions total  Due to potentially worsening AE including GI AE, will hold ribociclib now (received 12 days out of 21 days for cycle 2) and for duration of RT and restart a few days after RT is completed. She should continue her letrozole during this time. Dr Mandujano is calling the pt to tell her this now  I am scheduled to see the patient 1/19/24, I will ask for this to be rescheduled to 1/26/24 and plan to restart ribociclib 1/29/24  RNCC, scheduling team, pharmacy notified    Repeat CA 15-3 from today pending    Mary DICKSON Maravilla.  Hematology/Oncology  [facility]    "

## 2024-01-09 ENCOUNTER — OFFICE VISIT (OUTPATIENT)
Dept: RADIATION ONCOLOGY | Facility: CLINIC | Age: 64
End: 2024-01-09
Payer: COMMERCIAL

## 2024-01-09 ENCOUNTER — APPOINTMENT (OUTPATIENT)
Dept: RADIATION ONCOLOGY | Facility: CLINIC | Age: 64
End: 2024-01-09
Payer: COMMERCIAL

## 2024-01-09 VITALS
HEART RATE: 61 BPM | DIASTOLIC BLOOD PRESSURE: 72 MMHG | RESPIRATION RATE: 16 BRPM | OXYGEN SATURATION: 94 % | BODY MASS INDEX: 33.88 KG/M2 | SYSTOLIC BLOOD PRESSURE: 127 MMHG | TEMPERATURE: 98.8 F | WEIGHT: 203.6 LBS

## 2024-01-09 DIAGNOSIS — C79.51 CARCINOMA OF LEFT BREAST METASTATIC TO BONE (H): Primary | ICD-10-CM

## 2024-01-09 DIAGNOSIS — C50.912 CARCINOMA OF LEFT BREAST METASTATIC TO BONE (H): Primary | ICD-10-CM

## 2024-01-09 PROCEDURE — 77412 RADIATION TX DELIVERY LVL 3: CPT | Performed by: RADIOLOGY

## 2024-01-09 PROCEDURE — 99207 PR DROP WITH A PROCEDURE: CPT | Performed by: RADIOLOGY

## 2024-01-09 PROCEDURE — 77300 RADIATION THERAPY DOSE PLAN: CPT | Performed by: RADIOLOGY

## 2024-01-09 PROCEDURE — 77334 RADIATION TREATMENT AID(S): CPT | Performed by: RADIOLOGY

## 2024-01-09 PROCEDURE — 77014 PR CT GUIDE FOR PLACEMENT RADIATION THERAPY FIELDS: CPT | Mod: 59 | Performed by: RADIOLOGY

## 2024-01-09 PROCEDURE — 77295 3-D RADIOTHERAPY PLAN: CPT | Performed by: RADIOLOGY

## 2024-01-09 ASSESSMENT — PAIN SCALES - GENERAL: PAINLEVEL: MODERATE PAIN (5)

## 2024-01-09 NOTE — LETTER
2024         RE: Kesha Zacarias  [address] [address] [place] [phone]        Dear Colleague,    Thank you for referring your patient, Kesha Zacarias, to the [facility] RADIATION [facility]COL[facility] [place]. Please see a copy of my visit note below.    [facility]  SPECIALIZING IN BREAKTHROUGHS  Radiation Oncology    On Treatment Visit Note      Kesha Zacarias      Date: 2024   MRN: [id number]   : 1960  Diagnosis: Metastatic breast cancer      Reason for Visit:  On Radiation Treatment Visit     Treatment Summary to Date  Treatment Site: Sacrum Current Dose: 300/3000 cGy Fractions: 1/10      Chemotherapy  Chemo concurrent with radx?: No (Dr. Maravilla- Ribociclib held during XRT)    Subjective:     1st day of treament went well.  No complaints.  Pt is not taking Ribociclib at this time.    Nursing ROS:   Nutrition Alteration  Diet Type: Patient's Preference  Skin  Skin Reaction: 0 - No changes           Gastrointestinal  Nausea: 0 - None     Psychosocial  Psychosocial Note: Patient denies fatigue  Pain Assessment  0-10 Pain Scale: 5  Pain Note: Low back, patient taking Oxycodone 5 mg every 6 hours and Naproxen 500 mg BID.      Objective:   /72   Pulse 61   Temp 98.8  F (37.1  C) (Oral)   Resp 16   Wt 92.4 kg (203 lb 9.6 oz)   LMP 2011 (Exact Date)   SpO2 94%   BMI 33.88 kg/m    Gen: Appears well, in no acute distress    Labs:  CBC RESULTS:   Recent Labs   Lab Test 24  0740   WBC 3.4*   RBC 3.50*   HGB 11.1*   HCT 34.0*   MCV 97   MCH 31.7   MCHC 32.6   RDW 15.9*        ELECTROLYTES:  Recent Labs   Lab Test 23  0729      POTASSIUM 4.6   CHLORIDE 108*   NITISH 8.9   CO2 26   BUN 25.3*   CR 1.10*   *       Assessment:    Tolerating radiation therapy well.  All questions and concerns addressed.    Plan:   Continue current therapy.        Zingiq chart and setup information reviewed  MVCT/IGRT images checked    Medication  Review  Med list reviewed with patient?: Yes    Educational Topic Discussed  Additional Instructions: Follow up with Dr. Maravilla 1/25/24  Education Instructions: Monitoring pain, fatigue        Landon Mandujano MD    Please do not send letter to referring physician.        Again, thank you for allowing me to participate in the care of your patient.        Sincerely,        LOBO Mandujano MD

## 2024-01-10 ENCOUNTER — APPOINTMENT (OUTPATIENT)
Dept: RADIATION ONCOLOGY | Facility: CLINIC | Age: 64
End: 2024-01-10
Payer: COMMERCIAL

## 2024-01-10 PROCEDURE — 77014 PR CT GUIDE FOR PLACEMENT RADIATION THERAPY FIELDS: CPT | Performed by: RADIOLOGY

## 2024-01-10 PROCEDURE — 77412 RADIATION TX DELIVERY LVL 3: CPT | Performed by: RADIOLOGY

## 2024-01-11 ENCOUNTER — APPOINTMENT (OUTPATIENT)
Dept: RADIATION ONCOLOGY | Facility: CLINIC | Age: 64
End: 2024-01-11
Payer: COMMERCIAL

## 2024-01-11 PROCEDURE — 77412 RADIATION TX DELIVERY LVL 3: CPT | Performed by: RADIOLOGY

## 2024-01-11 PROCEDURE — 77014 PR CT GUIDE FOR PLACEMENT RADIATION THERAPY FIELDS: CPT | Performed by: RADIOLOGY

## 2024-01-11 NOTE — PROGRESS NOTES
[facility]  SPECIALIZING IN BREAKTHROUGHS  Radiation Oncology    On Treatment Visit Note      Kesha Zacarias      Date: 2024   MRN: [id number]   : 1960  Diagnosis: Metastatic breast cancer      Reason for Visit:  On Radiation Treatment Visit     Treatment Summary to Date  Treatment Site: Sacrum Current Dose: 300/3000 cGy Fractions: 1/10      Chemotherapy  Chemo concurrent with radx?: No (Dr. Maravilla- Ribociclib held during XRT)    Subjective:     1st day of treament went well.  No complaints.  Pt is not taking Ribociclib at this time.    Nursing ROS:   Nutrition Alteration  Diet Type: Patient's Preference  Skin  Skin Reaction: 0 - No changes           Gastrointestinal  Nausea: 0 - None     Psychosocial  Psychosocial Note: Patient denies fatigue  Pain Assessment  0-10 Pain Scale: 5  Pain Note: Low back, patient taking Oxycodone 5 mg every 6 hours and Naproxen 500 mg BID.      Objective:   /72   Pulse 61   Temp 98.8  F (37.1  C) (Oral)   Resp 16   Wt 92.4 kg (203 lb 9.6 oz)   LMP 2011 (Exact Date)   SpO2 94%   BMI 33.88 kg/m    Gen: Appears well, in no acute distress    Labs:  CBC RESULTS:   Recent Labs   Lab Test 24  0740   WBC 3.4*   RBC 3.50*   HGB 11.1*   HCT 34.0*   MCV 97   MCH 31.7   MCHC 32.6   RDW 15.9*        ELECTROLYTES:  Recent Labs   Lab Test 23  0729      POTASSIUM 4.6   CHLORIDE 108*   NITISH 8.9   CO2 26   BUN 25.3*   CR 1.10*   *       Assessment:    Tolerating radiation therapy well.  All questions and concerns addressed.    Plan:   Continue current therapy.        Mosaiq chart and setup information reviewed  MVCT/IGRT images checked    Medication Review  Med list reviewed with patient?: Yes    Educational Topic Discussed  Additional Instructions: Follow up with Dr. Maravilla 24  Education Instructions: Monitoring pain, fatigue        Landon Mandujano MD    Please do not send letter to referring physician.

## 2024-01-12 ENCOUNTER — APPOINTMENT (OUTPATIENT)
Dept: RADIATION ONCOLOGY | Facility: CLINIC | Age: 64
End: 2024-01-12
Payer: COMMERCIAL

## 2024-01-12 PROCEDURE — 77412 RADIATION TX DELIVERY LVL 3: CPT | Performed by: RADIOLOGY

## 2024-01-12 PROCEDURE — 77014 PR CT GUIDE FOR PLACEMENT RADIATION THERAPY FIELDS: CPT | Performed by: RADIOLOGY

## 2024-01-15 ENCOUNTER — APPOINTMENT (OUTPATIENT)
Dept: RADIATION ONCOLOGY | Facility: CLINIC | Age: 64
End: 2024-01-15
Payer: COMMERCIAL

## 2024-01-15 PROCEDURE — 77014 PR CT GUIDE FOR PLACEMENT RADIATION THERAPY FIELDS: CPT | Performed by: RADIOLOGY

## 2024-01-15 PROCEDURE — 77412 RADIATION TX DELIVERY LVL 3: CPT | Performed by: RADIOLOGY

## 2024-01-15 PROCEDURE — 77336 RADIATION PHYSICS CONSULT: CPT | Performed by: RADIOLOGY

## 2024-01-15 PROCEDURE — 77427 RADIATION TX MANAGEMENT X5: CPT | Performed by: RADIOLOGY

## 2024-01-16 ENCOUNTER — OFFICE VISIT (OUTPATIENT)
Dept: RADIATION ONCOLOGY | Facility: CLINIC | Age: 64
End: 2024-01-16
Payer: COMMERCIAL

## 2024-01-16 ENCOUNTER — APPOINTMENT (OUTPATIENT)
Dept: RADIATION ONCOLOGY | Facility: CLINIC | Age: 64
End: 2024-01-16
Payer: COMMERCIAL

## 2024-01-16 VITALS
SYSTOLIC BLOOD PRESSURE: 135 MMHG | RESPIRATION RATE: 16 BRPM | BODY MASS INDEX: 33.85 KG/M2 | TEMPERATURE: 98.5 F | DIASTOLIC BLOOD PRESSURE: 78 MMHG | HEART RATE: 67 BPM | WEIGHT: 203.4 LBS | OXYGEN SATURATION: 95 %

## 2024-01-16 DIAGNOSIS — C50.912 CARCINOMA OF LEFT BREAST METASTATIC TO BONE (H): Primary | ICD-10-CM

## 2024-01-16 DIAGNOSIS — C79.51 CARCINOMA OF LEFT BREAST METASTATIC TO BONE (H): Primary | ICD-10-CM

## 2024-01-16 PROCEDURE — 99207 PR DROP WITH A PROCEDURE: CPT | Performed by: RADIOLOGY

## 2024-01-16 PROCEDURE — 77412 RADIATION TX DELIVERY LVL 3: CPT | Performed by: RADIOLOGY

## 2024-01-16 PROCEDURE — 77014 PR CT GUIDE FOR PLACEMENT RADIATION THERAPY FIELDS: CPT | Performed by: RADIOLOGY

## 2024-01-16 ASSESSMENT — PAIN SCALES - GENERAL: PAINLEVEL: MILD PAIN (3)

## 2024-01-16 NOTE — PROGRESS NOTES
[facility] PHYSICIANS  SPECIALIZING IN BREAKTHROUGHS  Radiation Oncology    On Treatment Visit Note      Kesha Zacarias      Date: 2024   MRN: [id number]   : 1960  Diagnosis: Metastatic breast cancer      Reason for Visit:  On Radiation Treatment Visit     Treatment Summary to Date  Treatment Site: Sacrum Current Dose: 1800/3000 cGy Fractions: 6/10      Chemotherapy  Chemo concurrent with radx?: No (Dr. Maravilla- Ribociclib held during XRT)    Subjective:     Pain significantly improved since starting radiation treatment.  She now rates her pain a 2 or 3 out of 10 and has not been taking oxycodone    Nursing ROS:   Nutrition Alteration  Diet Type: Patient's Preference  Skin  Skin Reaction: 0 - No changes           Gastrointestinal  Nausea: 1 - One to two episodes of nausea/24  Diarrhea: 0 - None  GI Note: Patient having nausea that comes and goes, she is taking Zofran and Compazine as needed and feels this helps.     Psychosocial  Psychosocial Note: Patient denies fatigue  Pain Assessment  0-10 Pain Scale: 3  Pain Note: Low back, patient reports pain has improved, has not needed oxy for 3 days. She continues to take naproxen for pain control.      Objective:   /78   Pulse 67   Temp 98.5  F (36.9  C) (Oral)   Resp 16   Wt 92.3 kg (203 lb 6.4 oz)   LMP 2011 (Exact Date)   SpO2 95%   BMI 33.85 kg/m    Gen: Appears well, in no acute distress    Labs:  CBC RESULTS:   Recent Labs   Lab Test 24  0740   WBC 3.4*   RBC 3.50*   HGB 11.1*   HCT 34.0*   MCV 97   MCH 31.7   MCHC 32.6   RDW 15.9*        ELECTROLYTES:  Recent Labs   Lab Test 23  0729      POTASSIUM 4.6   CHLORIDE 108*   NITISH 8.9   CO2 26   BUN 25.3*   CR 1.10*   *       Assessment:    Tolerating radiation therapy well.  All questions and concerns addressed.    Toxicities:  Nausea: Grade 1: Loss of appetite without alteration in eating habits    Plan:   Continue current therapy.     Antiemetics as above  Pain medication to be weaned by medical oncology.  After completing radiation treatment follow-up with us as needed  Follow-up with medical oncology as previously directed      Mosaiq chart and setup information reviewed  MVCT/IGRT images checked    Medication Review  Med list reviewed with patient?: Yes    Educational Topic Discussed  Additional Instructions: Follow up with Dr. Maravilla 1/25/24. Follow up with Dr. Delbert ESPINOZA.  Education Instructions: Monitoring pain, fatigue        Landon Mandujano MD    Please do not send letter to referring physician.

## 2024-01-16 NOTE — LETTER
2024         RE: Kesha Zacarias  [address] [address] [place] [id number]        Dear Colleague,    Thank you for referring your patient, Kesha Zacarias, to the [facility] RADIATION ONCOLOGY [place]. Please see a copy of my visit note below.    [facility] PHYSICIANS  SPECIALIZING IN BREAKTHROUGHS  Radiation Oncology    On Treatment Visit Note      Kesha Zacarias      Date: 2024   MRN: [id number]   : 1960  Diagnosis: Metastatic breast cancer      Reason for Visit:  On Radiation Treatment Visit     Treatment Summary to Date  Treatment Site: Sacrum Current Dose: 1800/3000 cGy Fractions: 6/10      Chemotherapy  Chemo concurrent with radx?: No (Dr. Maravilla- Ribociclib held during XRT)    Subjective:     Pain significantly improved since starting radiation treatment.  She now rates her pain a 2 or 3 out of 10 and has not been taking oxycodone    Nursing ROS:   Nutrition Alteration  Diet Type: Patient's Preference  Skin  Skin Reaction: 0 - No changes           Gastrointestinal  Nausea: 1 - One to two episodes of nausea/24  Diarrhea: 0 - None  GI Note: Patient having nausea that comes and goes, she is taking Zofran and Compazine as needed and feels this helps.     Psychosocial  Psychosocial Note: Patient denies fatigue  Pain Assessment  0-10 Pain Scale: 3  Pain Note: Low back, patient reports pain has improved, has not needed oxy for 3 days. She continues to take naproxen for pain control.      Objective:   /78   Pulse 67   Temp 98.5  F (36.9  C) (Oral)   Resp 16   Wt 92.3 kg (203 lb 6.4 oz)   LMP 2011 (Exact Date)   SpO2 95%   BMI 33.85 kg/m    Gen: Appears well, in no acute distress    Labs:  CBC RESULTS:   Recent Labs   Lab Test 24  0740   WBC 3.4*   RBC 3.50*   HGB 11.1*   HCT 34.0*   MCV 97   MCH 31.7   MCHC 32.6   RDW 15.9*        ELECTROLYTES:  Recent Labs   Lab Test 23  0729      POTASSIUM 4.6   CHLORIDE 108*   NITISH 8.9    CO2 26   BUN 25.3*   CR 1.10*   *       Assessment:    Tolerating radiation therapy well.  All questions and concerns addressed.    Toxicities:  Nausea: Grade 1: Loss of appetite without alteration in eating habits    Plan:   Continue current therapy.    Antiemetics as above  Pain medication to be weaned by medical oncology.  After completing radiation treatment follow-up with us as needed  Follow-up with medical oncology as previously directed      Mosaiq chart and setup information reviewed  MVCT/IGRT images checked    Medication Review  Med list reviewed with patient?: Yes    Educational Topic Discussed  Additional Instructions: Follow up with Dr. Maravilla 1/25/24. Follow up with Dr. Mandujano PRSILVIANO.  Education Instructions: Monitoring pain, fatigue        Landon Mandujano MD    Please do not send letter to referring physician.        Again, thank you for allowing me to participate in the care of your patient.        Sincerely,        LOBO Mandujano MD

## 2024-01-17 ENCOUNTER — APPOINTMENT (OUTPATIENT)
Dept: RADIATION ONCOLOGY | Facility: CLINIC | Age: 64
End: 2024-01-17
Payer: COMMERCIAL

## 2024-01-17 PROCEDURE — 77412 RADIATION TX DELIVERY LVL 3: CPT | Performed by: RADIOLOGY

## 2024-01-17 PROCEDURE — 77014 PR CT GUIDE FOR PLACEMENT RADIATION THERAPY FIELDS: CPT | Performed by: RADIOLOGY

## 2024-01-18 ENCOUNTER — APPOINTMENT (OUTPATIENT)
Dept: RADIATION ONCOLOGY | Facility: CLINIC | Age: 64
End: 2024-01-18
Payer: COMMERCIAL

## 2024-01-18 PROCEDURE — 77412 RADIATION TX DELIVERY LVL 3: CPT | Performed by: SURGERY

## 2024-01-18 PROCEDURE — 77014 PR CT GUIDE FOR PLACEMENT RADIATION THERAPY FIELDS: CPT | Performed by: SURGERY

## 2024-01-19 ENCOUNTER — APPOINTMENT (OUTPATIENT)
Dept: RADIATION ONCOLOGY | Facility: CLINIC | Age: 64
End: 2024-01-19
Payer: COMMERCIAL

## 2024-01-19 ENCOUNTER — TRANSFERRED RECORDS (OUTPATIENT)
Dept: HEALTH INFORMATION MANAGEMENT | Facility: CLINIC | Age: 64
End: 2024-01-19

## 2024-01-19 ENCOUNTER — HOSPITAL ENCOUNTER (OUTPATIENT)
Dept: CARDIOLOGY | Facility: CLINIC | Age: 64
Discharge: HOME | End: 2024-01-19
Attending: INTERNAL MEDICINE | Admitting: INTERNAL MEDICINE
Payer: COMMERCIAL

## 2024-01-19 PROCEDURE — 77014 PR CT GUIDE FOR PLACEMENT RADIATION THERAPY FIELDS: CPT | Performed by: SURGERY

## 2024-01-19 PROCEDURE — 93005 ELECTROCARDIOGRAM TRACING: CPT | Performed by: INTERNAL MEDICINE

## 2024-01-19 PROCEDURE — 77412 RADIATION TX DELIVERY LVL 3: CPT | Performed by: SURGERY

## 2024-01-22 ENCOUNTER — ONCOLOGY VISIT (OUTPATIENT)
Dept: ONCOLOGY | Facility: CLINIC | Age: 64
End: 2024-01-22
Attending: NURSE PRACTITIONER
Payer: COMMERCIAL

## 2024-01-22 ENCOUNTER — HOSPITAL ENCOUNTER (OUTPATIENT)
Dept: ULTRASOUND IMAGING | Facility: CLINIC | Age: 64
Discharge: HOME | End: 2024-01-22
Attending: NURSE PRACTITIONER | Admitting: NURSE PRACTITIONER
Payer: COMMERCIAL

## 2024-01-22 ENCOUNTER — APPOINTMENT (OUTPATIENT)
Dept: RADIATION ONCOLOGY | Facility: CLINIC | Age: 64
End: 2024-01-22
Payer: COMMERCIAL

## 2024-01-22 VITALS
HEART RATE: 65 BPM | OXYGEN SATURATION: 99 % | BODY MASS INDEX: 33.48 KG/M2 | SYSTOLIC BLOOD PRESSURE: 112 MMHG | RESPIRATION RATE: 18 BRPM | WEIGHT: 201.2 LBS | DIASTOLIC BLOOD PRESSURE: 76 MMHG | TEMPERATURE: 97.9 F

## 2024-01-22 DIAGNOSIS — C50.912 CARCINOMA OF LEFT BREAST METASTATIC TO BONE (H): ICD-10-CM

## 2024-01-22 DIAGNOSIS — C79.52 SECONDARY MALIGNANT NEOPLASM OF BONE AND BONE MARROW (H): ICD-10-CM

## 2024-01-22 DIAGNOSIS — C79.51 CARCINOMA OF LEFT BREAST METASTATIC TO BONE (H): ICD-10-CM

## 2024-01-22 DIAGNOSIS — C79.51 SECONDARY MALIGNANT NEOPLASM OF BONE AND BONE MARROW (H): ICD-10-CM

## 2024-01-22 DIAGNOSIS — M79.89 LEFT LEG SWELLING: ICD-10-CM

## 2024-01-22 DIAGNOSIS — I82.402 ACUTE DEEP VEIN THROMBOSIS (DVT) OF LEFT LOWER EXTREMITY, UNSPECIFIED VEIN (H): ICD-10-CM

## 2024-01-22 DIAGNOSIS — M79.89 LEFT LEG SWELLING: Primary | ICD-10-CM

## 2024-01-22 PROCEDURE — 77336 RADIATION PHYSICS CONSULT: CPT | Performed by: RADIOLOGY

## 2024-01-22 PROCEDURE — 93971 EXTREMITY STUDY: CPT | Mod: LT

## 2024-01-22 PROCEDURE — 77412 RADIATION TX DELIVERY LVL 3: CPT | Performed by: RADIOLOGY

## 2024-01-22 PROCEDURE — 77427 RADIATION TX MANAGEMENT X5: CPT | Performed by: RADIOLOGY

## 2024-01-22 PROCEDURE — 99214 OFFICE O/P EST MOD 30 MIN: CPT | Performed by: NURSE PRACTITIONER

## 2024-01-22 PROCEDURE — 77014 PR CT GUIDE FOR PLACEMENT RADIATION THERAPY FIELDS: CPT | Performed by: RADIOLOGY

## 2024-01-22 RX ORDER — APIXABAN 5 MG (74)
KIT ORAL
Qty: 74 EACH | Refills: 0 | Status: SHIPPED | OUTPATIENT
Start: 2024-01-22 | End: 2024-02-21

## 2024-01-22 ASSESSMENT — PAIN SCALES - GENERAL: PAINLEVEL: SEVERE PAIN (6)

## 2024-01-22 NOTE — PROGRESS NOTES
Addendum 3:30 PM telephone call from [facility]: Positive for DVT to left lower leg in the distal vein.    Talked with patient and discussed that she needs to be on new medication for treatment-will start Eliquis starter pack for first month.  Had her discontinue her aspirin and will talk to her about the naproxen use which may increase chance of bleeding.  Patient educated on administration of the DOAC and his side effects and educated to call if there is increased shortness of breath or new pains or progressive swelling to the the lower left leg as well.  Patient stated understanding. Kristina Acosta,CNp

## 2024-01-22 NOTE — LETTER
1/22/2024         RE: Kesha Zacarias  [address] [address] [place] [id number]        Dear Colleague,    Thank you for referring your patient, Kesha Zacarias, to the [facility]. Please see a copy of my visit note below.    Oncology Follow Up Visit: January 22, 2024    Oncologist: Dr Mary Maravilla  PCP: Schoen, Gregory G    Diagnosis: Metastatic breast cancer- here for new progressive left ankle swelling since 1/19/2024.  Kseha Zacarias is a 63 year old female with left-sided breast cancer.  In September 2023, she noticed a mass with nipple retraction and mammogram revealed focal dense tissue with anterior skin thickening.  Ultrasound with 3 x 1.7 x 3.9 cm mass, biopsy with multifocal lobular carcinoma in situ and left axillary lymph node positive for metastatic lobular carcinoma.  ER/NC positive, HER2 negative.  PET scan in October 23 with multiple FDG avid lesions in the bony structures of the head and neck.  Brain MRI with calvarial lesions.  Treatment:   Letrozole daily  Ribociclib 400 mg daily with Zometa monthly.     Interval History:Ms Zacarias comes to clinic for radiation therapy but shared she had developed a swollen ankle quite suddenly on evening of 1/19/2024- no injury or redness noted with this. SHe has some pain from left hip down due to the metastatic cancer in the back but shares the ankle or lower leg is also having a pain. Treated with ace wrap and feels it did help with the swelling but still having pain. Never had this pain or swelling before. Is taking 81 mg of ASA daily. Denies fevers, chest pain or breathing difficulties or cough.   Rest of comprehensive and complete ROS is reviewed and is negative.   Past Medical History:   Diagnosis Date     Arthritis 2006     Breast cancer metastasized to bone (H) 10/12/2023     Chronic depressive personality disorder      Dysplasia of cervix (uteri) 1988    Cryotherapy     Female infertility of unspecified origin       Glaucoma      Rheumatoid arthritis (H)      Current Outpatient Medications   Medication     aspirin 81 MG tablet     betamethasone dipropionate (DIPROSONE) 0.05 % external lotion     COENZYME Q-10 PO     cyclobenzaprine (FLEXERIL) 5 MG tablet     docusate sodium (COLACE) 100 MG capsule     fish oil-omega-3 fatty acids 1000 MG capsule     hydroxychloroquine (PLAQUENIL) 200 MG tablet     letrozole (FEMARA) 2.5 MG tablet     loperamide (IMODIUM A-D) 2 MG tablet     magnesium 250 MG tablet     naproxen (NAPROSYN) 500 MG tablet     ondansetron (ZOFRAN) 4 MG tablet     oxyCODONE (ROXICODONE) 5 MG tablet     prochlorperazine (COMPAZINE) 10 MG tablet     No current facility-administered medications for this visit.     Allergies   Allergen Reactions     Ciprofloxacin      hives and was on flagyl too     Metronidazole      hives and was on cipro too       Physical Exam:/76   Pulse 65   Temp 97.9  F (36.6  C)   Resp 18   Wt 91.3 kg (201 lb 3.2 oz)   LMP 11/22/2011 (Exact Date)   SpO2 99%   BMI 33.48 kg/m     ECOG PS- 0  Constitutional: Alert and in no obvious distress.   ENT: Eyes bright, no cold symptoms  Cardiac: Heart rate and rhythm is regular and strong without murmur  Respiratory: Breathing easy. No cough.   GI: Abdomen is soft, non-tender, BS normal. No masses or organomegaly  MS: Muscle tone normal, Left ankle up to lower left leg with swelling noted- larger than right.   Is limping to left leg but has been doing this  since finding mets in back but ankle pain is also noted and may be affecting this. Homens was negative.  Skin: No suspicious lesions or rashes  Neuro: Sensory grossly WNL, gait - as above.   Lymph: Normal ant/post cervical, axillary, supraclavicular nodes  Psych: Mentation appears normal and affect normal/bright with good conversation.     Laboratory/Imaging Results:   No labs today.     Assessment and Plan:   New Left ankle and leg swelling- Pt has new symptoms of sudden swelling and  pain to ankle and lower leg but had some pain previous down leg from sciatic nerve pain with known mets to back from her breast cancer.   - Will get left leg US to help with Rule out of blood clot. Pt also could have some new site of cancer to pelvis that would cause some swelling to occur but this is not seen with 10/6/2023 PET - has next imaging set for 2/17 but may need to move up as necessary if no other source noted with US.   - pt already on 81 mg ASA- will need to move to DOAC if positive for blood clot.   - no sign of infection noted to the leg  - may continue to wrap for support.   Breast cancer with bone metastasis- finishing radiation therapy to the low back today.   Set to see Dr Maravilla on 1/25 for follow up after radiation therapy and planning for further treatment.     The total time of this encounter amounted to  30 minutes. This time included face to face time spent with the patient, prep work, ordering tests, and performing post visit documentation.  Kristina Acosta Cnp      Again, thank you for allowing me to participate in the care of your patient.        Sincerely,        Kristina Acosta, NP, APRN CNP

## 2024-01-22 NOTE — NURSING NOTE
"Oncology Rooming Note    January 22, 2024 11:14 AM   Kesha Zacarias is a 63 year old female who presents for:    Chief Complaint   Patient presents with    Oncology Clinic Visit     Follow Up     Initial Vitals: /76   Pulse 65   Temp 97.9  F (36.6  C)   Resp 18   Wt 91.3 kg (201 lb 3.2 oz)   LMP 11/22/2011 (Exact Date)   SpO2 99%   BMI 33.48 kg/m   Estimated body mass index is 33.48 kg/m  as calculated from the following:    Height as of 1/4/24: 1.651 m (5' 5\").    Weight as of this encounter: 91.3 kg (201 lb 3.2 oz). Body surface area is 2.05 meters squared.  Severe Pain (6) Comment: Data Unavailable   Patient's last menstrual period was 11/22/2011 (exact date).  Allergies reviewed: Yes  Medications reviewed: Yes    Medications: Medication refills not needed today.  Pharmacy name entered into [employer]:    [facility] - [address] [id number]  [place] - [address]  [facility] - [place], [place] - [address]    Frailty Screening:   Is the patient here for a new oncology consult visit in cancer care? 2. No      Clinical concerns: Patient will discuss concerns with provider       Negrita Hull MA            "

## 2024-01-22 NOTE — PROGRESS NOTES
Oncology Follow Up Visit: January 22, 2024    Oncologist: Dr Mary Maravilla  PCP: Schoen, Gregory G    Diagnosis: Metastatic breast cancer- here for new progressive left ankle swelling since 1/19/2024.  Kesha Zacarias is a 63 year old female with left-sided breast cancer.  In September 2023, she noticed a mass with nipple retraction and mammogram revealed focal dense tissue with anterior skin thickening.  Ultrasound with 3 x 1.7 x 3.9 cm mass, biopsy with multifocal lobular carcinoma in situ and left axillary lymph node positive for metastatic lobular carcinoma.  ER/SC positive, HER2 negative.  PET scan in October 23 with multiple FDG avid lesions in the bony structures of the head and neck.  Brain MRI with calvarial lesions.  Treatment:   Letrozole daily  Ribociclib 400 mg daily with Zometa monthly.     Interval History:Ms Zacarias comes to clinic for radiation therapy but shared she had developed a swollen ankle quite suddenly on evening of 1/19/2024- no injury or redness noted with this. SHe has some pain from left hip down due to the metastatic cancer in the back but shares the ankle or lower leg is also having a pain. Treated with ace wrap and feels it did help with the swelling but still having pain. Never had this pain or swelling before. Is taking 81 mg of ASA daily. Denies fevers, chest pain or breathing difficulties or cough.   Rest of comprehensive and complete ROS is reviewed and is negative.   Past Medical History:   Diagnosis Date    Arthritis 2006    Breast cancer metastasized to bone (H) 10/12/2023    Chronic depressive personality disorder     Dysplasia of cervix (uteri) 1988    Cryotherapy    Female infertility of unspecified origin     Glaucoma     Rheumatoid arthritis (H)      Current Outpatient Medications   Medication    aspirin 81 MG tablet    betamethasone dipropionate (DIPROSONE) 0.05 % external lotion    COENZYME Q-10 PO    cyclobenzaprine (FLEXERIL) 5 MG tablet    docusate sodium (COLACE)  100 MG capsule    fish oil-omega-3 fatty acids 1000 MG capsule    hydroxychloroquine (PLAQUENIL) 200 MG tablet    letrozole (FEMARA) 2.5 MG tablet    loperamide (IMODIUM A-D) 2 MG tablet    magnesium 250 MG tablet    naproxen (NAPROSYN) 500 MG tablet    ondansetron (ZOFRAN) 4 MG tablet    oxyCODONE (ROXICODONE) 5 MG tablet    prochlorperazine (COMPAZINE) 10 MG tablet     No current facility-administered medications for this visit.     Allergies   Allergen Reactions    Ciprofloxacin      hives and was on flagyl too    Metronidazole      hives and was on cipro too       Physical Exam:/76   Pulse 65   Temp 97.9  F (36.6  C)   Resp 18   Wt 91.3 kg (201 lb 3.2 oz)   LMP 11/22/2011 (Exact Date)   SpO2 99%   BMI 33.48 kg/m     ECOG PS- 0  Constitutional: Alert and in no obvious distress.   ENT: Eyes bright, no cold symptoms  Cardiac: Heart rate and rhythm is regular and strong without murmur  Respiratory: Breathing easy. No cough.   GI: Abdomen is soft, non-tender, BS normal. No masses or organomegaly  MS: Muscle tone normal, Left ankle up to lower left leg with swelling noted- larger than right.   Is limping to left leg but has been doing this  since finding mets in back but ankle pain is also noted and may be affecting this. Homens was negative.  Skin: No suspicious lesions or rashes  Neuro: Sensory grossly WNL, gait - as above.   Lymph: Normal ant/post cervical, axillary, supraclavicular nodes  Psych: Mentation appears normal and affect normal/bright with good conversation.     Laboratory/Imaging Results:   No labs today.     Assessment and Plan:   New Left ankle and leg swelling- Pt has new symptoms of sudden swelling and pain to ankle and lower leg but had some pain previous down leg from sciatic nerve pain with known mets to back from her breast cancer.   - Will get left leg US to help with Rule out of blood clot. Pt also could have some new site of cancer to pelvis that would cause some swelling to  occur but this is not seen with 10/6/2023 PET - has next imaging set for 2/17 but may need to move up as necessary if no other source noted with US.   - pt already on 81 mg ASA- will need to move to DOAC if positive for blood clot.   - no sign of infection noted to the leg  - may continue to wrap for support.   Breast cancer with bone metastasis- finishing radiation therapy to the low back today.   Set to see Dr Maravilla on 1/25 for follow up after radiation therapy and planning for further treatment.     The total time of this encounter amounted to  30 minutes. This time included face to face time spent with the patient, prep work, ordering tests, and performing post visit documentation.  Kristina Acosta,Cnp

## 2024-01-25 ENCOUNTER — MYC MEDICAL ADVICE (OUTPATIENT)
Dept: ONCOLOGY | Facility: CLINIC | Age: 64
End: 2024-01-25

## 2024-01-25 ENCOUNTER — LAB (OUTPATIENT)
Dept: LAB | Facility: CLINIC | Age: 64
End: 2024-01-25
Attending: INTERNAL MEDICINE
Payer: COMMERCIAL

## 2024-01-25 ENCOUNTER — VIRTUAL VISIT (OUTPATIENT)
Dept: ONCOLOGY | Facility: CLINIC | Age: 64
End: 2024-01-25
Attending: SURGERY
Payer: COMMERCIAL

## 2024-01-25 VITALS — WEIGHT: 201 LBS | BODY MASS INDEX: 32.3 KG/M2 | HEIGHT: 66 IN

## 2024-01-25 DIAGNOSIS — C50.912 CARCINOMA OF LEFT BREAST METASTATIC TO BONE (H): Primary | ICD-10-CM

## 2024-01-25 DIAGNOSIS — G89.3 CANCER ASSOCIATED PAIN: ICD-10-CM

## 2024-01-25 DIAGNOSIS — D64.81 ANEMIA ASSOCIATED WITH CHEMOTHERAPY: ICD-10-CM

## 2024-01-25 DIAGNOSIS — C50.912 CARCINOMA OF LEFT BREAST METASTATIC TO BONE (H): ICD-10-CM

## 2024-01-25 DIAGNOSIS — C79.51 CARCINOMA OF LEFT BREAST METASTATIC TO BONE (H): ICD-10-CM

## 2024-01-25 DIAGNOSIS — C79.51 CARCINOMA OF LEFT BREAST METASTATIC TO BONE (H): Primary | ICD-10-CM

## 2024-01-25 DIAGNOSIS — T45.1X5A ANEMIA ASSOCIATED WITH CHEMOTHERAPY: ICD-10-CM

## 2024-01-25 DIAGNOSIS — C79.51 MALIGNANT NEOPLASM METASTATIC TO BONE (H): Primary | ICD-10-CM

## 2024-01-25 DIAGNOSIS — I82.402 ACUTE DEEP VEIN THROMBOSIS (DVT) OF LEFT LOWER EXTREMITY, UNSPECIFIED VEIN (H): ICD-10-CM

## 2024-01-25 DIAGNOSIS — C79.51 MALIGNANT NEOPLASM METASTATIC TO BONE (H): ICD-10-CM

## 2024-01-25 LAB
ALBUMIN SERPL BCG-MCNC: 4 G/DL (ref 3.5–5.2)
ALP SERPL-CCNC: 88 U/L (ref 40–150)
ALT SERPL W P-5'-P-CCNC: 24 U/L (ref 0–50)
ANION GAP SERPL CALCULATED.3IONS-SCNC: 10 MMOL/L (ref 7–15)
AST SERPL W P-5'-P-CCNC: 25 U/L (ref 0–45)
BASOPHILS # BLD AUTO: 0.1 10E3/UL (ref 0–0.2)
BASOPHILS NFR BLD AUTO: 2 %
BILIRUB SERPL-MCNC: 0.3 MG/DL
BUN SERPL-MCNC: 21.5 MG/DL (ref 8–23)
CALCIUM SERPL-MCNC: 8.3 MG/DL (ref 8.8–10.2)
CHLORIDE SERPL-SCNC: 106 MMOL/L (ref 98–107)
CREAT SERPL-MCNC: 0.84 MG/DL (ref 0.51–0.95)
DEPRECATED HCO3 PLAS-SCNC: 24 MMOL/L (ref 22–29)
EGFRCR SERPLBLD CKD-EPI 2021: 78 ML/MIN/1.73M2
EOSINOPHIL # BLD AUTO: 0.3 10E3/UL (ref 0–0.7)
EOSINOPHIL NFR BLD AUTO: 8 %
ERYTHROCYTE [DISTWIDTH] IN BLOOD BY AUTOMATED COUNT: 15.7 % (ref 10–15)
FERRITIN SERPL-MCNC: 552 NG/ML (ref 11–328)
GLUCOSE SERPL-MCNC: 103 MG/DL (ref 70–99)
HCT VFR BLD AUTO: 35.6 % (ref 35–47)
HGB BLD-MCNC: 11.6 G/DL (ref 11.7–15.7)
HOLD SPECIMEN: NORMAL
HOLD SPECIMEN: NORMAL
IMM GRANULOCYTES # BLD: 0 10E3/UL
IMM GRANULOCYTES NFR BLD: 1 %
IRON BINDING CAPACITY (ROCHE): 272 UG/DL (ref 240–430)
IRON SATN MFR SERPL: 34 % (ref 15–46)
IRON SERPL-MCNC: 93 UG/DL (ref 37–145)
LYMPHOCYTES # BLD AUTO: 0.9 10E3/UL (ref 0.8–5.3)
LYMPHOCYTES NFR BLD AUTO: 27 %
MAGNESIUM SERPL-MCNC: 2.1 MG/DL (ref 1.7–2.3)
MCH RBC QN AUTO: 31.4 PG (ref 26.5–33)
MCHC RBC AUTO-ENTMCNC: 32.6 G/DL (ref 31.5–36.5)
MCV RBC AUTO: 96 FL (ref 78–100)
MONOCYTES # BLD AUTO: 0.4 10E3/UL (ref 0–1.3)
MONOCYTES NFR BLD AUTO: 12 %
NEUTROPHILS # BLD AUTO: 1.6 10E3/UL (ref 1.6–8.3)
NEUTROPHILS NFR BLD AUTO: 50 %
NRBC # BLD AUTO: 0 10E3/UL
NRBC BLD AUTO-RTO: 0 /100
PHOSPHATE SERPL-MCNC: 3.2 MG/DL (ref 2.5–4.5)
PLATELET # BLD AUTO: 233 10E3/UL (ref 150–450)
POTASSIUM SERPL-SCNC: 4.3 MMOL/L (ref 3.4–5.3)
PROT SERPL-MCNC: 6.5 G/DL (ref 6.4–8.3)
RBC # BLD AUTO: 3.7 10E6/UL (ref 3.8–5.2)
SODIUM SERPL-SCNC: 140 MMOL/L (ref 135–145)
VIT B12 SERPL-MCNC: 405 PG/ML (ref 232–1245)
WBC # BLD AUTO: 3.1 10E3/UL (ref 4–11)

## 2024-01-25 PROCEDURE — 36415 COLL VENOUS BLD VENIPUNCTURE: CPT

## 2024-01-25 PROCEDURE — 82728 ASSAY OF FERRITIN: CPT | Performed by: INTERNAL MEDICINE

## 2024-01-25 PROCEDURE — 85025 COMPLETE CBC W/AUTO DIFF WBC: CPT | Performed by: INTERNAL MEDICINE

## 2024-01-25 PROCEDURE — 83735 ASSAY OF MAGNESIUM: CPT | Performed by: INTERNAL MEDICINE

## 2024-01-25 PROCEDURE — 99214 OFFICE O/P EST MOD 30 MIN: CPT | Mod: 95 | Performed by: INTERNAL MEDICINE

## 2024-01-25 PROCEDURE — 80053 COMPREHEN METABOLIC PANEL: CPT | Performed by: INTERNAL MEDICINE

## 2024-01-25 PROCEDURE — 83550 IRON BINDING TEST: CPT | Performed by: INTERNAL MEDICINE

## 2024-01-25 PROCEDURE — 82607 VITAMIN B-12: CPT | Performed by: INTERNAL MEDICINE

## 2024-01-25 PROCEDURE — 84100 ASSAY OF PHOSPHORUS: CPT | Performed by: INTERNAL MEDICINE

## 2024-01-25 PROCEDURE — 83540 ASSAY OF IRON: CPT | Performed by: INTERNAL MEDICINE

## 2024-01-25 ASSESSMENT — PAIN SCALES - GENERAL: PAINLEVEL: MODERATE PAIN (4)

## 2024-01-25 NOTE — NURSING NOTE
Is the patient currently in the state of [place]? YES    Visit mode:VIDEO    If the visit is dropped, the patient can be reconnected by: VIDEO VISIT: Text to cell phone:   Telephone Information:   Mobile [phone]       Will anyone else be joining the visit? NO  (If patient encounters technical issues they should call [phone])    How would you like to obtain your AVS? MyChart    Are changes needed to the allergy or medication list? Pt declined med review    Reason for visit: TC RIVERO

## 2024-01-25 NOTE — PROGRESS NOTES
"Video-Visit Details     Video Start Time: 2:14PM     Type of service:  Video Visit     Video End Time: 2:53PM    Originating Location (pt. Location): Home     Distant Location (provider location):   [employer] on site      Platform used for Video Visit: Negrita       [facility]    Hematology/Oncology Established Patient Follow Up Note      Today's Date: 1/25/24    Reason for follow up: breast cancer    HISTORY OF PRESENT ILLNESS: Kesha Zacarias is a 63 year old female who presents for follow up    Patient has medical history including rheumatoid arthritis, hyperlipidemia, osteoarthritis, bilateral cataracts and glaucoma s/p surgery, endocervical polyp s/p resection, vaginal atrophy with conjugated estrogen vaginal cream use, colon polyp (hyperplastic polyp and tubular adenoma), seasonal depression, history of tobacco use ([age] y/o, about 1 ppd).    Regarding RA:  -dx over a decade ago, on plaquenil, managed by PCP  - arthritis is most severe in hands>knees, intermittent       - 3/23 bilateral screening mammogram [facility]  - a few months ago, noted nipple retraction  - 9/23 patient palpated mass in retroareolar region of left breast and w/nipple retraction, no discharge, skin thickening, no dimpling, no erythema  - 9/23 diagnostic LEFT breast mammogram: Focal dense tissue with some likely mild architectural distortion, some anterior skin thickening is noted  - 9/23 targeted LEFT breast ultrasound: Ill-defined shadowing hypoechoic mass, 3.0 x 1.7 x 3.9 cm.  In left axilla-few small lymph nodes, 1 normal-sized lymph node with cortical thickening, 0.7 x 0.6 cm.  -9/23 ultrasound-guided LEFT breast core biopsy of 12:00 lesion with clip placement, \"Postbiopsy unilateral digital mammogram of the left breast showed the clip to be at the expected biopsy site\" AND ultrasound-guided left axillary lymph node biopsy of lymph node with cortical thickening  PATHOLOGY  A.  Breast, left, 12:00, " biopsy:  -Lobular carcinoma in situ.-Multiple foci  -Invasive carcinoma is not identified.     B.  Lymph node, left axillary, biopsy:  -Positive for metastatic lobular carcinoma, grade 2  -Largest metastatic focus is 7 mm.  -Negative for extranodal extension.  -Breast ancillary testing:  -Estrogen receptor: Positive (91 to 100%, strong)  -Progesterone receptor: Positive (91 to 100%, strong)  -HER2 2+ IHC, FISH negative    -10/23 MRI bilateral breast:  LEFT breast:  - Heterogeneously enhancing irregular mass in the subareolar left breast, 5.0 x 4.9 x 4.9 cm, with associated nipple retraction and nipple/areolar involvement.  This mass extends superiorly through 11: positions, from anterior to mid depth.  The [employer] breast biopsy clip (which showed LCIS) is 1.5 cm posterosuperior to this mass.  - Multiple suspicious left level 1 axillary lymph node noted, including biopsy-proven metastatic node with indwelling biopsy marker, biopsied node measures 1.3 x 1.0 cm  - Heterogeneous marrow appearance of sternum and ribs with heterogeneous enhancement and a peripheral enhancing focus within the mid body.  IMPRESSION:  1. Upon further review of previous imaging and pathology results,  recommend repeat ultrasound guided large core-needle biopsy of the  discordant dominant left breast mass given metastatic left axillary  lymph node and superoposteriorly displaced left breast biopsy marker.   2. Nonspecific heterogeneous enhancement of the sternum and ribs.  Consider nuclear medicine bone scan    Lifetime estrogen exposure:  Menarche: 13   Last menstrual period: 49   Age of first pregnancy: 18   Number of pregnancies: 2 (no living children)   Weight gain: 20lb weight gain within a year  Exposure to exogenous estrogen: vaginal atrophy with conjugated estrogen vaginal cream use x4 years (intermittent), has not used it since 9/23. Birth control for about 13-15 years from her 20s-30s.      Pt reports 55lb weight loss in 1.5  years, this was intentional, was following weight watchers program (23 points available per day) 230lb->170lb->190lb (gained about 20lbs). Pt was walking on the treadmill and outside daily, about 30 mins to 1.5 miles.    10/23 labs:  AST 79, ALT 91,   CA 15-3 261    10/23 PET/CT:  Innumerable foci of FDG avid lesions are seen throughout the osseous  structures of the head and neck, most pronounced on the calvarium and  cervical spine, with prominently sclerotic appearance on CT correlate.  For example:  -Right frontal bone, SUV max 5.31.  -Left lateral mass of the atlas, SUV max 15.0  -Angle of the left mandible, SUV max 9.6  -C7 vertebral body, SUV max 17.7    Innumerable variable sized FDG avid lesions throughout the axial and  appendicular spine, including but not limited to the cervical,  thoracic, and lumbar spine, multilevel bilateral ribs, sternum,  bilateral scapula, bilateral humeri, clavicles, pelvis, and bilateral  femurs. A few of these lesions are described below:  -Large FDG avid sclerotic 3.4 cm lesion within the proximal left  humeral head, SUV max 17.24  -Sternal body, SUV max 20.35  -L2 vertebral body, SUV max 14.3 without  -Large ill-defined sclerotic lesion in the sacral body measuring up to  at least 5.9 cm, SUV max 16.84  -FDG avid focus within the left femoral head, SUV  max 13.65 without CT correlate.    Large irregular soft tissue FDG avid mass within the left breast   measuring approximately 3.2 x 2.7 cm with  extension into the superficial soft tissues and associated left nipple  retraction, SUV max 12.36 additional FDG avid. Left axillary lymph  nodes, not definitively enlarged by short axis size criteria, for  example, SUV max 5.93.    The liver is unremarkable.     Solid 3 mm non-FDG avid pulmonary nodule within the  right middle lobe    - 10/23 MRI brain:  - extensive osseous metastatic disease involving the calvarium, skull base w/involvement of occipital condyles, C1 and C2  vertebral bodies, and likely the mandible  -  Dominant calvarial lesions are located in the right frontal calvarium and right temporal calvarium without definite breach of the  inner or outer tables of the calvarium. There is a suggestion of mild asymmetric linear extra-axial enhancement deep to the dominant right temporal calvarial lesion along the dural margin, though this may be vascular in etiology.  - No abnormal parenchymal or leptomeningeal enhancement.   - sequelae of mild chronic microvascular ischemic disease. Mild generalized cerebral atrophy.       -supposed to start ribociclib 10/30/23 however, noted to have significantly elevated LFTS (10/26/23 , , alk phos 152)    10/17/23: AST 79, ALT 91, alk phos 116, t bili 0.5  10/26/23 , , alk phos 152  10/30/23: , , alk phos 178, coags WNL, ribociclib was not started, letrozole started, atorvastatin held  11/7/23: AST 96, , alk phos 169, MRI liver negative for mets  11/17/23: AST 81, , alk phos 163  11/27/23: AST 55, ALT 90, alk phos 128- started ribociclib 400mg  12/4/23: AST 26, ALT 35, alk phos 118    INTERIM HISTORY:    REGIMEN:  Ribociclib+letrozole  C1D1 11/27/23 ribociclib 400mg, C2D1 12/25/23 ribociclib 600mg (day 1-12 only 2/2 starting RT)  Ribociclib 600mg PO daily day 1-21 q28 days (11/27/23 started 400mg PO daily when LFTs improved to <3x ULN)  Letrozole 2.5mg PO daily (started 10/30/23)  Febrile neutropenia risk: neutropenia (69% to 78%; grade 3: 46% to 55%; grade 4: 7% to 10%), Febrile neutropenia (1%)    1/5/24 last day of ribociclib  1/9/24- 1/22/24 3000cGy in 10 fractions, palliative RT to sacrum w/Dr. Mandujano    Treatment related AE:  - Left posterior tibial DVT- 1/19/24 sx started- Left foot pain and swelling, 1/22/24 pt called in, 1/22/24 left LE doppler: DVT in 1 of 2 left distal posterior tibial veins, started on eliquis and stopped naproxen but continues to take Mobic for sciatica.  -  "blurry vision b/l- last saw eye doctor 3/23 and has trifocal glasses. She has dry eyes and uses lubricating eye drops., 10/23 MRI brain as above. Stable, pending eye doctor visit 3/24  - constipation- probiotics, using senna BID and colace TID; has not added miralax yet, having BM every other day   - leg cramps- Mg  - back pain- left lower back, sharp, radiates to left buttock and leg making it hard to walk, worse w/walking, improved w/heating pad and ibuprofen 800mg BID, flexeril 5mg qHS, oxycodone 5mg q6hr prn #30 tabs rx 12/4/23 with stool softner- pt is using at bedtime due feeling \"loopy\" during day- this combination allows pt to be functional (10/10 previously, now 6/10)->12/23 half oxycodone (2.5mg) q6 hrs during day and 5mg at bedtime and ibuprofen 800mg BID AM and afternoon- without significant change (still 6/10), 11/30/23 MRI lumbar spine with diffuse osseous metastatic disease.  Possible pathologic compression deformity at L1. neurosurgery consult 12/28/23 w/Dr. Perez- SI joint dysfunction- recommend mobic and PT, compression fracture is chronic. Rad onc consult 1/5/24 w/Dr. Mandujano- 1/9/24- 1/22/24 3000cGy in 10 fractions, palliative RT to sacrum (ribociclib held 1/5/24, plaquenil continued); LBP improved, not actively requiring oxycodone after 1st few fractions of RT. Pt has mild chronic sciatica only, her severe back pain has resolved, pt is able to ambulate. Pt is holding off on PT for at least 1 month after completing RT, per Dr. Mandujano. She uses mobic at bedtime, flexeril 5mg at bedtime (makes her sleepy), was off oxycodone for 5 days  but then restarted 2/2 left LE pain.  - anxiety-has support of family and  who are great advocates for her care, pt deferred, psychology referral  - pancytopenia- thrombocytopenia improved,   - hypocalcemia- taking calcium 600mg BID and vitamin D 1000IU    RESOLVED:  - elevated LFTs- improved 11/27/23 to <3x ULN (AST 55, ALT 90, alk phos 128)  11/27/24 started " ribociclib 400mg, 11/23 MRI liver negative for mets, ?cause- possibly viral infection between 10/17/23 and 10/26/23; 12/22/23 LFTs normal, cycle 2 started ribociclib 600mg; 1/24 LFTs WNL  - elevated Cr- increased water to 60oz/day, 1/24 Cr WNL  - daily HA- prior to starting tx- mostly b/l temporal regions, no sensitivity to light or sound, no N/V, takes tylenol w/o improvement; 10/23 MRI brain: extensive osseous mets w/dominant calvarial lesions w/enhancement deep to dominant right temporal calvarial lesion along dural margin (?vascular etiology), no parenchymal or leptomeningeal enhancement. resolved    REVIEW OF SYSTEMS:   A 14 point ROS was reviewed with pertinent positives and negatives in the HPI.        HOME MEDICATIONS:  Current Outpatient Medications   Medication Sig Dispense Refill    Apixaban Starter Pack (ELIQUIS DVT/PE STARTER PACK) 5 MG TBPK Take 10 mg by mouth 2 times daily for 7 days, THEN 5 mg 2 times daily for 23 days. 74 each 0    betamethasone dipropionate (DIPROSONE) 0.05 % external lotion Apply topically 2 times daily 60 mL 3    COENZYME Q-10 PO Take 1 tablet by mouth every other day      cyclobenzaprine (FLEXERIL) 5 MG tablet TAKE 1 TABLET(5 MG) BY MOUTH AT BEDTIME 90 tablet 3    docusate sodium (COLACE) 100 MG capsule Take 1 capsule (100 mg) by mouth 3 times daily as needed for constipation 90 capsule 3    fish oil-omega-3 fatty acids 1000 MG capsule Take 2 g by mouth daily      hydroxychloroquine (PLAQUENIL) 200 MG tablet TAKE 2 AND 1/2 TABLETS BY MOUTH EVERY  tablet 3    letrozole (FEMARA) 2.5 MG tablet Take 1 tablet (2.5 mg) by mouth every morning 90 tablet 3    loperamide (IMODIUM A-D) 2 MG tablet When diarrhea starts take 2 tabs (4mg), then with each additional episode of diarrhea take 1 additional tab and if needing more than 8 tabs in 24 hrs call oncology 30 tablet 3    magnesium 250 MG tablet Take 1 tablet by mouth daily      ondansetron (ZOFRAN) 4 MG tablet Take 1 tablet (4 mg)  by mouth every 6 hours as needed for nausea 30 tablet 3    oxyCODONE (ROXICODONE) 5 MG tablet Take 1 tablet (5 mg) by mouth every 6 hours as needed for pain 30 tablet 0    prochlorperazine (COMPAZINE) 10 MG tablet Take 1 tablet (10 mg) by mouth every 6 hours as needed for nausea or vomiting 30 tablet 2         ALLERGIES:  Allergies   Allergen Reactions    Ciprofloxacin      hives and was on flagyl too    Metronidazole      hives and was on cipro too         PAST MEDICAL HISTORY:  Past Medical History:   Diagnosis Date    Arthritis 2006    Breast cancer metastasized to bone (H) 10/12/2023    Chronic depressive personality disorder     Dysplasia of cervix (uteri)     Cryotherapy    Female infertility of unspecified origin     Glaucoma     Rheumatoid arthritis (H)          PAST SURGICAL HISTORY:  Past Surgical History:   Procedure Laterality Date    COLONOSCOPY      COLONOSCOPY N/A 2022    Procedure: COLONOSCOPY, WITH POLYPECTOMY AND BIOPSY;  Surgeon: Gagandeep Patterson MD;  Location: [facility]    HC INTRODUCE CATH FALLOPIAN TUBE, RE-OPEN/DIAGNOSIS      HERNIA REPAIR, INCISIONAL  2009    JOINT REPLACEMENT Right 2017    knee    TONSILLECTOMY      ZZC APPENDECTOMY  2003    ZZC REMV CATARACT INTRACAP,INSERT LENS  2003    right         SOCIAL HISTORY:  Social History     Socioeconomic History    Marital status:      Spouse name: Bandar    Number of children: 1    Years of education: Not on file    Highest education level: Not on file   Occupational History    Not on file   Tobacco Use    Smoking status: Former     Packs/day: 0.50     Years: 25.00     Additional pack years: 0.00     Total pack years: 12.50     Types: Cigarettes     Quit date: 2017     Years since quittin.3    Smokeless tobacco: Never   Vaping Use    Vaping Use: Never used   Substance and Sexual Activity    Alcohol use: Not Currently    Drug use: Yes     Types: Marijuana    Sexual activity: Yes     Partners: Male      Birth control/protection: None   Other Topics Concern    Parent/sibling w/ CABG, MI or angioplasty before 65F 55M? Yes   Social History Narrative    Not on file     Social Determinants of Health     Financial Resource Strain: Not on file   Food Insecurity: Not on file   Transportation Needs: Not on file   Physical Activity: Not on file   Stress: Not on file   Social Connections: Not on file   Interpersonal Safety: Not on file   Housing Stability: Not on file         FAMILY HISTORY:  Family History   Problem Relation Age of Onset    Heart Disease Mother     Lipids Mother     Hyperlipidemia Mother     Genitourinary Problems Father         prostate    Genetic Disorder Father         ulcer    Hypertension Father     Lipids Father     Prostate Cancer Father     Hyperlipidemia Sister     Hyperlipidemia Brother     Other Cancer Brother         Neck Cancer HPV (+)    Heart Disease Maternal Grandmother     Cerebrovascular Disease Maternal Grandmother     Heart Disease Maternal Grandfather     Heart Disease Maternal Uncle     Heart Disease Maternal Uncle         x  3    Cancer Nephew         Sister's Son       Family history of:  1.  Breast cancer including male breast cancer: negative   2. Ovarian cancer: negative  3.  Pancreatic cancer: negative  4.  Prostate cancer: father- ?in his 50s, other details unknown  5. Diffuse gastric cancer (if lobular breast CA): negative  6. Uterine cancer: negative  7. Colon cancer:  negative  6. Brother- head and neck, HPV +, had surgery, clinical trial and now cancer free      PHYSICAL EXAM:  Vital signs:  LMP 11/22/2011 (Exact Date)            LABS:   Latest Reference Range & Units 01/25/24 08:02   Sodium 135 - 145 mmol/L 140   Potassium 3.4 - 5.3 mmol/L 4.3   Chloride 98 - 107 mmol/L 106   Carbon Dioxide (CO2) 22 - 29 mmol/L 24   Urea Nitrogen 8.0 - 23.0 mg/dL 21.5   Creatinine 0.51 - 0.95 mg/dL 0.84   GFR Estimate >60 mL/min/1.73m2 78   Calcium 8.8 - 10.2 mg/dL 8.3 (L)   Anion Gap 7 - 15  "mmol/L 10   Magnesium 1.7 - 2.3 mg/dL 2.1   Phosphorus 2.5 - 4.5 mg/dL 3.2   Albumin 3.5 - 5.2 g/dL 4.0   Protein Total 6.4 - 8.3 g/dL 6.5   Alkaline Phosphatase 40 - 150 U/L 88   ALT 0 - 50 U/L 24   AST 0 - 45 U/L 25   Bilirubin Total <=1.2 mg/dL 0.3   Glucose 70 - 99 mg/dL 103 (H)   WBC 4.0 - 11.0 10e3/uL 3.1 (L)   Hemoglobin 11.7 - 15.7 g/dL 11.6 (L)   Hematocrit 35.0 - 47.0 % 35.6   Platelet Count 150 - 450 10e3/uL 233   RBC Count 3.80 - 5.20 10e6/uL 3.70 (L)   MCV 78 - 100 fL 96   MCH 26.5 - 33.0 pg 31.4   MCHC 31.5 - 36.5 g/dL 32.6   RDW 10.0 - 15.0 % 15.7 (H)   % Neutrophils % 50   % Lymphocytes % 27   % Monocytes % 12   % Eosinophils % 8   % Basophils % 2   Absolute Basophils 0.0 - 0.2 10e3/uL 0.1   Absolute Eosinophils 0.0 - 0.7 10e3/uL 0.3   Absolute Immature Granulocytes <=0.4 10e3/uL 0.0   Absolute Lymphocytes 0.8 - 5.3 10e3/uL 0.9   Absolute Monocytes 0.0 - 1.3 10e3/uL 0.4   % Immature Granulocytes % 1   Absolute Neutrophils 1.6 - 8.3 10e3/uL 1.6   Absolute NRBCs 10e3/uL 0.0   NRBCs per 100 WBC <1 /100 0   (L): Data is abnormally low  (H): Data is abnormally high    PATHOLOGY:    IMAGING:    ASSESSMENT/PLAN:  Kesha Zacarias is a 63 year old female with:    # de tavon metastatic left breast invasive lobular carcinoma, ER positive, SD positive, her2 negative on FISH  - pt has de tavon metastatic invasive lobular breast cancer, ER positive, SD positive, her2 negative. Pt does not have visceral crisis, she mainly has extensive bone metastases and LN metastases   -9/23 diagnostic left breast mammogram, left breast targeted ultrasound showed 3.0 x 1.7 x 3.9 ill-defined shadowing hypoechoic mass, few small lymph nodes in the left axilla, one with cortical thickening measuring 0.7 x 0.6 cm  -9/23 ultrasound-guided LEFT breast core biopsy of 12:00 lesion with clip placement, \"Postbiopsy unilateral digital mammogram of the left breast showed the clip to be at the expected biopsy site\" AND ultrasound-guided left " axillary lymph node biopsy of lymph node with cortical thickening, PATHOLOGY:  A.  Breast, left, 12:00, biopsy:Lobular carcinoma in situ, Multiple foci. Invasive carcinoma is not identified.   B.  Lymph node, left axillary, biopsy:  -Positive for metastatic lobular carcinoma, grade 2, 0.7 cm, negative GREGG, Estrogen receptor: Positive (91 to 100%, strong), Progesterone receptor: Positive (91 to 100%, strong), HER2 2+ IHC, FISH negative  -10/23 MRI bilateral breast:  - Heterogeneously enhancing irregular mass in the subareolar left breast, 5.0 x 4.9 x 4.9 cm, with associated nipple retraction and nipple/areolar involvement.  This mass extends superiorly through 11: positions, from anterior to mid depth.  The [employer] breast biopsy clip (which showed LCIS) is 1.5 cm posterosuperior to this mass.  - Multiple suspicious left level 1 axillary lymph node noted, including biopsy-proven metastatic node with indwelling biopsy marker, biopsied node measures 1.3 x 1.0 cm  - Heterogeneous marrow appearance of sternum and ribs with heterogeneous enhancement and a peripheral enhancing focus within the mid body.    - 10/23 PET/CT:  Innumerable foci of FDG avid lesions are seen throughout the osseous structures of the head and neck, most pronounced on the calvarium and cervical spine, with prominently sclerotic appearance on CT correlate.  For example:  -Right frontal bone, SUV max 5.31.  -Left lateral mass of the atlas, SUV max 15.0  -Angle of the left mandible, SUV max 9.6  -C7 vertebral body, SUV max 17.7    Innumerable variable sized FDG avid lesions throughout the axial and appendicular spine, including but not limited to the cervical, thoracic, and lumbar spine, multilevel bilateral ribs, sternum, bilateral scapula, bilateral humeri, clavicles, pelvis, and bilateral femurs. A few of these lesions are described below:  -Large FDG avid sclerotic 3.4 cm lesion within the proximal left humeral head, SUV max 17.24  -Sternal  body, SUV max 20.35  -L2 vertebral body, SUV max 14.3 without  -Large ill-defined sclerotic lesion in the sacral body measuring up to at least 5.9 cm, SUV max 16.84  -FDG avid focus within the left femoral head, SUV max 13.65 without CT correlate.    Large irregular soft tissue FDG avid mass within the left breast measuring approximately 3.2 x 2.7 cm with  extension into the superficial soft tissues and associated left nipple retraction, SUV max 12.36 additional FDG avid. Left axillary lymph nodes, not definitively enlarged by short axis size criteria, for example, SUV max 5.93.    - 10/23 MRI brain:  - extensive osseous metastatic disease involving the calvarium, skull base w/involvement of occipital condyles, C1 and C2 vertebral bodies, and likely the mandible  -  Dominant calvarial lesions are located in the right frontal calvarium and right temporal calvarium without definite breach of the inner or outer tables of the calvarium. There is a suggestion of mild asymmetric linear extra-axial enhancement deep to the dominant right temporal calvarial lesion along the dural margin, though this may be vascular in etiology.  - No abnormal parenchymal or leptomeningeal enhancement.   - sequelae of mild chronic microvascular ischemic disease. Mild generalized cerebral atrophy.     -10/23 DEXA: osteopenia (T score -2.0 lumbar spine, -2.0 left hip femoral neck, The 10 year probability of major osteoporotic fracture is 12.5%, and of hip fracture is 1.8%, based on left femoral neck BMD)    - 11/23 orthopedic consult to determine stability of left femur and fracture risk given presence of mets in left femoral head: do not see any areas of impending cortical erosion or sites of high risk for fracture, observe      REGIMEN:  Ribociclib+letrozole  C1D1 11/27/23 ribociclib 400mg, C2D1 12/25/23 ribociclib 600mg (day 1-12 only 2/2 starting RT)  Ribociclib 600mg PO daily day 1-21 q28 days (11/27/23 started 400mg PO daily when LFTs  improved to <3x ULN)  Letrozole 2.5mg PO daily (started 10/30/23)  Febrile neutropenia risk: neutropenia (69% to 78%; grade 3: 46% to 55%; grade 4: 7% to 10%), Febrile neutropenia (1%)    1/5/24 last day of ribociclib  1/9/24- 1/22/24 3000cGy in 10 fractions, palliative RT to sacrum w/Dr. Mandujano  1/30/24 will restart ribociclib (on Tuesday so pt can see me in [place] on Tuesdays), pharmacy notified to expedite drug delivery    - labs noted  - 1/19/24 EKG noted Qtc 431, pt on plaquenil, watch Qtc closely    Treatment related AE:  - DVT- continue eliquis, avoid NSAIDs, ok to use oxycodone 5mg prn (using q12 hr, filled #120 tabs 12/22/23)- hold on refilling at this time   - back pain- 1/9/24- 1/22/24 3000cGy in 10 fractions, palliative RT to sacrum w/Dr. Mandujano,  fine to continue flexeril 5mg at bedtime at night   - constipation- continue colace TID and senna BID prn, if no improvement can use miralax, monitor for diarrhea  - HA- stable, preceeded tx and unchanged since then, 10/23 MRI brain negative, alarm sx discussed, low threshold for repeat imaging w/ special attention to right temporal calvarium at dural margin (?vascular etiology)  - blurred vision- stable, no alarms sx, eye doctor visit 3/24  - leg cramps- focus on hydration, fine to take Mg prn  - anxiety- defers psychology consult   - pancytopenia- 2/2 tx, check ferritin, iron studies, B12, RBC folate, monitor for neutropenia  - hypocalcemia- mild, continue BID calcium and daily vitamin D     IMAGING:  PET pending 2/17/24    TUMOR MARKERS:  10/23 CA 15-3= 261  12/23 CA 15-3= 553  1/24 CA 15-3= 480  Repeat CA 15-3 w/PET 2/17/24    OTHER:  - continue zometa q4 weeks - zometa #1 1/4/24, next due 2/1/24;  once disease stabilized will transition to 4mg q12 weeks   - continue calcium and vitamin D  - Genetic counseling referral, pt had initially not scheduled but will call back now to schedule appt    #left posterior tibial DVT  -1/19/24 sx started- Left foot pain  and swelling  -1/22/24 pt called in, 1/22/24 left LE doppler: DVT in 1 of 2 left distal posterior tibial veins  -started on eliquis and stopped aspirin  - rx for maintenance dose given today    #post menopausal  #vaginal dryness  - Pts breast biopsy pathology results explained in detail. Pt is aware her tumor is estrogen positive. Pt educated to avoid all estrogen-containing products including but not limited to birth control, vaginal creams etc.     #RA  - on plaquenil     Cancel q2 week EKGS including 2/2 and 2/16 EKG onwards  Pt to have EKG on day of appt  RTC 2/20 for follow up with me, labs and EKG prior visit (after PET)      Mary DO [place]  Hematology/Oncology  [facility]

## 2024-01-25 NOTE — LETTER
1/25/2024         RE: Kesha Zacarias  [id number] [address] [place] [id number]        Dear Colleague,    Thank you for referring your patient, Kesha Zacarias, to the [facility]. Please see a copy of my visit note below.    Video-Visit Details     Video Start Time: 2:14PM     Type of service:  Video Visit     Video End Time: 2:53PM    Originating Location (pt. Location): Home     Distant Location (provider location):  [facility] on site      Platform used for Video Visit: [facility]    Hematology/Oncology Established Patient Follow Up Note      Today's Date: 1/25/24    Reason for follow up: breast cancer    HISTORY OF PRESENT ILLNESS: Kesha Zacarias is a 63 year old female who presents for follow up    Patient has medical history including rheumatoid arthritis, hyperlipidemia, osteoarthritis, bilateral cataracts and glaucoma s/p surgery, endocervical polyp s/p resection, vaginal atrophy with conjugated estrogen vaginal cream use, colon polyp (hyperplastic polyp and tubular adenoma), seasonal depression, history of tobacco use ([age] y/o, about 1 ppd).    Regarding RA:  -dx over a decade ago, on plaquenil, managed by PCP  - arthritis is most severe in hands>knees, intermittent       - 3/23 bilateral screening mammogram [facility]  - a few months ago, noted nipple retraction  - 9/23 patient palpated mass in retroareolar region of left breast and w/nipple retraction, no discharge, skin thickening, no dimpling, no erythema  - 9/23 diagnostic LEFT breast mammogram: Focal dense tissue with some likely mild architectural distortion, some anterior skin thickening is noted  - 9/23 targeted LEFT breast ultrasound: Ill-defined shadowing hypoechoic mass, 3.0 x 1.7 x 3.9 cm.  In left axilla-few small lymph nodes, 1 normal-sized lymph node with cortical thickening, 0.7 x 0.6 cm.  -9/23 ultrasound-guided LEFT breast core biopsy of 12:00 lesion with clip  "placement, \"Postbiopsy unilateral digital mammogram of the left breast showed the clip to be at the expected biopsy site\" AND ultrasound-guided left axillary lymph node biopsy of lymph node with cortical thickening  PATHOLOGY  A.  Breast, left, 12:00, biopsy:  -Lobular carcinoma in situ.-Multiple foci  -Invasive carcinoma is not identified.     B.  Lymph node, left axillary, biopsy:  -Positive for metastatic lobular carcinoma, grade 2  -Largest metastatic focus is 7 mm.  -Negative for extranodal extension.  -Breast ancillary testing:  -Estrogen receptor: Positive (91 to 100%, strong)  -Progesterone receptor: Positive (91 to 100%, strong)  -HER2 2+ IHC, FISH negative    -10/23 MRI bilateral breast:  LEFT breast:  - Heterogeneously enhancing irregular mass in the subareolar left breast, 5.0 x 4.9 x 4.9 cm, with associated nipple retraction and nipple/areolar involvement.  This mass extends superiorly through 11: positions, from anterior to mid depth.  The [employer] breast biopsy clip (which showed LCIS) is 1.5 cm posterosuperior to this mass.  - Multiple suspicious left level 1 axillary lymph node noted, including biopsy-proven metastatic node with indwelling biopsy marker, biopsied node measures 1.3 x 1.0 cm  - Heterogeneous marrow appearance of sternum and ribs with heterogeneous enhancement and a peripheral enhancing focus within the mid body.  IMPRESSION:  1. Upon further review of previous imaging and pathology results,  recommend repeat ultrasound guided large core-needle biopsy of the  discordant dominant left breast mass given metastatic left axillary  lymph node and superoposteriorly displaced left breast biopsy marker.   2. Nonspecific heterogeneous enhancement of the sternum and ribs.  Consider nuclear medicine bone scan    Lifetime estrogen exposure:  Menarche: 13   Last menstrual period: 49   Age of first pregnancy: 18   Number of pregnancies: 2 (no living children)   Weight gain: 20lb weight gain " within a year  Exposure to exogenous estrogen: vaginal atrophy with conjugated estrogen vaginal cream use x4 years (intermittent), has not used it since 9/23. Birth control for about 13-15 years from her 20s-30s.      Pt reports 55lb weight loss in 1.5 years, this was intentional, was following weight watchers program (23 points available per day) 230lb->170lb->190lb (gained about 20lbs). Pt was walking on the treadmill and outside daily, about 30 mins to 1.5 miles.    10/23 labs:  AST 79, ALT 91,   CA 15-3 261    10/23 PET/CT:  Innumerable foci of FDG avid lesions are seen throughout the osseous  structures of the head and neck, most pronounced on the calvarium and  cervical spine, with prominently sclerotic appearance on CT correlate.  For example:  -Right frontal bone, SUV max 5.31.  -Left lateral mass of the atlas, SUV max 15.0  -Angle of the left mandible, SUV max 9.6  -C7 vertebral body, SUV max 17.7    Innumerable variable sized FDG avid lesions throughout the axial and  appendicular spine, including but not limited to the cervical,  thoracic, and lumbar spine, multilevel bilateral ribs, sternum,  bilateral scapula, bilateral humeri, clavicles, pelvis, and bilateral  femurs. A few of these lesions are described below:  -Large FDG avid sclerotic 3.4 cm lesion within the proximal left  humeral head, SUV max 17.24  -Sternal body, SUV max 20.35  -L2 vertebral body, SUV max 14.3 without  -Large ill-defined sclerotic lesion in the sacral body measuring up to  at least 5.9 cm, SUV max 16.84  -FDG avid focus within the left femoral head, SUV  max 13.65 without CT correlate.    Large irregular soft tissue FDG avid mass within the left breast   measuring approximately 3.2 x 2.7 cm with  extension into the superficial soft tissues and associated left nipple  retraction, SUV max 12.36 additional FDG avid. Left axillary lymph  nodes, not definitively enlarged by short axis size criteria, for  example, SUV max  5.93.    The liver is unremarkable.     Solid 3 mm non-FDG avid pulmonary nodule within the  right middle lobe    - 10/23 MRI brain:  - extensive osseous metastatic disease involving the calvarium, skull base w/involvement of occipital condyles, C1 and C2 vertebral bodies, and likely the mandible  -  Dominant calvarial lesions are located in the right frontal calvarium and right temporal calvarium without definite breach of the  inner or outer tables of the calvarium. There is a suggestion of mild asymmetric linear extra-axial enhancement deep to the dominant right temporal calvarial lesion along the dural margin, though this may be vascular in etiology.  - No abnormal parenchymal or leptomeningeal enhancement.   - sequelae of mild chronic microvascular ischemic disease. Mild generalized cerebral atrophy.       -supposed to start ribociclib 10/30/23 however, noted to have significantly elevated LFTS (10/26/23 , , alk phos 152)    10/17/23: AST 79, ALT 91, alk phos 116, t bili 0.5  10/26/23 , , alk phos 152  10/30/23: , , alk phos 178, coags WNL, ribociclib was not started, letrozole started, atorvastatin held  11/7/23: AST 96, , alk phos 169, MRI liver negative for mets  11/17/23: AST 81, , alk phos 163  11/27/23: AST 55, ALT 90, alk phos 128- started ribociclib 400mg  12/4/23: AST 26, ALT 35, alk phos 118    INTERIM HISTORY:    REGIMEN:  Ribociclib+letrozole  C1D1 11/27/23 ribociclib 400mg, C2D1 12/25/23 ribociclib 600mg (day 1-12 only 2/2 starting RT)  Ribociclib 600mg PO daily day 1-21 q28 days (11/27/23 started 400mg PO daily when LFTs improved to <3x ULN)  Letrozole 2.5mg PO daily (started 10/30/23)  Febrile neutropenia risk: neutropenia (69% to 78%; grade 3: 46% to 55%; grade 4: 7% to 10%), Febrile neutropenia (1%)    1/5/24 last day of ribociclib  1/9/24- 1/22/24 3000cGy in 10 fractions, palliative RT to sacrum w/Dr. Mandujano    Treatment related AE:  - Left  "posterior tibial DVT- 1/19/24 sx started- Left foot pain and swelling, 1/22/24 pt called in, 1/22/24 left LE doppler: DVT in 1 of 2 left distal posterior tibial veins, started on eliquis and stopped naproxen but continues to take Mobic for sciatica.  - blurry vision b/l- last saw eye doctor 3/23 and has trifocal glasses. She has dry eyes and uses lubricating eye drops., 10/23 MRI brain as above. Stable, pending eye doctor visit 3/24  - constipation- probiotics, using senna BID and colace TID; has not added miralax yet, having BM every other day   - leg cramps- Mg  - back pain- left lower back, sharp, radiates to left buttock and leg making it hard to walk, worse w/walking, improved w/heating pad and ibuprofen 800mg BID, flexeril 5mg qHS, oxycodone 5mg q6hr prn #30 tabs rx 12/4/23 with stool softner- pt is using at bedtime due feeling \"loopy\" during day- this combination allows pt to be functional (10/10 previously, now 6/10)->12/23 half oxycodone (2.5mg) q6 hrs during day and 5mg at bedtime and ibuprofen 800mg BID AM and afternoon- without significant change (still 6/10), 11/30/23 MRI lumbar spine with diffuse osseous metastatic disease.  Possible pathologic compression deformity at L1. neurosurgery consult 12/28/23 w/Dr. Perez- SI joint dysfunction- recommend mobic and PT, compression fracture is chronic. Rad onc consult 1/5/24 w/Dr. Mandujano- 1/9/24- 1/22/24 3000cGy in 10 fractions, palliative RT to sacrum (ribociclib held 1/5/24, plaquenil continued); LBP improved, not actively requiring oxycodone after 1st few fractions of RT. Pt has mild chronic sciatica only, her severe back pain has resolved, pt is able to ambulate. Pt is holding off on PT for at least 1 month after completing RT, per Dr. Mandujano. She uses mobic at bedtime, flexeril 5mg at bedtime (makes her sleepy), was off oxycodone for 5 days  but then restarted 2/2 left LE pain.  - anxiety-has support of family and  who are great advocates for her care, " pt deferred, psychology referral  - pancytopenia- thrombocytopenia improved,   - hypocalcemia- taking calcium 600mg BID and vitamin D 1000IU    RESOLVED:  - elevated LFTs- improved 11/27/23 to <3x ULN (AST 55, ALT 90, alk phos 128)  11/27/24 started ribociclib 400mg, 11/23 MRI liver negative for mets, ?cause- possibly viral infection between 10/17/23 and 10/26/23; 12/22/23 LFTs normal, cycle 2 started ribociclib 600mg; 1/24 LFTs WNL  - elevated Cr- increased water to 60oz/day, 1/24 Cr WNL  - daily HA- prior to starting tx- mostly b/l temporal regions, no sensitivity to light or sound, no N/V, takes tylenol w/o improvement; 10/23 MRI brain: extensive osseous mets w/dominant calvarial lesions w/enhancement deep to dominant right temporal calvarial lesion along dural margin (?vascular etiology), no parenchymal or leptomeningeal enhancement. resolved    REVIEW OF SYSTEMS:   A 14 point ROS was reviewed with pertinent positives and negatives in the HPI.        HOME MEDICATIONS:  Current Outpatient Medications   Medication Sig Dispense Refill     Apixaban Starter Pack (ELIQUIS DVT/PE STARTER PACK) 5 MG TBPK Take 10 mg by mouth 2 times daily for 7 days, THEN 5 mg 2 times daily for 23 days. 74 each 0     betamethasone dipropionate (DIPROSONE) 0.05 % external lotion Apply topically 2 times daily 60 mL 3     COENZYME Q-10 PO Take 1 tablet by mouth every other day       cyclobenzaprine (FLEXERIL) 5 MG tablet TAKE 1 TABLET(5 MG) BY MOUTH AT BEDTIME 90 tablet 3     docusate sodium (COLACE) 100 MG capsule Take 1 capsule (100 mg) by mouth 3 times daily as needed for constipation 90 capsule 3     fish oil-omega-3 fatty acids 1000 MG capsule Take 2 g by mouth daily       hydroxychloroquine (PLAQUENIL) 200 MG tablet TAKE 2 AND 1/2 TABLETS BY MOUTH EVERY  tablet 3     letrozole (FEMARA) 2.5 MG tablet Take 1 tablet (2.5 mg) by mouth every morning 90 tablet 3     loperamide (IMODIUM A-D) 2 MG tablet When diarrhea starts take 2  tabs (4mg), then with each additional episode of diarrhea take 1 additional tab and if needing more than 8 tabs in 24 hrs call oncology 30 tablet 3     magnesium 250 MG tablet Take 1 tablet by mouth daily       ondansetron (ZOFRAN) 4 MG tablet Take 1 tablet (4 mg) by mouth every 6 hours as needed for nausea 30 tablet 3     oxyCODONE (ROXICODONE) 5 MG tablet Take 1 tablet (5 mg) by mouth every 6 hours as needed for pain 30 tablet 0     prochlorperazine (COMPAZINE) 10 MG tablet Take 1 tablet (10 mg) by mouth every 6 hours as needed for nausea or vomiting 30 tablet 2         ALLERGIES:  Allergies   Allergen Reactions     Ciprofloxacin      hives and was on flagyl too     Metronidazole      hives and was on cipro too         PAST MEDICAL HISTORY:  Past Medical History:   Diagnosis Date     Arthritis 2006     Breast cancer metastasized to bone (H) 10/12/2023     Chronic depressive personality disorder      Dysplasia of cervix (uteri) 1988    Cryotherapy     Female infertility of unspecified origin      Glaucoma      Rheumatoid arthritis (H)          PAST SURGICAL HISTORY:  Past Surgical History:   Procedure Laterality Date     COLONOSCOPY       COLONOSCOPY N/A 01/14/2022    Procedure: COLONOSCOPY, WITH POLYPECTOMY AND BIOPSY;  Surgeon: Gagandeep Patterson MD;  Location: [facility]     [facility] INTRODUCE CATH FALLOPIAN TUBE, RE-OPEN/DIAGNOSIS       HERNIA REPAIR, INCISIONAL  11/11/2009     JOINT REPLACEMENT Right 09/2017    knee     TONSILLECTOMY       [facility] APPENDECTOMY  06/14/2003     [facility] REMV CATARACT INTRACAP,INSERT LENS  02/13/2003    right         SOCIAL HISTORY:  Social History     Socioeconomic History     Marital status:      Spouse name: Bandar     Number of children: 1     Years of education: Not on file     Highest education level: Not on file   Occupational History     Not on file   Tobacco Use     Smoking status: Former     Packs/day: 0.50     Years: 25.00     Additional pack years: 0.00     Total pack years: 12.50      Types: Cigarettes     Quit date: 2017     Years since quittin.3     Smokeless tobacco: Never   Vaping Use     Vaping Use: Never used   Substance and Sexual Activity     Alcohol use: Not Currently     Drug use: Yes     Types: Marijuana     Sexual activity: Yes     Partners: Male     Birth control/protection: None   Other Topics Concern     Parent/sibling w/ CABG, MI or angioplasty before 65F 55M? Yes   Social History Narrative     Not on file     Social Determinants of Health     Financial Resource Strain: Not on file   Food Insecurity: Not on file   Transportation Needs: Not on file   Physical Activity: Not on file   Stress: Not on file   Social Connections: Not on file   Interpersonal Safety: Not on file   Housing Stability: Not on file         FAMILY HISTORY:  Family History   Problem Relation Age of Onset     Heart Disease Mother      Lipids Mother      Hyperlipidemia Mother      Genitourinary Problems Father         prostate     Genetic Disorder Father         ulcer     Hypertension Father      Lipids Father      Prostate Cancer Father      Hyperlipidemia Sister      Hyperlipidemia Brother      Other Cancer Brother         Neck Cancer HPV (+)     Heart Disease Maternal Grandmother      Cerebrovascular Disease Maternal Grandmother      Heart Disease Maternal Grandfather      Heart Disease Maternal Uncle      Heart Disease Maternal Uncle         x  3     Cancer Nephew         Sister's Son       Family history of:  1.  Breast cancer including male breast cancer: negative   2. Ovarian cancer: negative  3.  Pancreatic cancer: negative  4.  Prostate cancer: father- ?in his 50s, other details unknown  5. Diffuse gastric cancer (if lobular breast CA): negative  6. Uterine cancer: negative  7. Colon cancer:  negative  6. Brother- head and neck, HPV +, had surgery, clinical trial and now cancer free      PHYSICAL EXAM:  Vital signs:  LMP 2011 (Exact Date)            LABS:   Latest Reference Range & Units  01/25/24 08:02   Sodium 135 - 145 mmol/L 140   Potassium 3.4 - 5.3 mmol/L 4.3   Chloride 98 - 107 mmol/L 106   Carbon Dioxide (CO2) 22 - 29 mmol/L 24   Urea Nitrogen 8.0 - 23.0 mg/dL 21.5   Creatinine 0.51 - 0.95 mg/dL 0.84   GFR Estimate >60 mL/min/1.73m2 78   Calcium 8.8 - 10.2 mg/dL 8.3 (L)   Anion Gap 7 - 15 mmol/L 10   Magnesium 1.7 - 2.3 mg/dL 2.1   Phosphorus 2.5 - 4.5 mg/dL 3.2   Albumin 3.5 - 5.2 g/dL 4.0   Protein Total 6.4 - 8.3 g/dL 6.5   Alkaline Phosphatase 40 - 150 U/L 88   ALT 0 - 50 U/L 24   AST 0 - 45 U/L 25   Bilirubin Total <=1.2 mg/dL 0.3   Glucose 70 - 99 mg/dL 103 (H)   WBC 4.0 - 11.0 10e3/uL 3.1 (L)   Hemoglobin 11.7 - 15.7 g/dL 11.6 (L)   Hematocrit 35.0 - 47.0 % 35.6   Platelet Count 150 - 450 10e3/uL 233   RBC Count 3.80 - 5.20 10e6/uL 3.70 (L)   MCV 78 - 100 fL 96   MCH 26.5 - 33.0 pg 31.4   MCHC 31.5 - 36.5 g/dL 32.6   RDW 10.0 - 15.0 % 15.7 (H)   % Neutrophils % 50   % Lymphocytes % 27   % Monocytes % 12   % Eosinophils % 8   % Basophils % 2   Absolute Basophils 0.0 - 0.2 10e3/uL 0.1   Absolute Eosinophils 0.0 - 0.7 10e3/uL 0.3   Absolute Immature Granulocytes <=0.4 10e3/uL 0.0   Absolute Lymphocytes 0.8 - 5.3 10e3/uL 0.9   Absolute Monocytes 0.0 - 1.3 10e3/uL 0.4   % Immature Granulocytes % 1   Absolute Neutrophils 1.6 - 8.3 10e3/uL 1.6   Absolute NRBCs 10e3/uL 0.0   NRBCs per 100 WBC <1 /100 0   (L): Data is abnormally low  (H): Data is abnormally high    PATHOLOGY:    IMAGING:    ASSESSMENT/PLAN:  Kesha Zacarias is a 63 year old female with:    # de tavon metastatic left breast invasive lobular carcinoma, ER positive, IL positive, her2 negative on FISH  - pt has de tavon metastatic invasive lobular breast cancer, ER positive, IL positive, her2 negative. Pt does not have visceral crisis, she mainly has extensive bone metastases and LN metastases   -9/23 diagnostic left breast mammogram, left breast targeted ultrasound showed 3.0 x 1.7 x 3.9 ill-defined shadowing hypoechoic mass, few  "small lymph nodes in the left axilla, one with cortical thickening measuring 0.7 x 0.6 cm  -9/23 ultrasound-guided LEFT breast core biopsy of 12:00 lesion with clip placement, \"Postbiopsy unilateral digital mammogram of the left breast showed the clip to be at the expected biopsy site\" AND ultrasound-guided left axillary lymph node biopsy of lymph node with cortical thickening, PATHOLOGY:  A.  Breast, left, 12:00, biopsy:Lobular carcinoma in situ, Multiple foci. Invasive carcinoma is not identified.   B.  Lymph node, left axillary, biopsy:  -Positive for metastatic lobular carcinoma, grade 2, 0.7 cm, negative GREGG, Estrogen receptor: Positive (91 to 100%, strong), Progesterone receptor: Positive (91 to 100%, strong), HER2 2+ IHC, FISH negative  -10/23 MRI bilateral breast:  - Heterogeneously enhancing irregular mass in the subareolar left breast, 5.0 x 4.9 x 4.9 cm, with associated nipple retraction and nipple/areolar involvement.  This mass extends superiorly through 11: positions, from anterior to mid depth.  The [employer] breast biopsy clip (which showed LCIS) is 1.5 cm posterosuperior to this mass.  - Multiple suspicious left level 1 axillary lymph node noted, including biopsy-proven metastatic node with indwelling biopsy marker, biopsied node measures 1.3 x 1.0 cm  - Heterogeneous marrow appearance of sternum and ribs with heterogeneous enhancement and a peripheral enhancing focus within the mid body.    - 10/23 PET/CT:  Innumerable foci of FDG avid lesions are seen throughout the osseous structures of the head and neck, most pronounced on the calvarium and cervical spine, with prominently sclerotic appearance on CT correlate.  For example:  -Right frontal bone, SUV max 5.31.  -Left lateral mass of the atlas, SUV max 15.0  -Angle of the left mandible, SUV max 9.6  -C7 vertebral body, SUV max 17.7    Innumerable variable sized FDG avid lesions throughout the axial and appendicular spine, including but not " limited to the cervical, thoracic, and lumbar spine, multilevel bilateral ribs, sternum, bilateral scapula, bilateral humeri, clavicles, pelvis, and bilateral femurs. A few of these lesions are described below:  -Large FDG avid sclerotic 3.4 cm lesion within the proximal left humeral head, SUV max 17.24  -Sternal body, SUV max 20.35  -L2 vertebral body, SUV max 14.3 without  -Large ill-defined sclerotic lesion in the sacral body measuring up to at least 5.9 cm, SUV max 16.84  -FDG avid focus within the left femoral head, SUV max 13.65 without CT correlate.    Large irregular soft tissue FDG avid mass within the left breast measuring approximately 3.2 x 2.7 cm with  extension into the superficial soft tissues and associated left nipple retraction, SUV max 12.36 additional FDG avid. Left axillary lymph nodes, not definitively enlarged by short axis size criteria, for example, SUV max 5.93.    - 10/23 MRI brain:  - extensive osseous metastatic disease involving the calvarium, skull base w/involvement of occipital condyles, C1 and C2 vertebral bodies, and likely the mandible  -  Dominant calvarial lesions are located in the right frontal calvarium and right temporal calvarium without definite breach of the inner or outer tables of the calvarium. There is a suggestion of mild asymmetric linear extra-axial enhancement deep to the dominant right temporal calvarial lesion along the dural margin, though this may be vascular in etiology.  - No abnormal parenchymal or leptomeningeal enhancement.   - sequelae of mild chronic microvascular ischemic disease. Mild generalized cerebral atrophy.     -10/23 DEXA: osteopenia (T score -2.0 lumbar spine, -2.0 left hip femoral neck, The 10 year probability of major osteoporotic fracture is 12.5%, and of hip fracture is 1.8%, based on left femoral neck BMD)    - 11/23 orthopedic consult to determine stability of left femur and fracture risk given presence of mets in left femoral head: do  not see any areas of impending cortical erosion or sites of high risk for fracture, observe      REGIMEN:  Ribociclib+letrozole  C1D1 11/27/23 ribociclib 400mg, C2D1 12/25/23 ribociclib 600mg (day 1-12 only 2/2 starting RT)  Ribociclib 600mg PO daily day 1-21 q28 days (11/27/23 started 400mg PO daily when LFTs improved to <3x ULN)  Letrozole 2.5mg PO daily (started 10/30/23)  Febrile neutropenia risk: neutropenia (69% to 78%; grade 3: 46% to 55%; grade 4: 7% to 10%), Febrile neutropenia (1%)    1/5/24 last day of ribociclib  1/9/24- 1/22/24 3000cGy in 10 fractions, palliative RT to sacrum w/Dr. Mandujano  1/30/24 will restart ribociclib (on Tuesday so pt can see me in [place] on Tuesdays), pharmacy notified to expedite drug delivery    - labs noted  - 1/19/24 EKG noted Qtc 431, pt on plaquenil, watch Qtc closely    Treatment related AE:  - DVT- continue eliquis, avoid NSAIDs, ok to use oxycodone 5mg prn (using q12 hr, filled #120 tabs 12/22/23)- hold on refilling at this time   - back pain- 1/9/24- 1/22/24 3000cGy in 10 fractions, palliative RT to sacrum w/Dr. Mandujano,  fine to continue flexeril 5mg at bedtime at night   - constipation- continue colace TID and senna BID prn, if no improvement can use miralax, monitor for diarrhea  - HA- stable, preceeded tx and unchanged since then, 10/23 MRI brain negative, alarm sx discussed, low threshold for repeat imaging w/ special attention to right temporal calvarium at dural margin (?vascular etiology)  - blurred vision- stable, no alarms sx, eye doctor visit 3/24  - leg cramps- focus on hydration, fine to take Mg prn  - anxiety- defers psychology consult   - pancytopenia- 2/2 tx, check ferritin, iron studies, B12, RBC folate, monitor for neutropenia  - hypocalcemia- mild, continue BID calcium and daily vitamin D     IMAGING:  PET pending 2/17/24    TUMOR MARKERS:  10/23 CA 15-3= 261  12/23 CA 15-3= 553  1/24 CA 15-3= 480  Repeat CA 15-3 w/PET 2/17/24    OTHER:  - continue zometa  q4 weeks - zometa #1 1/4/24, next due 2/1/24;  once disease stabilized will transition to 4mg q12 weeks   - continue calcium and vitamin D  - Genetic counseling referral, pt had initially not scheduled but will call back now to schedule appt    #left posterior tibial DVT  -1/19/24 sx started- Left foot pain and swelling  -1/22/24 pt called in, 1/22/24 left LE doppler: DVT in 1 of 2 left distal posterior tibial veins  -started on eliquis and stopped aspirin  - rx for maintenance dose given today    #post menopausal  #vaginal dryness  - Pts breast biopsy pathology results explained in detail. Pt is aware her tumor is estrogen positive. Pt educated to avoid all estrogen-containing products including but not limited to birth control, vaginal creams etc.     #RA  - on plaquenil     Cancel q2 week EKGS including 2/2 and 2/16 EKG onwards  Pt to have EKG on day of appt  RTC 2/20 for follow up with me, labs and EKG prior visit (after PET)      Miller Altamirano DO  Hematology/Oncology  [facility]      Again, thank you for allowing me to participate in the care of your patient.        Sincerely,        MILLER ALTAMIRANO DO

## 2024-01-26 DIAGNOSIS — C50.912 CARCINOMA OF LEFT BREAST METASTATIC TO BONE (H): Primary | ICD-10-CM

## 2024-01-26 DIAGNOSIS — C79.51 CARCINOMA OF LEFT BREAST METASTATIC TO BONE (H): Primary | ICD-10-CM

## 2024-01-31 RX ORDER — ZOLEDRONIC ACID 0.04 MG/ML
4 INJECTION, SOLUTION INTRAVENOUS
Status: CANCELLED | OUTPATIENT
Start: 2024-01-31

## 2024-01-31 RX ORDER — ZOLEDRONIC ACID 0.04 MG/ML
4 INJECTION, SOLUTION INTRAVENOUS ONCE
Status: CANCELLED | OUTPATIENT
Start: 2024-01-31 | End: 2024-01-31

## 2024-02-01 ENCOUNTER — INFUSION THERAPY VISIT (OUTPATIENT)
Dept: INFUSION THERAPY | Facility: CLINIC | Age: 64
End: 2024-02-01
Attending: INTERNAL MEDICINE
Payer: COMMERCIAL

## 2024-02-01 VITALS
OXYGEN SATURATION: 95 % | WEIGHT: 204.7 LBS | RESPIRATION RATE: 18 BRPM | SYSTOLIC BLOOD PRESSURE: 114 MMHG | TEMPERATURE: 97.8 F | BODY MASS INDEX: 33.04 KG/M2 | DIASTOLIC BLOOD PRESSURE: 65 MMHG | HEART RATE: 60 BPM

## 2024-02-01 DIAGNOSIS — C50.919 CARCINOMA OF BREAST METASTATIC TO BONE, UNSPECIFIED LATERALITY (H): Primary | ICD-10-CM

## 2024-02-01 DIAGNOSIS — C79.51 CARCINOMA OF BREAST METASTATIC TO BONE, UNSPECIFIED LATERALITY (H): Primary | ICD-10-CM

## 2024-02-01 PROCEDURE — 250N000011 HC RX IP 250 OP 636: Mod: JZ | Performed by: INTERNAL MEDICINE

## 2024-02-01 PROCEDURE — 96365 THER/PROPH/DIAG IV INF INIT: CPT

## 2024-02-01 PROCEDURE — 258N000003 HC RX IP 258 OP 636: Performed by: INTERNAL MEDICINE

## 2024-02-01 RX ORDER — ZOLEDRONIC ACID 0.04 MG/ML
4 INJECTION, SOLUTION INTRAVENOUS
Status: DISCONTINUED | OUTPATIENT
Start: 2024-02-01 | End: 2024-02-01 | Stop reason: HOSPADM

## 2024-02-01 RX ORDER — ZOLEDRONIC ACID 0.04 MG/ML
4 INJECTION, SOLUTION INTRAVENOUS
Status: DISCONTINUED | OUTPATIENT
Start: 2024-02-02 | End: 2024-02-01

## 2024-02-01 RX ADMIN — ZOLEDRONIC ACID 4 MG: 0.04 INJECTION, SOLUTION INTRAVENOUS at 09:23

## 2024-02-01 RX ADMIN — SODIUM CHLORIDE 250 ML: 9 INJECTION, SOLUTION INTRAVENOUS at 09:16

## 2024-02-01 ASSESSMENT — PAIN SCALES - GENERAL: PAINLEVEL: NO PAIN (0)

## 2024-02-01 NOTE — PROGRESS NOTES
Infusion Nursing Note:  Kesha Zacarias presents today for Zometa.    Patient seen by provider today: No   present during visit today: Not Applicable.    Note: Had radiation to sacrum for severe back pain. Much better now, but still bothered by nausea. Taking oral anti-emetics. Two weeks ago she discovered a blood clot in her left foot. Was put on blood thinners. Resolved.      Intravenous Access:  Peripheral IV placed.    Treatment Conditions:  Lab Results   Component Value Date     01/25/2024    POTASSIUM 4.3 01/25/2024    MAG 2.1 01/25/2024    CR 0.84 01/25/2024    NITISH 8.3 (L) 01/25/2024    BILITOTAL 0.3 01/25/2024    ALBUMIN 4.0 01/25/2024    ALT 24 01/25/2024    AST 25 01/25/2024       Results reviewed, labs MET treatment parameters, ok to proceed with treatment.      Post Infusion Assessment:  Patient tolerated infusion without incident.       Discharge Plan:   Patient discharged in stable condition accompanied by: self.  Departure Mode: Ambulatory.      Rochelle Francisco RN

## 2024-02-09 DIAGNOSIS — M05.79 RHEUMATOID ARTHRITIS INVOLVING MULTIPLE SITES WITH POSITIVE RHEUMATOID FACTOR (H): ICD-10-CM

## 2024-02-09 RX ORDER — HYDROXYCHLOROQUINE SULFATE 200 MG/1
TABLET, FILM COATED ORAL
Qty: 225 TABLET | Refills: 3 | Status: SHIPPED | OUTPATIENT
Start: 2024-02-09

## 2024-02-14 ENCOUNTER — PATIENT OUTREACH (OUTPATIENT)
Dept: CARE COORDINATION | Facility: CLINIC | Age: 64
End: 2024-02-14
Payer: COMMERCIAL

## 2024-02-14 NOTE — PROGRESS NOTES
"Social Work - Intervention  [facility]  Data/Intervention:  Kesha has metastatic breast cancer followed by Dr. Maarvilla.   Patient Name: Kesha Zacarias Goes By: Kesha    /Age: 1960 (63 year old)     Visit Type: telephone  Referral Source: self   Reason for Referral: resource/support     Psychosocial Information/Concerns:  Financial Resources/Emotional support     Intervention/Education/Resources Provided:  -SW sent message to Dr. Maravilla's nurse about referral to dietician.  -PIFF - SW applied on patient behalf for $1500.00 scot  -Cancer Legal Care- Referral for support in applying for SSDI.  -Support Resources:  Otilio Bartlett, Oncology psychologist [phone] (Scheduling) for 1:1 support.  Breast Cancer Mentors: [facility]  Virtual Support groups:  [facility] Metastatic Breast Cancer - meeting virtually  When:  and  of the Month  Time: 12-1:30pm  Contact: [email or web address]  [facility]- Breast Cancer  When:  (unless holiday)  Time: 5-6:30pm (Virtual Only)  Contact: Pratima Lopez at [email or web address] to register  Please include your name, email address and phone number  [facility]- Metastatic Breast Cancer - meets virtually   When:  and  of the Month  Time: 10:30am-12pm  Contact: Sury Candelario at  [email or web address] or [phone] if you would like to join the virtual group. Please include your name, email address, home address, and phone number.    Assessment/Plan:  Kesha verbalized she was having a tough time emotionally and that its \"all catching up\". SW offered emotional support and active listening. Validated feelings and normalized.     Plan is for SW to reach out in two weeks.     Provided patient/family with contact information and availability.    CORAL Mcqueen, [facility]   Adult Oncology - [facility]  [phone]  [facility] on " Tuesdays   *Please note does not work on Thursdays.

## 2024-02-15 DIAGNOSIS — C79.51 CARCINOMA OF LEFT BREAST METASTATIC TO BONE (H): Primary | ICD-10-CM

## 2024-02-15 DIAGNOSIS — C50.912 CARCINOMA OF LEFT BREAST METASTATIC TO BONE (H): Primary | ICD-10-CM

## 2024-02-15 DIAGNOSIS — C79.51 MALIGNANT NEOPLASM METASTATIC TO BONE (H): ICD-10-CM

## 2024-02-16 ENCOUNTER — LAB (OUTPATIENT)
Dept: LAB | Facility: CLINIC | Age: 64
End: 2024-02-16
Payer: COMMERCIAL

## 2024-02-16 ENCOUNTER — HOSPITAL ENCOUNTER (OUTPATIENT)
Dept: CARDIOLOGY | Facility: CLINIC | Age: 64
Discharge: HOME | End: 2024-02-16
Attending: INTERNAL MEDICINE | Admitting: INTERNAL MEDICINE
Payer: COMMERCIAL

## 2024-02-16 DIAGNOSIS — C79.51 CARCINOMA OF LEFT BREAST METASTATIC TO BONE (H): ICD-10-CM

## 2024-02-16 DIAGNOSIS — C50.912 CARCINOMA OF LEFT BREAST METASTATIC TO BONE (H): ICD-10-CM

## 2024-02-16 DIAGNOSIS — C79.51 SECONDARY MALIGNANT NEOPLASM OF BONE AND BONE MARROW (H): Primary | ICD-10-CM

## 2024-02-16 DIAGNOSIS — C79.52 SECONDARY MALIGNANT NEOPLASM OF BONE AND BONE MARROW (H): Primary | ICD-10-CM

## 2024-02-16 LAB
ALBUMIN SERPL BCG-MCNC: 4 G/DL (ref 3.5–5.2)
ALP SERPL-CCNC: 76 U/L (ref 40–150)
ALT SERPL W P-5'-P-CCNC: 18 U/L (ref 0–50)
ANION GAP SERPL CALCULATED.3IONS-SCNC: 11 MMOL/L (ref 7–15)
AST SERPL W P-5'-P-CCNC: 21 U/L (ref 0–45)
BASOPHILS # BLD AUTO: 0 10E3/UL (ref 0–0.2)
BASOPHILS NFR BLD AUTO: 1 %
BILIRUB SERPL-MCNC: 0.3 MG/DL
BUN SERPL-MCNC: 19.8 MG/DL (ref 8–23)
CALCIUM SERPL-MCNC: 8.6 MG/DL (ref 8.8–10.2)
CHLORIDE SERPL-SCNC: 106 MMOL/L (ref 98–107)
CREAT SERPL-MCNC: 1.1 MG/DL (ref 0.51–0.95)
DEPRECATED HCO3 PLAS-SCNC: 26 MMOL/L (ref 22–29)
EGFRCR SERPLBLD CKD-EPI 2021: 57 ML/MIN/1.73M2
EOSINOPHIL # BLD AUTO: 0.1 10E3/UL (ref 0–0.7)
EOSINOPHIL NFR BLD AUTO: 4 %
ERYTHROCYTE [DISTWIDTH] IN BLOOD BY AUTOMATED COUNT: 15.2 % (ref 10–15)
GLUCOSE SERPL-MCNC: 97 MG/DL (ref 70–99)
HCT VFR BLD AUTO: 34.6 % (ref 35–47)
HGB BLD-MCNC: 11.3 G/DL (ref 11.7–15.7)
IMM GRANULOCYTES # BLD: 0 10E3/UL
IMM GRANULOCYTES NFR BLD: 1 %
LYMPHOCYTES # BLD AUTO: 0.7 10E3/UL (ref 0.8–5.3)
LYMPHOCYTES NFR BLD AUTO: 33 %
MAGNESIUM SERPL-MCNC: 2.2 MG/DL (ref 1.7–2.3)
MCH RBC QN AUTO: 32.7 PG (ref 26.5–33)
MCHC RBC AUTO-ENTMCNC: 32.7 G/DL (ref 31.5–36.5)
MCV RBC AUTO: 100 FL (ref 78–100)
MONOCYTES # BLD AUTO: 0.1 10E3/UL (ref 0–1.3)
MONOCYTES NFR BLD AUTO: 7 %
NEUTROPHILS # BLD AUTO: 1.2 10E3/UL (ref 1.6–8.3)
NEUTROPHILS NFR BLD AUTO: 54 %
NRBC # BLD AUTO: 0 10E3/UL
NRBC BLD AUTO-RTO: 0 /100
PHOSPHATE SERPL-MCNC: 3.7 MG/DL (ref 2.5–4.5)
PLATELET # BLD AUTO: 123 10E3/UL (ref 150–450)
POTASSIUM SERPL-SCNC: 4.5 MMOL/L (ref 3.4–5.3)
PROT SERPL-MCNC: 6.9 G/DL (ref 6.4–8.3)
RBC # BLD AUTO: 3.46 10E6/UL (ref 3.8–5.2)
SODIUM SERPL-SCNC: 143 MMOL/L (ref 135–145)
WBC # BLD AUTO: 2.2 10E3/UL (ref 4–11)

## 2024-02-16 PROCEDURE — 83735 ASSAY OF MAGNESIUM: CPT | Performed by: INTERNAL MEDICINE

## 2024-02-16 PROCEDURE — 84100 ASSAY OF PHOSPHORUS: CPT | Performed by: INTERNAL MEDICINE

## 2024-02-16 PROCEDURE — 85025 COMPLETE CBC W/AUTO DIFF WBC: CPT | Performed by: INTERNAL MEDICINE

## 2024-02-16 PROCEDURE — 80053 COMPREHEN METABOLIC PANEL: CPT | Performed by: INTERNAL MEDICINE

## 2024-02-16 PROCEDURE — 36415 COLL VENOUS BLD VENIPUNCTURE: CPT

## 2024-02-16 PROCEDURE — 93005 ELECTROCARDIOGRAM TRACING: CPT | Performed by: INTERNAL MEDICINE

## 2024-02-17 ENCOUNTER — HOSPITAL ENCOUNTER (OUTPATIENT)
Dept: PET IMAGING | Facility: CLINIC | Age: 64
Discharge: HOME | End: 2024-02-17
Attending: INTERNAL MEDICINE | Admitting: INTERNAL MEDICINE
Payer: COMMERCIAL

## 2024-02-17 DIAGNOSIS — C79.51 CARCINOMA OF BREAST METASTATIC TO BONE, UNSPECIFIED LATERALITY (H): ICD-10-CM

## 2024-02-17 DIAGNOSIS — C50.919 CARCINOMA OF BREAST METASTATIC TO BONE, UNSPECIFIED LATERALITY (H): ICD-10-CM

## 2024-02-17 PROCEDURE — 343N000001 HC RX 343: Performed by: INTERNAL MEDICINE

## 2024-02-17 PROCEDURE — 71260 CT THORAX DX C+: CPT

## 2024-02-17 PROCEDURE — A9552 F18 FDG: HCPCS | Performed by: INTERNAL MEDICINE

## 2024-02-17 PROCEDURE — 250N000011 HC RX IP 250 OP 636: Performed by: INTERNAL MEDICINE

## 2024-02-17 PROCEDURE — 78815 PET IMAGE W/CT SKULL-THIGH: CPT | Mod: PS

## 2024-02-17 RX ORDER — IOPAMIDOL 755 MG/ML
10-135 INJECTION, SOLUTION INTRAVASCULAR ONCE
Status: COMPLETED | OUTPATIENT
Start: 2024-02-17 | End: 2024-02-17

## 2024-02-17 RX ADMIN — FLUDEOXYGLUCOSE F-18 13.32 MILLICURIE: 500 INJECTION, SOLUTION INTRAVENOUS at 13:03

## 2024-02-17 RX ADMIN — IOPAMIDOL 123 ML: 755 INJECTION, SOLUTION INTRAVENOUS at 13:03

## 2024-02-19 ENCOUNTER — ONCOLOGY VISIT (OUTPATIENT)
Dept: ONCOLOGY | Facility: CLINIC | Age: 64
End: 2024-02-19
Attending: INTERNAL MEDICINE
Payer: COMMERCIAL

## 2024-02-19 VITALS
RESPIRATION RATE: 18 BRPM | DIASTOLIC BLOOD PRESSURE: 71 MMHG | OXYGEN SATURATION: 98 % | SYSTOLIC BLOOD PRESSURE: 110 MMHG | WEIGHT: 209 LBS | HEART RATE: 62 BPM | BODY MASS INDEX: 33.73 KG/M2

## 2024-02-19 DIAGNOSIS — T45.1X5A CHEMOTHERAPY-INDUCED NEUTROPENIA (H): ICD-10-CM

## 2024-02-19 DIAGNOSIS — T45.1X5A ANEMIA ASSOCIATED WITH CHEMOTHERAPY: ICD-10-CM

## 2024-02-19 DIAGNOSIS — I82.402 ACUTE DEEP VEIN THROMBOSIS (DVT) OF LEFT LOWER EXTREMITY, UNSPECIFIED VEIN (H): ICD-10-CM

## 2024-02-19 DIAGNOSIS — C50.912 CARCINOMA OF LEFT BREAST METASTATIC TO BONE (H): ICD-10-CM

## 2024-02-19 DIAGNOSIS — D70.1 CHEMOTHERAPY-INDUCED NEUTROPENIA (H): ICD-10-CM

## 2024-02-19 DIAGNOSIS — T45.1X5A CHEMOTHERAPY-INDUCED THROMBOCYTOPENIA: ICD-10-CM

## 2024-02-19 DIAGNOSIS — C79.51 CARCINOMA OF LEFT BREAST METASTATIC TO BONE (H): ICD-10-CM

## 2024-02-19 DIAGNOSIS — D64.81 ANEMIA ASSOCIATED WITH CHEMOTHERAPY: ICD-10-CM

## 2024-02-19 DIAGNOSIS — D69.59 CHEMOTHERAPY-INDUCED THROMBOCYTOPENIA: ICD-10-CM

## 2024-02-19 DIAGNOSIS — C79.51 MALIGNANT NEOPLASM METASTATIC TO BONE (H): Primary | ICD-10-CM

## 2024-02-19 LAB
HCT VFR BLD AUTO: 33.6 % (ref 35–47)
HOLD SPECIMEN: NORMAL

## 2024-02-19 PROCEDURE — 85014 HEMATOCRIT: CPT | Performed by: INTERNAL MEDICINE

## 2024-02-19 PROCEDURE — 99213 OFFICE O/P EST LOW 20 MIN: CPT | Performed by: INTERNAL MEDICINE

## 2024-02-19 PROCEDURE — 36415 COLL VENOUS BLD VENIPUNCTURE: CPT | Performed by: INTERNAL MEDICINE

## 2024-02-19 PROCEDURE — 86300 IMMUNOASSAY TUMOR CA 15-3: CPT | Performed by: INTERNAL MEDICINE

## 2024-02-19 PROCEDURE — 82747 ASSAY OF FOLIC ACID RBC: CPT | Performed by: INTERNAL MEDICINE

## 2024-02-19 PROCEDURE — 99214 OFFICE O/P EST MOD 30 MIN: CPT | Performed by: INTERNAL MEDICINE

## 2024-02-19 ASSESSMENT — PAIN SCALES - GENERAL: PAINLEVEL: NO PAIN (0)

## 2024-02-19 NOTE — PROGRESS NOTES
"[facility]    Hematology/Oncology Established Patient Follow Up Note      Today's Date: 2/19/24    Reason for follow up: breast cancer    HISTORY OF PRESENT ILLNESS: Kesha Zacarias is a 63 year old female who presents for follow up    Patient has medical history including rheumatoid arthritis, hyperlipidemia, osteoarthritis, bilateral cataracts and glaucoma s/p surgery, endocervical polyp s/p resection, vaginal atrophy with conjugated estrogen vaginal cream use, colon polyp (hyperplastic polyp and tubular adenoma), seasonal depression, history of tobacco use ([age] y/o, about 1 ppd).    Regarding RA:  -dx over a decade ago, on plaquenil, managed by PCP  - arthritis is most severe in hands>knees, intermittent       - 3/23 bilateral screening mammogram [facility]  - a few months ago, noted nipple retraction  - 9/23 patient palpated mass in retroareolar region of left breast and w/nipple retraction, no discharge, skin thickening, no dimpling, no erythema  - 9/23 diagnostic LEFT breast mammogram: Focal dense tissue with some likely mild architectural distortion, some anterior skin thickening is noted  - 9/23 targeted LEFT breast ultrasound: Ill-defined shadowing hypoechoic mass, 3.0 x 1.7 x 3.9 cm.  In left axilla-few small lymph nodes, 1 normal-sized lymph node with cortical thickening, 0.7 x 0.6 cm.  -9/23 ultrasound-guided LEFT breast core biopsy of 12:00 lesion with clip placement, \"Postbiopsy unilateral digital mammogram of the left breast showed the clip to be at the expected biopsy site\" AND ultrasound-guided left axillary lymph node biopsy of lymph node with cortical thickening  PATHOLOGY  A.  Breast, left, 12:00, biopsy:  -Lobular carcinoma in situ.-Multiple foci  -Invasive carcinoma is not identified.     B.  Lymph node, left axillary, biopsy:  -Positive for metastatic lobular carcinoma, grade 2  -Largest metastatic focus is 7 mm.  -Negative for extranodal extension.  -Breast ancillary " testing:  -Estrogen receptor: Positive (91 to 100%, strong)  -Progesterone receptor: Positive (91 to 100%, strong)  -HER2 2+ IHC, FISH negative    -10/23 MRI bilateral breast:  LEFT breast:  - Heterogeneously enhancing irregular mass in the subareolar left breast, 5.0 x 4.9 x 4.9 cm, with associated nipple retraction and nipple/areolar involvement.  This mass extends superiorly through 11: positions, from anterior to mid depth.  The HydroMARK breast biopsy clip (which showed LCIS) is 1.5 cm posterosuperior to this mass.  - Multiple suspicious left level 1 axillary lymph node noted, including biopsy-proven metastatic node with indwelling biopsy marker, biopsied node measures 1.3 x 1.0 cm  - Heterogeneous marrow appearance of sternum and ribs with heterogeneous enhancement and a peripheral enhancing focus within the mid body.  IMPRESSION:  1. Upon further review of previous imaging and pathology results,  recommend repeat ultrasound guided large core-needle biopsy of the  discordant dominant left breast mass given metastatic left axillary  lymph node and superoposteriorly displaced left breast biopsy marker.   2. Nonspecific heterogeneous enhancement of the sternum and ribs.  Consider nuclear medicine bone scan    Lifetime estrogen exposure:  Menarche: 13   Last menstrual period: 49   Age of first pregnancy: 18   Number of pregnancies: 2 (no living children)   Weight gain: 20lb weight gain within a year  Exposure to exogenous estrogen: vaginal atrophy with conjugated estrogen vaginal cream use x4 years (intermittent), has not used it since 9/23. Birth control for about 13-15 years from her 20s-30s.      Pt reports 55lb weight loss in 1.5 years, this was intentional, was following weight watchers program (23 points available per day) 230lb->170lb->190lb (gained about 20lbs). Pt was walking on the treadmill and outside daily, about 30 mins to 1.5 miles.    10/23 labs:  AST 79, ALT 91,   CA 15-3 261    10/23  PET/CT:  Innumerable foci of FDG avid lesions are seen throughout the osseous  structures of the head and neck, most pronounced on the calvarium and  cervical spine, with prominently sclerotic appearance on CT correlate.  For example:  -Right frontal bone, SUV max 5.31.  -Left lateral mass of the atlas, SUV max 15.0  -Angle of the left mandible, SUV max 9.6  -C7 vertebral body, SUV max 17.7    Innumerable variable sized FDG avid lesions throughout the axial and  appendicular spine, including but not limited to the cervical,  thoracic, and lumbar spine, multilevel bilateral ribs, sternum,  bilateral scapula, bilateral humeri, clavicles, pelvis, and bilateral  femurs. A few of these lesions are described below:  -Large FDG avid sclerotic 3.4 cm lesion within the proximal left  humeral head, SUV max 17.24  -Sternal body, SUV max 20.35  -L2 vertebral body, SUV max 14.3 without  -Large ill-defined sclerotic lesion in the sacral body measuring up to  at least 5.9 cm, SUV max 16.84  -FDG avid focus within the left femoral head, SUV  max 13.65 without CT correlate.    Large irregular soft tissue FDG avid mass within the left breast   measuring approximately 3.2 x 2.7 cm with  extension into the superficial soft tissues and associated left nipple  retraction, SUV max 12.36 additional FDG avid. Left axillary lymph  nodes, not definitively enlarged by short axis size criteria, for  example, SUV max 5.93.    The liver is unremarkable.     Solid 3 mm non-FDG avid pulmonary nodule within the  right middle lobe    - 10/23 MRI brain:  - extensive osseous metastatic disease involving the calvarium, skull base w/involvement of occipital condyles, C1 and C2 vertebral bodies, and likely the mandible  -  Dominant calvarial lesions are located in the right frontal calvarium and right temporal calvarium without definite breach of the  inner or outer tables of the calvarium. There is a suggestion of mild asymmetric linear extra-axial  enhancement deep to the dominant right temporal calvarial lesion along the dural margin, though this may be vascular in etiology.  - No abnormal parenchymal or leptomeningeal enhancement.   - sequelae of mild chronic microvascular ischemic disease. Mild generalized cerebral atrophy.       -supposed to start ribociclib 10/30/23 however, noted to have significantly elevated LFTS (10/26/23 , , alk phos 152)    10/17/23: AST 79, ALT 91, alk phos 116, t bili 0.5  10/26/23 , , alk phos 152  10/30/23: , , alk phos 178, coags WNL, ribociclib was not started, letrozole started, atorvastatin held  11/7/23: AST 96, , alk phos 169, MRI liver negative for mets  11/17/23: AST 81, , alk phos 163  11/27/23: AST 55, ALT 90, alk phos 128- started ribociclib 400mg  12/4/23: AST 26, ALT 35, alk phos 118    INTERIM HISTORY:    REGIMEN:  Ribociclib+letrozole  C1D1 11/27/23 ribociclib 400mg, C2D1 12/25/23 ribociclib 600mg (day 1-12 only 2/2 palliative RT to sacrum w/Dr. Mandujano 1/9/24- 1/22/24 3000cGy in 10 fractions)  Ribociclib 600mg PO daily day 1-21 q28 days (11/27/23 started 400mg PO daily when LFTs improved to <3x ULN)  Letrozole 2.5mg PO daily (started 10/30/23)  Febrile neutropenia risk: neutropenia (69% to 78%; grade 3: 46% to 55%; grade 4: 7% to 10%), Febrile neutropenia (1%)    C3D1 1/30/24  C4D1 2/27/24    Treatment related AE:  - nausea- minimal before RT, then frequent with RT, now in AM- mild, uses anti-emetics about every other day, zofran helps immediately, doesn't eat breakfast but trying to have protein shakes  - Left posterior tibial DVT- 1/19/24 sx started- Left foot pain and swelling, 1/22/24 pt called in, 1/22/24 left LE doppler: DVT in 1 of 2 left distal posterior tibial veins, started on eliquis and stopped naproxen but continues to take Mobic for sciatica.  - blurry vision b/l- last saw eye doctor 3/23 and has trifocal glasses. She has dry eyes and uses  "lubricating eye drops., 10/23 MRI brain as above. Stable, pending eye doctor visit 3/24  - constipation- probiotics, previously using senna BID and colace TID but after stopping oxycodone, stools are soft , no diarrhea  - anxiety-has support of family and  who are great advocates for her care, pt deferred, psychology referral  - pancytopenia- thrombocytopenia intermittent, 12/23 plt 143K, 2/24 plt 123K. 1/24 iron studies and B12 WNL  - hypocalcemia- taking calcium 600mg BID and vitamin D 1000IU  - elevated Cr- poor water intake, Cr 1.1 2/16/24 but has increased water to 60-90 oz this week     RESOLVED:  - leg cramps- Mg   - elevated LFTs- improved 11/27/23 to <3x ULN (AST 55, ALT 90, alk phos 128)  11/27/24 started ribociclib 400mg, 11/23 MRI liver negative for mets, ?cause- possibly viral infection between 10/17/23 and 10/26/23; 12/22/23 LFTs normal, cycle 2 started ribociclib 600mg; 1/24 LFTs WNL  - elevated Cr- increased water to 60oz/day, 1/24 Cr WNL  - daily HA- prior to starting tx- mostly b/l temporal regions, no sensitivity to light or sound, no N/V, takes tylenol w/o improvement; 10/23 MRI brain: extensive osseous mets w/dominant calvarial lesions w/enhancement deep to dominant right temporal calvarial lesion along dural margin (?vascular etiology), no parenchymal or leptomeningeal enhancement. Resolved  - back pain- left lower back, sharp, radiates to left buttock and leg making it hard to walk, worse w/walking, improved w/heating pad and ibuprofen 800mg BID, flexeril 5mg qHS, oxycodone 5mg q6hr prn #30 tabs rx 12/4/23 with stool softner- pt is using at bedtime due feeling \"loopy\" during day- this combination allows pt to be functional (10/10 previously, now 6/10)->12/23 half oxycodone (2.5mg) q6 hrs during day and 5mg at bedtime and ibuprofen 800mg BID AM and afternoon- without significant change (still 6/10), 11/30/23 MRI lumbar spine with diffuse osseous metastatic disease.  Possible pathologic " compression deformity at L1. neurosurgery consult 12/28/23 w/Dr. Perez- SI joint dysfunction- recommend mobic and PT, compression fracture is chronic. Rad onc consult 1/5/24 w/Dr. Mandujano- 1/9/24- 1/22/24 3000cGy in 10 fractions, palliative RT to sacrum (ribociclib held 1/5/24, plaquenil continued); LBP improved, while on RT was off oxycodone for 5 days but then restarted 2/2 left LE pain initially had sciatica but 2/24 stopped all oxycodone           - 2/17/24 PET CT CAP  PET/CT FINDINGS: Most of the extensive skeletal metastases have become sclerotic and non-FDG avid and those which remain avid have decreased in activity, for example in the proximal right humerus from SUVmax 19.3 to 13.9, in the proximal right femur from 16.5 to 12.2, and in the L2 vertebral body from 14.3 to 10.7.      CT FINDINGS: Bilateral lens replacements. Calcified atherosclerosis, including moderate right coronary artery disease. Splenule. Cholelithiasis. Retroaortic left renal vein. Pelvic phleboliths. Appendectomy. Ventral hernia repair with mesh. Persistent   metopic suture. T11 vertebral body hemangioma. Mild degenerative change in the spine.                                                       IMPRESSION:  Partial response         REVIEW OF SYSTEMS:   A 14 point ROS was reviewed with pertinent positives and negatives in the HPI.        HOME MEDICATIONS:  Current Outpatient Medications   Medication Sig Dispense Refill    apixaban ANTICOAGULANT (ELIQUIS) 5 MG tablet Take 1 tablet (5 mg) by mouth 2 times daily 60 tablet 3    Apixaban Starter Pack (ELIQUIS DVT/PE STARTER PACK) 5 MG TBPK Take 10 mg by mouth 2 times daily for 7 days, THEN 5 mg 2 times daily for 23 days. 74 each 0    betamethasone dipropionate (DIPROSONE) 0.05 % external lotion Apply topically 2 times daily 60 mL 3    cyclobenzaprine (FLEXERIL) 5 MG tablet TAKE 1 TABLET(5 MG) BY MOUTH AT BEDTIME 90 tablet 3    docusate sodium (COLACE) 100 MG capsule Take 1 capsule (100 mg) by  mouth 3 times daily as needed for constipation 90 capsule 3    fish oil-omega-3 fatty acids 1000 MG capsule Take 2 g by mouth daily      hydroxychloroquine (PLAQUENIL) 200 MG tablet TAKE 2 AND 1/2 TABLETS BY MOUTH EVERY  tablet 3    letrozole (FEMARA) 2.5 MG tablet Take 1 tablet (2.5 mg) by mouth every morning 90 tablet 3    loperamide (IMODIUM A-D) 2 MG tablet When diarrhea starts take 2 tabs (4mg), then with each additional episode of diarrhea take 1 additional tab and if needing more than 8 tabs in 24 hrs call oncology 30 tablet 3    magnesium 250 MG tablet Take 1 tablet by mouth daily      ondansetron (ZOFRAN) 4 MG tablet Take 1 tablet (4 mg) by mouth every 6 hours as needed for nausea 30 tablet 3    prochlorperazine (COMPAZINE) 10 MG tablet Take 1 tablet (10 mg) by mouth every 6 hours as needed for nausea or vomiting 30 tablet 2    ribociclib (KISQALI) 200 MG tablet Take 3 tablets (600 mg) by mouth every morning for 21 days , then off for 7 days. 42 tablet 0    COENZYME Q-10 PO Take 1 tablet by mouth every other day           ALLERGIES:  Allergies   Allergen Reactions    Ciprofloxacin      hives and was on flagyl too    Metronidazole      hives and was on cipro too         PAST MEDICAL HISTORY:  Past Medical History:   Diagnosis Date    Arthritis 2006    Breast cancer metastasized to bone (H) 10/12/2023    Chronic depressive personality disorder     Dysplasia of cervix (uteri) 1988    Cryotherapy    Female infertility of unspecified origin     Glaucoma     Rheumatoid arthritis (H)          PAST SURGICAL HISTORY:  Past Surgical History:   Procedure Laterality Date    COLONOSCOPY      COLONOSCOPY N/A 01/14/2022    Procedure: COLONOSCOPY, WITH POLYPECTOMY AND BIOPSY;  Surgeon: Gagandeep Patterson MD;  Location:  GI    HC INTRODUCE CATH FALLOPIAN TUBE, RE-OPEN/DIAGNOSIS      HERNIA REPAIR, INCISIONAL  11/11/2009    JOINT REPLACEMENT Right 09/2017    knee    TONSILLECTOMY      ZZC APPENDECTOMY  06/14/2003     ZZC REMV CATARACT INTRACAP,INSERT LENS  2003    right         SOCIAL HISTORY:  Social History     Socioeconomic History    Marital status:      Spouse name: Bandar    Number of children: 1    Years of education: Not on file    Highest education level: Not on file   Occupational History    Not on file   Tobacco Use    Smoking status: Former     Packs/day: 0.50     Years: 25.00     Additional pack years: 0.00     Total pack years: 12.50     Types: Cigarettes     Quit date: 2017     Years since quittin.4    Smokeless tobacco: Never   Vaping Use    Vaping Use: Never used   Substance and Sexual Activity    Alcohol use: Not Currently    Drug use: Yes     Types: Marijuana    Sexual activity: Yes     Partners: Male     Birth control/protection: None   Other Topics Concern    Parent/sibling w/ CABG, MI or angioplasty before 65F 55M? Yes   Social History Narrative    Not on file     Social Determinants of Health     Financial Resource Strain: Not on file   Food Insecurity: Not on file   Transportation Needs: Not on file   Physical Activity: Not on file   Stress: Not on file   Social Connections: Not on file   Interpersonal Safety: Not on file   Housing Stability: Not on file         FAMILY HISTORY:  Family History   Problem Relation Age of Onset    Heart Disease Mother     Lipids Mother     Hyperlipidemia Mother     Genitourinary Problems Father         prostate    Genetic Disorder Father         ulcer    Hypertension Father     Lipids Father     Prostate Cancer Father     Hyperlipidemia Sister     Hyperlipidemia Brother     Other Cancer Brother         Neck Cancer HPV (+)    Heart Disease Maternal Grandmother     Cerebrovascular Disease Maternal Grandmother     Heart Disease Maternal Grandfather     Heart Disease Maternal Uncle     Heart Disease Maternal Uncle         x  3    Cancer Nephew         Sister's Son       Family history of:  1.  Breast cancer including male breast cancer: negative   2. Ovarian  cancer: negative  3.  Pancreatic cancer: negative  4.  Prostate cancer: father- ?in his 50s, other details unknown  5. Diffuse gastric cancer (if lobular breast CA): negative  6. Uterine cancer: negative  7. Colon cancer:  negative  6. Brother- head and neck, HPV +, had surgery, clinical trial and now cancer free      PHYSICAL EXAM:  Vital signs:  /71   Pulse 62   Resp 18   Wt 94.8 kg (209 lb)   LMP 11/22/2011 (Exact Date)   SpO2 98%   BMI 33.73 kg/m       GENERAL/CONSTITUTIONAL: No acute distress.  EYES: Pupils are equal and round. Extraocular movements intact without nystagmus.  No scleral icterus.  RESPIRATORY: Equal chest rise.   MUSCULOSKELETAL: Warm and well-perfused, no cyanosis, clubbing, or edema.   NEUROLOGIC: Cranial nerves are grossly intact. Alert, oriented to person, place and time, answers questions appropriately.  INTEGUMENTARY: No rashes or jaundice.  GAIT: Steady, does not use assistive device        LABS:   Latest Reference Range & Units 02/16/24 07:36   Sodium 135 - 145 mmol/L 143   Potassium 3.4 - 5.3 mmol/L 4.5   Chloride 98 - 107 mmol/L 106   Carbon Dioxide (CO2) 22 - 29 mmol/L 26   Urea Nitrogen 8.0 - 23.0 mg/dL 19.8   Creatinine 0.51 - 0.95 mg/dL 1.10 (H)   GFR Estimate >60 mL/min/1.73m2 57 (L)   Calcium 8.8 - 10.2 mg/dL 8.6 (L)   Anion Gap 7 - 15 mmol/L 11   Magnesium 1.7 - 2.3 mg/dL 2.2   Phosphorus 2.5 - 4.5 mg/dL 3.7   Albumin 3.5 - 5.2 g/dL 4.0   Protein Total 6.4 - 8.3 g/dL 6.9   Alkaline Phosphatase 40 - 150 U/L 76   ALT 0 - 50 U/L 18   AST 0 - 45 U/L 21   Bilirubin Total <=1.2 mg/dL 0.3   Glucose 70 - 99 mg/dL 97   WBC 4.0 - 11.0 10e3/uL 2.2 (L)   Hemoglobin 11.7 - 15.7 g/dL 11.3 (L)   Hematocrit 35.0 - 47.0 % 34.6 (L)   Platelet Count 150 - 450 10e3/uL 123 (L)   RBC Count 3.80 - 5.20 10e6/uL 3.46 (L)   MCV 78 - 100 fL 100   MCH 26.5 - 33.0 pg 32.7   MCHC 31.5 - 36.5 g/dL 32.7   RDW 10.0 - 15.0 % 15.2 (H)   % Neutrophils % 54   % Lymphocytes % 33   % Monocytes % 7   %  Eosinophils % 4   % Basophils % 1   Absolute Basophils 0.0 - 0.2 10e3/uL 0.0   Absolute Eosinophils 0.0 - 0.7 10e3/uL 0.1   Absolute Immature Granulocytes <=0.4 10e3/uL 0.0   Absolute Lymphocytes 0.8 - 5.3 10e3/uL 0.7 (L)   Absolute Monocytes 0.0 - 1.3 10e3/uL 0.1   % Immature Granulocytes % 1   Absolute Neutrophils 1.6 - 8.3 10e3/uL 1.2 (L)   Absolute NRBCs 10e3/uL 0.0   NRBCs per 100 WBC <1 /100 0   (H): Data is abnormally high  (L): Data is abnormally low    PATHOLOGY:    IMAGING:  Narrative & Impression   EXAM: PET ONCOLOGY (EYES TO THIGHS), CT CHEST/ABDOMEN/PELVIS W CONTRAST  LOCATION: [facility]  DATE: 2/17/2024     INDICATION: Subsequent treatment strategy for restaging carcinoma of breast metastatic to bone, unspecified laterality, unspecified site of breast   COMPARISON: PET/CT from 10/06/2023   CONTRAST: 123 mL [id number]   TECHNIQUE: Serum glucose level 110  mg/dL. One hour post intravenous administration of 13.3  mCi F-18 FDG, PET imaging was performed from the skull vertex to mid thighs, utilizing attenuation correction with concurrent axial CT and PET/CT image fusion.   Separate diagnostic CT of the chest, abdomen, and pelvis was performed. Dose reduction techniques were used.     PET/CT FINDINGS: Most of the extensive skeletal metastases have become sclerotic and non-FDG avid and those which remain avid have decreased in activity, for example in the proximal right humerus from SUVmax 19.3 to 13.9, in the proximal right femur from   16.5 to 12.2, and in the L2 vertebral body from 14.3 to 10.7.      CT FINDINGS: Bilateral lens replacements. Calcified atherosclerosis, including moderate right coronary artery disease. Splenule. Cholelithiasis. Retroaortic left renal vein. Pelvic phleboliths. Appendectomy. Ventral hernia repair with mesh. Persistent   metopic suture. T11 vertebral body hemangioma. Mild degenerative change in the spine.                                          "                              IMPRESSION:     Partial response        ASSESSMENT/PLAN:  Kesha Zacarias is a 63 year old female with:    # de tavon metastatic left breast invasive lobular carcinoma, ER positive, NM positive, her2 negative on FISH  - pt has de tavon metastatic invasive lobular breast cancer, ER positive, NM positive, her2 negative. Pt does not have visceral crisis, she mainly has extensive bone metastases and LN metastases   -9/23 diagnostic left breast mammogram, left breast targeted ultrasound showed 3.0 x 1.7 x 3.9 ill-defined shadowing hypoechoic mass, few small lymph nodes in the left axilla, one with cortical thickening measuring 0.7 x 0.6 cm  -9/23 ultrasound-guided LEFT breast core biopsy of 12:00 lesion with clip placement, \"Postbiopsy unilateral digital mammogram of the left breast showed the clip to be at the expected biopsy site\" AND ultrasound-guided left axillary lymph node biopsy of lymph node with cortical thickening, PATHOLOGY:  A.  Breast, left, 12:00, biopsy:Lobular carcinoma in situ, Multiple foci. Invasive carcinoma is not identified.   B.  Lymph node, left axillary, biopsy:  -Positive for metastatic lobular carcinoma, grade 2, 0.7 cm, negative GREGG, Estrogen receptor: Positive (91 to 100%, strong), Progesterone receptor: Positive (91 to 100%, strong), HER2 2+ IHC, FISH negative  -10/23 MRI bilateral breast:  - Heterogeneously enhancing irregular mass in the subareolar left breast, 5.0 x 4.9 x 4.9 cm, with associated nipple retraction and nipple/areolar involvement.  This mass extends superiorly through 11: positions, from anterior to mid depth.  The Giveit100MARK breast biopsy clip (which showed LCIS) is 1.5 cm posterosuperior to this mass.  - Multiple suspicious left level 1 axillary lymph node noted, including biopsy-proven metastatic node with indwelling biopsy marker, biopsied node measures 1.3 x 1.0 cm  - Heterogeneous marrow appearance of sternum and ribs with heterogeneous " enhancement and a peripheral enhancing focus within the mid body.    - 10/23 PET/CT:  Innumerable foci of FDG avid lesions are seen throughout the osseous structures of the head and neck, most pronounced on the calvarium and cervical spine, with prominently sclerotic appearance on CT correlate.  For example:  -Right frontal bone, SUV max 5.31.  -Left lateral mass of the atlas, SUV max 15.0  -Angle of the left mandible, SUV max 9.6  -C7 vertebral body, SUV max 17.7    Innumerable variable sized FDG avid lesions throughout the axial and appendicular spine, including but not limited to the cervical, thoracic, and lumbar spine, multilevel bilateral ribs, sternum, bilateral scapula, bilateral humeri, clavicles, pelvis, and bilateral femurs. A few of these lesions are described below:  -Large FDG avid sclerotic 3.4 cm lesion within the proximal left humeral head, SUV max 17.24  -Sternal body, SUV max 20.35  -L2 vertebral body, SUV max 14.3 without  -Large ill-defined sclerotic lesion in the sacral body measuring up to at least 5.9 cm, SUV max 16.84  -FDG avid focus within the left femoral head, SUV max 13.65 without CT correlate.    Large irregular soft tissue FDG avid mass within the left breast measuring approximately 3.2 x 2.7 cm with  extension into the superficial soft tissues and associated left nipple retraction, SUV max 12.36 additional FDG avid. Left axillary lymph nodes, not definitively enlarged by short axis size criteria, for example, SUV max 5.93.    - 10/23 MRI brain:  - extensive osseous metastatic disease involving the calvarium, skull base w/involvement of occipital condyles, C1 and C2 vertebral bodies, and likely the mandible  -  Dominant calvarial lesions are located in the right frontal calvarium and right temporal calvarium without definite breach of the inner or outer tables of the calvarium. There is a suggestion of mild asymmetric linear extra-axial enhancement deep to the dominant right temporal  calvarial lesion along the dural margin, though this may be vascular in etiology.  - No abnormal parenchymal or leptomeningeal enhancement.   - sequelae of mild chronic microvascular ischemic disease. Mild generalized cerebral atrophy.     -10/23 DEXA: osteopenia (T score -2.0 lumbar spine, -2.0 left hip femoral neck, The 10 year probability of major osteoporotic fracture is 12.5%, and of hip fracture is 1.8%, based on left femoral neck BMD)    - 11/23 orthopedic consult to determine stability of left femur and fracture risk given presence of mets in left femoral head: do not see any areas of impending cortical erosion or sites of high risk for fracture, observe      REGIMEN:  Ribociclib+letrozole  C1D1 11/27/23 ribociclib 400mg, C2D1 12/25/23 ribociclib 600mg (day 1-12 only 2/2 palliative RT to sacrum w/Dr. Mandujano 1/9/24- 1/22/24 3000cGy in 10 fractions)  Ribociclib 600mg PO daily day 1-21 q28 days (11/27/23 started 400mg PO daily when LFTs improved to <3x ULN)  Letrozole 2.5mg PO daily (started 10/30/23)  Febrile neutropenia risk: neutropenia (69% to 78%; grade 3: 46% to 55%; grade 4: 7% to 10%), Febrile neutropenia (1%)    C3D1 1/30/24  C4D1 2/27/24 planned pending labs and repeat EKG    - labs noted  - 2/16/24 EKG noted Qtc 457, pt on plaquenil, watch Qtc closely. If Qtc continues to prolong, will hold tx. 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    TUMOR MARKERS:  10/23 CA 15-3= 261  12/23 CA 15-3= 553  1/24 CA 15-3= 480  Repeat CA 15-3 pending    OTHER:  - continue zometa q4 weeks - zometa #1 1/4/24, last zometa 2/1/24, next due 2/29/24; if disease stable on next scan, will transition to 4mg q12 weeks   - continue calcium and vitamin D  - Genetic counseling consult pending, # provided to pt     #left posterior tibial DVT  -1/19/24 sx started- Left foot pain and swelling  -1/22/24 pt called in, 1/22/24 left LE doppler: DVT in 1 of 2 left distal posterior tibial veins  - continue eliquis      #RA  - on plaquenil     RTC 2/27 for follow up with JUAN, labs and EKG prior to visit   RTC 3/26 for follow up with me, labs, EKG prior to visit- [place]  RT 4/23 for follow up with JUAN, labs and EKG prior to visit   RTC 5/21 for follow up with me, labs, EKG prior to visit- [place]    Mary Maravilla DO  Hematology/Oncology  [facility]

## 2024-02-19 NOTE — NURSING NOTE
"Oncology Rooming Note    February 19, 2024 3:40 PM   Kesha Zacarias is a 63 year old female who presents for:    Chief Complaint   Patient presents with    Oncology Clinic Visit     Follow up     Initial Vitals: /71   Pulse 62   Resp 18   Wt 94.8 kg (209 lb)   LMP 11/22/2011 (Exact Date)   SpO2 98%   BMI 33.73 kg/m   Estimated body mass index is 33.73 kg/m  as calculated from the following:    Height as of 1/25/24: 1.676 m (5' 6\").    Weight as of this encounter: 94.8 kg (209 lb). Body surface area is 2.1 meters squared.  No Pain (0) Comment: Data Unavailable   Patient's last menstrual period was 11/22/2011 (exact date).  Allergies reviewed: Yes  Medications reviewed: Yes    Medications: Medication refills not needed today.  Pharmacy name entered into [employer]:    [facility] - [id number] [id number] - [place], [place] - [address] AT [address] OF [facility] [address]  [facility] - [place], [place] - [address]    Frailty Screening:   Is the patient here for a new oncology consult visit in cancer care? 2. No      Clinical concerns: Patient will discuss concerns with provider       Negrita Hull MA            "

## 2024-02-19 NOTE — LETTER
"    2/19/2024         RE: Kesha Zacarias  [address] [address] [place] [id number]        Dear Colleague,    Thank you for referring your patient, Kesha Zacarias, to the [facility]. Please see a copy of my visit note below.    [facility]    Hematology/Oncology Established Patient Follow Up Note      Today's Date: 2/19/24    Reason for follow up: breast cancer    HISTORY OF PRESENT ILLNESS: Kesha Zacarias is a 63 year old female who presents for follow up    Patient has medical history including rheumatoid arthritis, hyperlipidemia, osteoarthritis, bilateral cataracts and glaucoma s/p surgery, endocervical polyp s/p resection, vaginal atrophy with conjugated estrogen vaginal cream use, colon polyp (hyperplastic polyp and tubular adenoma), seasonal depression, history of tobacco use ([age] y/o, about 1 ppd).    Regarding RA:  -dx over a decade ago, on plaquenil, managed by PCP  - arthritis is most severe in hands>knees, intermittent       - 3/23 bilateral screening mammogram FARIDA  - a few months ago, noted nipple retraction  - 9/23 patient palpated mass in retroareolar region of left breast and w/nipple retraction, no discharge, skin thickening, no dimpling, no erythema  - 9/23 diagnostic LEFT breast mammogram: Focal dense tissue with some likely mild architectural distortion, some anterior skin thickening is noted  - 9/23 targeted LEFT breast ultrasound: Ill-defined shadowing hypoechoic mass, 3.0 x 1.7 x 3.9 cm.  In left axilla-few small lymph nodes, 1 normal-sized lymph node with cortical thickening, 0.7 x 0.6 cm.  -9/23 ultrasound-guided LEFT breast core biopsy of 12:00 lesion with clip placement, \"Postbiopsy unilateral digital mammogram of the left breast showed the clip to be at the expected biopsy site\" AND ultrasound-guided left axillary lymph node biopsy of lymph node with cortical thickening  PATHOLOGY  A.  Breast, left, 12:00, biopsy:  -Lobular " carcinoma in situ.-Multiple foci  -Invasive carcinoma is not identified.     B.  Lymph node, left axillary, biopsy:  -Positive for metastatic lobular carcinoma, grade 2  -Largest metastatic focus is 7 mm.  -Negative for extranodal extension.  -Breast ancillary testing:  -Estrogen receptor: Positive (91 to 100%, strong)  -Progesterone receptor: Positive (91 to 100%, strong)  -HER2 2+ IHC, FISH negative    -10/23 MRI bilateral breast:  LEFT breast:  - Heterogeneously enhancing irregular mass in the subareolar left breast, 5.0 x 4.9 x 4.9 cm, with associated nipple retraction and nipple/areolar involvement.  This mass extends superiorly through 11: positions, from anterior to mid depth.  The LessThan3MARK breast biopsy clip (which showed LCIS) is 1.5 cm posterosuperior to this mass.  - Multiple suspicious left level 1 axillary lymph node noted, including biopsy-proven metastatic node with indwelling biopsy marker, biopsied node measures 1.3 x 1.0 cm  - Heterogeneous marrow appearance of sternum and ribs with heterogeneous enhancement and a peripheral enhancing focus within the mid body.  IMPRESSION:  1. Upon further review of previous imaging and pathology results,  recommend repeat ultrasound guided large core-needle biopsy of the  discordant dominant left breast mass given metastatic left axillary  lymph node and superoposteriorly displaced left breast biopsy marker.   2. Nonspecific heterogeneous enhancement of the sternum and ribs.  Consider nuclear medicine bone scan    Lifetime estrogen exposure:  Menarche: 13   Last menstrual period: 49   Age of first pregnancy: 18   Number of pregnancies: 2 (no living children)   Weight gain: 20lb weight gain within a year  Exposure to exogenous estrogen: vaginal atrophy with conjugated estrogen vaginal cream use x4 years (intermittent), has not used it since 9/23. Birth control for about 13-15 years from her 20s-30s.      Pt reports 55lb weight loss in 1.5 years, this was  intentional, was following weight watchers program (23 points available per day) 230lb->170lb->190lb (gained about 20lbs). Pt was walking on the treadmill and outside daily, about 30 mins to 1.5 miles.    10/23 labs:  AST 79, ALT 91,   CA 15-3 261    10/23 PET/CT:  Innumerable foci of FDG avid lesions are seen throughout the osseous  structures of the head and neck, most pronounced on the calvarium and  cervical spine, with prominently sclerotic appearance on CT correlate.  For example:  -Right frontal bone, SUV max 5.31.  -Left lateral mass of the atlas, SUV max 15.0  -Angle of the left mandible, SUV max 9.6  -C7 vertebral body, SUV max 17.7    Innumerable variable sized FDG avid lesions throughout the axial and  appendicular spine, including but not limited to the cervical,  thoracic, and lumbar spine, multilevel bilateral ribs, sternum,  bilateral scapula, bilateral humeri, clavicles, pelvis, and bilateral  femurs. A few of these lesions are described below:  -Large FDG avid sclerotic 3.4 cm lesion within the proximal left  humeral head, SUV max 17.24  -Sternal body, SUV max 20.35  -L2 vertebral body, SUV max 14.3 without  -Large ill-defined sclerotic lesion in the sacral body measuring up to  at least 5.9 cm, SUV max 16.84  -FDG avid focus within the left femoral head, SUV  max 13.65 without CT correlate.    Large irregular soft tissue FDG avid mass within the left breast   measuring approximately 3.2 x 2.7 cm with  extension into the superficial soft tissues and associated left nipple  retraction, SUV max 12.36 additional FDG avid. Left axillary lymph  nodes, not definitively enlarged by short axis size criteria, for  example, SUV max 5.93.    The liver is unremarkable.     Solid 3 mm non-FDG avid pulmonary nodule within the  right middle lobe    - 10/23 MRI brain:  - extensive osseous metastatic disease involving the calvarium, skull base w/involvement of occipital condyles, C1 and C2 vertebral bodies,  and likely the mandible  -  Dominant calvarial lesions are located in the right frontal calvarium and right temporal calvarium without definite breach of the  inner or outer tables of the calvarium. There is a suggestion of mild asymmetric linear extra-axial enhancement deep to the dominant right temporal calvarial lesion along the dural margin, though this may be vascular in etiology.  - No abnormal parenchymal or leptomeningeal enhancement.   - sequelae of mild chronic microvascular ischemic disease. Mild generalized cerebral atrophy.       -supposed to start ribociclib 10/30/23 however, noted to have significantly elevated LFTS (10/26/23 , , alk phos 152)    10/17/23: AST 79, ALT 91, alk phos 116, t bili 0.5  10/26/23 , , alk phos 152  10/30/23: , , alk phos 178, coags WNL, ribociclib was not started, letrozole started, atorvastatin held  11/7/23: AST 96, , alk phos 169, MRI liver negative for mets  11/17/23: AST 81, , alk phos 163  11/27/23: AST 55, ALT 90, alk phos 128- started ribociclib 400mg  12/4/23: AST 26, ALT 35, alk phos 118    INTERIM HISTORY:    REGIMEN:  Ribociclib+letrozole  C1D1 11/27/23 ribociclib 400mg, C2D1 12/25/23 ribociclib 600mg (day 1-12 only 2/2 palliative RT to sacrum w/Dr. Mandujano 1/9/24- 1/22/24 3000cGy in 10 fractions)  Ribociclib 600mg PO daily day 1-21 q28 days (11/27/23 started 400mg PO daily when LFTs improved to <3x ULN)  Letrozole 2.5mg PO daily (started 10/30/23)  Febrile neutropenia risk: neutropenia (69% to 78%; grade 3: 46% to 55%; grade 4: 7% to 10%), Febrile neutropenia (1%)    C3D1 1/30/24  C4D1 2/27/24    Treatment related AE:  - nausea- minimal before RT, then frequent with RT, now in AM- mild, uses anti-emetics about every other day, zofran helps immediately, doesn't eat breakfast but trying to have protein shakes  - Left posterior tibial DVT- 1/19/24 sx started- Left foot pain and swelling, 1/22/24 pt called in,  "1/22/24 left LE doppler: DVT in 1 of 2 left distal posterior tibial veins, started on eliquis and stopped naproxen but continues to take Mobic for sciatica.  - blurry vision b/l- last saw eye doctor 3/23 and has trifocal glasses. She has dry eyes and uses lubricating eye drops., 10/23 MRI brain as above. Stable, pending eye doctor visit 3/24  - constipation- probiotics, previously using senna BID and colace TID but after stopping oxycodone, stools are soft , no diarrhea  - anxiety-has support of family and  who are great advocates for her care, pt deferred, psychology referral  - pancytopenia- thrombocytopenia intermittent, 12/23 plt 143K, 2/24 plt 123K. 1/24 iron studies and B12 WNL  - hypocalcemia- taking calcium 600mg BID and vitamin D 1000IU  - elevated Cr- poor water intake, Cr 1.1 2/16/24 but has increased water to 60-90 oz this week     RESOLVED:  - leg cramps- Mg   - elevated LFTs- improved 11/27/23 to <3x ULN (AST 55, ALT 90, alk phos 128)  11/27/24 started ribociclib 400mg, 11/23 MRI liver negative for mets, ?cause- possibly viral infection between 10/17/23 and 10/26/23; 12/22/23 LFTs normal, cycle 2 started ribociclib 600mg; 1/24 LFTs WNL  - elevated Cr- increased water to 60oz/day, 1/24 Cr WNL  - daily HA- prior to starting tx- mostly b/l temporal regions, no sensitivity to light or sound, no N/V, takes tylenol w/o improvement; 10/23 MRI brain: extensive osseous mets w/dominant calvarial lesions w/enhancement deep to dominant right temporal calvarial lesion along dural margin (?vascular etiology), no parenchymal or leptomeningeal enhancement. Resolved  - back pain- left lower back, sharp, radiates to left buttock and leg making it hard to walk, worse w/walking, improved w/heating pad and ibuprofen 800mg BID, flexeril 5mg qHS, oxycodone 5mg q6hr prn #30 tabs rx 12/4/23 with stool softner- pt is using at bedtime due feeling \"loopy\" during day- this combination allows pt to be functional (10/10 " previously, now 6/10)->12/23 half oxycodone (2.5mg) q6 hrs during day and 5mg at bedtime and ibuprofen 800mg BID AM and afternoon- without significant change (still 6/10), 11/30/23 MRI lumbar spine with diffuse osseous metastatic disease.  Possible pathologic compression deformity at L1. neurosurgery consult 12/28/23 w/Dr. Perez- SI joint dysfunction- recommend mobic and PT, compression fracture is chronic. Rad onc consult 1/5/24 w/Dr. Mandujano- 1/9/24- 1/22/24 3000cGy in 10 fractions, palliative RT to sacrum (ribociclib held 1/5/24, plaquenil continued); LBP improved, while on RT was off oxycodone for 5 days but then restarted 2/2 left LE pain initially had sciatica but 2/24 stopped all oxycodone           - 2/17/24 PET CT CAP  PET/CT FINDINGS: Most of the extensive skeletal metastases have become sclerotic and non-FDG avid and those which remain avid have decreased in activity, for example in the proximal right humerus from SUVmax 19.3 to 13.9, in the proximal right femur from 16.5 to 12.2, and in the L2 vertebral body from 14.3 to 10.7.      CT FINDINGS: Bilateral lens replacements. Calcified atherosclerosis, including moderate right coronary artery disease. Splenule. Cholelithiasis. Retroaortic left renal vein. Pelvic phleboliths. Appendectomy. Ventral hernia repair with mesh. Persistent   metopic suture. T11 vertebral body hemangioma. Mild degenerative change in the spine.                                                       IMPRESSION:  Partial response         REVIEW OF SYSTEMS:   A 14 point ROS was reviewed with pertinent positives and negatives in the HPI.        HOME MEDICATIONS:  Current Outpatient Medications   Medication Sig Dispense Refill     apixaban ANTICOAGULANT (ELIQUIS) 5 MG tablet Take 1 tablet (5 mg) by mouth 2 times daily 60 tablet 3     Apixaban Starter Pack (ELIQUIS DVT/PE STARTER PACK) 5 MG TBPK Take 10 mg by mouth 2 times daily for 7 days, THEN 5 mg 2 times daily for 23 days. 74 each 0      betamethasone dipropionate (DIPROSONE) 0.05 % external lotion Apply topically 2 times daily 60 mL 3     cyclobenzaprine (FLEXERIL) 5 MG tablet TAKE 1 TABLET(5 MG) BY MOUTH AT BEDTIME 90 tablet 3     docusate sodium (COLACE) 100 MG capsule Take 1 capsule (100 mg) by mouth 3 times daily as needed for constipation 90 capsule 3     fish oil-omega-3 fatty acids 1000 MG capsule Take 2 g by mouth daily       hydroxychloroquine (PLAQUENIL) 200 MG tablet TAKE 2 AND 1/2 TABLETS BY MOUTH EVERY  tablet 3     letrozole (FEMARA) 2.5 MG tablet Take 1 tablet (2.5 mg) by mouth every morning 90 tablet 3     loperamide (IMODIUM A-D) 2 MG tablet When diarrhea starts take 2 tabs (4mg), then with each additional episode of diarrhea take 1 additional tab and if needing more than 8 tabs in 24 hrs call oncology 30 tablet 3     magnesium 250 MG tablet Take 1 tablet by mouth daily       ondansetron (ZOFRAN) 4 MG tablet Take 1 tablet (4 mg) by mouth every 6 hours as needed for nausea 30 tablet 3     prochlorperazine (COMPAZINE) 10 MG tablet Take 1 tablet (10 mg) by mouth every 6 hours as needed for nausea or vomiting 30 tablet 2     ribociclib (KISQALI) 200 MG tablet Take 3 tablets (600 mg) by mouth every morning for 21 days , then off for 7 days. 42 tablet 0     COENZYME Q-10 PO Take 1 tablet by mouth every other day           ALLERGIES:  Allergies   Allergen Reactions     Ciprofloxacin      hives and was on flagyl too     Metronidazole      hives and was on cipro too         PAST MEDICAL HISTORY:  Past Medical History:   Diagnosis Date     Arthritis 2006     Breast cancer metastasized to bone (H) 10/12/2023     Chronic depressive personality disorder      Dysplasia of cervix (uteri) 1988    Cryotherapy     Female infertility of unspecified origin      Glaucoma      Rheumatoid arthritis (H)          PAST SURGICAL HISTORY:  Past Surgical History:   Procedure Laterality Date     COLONOSCOPY       COLONOSCOPY N/A 01/14/2022    Procedure:  COLONOSCOPY, WITH POLYPECTOMY AND BIOPSY;  Surgeon: Gagandeep Patterson MD;  Location: [facility]     HC INTRODUCE CATH FALLOPIAN TUBE, RE-OPEN/DIAGNOSIS       HERNIA REPAIR, INCISIONAL  2009     JOINT REPLACEMENT Right 2017    knee     TONSILLECTOMY       ZZC APPENDECTOMY  2003     ZZC REMV CATARACT INTRACAP,INSERT LENS  2003    right         SOCIAL HISTORY:  Social History     Socioeconomic History     Marital status:      Spouse name: Bandar     Number of children: 1     Years of education: Not on file     Highest education level: Not on file   Occupational History     Not on file   Tobacco Use     Smoking status: Former     Packs/day: 0.50     Years: 25.00     Additional pack years: 0.00     Total pack years: 12.50     Types: Cigarettes     Quit date: 2017     Years since quittin.4     Smokeless tobacco: Never   Vaping Use     Vaping Use: Never used   Substance and Sexual Activity     Alcohol use: Not Currently     Drug use: Yes     Types: Marijuana     Sexual activity: Yes     Partners: Male     Birth control/protection: None   Other Topics Concern     Parent/sibling w/ CABG, MI or angioplasty before 65F 55M? Yes   Social History Narrative     Not on file     Social Determinants of Health     Financial Resource Strain: Not on file   Food Insecurity: Not on file   Transportation Needs: Not on file   Physical Activity: Not on file   Stress: Not on file   Social Connections: Not on file   Interpersonal Safety: Not on file   Housing Stability: Not on file         FAMILY HISTORY:  Family History   Problem Relation Age of Onset     Heart Disease Mother      Lipids Mother      Hyperlipidemia Mother      Genitourinary Problems Father         prostate     Genetic Disorder Father         ulcer     Hypertension Father      Lipids Father      Prostate Cancer Father      Hyperlipidemia Sister      Hyperlipidemia Brother      Other Cancer Brother         Neck Cancer HPV (+)     Heart Disease  Maternal Grandmother      Cerebrovascular Disease Maternal Grandmother      Heart Disease Maternal Grandfather      Heart Disease Maternal Uncle      Heart Disease Maternal Uncle         x  3     Cancer Nephew         Sister's Son       Family history of:  1.  Breast cancer including male breast cancer: negative   2. Ovarian cancer: negative  3.  Pancreatic cancer: negative  4.  Prostate cancer: father- ?in his 50s, other details unknown  5. Diffuse gastric cancer (if lobular breast CA): negative  6. Uterine cancer: negative  7. Colon cancer:  negative  6. Brother- head and neck, HPV +, had surgery, clinical trial and now cancer free      PHYSICAL EXAM:  Vital signs:  /71   Pulse 62   Resp 18   Wt 94.8 kg (209 lb)   LMP 11/22/2011 (Exact Date)   SpO2 98%   BMI 33.73 kg/m       GENERAL/CONSTITUTIONAL: No acute distress.  EYES: Pupils are equal and round. Extraocular movements intact without nystagmus.  No scleral icterus.  RESPIRATORY: Equal chest rise.   MUSCULOSKELETAL: Warm and well-perfused, no cyanosis, clubbing, or edema.   NEUROLOGIC: Cranial nerves are grossly intact. Alert, oriented to person, place and time, answers questions appropriately.  INTEGUMENTARY: No rashes or jaundice.  GAIT: Steady, does not use assistive device        LABS:   Latest Reference Range & Units 02/16/24 07:36   Sodium 135 - 145 mmol/L 143   Potassium 3.4 - 5.3 mmol/L 4.5   Chloride 98 - 107 mmol/L 106   Carbon Dioxide (CO2) 22 - 29 mmol/L 26   Urea Nitrogen 8.0 - 23.0 mg/dL 19.8   Creatinine 0.51 - 0.95 mg/dL 1.10 (H)   GFR Estimate >60 mL/min/1.73m2 57 (L)   Calcium 8.8 - 10.2 mg/dL 8.6 (L)   Anion Gap 7 - 15 mmol/L 11   Magnesium 1.7 - 2.3 mg/dL 2.2   Phosphorus 2.5 - 4.5 mg/dL 3.7   Albumin 3.5 - 5.2 g/dL 4.0   Protein Total 6.4 - 8.3 g/dL 6.9   Alkaline Phosphatase 40 - 150 U/L 76   ALT 0 - 50 U/L 18   AST 0 - 45 U/L 21   Bilirubin Total <=1.2 mg/dL 0.3   Glucose 70 - 99 mg/dL 97   WBC 4.0 - 11.0 10e3/uL 2.2 (L)    Hemoglobin 11.7 - 15.7 g/dL 11.3 (L)   Hematocrit 35.0 - 47.0 % 34.6 (L)   Platelet Count 150 - 450 10e3/uL 123 (L)   RBC Count 3.80 - 5.20 10e6/uL 3.46 (L)   MCV 78 - 100 fL 100   MCH 26.5 - 33.0 pg 32.7   MCHC 31.5 - 36.5 g/dL 32.7   RDW 10.0 - 15.0 % 15.2 (H)   % Neutrophils % 54   % Lymphocytes % 33   % Monocytes % 7   % Eosinophils % 4   % Basophils % 1   Absolute Basophils 0.0 - 0.2 10e3/uL 0.0   Absolute Eosinophils 0.0 - 0.7 10e3/uL 0.1   Absolute Immature Granulocytes <=0.4 10e3/uL 0.0   Absolute Lymphocytes 0.8 - 5.3 10e3/uL 0.7 (L)   Absolute Monocytes 0.0 - 1.3 10e3/uL 0.1   % Immature Granulocytes % 1   Absolute Neutrophils 1.6 - 8.3 10e3/uL 1.2 (L)   Absolute NRBCs 10e3/uL 0.0   NRBCs per 100 WBC <1 /100 0   (H): Data is abnormally high  (L): Data is abnormally low    PATHOLOGY:    IMAGING:  Narrative & Impression   EXAM: PET ONCOLOGY (EYES TO THIGHS), CT CHEST/ABDOMEN/PELVIS W CONTRAST  LOCATION: [facility]  DATE: 2/17/2024     INDICATION: Subsequent treatment strategy for restaging carcinoma of breast metastatic to bone, unspecified laterality, unspecified site of breast   COMPARISON: PET/CT from 10/06/2023   CONTRAST: 123 mL [id number]   TECHNIQUE: Serum glucose level 110  mg/dL. One hour post intravenous administration of 13.3  mCi F-18 FDG, PET imaging was performed from the skull vertex to mid thighs, utilizing attenuation correction with concurrent axial CT and PET/CT image fusion.   Separate diagnostic CT of the chest, abdomen, and pelvis was performed. Dose reduction techniques were used.     PET/CT FINDINGS: Most of the extensive skeletal metastases have become sclerotic and non-FDG avid and those which remain avid have decreased in activity, for example in the proximal right humerus from SUVmax 19.3 to 13.9, in the proximal right femur from   16.5 to 12.2, and in the L2 vertebral body from 14.3 to 10.7.      CT FINDINGS: Bilateral lens replacements.  "Calcified atherosclerosis, including moderate right coronary artery disease. Splenule. Cholelithiasis. Retroaortic left renal vein. Pelvic phleboliths. Appendectomy. Ventral hernia repair with mesh. Persistent   metopic suture. T11 vertebral body hemangioma. Mild degenerative change in the spine.                                                                       IMPRESSION:     Partial response        ASSESSMENT/PLAN:  Kesha Zacarias is a 63 year old female with:    # de tavon metastatic left breast invasive lobular carcinoma, ER positive, FL positive, her2 negative on FISH  - pt has de tavon metastatic invasive lobular breast cancer, ER positive, FL positive, her2 negative. Pt does not have visceral crisis, she mainly has extensive bone metastases and LN metastases   -9/23 diagnostic left breast mammogram, left breast targeted ultrasound showed 3.0 x 1.7 x 3.9 ill-defined shadowing hypoechoic mass, few small lymph nodes in the left axilla, one with cortical thickening measuring 0.7 x 0.6 cm  -9/23 ultrasound-guided LEFT breast core biopsy of 12:00 lesion with clip placement, \"Postbiopsy unilateral digital mammogram of the left breast showed the clip to be at the expected biopsy site\" AND ultrasound-guided left axillary lymph node biopsy of lymph node with cortical thickening, PATHOLOGY:  A.  Breast, left, 12:00, biopsy:Lobular carcinoma in situ, Multiple foci. Invasive carcinoma is not identified.   B.  Lymph node, left axillary, biopsy:  -Positive for metastatic lobular carcinoma, grade 2, 0.7 cm, negative GREGG, Estrogen receptor: Positive (91 to 100%, strong), Progesterone receptor: Positive (91 to 100%, strong), HER2 2+ IHC, FISH negative  -10/23 MRI bilateral breast:  - Heterogeneously enhancing irregular mass in the subareolar left breast, 5.0 x 4.9 x 4.9 cm, with associated nipple retraction and nipple/areolar involvement.  This mass extends superiorly through 11: positions, from anterior to mid depth. "  The HydroMARK breast biopsy clip (which showed LCIS) is 1.5 cm posterosuperior to this mass.  - Multiple suspicious left level 1 axillary lymph node noted, including biopsy-proven metastatic node with indwelling biopsy marker, biopsied node measures 1.3 x 1.0 cm  - Heterogeneous marrow appearance of sternum and ribs with heterogeneous enhancement and a peripheral enhancing focus within the mid body.    - 10/23 PET/CT:  Innumerable foci of FDG avid lesions are seen throughout the osseous structures of the head and neck, most pronounced on the calvarium and cervical spine, with prominently sclerotic appearance on CT correlate.  For example:  -Right frontal bone, SUV max 5.31.  -Left lateral mass of the atlas, SUV max 15.0  -Angle of the left mandible, SUV max 9.6  -C7 vertebral body, SUV max 17.7    Innumerable variable sized FDG avid lesions throughout the axial and appendicular spine, including but not limited to the cervical, thoracic, and lumbar spine, multilevel bilateral ribs, sternum, bilateral scapula, bilateral humeri, clavicles, pelvis, and bilateral femurs. A few of these lesions are described below:  -Large FDG avid sclerotic 3.4 cm lesion within the proximal left humeral head, SUV max 17.24  -Sternal body, SUV max 20.35  -L2 vertebral body, SUV max 14.3 without  -Large ill-defined sclerotic lesion in the sacral body measuring up to at least 5.9 cm, SUV max 16.84  -FDG avid focus within the left femoral head, SUV max 13.65 without CT correlate.    Large irregular soft tissue FDG avid mass within the left breast measuring approximately 3.2 x 2.7 cm with  extension into the superficial soft tissues and associated left nipple retraction, SUV max 12.36 additional FDG avid. Left axillary lymph nodes, not definitively enlarged by short axis size criteria, for example, SUV max 5.93.    - 10/23 MRI brain:  - extensive osseous metastatic disease involving the calvarium, skull base w/involvement of occipital  condyles, C1 and C2 vertebral bodies, and likely the mandible  -  Dominant calvarial lesions are located in the right frontal calvarium and right temporal calvarium without definite breach of the inner or outer tables of the calvarium. There is a suggestion of mild asymmetric linear extra-axial enhancement deep to the dominant right temporal calvarial lesion along the dural margin, though this may be vascular in etiology.  - No abnormal parenchymal or leptomeningeal enhancement.   - sequelae of mild chronic microvascular ischemic disease. Mild generalized cerebral atrophy.     -10/23 DEXA: osteopenia (T score -2.0 lumbar spine, -2.0 left hip femoral neck, The 10 year probability of major osteoporotic fracture is 12.5%, and of hip fracture is 1.8%, based on left femoral neck BMD)    - 11/23 orthopedic consult to determine stability of left femur and fracture risk given presence of mets in left femoral head: do not see any areas of impending cortical erosion or sites of high risk for fracture, observe      REGIMEN:  Ribociclib+letrozole  C1D1 11/27/23 ribociclib 400mg, C2D1 12/25/23 ribociclib 600mg (day 1-12 only 2/2 palliative RT to sacrum w/Dr. Mandujano 1/9/24- 1/22/24 3000cGy in 10 fractions)  Ribociclib 600mg PO daily day 1-21 q28 days (11/27/23 started 400mg PO daily when LFTs improved to <3x ULN)  Letrozole 2.5mg PO daily (started 10/30/23)  Febrile neutropenia risk: neutropenia (69% to 78%; grade 3: 46% to 55%; grade 4: 7% to 10%), Febrile neutropenia (1%)    C3D1 1/30/24  C4D1 2/27/24 planned pending labs and repeat EKG    - labs noted  - 2/16/24 EKG noted Qtc 457, pt on plaquenil, watch Qtc closely. If Qtc continues to prolong, will hold tx. Repeat EKG next week    Treatment related AE:  - nausea- grade 1, prn zofran   - DVT- continue eliquis, watch plt  - blurred vision- stable, no alarms sx, eye doctor visit 3/24  - anxiety- defers psychology consult   - pancytopenia- 2/2 tx, monitor for neutropenia and  thrombocytopenia  - hypocalcemia- mild, continue BID calcium and daily vitamin D       IMAGING:  - 2/17/24 PET CT CAP:  Most of the extensive skeletal metastases have become sclerotic and non-FDG avid and those which remain avid have decreased in activity, for example in the proximal right humerus from SUVmax 19.3 to 13.9, in the proximal right femur from 16.5 to 12.2, and in the L2 vertebral body from 14.3 to 10.7.    TUMOR MARKERS:  10/23 CA 15-3= 261  12/23 CA 15-3= 553  1/24 CA 15-3= 480  Repeat CA 15-3 pending    OTHER:  - continue zometa q4 weeks - zometa #1 1/4/24, last zometa 2/1/24, next due 2/29/24; if disease stable on next scan, will transition to 4mg q12 weeks   - continue calcium and vitamin D  - Genetic counseling consult pending, # provided to pt     #left posterior tibial DVT  -1/19/24 sx started- Left foot pain and swelling  -1/22/24 pt called in, 1/22/24 left LE doppler: DVT in 1 of 2 left distal posterior tibial veins  - continue eliquis      #RA  - on plaquenil     RTC 2/27 for follow up with JUAN, labs and EKG prior to visit   RTC 3/26 for follow up with me, labs, EKG prior to visit- [place]  RT 4/23 for follow up with JUAN, labs and EKG prior to visit   RTC 5/21 for follow up with me, labs, EKG prior to visit- [place]    Miller Altamirano DO  Hematology/Oncology  [facility]      Again, thank you for allowing me to participate in the care of your patient.        Sincerely,        MILLER ALTAMIRANO DO

## 2024-02-20 LAB — CANCER AG15-3 SERPL-ACNC: 338 U/ML

## 2024-02-21 LAB — FOLATE RBC-MCNC: 442 NG/ML

## 2024-02-22 ENCOUNTER — ONCOLOGY VISIT (OUTPATIENT)
Dept: ONCOLOGY | Facility: CLINIC | Age: 64
End: 2024-02-22
Attending: DIETITIAN, REGISTERED
Payer: COMMERCIAL

## 2024-02-22 DIAGNOSIS — C50.912 CARCINOMA OF LEFT BREAST METASTATIC TO BONE (H): ICD-10-CM

## 2024-02-22 DIAGNOSIS — C79.51 MALIGNANT NEOPLASM METASTATIC TO BONE (H): ICD-10-CM

## 2024-02-22 DIAGNOSIS — C79.51 CARCINOMA OF LEFT BREAST METASTATIC TO BONE (H): ICD-10-CM

## 2024-02-22 PROCEDURE — 97802 MEDICAL NUTRITION INDIV IN: CPT | Performed by: DIETITIAN, REGISTERED

## 2024-02-22 NOTE — PROGRESS NOTES
CLINICAL NUTRITION SERVICES - ASSESSMENT NOTE    Kesha Zacarias 63 year old referred for MNT related to breast cancer     Time Spent: 60 minutes  Visit Type: initial, in person  Pt accompanied by: self  Referring Physician: Taiwo 2/15/24    NUTRITION HISTORY  Factors affecting nutrition intake include:taste aversions  Current diet/appetite: general diet /good appetite (comes and goes at times)  Chemotherapy: Ribiciclib, Letrozole  Radiation: completed  Surgery history:   Past Surgical History:   Procedure Laterality Date    COLONOSCOPY      COLONOSCOPY N/A 01/14/2022    Procedure: COLONOSCOPY, WITH POLYPECTOMY AND BIOPSY;  Surgeon: Gagandeep Patterson MD;  Location:  GI    HC INTRODUCE CATH FALLOPIAN TUBE, RE-OPEN/DIAGNOSIS      HERNIA REPAIR, INCISIONAL  11/11/2009    JOINT REPLACEMENT Right 09/2017    knee    TONSILLECTOMY      Z APPENDECTOMY  06/14/2003    [facility] REMV CATARACT INTRACAP,INSERT LENS  02/13/2003    right       Kesha presents today to obtain guidance on nutrition and cancer.    She was able to lose 50-60 lbs intentionally with her weight loss program, Weight [employer].   She was then diagnosed with cancer and went through cancer treatment and and this time ended up gaining ~20 lbs.  She relates this to stress eating and eating whatever she wanted to get through cancer treatment.    Now, she is ready to get back on track to healthful eating to prevent further weight gain.  She denies barriers to eating from cancer treatment.  She does have some taste changes that she has been working around.  She tends to suck on lemon drops which helps.   She is working on increasing her water intake towards 64 oz/day.  She also adds lemon to her water.     Diet Recall  Breakfast Protein shake w/ meds ([employer])   Lunch Grab n Go OR WW OR ham sandwiches or hard salami   Dinner Same as lunch   Snacks AM oatmeal, banana, greek yogurt; meat sticks   Beverages Water       Treatment Plan:  Oncology History  "  Breast cancer metastasized to bone (H)   10/12/2023 Initial Diagnosis    Breast cancer metastasized to bone (H)     11/20/2023 -  Chemotherapy    Oral ONC Breast Cancer - Ribociclib / Aromatase Inhibitor  Plan Provider: Mary Maravilla DO  Treatment goal: Palliative  Line of treatment: [facility]     1/4/2024 -  Supportive Treatment    OP ONC Zoledronic Acid (Zometa) MONTHLY  Plan Provider: Mary Maravilla DO  Treatment goal: Palliative  Line of treatment: First Line       Treatment plan has been reviewed.    ANTHROPOMETRICS  Height: 66\"  Weight: 209 lbs/94kg  BMI: 33  Weight Status:  Obesity Grade I BMI 30-34.9  IBW: 130 lb (160%)  Weight History:   Wt Readings from Last 10 Encounters:   02/19/24 94.8 kg (209 lb)   02/01/24 92.9 kg (204 lb 11.2 oz)   01/25/24 91.2 kg (201 lb)   01/22/24 91.3 kg (201 lb 3.2 oz)   01/16/24 92.3 kg (203 lb 6.4 oz)   01/09/24 92.4 kg (203 lb 9.6 oz)   01/05/24 93.9 kg (207 lb)   01/04/24 93.9 kg (207 lb)   12/28/23 90.7 kg (200 lb)   12/22/23 90.7 kg (200 lb)     Dosing Weight: 68kg    Medications/vitamins/minerals/herbals:   Reviewed    Labs:   Labs reviewed    NUTRITION FOCUSED PHYSICAL ASSESSMENT FOR DIAGNOSING MALNUTRITION:  Consult for education only      ASSESSED NUTRITION NEEDS:  Estimated Energy Needs: 1400-21601 kcals (20-25 Kcal/Kg)  Justification: overweight  Estimated Protein Needs: 70 grams protein (1 g pro/Kg)  Justification: maintenance  Estimated Fluid Needs: 1500  mL   Justification: maintenance    NUTRITION DIAGNOSIS:  No nutrition diagnosis identified at this time       INTERVENTIONS  Provided written & verbal education:     - Reviewed nutrition and hydration needs.   Advised pt to aim for at least 1400kcal and 60-70g protein daily.    Advised pt to aim for 6-8 cups non-caffeine containing beverages daily.  - Discussed strategies to improve food choices and prevent further weight gain.    - Provided healthful meal and snack ideas.   - Discussed and encouraged " Mediterranean style diet  - Reviewed vitamin/minerals.  Suggested continue taking Vitamin D, Calcium and Mg.  Suggested taking 500-1000mg of omega 3 fish oil    Provided pt with corresponding education materials/handouts on:   Sources of Protein, Mediterranean diet guidelines, Thrive recipes, Coping with taste changes    Pt verbalize understanding of materials provided during consult.   Patient Understanding: good  Expected patient engagement: good    Goals  1.  Aim for 1400kcal and 60-70g protein/day  2. Weight maintenance /weight loss as able    Follow-Up Plans: Pt has RD contact information for questions.      Lucia James RD, , LD

## 2024-02-22 NOTE — LETTER
2/22/2024         RE: Kesha Zacarias  [address] [address] [place] [id number]        Dear Colleague,    Thank you for referring your patient, Kesha Zacarias, to the [facility]. Please see a copy of my visit note below.    CLINICAL NUTRITION SERVICES - ASSESSMENT NOTE    Kesha Zacarias 63 year old referred for MNT related to breast cancer     Time Spent: 60 minutes  Visit Type: initial, in person  Pt accompanied by: self  Referring Physician: Taiwo 2/15/24    NUTRITION HISTORY  Factors affecting nutrition intake include:taste aversions  Current diet/appetite: general diet /good appetite (comes and goes at times)  Chemotherapy: Ribiciclib, Letrozole  Radiation: completed  Surgery history:   Past Surgical History:   Procedure Laterality Date     COLONOSCOPY       COLONOSCOPY N/A 01/14/2022    Procedure: COLONOSCOPY, WITH POLYPECTOMY AND BIOPSY;  Surgeon: Gagandeep Patterson MD;  Location:  GI     HC INTRODUCE CATH FALLOPIAN TUBE, RE-OPEN/DIAGNOSIS       HERNIA REPAIR, INCISIONAL  11/11/2009     JOINT REPLACEMENT Right 09/2017    knee     TONSILLECTOMY       ZZC APPENDECTOMY  06/14/2003     Z REMV CATARACT INTRACAP,INSERT LENS  02/13/2003    right       Kesha presents today to obtain guidance on nutrition and cancer.    She was able to lose 50-60 lbs intentionally with her weight loss program, Weight [employer].   She was then diagnosed with cancer and went through cancer treatment and and this time ended up gaining ~20 lbs.  She relates this to stress eating and eating whatever she wanted to get through cancer treatment.    Now, she is ready to get back on track to healthful eating to prevent further weight gain.  She denies barriers to eating from cancer treatment.  She does have some taste changes that she has been working around.  She tends to suck on lemon drops which helps.   She is working on increasing her water intake towards 64 oz/day.  She also adds lemon to her  "water.     Diet Recall  Breakfast Protein shake w/ meds ([employer])   Lunch Grab n Go OR WW OR ham sandwiches or hard salami   Dinner Same as lunch   Snacks AM oatmeal, banana, greek yogurt; meat sticks   Beverages Water       Treatment Plan:  Oncology History   Breast cancer metastasized to bone (H)   10/12/2023 Initial Diagnosis    Breast cancer metastasized to bone (H)     11/20/2023 -  Chemotherapy    Oral ONC Breast Cancer - Ribociclib / Aromatase Inhibitor  Plan Provider: Mary Maravilla DO  Treatment goal: Palliative  Line of treatment: First Line     1/4/2024 -  Supportive Treatment    OP ONC Zoledronic Acid (Zometa) MONTHLY  Plan Provider: Mary Maravilla DO  Treatment goal: Palliative  Line of treatment: First Line       Treatment plan has been reviewed.    ANTHROPOMETRICS  Height: 66\"  Weight: 209 lbs/94kg  BMI: 33  Weight Status:  Obesity Grade I BMI 30-34.9  IBW: 130 lb (160%)  Weight History:   Wt Readings from Last 10 Encounters:   02/19/24 94.8 kg (209 lb)   02/01/24 92.9 kg (204 lb 11.2 oz)   01/25/24 91.2 kg (201 lb)   01/22/24 91.3 kg (201 lb 3.2 oz)   01/16/24 92.3 kg (203 lb 6.4 oz)   01/09/24 92.4 kg (203 lb 9.6 oz)   01/05/24 93.9 kg (207 lb)   01/04/24 93.9 kg (207 lb)   12/28/23 90.7 kg (200 lb)   12/22/23 90.7 kg (200 lb)     Dosing Weight: 68kg    Medications/vitamins/minerals/herbals:   Reviewed    Labs:   Labs reviewed    NUTRITION FOCUSED PHYSICAL ASSESSMENT FOR DIAGNOSING MALNUTRITION:  Consult for education only      ASSESSED NUTRITION NEEDS:  Estimated Energy Needs: 1400-56793 kcals (20-25 Kcal/Kg)  Justification: overweight  Estimated Protein Needs: 70 grams protein (1 g pro/Kg)  Justification: maintenance  Estimated Fluid Needs: 1500  mL   Justification: maintenance    NUTRITION DIAGNOSIS:  No nutrition diagnosis identified at this time       INTERVENTIONS  Provided written & verbal education:     - Reviewed nutrition and hydration needs.   Advised pt to aim for at " least 1400kcal and 60-70g protein daily.    Advised pt to aim for 6-8 cups non-caffeine containing beverages daily.  - Discussed strategies to improve food choices and prevent further weight gain.    - Provided healthful meal and snack ideas.   - Discussed and encouraged Mediterranean style diet  - Reviewed vitamin/minerals.  Suggested continue taking Vitamin D, Calcium and Mg.  Suggested taking 500-1000mg of omega 3 fish oil    Provided pt with corresponding education materials/handouts on:   Sources of Protein, Mediterranean diet guidelines, Thrive recipes, Coping with taste changes    Pt verbalize understanding of materials provided during consult.   Patient Understanding: good  Expected patient engagement: good    Goals  1.  Aim for 1400kcal and 60-70g protein/day  2. Weight maintenance /weight loss as able    Follow-Up Plans: Pt has RD contact information for questions.      Lucia James RD, , LD      Again, thank you for allowing me to participate in the care of your patient.        Sincerely,        Lucia James RD

## 2024-02-23 ENCOUNTER — DOCUMENTATION ONLY (OUTPATIENT)
Dept: RADIATION ONCOLOGY | Facility: CLINIC | Age: 64
End: 2024-02-23
Payer: COMMERCIAL

## 2024-02-23 DIAGNOSIS — C79.51 CARCINOMA OF LEFT BREAST METASTATIC TO BONE (H): Primary | ICD-10-CM

## 2024-02-23 DIAGNOSIS — C50.912 CARCINOMA OF LEFT BREAST METASTATIC TO BONE (H): Primary | ICD-10-CM

## 2024-02-23 PROCEDURE — 99207 PR NO BILLABLE SERVICE THIS VISIT: CPT | Performed by: RADIOLOGY

## 2024-02-23 NOTE — PROGRESS NOTES
Radiotherapy Treatment Summary              PATIENT: Kesha Zacarias  MEDICAL RECORD NO: [id number]   : 1960    DIAGNOSIS / ID:  63-year-old woman prior smoker with RA on Plaquenil recently diagnosed with metastatic breast cancer to the bones with painful upper pelvic pain now s/p palliative radiotherapy.        INTENT OF RADIOTHERAPY: Palliative    CONCURRENT SYSTEMIC THERAPY: None          SITE OF TREATMENT: Sacrum    DATES  OF TREATMENT: 2024 to 2024    TOTAL DOSE OF TREATMENT / FRACTIONS: 3000 cGy in 300 cGy daily fractions                                         FOLLOW UP PLAN:  Follow up with Radiation Oncology on as needed basis  Follow up with Medical Oncology as previously directed    CC  Patient Care Team:  Schoen, Gregory G, MD as PCP - General  Schoen, Gregory G, MD as Assigned PCP  Mary Maravilla DO as Physician (Internal Medicine)  Zackery Urbina MD as MD (Surgery)  Zackery Urbina MD as Assigned Surgical Provider  Mary Maravilla DO as Assigned Cancer Care Provider  Naman Mead MD as Assigned Musculoskeletal Provider  Suzy Mandujano MD as MD (Radiation Oncology)  Aurora Hastings PA-C as Physician Assistant (Hematology & Oncology)  Vikram Andrews MD as MD (Occupational Medicine)  Aurora Hastings PA-C as Physician Assistant (Hematology & Oncology)  Suzy Mandujano MD as MD (Radiation Oncology)  Judie Torres RN as Registered Nurse  Martínez Phillips MD as Assigned Neuroscience Provider       Landon Mandujano M.D.  Department of Radiation Oncology  [facility]

## 2024-02-26 NOTE — PROGRESS NOTES
Virtual Visit Details    Type of service:  Video Visit     Originating Location (pt. Location): Home    Distant Location (provider location):  On-site  Platform used for Video Visit: [facility]    Oncology/Hematology Visit Note  Feb 27, 2024    Reason for visit: Follow up metastatic breast cancer     Oncology HPI: Kesha Zacarias is a 63 year old female with left-sided breast cancer.  In September 2023, she noticed a mass with nipple retraction and mammogram revealed focal dense tissue with anterior skin thickening.  Ultrasound with 3 x 1.7 x 3.9 cm mass, biopsy with multifocal lobular carcinoma in situ and left axillary lymph node positive for metastatic lobular carcinoma.  ER/NY positive, HER2 negative.  PET scan in October 23 with multiple FDG avid lesions in the bony structures of the head and neck.  Brain MRI with calvarial lesions.     Dr. Maravilla recommended ribociclib 600 mg daily (21 days on, 7 days off) + letrozole daily with Zometa monthly. She also received palliative RT to sacrum Jan 2024.      PET 2/17/24 with partial response to therapy.     Interval History:  Kesha is overall doing well. Tolerating ribociclib well. She has mild nausea that is manageable. Has had improvement in bone pain, not needing any meds. Denies any fevers.     She does note that yesterday she had 3 10min episodes of chest pressure and difficulty taking a deep breath. Symptoms occurred at rest and resolved on their own. Has not had this before. Outside of these episodes denies any chest pain or SOB. No cardiac history.     Current Outpatient Medications   Medication Sig Dispense Refill    apixaban ANTICOAGULANT (ELIQUIS) 5 MG tablet Take 1 tablet (5 mg) by mouth 2 times daily 60 tablet 3    betamethasone dipropionate (DIPROSONE) 0.05 % external lotion Apply topically 2 times daily 60 mL 3    COENZYME Q-10 PO Take 1 tablet by mouth every other day      cyclobenzaprine (FLEXERIL) 5 MG tablet TAKE 1 TABLET(5 MG) BY MOUTH AT BEDTIME 90  tablet 3    docusate sodium (COLACE) 100 MG capsule Take 1 capsule (100 mg) by mouth 3 times daily as needed for constipation 90 capsule 3    fish oil-omega-3 fatty acids 1000 MG capsule Take 2 g by mouth daily      hydroxychloroquine (PLAQUENIL) 200 MG tablet TAKE 2 AND 1/2 TABLETS BY MOUTH EVERY  tablet 3    letrozole (FEMARA) 2.5 MG tablet Take 1 tablet (2.5 mg) by mouth every morning 90 tablet 3    loperamide (IMODIUM A-D) 2 MG tablet When diarrhea starts take 2 tabs (4mg), then with each additional episode of diarrhea take 1 additional tab and if needing more than 8 tabs in 24 hrs call oncology 30 tablet 3    magnesium 250 MG tablet Take 1 tablet by mouth daily      ondansetron (ZOFRAN) 4 MG tablet Take 1 tablet (4 mg) by mouth every 6 hours as needed for nausea 30 tablet 3    prochlorperazine (COMPAZINE) 10 MG tablet Take 1 tablet (10 mg) by mouth every 6 hours as needed for nausea or vomiting 30 tablet 2    [START ON 2/27/2024] ribociclib (KISQALI) 200 MG tablet Take 3 tablets (600 mg) by mouth every morning for 21 days , then off for 7 days. 63 tablet 0       Past Medical History  Past Medical History:   Diagnosis Date    Arthritis 2006    Breast cancer metastasized to bone (H) 10/12/2023    Chronic depressive personality disorder     Dysplasia of cervix (uteri) 1988    Cryotherapy    Female infertility of unspecified origin     Glaucoma     Rheumatoid arthritis (H)      Past Surgical History:   Procedure Laterality Date    COLONOSCOPY      COLONOSCOPY N/A 01/14/2022    Procedure: COLONOSCOPY, WITH POLYPECTOMY AND BIOPSY;  Surgeon: Gagandeep Patterson MD;  Location:  GI    HC INTRODUCE CATH FALLOPIAN TUBE, RE-OPEN/DIAGNOSIS      HERNIA REPAIR, INCISIONAL  11/11/2009    JOINT REPLACEMENT Right 09/2017    knee    TONSILLECTOMY      Z APPENDECTOMY  06/14/2003    [facility] REMV CATARACT INTRACAP,INSERT LENS  02/13/2003    right     Allergies   Allergen Reactions    Ciprofloxacin      hives and was on flagyl  "too    Metronidazole      hives and was on cipro too     Social History   Social History     Tobacco Use    Smoking status: Former     Packs/day: 0.50     Years: 25.00     Additional pack years: 0.00     Total pack years: 12.50     Types: Cigarettes     Quit date: 2017     Years since quittin.4    Smokeless tobacco: Never   Vaping Use    Vaping Use: Never used   Substance Use Topics    Alcohol use: Not Currently    Drug use: Yes     Types: Marijuana      Past medical history and social history were reviewed.    Physical Examination:  Ht 1.676 m (5' 6\")   Wt 94.8 kg (209 lb)   LMP 2011 (Exact Date)   BMI 33.73 kg/m    Wt Readings from Last 10 Encounters:   24 94.8 kg (209 lb)   24 92.9 kg (204 lb 11.2 oz)   24 91.2 kg (201 lb)   24 91.3 kg (201 lb 3.2 oz)   24 92.3 kg (203 lb 6.4 oz)   24 92.4 kg (203 lb 9.6 oz)   24 93.9 kg (207 lb)   24 93.9 kg (207 lb)   23 90.7 kg (200 lb)   23 90.7 kg (200 lb)     Video physical exam  General: Patient appears well in no acute distress.   Skin: No visualized rash or lesions on visualized skin  Eyes: EOMI, no erythema, sclera icterus or discharge noted  Resp: Appears to be breathing comfortably without accessory muscle usage, speaking in full sentences, no cough  MSK: Appears to have normal range of motion based on visualized movements  Neurologic: No apparent tremors, facial movements symmetric  Psych: affect normal, alert and oriented    Laboratory Data:   Latest Reference Range & Units 24 07:49   Sodium 135 - 145 mmol/L 142   Potassium 3.4 - 5.3 mmol/L 4.3   Chloride 98 - 107 mmol/L 105   Carbon Dioxide (CO2) 22 - 29 mmol/L 28   Urea Nitrogen 8.0 - 23.0 mg/dL 21.7   Creatinine 0.51 - 0.95 mg/dL 1.15 (H)   GFR Estimate >60 mL/min/1.73m2 54 (L)   Calcium 8.8 - 10.2 mg/dL 8.9   Anion Gap 7 - 15 mmol/L 9   Magnesium 1.7 - 2.3 mg/dL 2.0   Phosphorus 2.5 - 4.5 mg/dL 4.0   Albumin 3.5 - 5.2 g/dL 4.1 "   Protein Total 6.4 - 8.3 g/dL 6.8   Alkaline Phosphatase 40 - 150 U/L 69   ALT 0 - 50 U/L 19   AST 0 - 45 U/L 22   Bilirubin Total <=1.2 mg/dL 0.3   Glucose 70 - 99 mg/dL 88   WBC 4.0 - 11.0 10e3/uL 2.0 (L)   Hemoglobin 11.7 - 15.7 g/dL 11.2 (L)   Hematocrit 35.0 - 47.0 % 33.7 (L)   Platelet Count 150 - 450 10e3/uL 155   RBC Count 3.80 - 5.20 10e6/uL 3.37 (L)   MCV 78 - 100 fL 100   MCH 26.5 - 33.0 pg 33.2 (H)   MCHC 31.5 - 36.5 g/dL 33.2   RDW 10.0 - 15.0 % 15.9 (H)   % Neutrophils % 40   % Lymphocytes % 38   % Monocytes % 16   % Eosinophils % 2   % Basophils % 3   Absolute Basophils 0.0 - 0.2 10e3/uL 0.1   Absolute Eosinophils 0.0 - 0.7 10e3/uL 0.0   Absolute Immature Granulocytes <=0.4 10e3/uL 0.0   Absolute Lymphocytes 0.8 - 5.3 10e3/uL 0.8   Absolute Monocytes 0.0 - 1.3 10e3/uL 0.3   % Immature Granulocytes % 1   Absolute Neutrophils 1.6 - 8.3 10e3/uL 0.8 (L)   Absolute NRBCs 10e3/uL 0.0   NRBCs per 100 WBC <1 /100 0   (H): Data is abnormally high  (L): Data is abnormally low      Assessment and Plan:  # Metastatic Breast Cancer   de tavon metastatic left breast invasive lobular carcinoma, ER positive, [place] positive, her2 negative on FISH with bone and LN metastases. Started treatment Nov 2023  - Currently on treatment with Ribociclib 600mg day 1-21 off 7 days + Letrozole 2.5mg daily  - Tolerating treatment well. Due for cycle 4 today  - ANC low at 0.8. DEFER treatment by one week. If ANC >=1 can restart at 600mg dosing. If still low then will need to continue delay in start and consider dose reduction  - EKG QTc improved 409  - MD with EKG and labs in March as scheduled   - Has been referred to genetics   - PET with positive response to treatment. CCA 15-3 has improved     # Chest Pressure  3 isolated episodes of chest pressure, unclear etiology. Given resolution of symptoms will hold off on sending to ED. EKG reviewed, she has had questionable anterior infarct but this has been present on multiple EKG.   -  Stress Echo to r/o CAD, angina  - Discussed going to ED with any recurrent symptoms     # Nausea  Mild, manageable  - Continue Zofran PRN     # Bone Mets  S/p radiation to sacrum Jan 2024. Pain improved  - No pain issues  - Continue Zometa monthly, due this week  - Continue calcium and vit D supplement     # LLE DVT  Diagnosed Jan 2024  - Continue Eliquis     35 minutes spent on the date of the encounter doing chart review, review of test results, interpretation of tests, patient visit, and documentation     Dimas Del Cid PA-C  Department of Hematology and Oncology  [facility]

## 2024-02-27 ENCOUNTER — APPOINTMENT (OUTPATIENT)
Dept: LAB | Facility: CLINIC | Age: 64
End: 2024-02-27
Payer: COMMERCIAL

## 2024-02-27 ENCOUNTER — VIRTUAL VISIT (OUTPATIENT)
Dept: ONCOLOGY | Facility: CLINIC | Age: 64
End: 2024-02-27
Attending: PHYSICIAN ASSISTANT
Payer: COMMERCIAL

## 2024-02-27 ENCOUNTER — HOSPITAL ENCOUNTER (OUTPATIENT)
Dept: CARDIOLOGY | Facility: CLINIC | Age: 64
Discharge: HOME | End: 2024-02-27
Attending: INTERNAL MEDICINE | Admitting: INTERNAL MEDICINE
Payer: COMMERCIAL

## 2024-02-27 VITALS — HEIGHT: 66 IN | WEIGHT: 209 LBS | BODY MASS INDEX: 33.59 KG/M2

## 2024-02-27 DIAGNOSIS — C79.51 MALIGNANT NEOPLASM METASTATIC TO BONE (H): Primary | ICD-10-CM

## 2024-02-27 DIAGNOSIS — C79.51 CARCINOMA OF BREAST METASTATIC TO BONE, UNSPECIFIED LATERALITY (H): Primary | ICD-10-CM

## 2024-02-27 DIAGNOSIS — C50.919 CARCINOMA OF BREAST METASTATIC TO BONE, UNSPECIFIED LATERALITY (H): Primary | ICD-10-CM

## 2024-02-27 DIAGNOSIS — C79.51 SECONDARY MALIGNANT NEOPLASM OF BONE AND BONE MARROW (H): ICD-10-CM

## 2024-02-27 DIAGNOSIS — C79.52 SECONDARY MALIGNANT NEOPLASM OF BONE AND BONE MARROW (H): ICD-10-CM

## 2024-02-27 DIAGNOSIS — R07.89 CHEST PRESSURE: ICD-10-CM

## 2024-02-27 LAB
ALBUMIN SERPL BCG-MCNC: 4.1 G/DL (ref 3.5–5.2)
ALP SERPL-CCNC: 69 U/L (ref 40–150)
ALT SERPL W P-5'-P-CCNC: 19 U/L (ref 0–50)
ANION GAP SERPL CALCULATED.3IONS-SCNC: 9 MMOL/L (ref 7–15)
AST SERPL W P-5'-P-CCNC: 22 U/L (ref 0–45)
BASOPHILS # BLD AUTO: 0.1 10E3/UL (ref 0–0.2)
BASOPHILS NFR BLD AUTO: 3 %
BILIRUB SERPL-MCNC: 0.3 MG/DL
BUN SERPL-MCNC: 21.7 MG/DL (ref 8–23)
CALCIUM SERPL-MCNC: 8.9 MG/DL (ref 8.8–10.2)
CHLORIDE SERPL-SCNC: 105 MMOL/L (ref 98–107)
CREAT SERPL-MCNC: 1.15 MG/DL (ref 0.51–0.95)
DEPRECATED HCO3 PLAS-SCNC: 28 MMOL/L (ref 22–29)
EGFRCR SERPLBLD CKD-EPI 2021: 54 ML/MIN/1.73M2
EOSINOPHIL # BLD AUTO: 0 10E3/UL (ref 0–0.7)
EOSINOPHIL NFR BLD AUTO: 2 %
ERYTHROCYTE [DISTWIDTH] IN BLOOD BY AUTOMATED COUNT: 15.9 % (ref 10–15)
GLUCOSE SERPL-MCNC: 88 MG/DL (ref 70–99)
HCT VFR BLD AUTO: 33.7 % (ref 35–47)
HGB BLD-MCNC: 11.2 G/DL (ref 11.7–15.7)
IMM GRANULOCYTES # BLD: 0 10E3/UL
IMM GRANULOCYTES NFR BLD: 1 %
LYMPHOCYTES # BLD AUTO: 0.8 10E3/UL (ref 0.8–5.3)
LYMPHOCYTES NFR BLD AUTO: 38 %
MAGNESIUM SERPL-MCNC: 2 MG/DL (ref 1.7–2.3)
MCH RBC QN AUTO: 33.2 PG (ref 26.5–33)
MCHC RBC AUTO-ENTMCNC: 33.2 G/DL (ref 31.5–36.5)
MCV RBC AUTO: 100 FL (ref 78–100)
MONOCYTES # BLD AUTO: 0.3 10E3/UL (ref 0–1.3)
MONOCYTES NFR BLD AUTO: 16 %
NEUTROPHILS # BLD AUTO: 0.8 10E3/UL (ref 1.6–8.3)
NEUTROPHILS NFR BLD AUTO: 40 %
NRBC # BLD AUTO: 0 10E3/UL
NRBC BLD AUTO-RTO: 0 /100
PHOSPHATE SERPL-MCNC: 4 MG/DL (ref 2.5–4.5)
PLATELET # BLD AUTO: 155 10E3/UL (ref 150–450)
POTASSIUM SERPL-SCNC: 4.3 MMOL/L (ref 3.4–5.3)
PROT SERPL-MCNC: 6.8 G/DL (ref 6.4–8.3)
RBC # BLD AUTO: 3.37 10E6/UL (ref 3.8–5.2)
SODIUM SERPL-SCNC: 142 MMOL/L (ref 135–145)
WBC # BLD AUTO: 2 10E3/UL (ref 4–11)

## 2024-02-27 PROCEDURE — 85025 COMPLETE CBC W/AUTO DIFF WBC: CPT | Performed by: INTERNAL MEDICINE

## 2024-02-27 PROCEDURE — 82374 ASSAY BLOOD CARBON DIOXIDE: CPT | Performed by: INTERNAL MEDICINE

## 2024-02-27 PROCEDURE — 93005 ELECTROCARDIOGRAM TRACING: CPT | Performed by: REHABILITATION PRACTITIONER

## 2024-02-27 PROCEDURE — 99207 EKG 12-LEAD CLINIC READ: CPT | Performed by: INTERNAL MEDICINE

## 2024-02-27 PROCEDURE — 83735 ASSAY OF MAGNESIUM: CPT | Performed by: INTERNAL MEDICINE

## 2024-02-27 PROCEDURE — 36415 COLL VENOUS BLD VENIPUNCTURE: CPT | Performed by: INTERNAL MEDICINE

## 2024-02-27 PROCEDURE — 84100 ASSAY OF PHOSPHORUS: CPT | Performed by: INTERNAL MEDICINE

## 2024-02-27 PROCEDURE — 99214 OFFICE O/P EST MOD 30 MIN: CPT | Performed by: PHYSICIAN ASSISTANT

## 2024-02-27 RX ORDER — HEPARIN SODIUM,PORCINE 10 UNIT/ML
5-20 VIAL (ML) INTRAVENOUS DAILY PRN
Status: CANCELLED | OUTPATIENT
Start: 2024-02-27

## 2024-02-27 RX ORDER — HEPARIN SODIUM (PORCINE) LOCK FLUSH IV SOLN 100 UNIT/ML 100 UNIT/ML
5 SOLUTION INTRAVENOUS
Status: CANCELLED | OUTPATIENT
Start: 2024-02-27

## 2024-02-27 RX ORDER — ZOLEDRONIC ACID 0.04 MG/ML
4 INJECTION, SOLUTION INTRAVENOUS ONCE
Status: CANCELLED | OUTPATIENT
Start: 2024-02-27 | End: 2024-02-27

## 2024-02-27 NOTE — NURSING NOTE
Is the patient currently in the state of [place]? YES    Visit mode:VIDEO    If the visit is dropped, the patient can be reconnected by: TELEPHONE VISIT: Phone number: [phone]    Will anyone else be joining the visit? NO  (If patient encounters technical issues they should call [phone]    How would you like to obtain your AVS? MyChart    Are changes needed to the allergy or medication list? No    Reason for visit: NUECK    Guerita RIVERO

## 2024-02-27 NOTE — LETTER
2/27/2024         RE: Kesha Zacarias  [address] [address] [place] [id number]        Dear Colleague,    Thank you for referring your patient, Kesha Zacarias, to the [facility]. Please see a copy of my visit note below.    Virtual Visit Details    Type of service:  Video Visit     Originating Location (pt. Location): Home    Distant Location (provider location):  On-site  Platform used for Video Visit: [facility]    Oncology/Hematology Visit Note  Feb 27, 2024    Reason for visit: Follow up metastatic breast cancer     Oncology HPI: Kesha Zacarias is a 63 year old female with left-sided breast cancer.  In September 2023, she noticed a mass with nipple retraction and mammogram revealed focal dense tissue with anterior skin thickening.  Ultrasound with 3 x 1.7 x 3.9 cm mass, biopsy with multifocal lobular carcinoma in situ and left axillary lymph node positive for metastatic lobular carcinoma.  ER/WI positive, HER2 negative.  PET scan in October 23 with multiple FDG avid lesions in the bony structures of the head and neck.  Brain MRI with calvarial lesions.     Dr. Maravilla recommended ribociclib 600 mg daily (21 days on, 7 days off) + letrozole daily with Zometa monthly. She also received palliative RT to sacrum Jan 2024.      PET 2/17/24 with partial response to therapy.     Interval History:  Kesha is overall doing well. Tolerating ribociclib well. She has mild nausea that is manageable. Has had improvement in bone pain, not needing any meds. Denies any fevers.     She does note that yesterday she had 3 10min episodes of chest pressure and difficulty taking a deep breath. Symptoms occurred at rest and resolved on their own. Has not had this before. Outside of these episodes denies any chest pain or SOB. No cardiac history.     Current Outpatient Medications   Medication Sig Dispense Refill     apixaban ANTICOAGULANT (ELIQUIS) 5 MG tablet Take 1 tablet (5 mg) by mouth 2 times daily  60 tablet 3     betamethasone dipropionate (DIPROSONE) 0.05 % external lotion Apply topically 2 times daily 60 mL 3     COENZYME Q-10 PO Take 1 tablet by mouth every other day       cyclobenzaprine (FLEXERIL) 5 MG tablet TAKE 1 TABLET(5 MG) BY MOUTH AT BEDTIME 90 tablet 3     docusate sodium (COLACE) 100 MG capsule Take 1 capsule (100 mg) by mouth 3 times daily as needed for constipation 90 capsule 3     fish oil-omega-3 fatty acids 1000 MG capsule Take 2 g by mouth daily       hydroxychloroquine (PLAQUENIL) 200 MG tablet TAKE 2 AND 1/2 TABLETS BY MOUTH EVERY  tablet 3     letrozole (FEMARA) 2.5 MG tablet Take 1 tablet (2.5 mg) by mouth every morning 90 tablet 3     loperamide (IMODIUM A-D) 2 MG tablet When diarrhea starts take 2 tabs (4mg), then with each additional episode of diarrhea take 1 additional tab and if needing more than 8 tabs in 24 hrs call oncology 30 tablet 3     magnesium 250 MG tablet Take 1 tablet by mouth daily       ondansetron (ZOFRAN) 4 MG tablet Take 1 tablet (4 mg) by mouth every 6 hours as needed for nausea 30 tablet 3     prochlorperazine (COMPAZINE) 10 MG tablet Take 1 tablet (10 mg) by mouth every 6 hours as needed for nausea or vomiting 30 tablet 2     [START ON 2/27/2024] ribociclib (KISQALI) 200 MG tablet Take 3 tablets (600 mg) by mouth every morning for 21 days , then off for 7 days. 63 tablet 0       Past Medical History  Past Medical History:   Diagnosis Date     Arthritis 2006     Breast cancer metastasized to bone (H) 10/12/2023     Chronic depressive personality disorder      Dysplasia of cervix (uteri) 1988    Cryotherapy     Female infertility of unspecified origin      Glaucoma      Rheumatoid arthritis (H)      Past Surgical History:   Procedure Laterality Date     COLONOSCOPY       COLONOSCOPY N/A 01/14/2022    Procedure: COLONOSCOPY, WITH POLYPECTOMY AND BIOPSY;  Surgeon: Gagandeep Patterson MD;  Location: [facility]     HC INTRODUCE CATH FALLOPIAN TUBE, RE-OPEN/DIAGNOSIS  "      HERNIA REPAIR, INCISIONAL  2009     JOINT REPLACEMENT Right 2017    knee     TONSILLECTOMY       Z APPENDECTOMY  2003     [facility] REMV CATARACT INTRACAP,INSERT LENS  2003    right     Allergies   Allergen Reactions     Ciprofloxacin      hives and was on flagyl too     Metronidazole      hives and was on cipro too     Social History   Social History     Tobacco Use     Smoking status: Former     Packs/day: 0.50     Years: 25.00     Additional pack years: 0.00     Total pack years: 12.50     Types: Cigarettes     Quit date: 2017     Years since quittin.4     Smokeless tobacco: Never   Vaping Use     Vaping Use: Never used   Substance Use Topics     Alcohol use: Not Currently     Drug use: Yes     Types: Marijuana      Past medical history and social history were reviewed.    Physical Examination:  Ht 1.676 m (5' 6\")   Wt 94.8 kg (209 lb)   LMP 2011 (Exact Date)   BMI 33.73 kg/m    Wt Readings from Last 10 Encounters:   24 94.8 kg (209 lb)   24 92.9 kg (204 lb 11.2 oz)   24 91.2 kg (201 lb)   24 91.3 kg (201 lb 3.2 oz)   24 92.3 kg (203 lb 6.4 oz)   24 92.4 kg (203 lb 9.6 oz)   24 93.9 kg (207 lb)   24 93.9 kg (207 lb)   23 90.7 kg (200 lb)   23 90.7 kg (200 lb)     Video physical exam  General: Patient appears well in no acute distress.   Skin: No visualized rash or lesions on visualized skin  Eyes: EOMI, no erythema, sclera icterus or discharge noted  Resp: Appears to be breathing comfortably without accessory muscle usage, speaking in full sentences, no cough  MSK: Appears to have normal range of motion based on visualized movements  Neurologic: No apparent tremors, facial movements symmetric  Psych: affect normal, alert and oriented    Laboratory Data:   Latest Reference Range & Units 24 07:49   Sodium 135 - 145 mmol/L 142   Potassium 3.4 - 5.3 mmol/L 4.3   Chloride 98 - 107 mmol/L 105   Carbon Dioxide " (CO2) 22 - 29 mmol/L 28   Urea Nitrogen 8.0 - 23.0 mg/dL 21.7   Creatinine 0.51 - 0.95 mg/dL 1.15 (H)   GFR Estimate >60 mL/min/1.73m2 54 (L)   Calcium 8.8 - 10.2 mg/dL 8.9   Anion Gap 7 - 15 mmol/L 9   Magnesium 1.7 - 2.3 mg/dL 2.0   Phosphorus 2.5 - 4.5 mg/dL 4.0   Albumin 3.5 - 5.2 g/dL 4.1   Protein Total 6.4 - 8.3 g/dL 6.8   Alkaline Phosphatase 40 - 150 U/L 69   ALT 0 - 50 U/L 19   AST 0 - 45 U/L 22   Bilirubin Total <=1.2 mg/dL 0.3   Glucose 70 - 99 mg/dL 88   WBC 4.0 - 11.0 10e3/uL 2.0 (L)   Hemoglobin 11.7 - 15.7 g/dL 11.2 (L)   Hematocrit 35.0 - 47.0 % 33.7 (L)   Platelet Count 150 - 450 10e3/uL 155   RBC Count 3.80 - 5.20 10e6/uL 3.37 (L)   MCV 78 - 100 fL 100   MCH 26.5 - 33.0 pg 33.2 (H)   MCHC 31.5 - 36.5 g/dL 33.2   RDW 10.0 - 15.0 % 15.9 (H)   % Neutrophils % 40   % Lymphocytes % 38   % Monocytes % 16   % Eosinophils % 2   % Basophils % 3   Absolute Basophils 0.0 - 0.2 10e3/uL 0.1   Absolute Eosinophils 0.0 - 0.7 10e3/uL 0.0   Absolute Immature Granulocytes <=0.4 10e3/uL 0.0   Absolute Lymphocytes 0.8 - 5.3 10e3/uL 0.8   Absolute Monocytes 0.0 - 1.3 10e3/uL 0.3   % Immature Granulocytes % 1   Absolute Neutrophils 1.6 - 8.3 10e3/uL 0.8 (L)   Absolute NRBCs 10e3/uL 0.0   NRBCs per 100 WBC <1 /100 0   (H): Data is abnormally high  (L): Data is abnormally low      Assessment and Plan:  # Metastatic Breast Cancer   de tavon metastatic left breast invasive lobular carcinoma, ER positive, SD positive, her2 negative on FISH with bone and LN metastases. Started treatment Nov 2023  - Currently on treatment with Ribociclib 600mg day 1-21 off 7 days + Letrozole 2.5mg daily  - Tolerating treatment well. Due for cycle 4 today  - ANC low at 0.8. DEFER treatment by one week. If ANC >=1 can restart at 600mg dosing. If still low then will need to continue delay in start and consider dose reduction  - EKG QTc improved 409  - MD with EKG and labs in March as scheduled   - Has been referred to genetics   - PET with  positive response to treatment. CCA 15-3 has improved     # Chest Pressure  3 isolated episodes of chest pressure, unclear etiology. Given resolution of symptoms will hold off on sending to ED. EKG reviewed, she has had questionable anterior infarct but this has been present on multiple EKG.   - Stress Echo to r/o CAD, angina  - Discussed going to ED with any recurrent symptoms     # Nausea  Mild, manageable  - Continue Zofran PRN     # Bone Mets  S/p radiation to sacrum Jan 2024. Pain improved  - No pain issues  - Continue Zometa monthly, due this week  - Continue calcium and vit D supplement     # LLE DVT  Diagnosed Jan 2024  - Continue Eliquis     35 minutes spent on the date of the encounter doing chart review, review of test results, interpretation of tests, patient visit, and documentation     Dimas Del Cid PA-C  Department of Hematology and Oncology  [facility]       Again, thank you for allowing me to participate in the care of your patient.        Sincerely,        KASEY Verduzco

## 2024-02-27 NOTE — LETTER
2/27/2024         RE: Kesha Zacarias  [address] [address] [place] [id number]        Dear Colleague,    Thank you for referring your patient, Kesha Zacarias, to the [facility]. Please see a copy of my visit note below.    Virtual Visit Details    Type of service:  Video Visit     Originating Location (pt. Location): Home    Distant Location (provider location):  On-site  Platform used for Video Visit: [facility]    Oncology/Hematology Visit Note  Feb 27, 2024    Reason for visit: Follow up metastatic breast cancer     Oncology HPI: Kesha Zacarias is a 63 year old female with left-sided breast cancer.  In September 2023, she noticed a mass with nipple retraction and mammogram revealed focal dense tissue with anterior skin thickening.  Ultrasound with 3 x 1.7 x 3.9 cm mass, biopsy with multifocal lobular carcinoma in situ and left axillary lymph node positive for metastatic lobular carcinoma.  ER/RI positive, HER2 negative.  PET scan in October 23 with multiple FDG avid lesions in the bony structures of the head and neck.  Brain MRI with calvarial lesions.     Dr. Maravilla recommended ribociclib 600 mg daily (21 days on, 7 days off) + letrozole daily with Zometa monthly. She also received palliative RT to sacrum Jan 2024.      PET 2/17/24 with partial response to therapy.     Interval History:  Kesha is overall doing well. Tolerating ribociclib well. She has mild nausea that is manageable. Has had improvement in bone pain, not needing any meds. Denies any fevers.     She does note that yesterday she had 3 10min episodes of chest pressure and difficulty taking a deep breath. Symptoms occurred at rest and resolved on their own. Has not had this before. Outside of these episodes denies any chest pain or SOB. No cardiac history.     Current Outpatient Medications   Medication Sig Dispense Refill     apixaban ANTICOAGULANT (ELIQUIS) 5 MG tablet Take 1 tablet (5 mg) by mouth 2 times daily  60 tablet 3     betamethasone dipropionate (DIPROSONE) 0.05 % external lotion Apply topically 2 times daily 60 mL 3     COENZYME Q-10 PO Take 1 tablet by mouth every other day       cyclobenzaprine (FLEXERIL) 5 MG tablet TAKE 1 TABLET(5 MG) BY MOUTH AT BEDTIME 90 tablet 3     docusate sodium (COLACE) 100 MG capsule Take 1 capsule (100 mg) by mouth 3 times daily as needed for constipation 90 capsule 3     fish oil-omega-3 fatty acids 1000 MG capsule Take 2 g by mouth daily       hydroxychloroquine (PLAQUENIL) 200 MG tablet TAKE 2 AND 1/2 TABLETS BY MOUTH EVERY  tablet 3     letrozole (FEMARA) 2.5 MG tablet Take 1 tablet (2.5 mg) by mouth every morning 90 tablet 3     loperamide (IMODIUM A-D) 2 MG tablet When diarrhea starts take 2 tabs (4mg), then with each additional episode of diarrhea take 1 additional tab and if needing more than 8 tabs in 24 hrs call oncology 30 tablet 3     magnesium 250 MG tablet Take 1 tablet by mouth daily       ondansetron (ZOFRAN) 4 MG tablet Take 1 tablet (4 mg) by mouth every 6 hours as needed for nausea 30 tablet 3     prochlorperazine (COMPAZINE) 10 MG tablet Take 1 tablet (10 mg) by mouth every 6 hours as needed for nausea or vomiting 30 tablet 2     [START ON 2/27/2024] ribociclib (KISQALI) 200 MG tablet Take 3 tablets (600 mg) by mouth every morning for 21 days , then off for 7 days. 63 tablet 0       Past Medical History  Past Medical History:   Diagnosis Date     Arthritis 2006     Breast cancer metastasized to bone (H) 10/12/2023     Chronic depressive personality disorder      Dysplasia of cervix (uteri) 1988    Cryotherapy     Female infertility of unspecified origin      Glaucoma      Rheumatoid arthritis (H)      Past Surgical History:   Procedure Laterality Date     COLONOSCOPY       COLONOSCOPY N/A 01/14/2022    Procedure: COLONOSCOPY, WITH POLYPECTOMY AND BIOPSY;  Surgeon: Gagandeep Patterson MD;  Location: [facility]     HC INTRODUCE CATH FALLOPIAN TUBE, RE-OPEN/DIAGNOSIS  "      HERNIA REPAIR, INCISIONAL  2009     JOINT REPLACEMENT Right 2017    knee     TONSILLECTOMY       Z APPENDECTOMY  2003     [facility] REMV CATARACT INTRACAP,INSERT LENS  2003    right     Allergies   Allergen Reactions     Ciprofloxacin      hives and was on flagyl too     Metronidazole      hives and was on cipro too     Social History   Social History     Tobacco Use     Smoking status: Former     Packs/day: 0.50     Years: 25.00     Additional pack years: 0.00     Total pack years: 12.50     Types: Cigarettes     Quit date: 2017     Years since quittin.4     Smokeless tobacco: Never   Vaping Use     Vaping Use: Never used   Substance Use Topics     Alcohol use: Not Currently     Drug use: Yes     Types: Marijuana      Past medical history and social history were reviewed.    Physical Examination:  Ht 1.676 m (5' 6\")   Wt 94.8 kg (209 lb)   LMP 2011 (Exact Date)   BMI 33.73 kg/m    Wt Readings from Last 10 Encounters:   24 94.8 kg (209 lb)   24 92.9 kg (204 lb 11.2 oz)   24 91.2 kg (201 lb)   24 91.3 kg (201 lb 3.2 oz)   24 92.3 kg (203 lb 6.4 oz)   24 92.4 kg (203 lb 9.6 oz)   24 93.9 kg (207 lb)   24 93.9 kg (207 lb)   23 90.7 kg (200 lb)   23 90.7 kg (200 lb)     Video physical exam  General: Patient appears well in no acute distress.   Skin: No visualized rash or lesions on visualized skin  Eyes: EOMI, no erythema, sclera icterus or discharge noted  Resp: Appears to be breathing comfortably without accessory muscle usage, speaking in full sentences, no cough  MSK: Appears to have normal range of motion based on visualized movements  Neurologic: No apparent tremors, facial movements symmetric  Psych: affect normal, alert and oriented    Laboratory Data:   Latest Reference Range & Units 24 07:49   Sodium 135 - 145 mmol/L 142   Potassium 3.4 - 5.3 mmol/L 4.3   Chloride 98 - 107 mmol/L 105   Carbon Dioxide " (CO2) 22 - 29 mmol/L 28   Urea Nitrogen 8.0 - 23.0 mg/dL 21.7   Creatinine 0.51 - 0.95 mg/dL 1.15 (H)   GFR Estimate >60 mL/min/1.73m2 54 (L)   Calcium 8.8 - 10.2 mg/dL 8.9   Anion Gap 7 - 15 mmol/L 9   Magnesium 1.7 - 2.3 mg/dL 2.0   Phosphorus 2.5 - 4.5 mg/dL 4.0   Albumin 3.5 - 5.2 g/dL 4.1   Protein Total 6.4 - 8.3 g/dL 6.8   Alkaline Phosphatase 40 - 150 U/L 69   ALT 0 - 50 U/L 19   AST 0 - 45 U/L 22   Bilirubin Total <=1.2 mg/dL 0.3   Glucose 70 - 99 mg/dL 88   WBC 4.0 - 11.0 10e3/uL 2.0 (L)   Hemoglobin 11.7 - 15.7 g/dL 11.2 (L)   Hematocrit 35.0 - 47.0 % 33.7 (L)   Platelet Count 150 - 450 10e3/uL 155   RBC Count 3.80 - 5.20 10e6/uL 3.37 (L)   MCV 78 - 100 fL 100   MCH 26.5 - 33.0 pg 33.2 (H)   MCHC 31.5 - 36.5 g/dL 33.2   RDW 10.0 - 15.0 % 15.9 (H)   % Neutrophils % 40   % Lymphocytes % 38   % Monocytes % 16   % Eosinophils % 2   % Basophils % 3   Absolute Basophils 0.0 - 0.2 10e3/uL 0.1   Absolute Eosinophils 0.0 - 0.7 10e3/uL 0.0   Absolute Immature Granulocytes <=0.4 10e3/uL 0.0   Absolute Lymphocytes 0.8 - 5.3 10e3/uL 0.8   Absolute Monocytes 0.0 - 1.3 10e3/uL 0.3   % Immature Granulocytes % 1   Absolute Neutrophils 1.6 - 8.3 10e3/uL 0.8 (L)   Absolute NRBCs 10e3/uL 0.0   NRBCs per 100 WBC <1 /100 0   (H): Data is abnormally high  (L): Data is abnormally low      Assessment and Plan:  # Metastatic Breast Cancer   de tavon metastatic left breast invasive lobular carcinoma, ER positive, TN positive, her2 negative on FISH with bone and LN metastases. Started treatment Nov 2023  - Currently on treatment with Ribociclib 600mg day 1-21 off 7 days + Letrozole 2.5mg daily  - Tolerating treatment well. Due for cycle 4 today  - ANC low at 0.8. DEFER treatment by one week. If ANC >=1 can restart at 600mg dosing. If still low then will need to continue delay in start and consider dose reduction  - EKG QTc improved 409  - MD with EKG and labs in March as scheduled   - Has been referred to genetics   - PET with  positive response to treatment. CCA 15-3 has improved     # Chest Pressure  3 isolated episodes of chest pressure, unclear etiology. Given resolution of symptoms will hold off on sending to ED. EKG reviewed, she has had questionable anterior infarct but this has been present on multiple EKG.   - Stress Echo to r/o CAD, angina  - Discussed going to ED with any recurrent symptoms     # Nausea  Mild, manageable  - Continue Zofran PRN     # Bone Mets  S/p radiation to sacrum Jan 2024. Pain improved  - No pain issues  - Continue Zometa monthly, due this week  - Continue calcium and vit D supplement     # LLE DVT  Diagnosed Jan 2024  - Continue Eliquis     35 minutes spent on the date of the encounter doing chart review, review of test results, interpretation of tests, patient visit, and documentation     Dimas Del Cid PA-C  Department of Hematology and Oncology  [facility]       Again, thank you for allowing me to participate in the care of your patient.        Sincerely,        KASEY Verduzco

## 2024-02-28 ENCOUNTER — HOSPITAL ENCOUNTER (OUTPATIENT)
Dept: CARDIOLOGY | Facility: CLINIC | Age: 64
Discharge: HOME | End: 2024-02-28
Attending: PHYSICIAN ASSISTANT | Admitting: PHYSICIAN ASSISTANT
Payer: COMMERCIAL

## 2024-02-28 DIAGNOSIS — R07.89 CHEST PRESSURE: ICD-10-CM

## 2024-02-28 PROCEDURE — 93350 STRESS TTE ONLY: CPT | Mod: 26 | Performed by: INTERNAL MEDICINE

## 2024-02-28 PROCEDURE — 999N000208 ECHO STRESS ECHOCARDIOGRAM

## 2024-02-28 PROCEDURE — 93018 CV STRESS TEST I&R ONLY: CPT | Performed by: INTERNAL MEDICINE

## 2024-02-28 PROCEDURE — 93016 CV STRESS TEST SUPVJ ONLY: CPT | Performed by: INTERNAL MEDICINE

## 2024-02-28 PROCEDURE — 255N000002 HC RX 255 OP 636: Performed by: INTERNAL MEDICINE

## 2024-02-28 PROCEDURE — 93325 DOPPLER ECHO COLOR FLOW MAPG: CPT | Mod: 26 | Performed by: INTERNAL MEDICINE

## 2024-02-28 PROCEDURE — 93321 DOPPLER ECHO F-UP/LMTD STD: CPT | Mod: 26 | Performed by: INTERNAL MEDICINE

## 2024-02-28 RX ADMIN — PERFLUTREN 4 ML: 6.52 INJECTION, SUSPENSION INTRAVENOUS at 09:39

## 2024-02-29 ENCOUNTER — INFUSION THERAPY VISIT (OUTPATIENT)
Dept: INFUSION THERAPY | Facility: CLINIC | Age: 64
End: 2024-02-29
Attending: INTERNAL MEDICINE
Payer: COMMERCIAL

## 2024-02-29 VITALS
RESPIRATION RATE: 16 BRPM | WEIGHT: 210.1 LBS | DIASTOLIC BLOOD PRESSURE: 74 MMHG | BODY MASS INDEX: 33.91 KG/M2 | OXYGEN SATURATION: 98 % | TEMPERATURE: 98 F | SYSTOLIC BLOOD PRESSURE: 133 MMHG | HEART RATE: 81 BPM

## 2024-02-29 DIAGNOSIS — C50.919 CARCINOMA OF BREAST METASTATIC TO BONE, UNSPECIFIED LATERALITY (H): Primary | ICD-10-CM

## 2024-02-29 DIAGNOSIS — C79.51 CARCINOMA OF BREAST METASTATIC TO BONE, UNSPECIFIED LATERALITY (H): Primary | ICD-10-CM

## 2024-02-29 PROCEDURE — 96365 THER/PROPH/DIAG IV INF INIT: CPT

## 2024-02-29 PROCEDURE — 258N000003 HC RX IP 258 OP 636: Performed by: PHYSICIAN ASSISTANT

## 2024-02-29 PROCEDURE — 250N000011 HC RX IP 250 OP 636: Mod: JZ | Performed by: PHYSICIAN ASSISTANT

## 2024-02-29 RX ORDER — ZOLEDRONIC ACID 0.04 MG/ML
4 INJECTION, SOLUTION INTRAVENOUS ONCE
Status: COMPLETED | OUTPATIENT
Start: 2024-02-29 | End: 2024-02-29

## 2024-02-29 RX ADMIN — ZOLEDRONIC ACID 4 MG: 0.04 INJECTION, SOLUTION INTRAVENOUS at 08:31

## 2024-02-29 RX ADMIN — SODIUM CHLORIDE 250 ML: 9 INJECTION, SOLUTION INTRAVENOUS at 08:31

## 2024-02-29 NOTE — PROGRESS NOTES
Infusion Nursing Note:  Kesha Zacarias presents today for Zometa.    Patient seen by provider today: No   present during visit today: Not Applicable.    Note: Kesha has intermittent nausea, controlled with zofran. Denies pain.  Able to continue working.      Intravenous Access:  Peripheral IV placed.    Treatment Conditions:  Lab Results   Component Value Date     02/27/2024    POTASSIUM 4.3 02/27/2024    MAG 2.0 02/27/2024    CR 1.15 (H) 02/27/2024    NITISH 8.9 02/27/2024    BILITOTAL 0.3 02/27/2024    ALBUMIN 4.1 02/27/2024    ALT 19 02/27/2024    AST 22 02/27/2024       Results reviewed, labs MET treatment parameters, ok to proceed with treatment.      Post Infusion Assessment:  Patient tolerated infusion without incident.  Site patent and intact, free from redness, edema or discomfort.  No evidence of extravasations.  Access discontinued per protocol.       Discharge Plan:   Discharge instructions reviewed with: Patient.  Patient and/or family verbalized understanding of discharge instructions and all questions answered.  Patient discharged in stable condition accompanied by: self.  Departure Mode: Ambulatory.      Monica Hernandez RN

## 2024-03-05 ENCOUNTER — LAB (OUTPATIENT)
Dept: LAB | Facility: CLINIC | Age: 64
End: 2024-03-05
Payer: COMMERCIAL

## 2024-03-05 ENCOUNTER — TELEPHONE (OUTPATIENT)
Dept: ONCOLOGY | Facility: CLINIC | Age: 64
End: 2024-03-05

## 2024-03-05 DIAGNOSIS — C79.51 MALIGNANT NEOPLASM METASTATIC TO BONE (H): ICD-10-CM

## 2024-03-05 LAB
BASOPHILS # BLD AUTO: 0.1 10E3/UL (ref 0–0.2)
BASOPHILS NFR BLD AUTO: 2 %
EOSINOPHIL # BLD AUTO: 0 10E3/UL (ref 0–0.7)
EOSINOPHIL NFR BLD AUTO: 1 %
ERYTHROCYTE [DISTWIDTH] IN BLOOD BY AUTOMATED COUNT: 15.6 % (ref 10–15)
HCT VFR BLD AUTO: 35.6 % (ref 35–47)
HGB BLD-MCNC: 11.7 G/DL (ref 11.7–15.7)
IMM GRANULOCYTES # BLD: 0 10E3/UL
IMM GRANULOCYTES NFR BLD: 0 %
LYMPHOCYTES # BLD AUTO: 0.8 10E3/UL (ref 0–5.3)
LYMPHOCYTES NFR BLD AUTO: 30 %
MCH RBC QN AUTO: 33.1 PG (ref 26.5–33)
MCHC RBC AUTO-ENTMCNC: 32.9 G/DL (ref 31.5–36.5)
MCV RBC AUTO: 101 FL (ref 78–100)
MONOCYTES # BLD AUTO: 0.4 10E3/UL (ref 0–1.3)
MONOCYTES NFR BLD AUTO: 13 %
NEUTROPHILS # BLD AUTO: 1.5 10E3/UL (ref 1.6–8.3)
NEUTROPHILS NFR BLD AUTO: 54 %
NRBC # BLD AUTO: 0 10E3/UL
NRBC BLD AUTO-RTO: 0 /100
PLATELET # BLD AUTO: 207 10E3/UL (ref 150–450)
RBC # BLD AUTO: 3.53 10E6/UL (ref 3.8–5.2)
WBC # BLD AUTO: 2.8 10E3/UL (ref 4–11)

## 2024-03-05 PROCEDURE — 36415 COLL VENOUS BLD VENIPUNCTURE: CPT

## 2024-03-05 PROCEDURE — 85025 COMPLETE CBC W/AUTO DIFF WBC: CPT

## 2024-03-05 NOTE — ORAL ONC MGMT
Oral Chemotherapy Program  Lab review     Reviewed labs from 3/5/24     Labs are unremarkable and patient is able to restart ribocicilb  600 mg daily for 21 days, then off for 7 days.     Follow-up/plan  I discussed this with Kesha and she plans to restart tomorrow (3/6/24).     Beto Briceno, PharmD  [facility]  [phone]  March 5, 2024

## 2024-03-13 ENCOUNTER — PATIENT OUTREACH (OUTPATIENT)
Dept: CARE COORDINATION | Facility: CLINIC | Age: 64
End: 2024-03-13
Payer: COMMERCIAL

## 2024-03-13 NOTE — PROGRESS NOTES
"Social Work - Follow-Up  [facility]    Data/Intervention:  Kesha has metastatic breast cancer followed by Dr. Maravilla.  Patient Name: Kesha Zacarias Goes By: Kesha    /Age: 1960 (63 year old)    Reason for Follow-Up:  Checking in after initial intervention    Intervention/Education/Resources Provided:  [employer] was able to award $1500 scot.     Assessment/Plan:  Kesha stated she was taking it \"day by day\" and did not feel she needed anything else today. Will reach out to  as needed.    Previously provided patient/family with writer's contact information and availability.    CORAL Mcqueen, [facility]   Adult Oncology - [place]  [phone]  [facility] [place] on    *Please note does not work on .      "

## 2024-03-16 ENCOUNTER — HEALTH MAINTENANCE LETTER (OUTPATIENT)
Age: 64
End: 2024-03-16

## 2024-03-26 DIAGNOSIS — C79.51 CARCINOMA OF LEFT BREAST METASTATIC TO BONE (H): Primary | ICD-10-CM

## 2024-03-26 DIAGNOSIS — C50.912 CARCINOMA OF LEFT BREAST METASTATIC TO BONE (H): Primary | ICD-10-CM

## 2024-03-26 NOTE — PROGRESS NOTES
"Video-Visit Details     Video Start Time: 9:22AM     Type of service:  Video Visit     Video End Time: 9:43AM    Originating Location (pt. Location): Home     Distant Location (provider location):  [facility] off site     Platform used for Video Visit: [facility]    Hematology/Oncology Established Patient Follow Up Note      Today's Date: 4/3/24    Reason for follow up: metastatic breast cancer    HISTORY OF PRESENT ILLNESS: Kesha Zacarias is a 63 year old female who presents for follow up    Patient has medical history including rheumatoid arthritis, hyperlipidemia, osteoarthritis, bilateral cataracts and glaucoma s/p surgery, endocervical polyp s/p resection, vaginal atrophy with conjugated estrogen vaginal cream use, colon polyp (hyperplastic polyp and tubular adenoma), seasonal depression, history of tobacco use ([age] y/o, about 1 ppd).    Regarding RA:  -dx over a decade ago, on plaquenil, managed by PCP  - arthritis is most severe in hands>knees, intermittent       - 3/23 bilateral screening mammogram [facility]  - a few months ago, noted nipple retraction  - 9/23 patient palpated mass in retroareolar region of left breast and w/nipple retraction, no discharge, skin thickening, no dimpling, no erythema  - 9/23 diagnostic LEFT breast mammogram: Focal dense tissue with some likely mild architectural distortion, some anterior skin thickening is noted  - 9/23 targeted LEFT breast ultrasound: Ill-defined shadowing hypoechoic mass, 3.0 x 1.7 x 3.9 cm.  In left axilla-few small lymph nodes, 1 normal-sized lymph node with cortical thickening, 0.7 x 0.6 cm.  -9/23 ultrasound-guided LEFT breast core biopsy of 12:00 lesion with clip placement, \"Postbiopsy unilateral digital mammogram of the left breast showed the clip to be at the expected biopsy site\" AND ultrasound-guided left axillary lymph node biopsy of lymph node with cortical thickening  PATHOLOGY  A.  Breast, left, 12:00, " biopsy:  -Lobular carcinoma in situ.-Multiple foci  -Invasive carcinoma is not identified.     B.  Lymph node, left axillary, biopsy:  -Positive for metastatic lobular carcinoma, grade 2  -Largest metastatic focus is 7 mm.  -Negative for extranodal extension.  -Breast ancillary testing:  -Estrogen receptor: Positive (91 to 100%, strong)  -Progesterone receptor: Positive (91 to 100%, strong)  -HER2 2+ IHC, FISH negative    -10/23 MRI bilateral breast:  LEFT breast:  - Heterogeneously enhancing irregular mass in the subareolar left breast, 5.0 x 4.9 x 4.9 cm, with associated nipple retraction and nipple/areolar involvement.  This mass extends superiorly through 11: positions, from anterior to mid depth.  The [employer] breast biopsy clip (which showed LCIS) is 1.5 cm posterosuperior to this mass.  - Multiple suspicious left level 1 axillary lymph node noted, including biopsy-proven metastatic node with indwelling biopsy marker, biopsied node measures 1.3 x 1.0 cm  - Heterogeneous marrow appearance of sternum and ribs with heterogeneous enhancement and a peripheral enhancing focus within the mid body.  IMPRESSION:  1. Upon further review of previous imaging and pathology results,  recommend repeat ultrasound guided large core-needle biopsy of the  discordant dominant left breast mass given metastatic left axillary  lymph node and superoposteriorly displaced left breast biopsy marker.   2. Nonspecific heterogeneous enhancement of the sternum and ribs.  Consider nuclear medicine bone scan    Lifetime estrogen exposure:  Menarche: 13   Last menstrual period: 49   Age of first pregnancy: 18   Number of pregnancies: 2 (no living children)   Weight gain: 20lb weight gain within a year  Exposure to exogenous estrogen: vaginal atrophy with conjugated estrogen vaginal cream use x4 years (intermittent), has not used it since 9/23. Birth control for about 13-15 years from her 20s-30s.      Pt reports 55lb weight loss in 1.5  years, this was intentional, was following weight watchers program (23 points available per day) 230lb->170lb->190lb (gained about 20lbs). Pt was walking on the treadmill and outside daily, about 30 mins to 1.5 miles.    10/23 labs:  AST 79, ALT 91,   CA 15-3 261    10/23 PET/CT:  Innumerable foci of FDG avid lesions are seen throughout the osseous  structures of the head and neck, most pronounced on the calvarium and  cervical spine, with prominently sclerotic appearance on CT correlate.  For example:  -Right frontal bone, SUV max 5.31.  -Left lateral mass of the atlas, SUV max 15.0  -Angle of the left mandible, SUV max 9.6  -C7 vertebral body, SUV max 17.7    Innumerable variable sized FDG avid lesions throughout the axial and  appendicular spine, including but not limited to the cervical,  thoracic, and lumbar spine, multilevel bilateral ribs, sternum,  bilateral scapula, bilateral humeri, clavicles, pelvis, and bilateral  femurs. A few of these lesions are described below:  -Large FDG avid sclerotic 3.4 cm lesion within the proximal left  humeral head, SUV max 17.24  -Sternal body, SUV max 20.35  -L2 vertebral body, SUV max 14.3 without  -Large ill-defined sclerotic lesion in the sacral body measuring up to  at least 5.9 cm, SUV max 16.84  -FDG avid focus within the left femoral head, SUV  max 13.65 without CT correlate.    Large irregular soft tissue FDG avid mass within the left breast   measuring approximately 3.2 x 2.7 cm with  extension into the superficial soft tissues and associated left nipple  retraction, SUV max 12.36 additional FDG avid. Left axillary lymph  nodes, not definitively enlarged by short axis size criteria, for  example, SUV max 5.93.    The liver is unremarkable.     Solid 3 mm non-FDG avid pulmonary nodule within the  right middle lobe    - 10/23 MRI brain:  - extensive osseous metastatic disease involving the calvarium, skull base w/involvement of occipital condyles, C1 and C2  vertebral bodies, and likely the mandible  -  Dominant calvarial lesions are located in the right frontal calvarium and right temporal calvarium without definite breach of the  inner or outer tables of the calvarium. There is a suggestion of mild asymmetric linear extra-axial enhancement deep to the dominant right temporal calvarial lesion along the dural margin, though this may be vascular in etiology.  - No abnormal parenchymal or leptomeningeal enhancement.   - sequelae of mild chronic microvascular ischemic disease. Mild generalized cerebral atrophy.       -supposed to start ribociclib 10/30/23 however, noted to have significantly elevated LFTS (10/26/23 , , alk phos 152)    10/17/23: AST 79, ALT 91, alk phos 116, t bili 0.5  10/26/23 , , alk phos 152  10/30/23: , , alk phos 178, coags WNL, ribociclib was not started, letrozole started, atorvastatin held  11/7/23: AST 96, , alk phos 169, MRI liver negative for mets  11/17/23: AST 81, , alk phos 163  11/27/23: AST 55, ALT 90, alk phos 128- started ribociclib 400mg  12/4/23: AST 26, ALT 35, alk phos 118    - 11/27/23 started ribociclib+ letrozole  - 1/9/24-1/22/24 palliative RT to sacrum w/Dr. Mandujano- 3000cGy in 10 fractions    - 2/17/24 PET CT CAP  PET/CT FINDINGS: Most of the extensive skeletal metastases have become sclerotic and non-FDG avid and those which remain avid have decreased in activity, for example in the proximal right humerus from SUVmax 19.3 to 13.9, in the proximal right femur from 16.5 to 12.2, and in the L2 vertebral body from 14.3 to 10.7.      CT FINDINGS: Bilateral lens replacements. Calcified atherosclerosis, including moderate right coronary artery disease. Splenule. Cholelithiasis. Retroaortic left renal vein. Pelvic phleboliths. Appendectomy. Ventral hernia repair with mesh. Persistent   metopic suture. T11 vertebral body hemangioma. Mild degenerative change in the spine.                                                        IMPRESSION:  Partial response     INTERIM HISTORY:    REGIMEN:  Ribociclib+letrozole  C1D1 11/27/23 ribociclib 400mg, C2D1 12/25/23 ribociclib 600mg (day 1-12 only 2/2 palliative RT to sacrum w/Dr. Mandujano 1/9/24- 1/22/24 3000cGy in 10 fractions), C3D1 1/30/24, C4D1 3/6/24 (deferred 1 week 2/2 2/27/24 ANC 0.8->3/5/24 ANC 1.5)  Ribociclib 600mg PO daily day 1-21 q28 days (11/27/23 started 400mg PO daily when LFTs improved to <3x ULN)  Letrozole 2.5mg PO daily (started 10/30/23)  Febrile neutropenia risk: neutropenia (69% to 78%; grade 3: 46% to 55%; grade 4: 7% to 10%), Febrile neutropenia (1%)    C5D1 4/3/24 planned pending labs    Treatment related AE:  - neutropenia- C4 deferred 1 week 2/2 2/27/24 ANC 0.8->3/5/24 ANC 1.5; 4/2/24 ANC 1.0  - thrombocytopenia-  intermittent, 12/23 plt 143K, 2/24 plt 123K, 4/24 plt 147K.   - macrocytic anemia- hgb 11, , 1/24 iron studies and B12 WNL  - hypocalcemia- taking calcium 600mg BID and vitamin D 1000IU  - elevated Cr- Cr 1.1 2/16/24, stable since then. increased water to 60-90 oz this week   - anxiety, mood changes-has support of family and  who are great advocates for her care,  since starting letrozole- feels more emotional with labile moods, more tearful; open to speaking with psychologist   - hot flashes- 10x/day, last a few mins, daily, affecting quality of life including sleep  - Left posterior tibial DVT- 1/19/24 sx started- Left foot pain and swelling, 1/22/24 pt called in, 1/22/24 left LE doppler: DVT in 1 of 2 left distal posterior tibial veins, started on eliquis and stopped naproxen; no issues with bleeding but does have intermitting bruising when she bumps her hand  - blurry vision b/l- last saw eye doctor 3/23 and has trifocal glasses. She has dry eyes and uses lubricating eye drops., 10/23 MRI brain as above. Stable, pending eye doctor visit 4/3/24      RESOLVED:  - constipation- probiotics, previously using  "senna BID and colace TID but after stopping oxycodone, stools are soft , no diarrhea  - chest pressure w/dyspnea: 2/24 three 10 min episodes, at rest, spontaneously resolved; 2/28/24 stress echo WNL, no recurrence of sx  - nausea- minimal before RT, then frequent with RT, 2/24 in AM- mild, uses anti-emetics about every other day, zofran helps immediately, doesn't eat breakfast but trying to have protein shakes  - leg cramps- Mg   - elevated LFTs- improved 11/27/23 to <3x ULN (AST 55, ALT 90, alk phos 128)  11/27/24 started ribociclib 400mg, 11/23 MRI liver negative for mets, ?cause- possibly viral infection between 10/17/23 and 10/26/23; 12/22/23 LFTs normal, cycle 2 started ribociclib 600mg; 1/24 LFTs WNL  - elevated Cr- increased water to 60oz/day, 1/24 Cr WNL  - daily HA- prior to starting tx- mostly b/l temporal regions, no sensitivity to light or sound, no N/V, takes tylenol w/o improvement; 10/23 MRI brain: extensive osseous mets w/dominant calvarial lesions w/enhancement deep to dominant right temporal calvarial lesion along dural margin (?vascular etiology), no parenchymal or leptomeningeal enhancement. Resolved  - back pain- left lower back, sharp, radiates to left buttock and leg making it hard to walk, worse w/walking, improved w/heating pad and ibuprofen 800mg BID, flexeril 5mg qHS, oxycodone 5mg q6hr prn #30 tabs rx 12/4/23 with stool softner- pt is using at bedtime due feeling \"loopy\" during day- this combination allows pt to be functional (10/10 previously, now 6/10)->12/23 half oxycodone (2.5mg) q6 hrs during day and 5mg at bedtime and ibuprofen 800mg BID AM and afternoon- without significant change (still 6/10), 11/30/23 MRI lumbar spine with diffuse osseous metastatic disease.  Possible pathologic compression deformity at L1. neurosurgery consult 12/28/23 w/Dr. Perez- SI joint dysfunction- recommend mobic and PT, compression fracture is chronic. Rad onc consult 1/5/24 w/Dr. Mandujano- 1/9/24- 1/22/24 " 3000cGy in 10 fractions, palliative RT to sacrum (ribociclib held 1/5/24, plaquenil continued); LBP improved, while on RT was off oxycodone for 5 days but then restarted 2/2 left LE pain initially had sciatica but 2/24 stopped all oxycodone                   REVIEW OF SYSTEMS:   A 14 point ROS was reviewed with pertinent positives and negatives in the HPI.        HOME MEDICATIONS:  Current Outpatient Medications   Medication Sig Dispense Refill    apixaban ANTICOAGULANT (ELIQUIS) 5 MG tablet Take 1 tablet (5 mg) by mouth 2 times daily 60 tablet 3    betamethasone dipropionate (DIPROSONE) 0.05 % external lotion Apply topically 2 times daily 60 mL 3    cyclobenzaprine (FLEXERIL) 5 MG tablet TAKE 1 TABLET(5 MG) BY MOUTH AT BEDTIME 90 tablet 3    docusate sodium (COLACE) 100 MG capsule Take 1 capsule (100 mg) by mouth 3 times daily as needed for constipation 90 capsule 3    fish oil-omega-3 fatty acids 1000 MG capsule Take 2 g by mouth daily      hydroxychloroquine (PLAQUENIL) 200 MG tablet TAKE 2 AND 1/2 TABLETS BY MOUTH EVERY  tablet 3    letrozole (FEMARA) 2.5 MG tablet Take 1 tablet (2.5 mg) by mouth every morning 90 tablet 3    loperamide (IMODIUM A-D) 2 MG tablet When diarrhea starts take 2 tabs (4mg), then with each additional episode of diarrhea take 1 additional tab and if needing more than 8 tabs in 24 hrs call oncology 30 tablet 3    magnesium 250 MG tablet Take 1 tablet by mouth daily      ondansetron (ZOFRAN) 4 MG tablet Take 1 tablet (4 mg) by mouth every 6 hours as needed for nausea 30 tablet 3    prochlorperazine (COMPAZINE) 10 MG tablet Take 1 tablet (10 mg) by mouth every 6 hours as needed for nausea or vomiting 30 tablet 2    ribociclib (KISQALI) (600 MG DOSE) 200 MG tablet therapy pack Take 3 tablets (600 mg) by mouth every morning for 21 days , then off for 7 days. 63 tablet 0         ALLERGIES:  Allergies   Allergen Reactions    Ciprofloxacin      hives and was on flagyl too    Metronidazole       hives and was on cipro too         PAST MEDICAL HISTORY:  Past Medical History:   Diagnosis Date    Arthritis 2006    Breast cancer metastasized to bone (H) 10/12/2023    Chronic depressive personality disorder     Dysplasia of cervix (uteri)     Cryotherapy    Female infertility of unspecified origin     Glaucoma     Rheumatoid arthritis (H)          PAST SURGICAL HISTORY:  Past Surgical History:   Procedure Laterality Date    COLONOSCOPY      COLONOSCOPY N/A 2022    Procedure: COLONOSCOPY, WITH POLYPECTOMY AND BIOPSY;  Surgeon: Gagandeep Patterson MD;  Location:  GI    HC INTRODUCE CATH FALLOPIAN TUBE, RE-OPEN/DIAGNOSIS      HERNIA REPAIR, INCISIONAL  2009    JOINT REPLACEMENT Right 2017    knee    TONSILLECTOMY      ZZC APPENDECTOMY  2003    ZC REMV CATARACT INTRACAP,INSERT LENS  2003    right         SOCIAL HISTORY:  Social History     Socioeconomic History    Marital status:      Spouse name: Bandar    Number of children: 1    Years of education: Not on file    Highest education level: Not on file   Occupational History    Not on file   Tobacco Use    Smoking status: Former     Packs/day: 0.50     Years: 25.00     Additional pack years: 0.00     Total pack years: 12.50     Types: Cigarettes     Quit date: 2017     Years since quittin.5    Smokeless tobacco: Never   Vaping Use    Vaping Use: Never used   Substance and Sexual Activity    Alcohol use: Not Currently    Drug use: Yes     Types: Marijuana    Sexual activity: Yes     Partners: Male     Birth control/protection: None   Other Topics Concern    Parent/sibling w/ CABG, MI or angioplasty before 65F 55M? Yes   Social History Narrative    Not on file     Social Determinants of Health     Financial Resource Strain: Not on file   Food Insecurity: Not on file   Transportation Needs: Not on file   Physical Activity: Not on file   Stress: Not on file   Social Connections: Not on file   Interpersonal Safety: Not  on file   Housing Stability: Not on file         FAMILY HISTORY:  Family History   Problem Relation Age of Onset    Heart Disease Mother     Lipids Mother     Hyperlipidemia Mother     Genitourinary Problems Father         prostate    Genetic Disorder Father         ulcer    Hypertension Father     Lipids Father     Prostate Cancer Father     Hyperlipidemia Sister     Hyperlipidemia Brother     Other Cancer Brother         Neck Cancer HPV (+)    Heart Disease Maternal Grandmother     Cerebrovascular Disease Maternal Grandmother     Heart Disease Maternal Grandfather     Heart Disease Maternal Uncle     Heart Disease Maternal Uncle         x  3    Cancer Nephew         Sister's Son       Family history of:  1.  Breast cancer including male breast cancer: negative   2. Ovarian cancer: negative  3.  Pancreatic cancer: negative  4.  Prostate cancer: father- ?in his 50s, other details unknown  5. Diffuse gastric cancer (if lobular breast CA): negative  6. Uterine cancer: negative  7. Colon cancer:  negative  6. Brother- head and neck, HPV +, had surgery, clinical trial and now cancer free      PHYSICAL EXAM:  Vital signs:  LMP 11/22/2011 (Exact Date)      LABS:   Latest Reference Range & Units 04/02/24 07:41   WBC 4.0 - 11.0 10e3/uL 2.3 (L)   Hemoglobin 11.7 - 15.7 g/dL 11.2 (L)   Hematocrit 35.0 - 47.0 % 33.9 (L)   Platelet Count 150 - 450 10e3/uL 147 (L)   RBC Count 3.80 - 5.20 10e6/uL 3.29 (L)   MCV 78 - 100 fL 103 (H)   MCH 26.5 - 33.0 pg 34.0 (H)   MCHC 31.5 - 36.5 g/dL 33.0   RDW 10.0 - 15.0 % 15.7 (H)   % Neutrophils % 41   % Lymphocytes % 36   % Monocytes % 20   % Eosinophils % 1   % Basophils % 2   Absolute Basophils 0.0 - 0.2 10e3/uL 0.1   Absolute Eosinophils 0.0 - 0.7 10e3/uL 0.0   Absolute Immature Granulocytes <=0.4 10e3/uL 0.0   Absolute Lymphocytes 0.8 - 5.3 10e3/uL 0.8   Absolute Monocytes 0.0 - 1.3 10e3/uL 0.5   % Immature Granulocytes % 0   Absolute Neutrophils 1.6 - 8.3 10e3/uL 1.0 (L)   Absolute  "NRBCs 10e3/uL 0.0   NRBCs per 100 WBC <1 /100 0   (L): Data is abnormally low  (H): Data is abnormally high    PATHOLOGY:    IMAGING:      ASSESSMENT/PLAN:  Kesha Zacarias is a 63 year old female with:    # de tavon metastatic left breast invasive lobular carcinoma, ER positive, ND positive, her2 negative on FISH  - pt has de tavon metastatic invasive lobular breast cancer, ER positive, ND positive, her2 negative. Pt does not have visceral crisis, she mainly has extensive bone metastases and LN metastases   -9/23 diagnostic left breast mammogram, left breast targeted ultrasound showed 3.0 x 1.7 x 3.9 ill-defined shadowing hypoechoic mass, few small lymph nodes in the left axilla, one with cortical thickening measuring 0.7 x 0.6 cm  -9/23 ultrasound-guided LEFT breast core biopsy of 12:00 lesion with clip placement, \"Postbiopsy unilateral digital mammogram of the left breast showed the clip to be at the expected biopsy site\" AND ultrasound-guided left axillary lymph node biopsy of lymph node with cortical thickening, PATHOLOGY:  A.  Breast, left, 12:00, biopsy:Lobular carcinoma in situ, Multiple foci. Invasive carcinoma is not identified.   B.  Lymph node, left axillary, biopsy:  -Positive for metastatic lobular carcinoma, grade 2, 0.7 cm, negative GREGG, Estrogen receptor: Positive (91 to 100%, strong), Progesterone receptor: Positive (91 to 100%, strong), HER2 2+ IHC, FISH negative  -10/23 MRI bilateral breast:  - Heterogeneously enhancing irregular mass in the subareolar left breast, 5.0 x 4.9 x 4.9 cm, with associated nipple retraction and nipple/areolar involvement.  This mass extends superiorly through 11: positions, from anterior to mid depth.  The [employer] breast biopsy clip (which showed LCIS) is 1.5 cm posterosuperior to this mass.  - Multiple suspicious left level 1 axillary lymph node noted, including biopsy-proven metastatic node with indwelling biopsy marker, biopsied node measures 1.3 x 1.0 cm  - " Heterogeneous marrow appearance of sternum and ribs with heterogeneous enhancement and a peripheral enhancing focus within the mid body.    - 10/23 PET/CT:  Innumerable foci of FDG avid lesions are seen throughout the osseous structures of the head and neck, most pronounced on the calvarium and cervical spine, with prominently sclerotic appearance on CT correlate.  For example:  -Right frontal bone, SUV max 5.31.  -Left lateral mass of the atlas, SUV max 15.0  -Angle of the left mandible, SUV max 9.6  -C7 vertebral body, SUV max 17.7    Innumerable variable sized FDG avid lesions throughout the axial and appendicular spine, including but not limited to the cervical, thoracic, and lumbar spine, multilevel bilateral ribs, sternum, bilateral scapula, bilateral humeri, clavicles, pelvis, and bilateral femurs. A few of these lesions are described below:  -Large FDG avid sclerotic 3.4 cm lesion within the proximal left humeral head, SUV max 17.24  -Sternal body, SUV max 20.35  -L2 vertebral body, SUV max 14.3 without  -Large ill-defined sclerotic lesion in the sacral body measuring up to at least 5.9 cm, SUV max 16.84  -FDG avid focus within the left femoral head, SUV max 13.65 without CT correlate.    Large irregular soft tissue FDG avid mass within the left breast measuring approximately 3.2 x 2.7 cm with  extension into the superficial soft tissues and associated left nipple retraction, SUV max 12.36 additional FDG avid. Left axillary lymph nodes, not definitively enlarged by short axis size criteria, for example, SUV max 5.93.    - 10/23 MRI brain:  - extensive osseous metastatic disease involving the calvarium, skull base w/involvement of occipital condyles, C1 and C2 vertebral bodies, and likely the mandible  -  Dominant calvarial lesions are located in the right frontal calvarium and right temporal calvarium without definite breach of the inner or outer tables of the calvarium. There is a suggestion of mild  asymmetric linear extra-axial enhancement deep to the dominant right temporal calvarial lesion along the dural margin, though this may be vascular in etiology.  - No abnormal parenchymal or leptomeningeal enhancement.   - sequelae of mild chronic microvascular ischemic disease. Mild generalized cerebral atrophy.     -10/23 DEXA: osteopenia (T score -2.0 lumbar spine, -2.0 left hip femoral neck, The 10 year probability of major osteoporotic fracture is 12.5%, and of hip fracture is 1.8%, based on left femoral neck BMD)    - 11/23 orthopedic consult to determine stability of left femur and fracture risk given presence of mets in left femoral head: do not see any areas of impending cortical erosion or sites of high risk for fracture, observe      REGIMEN:  Ribociclib+letrozole  C1D1 11/27/23 ribociclib 400mg, C2D1 12/25/23 ribociclib 600mg (day 1-12 only 2/2 palliative RT to sacrum w/Dr. Mandujano 1/9/24- 1/22/24 3000cGy in 10 fractions), C3D1 1/30/24, C4D1 3/6/24 (deferred 1 week 2/2 2/27/24 ANC 0.8->3/5/24 ANC 1.5)  Ribociclib 600mg PO daily day 1-21 q28 days (11/27/23 started 400mg PO daily when LFTs improved to <3x ULN)  Letrozole 2.5mg PO daily (started 10/30/23)  Febrile neutropenia risk: neutropenia (69% to 78%; grade 3: 46% to 55%; grade 4: 7% to 10%), Febrile neutropenia (1%)    C5D1 4/3/24 planned     - 4/24 EKG noted Qtc [id number], pt on plaquenil, watch Qtc closely.    Treatment related AE:  - neutropenia- ANC 1.0 4/2/24- ok to continue ribociclib at current dose, if needed- will dose reduce in future, neutropenic fever precautions discussed  - thrombocytopenia- plt 147K monitor for bleeding and bruising while on effexor and eliquis  - macrocytic anemia- hgb 11, stable  - anxiety, mood changes- psychology referral   - hot flashes- start effexor- 37.5mg x1 week, then 75mg thereafter- next refill will be for 75mg capsules  - DVT- continue eliquis  - blurred vision- stable, no alarms sx, eye doctor visit 4/3/24  -  hypocalcemia- mild, continue BID calcium and daily vitamin D       IMAGING:  - next PET due end of 5/2024- ordered today    - 2/17/24 PET CT CAP:  Most of the extensive skeletal metastases have become sclerotic and non-FDG avid and those which remain avid have decreased in activity, for example in the proximal right humerus from SUVmax 19.3 to 13.9, in the proximal right femur from 16.5 to 12.2, and in the L2 vertebral body from 14.3 to 10.7.    TUMOR MARKERS:  10/23 CA 15-3= 261  12/23 CA 15-3= 553  1/24 CA 15-3= 480  2/24 CA 15-3 338  Repeat CA 15-3 pending 5/24    OTHER:  - continue zometa q4 weeks - zometa #1 1/4/24, last zometa 3/28/24, next due 4/25/24; if disease stable on next scan, will transition to 4mg q12 weeks   - continue calcium and vitamin D  - Genetic counseling consult pending, # provided to pt previously    # left posterior tibial DVT  -1/19/24 sx started- Left foot pain and swelling  -1/22/24 pt called in, 1/22/24 left LE doppler: DVT in 1 of 2 left distal posterior tibial veins  - continue eliquis      # RA  - on plaquenil       RTC 4/30 for follow up with JUAN, labs and EKG prior to visit   RTC 5/28 for follow up with me, labs, EKG prior to visit- [place] (after PET)    Mary Maravilla DO  Hematology/Oncology  [facility]

## 2024-03-28 ENCOUNTER — APPOINTMENT (OUTPATIENT)
Dept: LAB | Facility: CLINIC | Age: 64
End: 2024-03-28
Payer: COMMERCIAL

## 2024-03-28 ENCOUNTER — INFUSION THERAPY VISIT (OUTPATIENT)
Dept: INFUSION THERAPY | Facility: CLINIC | Age: 64
End: 2024-03-28
Attending: INTERNAL MEDICINE
Payer: COMMERCIAL

## 2024-03-28 VITALS
DIASTOLIC BLOOD PRESSURE: 59 MMHG | WEIGHT: 213.5 LBS | RESPIRATION RATE: 16 BRPM | BODY MASS INDEX: 34.31 KG/M2 | SYSTOLIC BLOOD PRESSURE: 114 MMHG | TEMPERATURE: 98.3 F | HEART RATE: 62 BPM | HEIGHT: 66 IN | OXYGEN SATURATION: 100 %

## 2024-03-28 DIAGNOSIS — C79.51 CARCINOMA OF BREAST METASTATIC TO BONE, UNSPECIFIED LATERALITY (H): Primary | ICD-10-CM

## 2024-03-28 DIAGNOSIS — C50.919 CARCINOMA OF BREAST METASTATIC TO BONE, UNSPECIFIED LATERALITY (H): Primary | ICD-10-CM

## 2024-03-28 LAB
ALBUMIN SERPL BCG-MCNC: 4.1 G/DL (ref 3.5–5.2)
CALCIUM SERPL-MCNC: 9 MG/DL (ref 8.8–10.2)
CREAT SERPL-MCNC: 1.12 MG/DL (ref 0.51–0.95)
EGFRCR SERPLBLD CKD-EPI 2021: 55 ML/MIN/1.73M2

## 2024-03-28 PROCEDURE — 36415 COLL VENOUS BLD VENIPUNCTURE: CPT | Performed by: INTERNAL MEDICINE

## 2024-03-28 PROCEDURE — 250N000011 HC RX IP 250 OP 636: Mod: JZ | Performed by: INTERNAL MEDICINE

## 2024-03-28 PROCEDURE — 258N000003 HC RX IP 258 OP 636: Performed by: INTERNAL MEDICINE

## 2024-03-28 PROCEDURE — 82565 ASSAY OF CREATININE: CPT | Performed by: INTERNAL MEDICINE

## 2024-03-28 PROCEDURE — 96374 THER/PROPH/DIAG INJ IV PUSH: CPT

## 2024-03-28 PROCEDURE — 82310 ASSAY OF CALCIUM: CPT | Performed by: INTERNAL MEDICINE

## 2024-03-28 PROCEDURE — 82040 ASSAY OF SERUM ALBUMIN: CPT | Performed by: INTERNAL MEDICINE

## 2024-03-28 RX ORDER — HEPARIN SODIUM,PORCINE 10 UNIT/ML
5-20 VIAL (ML) INTRAVENOUS DAILY PRN
Status: CANCELLED | OUTPATIENT
Start: 2024-03-28

## 2024-03-28 RX ORDER — ZOLEDRONIC ACID 0.04 MG/ML
4 INJECTION, SOLUTION INTRAVENOUS ONCE
Status: COMPLETED | OUTPATIENT
Start: 2024-03-28 | End: 2024-03-28

## 2024-03-28 RX ORDER — ZOLEDRONIC ACID 0.04 MG/ML
4 INJECTION, SOLUTION INTRAVENOUS ONCE
Status: CANCELLED | OUTPATIENT
Start: 2024-03-28 | End: 2024-03-28

## 2024-03-28 RX ORDER — HEPARIN SODIUM (PORCINE) LOCK FLUSH IV SOLN 100 UNIT/ML 100 UNIT/ML
5 SOLUTION INTRAVENOUS
Status: CANCELLED | OUTPATIENT
Start: 2024-03-28

## 2024-03-28 RX ADMIN — ZOLEDRONIC ACID 4 MG: 0.04 INJECTION, SOLUTION INTRAVENOUS at 15:57

## 2024-03-28 RX ADMIN — SODIUM CHLORIDE 250 ML: 9 INJECTION, SOLUTION INTRAVENOUS at 15:52

## 2024-03-28 ASSESSMENT — PAIN SCALES - GENERAL: PAINLEVEL: NO PAIN (0)

## 2024-03-28 NOTE — PROGRESS NOTES
Infusion Nursing Note:  Kesha Zacarias presents today for Z[place].    Patient seen by provider today: No   present during visit today: Not Applicable.    Note: Patient doing well. Tired at times. VSS. Denies pain..      Intravenous Access:  Peripheral IV placed.    Treatment Conditions:  Lab Results   Component Value Date     02/27/2024    POTASSIUM 4.3 02/27/2024    MAG 2.0 02/27/2024    CR 1.12 (H) 03/28/2024    NITISH 9.0 03/28/2024    BILITOTAL 0.3 02/27/2024    ALBUMIN 4.1 03/28/2024    ALT 19 02/27/2024    AST 22 02/27/2024       Results reviewed, labs MET treatment parameters, ok to proceed with treatment.  Corrected calcium-8.92.      Post Infusion Assessment:  Patient tolerated infusion without incident.  Site patent and intact, free from redness, edema or discomfort.  Access discontinued per protocol.  Patient requested to get all of the 250ML NS.      Discharge Plan:   Patient discharged in stable condition accompanied by: self.  Departure Mode: Ambulatory.      Sherri Candelario RN

## 2024-04-01 ENCOUNTER — TELEPHONE (OUTPATIENT)
Dept: ONCOLOGY | Facility: CLINIC | Age: 64
End: 2024-04-01
Payer: COMMERCIAL

## 2024-04-02 ENCOUNTER — LAB (OUTPATIENT)
Dept: LAB | Facility: CLINIC | Age: 64
End: 2024-04-02
Payer: COMMERCIAL

## 2024-04-02 ENCOUNTER — HOSPITAL ENCOUNTER (OUTPATIENT)
Dept: CARDIOLOGY | Facility: CLINIC | Age: 64
Discharge: HOME | End: 2024-04-02
Attending: INTERNAL MEDICINE | Admitting: INTERNAL MEDICINE
Payer: COMMERCIAL

## 2024-04-02 DIAGNOSIS — C50.912 CARCINOMA OF LEFT BREAST METASTATIC TO BONE (H): ICD-10-CM

## 2024-04-02 DIAGNOSIS — C79.51 CARCINOMA OF LEFT BREAST METASTATIC TO BONE (H): ICD-10-CM

## 2024-04-02 DIAGNOSIS — C50.919 CARCINOMA OF BREAST METASTATIC TO BONE, UNSPECIFIED LATERALITY (H): ICD-10-CM

## 2024-04-02 DIAGNOSIS — C79.51 CARCINOMA OF BREAST METASTATIC TO BONE, UNSPECIFIED LATERALITY (H): ICD-10-CM

## 2024-04-02 LAB
BASOPHILS # BLD AUTO: 0.1 10E3/UL (ref 0–0.2)
BASOPHILS NFR BLD AUTO: 2 %
EOSINOPHIL # BLD AUTO: 0 10E3/UL (ref 0–0.7)
EOSINOPHIL NFR BLD AUTO: 1 %
ERYTHROCYTE [DISTWIDTH] IN BLOOD BY AUTOMATED COUNT: 15.7 % (ref 10–15)
HCT VFR BLD AUTO: 33.9 % (ref 35–47)
HGB BLD-MCNC: 11.2 G/DL (ref 11.7–15.7)
IMM GRANULOCYTES # BLD: 0 10E3/UL
IMM GRANULOCYTES NFR BLD: 0 %
LYMPHOCYTES # BLD AUTO: 0.8 10E3/UL (ref 0.8–5.3)
LYMPHOCYTES NFR BLD AUTO: 36 %
MCH RBC QN AUTO: 34 PG (ref 26.5–33)
MCHC RBC AUTO-ENTMCNC: 33 G/DL (ref 31.5–36.5)
MCV RBC AUTO: 103 FL (ref 78–100)
MONOCYTES # BLD AUTO: 0.5 10E3/UL (ref 0–1.3)
MONOCYTES NFR BLD AUTO: 20 %
NEUTROPHILS # BLD AUTO: 1 10E3/UL (ref 1.6–8.3)
NEUTROPHILS NFR BLD AUTO: 41 %
NRBC # BLD AUTO: 0 10E3/UL
NRBC BLD AUTO-RTO: 0 /100
PLATELET # BLD AUTO: 147 10E3/UL (ref 150–450)
RBC # BLD AUTO: 3.29 10E6/UL (ref 3.8–5.2)
WBC # BLD AUTO: 2.3 10E3/UL (ref 4–11)

## 2024-04-02 PROCEDURE — 85025 COMPLETE CBC W/AUTO DIFF WBC: CPT | Performed by: INTERNAL MEDICINE

## 2024-04-02 PROCEDURE — 36415 COLL VENOUS BLD VENIPUNCTURE: CPT

## 2024-04-02 PROCEDURE — 99207 EKG 12-LEAD COMPLETE W/READ - CLINICS: CPT | Performed by: INTERNAL MEDICINE

## 2024-04-02 PROCEDURE — 93005 ELECTROCARDIOGRAM TRACING: CPT | Performed by: REHABILITATION PRACTITIONER

## 2024-04-02 NOTE — TELEPHONE ENCOUNTER
Prior Authorization Approval    Medication: KISQALI (600 MG DOSE) 200 MG PO TBPK  Authorization Effective Date: 4/1/2024  Authorization Expiration Date: 4/1/2025  Approved Dose/Quantity: 63/28 days  Reference #: [id number]   Insurance Company: [employer] - Phone [phone] Fax [phone]  Expected CoPay: $    CoPay Card Available:      Financial Assistance Needed: no  Which Pharmacy is filling the prescription: [facility] - [place], [place] - [facility] [address]  Pharmacy Notified: yes  Patient Notified: yes

## 2024-04-03 ENCOUNTER — VIRTUAL VISIT (OUTPATIENT)
Dept: ONCOLOGY | Facility: CLINIC | Age: 64
End: 2024-04-03
Payer: COMMERCIAL

## 2024-04-03 VITALS — WEIGHT: 213 LBS | BODY MASS INDEX: 34.38 KG/M2

## 2024-04-03 DIAGNOSIS — D69.59 CHEMOTHERAPY-INDUCED THROMBOCYTOPENIA: ICD-10-CM

## 2024-04-03 DIAGNOSIS — T45.1X5A CHEMOTHERAPY-INDUCED THROMBOCYTOPENIA: ICD-10-CM

## 2024-04-03 DIAGNOSIS — C50.912 CARCINOMA OF LEFT BREAST METASTATIC TO BONE (H): Primary | ICD-10-CM

## 2024-04-03 DIAGNOSIS — D64.81 ANEMIA ASSOCIATED WITH CHEMOTHERAPY: ICD-10-CM

## 2024-04-03 DIAGNOSIS — N95.1 MENOPAUSAL SYNDROME (HOT FLASHES): ICD-10-CM

## 2024-04-03 DIAGNOSIS — T45.1X5A CHEMOTHERAPY-INDUCED NEUTROPENIA (H): ICD-10-CM

## 2024-04-03 DIAGNOSIS — C79.51 CARCINOMA OF LEFT BREAST METASTATIC TO BONE (H): Primary | ICD-10-CM

## 2024-04-03 DIAGNOSIS — R45.86 MOOD CHANGE: ICD-10-CM

## 2024-04-03 DIAGNOSIS — T45.1X5A ANEMIA ASSOCIATED WITH CHEMOTHERAPY: ICD-10-CM

## 2024-04-03 DIAGNOSIS — D70.1 CHEMOTHERAPY-INDUCED NEUTROPENIA (H): ICD-10-CM

## 2024-04-03 PROCEDURE — 99214 OFFICE O/P EST MOD 30 MIN: CPT | Mod: 95 | Performed by: INTERNAL MEDICINE

## 2024-04-03 RX ORDER — VENLAFAXINE HYDROCHLORIDE 37.5 MG/1
CAPSULE, EXTENDED RELEASE ORAL
Qty: 49 CAPSULE | Refills: 0 | Status: SHIPPED | OUTPATIENT
Start: 2024-04-03 | End: 2024-04-30

## 2024-04-03 ASSESSMENT — PAIN SCALES - GENERAL: PAINLEVEL: NO PAIN (0)

## 2024-04-03 NOTE — LETTER
4/3/2024         RE: Kesha Zacarias  [address] [address] [place] [id number]        Dear Colleague,    Thank you for referring your patient, Kesha Zacarias, to the [facility]. Please see a copy of my visit note below.    Video-Visit Details     Video Start Time: 9:22AM     Type of service:  Video Visit     Video End Time: 9:43AM    Originating Location (pt. Location): Home     Distant Location (provider location):  [facility] off site     Platform used for Video Visit: [facility]    Hematology/Oncology Established Patient Follow Up Note      Today's Date: 4/3/24    Reason for follow up: metastatic breast cancer    HISTORY OF PRESENT ILLNESS: Kesha Zacarias is a 63 year old female who presents for follow up    Patient has medical history including rheumatoid arthritis, hyperlipidemia, osteoarthritis, bilateral cataracts and glaucoma s/p surgery, endocervical polyp s/p resection, vaginal atrophy with conjugated estrogen vaginal cream use, colon polyp (hyperplastic polyp and tubular adenoma), seasonal depression, history of tobacco use ([age] y/o, about 1 ppd).    Regarding RA:  -dx over a decade ago, on plaquenil, managed by PCP  - arthritis is most severe in hands>knees, intermittent       - 3/23 bilateral screening mammogram [facility]  - a few months ago, noted nipple retraction  - 9/23 patient palpated mass in retroareolar region of left breast and w/nipple retraction, no discharge, skin thickening, no dimpling, no erythema  - 9/23 diagnostic LEFT breast mammogram: Focal dense tissue with some likely mild architectural distortion, some anterior skin thickening is noted  - 9/23 targeted LEFT breast ultrasound: Ill-defined shadowing hypoechoic mass, 3.0 x 1.7 x 3.9 cm.  In left axilla-few small lymph nodes, 1 normal-sized lymph node with cortical thickening, 0.7 x 0.6 cm.  -9/23 ultrasound-guided LEFT breast core biopsy of 12:00 lesion with  "clip placement, \"Postbiopsy unilateral digital mammogram of the left breast showed the clip to be at the expected biopsy site\" AND ultrasound-guided left axillary lymph node biopsy of lymph node with cortical thickening  PATHOLOGY  A.  Breast, left, 12:00, biopsy:  -Lobular carcinoma in situ.-Multiple foci  -Invasive carcinoma is not identified.     B.  Lymph node, left axillary, biopsy:  -Positive for metastatic lobular carcinoma, grade 2  -Largest metastatic focus is 7 mm.  -Negative for extranodal extension.  -Breast ancillary testing:  -Estrogen receptor: Positive (91 to 100%, strong)  -Progesterone receptor: Positive (91 to 100%, strong)  -HER2 2+ IHC, FISH negative    -10/23 MRI bilateral breast:  LEFT breast:  - Heterogeneously enhancing irregular mass in the subareolar left breast, 5.0 x 4.9 x 4.9 cm, with associated nipple retraction and nipple/areolar involvement.  This mass extends superiorly through 11: positions, from anterior to mid depth.  The HydroMARK breast biopsy clip (which showed LCIS) is 1.5 cm posterosuperior to this mass.  - Multiple suspicious left level 1 axillary lymph node noted, including biopsy-proven metastatic node with indwelling biopsy marker, biopsied node measures 1.3 x 1.0 cm  - Heterogeneous marrow appearance of sternum and ribs with heterogeneous enhancement and a peripheral enhancing focus within the mid body.  IMPRESSION:  1. Upon further review of previous imaging and pathology results,  recommend repeat ultrasound guided large core-needle biopsy of the  discordant dominant left breast mass given metastatic left axillary  lymph node and superoposteriorly displaced left breast biopsy marker.   2. Nonspecific heterogeneous enhancement of the sternum and ribs.  Consider nuclear medicine bone scan    Lifetime estrogen exposure:  Menarche: 13   Last menstrual period: 49   Age of first pregnancy: 18   Number of pregnancies: 2 (no living children)   Weight gain: 20lb weight " gain within a year  Exposure to exogenous estrogen: vaginal atrophy with conjugated estrogen vaginal cream use x4 years (intermittent), has not used it since 9/23. Birth control for about 13-15 years from her 20s-30s.      Pt reports 55lb weight loss in 1.5 years, this was intentional, was following weight watchers program (23 points available per day) 230lb->170lb->190lb (gained about 20lbs). Pt was walking on the treadmill and outside daily, about 30 mins to 1.5 miles.    10/23 labs:  AST 79, ALT 91,   CA 15-3 261    10/23 PET/CT:  Innumerable foci of FDG avid lesions are seen throughout the osseous  structures of the head and neck, most pronounced on the calvarium and  cervical spine, with prominently sclerotic appearance on CT correlate.  For example:  -Right frontal bone, SUV max 5.31.  -Left lateral mass of the atlas, SUV max 15.0  -Angle of the left mandible, SUV max 9.6  -C7 vertebral body, SUV max 17.7    Innumerable variable sized FDG avid lesions throughout the axial and  appendicular spine, including but not limited to the cervical,  thoracic, and lumbar spine, multilevel bilateral ribs, sternum,  bilateral scapula, bilateral humeri, clavicles, pelvis, and bilateral  femurs. A few of these lesions are described below:  -Large FDG avid sclerotic 3.4 cm lesion within the proximal left  humeral head, SUV max 17.24  -Sternal body, SUV max 20.35  -L2 vertebral body, SUV max 14.3 without  -Large ill-defined sclerotic lesion in the sacral body measuring up to  at least 5.9 cm, SUV max 16.84  -FDG avid focus within the left femoral head, SUV  max 13.65 without CT correlate.    Large irregular soft tissue FDG avid mass within the left breast   measuring approximately 3.2 x 2.7 cm with  extension into the superficial soft tissues and associated left nipple  retraction, SUV max 12.36 additional FDG avid. Left axillary lymph  nodes, not definitively enlarged by short axis size criteria, for  example, SUV max  5.93.    The liver is unremarkable.     Solid 3 mm non-FDG avid pulmonary nodule within the  right middle lobe    - 10/23 MRI brain:  - extensive osseous metastatic disease involving the calvarium, skull base w/involvement of occipital condyles, C1 and C2 vertebral bodies, and likely the mandible  -  Dominant calvarial lesions are located in the right frontal calvarium and right temporal calvarium without definite breach of the  inner or outer tables of the calvarium. There is a suggestion of mild asymmetric linear extra-axial enhancement deep to the dominant right temporal calvarial lesion along the dural margin, though this may be vascular in etiology.  - No abnormal parenchymal or leptomeningeal enhancement.   - sequelae of mild chronic microvascular ischemic disease. Mild generalized cerebral atrophy.       -supposed to start ribociclib 10/30/23 however, noted to have significantly elevated LFTS (10/26/23 , , alk phos 152)    10/17/23: AST 79, ALT 91, alk phos 116, t bili 0.5  10/26/23 , , alk phos 152  10/30/23: , , alk phos 178, coags WNL, ribociclib was not started, letrozole started, atorvastatin held  11/7/23: AST 96, , alk phos 169, MRI liver negative for mets  11/17/23: AST 81, , alk phos 163  11/27/23: AST 55, ALT 90, alk phos 128- started ribociclib 400mg  12/4/23: AST 26, ALT 35, alk phos 118    - 11/27/23 started ribociclib+ letrozole  - 1/9/24-1/22/24 palliative RT to sacrum w/Dr. Mandujano- 3000cGy in 10 fractions    - 2/17/24 PET CT CAP  PET/CT FINDINGS: Most of the extensive skeletal metastases have become sclerotic and non-FDG avid and those which remain avid have decreased in activity, for example in the proximal right humerus from SUVmax 19.3 to 13.9, in the proximal right femur from 16.5 to 12.2, and in the L2 vertebral body from 14.3 to 10.7.      CT FINDINGS: Bilateral lens replacements. Calcified atherosclerosis, including moderate right  coronary artery disease. Splenule. Cholelithiasis. Retroaortic left renal vein. Pelvic phleboliths. Appendectomy. Ventral hernia repair with mesh. Persistent   metopic suture. T11 vertebral body hemangioma. Mild degenerative change in the spine.                                                       IMPRESSION:  Partial response     INTERIM HISTORY:    REGIMEN:  Ribociclib+letrozole  C1D1 11/27/23 ribociclib 400mg, C2D1 12/25/23 ribociclib 600mg (day 1-12 only 2/2 palliative RT to sacrum w/Dr. Mandujano 1/9/24- 1/22/24 3000cGy in 10 fractions), C3D1 1/30/24, C4D1 3/6/24 (deferred 1 week 2/2 2/27/24 ANC 0.8->3/5/24 ANC 1.5)  Ribociclib 600mg PO daily day 1-21 q28 days (11/27/23 started 400mg PO daily when LFTs improved to <3x ULN)  Letrozole 2.5mg PO daily (started 10/30/23)  Febrile neutropenia risk: neutropenia (69% to 78%; grade 3: 46% to 55%; grade 4: 7% to 10%), Febrile neutropenia (1%)    C5D1 4/3/24 planned pending labs    Treatment related AE:  - neutropenia- C4 deferred 1 week 2/2 2/27/24 ANC 0.8->3/5/24 ANC 1.5; 4/2/24 ANC 1.0  - thrombocytopenia-  intermittent, 12/23 plt 143K, 2/24 plt 123K, 4/24 plt 147K.   - macrocytic anemia- hgb 11, , 1/24 iron studies and B12 WNL  - hypocalcemia- taking calcium 600mg BID and vitamin D 1000IU  - elevated Cr- Cr 1.1 2/16/24, stable since then. increased water to 60-90 oz this week   - anxiety, mood changes-has support of family and  who are great advocates for her care,  since starting letrozole- feels more emotional with labile moods, more tearful; open to speaking with psychologist   - hot flashes- 10x/day, last a few mins, daily, affecting quality of life including sleep  - Left posterior tibial DVT- 1/19/24 sx started- Left foot pain and swelling, 1/22/24 pt called in, 1/22/24 left LE doppler: DVT in 1 of 2 left distal posterior tibial veins, started on eliquis and stopped naproxen; no issues with bleeding but does have intermitting bruising when she bumps  "her hand  - blurry vision b/l- last saw eye doctor 3/23 and has trifocal glasses. She has dry eyes and uses lubricating eye drops., 10/23 MRI brain as above. Stable, pending eye doctor visit 4/3/24      RESOLVED:  - constipation- probiotics, previously using senna BID and colace TID but after stopping oxycodone, stools are soft , no diarrhea  - chest pressure w/dyspnea: 2/24 three 10 min episodes, at rest, spontaneously resolved; 2/28/24 stress echo WNL, no recurrence of sx  - nausea- minimal before RT, then frequent with RT, 2/24 in AM- mild, uses anti-emetics about every other day, zofran helps immediately, doesn't eat breakfast but trying to have protein shakes  - leg cramps- Mg   - elevated LFTs- improved 11/27/23 to <3x ULN (AST 55, ALT 90, alk phos 128)  11/27/24 started ribociclib 400mg, 11/23 MRI liver negative for mets, ?cause- possibly viral infection between 10/17/23 and 10/26/23; 12/22/23 LFTs normal, cycle 2 started ribociclib 600mg; 1/24 LFTs WNL  - elevated Cr- increased water to 60oz/day, 1/24 Cr WNL  - daily HA- prior to starting tx- mostly b/l temporal regions, no sensitivity to light or sound, no N/V, takes tylenol w/o improvement; 10/23 MRI brain: extensive osseous mets w/dominant calvarial lesions w/enhancement deep to dominant right temporal calvarial lesion along dural margin (?vascular etiology), no parenchymal or leptomeningeal enhancement. Resolved  - back pain- left lower back, sharp, radiates to left buttock and leg making it hard to walk, worse w/walking, improved w/heating pad and ibuprofen 800mg BID, flexeril 5mg qHS, oxycodone 5mg q6hr prn #30 tabs rx 12/4/23 with stool softner- pt is using at bedtime due feeling \"loopy\" during day- this combination allows pt to be functional (10/10 previously, now 6/10)->12/23 half oxycodone (2.5mg) q6 hrs during day and 5mg at bedtime and ibuprofen 800mg BID AM and afternoon- without significant change (still 6/10), 11/30/23 MRI lumbar spine with " diffuse osseous metastatic disease.  Possible pathologic compression deformity at L1. neurosurgery consult 12/28/23 w/Dr. Perez- SI joint dysfunction- recommend mobic and PT, compression fracture is chronic. Rad onc consult 1/5/24 w/Dr. Mandujano- 1/9/24- 1/22/24 3000cGy in 10 fractions, palliative RT to sacrum (ribociclib held 1/5/24, plaquenil continued); LBP improved, while on RT was off oxycodone for 5 days but then restarted 2/2 left LE pain initially had sciatica but 2/24 stopped all oxycodone                   REVIEW OF SYSTEMS:   A 14 point ROS was reviewed with pertinent positives and negatives in the HPI.        HOME MEDICATIONS:  Current Outpatient Medications   Medication Sig Dispense Refill     apixaban ANTICOAGULANT (ELIQUIS) 5 MG tablet Take 1 tablet (5 mg) by mouth 2 times daily 60 tablet 3     betamethasone dipropionate (DIPROSONE) 0.05 % external lotion Apply topically 2 times daily 60 mL 3     cyclobenzaprine (FLEXERIL) 5 MG tablet TAKE 1 TABLET(5 MG) BY MOUTH AT BEDTIME 90 tablet 3     docusate sodium (COLACE) 100 MG capsule Take 1 capsule (100 mg) by mouth 3 times daily as needed for constipation 90 capsule 3     fish oil-omega-3 fatty acids 1000 MG capsule Take 2 g by mouth daily       hydroxychloroquine (PLAQUENIL) 200 MG tablet TAKE 2 AND 1/2 TABLETS BY MOUTH EVERY  tablet 3     letrozole (FEMARA) 2.5 MG tablet Take 1 tablet (2.5 mg) by mouth every morning 90 tablet 3     loperamide (IMODIUM A-D) 2 MG tablet When diarrhea starts take 2 tabs (4mg), then with each additional episode of diarrhea take 1 additional tab and if needing more than 8 tabs in 24 hrs call oncology 30 tablet 3     magnesium 250 MG tablet Take 1 tablet by mouth daily       ondansetron (ZOFRAN) 4 MG tablet Take 1 tablet (4 mg) by mouth every 6 hours as needed for nausea 30 tablet 3     prochlorperazine (COMPAZINE) 10 MG tablet Take 1 tablet (10 mg) by mouth every 6 hours as needed for nausea or vomiting 30 tablet 2      ribociclib (KISQALI) (600 MG DOSE) 200 MG tablet therapy pack Take 3 tablets (600 mg) by mouth every morning for 21 days , then off for 7 days. 63 tablet 0         ALLERGIES:  Allergies   Allergen Reactions     Ciprofloxacin      hives and was on flagyl too     Metronidazole      hives and was on cipro too         PAST MEDICAL HISTORY:  Past Medical History:   Diagnosis Date     Arthritis 2006     Breast cancer metastasized to bone (H) 10/12/2023     Chronic depressive personality disorder      Dysplasia of cervix (uteri)     Cryotherapy     Female infertility of unspecified origin      Glaucoma      Rheumatoid arthritis (H)          PAST SURGICAL HISTORY:  Past Surgical History:   Procedure Laterality Date     COLONOSCOPY       COLONOSCOPY N/A 2022    Procedure: COLONOSCOPY, WITH POLYPECTOMY AND BIOPSY;  Surgeon: Gagandeep Patterson MD;  Location: [facility]     HC INTRODUCE CATH FALLOPIAN TUBE, RE-OPEN/DIAGNOSIS       HERNIA REPAIR, INCISIONAL  2009     JOINT REPLACEMENT Right 2017    knee     TONSILLECTOMY       ZZC APPENDECTOMY  2003     ZZC REMV CATARACT INTRACAP,INSERT LENS  2003    right         SOCIAL HISTORY:  Social History     Socioeconomic History     Marital status:      Spouse name: Bandar     Number of children: 1     Years of education: Not on file     Highest education level: Not on file   Occupational History     Not on file   Tobacco Use     Smoking status: Former     Packs/day: 0.50     Years: 25.00     Additional pack years: 0.00     Total pack years: 12.50     Types: Cigarettes     Quit date: 2017     Years since quittin.5     Smokeless tobacco: Never   Vaping Use     Vaping Use: Never used   Substance and Sexual Activity     Alcohol use: Not Currently     Drug use: Yes     Types: Marijuana     Sexual activity: Yes     Partners: Male     Birth control/protection: None   Other Topics Concern     Parent/sibling w/ CABG, MI or angioplasty before 65F 55M? Yes    Social History Narrative     Not on file     Social Determinants of Health     Financial Resource Strain: Not on file   Food Insecurity: Not on file   Transportation Needs: Not on file   Physical Activity: Not on file   Stress: Not on file   Social Connections: Not on file   Interpersonal Safety: Not on file   Housing Stability: Not on file         FAMILY HISTORY:  Family History   Problem Relation Age of Onset     Heart Disease Mother      Lipids Mother      Hyperlipidemia Mother      Genitourinary Problems Father         prostate     Genetic Disorder Father         ulcer     Hypertension Father      Lipids Father      Prostate Cancer Father      Hyperlipidemia Sister      Hyperlipidemia Brother      Other Cancer Brother         Neck Cancer HPV (+)     Heart Disease Maternal Grandmother      Cerebrovascular Disease Maternal Grandmother      Heart Disease Maternal Grandfather      Heart Disease Maternal Uncle      Heart Disease Maternal Uncle         x  3     Cancer Nephew         Sister's Son       Family history of:  1.  Breast cancer including male breast cancer: negative   2. Ovarian cancer: negative  3.  Pancreatic cancer: negative  4.  Prostate cancer: father- ?in his 50s, other details unknown  5. Diffuse gastric cancer (if lobular breast CA): negative  6. Uterine cancer: negative  7. Colon cancer:  negative  6. Brother- head and neck, HPV +, had surgery, clinical trial and now cancer free      PHYSICAL EXAM:  Vital signs:  LMP 11/22/2011 (Exact Date)      LABS:   Latest Reference Range & Units 04/02/24 07:41   WBC 4.0 - 11.0 10e3/uL 2.3 (L)   Hemoglobin 11.7 - 15.7 g/dL 11.2 (L)   Hematocrit 35.0 - 47.0 % 33.9 (L)   Platelet Count 150 - 450 10e3/uL 147 (L)   RBC Count 3.80 - 5.20 10e6/uL 3.29 (L)   MCV 78 - 100 fL 103 (H)   MCH 26.5 - 33.0 pg 34.0 (H)   MCHC 31.5 - 36.5 g/dL 33.0   RDW 10.0 - 15.0 % 15.7 (H)   % Neutrophils % 41   % Lymphocytes % 36   % Monocytes % 20   % Eosinophils % 1   % Basophils % 2  "  Absolute Basophils 0.0 - 0.2 10e3/uL 0.1   Absolute Eosinophils 0.0 - 0.7 10e3/uL 0.0   Absolute Immature Granulocytes <=0.4 10e3/uL 0.0   Absolute Lymphocytes 0.8 - 5.3 10e3/uL 0.8   Absolute Monocytes 0.0 - 1.3 10e3/uL 0.5   % Immature Granulocytes % 0   Absolute Neutrophils 1.6 - 8.3 10e3/uL 1.0 (L)   Absolute NRBCs 10e3/uL 0.0   NRBCs per 100 WBC <1 /100 0   (L): Data is abnormally low  (H): Data is abnormally high    PATHOLOGY:    IMAGING:      ASSESSMENT/PLAN:  Kesha Zacarias is a 63 year old female with:    # de tavon metastatic left breast invasive lobular carcinoma, ER positive, VT positive, her2 negative on FISH  - pt has de tavon metastatic invasive lobular breast cancer, ER positive, VT positive, her2 negative. Pt does not have visceral crisis, she mainly has extensive bone metastases and LN metastases   -9/23 diagnostic left breast mammogram, left breast targeted ultrasound showed 3.0 x 1.7 x 3.9 ill-defined shadowing hypoechoic mass, few small lymph nodes in the left axilla, one with cortical thickening measuring 0.7 x 0.6 cm  -9/23 ultrasound-guided LEFT breast core biopsy of 12:00 lesion with clip placement, \"Postbiopsy unilateral digital mammogram of the left breast showed the clip to be at the expected biopsy site\" AND ultrasound-guided left axillary lymph node biopsy of lymph node with cortical thickening, PATHOLOGY:  A.  Breast, left, 12:00, biopsy:Lobular carcinoma in situ, Multiple foci. Invasive carcinoma is not identified.   B.  Lymph node, left axillary, biopsy:  -Positive for metastatic lobular carcinoma, grade 2, 0.7 cm, negative GRGEG, Estrogen receptor: Positive (91 to 100%, strong), Progesterone receptor: Positive (91 to 100%, strong), HER2 2+ IHC, FISH negative  -10/23 MRI bilateral breast:  - Heterogeneously enhancing irregular mass in the subareolar left breast, 5.0 x 4.9 x 4.9 cm, with associated nipple retraction and nipple/areolar involvement.  This mass extends superiorly through " 11: positions, from anterior to mid depth.  The HydroMARK breast biopsy clip (which showed LCIS) is 1.5 cm posterosuperior to this mass.  - Multiple suspicious left level 1 axillary lymph node noted, including biopsy-proven metastatic node with indwelling biopsy marker, biopsied node measures 1.3 x 1.0 cm  - Heterogeneous marrow appearance of sternum and ribs with heterogeneous enhancement and a peripheral enhancing focus within the mid body.    - 10/23 PET/CT:  Innumerable foci of FDG avid lesions are seen throughout the osseous structures of the head and neck, most pronounced on the calvarium and cervical spine, with prominently sclerotic appearance on CT correlate.  For example:  -Right frontal bone, SUV max 5.31.  -Left lateral mass of the atlas, SUV max 15.0  -Angle of the left mandible, SUV max 9.6  -C7 vertebral body, SUV max 17.7    Innumerable variable sized FDG avid lesions throughout the axial and appendicular spine, including but not limited to the cervical, thoracic, and lumbar spine, multilevel bilateral ribs, sternum, bilateral scapula, bilateral humeri, clavicles, pelvis, and bilateral femurs. A few of these lesions are described below:  -Large FDG avid sclerotic 3.4 cm lesion within the proximal left humeral head, SUV max 17.24  -Sternal body, SUV max 20.35  -L2 vertebral body, SUV max 14.3 without  -Large ill-defined sclerotic lesion in the sacral body measuring up to at least 5.9 cm, SUV max 16.84  -FDG avid focus within the left femoral head, SUV max 13.65 without CT correlate.    Large irregular soft tissue FDG avid mass within the left breast measuring approximately 3.2 x 2.7 cm with  extension into the superficial soft tissues and associated left nipple retraction, SUV max 12.36 additional FDG avid. Left axillary lymph nodes, not definitively enlarged by short axis size criteria, for example, SUV max 5.93.    - 10/23 MRI brain:  - extensive osseous metastatic disease involving the  calvarium, skull base w/involvement of occipital condyles, C1 and C2 vertebral bodies, and likely the mandible  -  Dominant calvarial lesions are located in the right frontal calvarium and right temporal calvarium without definite breach of the inner or outer tables of the calvarium. There is a suggestion of mild asymmetric linear extra-axial enhancement deep to the dominant right temporal calvarial lesion along the dural margin, though this may be vascular in etiology.  - No abnormal parenchymal or leptomeningeal enhancement.   - sequelae of mild chronic microvascular ischemic disease. Mild generalized cerebral atrophy.     -10/23 DEXA: osteopenia (T score -2.0 lumbar spine, -2.0 left hip femoral neck, The 10 year probability of major osteoporotic fracture is 12.5%, and of hip fracture is 1.8%, based on left femoral neck BMD)    - 11/23 orthopedic consult to determine stability of left femur and fracture risk given presence of mets in left femoral head: do not see any areas of impending cortical erosion or sites of high risk for fracture, observe      REGIMEN:  Ribociclib+letrozole  C1D1 11/27/23 ribociclib 400mg, C2D1 12/25/23 ribociclib 600mg (day 1-12 only 2/2 palliative RT to sacrum w/Dr. Mandujano 1/9/24- 1/22/24 3000cGy in 10 fractions), C3D1 1/30/24, C4D1 3/6/24 (deferred 1 week 2/2 2/27/24 ANC 0.8->3/5/24 ANC 1.5)  Ribociclib 600mg PO daily day 1-21 q28 days (11/27/23 started 400mg PO daily when LFTs improved to <3x ULN)  Letrozole 2.5mg PO daily (started 10/30/23)  Febrile neutropenia risk: neutropenia (69% to 78%; grade 3: 46% to 55%; grade 4: 7% to 10%), Febrile neutropenia (1%)    C5D1 4/3/24 planned     - 4/24 EKG noted Qtc [id number], pt on plaquenil, watch Qtc closely.    Treatment related AE:  - neutropenia- ANC 1.0 4/2/24- ok to continue ribociclib at current dose, if needed- will dose reduce in future, neutropenic fever precautions discussed  - thrombocytopenia- plt 147K monitor for bleeding and bruising  while on effexor and eliquis  - macrocytic anemia- hgb 11, stable  - anxiety, mood changes- psychology referral   - hot flashes- start effexor- 37.5mg x1 week, then 75mg thereafter- next refill will be for 75mg capsules  - DVT- continue eliquis  - blurred vision- stable, no alarms sx, eye doctor visit 4/3/24  - hypocalcemia- mild, continue BID calcium and daily vitamin D       IMAGING:  - next PET due end of 5/2024- ordered today    - 2/17/24 PET CT CAP:  Most of the extensive skeletal metastases have become sclerotic and non-FDG avid and those which remain avid have decreased in activity, for example in the proximal right humerus from SUVmax 19.3 to 13.9, in the proximal right femur from 16.5 to 12.2, and in the L2 vertebral body from 14.3 to 10.7.    TUMOR MARKERS:  10/23 CA 15-3= 261  12/23 CA 15-3= 553  1/24 CA 15-3= 480  2/24 CA 15-3 338  Repeat CA 15-3 pending 5/24    OTHER:  - continue zometa q4 weeks - zometa #1 1/4/24, last zometa 3/28/24, next due 4/25/24; if disease stable on next scan, will transition to 4mg q12 weeks   - continue calcium and vitamin D  - Genetic counseling consult pending, # provided to pt previously    # left posterior tibial DVT  -1/19/24 sx started- Left foot pain and swelling  -1/22/24 pt called in, 1/22/24 left LE doppler: DVT in 1 of 2 left distal posterior tibial veins  - continue eliquis      # RA  - on plaquenil       RTC 4/30 for follow up with JUAN, labs and EKG prior to visit   RTC 5/28 for follow up with me, labs, EKG prior to visit- [facility] (after PET)    Miller Altamirano DO  Hematology/Oncology  [facility]      Again, thank you for allowing me to participate in the care of your patient.        Sincerely,        MILLER ALTAMIRANO DO

## 2024-04-03 NOTE — LETTER
4/3/2024         RE: Kesha Zacarias  [address] [address] [place] [id number]        Dear Colleague,    Thank you for referring your patient, Kesha Zacarias, to the [facility]. Please see a copy of my visit note below.    Video-Visit Details     Video Start Time: 9:22AM     Type of service:  Video Visit     Video End Time: 9:43AM    Originating Location (pt. Location): Home     Distant Location (provider location):  [facility] off site     Platform used for Video Visit: [facility]    Hematology/Oncology Established Patient Follow Up Note      Today's Date: 4/3/24    Reason for follow up: metastatic breast cancer    HISTORY OF PRESENT ILLNESS: Kesha Zacarias is a 63 year old female who presents for follow up    Patient has medical history including rheumatoid arthritis, hyperlipidemia, osteoarthritis, bilateral cataracts and glaucoma s/p surgery, endocervical polyp s/p resection, vaginal atrophy with conjugated estrogen vaginal cream use, colon polyp (hyperplastic polyp and tubular adenoma), seasonal depression, history of tobacco use ([age] y/o, about 1 ppd).    Regarding RA:  -dx over a decade ago, on plaquenil, managed by PCP  - arthritis is most severe in hands>knees, intermittent       - 3/23 bilateral screening mammogram FARIDA  - a few months ago, noted nipple retraction  - 9/23 patient palpated mass in retroareolar region of left breast and w/nipple retraction, no discharge, skin thickening, no dimpling, no erythema  - 9/23 diagnostic LEFT breast mammogram: Focal dense tissue with some likely mild architectural distortion, some anterior skin thickening is noted  - 9/23 targeted LEFT breast ultrasound: Ill-defined shadowing hypoechoic mass, 3.0 x 1.7 x 3.9 cm.  In left axilla-few small lymph nodes, 1 normal-sized lymph node with cortical thickening, 0.7 x 0.6 cm.  -9/23 ultrasound-guided LEFT breast core biopsy of 12:00 lesion with  "clip placement, \"Postbiopsy unilateral digital mammogram of the left breast showed the clip to be at the expected biopsy site\" AND ultrasound-guided left axillary lymph node biopsy of lymph node with cortical thickening  PATHOLOGY  A.  Breast, left, 12:00, biopsy:  -Lobular carcinoma in situ.-Multiple foci  -Invasive carcinoma is not identified.     B.  Lymph node, left axillary, biopsy:  -Positive for metastatic lobular carcinoma, grade 2  -Largest metastatic focus is 7 mm.  -Negative for extranodal extension.  -Breast ancillary testing:  -Estrogen receptor: Positive (91 to 100%, strong)  -Progesterone receptor: Positive (91 to 100%, strong)  -HER2 2+ IHC, FISH negative    -10/23 MRI bilateral breast:  LEFT breast:  - Heterogeneously enhancing irregular mass in the subareolar left breast, 5.0 x 4.9 x 4.9 cm, with associated nipple retraction and nipple/areolar involvement.  This mass extends superiorly through 11: positions, from anterior to mid depth.  The HydroMARK breast biopsy clip (which showed LCIS) is 1.5 cm posterosuperior to this mass.  - Multiple suspicious left level 1 axillary lymph node noted, including biopsy-proven metastatic node with indwelling biopsy marker, biopsied node measures 1.3 x 1.0 cm  - Heterogeneous marrow appearance of sternum and ribs with heterogeneous enhancement and a peripheral enhancing focus within the mid body.  IMPRESSION:  1. Upon further review of previous imaging and pathology results,  recommend repeat ultrasound guided large core-needle biopsy of the  discordant dominant left breast mass given metastatic left axillary  lymph node and superoposteriorly displaced left breast biopsy marker.   2. Nonspecific heterogeneous enhancement of the sternum and ribs.  Consider nuclear medicine bone scan    Lifetime estrogen exposure:  Menarche: 13   Last menstrual period: 49   Age of first pregnancy: 18   Number of pregnancies: 2 (no living children)   Weight gain: 20lb weight " gain within a year  Exposure to exogenous estrogen: vaginal atrophy with conjugated estrogen vaginal cream use x4 years (intermittent), has not used it since 9/23. Birth control for about 13-15 years from her 20s-30s.      Pt reports 55lb weight loss in 1.5 years, this was intentional, was following weight watchers program (23 points available per day) 230lb->170lb->190lb (gained about 20lbs). Pt was walking on the treadmill and outside daily, about 30 mins to 1.5 miles.    10/23 labs:  AST 79, ALT 91,   CA 15-3 261    10/23 PET/CT:  Innumerable foci of FDG avid lesions are seen throughout the osseous  structures of the head and neck, most pronounced on the calvarium and  cervical spine, with prominently sclerotic appearance on CT correlate.  For example:  -Right frontal bone, SUV max 5.31.  -Left lateral mass of the atlas, SUV max 15.0  -Angle of the left mandible, SUV max 9.6  -C7 vertebral body, SUV max 17.7    Innumerable variable sized FDG avid lesions throughout the axial and  appendicular spine, including but not limited to the cervical,  thoracic, and lumbar spine, multilevel bilateral ribs, sternum,  bilateral scapula, bilateral humeri, clavicles, pelvis, and bilateral  femurs. A few of these lesions are described below:  -Large FDG avid sclerotic 3.4 cm lesion within the proximal left  humeral head, SUV max 17.24  -Sternal body, SUV max 20.35  -L2 vertebral body, SUV max 14.3 without  -Large ill-defined sclerotic lesion in the sacral body measuring up to  at least 5.9 cm, SUV max 16.84  -FDG avid focus within the left femoral head, SUV  max 13.65 without CT correlate.    Large irregular soft tissue FDG avid mass within the left breast   measuring approximately 3.2 x 2.7 cm with  extension into the superficial soft tissues and associated left nipple  retraction, SUV max 12.36 additional FDG avid. Left axillary lymph  nodes, not definitively enlarged by short axis size criteria, for  example, SUV max  5.93.    The liver is unremarkable.     Solid 3 mm non-FDG avid pulmonary nodule within the  right middle lobe    - 10/23 MRI brain:  - extensive osseous metastatic disease involving the calvarium, skull base w/involvement of occipital condyles, C1 and C2 vertebral bodies, and likely the mandible  -  Dominant calvarial lesions are located in the right frontal calvarium and right temporal calvarium without definite breach of the  inner or outer tables of the calvarium. There is a suggestion of mild asymmetric linear extra-axial enhancement deep to the dominant right temporal calvarial lesion along the dural margin, though this may be vascular in etiology.  - No abnormal parenchymal or leptomeningeal enhancement.   - sequelae of mild chronic microvascular ischemic disease. Mild generalized cerebral atrophy.       -supposed to start ribociclib 10/30/23 however, noted to have significantly elevated LFTS (10/26/23 , , alk phos 152)    10/17/23: AST 79, ALT 91, alk phos 116, t bili 0.5  10/26/23 , , alk phos 152  10/30/23: , , alk phos 178, coags WNL, ribociclib was not started, letrozole started, atorvastatin held  11/7/23: AST 96, , alk phos 169, MRI liver negative for mets  11/17/23: AST 81, , alk phos 163  11/27/23: AST 55, ALT 90, alk phos 128- started ribociclib 400mg  12/4/23: AST 26, ALT 35, alk phos 118    - 11/27/23 started ribociclib+ letrozole  - 1/9/24-1/22/24 palliative RT to sacrum w/Dr. Mandujano- 3000cGy in 10 fractions    - 2/17/24 PET CT CAP  PET/CT FINDINGS: Most of the extensive skeletal metastases have become sclerotic and non-FDG avid and those which remain avid have decreased in activity, for example in the proximal right humerus from SUVmax 19.3 to 13.9, in the proximal right femur from 16.5 to 12.2, and in the L2 vertebral body from 14.3 to 10.7.      CT FINDINGS: Bilateral lens replacements. Calcified atherosclerosis, including moderate right  coronary artery disease. Splenule. Cholelithiasis. Retroaortic left renal vein. Pelvic phleboliths. Appendectomy. Ventral hernia repair with mesh. Persistent   metopic suture. T11 vertebral body hemangioma. Mild degenerative change in the spine.                                                       IMPRESSION:  Partial response     INTERIM HISTORY:    REGIMEN:  Ribociclib+letrozole  C1D1 11/27/23 ribociclib 400mg, C2D1 12/25/23 ribociclib 600mg (day 1-12 only 2/2 palliative RT to sacrum w/Dr. Mandujano 1/9/24- 1/22/24 3000cGy in 10 fractions), C3D1 1/30/24, C4D1 3/6/24 (deferred 1 week 2/2 2/27/24 ANC 0.8->3/5/24 ANC 1.5)  Ribociclib 600mg PO daily day 1-21 q28 days (11/27/23 started 400mg PO daily when LFTs improved to <3x ULN)  Letrozole 2.5mg PO daily (started 10/30/23)  Febrile neutropenia risk: neutropenia (69% to 78%; grade 3: 46% to 55%; grade 4: 7% to 10%), Febrile neutropenia (1%)    C5D1 4/3/24 planned pending labs    Treatment related AE:  - neutropenia- C4 deferred 1 week 2/2 2/27/24 ANC 0.8->3/5/24 ANC 1.5; 4/2/24 ANC 1.0  - thrombocytopenia-  intermittent, 12/23 plt 143K, 2/24 plt 123K, 4/24 plt 147K.   - macrocytic anemia- hgb 11, , 1/24 iron studies and B12 WNL  - hypocalcemia- taking calcium 600mg BID and vitamin D 1000IU  - elevated Cr- Cr 1.1 2/16/24, stable since then. increased water to 60-90 oz this week   - anxiety, mood changes-has support of family and  who are great advocates for her care,  since starting letrozole- feels more emotional with labile moods, more tearful; open to speaking with psychologist   - hot flashes- 10x/day, last a few mins, daily, affecting quality of life including sleep  - Left posterior tibial DVT- 1/19/24 sx started- Left foot pain and swelling, 1/22/24 pt called in, 1/22/24 left LE doppler: DVT in 1 of 2 left distal posterior tibial veins, started on eliquis and stopped naproxen; no issues with bleeding but does have intermitting bruising when she bumps  "her hand  - blurry vision b/l- last saw eye doctor 3/23 and has trifocal glasses. She has dry eyes and uses lubricating eye drops., 10/23 MRI brain as above. Stable, pending eye doctor visit 4/3/24      RESOLVED:  - constipation- probiotics, previously using senna BID and colace TID but after stopping oxycodone, stools are soft , no diarrhea  - chest pressure w/dyspnea: 2/24 three 10 min episodes, at rest, spontaneously resolved; 2/28/24 stress echo WNL, no recurrence of sx  - nausea- minimal before RT, then frequent with RT, 2/24 in AM- mild, uses anti-emetics about every other day, zofran helps immediately, doesn't eat breakfast but trying to have protein shakes  - leg cramps- Mg   - elevated LFTs- improved 11/27/23 to <3x ULN (AST 55, ALT 90, alk phos 128)  11/27/24 started ribociclib 400mg, 11/23 MRI liver negative for mets, ?cause- possibly viral infection between 10/17/23 and 10/26/23; 12/22/23 LFTs normal, cycle 2 started ribociclib 600mg; 1/24 LFTs WNL  - elevated Cr- increased water to 60oz/day, 1/24 Cr WNL  - daily HA- prior to starting tx- mostly b/l temporal regions, no sensitivity to light or sound, no N/V, takes tylenol w/o improvement; 10/23 MRI brain: extensive osseous mets w/dominant calvarial lesions w/enhancement deep to dominant right temporal calvarial lesion along dural margin (?vascular etiology), no parenchymal or leptomeningeal enhancement. Resolved  - back pain- left lower back, sharp, radiates to left buttock and leg making it hard to walk, worse w/walking, improved w/heating pad and ibuprofen 800mg BID, flexeril 5mg qHS, oxycodone 5mg q6hr prn #30 tabs rx 12/4/23 with stool softner- pt is using at bedtime due feeling \"loopy\" during day- this combination allows pt to be functional (10/10 previously, now 6/10)->12/23 half oxycodone (2.5mg) q6 hrs during day and 5mg at bedtime and ibuprofen 800mg BID AM and afternoon- without significant change (still 6/10), 11/30/23 MRI lumbar spine with " diffuse osseous metastatic disease.  Possible pathologic compression deformity at L1. neurosurgery consult 12/28/23 w/Dr. Perez- SI joint dysfunction- recommend mobic and PT, compression fracture is chronic. Rad onc consult 1/5/24 w/Dr. Mandujano- 1/9/24- 1/22/24 3000cGy in 10 fractions, palliative RT to sacrum (ribociclib held 1/5/24, plaquenil continued); LBP improved, while on RT was off oxycodone for 5 days but then restarted 2/2 left LE pain initially had sciatica but 2/24 stopped all oxycodone                   REVIEW OF SYSTEMS:   A 14 point ROS was reviewed with pertinent positives and negatives in the HPI.        HOME MEDICATIONS:  Current Outpatient Medications   Medication Sig Dispense Refill     apixaban ANTICOAGULANT (ELIQUIS) 5 MG tablet Take 1 tablet (5 mg) by mouth 2 times daily 60 tablet 3     betamethasone dipropionate (DIPROSONE) 0.05 % external lotion Apply topically 2 times daily 60 mL 3     cyclobenzaprine (FLEXERIL) 5 MG tablet TAKE 1 TABLET(5 MG) BY MOUTH AT BEDTIME 90 tablet 3     docusate sodium (COLACE) 100 MG capsule Take 1 capsule (100 mg) by mouth 3 times daily as needed for constipation 90 capsule 3     fish oil-omega-3 fatty acids 1000 MG capsule Take 2 g by mouth daily       hydroxychloroquine (PLAQUENIL) 200 MG tablet TAKE 2 AND 1/2 TABLETS BY MOUTH EVERY  tablet 3     letrozole (FEMARA) 2.5 MG tablet Take 1 tablet (2.5 mg) by mouth every morning 90 tablet 3     loperamide (IMODIUM A-D) 2 MG tablet When diarrhea starts take 2 tabs (4mg), then with each additional episode of diarrhea take 1 additional tab and if needing more than 8 tabs in 24 hrs call oncology 30 tablet 3     magnesium 250 MG tablet Take 1 tablet by mouth daily       ondansetron (ZOFRAN) 4 MG tablet Take 1 tablet (4 mg) by mouth every 6 hours as needed for nausea 30 tablet 3     prochlorperazine (COMPAZINE) 10 MG tablet Take 1 tablet (10 mg) by mouth every 6 hours as needed for nausea or vomiting 30 tablet 2      ribociclib (KISQALI) (600 MG DOSE) 200 MG tablet therapy pack Take 3 tablets (600 mg) by mouth every morning for 21 days , then off for 7 days. 63 tablet 0         ALLERGIES:  Allergies   Allergen Reactions     Ciprofloxacin      hives and was on flagyl too     Metronidazole      hives and was on cipro too         PAST MEDICAL HISTORY:  Past Medical History:   Diagnosis Date     Arthritis 2006     Breast cancer metastasized to bone (H) 10/12/2023     Chronic depressive personality disorder      Dysplasia of cervix (uteri)     Cryotherapy     Female infertility of unspecified origin      Glaucoma      Rheumatoid arthritis (H)          PAST SURGICAL HISTORY:  Past Surgical History:   Procedure Laterality Date     COLONOSCOPY       COLONOSCOPY N/A 2022    Procedure: COLONOSCOPY, WITH POLYPECTOMY AND BIOPSY;  Surgeon: Gagandeep Patterson MD;  Location: [facility]     HC INTRODUCE CATH FALLOPIAN TUBE, RE-OPEN/DIAGNOSIS       HERNIA REPAIR, INCISIONAL  2009     JOINT REPLACEMENT Right 2017    knee     TONSILLECTOMY       ZZC APPENDECTOMY  2003     ZZC REMV CATARACT INTRACAP,INSERT LENS  2003    right         SOCIAL HISTORY:  Social History     Socioeconomic History     Marital status:      Spouse name: Bandar     Number of children: 1     Years of education: Not on file     Highest education level: Not on file   Occupational History     Not on file   Tobacco Use     Smoking status: Former     Packs/day: 0.50     Years: 25.00     Additional pack years: 0.00     Total pack years: 12.50     Types: Cigarettes     Quit date: 2017     Years since quittin.5     Smokeless tobacco: Never   Vaping Use     Vaping Use: Never used   Substance and Sexual Activity     Alcohol use: Not Currently     Drug use: Yes     Types: Marijuana     Sexual activity: Yes     Partners: Male     Birth control/protection: None   Other Topics Concern     Parent/sibling w/ CABG, MI or angioplasty before 65F 55M? Yes    Social History Narrative     Not on file     Social Determinants of Health     Financial Resource Strain: Not on file   Food Insecurity: Not on file   Transportation Needs: Not on file   Physical Activity: Not on file   Stress: Not on file   Social Connections: Not on file   Interpersonal Safety: Not on file   Housing Stability: Not on file         FAMILY HISTORY:  Family History   Problem Relation Age of Onset     Heart Disease Mother      Lipids Mother      Hyperlipidemia Mother      Genitourinary Problems Father         prostate     Genetic Disorder Father         ulcer     Hypertension Father      Lipids Father      Prostate Cancer Father      Hyperlipidemia Sister      Hyperlipidemia Brother      Other Cancer Brother         Neck Cancer HPV (+)     Heart Disease Maternal Grandmother      Cerebrovascular Disease Maternal Grandmother      Heart Disease Maternal Grandfather      Heart Disease Maternal Uncle      Heart Disease Maternal Uncle         x  3     Cancer Nephew         Sister's Son       Family history of:  1.  Breast cancer including male breast cancer: negative   2. Ovarian cancer: negative  3.  Pancreatic cancer: negative  4.  Prostate cancer: father- ?in his 50s, other details unknown  5. Diffuse gastric cancer (if lobular breast CA): negative  6. Uterine cancer: negative  7. Colon cancer:  negative  6. Brother- head and neck, HPV +, had surgery, clinical trial and now cancer free      PHYSICAL EXAM:  Vital signs:  LMP 11/22/2011 (Exact Date)      LABS:   Latest Reference Range & Units 04/02/24 07:41   WBC 4.0 - 11.0 10e3/uL 2.3 (L)   Hemoglobin 11.7 - 15.7 g/dL 11.2 (L)   Hematocrit 35.0 - 47.0 % 33.9 (L)   Platelet Count 150 - 450 10e3/uL 147 (L)   RBC Count 3.80 - 5.20 10e6/uL 3.29 (L)   MCV 78 - 100 fL 103 (H)   MCH 26.5 - 33.0 pg 34.0 (H)   MCHC 31.5 - 36.5 g/dL 33.0   RDW 10.0 - 15.0 % 15.7 (H)   % Neutrophils % 41   % Lymphocytes % 36   % Monocytes % 20   % Eosinophils % 1   % Basophils % 2  "  Absolute Basophils 0.0 - 0.2 10e3/uL 0.1   Absolute Eosinophils 0.0 - 0.7 10e3/uL 0.0   Absolute Immature Granulocytes <=0.4 10e3/uL 0.0   Absolute Lymphocytes 0.8 - 5.3 10e3/uL 0.8   Absolute Monocytes 0.0 - 1.3 10e3/uL 0.5   % Immature Granulocytes % 0   Absolute Neutrophils 1.6 - 8.3 10e3/uL 1.0 (L)   Absolute NRBCs 10e3/uL 0.0   NRBCs per 100 WBC <1 /100 0   (L): Data is abnormally low  (H): Data is abnormally high    PATHOLOGY:    IMAGING:      ASSESSMENT/PLAN:  Kesha Zacarias is a 63 year old female with:    # de tavon metastatic left breast invasive lobular carcinoma, ER positive, [place] positive, her2 negative on FISH  - pt has de tavon metastatic invasive lobular breast cancer, ER positive, [place] positive, her2 negative. Pt does not have visceral crisis, she mainly has extensive bone metastases and LN metastases   -9/23 diagnostic left breast mammogram, left breast targeted ultrasound showed 3.0 x 1.7 x 3.9 ill-defined shadowing hypoechoic mass, few small lymph nodes in the left axilla, one with cortical thickening measuring 0.7 x 0.6 cm  -9/23 ultrasound-guided LEFT breast core biopsy of 12:00 lesion with clip placement, \"Postbiopsy unilateral digital mammogram of the left breast showed the clip to be at the expected biopsy site\" AND ultrasound-guided left axillary lymph node biopsy of lymph node with cortical thickening, PATHOLOGY:  A.  Breast, left, 12:00, biopsy:Lobular carcinoma in situ, Multiple foci. Invasive carcinoma is not identified.   B.  Lymph node, left axillary, biopsy:  -Positive for metastatic lobular carcinoma, grade 2, 0.7 cm, negative GREGG, Estrogen receptor: Positive (91 to 100%, strong), Progesterone receptor: Positive (91 to 100%, strong), HER2 2+ IHC, FISH negative  -10/23 MRI bilateral breast:  - Heterogeneously enhancing irregular mass in the subareolar left breast, 5.0 x 4.9 x 4.9 cm, with associated nipple retraction and nipple/areolar involvement.  This mass extends superiorly through " 11: positions, from anterior to mid depth.  The HydroMARK breast biopsy clip (which showed LCIS) is 1.5 cm posterosuperior to this mass.  - Multiple suspicious left level 1 axillary lymph node noted, including biopsy-proven metastatic node with indwelling biopsy marker, biopsied node measures 1.3 x 1.0 cm  - Heterogeneous marrow appearance of sternum and ribs with heterogeneous enhancement and a peripheral enhancing focus within the mid body.    - 10/23 PET/CT:  Innumerable foci of FDG avid lesions are seen throughout the osseous structures of the head and neck, most pronounced on the calvarium and cervical spine, with prominently sclerotic appearance on CT correlate.  For example:  -Right frontal bone, SUV max 5.31.  -Left lateral mass of the atlas, SUV max 15.0  -Angle of the left mandible, SUV max 9.6  -C7 vertebral body, SUV max 17.7    Innumerable variable sized FDG avid lesions throughout the axial and appendicular spine, including but not limited to the cervical, thoracic, and lumbar spine, multilevel bilateral ribs, sternum, bilateral scapula, bilateral humeri, clavicles, pelvis, and bilateral femurs. A few of these lesions are described below:  -Large FDG avid sclerotic 3.4 cm lesion within the proximal left humeral head, SUV max 17.24  -Sternal body, SUV max 20.35  -L2 vertebral body, SUV max 14.3 without  -Large ill-defined sclerotic lesion in the sacral body measuring up to at least 5.9 cm, SUV max 16.84  -FDG avid focus within the left femoral head, SUV max 13.65 without CT correlate.    Large irregular soft tissue FDG avid mass within the left breast measuring approximately 3.2 x 2.7 cm with  extension into the superficial soft tissues and associated left nipple retraction, SUV max 12.36 additional FDG avid. Left axillary lymph nodes, not definitively enlarged by short axis size criteria, for example, SUV max 5.93.    - 10/23 MRI brain:  - extensive osseous metastatic disease involving the  calvarium, skull base w/involvement of occipital condyles, C1 and C2 vertebral bodies, and likely the mandible  -  Dominant calvarial lesions are located in the right frontal calvarium and right temporal calvarium without definite breach of the inner or outer tables of the calvarium. There is a suggestion of mild asymmetric linear extra-axial enhancement deep to the dominant right temporal calvarial lesion along the dural margin, though this may be vascular in etiology.  - No abnormal parenchymal or leptomeningeal enhancement.   - sequelae of mild chronic microvascular ischemic disease. Mild generalized cerebral atrophy.     -10/23 DEXA: osteopenia (T score -2.0 lumbar spine, -2.0 left hip femoral neck, The 10 year probability of major osteoporotic fracture is 12.5%, and of hip fracture is 1.8%, based on left femoral neck BMD)    - 11/23 orthopedic consult to determine stability of left femur and fracture risk given presence of mets in left femoral head: do not see any areas of impending cortical erosion or sites of high risk for fracture, observe      REGIMEN:  Ribociclib+letrozole  C1D1 11/27/23 ribociclib 400mg, C2D1 12/25/23 ribociclib 600mg (day 1-12 only 2/2 palliative RT to sacrum w/Dr. Mandujano 1/9/24- 1/22/24 3000cGy in 10 fractions), C3D1 1/30/24, C4D1 3/6/24 (deferred 1 week 2/2 2/27/24 ANC 0.8->3/5/24 ANC 1.5)  Ribociclib 600mg PO daily day 1-21 q28 days (11/27/23 started 400mg PO daily when LFTs improved to <3x ULN)  Letrozole 2.5mg PO daily (started 10/30/23)  Febrile neutropenia risk: neutropenia (69% to 78%; grade 3: 46% to 55%; grade 4: 7% to 10%), Febrile neutropenia (1%)    C5D1 4/3/24 planned     - 4/24 EKG noted Qtc [id number], pt on plaquenil, watch Qtc closely.    Treatment related AE:  - neutropenia- ANC 1.0 4/2/24- ok to continue ribociclib at current dose, if needed- will dose reduce in future, neutropenic fever precautions discussed  - thrombocytopenia- plt 147K monitor for bleeding and bruising  while on effexor and eliquis  - macrocytic anemia- hgb 11, stable  - anxiety, mood changes- psychology referral   - hot flashes- start effexor- 37.5mg x1 week, then 75mg thereafter- next refill will be for 75mg capsules  - DVT- continue eliquis  - blurred vision- stable, no alarms sx, eye doctor visit 4/3/24  - hypocalcemia- mild, continue BID calcium and daily vitamin D       IMAGING:  - next PET due end of 5/2024- ordered today    - 2/17/24 PET CT CAP:  Most of the extensive skeletal metastases have become sclerotic and non-FDG avid and those which remain avid have decreased in activity, for example in the proximal right humerus from SUVmax 19.3 to 13.9, in the proximal right femur from 16.5 to 12.2, and in the L2 vertebral body from 14.3 to 10.7.    TUMOR MARKERS:  10/23 CA 15-3= 261  12/23 CA 15-3= 553  1/24 CA 15-3= 480  2/24 CA 15-3 338  Repeat CA 15-3 pending 5/24    OTHER:  - continue zometa q4 weeks - zometa #1 1/4/24, last zometa 3/28/24, next due 4/25/24; if disease stable on next scan, will transition to 4mg q12 weeks   - continue calcium and vitamin D  - Genetic counseling consult pending, # provided to pt previously    # left posterior tibial DVT  -1/19/24 sx started- Left foot pain and swelling  -1/22/24 pt called in, 1/22/24 left LE doppler: DVT in 1 of 2 left distal posterior tibial veins  - continue eliquis      # RA  - on plaquenil       RTC 4/30 for follow up with JUAN, labs and EKG prior to visit   RTC 5/28 for follow up with me, labs, EKG prior to visit- Chavo (after PET)    Miller Altamirano DO  Hematology/Oncology  [facility]      Again, thank you for allowing me to participate in the care of your patient.        Sincerely,        MILLER ALTAMIRANO DO

## 2024-04-03 NOTE — NURSING NOTE
Chief Complaint   Patient presents with    Oncology Clinic Visit     Oral ONC Breast Cancer - Ribociclib / Aromatase Inhibitor    Lab Only     On 4/2/2023

## 2024-04-23 DIAGNOSIS — C79.51 CARCINOMA OF LEFT BREAST METASTATIC TO BONE (H): Primary | ICD-10-CM

## 2024-04-23 DIAGNOSIS — C50.912 CARCINOMA OF LEFT BREAST METASTATIC TO BONE (H): Primary | ICD-10-CM

## 2024-04-28 DIAGNOSIS — G25.81 RESTLESS LEGS SYNDROME: ICD-10-CM

## 2024-04-28 RX ORDER — CYCLOBENZAPRINE HCL 5 MG
TABLET ORAL
Qty: 90 TABLET | Refills: 3 | Status: SHIPPED | OUTPATIENT
Start: 2024-04-28

## 2024-04-29 NOTE — PROGRESS NOTES
"Virtual Oncology Follow Up Visit: May 1, 2024    Oncologist: Dr. Maravilla   PCP: Schoen, Gregory G    Reason for Visit: Pre-treatment follow-up    Diagnosis:  # de tavon metastatic left breast invasive lobular carcinoma, ER positive, KY positive, her2 negative on FISH  - pt has de tavon metastatic invasive lobular breast cancer, ER positive, KY positive, her2 negative. Pt does not have visceral crisis, she mainly has extensive bone metastases and LN metastases   -9/23 diagnostic left breast mammogram, left breast targeted ultrasound showed 3.0 x 1.7 x 3.9 ill-defined shadowing hypoechoic mass, few small lymph nodes in the left axilla, one with cortical thickening measuring 0.7 x 0.6 cm  -9/23 ultrasound-guided LEFT breast core biopsy of 12:00 lesion with clip placement, \"Postbiopsy unilateral digital mammogram of the left breast showed the clip to be at the expected biopsy site\" AND ultrasound-guided left axillary lymph node biopsy of lymph node with cortical thickening, PATHOLOGY:  A.  Breast, left, 12:00, biopsy:Lobular carcinoma in situ, Multiple foci. Invasive carcinoma is not identified.   B.  Lymph node, left axillary, biopsy:  -Positive for metastatic lobular carcinoma, grade 2, 0.7 cm, negative GREGG, Estrogen receptor: Positive (91 to 100%, strong), Progesterone receptor: Positive (91 to 100%, strong), HER2 2+ IHC, FISH negative  -10/23 MRI bilateral breast:  - Heterogeneously enhancing irregular mass in the subareolar left breast, 5.0 x 4.9 x 4.9 cm, with associated nipple retraction and nipple/areolar involvement.  This mass extends superiorly through 11: positions, from anterior to mid depth.  The [employer] breast biopsy clip (which showed LCIS) is 1.5 cm posterosuperior to this mass.  - Multiple suspicious left level 1 axillary lymph node noted, including biopsy-proven metastatic node with indwelling biopsy marker, biopsied node measures 1.3 x 1.0 cm  - Heterogeneous marrow appearance of sternum and ribs " with heterogeneous enhancement and a peripheral enhancing focus within the mid body.     - 10/23 PET/CT:  Innumerable foci of FDG avid lesions are seen throughout the osseous structures of the head and neck, most pronounced on the calvarium and cervical spine, with prominently sclerotic appearance on CT correlate.  For example:  -Right frontal bone, SUV max 5.31.  -Left lateral mass of the atlas, SUV max 15.0  -Angle of the left mandible, SUV max 9.6  -C7 vertebral body, SUV max 17.7     Innumerable variable sized FDG avid lesions throughout the axial and appendicular spine, including but not limited to the cervical, thoracic, and lumbar spine, multilevel bilateral ribs, sternum, bilateral scapula, bilateral humeri, clavicles, pelvis, and bilateral femurs. A few of these lesions are described below:  -Large FDG avid sclerotic 3.4 cm lesion within the proximal left humeral head, SUV max 17.24  -Sternal body, SUV max 20.35  -L2 vertebral body, SUV max 14.3 without  -Large ill-defined sclerotic lesion in the sacral body measuring up to at least 5.9 cm, SUV max 16.84  -FDG avid focus within the left femoral head, SUV max 13.65 without CT correlate.     Large irregular soft tissue FDG avid mass within the left breast measuring approximately 3.2 x 2.7 cm with  extension into the superficial soft tissues and associated left nipple retraction, SUV max 12.36 additional FDG avid. Left axillary lymph nodes, not definitively enlarged by short axis size criteria, for example, SUV max 5.93.     - 10/23 MRI brain:  - extensive osseous metastatic disease involving the calvarium, skull base w/involvement of occipital condyles, C1 and C2 vertebral bodies, and likely the mandible  -  Dominant calvarial lesions are located in the right frontal calvarium and right temporal calvarium without definite breach of the inner or outer tables of the calvarium. There is a suggestion of mild asymmetric linear extra-axial enhancement deep to the  dominant right temporal calvarial lesion along the dural margin, though this may be vascular in etiology.  - No abnormal parenchymal or leptomeningeal enhancement.   - sequelae of mild chronic microvascular ischemic disease. Mild generalized cerebral atrophy.      -10/23 DEXA: osteopenia (T score -2.0 lumbar spine, -2.0 left hip femoral neck, The 10 year probability of major osteoporotic fracture is 12.5%, and of hip fracture is 1.8%, based on left femoral neck BMD)     - 11/23 orthopedic consult to determine stability of left femur and fracture risk given presence of mets in left femoral head: do not see any areas of impending cortical erosion or sites of high risk for fracture, observe    Treatment:  Ribociclib+letrozole  C1D1 11/27/23 ribociclib 400mg, C2D1 12/25/23 ribociclib 600mg (day 1-12 only 2/2 palliative RT to sacrum w/Dr. Mandujano 1/9/24- 1/22/24 3000cGy in 10 fractions), C3D1 1/30/24, C4D1 3/6/24 (deferred 1 week 2/2 2/27/24 ANC 0.8->3/5/24 ANC 1.5). C5D1 5/1/24  Ribociclib 600mg PO daily day 1-21 q28 days (11/27/23 started 400mg PO daily when LFTs improved to <3x ULN)  Letrozole 2.5mg PO daily (started 10/30/23)  Febrile neutropenia risk: neutropenia (69% to 78%; grade 3: 46% to 55%; grade 4: 7% to 10%), Febrile neutropenia (1%)    Interval History:  Pt is seen virtually prior to start of cycle 5. She states she has ongoing fatigue and low energy. She is mildly constipated and uses Miralax PRN. She is drinking protein shakes daily, which also helps with the nausea. She has body/joint aches at baseline d/t her RA and overall those are stable. She has not noticed an increase with use of letrozole. Hot flashes are much improved, using effexor 75 mg daily. Now reporting 2-3 a day when previously 10+ hot flashes that affected her QOL. Overall she is pleased with how things are going and has no additional questions or concerns today.    Patient denies any of the following except if noted above: fevers, chills,  difficulty with energy, vision or hearing changes, chest pain, dyspnea, abdominal pain, nausea, vomiting, diarrhea, constipation, urinary concerns, headaches, numbness, tingling, issues with sleep or mood. She also denies lumps, bumps, rashes or skin lesions, bleeding or bruising issues.    Physical Exam: Limited due to virtual visit restrictions.  General: No acute distress. Appearance is well-groomed.  Resp: No respiratory distress. No cough.   Skin: No visible rash or lesions.  Neuro: A/O x 4. Appropriate mentation and speech.  Psych: Appropriate mood.     Laboratory Results:    Latest Reference Range & Units 04/30/24 07:37   Sodium 135 - 145 mmol/L 142   Potassium 3.4 - 5.3 mmol/L 4.2   Chloride 98 - 107 mmol/L 105   Carbon Dioxide (CO2) 22 - 29 mmol/L 27   Urea Nitrogen 8.0 - 23.0 mg/dL 15.0   Creatinine 0.51 - 0.95 mg/dL 0.97 (H)   GFR Estimate >60 mL/min/1.73m2 66   Calcium 8.8 - 10.2 mg/dL 9.1   Anion Gap 7 - 15 mmol/L 10   Magnesium 1.7 - 2.3 mg/dL 1.9   Phosphorus 2.5 - 4.5 mg/dL 3.3   Albumin 3.5 - 5.2 g/dL 4.1   Protein Total 6.4 - 8.3 g/dL 7.2   Alkaline Phosphatase 40 - 150 U/L 59   ALT 0 - 50 U/L 18   AST 0 - 45 U/L 21   Bilirubin Total <=1.2 mg/dL 0.3   Glucose 70 - 99 mg/dL 93   WBC 4.0 - 11.0 10e3/uL 2.6 (L)   Hemoglobin 11.7 - 15.7 g/dL 11.7   Hematocrit 35.0 - 47.0 % 35.2   Platelet Count 150 - 450 10e3/uL 171   RBC Count 3.80 - 5.20 10e6/uL 3.34 (L)   MCV 78 - 100 fL 105 (H)   MCH 26.5 - 33.0 pg 35.0 (H)   MCHC 31.5 - 36.5 g/dL 33.2   RDW 10.0 - 15.0 % 16.1 (H)   % Neutrophils % 41   % Lymphocytes % 43   % Monocytes % 13   % Eosinophils % 1   % Basophils % 2   Absolute Basophils 0.0 - 0.2 10e3/uL 0.1   Absolute Eosinophils 0.0 - 0.7 10e3/uL 0.0   Absolute Immature Granulocytes <=0.4 10e3/uL 0.0   Absolute Lymphocytes 0.8 - 5.3 10e3/uL 1.1   Absolute Monocytes 0.0 - 1.3 10e3/uL 0.3   % Immature Granulocytes % 0   Absolute Neutrophils 1.6 - 8.3 10e3/uL 1.0 (L)   Absolute NRBCs 10e3/uL 0.0   NRBCs  per 100 WBC <1 /100 0   (H): Data is abnormally high  (L): Data is abnormally low  I reviewed the above labs today.    Imaging:  Echocardiogram Exercise Stress  [id number]  [facility]  [id number]  [id number]^ANTONY^ERJI     [facility]  Echocardiography Laboratory  [address] [place] [address]     Name: RIP DALE  MRN: [id number]  : 1960  Study Date: 2024 09:00 AM  Age: 63 yrs  Gender: Female  Patient Location: [facility]  Reason For Study: Chest pressure  History: breast cancer,  Ordering Physician: REJI HARDY  Referring Physician: Schoen, Gregory  Performed By: Amber Gomez     BSA: 2.0 m2  Height: 66 in  Weight: 209 lb  HR: 75  BP: 108/62 mmHg  ______________________________________________________________________________  Procedure  Stress Echo Complete. Definity (NDC #90507-631) given intravenously.  ______________________________________________________________________________  Interpretation Summary  Normal resting LV function with good augmentation of all wall segments post  exercise.  Normal exercise capacity  No ischemic ECG changes.  This is a normal stress echo indicating low probability of significant  exercise induced myocardial ischemia.  ______________________________________________________________________________  Stress  Exercise was stopped due to dyspnea.  There was a normal BP response to exercise.  Target Heart Rate was achieved.  The EKG portion of this stress test was negative for inducible ischemia (see  echo results below).  Normal left ventricular function and wall motion at rest and post-stress.  Left ventricular cavity size decreases with exercise.  Global LV systolic function augments with exercise.     Baseline  The patient is in normal sinus rhythm.  Low voltage QRS, mild NSST-T changes.  Normal left ventricular function and wall motion at rest and post-stress.     Stress Results             Protocol:  Jose           Maximum Predicted HR:   158 bpm             Target HR: 134 bpm        % Maximum Predicted HR: 87 %              Stage  DurationHeart Rate  BP             Comment                  (mm:ss)   (bpm)          Stage 1   3:00      97    122/62          Stage 2   3:00     112    148/62          Stage 3   2:00     137    152/62RPP [id number], ramirez 4, FAC average          Stage 4R  4:15      75    132/62             Stress Duration:   8:00 mm:ss        Recovery Time: 4:15 mm:ss          Maximum Stress HR: 137 bpm           METS:          10     Left Ventricle  The left ventricle is normal in size. There is mild concentric left  ventricular hypertrophy.     Mitral Valve  The mitral valve is normal in structure and function.     Tricuspid Valve  The tricuspid valve is normal in structure and function.     Aortic Valve  The aortic valve is normal in structure and function.     Vessels  Normal size aorta.     ______________________________________________________________________________  MMode/2D Measurements & Calculations  IVSd: 1.4 cm  LVIDd: 4.6 cm  LVIDs: 3.4 cm  LVPWd: 1.2 cm  FS: 25.3 %  LV mass(C)dI: 106.8 grams/m2  Ao root diam: 3.4 cm  LA dimension: 3.2 cm  asc Aorta Diam: 3.9 cm  Ao root diam index Ht(cm/m): 2.0     Ao root diam index BSA (cm/m2): 1.7  Asc Ao diam index BSA (cm/m2): 1.9  Asc Ao diam index Ht(cm/m): 2.3  RWT: 0.51     ______________________________________________________________________________  Report approved by: Maribell Brown 02/29/2024 10:52 AM          I reviewed the above imaging report today.      Assessment and Plan:   # de tavon metastatic left breast invasive lobular carcinoma, ER positive, [place] positive, her2 negative on FISH   - On treatment with ribociclib and letrozole  - Tolerating well with manageable side effects including fatigue and low energy. Mild constipation and uses Miralax occasionally. Generalized body aches present prior to start of letrozole d/t underlying RA, overall  stable.   - Ongoing neutropenia, stable at 1.0 (grade 2) - Per UpToDate, no dosage adjustment needed until grade 3 neutropenia w/ANC of 0.5 to <1. Will discuss with Dr. Maravilla upon her return to clinic about plan moving forward.   - Effexor 75 mg daily for hot flashes, which are much improved. Now reports 2-3/day when previously 10+ episodes. Can consider gabapentin at bedtime if hot flashes are impacting sleep.   - On Eliquis for hx of DVT, no s/s of bleeding.  - PET scan scheduled for 5/2024.  - QTc 426 on ECG 4/30, on plaquenil, no report of cardiac s/s today. Continue to monitor.   - Genetic counseling pending.    Bone health  - Extensive osseous metastatic disease  - Osteopenia on DEXA scan 10/23 - lumbar spine, and left hip femoral neck   - On Zometa q4 weeks, due for dose today. Monitoring calcium and creatinine prior to infusion.  - Confirmed use of calcium 600 mg BID and vitamin D 1000 IU   - Encouraged pt to achieve weight-bearing exercises several times a week, if possible.         Kitty YING, CNP                  Virtual Visit Details     Type of service:  Video Visit     Originating Location (pt. Location): Home   Distant Location (provider location): On-site  Platform used for Video Visit: Negrita    Video Start Time: 134  Video Stop Time: 143

## 2024-04-30 ENCOUNTER — HOSPITAL ENCOUNTER (OUTPATIENT)
Dept: CARDIOLOGY | Facility: CLINIC | Age: 64
Discharge: HOME | End: 2024-04-30
Attending: PHYSICIAN ASSISTANT | Admitting: PHYSICIAN ASSISTANT
Payer: COMMERCIAL

## 2024-04-30 ENCOUNTER — LAB (OUTPATIENT)
Dept: LAB | Facility: CLINIC | Age: 64
End: 2024-04-30
Payer: COMMERCIAL

## 2024-04-30 ENCOUNTER — MYC REFILL (OUTPATIENT)
Dept: ONCOLOGY | Facility: CLINIC | Age: 64
End: 2024-04-30

## 2024-04-30 DIAGNOSIS — N95.1 MENOPAUSAL SYNDROME (HOT FLASHES): ICD-10-CM

## 2024-04-30 DIAGNOSIS — C79.51 CARCINOMA OF LEFT BREAST METASTATIC TO BONE (H): ICD-10-CM

## 2024-04-30 DIAGNOSIS — C50.919 CARCINOMA OF BREAST METASTATIC TO BONE, UNSPECIFIED LATERALITY (H): ICD-10-CM

## 2024-04-30 DIAGNOSIS — C79.51 CARCINOMA OF BREAST METASTATIC TO BONE, UNSPECIFIED LATERALITY (H): ICD-10-CM

## 2024-04-30 DIAGNOSIS — C79.51 MALIGNANT NEOPLASM METASTATIC TO BONE (H): ICD-10-CM

## 2024-04-30 DIAGNOSIS — C50.912 CARCINOMA OF LEFT BREAST METASTATIC TO BONE (H): ICD-10-CM

## 2024-04-30 DIAGNOSIS — C79.51 METASTATIC CANCER TO BONE (H): Primary | ICD-10-CM

## 2024-04-30 LAB
ALBUMIN SERPL BCG-MCNC: 4.1 G/DL (ref 3.5–5.2)
ALP SERPL-CCNC: 59 U/L (ref 40–150)
ALT SERPL W P-5'-P-CCNC: 18 U/L (ref 0–50)
ANION GAP SERPL CALCULATED.3IONS-SCNC: 10 MMOL/L (ref 7–15)
AST SERPL W P-5'-P-CCNC: 21 U/L (ref 0–45)
BASOPHILS # BLD AUTO: 0.1 10E3/UL (ref 0–0.2)
BASOPHILS NFR BLD AUTO: 2 %
BILIRUB SERPL-MCNC: 0.3 MG/DL
BUN SERPL-MCNC: 15 MG/DL (ref 8–23)
CALCIUM SERPL-MCNC: 9.1 MG/DL (ref 8.8–10.2)
CHLORIDE SERPL-SCNC: 105 MMOL/L (ref 98–107)
CREAT SERPL-MCNC: 0.97 MG/DL (ref 0.51–0.95)
DEPRECATED HCO3 PLAS-SCNC: 27 MMOL/L (ref 22–29)
EGFRCR SERPLBLD CKD-EPI 2021: 66 ML/MIN/1.73M2
EOSINOPHIL # BLD AUTO: 0 10E3/UL (ref 0–0.7)
EOSINOPHIL NFR BLD AUTO: 1 %
ERYTHROCYTE [DISTWIDTH] IN BLOOD BY AUTOMATED COUNT: 16.1 % (ref 10–15)
GLUCOSE SERPL-MCNC: 93 MG/DL (ref 70–99)
HCT VFR BLD AUTO: 35.2 % (ref 35–47)
HGB BLD-MCNC: 11.7 G/DL (ref 11.7–15.7)
IMM GRANULOCYTES # BLD: 0 10E3/UL
IMM GRANULOCYTES NFR BLD: 0 %
LYMPHOCYTES # BLD AUTO: 1.1 10E3/UL (ref 0.8–5.3)
LYMPHOCYTES NFR BLD AUTO: 43 %
MAGNESIUM SERPL-MCNC: 1.9 MG/DL (ref 1.7–2.3)
MCH RBC QN AUTO: 35 PG (ref 26.5–33)
MCHC RBC AUTO-ENTMCNC: 33.2 G/DL (ref 31.5–36.5)
MCV RBC AUTO: 105 FL (ref 78–100)
MONOCYTES # BLD AUTO: 0.3 10E3/UL (ref 0–1.3)
MONOCYTES NFR BLD AUTO: 13 %
NEUTROPHILS # BLD AUTO: 1 10E3/UL (ref 1.6–8.3)
NEUTROPHILS NFR BLD AUTO: 41 %
NRBC # BLD AUTO: 0 10E3/UL
NRBC BLD AUTO-RTO: 0 /100
PHOSPHATE SERPL-MCNC: 3.3 MG/DL (ref 2.5–4.5)
PLATELET # BLD AUTO: 171 10E3/UL (ref 150–450)
POTASSIUM SERPL-SCNC: 4.2 MMOL/L (ref 3.4–5.3)
PROT SERPL-MCNC: 7.2 G/DL (ref 6.4–8.3)
RBC # BLD AUTO: 3.34 10E6/UL (ref 3.8–5.2)
SODIUM SERPL-SCNC: 142 MMOL/L (ref 135–145)
WBC # BLD AUTO: 2.6 10E3/UL (ref 4–11)

## 2024-04-30 PROCEDURE — 85025 COMPLETE CBC W/AUTO DIFF WBC: CPT | Performed by: INTERNAL MEDICINE

## 2024-04-30 PROCEDURE — 80053 COMPREHEN METABOLIC PANEL: CPT | Performed by: INTERNAL MEDICINE

## 2024-04-30 PROCEDURE — 93005 ELECTROCARDIOGRAM TRACING: CPT | Performed by: REHABILITATION PRACTITIONER

## 2024-04-30 PROCEDURE — 84100 ASSAY OF PHOSPHORUS: CPT | Performed by: INTERNAL MEDICINE

## 2024-04-30 PROCEDURE — 36415 COLL VENOUS BLD VENIPUNCTURE: CPT

## 2024-04-30 PROCEDURE — 83735 ASSAY OF MAGNESIUM: CPT | Performed by: INTERNAL MEDICINE

## 2024-04-30 RX ORDER — VENLAFAXINE HYDROCHLORIDE 37.5 MG/1
CAPSULE, EXTENDED RELEASE ORAL
Qty: 49 CAPSULE | Refills: 0 | Status: SHIPPED | OUTPATIENT
Start: 2024-04-30 | End: 2024-05-30

## 2024-05-01 ENCOUNTER — VIRTUAL VISIT (OUTPATIENT)
Dept: ONCOLOGY | Facility: CLINIC | Age: 64
End: 2024-05-01
Attending: PHYSICIAN ASSISTANT
Payer: COMMERCIAL

## 2024-05-01 ENCOUNTER — TELEPHONE (OUTPATIENT)
Dept: ONCOLOGY | Facility: CLINIC | Age: 64
End: 2024-05-01

## 2024-05-01 ENCOUNTER — INFUSION THERAPY VISIT (OUTPATIENT)
Dept: INFUSION THERAPY | Facility: CLINIC | Age: 64
End: 2024-05-01
Attending: INTERNAL MEDICINE
Payer: COMMERCIAL

## 2024-05-01 VITALS
WEIGHT: 213.5 LBS | SYSTOLIC BLOOD PRESSURE: 111 MMHG | OXYGEN SATURATION: 98 % | HEART RATE: 60 BPM | BODY MASS INDEX: 34.46 KG/M2 | RESPIRATION RATE: 16 BRPM | TEMPERATURE: 97.5 F | DIASTOLIC BLOOD PRESSURE: 55 MMHG

## 2024-05-01 DIAGNOSIS — C79.51 CARCINOMA OF BREAST METASTATIC TO BONE, UNSPECIFIED LATERALITY (H): Primary | ICD-10-CM

## 2024-05-01 DIAGNOSIS — T45.1X5A CHEMOTHERAPY-INDUCED FATIGUE: ICD-10-CM

## 2024-05-01 DIAGNOSIS — C50.919 CARCINOMA OF BREAST METASTATIC TO BONE, UNSPECIFIED LATERALITY (H): Primary | ICD-10-CM

## 2024-05-01 DIAGNOSIS — N95.1 MENOPAUSAL SYNDROME (HOT FLASHES): ICD-10-CM

## 2024-05-01 DIAGNOSIS — I82.402 ACUTE DEEP VEIN THROMBOSIS (DVT) OF LEFT LOWER EXTREMITY, UNSPECIFIED VEIN (H): ICD-10-CM

## 2024-05-01 DIAGNOSIS — R53.83 CHEMOTHERAPY-INDUCED FATIGUE: ICD-10-CM

## 2024-05-01 DIAGNOSIS — D70.1 CHEMOTHERAPY-INDUCED NEUTROPENIA (H): ICD-10-CM

## 2024-05-01 DIAGNOSIS — Z79.811 LONG TERM (CURRENT) USE OF AROMATASE INHIBITORS: ICD-10-CM

## 2024-05-01 DIAGNOSIS — T45.1X5A CHEMOTHERAPY-INDUCED NEUTROPENIA (H): ICD-10-CM

## 2024-05-01 PROCEDURE — 99214 OFFICE O/P EST MOD 30 MIN: CPT | Mod: 95

## 2024-05-01 PROCEDURE — 250N000011 HC RX IP 250 OP 636: Mod: JZ

## 2024-05-01 PROCEDURE — 96365 THER/PROPH/DIAG IV INF INIT: CPT

## 2024-05-01 PROCEDURE — 258N000003 HC RX IP 258 OP 636

## 2024-05-01 RX ORDER — ZOLEDRONIC ACID 0.04 MG/ML
4 INJECTION, SOLUTION INTRAVENOUS ONCE
Status: COMPLETED | OUTPATIENT
Start: 2024-05-01 | End: 2024-05-01

## 2024-05-01 RX ORDER — HEPARIN SODIUM,PORCINE 10 UNIT/ML
5-20 VIAL (ML) INTRAVENOUS DAILY PRN
Status: CANCELLED | OUTPATIENT
Start: 2024-05-01

## 2024-05-01 RX ORDER — HEPARIN SODIUM (PORCINE) LOCK FLUSH IV SOLN 100 UNIT/ML 100 UNIT/ML
5 SOLUTION INTRAVENOUS
Status: CANCELLED | OUTPATIENT
Start: 2024-05-01

## 2024-05-01 RX ORDER — ZOLEDRONIC ACID 0.04 MG/ML
4 INJECTION, SOLUTION INTRAVENOUS ONCE
Status: CANCELLED | OUTPATIENT
Start: 2024-05-01 | End: 2024-05-01

## 2024-05-01 RX ADMIN — SODIUM CHLORIDE 250 ML: 9 INJECTION, SOLUTION INTRAVENOUS at 15:48

## 2024-05-01 RX ADMIN — ZOLEDRONIC ACID 4 MG: 0.04 INJECTION, SOLUTION INTRAVENOUS at 15:50

## 2024-05-01 ASSESSMENT — PAIN SCALES - GENERAL: PAINLEVEL: NO PAIN (0)

## 2024-05-01 NOTE — PROGRESS NOTES
Kesha is a 63 year old who is being evaluated via a billable video visit.    How would you like to obtain your AVS? MyChart  If the video visit is dropped, the invitation should be resent by: Text to cell phone: [phone]  Will anyone else be joining your video visit? No      Dilcia Stroud RN on 5/1/2024 at 1:29 PM

## 2024-05-01 NOTE — LETTER
"    5/1/2024         RE: Kesha Zacarias  [address] [address] [place] [id number]        Dear Colleague,    Thank you for referring your patient, Kesha Zacarias, to the [facility]. Please see a copy of my visit note below.    Virtual Oncology Follow Up Visit: May 1, 2024    Oncologist: Dr. 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 Pt does not have visceral crisis, she mainly has extensive bone metastases and LN metastases   -9/23 diagnostic left breast mammogram, left breast targeted ultrasound showed 3.0 x 1.7 x 3.9 ill-defined shadowing hypoechoic mass, few small lymph nodes in the left axilla, one with cortical thickening measuring 0.7 x 0.6 cm  -9/23 ultrasound-guided LEFT breast core biopsy of 12:00 lesion with clip placement, \"Postbiopsy unilateral digital mammogram of the left breast showed the clip to be at the expected biopsy site\" AND ultrasound-guided left axillary lymph node biopsy of lymph node with cortical thickening, PATHOLOGY:  A.  Breast, left, 12:00, biopsy:Lobular carcinoma in situ, Multiple foci. Invasive carcinoma is not identified.   B.  Lymph node, left axillary, biopsy:  -Positive for metastatic lobular carcinoma, grade 2, 0.7 cm, negative GREGG, Estrogen receptor: Positive (91 to 100%, strong), Progesterone receptor: Positive (91 to 100%, strong), HER2 2+ IHC, FISH negative  -10/23 MRI bilateral breast:  - Heterogeneously enhancing irregular mass in the subareolar left breast, 5.0 x 4.9 x 4.9 cm, with associated nipple retraction and nipple/areolar involvement.  This mass extends superiorly through 11: positions, from anterior to mid depth.  The HydroMARK breast biopsy clip (which showed LCIS) is " 1.5 cm posterosuperior to this mass.  - Multiple suspicious left level 1 axillary lymph node noted, including biopsy-proven metastatic node with indwelling biopsy marker, biopsied node measures 1.3 x 1.0 cm  - Heterogeneous marrow appearance of sternum and ribs with heterogeneous enhancement and a peripheral enhancing focus within the mid body.     - 10/23 PET/CT:  Innumerable foci of FDG avid lesions are seen throughout the osseous structures of the head and neck, most pronounced on the calvarium and cervical spine, with prominently sclerotic appearance on CT correlate.  For example:  -Right frontal bone, SUV max 5.31.  -Left lateral mass of the atlas, SUV max 15.0  -Angle of the left mandible, SUV max 9.6  -C7 vertebral body, SUV max 17.7     Innumerable variable sized FDG avid lesions throughout the axial and appendicular spine, including but not limited to the cervical, thoracic, and lumbar spine, multilevel bilateral ribs, sternum, bilateral scapula, bilateral humeri, clavicles, pelvis, and bilateral femurs. A few of these lesions are described below:  -Large FDG avid sclerotic 3.4 cm lesion within the proximal left humeral head, SUV max 17.24  -Sternal body, SUV max 20.35  -L2 vertebral body, SUV max 14.3 without  -Large ill-defined sclerotic lesion in the sacral body measuring up to at least 5.9 cm, SUV max 16.84  -FDG avid focus within the left femoral head, SUV max 13.65 without CT correlate.     Large irregular soft tissue FDG avid mass within the left breast measuring approximately 3.2 x 2.7 cm with  extension into the superficial soft tissues and associated left nipple retraction, SUV max 12.36 additional FDG avid. Left axillary lymph nodes, not definitively enlarged by short axis size criteria, for example, SUV max 5.93.     - 10/23 MRI brain:  - extensive osseous metastatic disease involving the calvarium, skull base w/involvement of occipital condyles, C1 and C2 vertebral bodies, and likely the  mandible  -  Dominant calvarial lesions are located in the right frontal calvarium and right temporal calvarium without definite breach of the inner or outer tables of the calvarium. There is a suggestion of mild asymmetric linear extra-axial enhancement deep to the dominant right temporal calvarial lesion along the dural margin, though this may be vascular in etiology.  - No abnormal parenchymal or leptomeningeal enhancement.   - sequelae of mild chronic microvascular ischemic disease. Mild generalized cerebral atrophy.      -10/23 DEXA: osteopenia (T score -2.0 lumbar spine, -2.0 left hip femoral neck, The 10 year probability of major osteoporotic fracture is 12.5%, and of hip fracture is 1.8%, based on left femoral neck BMD)     - 11/23 orthopedic consult to determine stability of left femur and fracture risk given presence of mets in left femoral head: do not see any areas of impending cortical erosion or sites of high risk for fracture, observe    Treatment:  Ribociclib+letrozole  C1D1 11/27/23 ribociclib 400mg, C2D1 12/25/23 ribociclib 600mg (day 1-12 only 2/2 palliative RT to sacrum w/Dr. Mandujano 1/9/24- 1/22/24 3000cGy in 10 fractions), C3D1 1/30/24, C4D1 3/6/24 (deferred 1 week 2/2 2/27/24 ANC 0.8->3/5/24 ANC 1.5). C5D1 5/1/24  Ribociclib 600mg PO daily day 1-21 q28 days (11/27/23 started 400mg PO daily when LFTs improved to <3x ULN)  Letrozole 2.5mg PO daily (started 10/30/23)  Febrile neutropenia risk: neutropenia (69% to 78%; grade 3: 46% to 55%; grade 4: 7% to 10%), Febrile neutropenia (1%)    Interval History:  Pt is seen virtually prior to start of cycle 5. She states she has ongoing fatigue and low energy. She is mildly constipated and uses Miralax PRN. She is drinking protein shakes daily, which also helps with the nausea. She has body/joint aches at baseline d/t her RA and overall those are stable. She has not noticed an increase with use of letrozole. Hot flashes are much improved, using effexor 75  mg daily. Now reporting 2-3 a day when previously 10+ hot flashes that affected her QOL. Overall she is pleased with how things are going and has no additional questions or concerns today.    Patient denies any of the following except if noted above: fevers, chills, difficulty with energy, vision or hearing changes, chest pain, dyspnea, abdominal pain, nausea, vomiting, diarrhea, constipation, urinary concerns, headaches, numbness, tingling, issues with sleep or mood. She also denies lumps, bumps, rashes or skin lesions, bleeding or bruising issues.    Physical Exam: Limited due to virtual visit restrictions.  General: No acute distress. Appearance is well-groomed.  Resp: No respiratory distress. No cough.   Skin: No visible rash or lesions.  Neuro: A/O x 4. Appropriate mentation and speech.  Psych: Appropriate mood.     Laboratory Results:    Latest Reference Range & Units 04/30/24 07:37   Sodium 135 - 145 mmol/L 142   Potassium 3.4 - 5.3 mmol/L 4.2   Chloride 98 - 107 mmol/L 105   Carbon Dioxide (CO2) 22 - 29 mmol/L 27   Urea Nitrogen 8.0 - 23.0 mg/dL 15.0   Creatinine 0.51 - 0.95 mg/dL 0.97 (H)   GFR Estimate >60 mL/min/1.73m2 66   Calcium 8.8 - 10.2 mg/dL 9.1   Anion Gap 7 - 15 mmol/L 10   Magnesium 1.7 - 2.3 mg/dL 1.9   Phosphorus 2.5 - 4.5 mg/dL 3.3   Albumin 3.5 - 5.2 g/dL 4.1   Protein Total 6.4 - 8.3 g/dL 7.2   Alkaline Phosphatase 40 - 150 U/L 59   ALT 0 - 50 U/L 18   AST 0 - 45 U/L 21   Bilirubin Total <=1.2 mg/dL 0.3   Glucose 70 - 99 mg/dL 93   WBC 4.0 - 11.0 10e3/uL 2.6 (L)   Hemoglobin 11.7 - 15.7 g/dL 11.7   Hematocrit 35.0 - 47.0 % 35.2   Platelet Count 150 - 450 10e3/uL 171   RBC Count 3.80 - 5.20 10e6/uL 3.34 (L)   MCV 78 - 100 fL 105 (H)   MCH 26.5 - 33.0 pg 35.0 (H)   MCHC 31.5 - 36.5 g/dL 33.2   RDW 10.0 - 15.0 % 16.1 (H)   % Neutrophils % 41   % Lymphocytes % 43   % Monocytes % 13   % Eosinophils % 1   % Basophils % 2   Absolute Basophils 0.0 - 0.2 10e3/uL 0.1   Absolute Eosinophils 0.0 - 0.7  10e3/uL 0.0   Absolute Immature Granulocytes <=0.4 10e3/uL 0.0   Absolute Lymphocytes 0.8 - 5.3 10e3/uL 1.1   Absolute Monocytes 0.0 - 1.3 10e3/uL 0.3   % Immature Granulocytes % 0   Absolute Neutrophils 1.6 - 8.3 10e3/uL 1.0 (L)   Absolute NRBCs 10e3/uL 0.0   NRBCs per 100 WBC <1 /100 0   (H): Data is abnormally high  (L): Data is abnormally low  I reviewed the above labs today.    Imaging:  Echocardiogram Exercise Stress  [id number]  [id number]  [id number]  [id number]^[email or web address]^[email or web address]     [facility]  Echocardiography Laboratory  [address] [place] [address]     Name: RIP DALE  MRN: [id number]  : 1960  Study Date: 2024 09:00 AM  Age: 63 yrs  Gender: Female  Patient [facility]: [facility]  Reason For Study: Chest pressure  History: breast cancer,  Ordering Physician: REJI HARDY  Referring Physician: Schoen, Gregory  Performed By: Amber Gomez     BSA: 2.0 m2  Height: 66 in  Weight: 209 lb  HR: 75  BP: 108/62 mmHg  ______________________________________________________________________________  Procedure  Stress Echo Complete. Definity (NDC #37289-857) given intravenously.  ______________________________________________________________________________  Interpretation Summary  Normal resting LV function with good augmentation of all wall segments post  exercise.  Normal exercise capacity  No ischemic ECG changes.  This is a normal stress echo indicating low probability of significant  exercise induced myocardial ischemia.  ______________________________________________________________________________  Stress  Exercise was stopped due to dyspnea.  There was a normal BP response to exercise.  Target Heart Rate was achieved.  The EKG portion of this stress test was negative for inducible ischemia (see  echo results below).  Normal left ventricular function and wall motion at rest and post-stress.  Left ventricular cavity size decreases with exercise.  Global LV  systolic function augments with exercise.     Baseline  The patient is in normal sinus rhythm.  Low voltage QRS, mild NSST-T changes.  Normal left ventricular function and wall motion at rest and post-stress.     Stress Results             Protocol:  Jose          Maximum Predicted HR:   158 bpm             Target HR: 134 bpm        % Maximum Predicted HR: 87 %              Stage  DurationHeart Rate  BP             Comment                  (mm:ss)   (bpm)          Stage 1   3:00      97    122/62          Stage 2   3:00     112    148/62          Stage 3   2:00     137    152/62RPP [id number], ramirez 4, FAC average          Stage 4R  4:15      75    132/62             Stress Duration:   8:00 mm:ss        Recovery Time: 4:15 mm:ss          Maximum Stress HR: 137 bpm           METS:          10     Left Ventricle  The left ventricle is normal in size. There is mild concentric left  ventricular hypertrophy.     Mitral Valve  The mitral valve is normal in structure and function.     Tricuspid Valve  The tricuspid valve is normal in structure and function.     Aortic Valve  The aortic valve is normal in structure and function.     Vessels  Normal size aorta.     ______________________________________________________________________________  MMode/2D Measurements & Calculations  IVSd: 1.4 cm  LVIDd: 4.6 cm  LVIDs: 3.4 cm  LVPWd: 1.2 cm  FS: 25.3 %  LV mass(C)dI: 106.8 grams/m2  Ao root diam: 3.4 cm  LA dimension: 3.2 cm  asc Aorta Diam: 3.9 cm  Ao root diam index Ht(cm/m): 2.0     Ao root diam index BSA (cm/m2): 1.7  Asc Ao diam index BSA (cm/m2): 1.9  Asc Ao diam index Ht(cm/m): 2.3  RWT: 0.51     ______________________________________________________________________________  Report approved by: Maribell Brown 02/29/2024 10:52 AM          I reviewed the above imaging report today.      Assessment and Plan:   # de tavon metastatic left breast invasive lobular carcinoma, ER positive, AR positive, her2 negative on FISH   -  On treatment with ribociclib and letrozole  - Tolerating well with manageable side effects including fatigue and low energy. Mild constipation and uses Miralax occasionally. Generalized body aches present prior to start of letrozole d/t underlying RA, overall stable.   - Ongoing neutropenia, stable at 1.0 (grade 2) - Per UpToDate, no dosage adjustment needed until grade 3 neutropenia w/ANC of 0.5 to <1. Will discuss with Dr. Maravilla upon her return to clinic about plan moving forward.   - Effexor 75 mg daily for hot flashes, which are much improved. Now reports 2-3/day when previously 10+ episodes. Can consider gabapentin at bedtime if hot flashes are impacting sleep.   - On Eliquis for hx of DVT, no s/s of bleeding.  - PET scan scheduled for 5/2024.  - QTc 426 on ECG 4/30, on plaquenil, no report of cardiac s/s today. Continue to monitor.   - Genetic counseling pending.    Bone health  - Extensive osseous metastatic disease  - Osteopenia on DEXA scan 10/23 - lumbar spine, and left hip femoral neck   - On Zometa q4 weeks, due for dose today. Monitoring calcium and creatinine prior to infusion.  - Confirmed use of calcium 600 mg BID and vitamin D 1000 IU   - Encouraged pt to achieve weight-bearing exercises several times a week, if possible.         Kitty YING CNP                  Virtual Visit Details     Type of service:  Video Visit     Originating Location (pt. Location): Home   Distant Location (provider location): On-site  Platform used for Video Visit: GoodPeople    Video Start Time: 134  Video Stop Time: 143    Kesha is a 63 year old who is being evaluated via a billable video visit.    How would you like to obtain your AVS? MyChart  If the video visit is dropped, the invitation should be resent by: Text to cell phone: [phone]  Will anyone else be joining your video visit? No      Dilcia Stroud RN on 5/1/2024 at 1:29 PM              Again, thank you for allowing me to participate in the care  of your patient.        Sincerely,        STEPAN Willams CNP

## 2024-05-01 NOTE — LETTER
"    5/1/2024         RE: Kesha Zacarias  [address] [address] [place] [id number]        Dear Colleague,    Thank you for referring your patient, Kesha Zacarias, to the [facility]. Please see a copy of my visit note below.    Virtual Oncology Follow Up Visit: May 1, 2024    Oncologist: Dr. Maravilla   PCP: Schoen, Gregory G    Reason for Visit: Pre-treatment follow-up    Diagnosis:  # de tavon metastatic left breast invasive lobular carcinoma, ER positive, W[place] positive, her2 negative on FISH  - pt has de tavon metastatic invasive lobular breast cancer, ER positive, W[place] positive, her2 negative. Pt does not have visceral crisis, she mainly has extensive bone metastases and LN metastases   -9/23 diagnostic left breast mammogram, left breast targeted ultrasound showed 3.0 x 1.7 x 3.9 ill-defined shadowing hypoechoic mass, few small lymph nodes in the left axilla, one with cortical thickening measuring 0.7 x 0.6 cm  -9/23 ultrasound-guided LEFT breast core biopsy of 12:00 lesion with clip placement, \"Postbiopsy unilateral digital mammogram of the left breast showed the clip to be at the expected biopsy site\" AND ultrasound-guided left axillary lymph node biopsy of lymph node with cortical thickening, PATHOLOGY:  A.  Breast, left, 12:00, biopsy:Lobular carcinoma in situ, Multiple foci. Invasive carcinoma is not identified.   B.  Lymph node, left axillary, biopsy:  -Positive for metastatic lobular carcinoma, grade 2, 0.7 cm, negative GREGG, Estrogen receptor: Positive (91 to 100%, strong), Progesterone receptor: Positive (91 to 100%, strong), HER2 2+ IHC, FISH negative  -10/23 MRI bilateral breast:  - Heterogeneously enhancing irregular mass in the subareolar left breast, 5.0 x 4.9 x 4.9 cm, with associated nipple retraction and nipple/areolar involvement.  This mass extends superiorly through 11: positions, from anterior to mid depth.  The HydroMARK breast biopsy clip (which showed LCIS) is " 1.5 cm posterosuperior to this mass.  - Multiple suspicious left level 1 axillary lymph node noted, including biopsy-proven metastatic node with indwelling biopsy marker, biopsied node measures 1.3 x 1.0 cm  - Heterogeneous marrow appearance of sternum and ribs with heterogeneous enhancement and a peripheral enhancing focus within the mid body.     - 10/23 PET/CT:  Innumerable foci of FDG avid lesions are seen throughout the osseous structures of the head and neck, most pronounced on the calvarium and cervical spine, with prominently sclerotic appearance on CT correlate.  For example:  -Right frontal bone, SUV max 5.31.  -Left lateral mass of the atlas, SUV max 15.0  -Angle of the left mandible, SUV max 9.6  -C7 vertebral body, SUV max 17.7     Innumerable variable sized FDG avid lesions throughout the axial and appendicular spine, including but not limited to the cervical, thoracic, and lumbar spine, multilevel bilateral ribs, sternum, bilateral scapula, bilateral humeri, clavicles, pelvis, and bilateral femurs. A few of these lesions are described below:  -Large FDG avid sclerotic 3.4 cm lesion within the proximal left humeral head, SUV max 17.24  -Sternal body, SUV max 20.35  -L2 vertebral body, SUV max 14.3 without  -Large ill-defined sclerotic lesion in the sacral body measuring up to at least 5.9 cm, SUV max 16.84  -FDG avid focus within the left femoral head, SUV max 13.65 without CT correlate.     Large irregular soft tissue FDG avid mass within the left breast measuring approximately 3.2 x 2.7 cm with  extension into the superficial soft tissues and associated left nipple retraction, SUV max 12.36 additional FDG avid. Left axillary lymph nodes, not definitively enlarged by short axis size criteria, for example, SUV max 5.93.     - 10/23 MRI brain:  - extensive osseous metastatic disease involving the calvarium, skull base w/involvement of occipital condyles, C1 and C2 vertebral bodies, and likely the  mandible  -  Dominant calvarial lesions are located in the right frontal calvarium and right temporal calvarium without definite breach of the inner or outer tables of the calvarium. There is a suggestion of mild asymmetric linear extra-axial enhancement deep to the dominant right temporal calvarial lesion along the dural margin, though this may be vascular in etiology.  - No abnormal parenchymal or leptomeningeal enhancement.   - sequelae of mild chronic microvascular ischemic disease. Mild generalized cerebral atrophy.      -10/23 DEXA: osteopenia (T score -2.0 lumbar spine, -2.0 left hip femoral neck, The 10 year probability of major osteoporotic fracture is 12.5%, and of hip fracture is 1.8%, based on left femoral neck BMD)     - 11/23 orthopedic consult to determine stability of left femur and fracture risk given presence of mets in left femoral head: do not see any areas of impending cortical erosion or sites of high risk for fracture, observe    Treatment:  Ribociclib+letrozole  C1D1 11/27/23 ribociclib 400mg, C2D1 12/25/23 ribociclib 600mg (day 1-12 only 2/2 palliative RT to sacrum w/Dr. Mandujano 1/9/24- 1/22/24 3000cGy in 10 fractions), C3D1 1/30/24, C4D1 3/6/24 (deferred 1 week 2/2 2/27/24 ANC 0.8->3/5/24 ANC 1.5). C5D1 5/1/24  Ribociclib 600mg PO daily day 1-21 q28 days (11/27/23 started 400mg PO daily when LFTs improved to <3x ULN)  Letrozole 2.5mg PO daily (started 10/30/23)  Febrile neutropenia risk: neutropenia (69% to 78%; grade 3: 46% to 55%; grade 4: 7% to 10%), Febrile neutropenia (1%)    Interval History:  Pt is seen virtually prior to start of cycle 5. She states she has ongoing fatigue and low energy. She is mildly constipated and uses Miralax PRN. She is drinking protein shakes daily, which also helps with the nausea. She has body/joint aches at baseline d/t her RA and overall those are stable. She has not noticed an increase with use of letrozole. Hot flashes are much improved, using effexor 75  mg daily. Now reporting 2-3 a day when previously 10+ hot flashes that affected her QOL. Overall she is pleased with how things are going and has no additional questions or concerns today.    Patient denies any of the following except if noted above: fevers, chills, difficulty with energy, vision or hearing changes, chest pain, dyspnea, abdominal pain, nausea, vomiting, diarrhea, constipation, urinary concerns, headaches, numbness, tingling, issues with sleep or mood. She also denies lumps, bumps, rashes or skin lesions, bleeding or bruising issues.    Physical Exam: Limited due to virtual visit restrictions.  General: No acute distress. Appearance is well-groomed.  Resp: No respiratory distress. No cough.   Skin: No visible rash or lesions.  Neuro: A/O x 4. Appropriate mentation and speech.  Psych: Appropriate mood.     Laboratory Results:    Latest Reference Range & Units 04/30/24 07:37   Sodium 135 - 145 mmol/L 142   Potassium 3.4 - 5.3 mmol/L 4.2   Chloride 98 - 107 mmol/L 105   Carbon Dioxide (CO2) 22 - 29 mmol/L 27   Urea Nitrogen 8.0 - 23.0 mg/dL 15.0   Creatinine 0.51 - 0.95 mg/dL 0.97 (H)   GFR Estimate >60 mL/min/1.73m2 66   Calcium 8.8 - 10.2 mg/dL 9.1   Anion Gap 7 - 15 mmol/L 10   Magnesium 1.7 - 2.3 mg/dL 1.9   Phosphorus 2.5 - 4.5 mg/dL 3.3   Albumin 3.5 - 5.2 g/dL 4.1   Protein Total 6.4 - 8.3 g/dL 7.2   Alkaline Phosphatase 40 - 150 U/L 59   ALT 0 - 50 U/L 18   AST 0 - 45 U/L 21   Bilirubin Total <=1.2 mg/dL 0.3   Glucose 70 - 99 mg/dL 93   WBC 4.0 - 11.0 10e3/uL 2.6 (L)   Hemoglobin 11.7 - 15.7 g/dL 11.7   Hematocrit 35.0 - 47.0 % 35.2   Platelet Count 150 - 450 10e3/uL 171   RBC Count 3.80 - 5.20 10e6/uL 3.34 (L)   MCV 78 - 100 fL 105 (H)   MCH 26.5 - 33.0 pg 35.0 (H)   MCHC 31.5 - 36.5 g/dL 33.2   RDW 10.0 - 15.0 % 16.1 (H)   % Neutrophils % 41   % Lymphocytes % 43   % Monocytes % 13   % Eosinophils % 1   % Basophils % 2   Absolute Basophils 0.0 - 0.2 10e3/uL 0.1   Absolute Eosinophils 0.0 - 0.7  10e3/uL 0.0   Absolute Immature Granulocytes <=0.4 10e3/uL 0.0   Absolute Lymphocytes 0.8 - 5.3 10e3/uL 1.1   Absolute Monocytes 0.0 - 1.3 10e3/uL 0.3   % Immature Granulocytes % 0   Absolute Neutrophils 1.6 - 8.3 10e3/uL 1.0 (L)   Absolute NRBCs 10e3/uL 0.0   NRBCs per 100 WBC <1 /100 0   (H): Data is abnormally high  (L): Data is abnormally low  I reviewed the above labs today.    Imaging:  Echocardiogram Exercise Stress  [id number]  [id number]  [id number]  [id number]^ANTONY^REJI     [facility]  Echocardiography Laboratory  [address] [place] [address]     Name: RIP DALE  MRN: [id number]  : 1960  Study Date: 2024 09:00 AM  Age: 63 yrs  Gender: Female  Patient Location: [facility]  Reason For Study: Chest pressure  History: breast cancer,  Ordering Physician: REJI HARDY  Referring Physician: Schoen, Gregory  Performed By: Amber Gomez     BSA: 2.0 m2  Height: 66 in  Weight: 209 lb  HR: 75  BP: 108/62 mmHg  ______________________________________________________________________________  Procedure  Stress Echo Complete. Definity (NDC #38081-721) given intravenously.  ______________________________________________________________________________  Interpretation Summary  Normal resting LV function with good augmentation of all wall segments post  exercise.  Normal exercise capacity  No ischemic ECG changes.  This is a normal stress echo indicating low probability of significant  exercise induced myocardial ischemia.  ______________________________________________________________________________  Stress  Exercise was stopped due to dyspnea.  There was a normal BP response to exercise.  Target Heart Rate was achieved.  The EKG portion of this stress test was negative for inducible ischemia (see  echo results below).  Normal left ventricular function and wall motion at rest and post-stress.  Left ventricular cavity size decreases with exercise.  Global LV  systolic function augments with exercise.     Baseline  The patient is in normal sinus rhythm.  Low voltage QRS, mild NSST-T changes.  Normal left ventricular function and wall motion at rest and post-stress.     Stress Results             Protocol:  Jose          Maximum Predicted HR:   158 bpm             Target HR: 134 bpm        % Maximum Predicted HR: 87 %              Stage  DurationHeart Rate  BP             Comment                  (mm:ss)   (bpm)          Stage 1   3:00      97    122/62          Stage 2   3:00     112    148/62          Stage 3   2:00     137    152/62RPP [id number], ramirez 4, FAC average          Stage 4R  4:15      75    132/62             Stress Duration:   8:00 mm:ss        Recovery Time: 4:15 mm:ss          Maximum Stress HR: 137 bpm           METS:          10     Left Ventricle  The left ventricle is normal in size. There is mild concentric left  ventricular hypertrophy.     Mitral Valve  The mitral valve is normal in structure and function.     Tricuspid Valve  The tricuspid valve is normal in structure and function.     Aortic Valve  The aortic valve is normal in structure and function.     Vessels  Normal size aorta.     ______________________________________________________________________________  MMode/2D Measurements & Calculations  IVSd: 1.4 cm  LVIDd: 4.6 cm  LVIDs: 3.4 cm  LVPWd: 1.2 cm  FS: 25.3 %  LV mass(C)dI: 106.8 grams/m2  Ao root diam: 3.4 cm  LA dimension: 3.2 cm  asc Aorta Diam: 3.9 cm  Ao root diam index Ht(cm/m): 2.0     Ao root diam index BSA (cm/m2): 1.7  Asc Ao diam index BSA (cm/m2): 1.9  Asc Ao diam index Ht(cm/m): 2.3  RWT: 0.51     ______________________________________________________________________________  Report approved by: Maribell Brown 02/29/2024 10:52 AM          I reviewed the above imaging report today.      Assessment and Plan:   # de tavon metastatic left breast invasive lobular carcinoma, ER positive, [place] positive, her2 negative on FISH   -  On treatment with ribociclib and letrozole  - Tolerating well with manageable side effects including fatigue and low energy. Mild constipation and uses Miralax occasionally. Generalized body aches present prior to start of letrozole d/t underlying RA, overall stable.   - Ongoing neutropenia, stable at 1.0 (grade 2) - Per UpToDate, no dosage adjustment needed until grade 3 neutropenia w/ANC of 0.5 to <1. Will discuss with Dr. Maravilla upon her return to clinic about plan moving forward.   - Effexor 75 mg daily for hot flashes, which are much improved. Now reports 2-3/day when previously 10+ episodes. Can consider gabapentin at bedtime if hot flashes are impacting sleep.   - On Eliquis for hx of DVT, no s/s of bleeding.  - PET scan scheduled for 5/2024.  - QTc 426 on ECG 4/30, on plaquenil, no report of cardiac s/s today. Continue to monitor.   - Genetic counseling pending.    Bone health  - Extensive osseous metastatic disease  - Osteopenia on DEXA scan 10/23 - lumbar spine, and left hip femoral neck   - On Zometa q4 weeks, due for dose today. Monitoring calcium and creatinine prior to infusion.  - Confirmed use of calcium 600 mg BID and vitamin D 1000 IU   - Encouraged pt to achieve weight-bearing exercises several times a week, if possible.         Kitty YING CNP                  Virtual Visit Details     Type of service:  Video Visit     Originating Location (pt. Location): Home   Distant Location (provider location): On-site  Platform used for Video Visit: [employer]    Video Start Time: 134  Video Stop Time: 143    Kesha is a 63 year old who is being evaluated via a billable video visit.    How would you like to obtain your AVS? MyChart  If the video visit is dropped, the invitation should be resent by: Text to cell phone: [phone]  Will anyone else be joining your video visit? No      Dilcia Stroud RN on 5/1/2024 at 1:29 PM              Again, thank you for allowing me to participate in the care  of your patient.        Sincerely,        STEPAN Willams CNP

## 2024-05-01 NOTE — PROGRESS NOTES
Infusion Nursing Note:  Kesha Ortizndsen presents today for Zometa.    Patient seen by provider today: Yes.  Video visit with Kitty Candelario   present during visit today: Not Applicable.    Note: Patient here today for Zometa.  Had labs and EKG yesterday,  provider visit today prior to infusion. Patient is feeling okay today.  Has been tired. No new concerns.     Intravenous Access:  Peripheral IV placed.    Treatment Conditions:  Lab Results   Component Value Date    HGB 11.7 04/30/2024    WBC 2.6 (L) 04/30/2024    ANEU 3.7 11/21/2005    ANEUTAUTO 1.0 (L) 04/30/2024     04/30/2024            Post Infusion Assessment:  Patient tolerated infusion without incident.  Site patent and intact, free from redness, edema or discomfort.  Access discontinued per protocol.       Discharge Plan:   Patient discharged in stable condition accompanied by: self.  Departure Mode: Ambulatory.  Patient has appt to return to infusion on 05/30/2024.      Poly Holt RN

## 2024-05-11 ENCOUNTER — HOSPITAL ENCOUNTER (OUTPATIENT)
Dept: PET IMAGING | Facility: CLINIC | Age: 64
Discharge: HOME | End: 2024-05-11
Attending: INTERNAL MEDICINE | Admitting: INTERNAL MEDICINE
Payer: COMMERCIAL

## 2024-05-11 DIAGNOSIS — N95.1 MENOPAUSAL SYNDROME (HOT FLASHES): ICD-10-CM

## 2024-05-11 PROCEDURE — 343N000001 HC RX 343: Performed by: INTERNAL MEDICINE

## 2024-05-11 PROCEDURE — 250N000011 HC RX IP 250 OP 636: Performed by: INTERNAL MEDICINE

## 2024-05-11 PROCEDURE — 78815 PET IMAGE W/CT SKULL-THIGH: CPT | Mod: PS

## 2024-05-11 PROCEDURE — 71260 CT THORAX DX C+: CPT

## 2024-05-11 PROCEDURE — A9552 F18 FDG: HCPCS | Performed by: INTERNAL MEDICINE

## 2024-05-11 RX ORDER — FLUDEOXYGLUCOSE F 18 200 MCI/ML
10-18 INJECTION, SOLUTION INTRAVENOUS ONCE
Status: COMPLETED | OUTPATIENT
Start: 2024-05-11 | End: 2024-05-11

## 2024-05-11 RX ORDER — IOPAMIDOL 755 MG/ML
10-135 INJECTION, SOLUTION INTRAVASCULAR ONCE
Status: COMPLETED | OUTPATIENT
Start: 2024-05-11 | End: 2024-05-11

## 2024-05-11 RX ADMIN — FLUDEOXYGLUCOSE F 18 14.1 MILLICURIE: 200 INJECTION, SOLUTION INTRAVENOUS at 12:02

## 2024-05-11 RX ADMIN — IOPAMIDOL 131 ML: 755 INJECTION, SOLUTION INTRAVENOUS at 12:02

## 2024-05-20 DIAGNOSIS — C79.51 CARCINOMA OF LEFT BREAST METASTATIC TO BONE (H): Primary | ICD-10-CM

## 2024-05-20 DIAGNOSIS — C50.912 CARCINOMA OF LEFT BREAST METASTATIC TO BONE (H): Primary | ICD-10-CM

## 2024-05-23 ENCOUNTER — MYC MEDICAL ADVICE (OUTPATIENT)
Dept: ONCOLOGY | Facility: CLINIC | Age: 64
End: 2024-05-23
Payer: COMMERCIAL

## 2024-05-23 DIAGNOSIS — C79.51 CARCINOMA OF LEFT BREAST METASTATIC TO BONE (H): Primary | ICD-10-CM

## 2024-05-23 DIAGNOSIS — C50.912 CARCINOMA OF LEFT BREAST METASTATIC TO BONE (H): Primary | ICD-10-CM

## 2024-05-23 DIAGNOSIS — H53.8 BLURRED VISION: ICD-10-CM

## 2024-05-23 DIAGNOSIS — N95.1 MENOPAUSAL SYNDROME (HOT FLASHES): Primary | ICD-10-CM

## 2024-05-23 RX ORDER — VENLAFAXINE HYDROCHLORIDE 37.5 MG/1
CAPSULE, EXTENDED RELEASE ORAL
Qty: 49 CAPSULE | Refills: 0 | Status: CANCELLED | OUTPATIENT
Start: 2024-05-23

## 2024-05-23 NOTE — TELEPHONE ENCOUNTER
S-(situation): pt has been having increase in blurry vision over the past 2 months     B-(background): Pt is being seen for breast cancer and is being treated with Ribociclib+letrozole     A-(assessment): She states that she wears glasses regularly but that her vision has been getting progressively worse over the past 2 months. She reports that the blurry vision remain constant despite time of day, near vision, far vision, or the use of her corrective lenses. No other neurological symptoms at this time. Denies changes in sensation, movement, strength, balance, or alertness.    R-(recommendations): Calling to see about MRI availability for today.

## 2024-05-23 NOTE — TELEPHONE ENCOUNTER
Writer discussed recommendations with pt.     Mary Maravilla, DO  You; Adele Flores, RN5 minutes ago (4:25 PM)     SK  My medical recommendation is ER today for STAT MRI if it cannot be arranged for outpatient  If she does not wish to follow those recommendations then she needs to get MRI STAT outpatient- the sooner the better- ie. Tomorrow     Pt does not wish to seek care in ED. MRI scheduled for tomorrow at 9:45. Pt is aware and will be at apt.

## 2024-05-24 ENCOUNTER — ANCILLARY PROCEDURE (OUTPATIENT)
Dept: MRI IMAGING | Facility: CLINIC | Age: 64
End: 2024-05-24
Attending: INTERNAL MEDICINE
Payer: COMMERCIAL

## 2024-05-24 DIAGNOSIS — C79.51 CARCINOMA OF LEFT BREAST METASTATIC TO BONE (H): ICD-10-CM

## 2024-05-24 DIAGNOSIS — H53.8 BLURRED VISION: ICD-10-CM

## 2024-05-24 DIAGNOSIS — C50.912 CARCINOMA OF LEFT BREAST METASTATIC TO BONE (H): ICD-10-CM

## 2024-05-24 PROCEDURE — 70553 MRI BRAIN STEM W/O & W/DYE: CPT | Mod: TC | Performed by: RADIOLOGY

## 2024-05-24 PROCEDURE — A9585 GADOBUTROL INJECTION: HCPCS | Performed by: RADIOLOGY

## 2024-05-24 RX ORDER — GADOBUTROL 604.72 MG/ML
9.5 INJECTION INTRAVENOUS ONCE
Status: COMPLETED | OUTPATIENT
Start: 2024-05-24 | End: 2024-05-24

## 2024-05-24 RX ORDER — VENLAFAXINE HYDROCHLORIDE 75 MG/1
75 CAPSULE, EXTENDED RELEASE ORAL DAILY
Qty: 30 CAPSULE | Refills: 3 | Status: SHIPPED | OUTPATIENT
Start: 2024-05-24 | End: 2024-05-30

## 2024-05-24 RX ADMIN — GADOBUTROL 9.5 ML: 604.72 INJECTION INTRAVENOUS at 10:05

## 2024-05-30 ENCOUNTER — LAB (OUTPATIENT)
Dept: INFUSION THERAPY | Facility: CLINIC | Age: 64
End: 2024-05-30
Attending: INTERNAL MEDICINE
Payer: COMMERCIAL

## 2024-05-30 ENCOUNTER — ONCOLOGY VISIT (OUTPATIENT)
Dept: ONCOLOGY | Facility: CLINIC | Age: 64
End: 2024-05-30
Attending: INTERNAL MEDICINE
Payer: COMMERCIAL

## 2024-05-30 VITALS
DIASTOLIC BLOOD PRESSURE: 79 MMHG | SYSTOLIC BLOOD PRESSURE: 118 MMHG | OXYGEN SATURATION: 98 % | HEART RATE: 67 BPM | HEIGHT: 66 IN | TEMPERATURE: 98.6 F | BODY MASS INDEX: 33.75 KG/M2 | WEIGHT: 210 LBS

## 2024-05-30 DIAGNOSIS — C79.51 MALIGNANT NEOPLASM METASTATIC TO BONE (H): ICD-10-CM

## 2024-05-30 DIAGNOSIS — C50.919 CARCINOMA OF BREAST METASTATIC TO BONE, UNSPECIFIED LATERALITY (H): Primary | ICD-10-CM

## 2024-05-30 DIAGNOSIS — H53.8 BLURRED VISION: ICD-10-CM

## 2024-05-30 DIAGNOSIS — D53.9 MACROCYTIC ANEMIA: ICD-10-CM

## 2024-05-30 DIAGNOSIS — T45.1X5A CHEMOTHERAPY-INDUCED FATIGUE: ICD-10-CM

## 2024-05-30 DIAGNOSIS — C50.919 CARCINOMA OF BREAST METASTATIC TO BONE, UNSPECIFIED LATERALITY (H): ICD-10-CM

## 2024-05-30 DIAGNOSIS — D70.1 CHEMOTHERAPY-INDUCED NEUTROPENIA (H): ICD-10-CM

## 2024-05-30 DIAGNOSIS — C79.51 CARCINOMA OF LEFT BREAST METASTATIC TO BONE (H): Primary | ICD-10-CM

## 2024-05-30 DIAGNOSIS — C50.912 CARCINOMA OF LEFT BREAST METASTATIC TO BONE (H): ICD-10-CM

## 2024-05-30 DIAGNOSIS — C50.912 CARCINOMA OF LEFT BREAST METASTATIC TO BONE (H): Primary | ICD-10-CM

## 2024-05-30 DIAGNOSIS — C79.51 CARCINOMA OF BREAST METASTATIC TO BONE, UNSPECIFIED LATERALITY (H): ICD-10-CM

## 2024-05-30 DIAGNOSIS — Z79.811 LONG TERM (CURRENT) USE OF AROMATASE INHIBITORS: ICD-10-CM

## 2024-05-30 DIAGNOSIS — N95.1 MENOPAUSAL SYNDROME (HOT FLASHES): ICD-10-CM

## 2024-05-30 DIAGNOSIS — T45.1X5A CHEMOTHERAPY-INDUCED NEUTROPENIA (H): ICD-10-CM

## 2024-05-30 DIAGNOSIS — C79.51 CARCINOMA OF BREAST METASTATIC TO BONE, UNSPECIFIED LATERALITY (H): Primary | ICD-10-CM

## 2024-05-30 DIAGNOSIS — R53.83 CHEMOTHERAPY-INDUCED FATIGUE: ICD-10-CM

## 2024-05-30 DIAGNOSIS — C79.51 CARCINOMA OF LEFT BREAST METASTATIC TO BONE (H): ICD-10-CM

## 2024-05-30 DIAGNOSIS — I82.402 ACUTE DEEP VEIN THROMBOSIS (DVT) OF LEFT LOWER EXTREMITY, UNSPECIFIED VEIN (H): ICD-10-CM

## 2024-05-30 LAB
ALBUMIN SERPL BCG-MCNC: 4.1 G/DL (ref 3.5–5.2)
ALP SERPL-CCNC: 63 U/L (ref 40–150)
ALT SERPL W P-5'-P-CCNC: 22 U/L (ref 0–50)
ANION GAP SERPL CALCULATED.3IONS-SCNC: 10 MMOL/L (ref 7–15)
AST SERPL W P-5'-P-CCNC: 29 U/L (ref 0–45)
BASOPHILS # BLD AUTO: 0.1 10E3/UL (ref 0–0.2)
BASOPHILS NFR BLD AUTO: 2 %
BILIRUB SERPL-MCNC: 0.2 MG/DL
BUN SERPL-MCNC: 14 MG/DL (ref 8–23)
CALCIUM SERPL-MCNC: 9.1 MG/DL (ref 8.8–10.2)
CHLORIDE SERPL-SCNC: 104 MMOL/L (ref 98–107)
CREAT SERPL-MCNC: 0.98 MG/DL (ref 0.51–0.95)
DEPRECATED HCO3 PLAS-SCNC: 28 MMOL/L (ref 22–29)
EGFRCR SERPLBLD CKD-EPI 2021: 65 ML/MIN/1.73M2
EOSINOPHIL # BLD AUTO: 0 10E3/UL (ref 0–0.7)
EOSINOPHIL NFR BLD AUTO: 1 %
ERYTHROCYTE [DISTWIDTH] IN BLOOD BY AUTOMATED COUNT: 15.9 % (ref 10–15)
FERRITIN SERPL-MCNC: 355 NG/ML (ref 11–328)
GLUCOSE SERPL-MCNC: 145 MG/DL (ref 70–99)
HCT VFR BLD AUTO: 33.3 % (ref 35–47)
HGB BLD-MCNC: 11.2 G/DL (ref 11.7–15.7)
HOLD SPECIMEN: NORMAL
IMM GRANULOCYTES # BLD: 0 10E3/UL
IMM GRANULOCYTES NFR BLD: 0 %
INTERPRETATION: NORMAL
INTERPRETATION: NORMAL
IRON BINDING CAPACITY (ROCHE): 307 UG/DL (ref 240–430)
IRON SATN MFR SERPL: 18 % (ref 15–46)
IRON SERPL-MCNC: 56 UG/DL (ref 37–145)
LAB PDF RESULT: NORMAL
LAB PDF RESULT: NORMAL
LYMPHOCYTES # BLD AUTO: 0.8 10E3/UL (ref 0.8–5.3)
LYMPHOCYTES NFR BLD AUTO: 30 %
MAGNESIUM SERPL-MCNC: 2 MG/DL (ref 1.7–2.3)
MCH RBC QN AUTO: 36.7 PG (ref 26.5–33)
MCHC RBC AUTO-ENTMCNC: 33.6 G/DL (ref 31.5–36.5)
MCV RBC AUTO: 109 FL (ref 78–100)
MONOCYTES # BLD AUTO: 0.4 10E3/UL (ref 0–1.3)
MONOCYTES NFR BLD AUTO: 16 %
NEUTROPHILS # BLD AUTO: 1.3 10E3/UL (ref 1.6–8.3)
NEUTROPHILS NFR BLD AUTO: 50 %
NRBC # BLD AUTO: 0 10E3/UL
NRBC BLD AUTO-RTO: 0 /100
PHOSPHATE SERPL-MCNC: 4 MG/DL (ref 2.5–4.5)
PLATELET # BLD AUTO: 161 10E3/UL (ref 150–450)
POTASSIUM SERPL-SCNC: 4.2 MMOL/L (ref 3.4–5.3)
PROT SERPL-MCNC: 6.7 G/DL (ref 6.4–8.3)
RBC # BLD AUTO: 3.05 10E6/UL (ref 3.8–5.2)
RETICS # AUTO: 0.07 10E6/UL
RETICS/RBC NFR AUTO: 2.3 %
SIGNIFICANT RESULTS: NORMAL
SIGNIFICANT RESULTS: NORMAL
SODIUM SERPL-SCNC: 142 MMOL/L (ref 135–145)
SPECIMEN DESCRIPTION: NORMAL
SPECIMEN DESCRIPTION: NORMAL
TEST DETAILS, MDL: NORMAL
TEST DETAILS, MDL: NORMAL
TSH SERPL DL<=0.005 MIU/L-ACNC: 1.19 UIU/ML (ref 0.3–4.2)
WBC # BLD AUTO: 2.6 10E3/UL (ref 4–11)

## 2024-05-30 PROCEDURE — 96365 THER/PROPH/DIAG IV INF INIT: CPT

## 2024-05-30 PROCEDURE — 84443 ASSAY THYROID STIM HORMONE: CPT

## 2024-05-30 PROCEDURE — 85025 COMPLETE CBC W/AUTO DIFF WBC: CPT

## 2024-05-30 PROCEDURE — 99214 OFFICE O/P EST MOD 30 MIN: CPT | Performed by: INTERNAL MEDICINE

## 2024-05-30 PROCEDURE — 85045 AUTOMATED RETICULOCYTE COUNT: CPT

## 2024-05-30 PROCEDURE — 36415 COLL VENOUS BLD VENIPUNCTURE: CPT

## 2024-05-30 PROCEDURE — 82728 ASSAY OF FERRITIN: CPT

## 2024-05-30 PROCEDURE — 93010 ELECTROCARDIOGRAM REPORT: CPT | Performed by: INTERNAL MEDICINE

## 2024-05-30 PROCEDURE — 83735 ASSAY OF MAGNESIUM: CPT

## 2024-05-30 PROCEDURE — 258N000003 HC RX IP 258 OP 636: Performed by: INTERNAL MEDICINE

## 2024-05-30 PROCEDURE — 84100 ASSAY OF PHOSPHORUS: CPT

## 2024-05-30 PROCEDURE — 86300 IMMUNOASSAY TUMOR CA 15-3: CPT | Performed by: INTERNAL MEDICINE

## 2024-05-30 PROCEDURE — 99207 PR NO CHARGE LOS: CPT

## 2024-05-30 PROCEDURE — 84590 ASSAY OF VITAMIN A: CPT

## 2024-05-30 PROCEDURE — 82565 ASSAY OF CREATININE: CPT

## 2024-05-30 PROCEDURE — 83550 IRON BINDING TEST: CPT

## 2024-05-30 PROCEDURE — 82607 VITAMIN B-12: CPT

## 2024-05-30 PROCEDURE — 250N000011 HC RX IP 250 OP 636: Mod: JZ | Performed by: INTERNAL MEDICINE

## 2024-05-30 PROCEDURE — 99213 OFFICE O/P EST LOW 20 MIN: CPT | Mod: 25 | Performed by: INTERNAL MEDICINE

## 2024-05-30 RX ORDER — ZOLEDRONIC ACID 0.04 MG/ML
4 INJECTION, SOLUTION INTRAVENOUS ONCE
Status: COMPLETED | OUTPATIENT
Start: 2024-05-30 | End: 2024-05-30

## 2024-05-30 RX ORDER — VENLAFAXINE HYDROCHLORIDE 75 MG/1
75 CAPSULE, EXTENDED RELEASE ORAL DAILY
Qty: 30 CAPSULE | Refills: 11 | Status: SHIPPED | OUTPATIENT
Start: 2024-05-30 | End: 2024-08-08

## 2024-05-30 RX ORDER — ZOLEDRONIC ACID 0.04 MG/ML
4 INJECTION, SOLUTION INTRAVENOUS ONCE
Status: CANCELLED | OUTPATIENT
Start: 2024-05-30 | End: 2024-05-30

## 2024-05-30 RX ORDER — HEPARIN SODIUM,PORCINE 10 UNIT/ML
5-20 VIAL (ML) INTRAVENOUS DAILY PRN
Status: CANCELLED | OUTPATIENT
Start: 2024-05-30

## 2024-05-30 RX ORDER — HEPARIN SODIUM (PORCINE) LOCK FLUSH IV SOLN 100 UNIT/ML 100 UNIT/ML
5 SOLUTION INTRAVENOUS
Status: CANCELLED | OUTPATIENT
Start: 2024-05-30

## 2024-05-30 RX ADMIN — SODIUM CHLORIDE 250 ML: 9 INJECTION, SOLUTION INTRAVENOUS at 15:32

## 2024-05-30 RX ADMIN — ZOLEDRONIC ACID 4 MG: 4 SOLUTION INTRAVENOUS at 15:38

## 2024-05-30 ASSESSMENT — PAIN SCALES - GENERAL: PAINLEVEL: NO PAIN (0)

## 2024-05-30 NOTE — PROGRESS NOTES
"[facility]    Hematology/Oncology Established Patient Follow Up Note      Today's Date: 5/30/24    Reason for follow up: metastatic breast cancer    HISTORY OF PRESENT ILLNESS: Kesha Zacarias is a 63 year old female who presents for follow up    Patient has medical history including rheumatoid arthritis, hyperlipidemia, osteoarthritis, bilateral cataracts and glaucoma s/p surgery, endocervical polyp s/p resection, vaginal atrophy with conjugated estrogen vaginal cream use, colon polyp (hyperplastic polyp and tubular adenoma), seasonal depression, history of tobacco use ([age] y/o, about 1 ppd).    Regarding RA:  -dx over a decade ago, on plaquenil, managed by PCP  - arthritis is most severe in hands>knees, intermittent       - 3/23 bilateral screening mammogram [facility]  - a few months ago, noted nipple retraction  - 9/23 patient palpated mass in retroareolar region of left breast and w/nipple retraction, no discharge, skin thickening, no dimpling, no erythema  - 9/23 diagnostic LEFT breast mammogram: Focal dense tissue with some likely mild architectural distortion, some anterior skin thickening is noted  - 9/23 targeted LEFT breast ultrasound: Ill-defined shadowing hypoechoic mass, 3.0 x 1.7 x 3.9 cm.  In left axilla-few small lymph nodes, 1 normal-sized lymph node with cortical thickening, 0.7 x 0.6 cm.  -9/23 ultrasound-guided LEFT breast core biopsy of 12:00 lesion with clip placement, \"Postbiopsy unilateral digital mammogram of the left breast showed the clip to be at the expected biopsy site\" AND ultrasound-guided left axillary lymph node biopsy of lymph node with cortical thickening  PATHOLOGY  A.  Breast, left, 12:00, biopsy:  -Lobular carcinoma in situ.-Multiple foci  -Invasive carcinoma is not identified.     B.  Lymph node, left axillary, biopsy:  -Positive for metastatic lobular carcinoma, grade 2  -Largest metastatic focus is 7 mm.  -Negative for extranodal extension.  -Breast " ancillary testing:  -Estrogen receptor: Positive (91 to 100%, strong)  -Progesterone receptor: Positive (91 to 100%, strong)  -HER2 2+ IHC, FISH negative    -10/23 MRI bilateral breast:  LEFT breast:  - Heterogeneously enhancing irregular mass in the subareolar left breast, 5.0 x 4.9 x 4.9 cm, with associated nipple retraction and nipple/areolar involvement.  This mass extends superiorly through 11: positions, from anterior to mid depth.  The HydroMARK breast biopsy clip (which showed LCIS) is 1.5 cm posterosuperior to this mass.  - Multiple suspicious left level 1 axillary lymph node noted, including biopsy-proven metastatic node with indwelling biopsy marker, biopsied node measures 1.3 x 1.0 cm  - Heterogeneous marrow appearance of sternum and ribs with heterogeneous enhancement and a peripheral enhancing focus within the mid body.  IMPRESSION:  1. Upon further review of previous imaging and pathology results,  recommend repeat ultrasound guided large core-needle biopsy of the  discordant dominant left breast mass given metastatic left axillary  lymph node and superoposteriorly displaced left breast biopsy marker.   2. Nonspecific heterogeneous enhancement of the sternum and ribs.  Consider nuclear medicine bone scan    Lifetime estrogen exposure:  Menarche: 13   Last menstrual period: 49   Age of first pregnancy: 18   Number of pregnancies: 2 (no living children)   Weight gain: 20lb weight gain within a year  Exposure to exogenous estrogen: vaginal atrophy with conjugated estrogen vaginal cream use x4 years (intermittent), has not used it since 9/23. Birth control for about 13-15 years from her 20s-30s.      Pt reports 55lb weight loss in 1.5 years, this was intentional, was following weight watchers program (23 points available per day) 230lb->170lb->190lb (gained about 20lbs). Pt was walking on the treadmill and outside daily, about 30 mins to 1.5 miles.    10/23 labs:  AST 79, ALT 91,   CA 15-3  261    10/23 PET/CT:  Innumerable foci of FDG avid lesions are seen throughout the osseous  structures of the head and neck, most pronounced on the calvarium and  cervical spine, with prominently sclerotic appearance on CT correlate.  For example:  -Right frontal bone, SUV max 5.31.  -Left lateral mass of the atlas, SUV max 15.0  -Angle of the left mandible, SUV max 9.6  -C7 vertebral body, SUV max 17.7    Innumerable variable sized FDG avid lesions throughout the axial and  appendicular spine, including but not limited to the cervical,  thoracic, and lumbar spine, multilevel bilateral ribs, sternum,  bilateral scapula, bilateral humeri, clavicles, pelvis, and bilateral  femurs. A few of these lesions are described below:  -Large FDG avid sclerotic 3.4 cm lesion within the proximal left  humeral head, SUV max 17.24  -Sternal body, SUV max 20.35  -L2 vertebral body, SUV max 14.3 without  -Large ill-defined sclerotic lesion in the sacral body measuring up to  at least 5.9 cm, SUV max 16.84  -FDG avid focus within the left femoral head, SUV  max 13.65 without CT correlate.    Large irregular soft tissue FDG avid mass within the left breast   measuring approximately 3.2 x 2.7 cm with  extension into the superficial soft tissues and associated left nipple  retraction, SUV max 12.36 additional FDG avid. Left axillary lymph  nodes, not definitively enlarged by short axis size criteria, for  example, SUV max 5.93.    The liver is unremarkable.     Solid 3 mm non-FDG avid pulmonary nodule within the  right middle lobe    - 10/23 MRI brain:  - extensive osseous metastatic disease involving the calvarium, skull base w/involvement of occipital condyles, C1 and C2 vertebral bodies, and likely the mandible  -  Dominant calvarial lesions are located in the right frontal calvarium and right temporal calvarium without definite breach of the  inner or outer tables of the calvarium. There is a suggestion of mild asymmetric linear  extra-axial enhancement deep to the dominant right temporal calvarial lesion along the dural margin, though this may be vascular in etiology.  - No abnormal parenchymal or leptomeningeal enhancement.   - sequelae of mild chronic microvascular ischemic disease. Mild generalized cerebral atrophy.       -supposed to start ribociclib 10/30/23 however, noted to have significantly elevated LFTS (10/26/23 , , alk phos 152)    10/17/23: AST 79, ALT 91, alk phos 116, t bili 0.5  10/26/23 , , alk phos 152  10/30/23: , , alk phos 178, coags WNL, ribociclib was not started, letrozole started, atorvastatin held  11/7/23: AST 96, , alk phos 169, MRI liver negative for mets  11/17/23: AST 81, , alk phos 163  11/27/23: AST 55, ALT 90, alk phos 128- started ribociclib 400mg  12/4/23: AST 26, ALT 35, alk phos 118    - 11/27/23 started ribociclib+ letrozole  - 1/9/24-1/22/24 palliative RT to sacrum w/Dr. Mandujano- 3000cGy in 10 fractions    - 2/17/24 PET CT CAP  PET/CT FINDINGS: Most of the extensive skeletal metastases have become sclerotic and non-FDG avid and those which remain avid have decreased in activity, for example in the proximal right humerus from SUVmax 19.3 to 13.9, in the proximal right femur from 16.5 to 12.2, and in the L2 vertebral body from 14.3 to 10.7.      CT FINDINGS: Bilateral lens replacements. Calcified atherosclerosis, including moderate right coronary artery disease. Splenule. Cholelithiasis. Retroaortic left renal vein. Pelvic phleboliths. Appendectomy. Ventral hernia repair with mesh. Persistent   metopic suture. T11 vertebral body hemangioma. Mild degenerative change in the spine.                                                       IMPRESSION:  Partial response     INTERIM HISTORY:    REGIMEN:  Ribociclib+letrozole  C1D1 11/27/23 ribociclib 400mg, C2D1 12/25/23 ribociclib 600mg (day 1-12 only 2/2 palliative RT to sacrum w/Dr. Mandujano 1/9/24- 1/22/24  3000cGy in 10 fractions), C3D1 1/30/24, C4D1 3/6/24 (deferred 1 week 2/2 2/27/24 ANC 0.8->3/5/24 ANC 1.5), C5D1 4/3/24, C6D1 5/1/24  Ribociclib 600mg PO daily day 1-21 q28 days (11/27/23 started 400mg PO daily when LFTs improved to <3x ULN)  Letrozole 2.5mg PO daily (started 10/30/23)  Febrile neutropenia risk: neutropenia (69% to 78%; grade 3: 46% to 55%; grade 4: 7% to 10%), Febrile neutropenia (1%)    C7D1 5/30/24 planned pending labs    Treatment related AE:  - neutropenia- C4 deferred 1 week 2/2 2/27/24 ANC 0.8->3/5/24 ANC 1.5; 4/2/24 ANC 1.0, 4/30/24 ANC 1.0, 5/30/24 ANC 1.3  - thrombocytopenia-  intermittent, 12/23 plt 143K, 2/24 plt 123K, 4/24 plt 147K, 4/30/24 plt 171K, 5/30/24 plt 161K  - macrocytic anemia- hgb 11, , 1/24 iron studies and B12 WNL  - hypocalcemia- taking calcium 600mg BID and vitamin D 1000IU  - elevated Cr- Cr 1.1 2/16/24, stable since then. increased water to 60-90 oz this week   - anxiety, mood changes-has support of family and  who are great advocates for her care,  since starting letrozole- feels more emotional with labile moods, more tearful; doing better on effexor, feels she is coping better, psychology consult pending   - hot flashes- 3/24 10x/day, last a few mins, daily, affecting quality of life including sleep; 4/24 started effexor 37.5mg x1 week, then 75mg thereafter and hot flashes improved to 2-3/day  - Left posterior tibial DVT- 1/19/24 sx started- Left foot pain and swelling, 1/22/24 pt called in, 1/22/24 left LE doppler: DVT in 1 of 2 left distal posterior tibial veins, started on eliquis and stopped naproxen; no issues with bleeding but does have intermitting bruising when she bumps her hand  - blurry vision b/l- last saw eye doctor 3/23 and has trifocal glasses. She has dry eyes and uses lubricating eye drops., 10/23 MRI brain as above. eye doctor visit 4/3/24- got new glasses rx, 5/23/24 sent message regarding increased blurred vision- remain constant  "despite time of day, near vision, far vision, or the use of her corrective lenses; 5/24/24 MRI brain negative for intraparenchymal mets, osseous mets stable  - macrocytic anemia- 5/24 hgb 11, , RDW high     RESOLVED:  - constipation- probiotics, previously using senna BID and colace TID but after stopping oxycodone, stools are soft , no diarrhea  - chest pressure w/dyspnea: 2/24 three 10 min episodes, at rest, spontaneously resolved; 2/28/24 stress echo WNL, no recurrence of sx  - nausea- minimal before RT, then frequent with RT, 2/24 in AM- mild, uses anti-emetics about every other day, zofran helps immediately, doesn't eat breakfast but trying to have protein shakes  - leg cramps- Mg   - elevated LFTs- improved 11/27/23 to <3x ULN (AST 55, ALT 90, alk phos 128)  11/27/24 started ribociclib 400mg, 11/23 MRI liver negative for mets, ?cause- possibly viral infection between 10/17/23 and 10/26/23; 12/22/23 LFTs normal, cycle 2 started ribociclib 600mg; 1/24 LFTs WNL  - elevated Cr- increased water to 60oz/day, 1/24 Cr WNL  - daily HA- prior to starting tx- mostly b/l temporal regions, no sensitivity to light or sound, no N/V, takes tylenol w/o improvement; 10/23 MRI brain: extensive osseous mets w/dominant calvarial lesions w/enhancement deep to dominant right temporal calvarial lesion along dural margin (?vascular etiology), no parenchymal or leptomeningeal enhancement. Resolved  - back pain- left lower back, sharp, radiates to left buttock and leg making it hard to walk, worse w/walking, improved w/heating pad and ibuprofen 800mg BID, flexeril 5mg qHS, oxycodone 5mg q6hr prn #30 tabs rx 12/4/23 with stool softner- pt is using at bedtime due feeling \"loopy\" during day- this combination allows pt to be functional (10/10 previously, now 6/10)->12/23 half oxycodone (2.5mg) q6 hrs during day and 5mg at bedtime and ibuprofen 800mg BID AM and afternoon- without significant change (still 6/10), 11/30/23 MRI lumbar " spine with diffuse osseous metastatic disease.  Possible pathologic compression deformity at L1. neurosurgery consult 12/28/23 w/Dr. Perez- SI joint dysfunction- recommend mobic and PT, compression fracture is chronic. Rad onc consult 1/5/24 w/Dr. Mandujano- 1/9/24- 1/22/24 3000cGy in 10 fractions, palliative RT to sacrum (ribociclib held 1/5/24, plaquenil continued); LBP improved, while on RT was off oxycodone for 5 days but then restarted 2/2 left LE pain initially had sciatica but 2/24 stopped all oxycodone           - 5/11/24 PET CT CAP:  Redemonstrated innumerable FDG avid osseous lesions, some of which are increasing in metabolic activity and suggest mild progression of disease including representative examples involving the inferior sternum (Max SUV 16.5, previously 7.9) and left distal femur (Max SUV 20.8, previously 11.3).    - 5/24/24 MRI brain w/wo contrast:  IMPRESSION:   1. Presumed osseous metastases involving the occipital condyles  bilaterally, C1, C2 and C3 vertebrae again noted.  2. Questionable osseous metastatic disease involving the parietal  bones bilaterally again noted without change.  3. Diffuse cerebral volume loss and cerebral white matter changes  consistent with chronic small vessel ischemic disease. No evidence for  intracranial metastases.  4. No evidence for acute intracranial pathology.      REVIEW OF SYSTEMS:   A 14 point ROS was reviewed with pertinent positives and negatives in the HPI.        HOME MEDICATIONS:  Current Outpatient Medications   Medication Sig Dispense Refill    apixaban ANTICOAGULANT (ELIQUIS) 5 MG tablet Take 1 tablet (5 mg) by mouth 2 times daily 60 tablet 11    betamethasone dipropionate (DIPROSONE) 0.05 % external lotion Apply topically 2 times daily 60 mL 3    cyclobenzaprine (FLEXERIL) 5 MG tablet TAKE 1 TABLET(5 MG) BY MOUTH AT BEDTIME 90 tablet 3    docusate sodium (COLACE) 100 MG capsule Take 1 capsule (100 mg) by mouth 3 times daily as needed for  constipation 90 capsule 3    fish oil-omega-3 fatty acids 1000 MG capsule Take 2 g by mouth daily      hydroxychloroquine (PLAQUENIL) 200 MG tablet TAKE 2 AND 1/2 TABLETS BY MOUTH EVERY  tablet 3    letrozole (FEMARA) 2.5 MG tablet Take 1 tablet (2.5 mg) by mouth every morning 90 tablet 3    loperamide (IMODIUM A-D) 2 MG tablet When diarrhea starts take 2 tabs (4mg), then with each additional episode of diarrhea take 1 additional tab and if needing more than 8 tabs in 24 hrs call oncology 30 tablet 3    magnesium 250 MG tablet Take 1 tablet by mouth daily      ondansetron (ZOFRAN) 4 MG tablet Take 1 tablet (4 mg) by mouth every 6 hours as needed for nausea 30 tablet 3    prochlorperazine (COMPAZINE) 10 MG tablet Take 1 tablet (10 mg) by mouth every 6 hours as needed for nausea or vomiting 30 tablet 2    ribociclib (KISQALI) (600 MG DOSE) 200 MG tablet therapy pack Take 3 tablets (600 mg) by mouth every morning for 21 days , then off for 7 days. 63 tablet 0    venlafaxine (EFFEXOR XR) 75 MG 24 hr capsule Take 1 capsule (75 mg) by mouth daily 30 capsule 11         ALLERGIES:  Allergies   Allergen Reactions    Ciprofloxacin      hives and was on flagyl too    Metronidazole      hives and was on cipro too         PAST MEDICAL HISTORY:  Past Medical History:   Diagnosis Date    Arthritis 2006    Breast cancer metastasized to bone (H) 10/12/2023    Chronic depressive personality disorder     Dysplasia of cervix (uteri) 1988    Cryotherapy    Female infertility of unspecified origin     Glaucoma     Rheumatoid arthritis (H)          PAST SURGICAL HISTORY:  Past Surgical History:   Procedure Laterality Date    COLONOSCOPY      COLONOSCOPY N/A 01/14/2022    Procedure: COLONOSCOPY, WITH POLYPECTOMY AND BIOPSY;  Surgeon: Gagandeep Patterson MD;  Location: [facility]    [facility] INTRODUCE CATH FALLOPIAN TUBE, RE-OPEN/DIAGNOSIS      HERNIA REPAIR, INCISIONAL  11/11/2009    JOINT REPLACEMENT Right 09/2017    knee    TONSILLECTOMY       ZZC APPENDECTOMY  2003    ZZC REMV CATARACT INTRACAP,INSERT LENS  2003    right         SOCIAL HISTORY:  Social History     Socioeconomic History    Marital status:      Spouse name: Bandar    Number of children: 1    Years of education: Not on file    Highest education level: Not on file   Occupational History    Not on file   Tobacco Use    Smoking status: Former     Current packs/day: 0.00     Average packs/day: 0.5 packs/day for 25.0 years (12.5 ttl pk-yrs)     Types: Cigarettes     Start date: 1992     Quit date: 2017     Years since quittin.7    Smokeless tobacco: Never   Vaping Use    Vaping status: Never Used   Substance and Sexual Activity    Alcohol use: Not Currently    Drug use: Yes     Types: Marijuana    Sexual activity: Yes     Partners: Male     Birth control/protection: None   Other Topics Concern    Parent/sibling w/ CABG, MI or angioplasty before 65F 55M? Yes   Social History Narrative    Not on file     Social Determinants of Health     Financial Resource Strain: Not on file   Food Insecurity: Not on file   Transportation Needs: Not on file   Physical Activity: Not on file   Stress: Not on file   Social Connections: Not on file   Interpersonal Safety: Not on file   Housing Stability: Not on file         FAMILY HISTORY:  Family History   Problem Relation Age of Onset    Heart Disease Mother     Lipids Mother     Hyperlipidemia Mother     Genitourinary Problems Father         prostate    Genetic Disorder Father         ulcer    Hypertension Father     Lipids Father     Prostate Cancer Father     Hyperlipidemia Sister     Hyperlipidemia Brother     Other Cancer Brother         Neck Cancer HPV (+)    Heart Disease Maternal Grandmother     Cerebrovascular Disease Maternal Grandmother     Heart Disease Maternal Grandfather     Heart Disease Maternal Uncle     Heart Disease Maternal Uncle         x  3    Cancer Nephew         Sister's Son       Family history of:  1.   "Breast cancer including male breast cancer: negative   2. Ovarian cancer: negative  3.  Pancreatic cancer: negative  4.  Prostate cancer: father- ?in his 50s, other details unknown  5. Diffuse gastric cancer (if lobular breast CA): negative  6. Uterine cancer: negative  7. Colon cancer:  negative  6. Brother- head and neck, HPV +, had surgery, clinical trial and now cancer free      PHYSICAL EXAM:  Vital signs:  /79 (BP Location: Left arm, Patient Position: Chair, Cuff Size: Adult Large)   Pulse 67   Temp 98.6  F (37  C) (Oral)   Ht 1.676 m (5' 6\")   Wt 95.3 kg (210 lb)   LMP 11/22/2011 (Exact Date)   SpO2 98%   BMI 33.89 kg/m       ECOG:   GENERAL/CONSTITUTIONAL: No acute distress.  EYES: Pupils are equal and round. Extraocular movements intact without nystagmus.  No scleral icterus.  RESPIRATORY: Equal chest rise.   MUSCULOSKELETAL: Warm and well-perfused, no cyanosis, clubbing, or edema.   NEUROLOGIC: Cranial nerves are grossly intact. Alert, oriented to person, place and time, answers questions appropriately.  INTEGUMENTARY: No rashes or jaundice.  GAIT: Steady, does not use assistive device    LABS:   Latest Reference Range & Units 05/30/24 13:19   Sodium 135 - 145 mmol/L 142   Potassium 3.4 - 5.3 mmol/L 4.2   Chloride 98 - 107 mmol/L 104   Carbon Dioxide (CO2) 22 - 29 mmol/L 28   Urea Nitrogen 8.0 - 23.0 mg/dL 14.0   Creatinine 0.51 - 0.95 mg/dL 0.98 (H)   GFR Estimate >60 mL/min/1.73m2 65   Calcium 8.8 - 10.2 mg/dL 9.1   Anion Gap 7 - 15 mmol/L 10   Magnesium 1.7 - 2.3 mg/dL 2.0   Phosphorus 2.5 - 4.5 mg/dL 4.0   Albumin 3.5 - 5.2 g/dL 4.1   Protein Total 6.4 - 8.3 g/dL 6.7   Alkaline Phosphatase 40 - 150 U/L 63   ALT 0 - 50 U/L 22   AST 0 - 45 U/L 29   Bilirubin Total <=1.2 mg/dL 0.2   Ferritin 11 - 328 ng/mL 355 (H)   Glucose 70 - 99 mg/dL 145 (H)   Iron 37 - 145 ug/dL 56   Iron Binding Capacity 240 - 430 ug/dL 307   Iron Sat Index 15 - 46 % 18   TSH 0.30 - 4.20 uIU/mL 1.19   WBC 4.0 - 11.0 " 10e3/uL 2.6 (L)   Hemoglobin 11.7 - 15.7 g/dL 11.2 (L)   Hematocrit 35.0 - 47.0 % 33.3 (L)   Platelet Count 150 - 450 10e3/uL 161   RBC Count 3.80 - 5.20 10e6/uL 3.05 (L)   MCV 78 - 100 fL 109 (H)   MCH 26.5 - 33.0 pg 36.7 (H)   MCHC 31.5 - 36.5 g/dL 33.6   RDW 10.0 - 15.0 % 15.9 (H)   % Neutrophils % 50   % Lymphocytes % 30   % Monocytes % 16   % Eosinophils % 1   % Basophils % 2   Absolute Basophils 0.0 - 0.2 10e3/uL 0.1   Absolute Eosinophils 0.0 - 0.7 10e3/uL 0.0   Absolute Immature Granulocytes <=0.4 10e3/uL 0.0   Absolute Lymphocytes 0.8 - 5.3 10e3/uL 0.8   Absolute Monocytes 0.0 - 1.3 10e3/uL 0.4   % Immature Granulocytes % 0   Absolute Neutrophils 1.6 - 8.3 10e3/uL 1.3 (L)   Absolute NRBCs 10e3/uL 0.0   NRBCs per 100 WBC <1 /100 0   % Retic % 2.3   Absolute Retic 10e6/uL 0.071   (H): Data is abnormally high  (L): Data is abnormally low    PATHOLOGY:    IMAGING:  EXAM: PET ONCOLOGY (EYES TO THIGHS), CT CHEST/ABDOMEN/PELVIS W CONTRAST  LOCATION: [facility]  DATE: 5/11/2024     INDICATION: Restaging for malignant neoplasm overlapping sites of right female breast. Currently receiving chemotherapy. Modest response  COMPARISON: FDG PET/CT dated 2/17/2024  CONTRAST: 131 mL [id number]  TECHNIQUE: Serum glucose level 98 mg/dL. One hour post intravenous administration of 14.1 mCi F-18 FDG, PET imaging was performed from the skull vertex to mid thighs, utilizing attenuation correction with concurrent axial CT and PET/CT image fusion.   Separate diagnostic CT of the chest, abdomen, and pelvis was performed. Dose reduction techniques were used.     PET/CT FINDINGS: Redemonstrated innumerable FDG avid osseous lesions, some of which are increasing in metabolic activity and suggest mild progression of disease including representative examples involving the inferior sternum (Max SUV 16.5, previously   7.9) and left distal femur (Max SUV 20.8, previously 11.3).     CT FINDINGS: Mild senescent  intracranial changes. Postoperative changes of bilateral lenses. Mild paranasal sinus with also thickening. The aerodigestive tract and neck soft tissues are unremarkable. Mild coronary artery calcium. Anterior abdominal wall   hernia repair. Pelvic phleboliths. Multilevel degenerative changes of the spine.r                                                                      IMPRESSION:     Redemonstrated innumerable FDG avid osseous lesions, some of which are increasing in metabolic activity and suggest mild progression of disease including representative examples involving the inferior sternum and left distal femur.  Narrative & Impression   MRI OF THE BRAIN WITHOUT AND WITH CONTRAST 5/24/2024 10:22 AM      COMPARISON: Brain MRI 10/23/2023.     HISTORY:  New blurred vision, metastatic breast cancer. Carcinoma of  left breast metastatic to bone (H). Blurred vision.     TECHNIQUE: Axial diffusion-weighted with ADC map, axial T2-weighted  with fat saturation, axial T1-weighted, axial turboFLAIR and coronal  T1-weighted images of the brain were acquired without intravenous  contrast.  Following intravenous administration of gadolinium (9.5 mL  Gadavist), axial T1-weighted images of the brain were acquired.      FINDINGS: There is mild diffuse cerebral volume loss. There are few  tiny scattered focal areas of abnormal T2 signal hyperintensity in the  cerebral white matter bilaterally that are consistent with sequela of  chronic small vessel ischemic disease.     The ventricles and basal cisterns are within normal limits in  configuration given the degree of cerebral volume loss.  There is no  midline shift.  There are no extra-axial fluid collections.  There is  no evidence for stroke or acute intracranial hemorrhage.  There is no  abnormal contrast enhancement in the brain or its coverings.      Foci of abnormal signal hypointensity in the C1, C2 and C3 vertebrae  and occipital condyles bilaterally again noted  "consistent with osseous  metastatic disease. Patchy contrast enhancement in the parietal bones  bilaterally (right greater than left) again noted without change  possibly representing calvarial metastases.     There is no sinusitis or mastoiditis.                                                                      IMPRESSION:   1. Presumed osseous metastases involving the occipital condyles  bilaterally, C1, C2 and C3 vertebrae again noted.  2. Questionable osseous metastatic disease involving the parietal  bones bilaterally again noted without change.  3. Diffuse cerebral volume loss and cerebral white matter changes  consistent with chronic small vessel ischemic disease. No evidence for  intracranial metastases.  4. No evidence for acute intracranial pathology.     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 Pt does not have visceral crisis, she mainly has extensive bone metastases and LN metastases   -9/23 diagnostic left breast mammogram, left breast targeted ultrasound showed 3.0 x 1.7 x 3.9 ill-defined shadowing hypoechoic mass, few small lymph nodes in the left axilla, one with cortical thickening measuring 0.7 x 0.6 cm  -9/23 ultrasound-guided LEFT breast core biopsy of 12:00 lesion with clip placement, \"Postbiopsy unilateral digital mammogram of the left breast showed the clip to be at the expected biopsy site\" AND ultrasound-guided left axillary lymph node biopsy of lymph node with cortical thickening, PATHOLOGY:  A.  Breast, left, 12:00, biopsy:Lobular carcinoma in situ, Multiple foci. Invasive carcinoma is not identified.   B.  Lymph node, left axillary, biopsy:  -Positive for metastatic lobular carcinoma, grade 2, 0.7 cm, " negative GREGG, Estrogen receptor: Positive (91 to 100%, strong), Progesterone receptor: Positive (91 to 100%, strong), HER2 2+ IHC, FISH negative  -10/23 MRI bilateral breast:  - Heterogeneously enhancing irregular mass in the subareolar left breast, 5.0 x 4.9 x 4.9 cm, with associated nipple retraction and nipple/areolar involvement.  This mass extends superiorly through 11: positions, from anterior to mid depth.  The HydroMARK breast biopsy clip (which showed LCIS) is 1.5 cm posterosuperior to this mass.  - Multiple suspicious left level 1 axillary lymph node noted, including biopsy-proven metastatic node with indwelling biopsy marker, biopsied node measures 1.3 x 1.0 cm  - Heterogeneous marrow appearance of sternum and ribs with heterogeneous enhancement and a peripheral enhancing focus within the mid body.    - 10/23 PET/CT:  Innumerable foci of FDG avid lesions are seen throughout the osseous structures of the head and neck, most pronounced on the calvarium and cervical spine, with prominently sclerotic appearance on CT correlate.  For example:  -Right frontal bone, SUV max 5.31.  -Left lateral mass of the atlas, SUV max 15.0  -Angle of the left mandible, SUV max 9.6  -C7 vertebral body, SUV max 17.7    Innumerable variable sized FDG avid lesions throughout the axial and appendicular spine, including but not limited to the cervical, thoracic, and lumbar spine, multilevel bilateral ribs, sternum, bilateral scapula, bilateral humeri, clavicles, pelvis, and bilateral femurs. A few of these lesions are described below:  -Large FDG avid sclerotic 3.4 cm lesion within the proximal left humeral head, SUV max 17.24  -Sternal body, SUV max 20.35  -L2 vertebral body, SUV max 14.3 without  -Large ill-defined sclerotic lesion in the sacral body measuring up to at least 5.9 cm, SUV max 16.84  -FDG avid focus within the left femoral head, SUV max 13.65 without CT correlate.    Large irregular soft tissue FDG avid mass  within the left breast measuring approximately 3.2 x 2.7 cm with  extension into the superficial soft tissues and associated left nipple retraction, SUV max 12.36 additional FDG avid. Left axillary lymph nodes, not definitively enlarged by short axis size criteria, for example, SUV max 5.93.    - 10/23 MRI brain:  - extensive osseous metastatic disease involving the calvarium, skull base w/involvement of occipital condyles, C1 and C2 vertebral bodies, and likely the mandible  -  Dominant calvarial lesions are located in the right frontal calvarium and right temporal calvarium without definite breach of the inner or outer tables of the calvarium. There is a suggestion of mild asymmetric linear extra-axial enhancement deep to the dominant right temporal calvarial lesion along the dural margin, though this may be vascular in etiology.  - No abnormal parenchymal or leptomeningeal enhancement.   - sequelae of mild chronic microvascular ischemic disease. Mild generalized cerebral atrophy.     -10/23 DEXA: osteopenia (T score -2.0 lumbar spine, -2.0 left hip femoral neck, The 10 year probability of major osteoporotic fracture is 12.5%, and of hip fracture is 1.8%, based on left femoral neck BMD)    - 11/23 orthopedic consult to determine stability of left femur and fracture risk given presence of mets in left femoral head: do not see any areas of impending cortical erosion or sites of high risk for fracture, observe      REGIMEN:  Ribociclib+letrozole  C1D1 11/27/23 ribociclib 400mg, C2D1 12/25/23 ribociclib 600mg (day 1-12 only 2/2 palliative RT to sacrum w/Dr. Mandujano 1/9/24- 1/22/24 3000cGy in 10 fractions), C3D1 1/30/24, C4D1 3/6/24 (deferred 1 week 2/2 2/27/24 ANC 0.8->3/5/24 ANC 1.5), C5D1 4/3/24, C6D1 5/1/24  Ribociclib 600mg PO daily day 1-21 q28 days (11/27/23 started 400mg PO daily when LFTs improved to <3x ULN)  Letrozole 2.5mg PO daily (started 10/30/23)  Febrile neutropenia risk: neutropenia (69% to 78%; grade 3:  46% to 55%; grade 4: 7% to 10%), Febrile neutropenia (1%)    C7D1 5/30/24 planned pending labs  Pt w/progression of disease in bones on last PET  - Genetic counseling consult pending, # provided to pt previously, pt to call tomorrow. I have ordered labs for pre-auth for breast actionable panel to be done STAT for germline BRCA1/2  - check to following on previously obtained tissue to see if pt is candidate for targeted tx:  - BRAF, PIK3CA, AKT1, PTEN, ESR1 NGS  - NTRK, RET fusion  - MSI   - PDL1 w/TMB   - if pt is not a candidate for targeted tx, would switch to everolimus 10mg PO daily + exemestane 25mg PO daily  - will update pt and pharmacy team once the above results are back, for now, continue with current tx      Treatment related AE:  - neutropenia- ANC 1.3- ok to continue ribociclib at current dose, if needed- will dose reduce in future, neutropenic fever precautions discussed  - thrombocytopenia- intermittent and mild, monitor for bleeding and bruising while on effexor and eliquis  - macrocytic anemia- hgb 11, check B12, TSH, absolute retic, ferritin, iron studies; RBC folate next labs  - anxiety, mood changes- psychology referral   - hot flashes- continue effexor 75mg daily, refilled today  - DVT- continue eliquis, refilled today   - blurred vision- stable, no alarms sx, eye doctor visit 4/3/24; 5/24 MRI brain negative for mets; check vitamin A, B12, TSH, at next visit check RBC folate   - hypocalcemia- mild, continue BID calcium and daily vitamin D         IMAGING:  - next PET due 3 months after starting new tx- to be ordered later    - 5/24 EKG pending    - 5/11/24 PET CT CAP:  Redemonstrated innumerable FDG avid osseous lesions, some of which are increasing in metabolic activity and suggest mild progression of disease including representative examples involving the inferior sternum (Max SUV 16.5, previously 7.9) and left distal femur (Max SUV 20.8, previously 11.3).    - 5/24/24 MRI brain w/wo  contrast:  IMPRESSION:   1. Presumed osseous metastases involving the occipital condyles bilaterally, C1, C2 and C3 vertebrae again noted.  2. Questionable osseous metastatic disease involving the parietal bones bilaterally again noted without change.  3. Diffuse cerebral volume loss and cerebral white matter changes consistent with chronic small vessel ischemic disease. No evidence for intracranial metastases.  4. No evidence for acute intracranial pathology.    - 2/17/24 PET CT CAP:  Most of the extensive skeletal metastases have become sclerotic and non-FDG avid and those which remain avid have decreased in activity, for example in the proximal right humerus from SUVmax 19.3 to 13.9, in the proximal right femur from 16.5 to 12.2, and in the L2 vertebral body from 14.3 to 10.7.    TUMOR MARKERS:  10/23 CA 15-3= 261  12/23 CA 15-3= 553  1/24 CA 15-3= 480  2/24 CA 15-3 338  Repeat CA 15-3 pending 5/24    OTHER:  - continue zometa q4 weeks - zometa #1 1/4/24, last zometa 5/1/24, next due 5/30/24; if disease stable on next scan, will transition to 4mg q12 weeks   - continue calcium and vitamin D    # osteopenia  - 10/23 DEXA: osteopenia, T score - 2.0 left hip femoral neck and lumbar spine   - repeat DEXA 10/2025  - continue continue calcium and vitamin D  - zometa as above    # left posterior tibial DVT  -1/19/24 sx started- Left foot pain and swelling  -1/22/24 pt called in, 1/22/24 left LE doppler: DVT in 1 of 2 left distal posterior tibial veins  - continue eliquis, refilled today      # RA  - on plaquenil       RTC 6/26 for follow up with me, labs, EKG prior to visit- virtual    Mary DO Taiwo  Hematology/Oncology  [facility]

## 2024-05-30 NOTE — LETTER
"    5/30/2024         RE: Kesha Zacarias  [id number] [address] [place] [id number]        Dear Colleague,    Thank you for referring your patient, Kesha Zacarias, to the [facility]. Please see a copy of my visit note below.    [facility]    Hematology/Oncology Established Patient Follow Up Note      Today's Date: 5/30/24    Reason for follow up: metastatic breast cancer    HISTORY OF PRESENT ILLNESS: Kesha Zacarias is a 63 year old female who presents for follow up    Patient has medical history including rheumatoid arthritis, hyperlipidemia, osteoarthritis, bilateral cataracts and glaucoma s/p surgery, endocervical polyp s/p resection, vaginal atrophy with conjugated estrogen vaginal cream use, colon polyp (hyperplastic polyp and tubular adenoma), seasonal depression, history of tobacco use ([age] y/o, about 1 ppd).    Regarding RA:  -dx over a decade ago, on plaquenil, managed by PCP  - arthritis is most severe in hands>knees, intermittent       - 3/23 bilateral screening mammogram [facility]  - a few months ago, noted nipple retraction  - 9/23 patient palpated mass in retroareolar region of left breast and w/nipple retraction, no discharge, skin thickening, no dimpling, no erythema  - 9/23 diagnostic LEFT breast mammogram: Focal dense tissue with some likely mild architectural distortion, some anterior skin thickening is noted  - 9/23 targeted LEFT breast ultrasound: Ill-defined shadowing hypoechoic mass, 3.0 x 1.7 x 3.9 cm.  In left axilla-few small lymph nodes, 1 normal-sized lymph node with cortical thickening, 0.7 x 0.6 cm.  -9/23 ultrasound-guided LEFT breast core biopsy of 12:00 lesion with clip placement, \"Postbiopsy unilateral digital mammogram of the left breast showed the clip to be at the expected biopsy site\" AND ultrasound-guided left axillary lymph node biopsy of lymph node with cortical thickening  PATHOLOGY  A.  Breast, left, 12:00, " biopsy:  -Lobular carcinoma in situ.-Multiple foci  -Invasive carcinoma is not identified.     B.  Lymph node, left axillary, biopsy:  -Positive for metastatic lobular carcinoma, grade 2  -Largest metastatic focus is 7 mm.  -Negative for extranodal extension.  -Breast ancillary testing:  -Estrogen receptor: Positive (91 to 100%, strong)  -Progesterone receptor: Positive (91 to 100%, strong)  -HER2 2+ IHC, FISH negative    -10/23 MRI bilateral breast:  LEFT breast:  - Heterogeneously enhancing irregular mass in the subareolar left breast, 5.0 x 4.9 x 4.9 cm, with associated nipple retraction and nipple/areolar involvement.  This mass extends superiorly through 11: positions, from anterior to mid depth.  The PRXMARK breast biopsy clip (which showed LCIS) is 1.5 cm posterosuperior to this mass.  - Multiple suspicious left level 1 axillary lymph node noted, including biopsy-proven metastatic node with indwelling biopsy marker, biopsied node measures 1.3 x 1.0 cm  - Heterogeneous marrow appearance of sternum and ribs with heterogeneous enhancement and a peripheral enhancing focus within the mid body.  IMPRESSION:  1. Upon further review of previous imaging and pathology results,  recommend repeat ultrasound guided large core-needle biopsy of the  discordant dominant left breast mass given metastatic left axillary  lymph node and superoposteriorly displaced left breast biopsy marker.   2. Nonspecific heterogeneous enhancement of the sternum and ribs.  Consider nuclear medicine bone scan    Lifetime estrogen exposure:  Menarche: 13   Last menstrual period: 49   Age of first pregnancy: 18   Number of pregnancies: 2 (no living children)   Weight gain: 20lb weight gain within a year  Exposure to exogenous estrogen: vaginal atrophy with conjugated estrogen vaginal cream use x4 years (intermittent), has not used it since 9/23. Birth control for about 13-15 years from her 20s-30s.      Pt reports 55lb weight loss in 1.5  years, this was intentional, was following weight watchers program (23 points available per day) 230lb->170lb->190lb (gained about 20lbs). Pt was walking on the treadmill and outside daily, about 30 mins to 1.5 miles.    10/23 labs:  AST 79, ALT 91,   CA 15-3 261    10/23 PET/CT:  Innumerable foci of FDG avid lesions are seen throughout the osseous  structures of the head and neck, most pronounced on the calvarium and  cervical spine, with prominently sclerotic appearance on CT correlate.  For example:  -Right frontal bone, SUV max 5.31.  -Left lateral mass of the atlas, SUV max 15.0  -Angle of the left mandible, SUV max 9.6  -C7 vertebral body, SUV max 17.7    Innumerable variable sized FDG avid lesions throughout the axial and  appendicular spine, including but not limited to the cervical,  thoracic, and lumbar spine, multilevel bilateral ribs, sternum,  bilateral scapula, bilateral humeri, clavicles, pelvis, and bilateral  femurs. A few of these lesions are described below:  -Large FDG avid sclerotic 3.4 cm lesion within the proximal left  humeral head, SUV max 17.24  -Sternal body, SUV max 20.35  -L2 vertebral body, SUV max 14.3 without  -Large ill-defined sclerotic lesion in the sacral body measuring up to  at least 5.9 cm, SUV max 16.84  -FDG avid focus within the left femoral head, SUV  max 13.65 without CT correlate.    Large irregular soft tissue FDG avid mass within the left breast   measuring approximately 3.2 x 2.7 cm with  extension into the superficial soft tissues and associated left nipple  retraction, SUV max 12.36 additional FDG avid. Left axillary lymph  nodes, not definitively enlarged by short axis size criteria, for  example, SUV max 5.93.    The liver is unremarkable.     Solid 3 mm non-FDG avid pulmonary nodule within the  right middle lobe    - 10/23 MRI brain:  - extensive osseous metastatic disease involving the calvarium, skull base w/involvement of occipital condyles, C1 and C2  vertebral bodies, and likely the mandible  -  Dominant calvarial lesions are located in the right frontal calvarium and right temporal calvarium without definite breach of the  inner or outer tables of the calvarium. There is a suggestion of mild asymmetric linear extra-axial enhancement deep to the dominant right temporal calvarial lesion along the dural margin, though this may be vascular in etiology.  - No abnormal parenchymal or leptomeningeal enhancement.   - sequelae of mild chronic microvascular ischemic disease. Mild generalized cerebral atrophy.       -supposed to start ribociclib 10/30/23 however, noted to have significantly elevated LFTS (10/26/23 , , alk phos 152)    10/17/23: AST 79, ALT 91, alk phos 116, t bili 0.5  10/26/23 , , alk phos 152  10/30/23: , , alk phos 178, coags WNL, ribociclib was not started, letrozole started, atorvastatin held  11/7/23: AST 96, , alk phos 169, MRI liver negative for mets  11/17/23: AST 81, , alk phos 163  11/27/23: AST 55, ALT 90, alk phos 128- started ribociclib 400mg  12/4/23: AST 26, ALT 35, alk phos 118    - 11/27/23 started ribociclib+ letrozole  - 1/9/24-1/22/24 palliative RT to sacrum w/Dr. Mandujano- 3000cGy in 10 fractions    - 2/17/24 PET CT CAP  PET/CT FINDINGS: Most of the extensive skeletal metastases have become sclerotic and non-FDG avid and those which remain avid have decreased in activity, for example in the proximal right humerus from SUVmax 19.3 to 13.9, in the proximal right femur from 16.5 to 12.2, and in the L2 vertebral body from 14.3 to 10.7.      CT FINDINGS: Bilateral lens replacements. Calcified atherosclerosis, including moderate right coronary artery disease. Splenule. Cholelithiasis. Retroaortic left renal vein. Pelvic phleboliths. Appendectomy. Ventral hernia repair with mesh. Persistent   metopic suture. T11 vertebral body hemangioma. Mild degenerative change in the spine.                                                        IMPRESSION:  Partial response     INTERIM HISTORY:    REGIMEN:  Ribociclib+letrozole  C1D1 11/27/23 ribociclib 400mg, C2D1 12/25/23 ribociclib 600mg (day 1-12 only 2/2 palliative RT to sacrum w/Dr. Mandujano 1/9/24- 1/22/24 3000cGy in 10 fractions), C3D1 1/30/24, C4D1 3/6/24 (deferred 1 week 2/2 2/27/24 ANC 0.8->3/5/24 ANC 1.5), C5D1 4/3/24, C6D1 5/1/24  Ribociclib 600mg PO daily day 1-21 q28 days (11/27/23 started 400mg PO daily when LFTs improved to <3x ULN)  Letrozole 2.5mg PO daily (started 10/30/23)  Febrile neutropenia risk: neutropenia (69% to 78%; grade 3: 46% to 55%; grade 4: 7% to 10%), Febrile neutropenia (1%)    C7D1 5/30/24 planned pending labs    Treatment related AE:  - neutropenia- C4 deferred 1 week 2/2 2/27/24 ANC 0.8->3/5/24 ANC 1.5; 4/2/24 ANC 1.0, 4/30/24 ANC 1.0, 5/30/24 ANC 1.3  - thrombocytopenia-  intermittent, 12/23 plt 143K, 2/24 plt 123K, 4/24 plt 147K, 4/30/24 plt 171K, 5/30/24 plt 161K  - macrocytic anemia- hgb 11, , 1/24 iron studies and B12 WNL  - hypocalcemia- taking calcium 600mg BID and vitamin D 1000IU  - elevated Cr- Cr 1.1 2/16/24, stable since then. increased water to 60-90 oz this week   - anxiety, mood changes-has support of family and  who are great advocates for her care,  since starting letrozole- feels more emotional with labile moods, more tearful; doing better on effexor, feels she is coping better, psychology consult pending   - hot flashes- 3/24 10x/day, last a few mins, daily, affecting quality of life including sleep; 4/24 started effexor 37.5mg x1 week, then 75mg thereafter and hot flashes improved to 2-3/day  - Left posterior tibial DVT- 1/19/24 sx started- Left foot pain and swelling, 1/22/24 pt called in, 1/22/24 left LE doppler: DVT in 1 of 2 left distal posterior tibial veins, started on eliquis and stopped naproxen; no issues with bleeding but does have intermitting bruising when she bumps her hand  - blurry  "vision b/l- last saw eye doctor 3/23 and has trifocal glasses. She has dry eyes and uses lubricating eye drops., 10/23 MRI brain as above. eye doctor visit 4/3/24- got new glasses rx, 5/23/24 sent message regarding increased blurred vision- remain constant despite time of day, near vision, far vision, or the use of her corrective lenses; 5/24/24 MRI brain negative for intraparenchymal mets, osseous mets stable  - macrocytic anemia- 5/24 hgb 11, , RDW high     RESOLVED:  - constipation- probiotics, previously using senna BID and colace TID but after stopping oxycodone, stools are soft , no diarrhea  - chest pressure w/dyspnea: 2/24 three 10 min episodes, at rest, spontaneously resolved; 2/28/24 stress echo WNL, no recurrence of sx  - nausea- minimal before RT, then frequent with RT, 2/24 in AM- mild, uses anti-emetics about every other day, zofran helps immediately, doesn't eat breakfast but trying to have protein shakes  - leg cramps- Mg   - elevated LFTs- improved 11/27/23 to <3x ULN (AST 55, ALT 90, alk phos 128)  11/27/24 started ribociclib 400mg, 11/23 MRI liver negative for mets, ?cause- possibly viral infection between 10/17/23 and 10/26/23; 12/22/23 LFTs normal, cycle 2 started ribociclib 600mg; 1/24 LFTs WNL  - elevated Cr- increased water to 60oz/day, 1/24 Cr WNL  - daily HA- prior to starting tx- mostly b/l temporal regions, no sensitivity to light or sound, no N/V, takes tylenol w/o improvement; 10/23 MRI brain: extensive osseous mets w/dominant calvarial lesions w/enhancement deep to dominant right temporal calvarial lesion along dural margin (?vascular etiology), no parenchymal or leptomeningeal enhancement. Resolved  - back pain- left lower back, sharp, radiates to left buttock and leg making it hard to walk, worse w/walking, improved w/heating pad and ibuprofen 800mg BID, flexeril 5mg qHS, oxycodone 5mg q6hr prn #30 tabs rx 12/4/23 with stool softner- pt is using at bedtime due feeling \"loopy\" " during day- this combination allows pt to be functional (10/10 previously, now 6/10)->12/23 half oxycodone (2.5mg) q6 hrs during day and 5mg at bedtime and ibuprofen 800mg BID AM and afternoon- without significant change (still 6/10), 11/30/23 MRI lumbar spine with diffuse osseous metastatic disease.  Possible pathologic compression deformity at L1. neurosurgery consult 12/28/23 w/Dr. Perez- SI joint dysfunction- recommend mobic and PT, compression fracture is chronic. Rad onc consult 1/5/24 w/Dr. Mandujano- 1/9/24- 1/22/24 3000cGy in 10 fractions, palliative RT to sacrum (ribociclib held 1/5/24, plaquenil continued); LBP improved, while on RT was off oxycodone for 5 days but then restarted 2/2 left LE pain initially had sciatica but 2/24 stopped all oxycodone           - 5/11/24 PET CT CAP:  Redemonstrated innumerable FDG avid osseous lesions, some of which are increasing in metabolic activity and suggest mild progression of disease including representative examples involving the inferior sternum (Max SUV 16.5, previously 7.9) and left distal femur (Max SUV 20.8, previously 11.3).    - 5/24/24 MRI brain w/wo contrast:  IMPRESSION:   1. Presumed osseous metastases involving the occipital condyles  bilaterally, C1, C2 and C3 vertebrae again noted.  2. Questionable osseous metastatic disease involving the parietal  bones bilaterally again noted without change.  3. Diffuse cerebral volume loss and cerebral white matter changes  consistent with chronic small vessel ischemic disease. No evidence for  intracranial metastases.  4. No evidence for acute intracranial pathology.      REVIEW OF SYSTEMS:   A 14 point ROS was reviewed with pertinent positives and negatives in the HPI.        HOME MEDICATIONS:  Current Outpatient Medications   Medication Sig Dispense Refill     apixaban ANTICOAGULANT (ELIQUIS) 5 MG tablet Take 1 tablet (5 mg) by mouth 2 times daily 60 tablet 11     betamethasone dipropionate (DIPROSONE) 0.05 %  external lotion Apply topically 2 times daily 60 mL 3     cyclobenzaprine (FLEXERIL) 5 MG tablet TAKE 1 TABLET(5 MG) BY MOUTH AT BEDTIME 90 tablet 3     docusate sodium (COLACE) 100 MG capsule Take 1 capsule (100 mg) by mouth 3 times daily as needed for constipation 90 capsule 3     fish oil-omega-3 fatty acids 1000 MG capsule Take 2 g by mouth daily       hydroxychloroquine (PLAQUENIL) 200 MG tablet TAKE 2 AND 1/2 TABLETS BY MOUTH EVERY  tablet 3     letrozole (FEMARA) 2.5 MG tablet Take 1 tablet (2.5 mg) by mouth every morning 90 tablet 3     loperamide (IMODIUM A-D) 2 MG tablet When diarrhea starts take 2 tabs (4mg), then with each additional episode of diarrhea take 1 additional tab and if needing more than 8 tabs in 24 hrs call oncology 30 tablet 3     magnesium 250 MG tablet Take 1 tablet by mouth daily       ondansetron (ZOFRAN) 4 MG tablet Take 1 tablet (4 mg) by mouth every 6 hours as needed for nausea 30 tablet 3     prochlorperazine (COMPAZINE) 10 MG tablet Take 1 tablet (10 mg) by mouth every 6 hours as needed for nausea or vomiting 30 tablet 2     ribociclib (KISQALI) (600 MG DOSE) 200 MG tablet therapy pack Take 3 tablets (600 mg) by mouth every morning for 21 days , then off for 7 days. 63 tablet 0     venlafaxine (EFFEXOR XR) 75 MG 24 hr capsule Take 1 capsule (75 mg) by mouth daily 30 capsule 11         ALLERGIES:  Allergies   Allergen Reactions     Ciprofloxacin      hives and was on flagyl too     Metronidazole      hives and was on cipro too         PAST MEDICAL HISTORY:  Past Medical History:   Diagnosis Date     Arthritis 2006     Breast cancer metastasized to bone (H) 10/12/2023     Chronic depressive personality disorder      Dysplasia of cervix (uteri) 1988    Cryotherapy     Female infertility of unspecified origin      Glaucoma      Rheumatoid arthritis (H)          PAST SURGICAL HISTORY:  Past Surgical History:   Procedure Laterality Date     COLONOSCOPY       COLONOSCOPY N/A  2022    Procedure: COLONOSCOPY, WITH POLYPECTOMY AND BIOPSY;  Surgeon: Gagandeep Patterson MD;  Location: [facility]     HC INTRODUCE CATH FALLOPIAN TUBE, RE-OPEN/DIAGNOSIS       HERNIA REPAIR, INCISIONAL  2009     JOINT REPLACEMENT Right 2017    knee     TONSILLECTOMY       ZZC APPENDECTOMY  2003     ZZC REMV CATARACT INTRACAP,INSERT LENS  2003    right         SOCIAL HISTORY:  Social History     Socioeconomic History     Marital status:      Spouse name: Bandar     Number of children: 1     Years of education: Not on file     Highest education level: Not on file   Occupational History     Not on file   Tobacco Use     Smoking status: Former     Current packs/day: 0.00     Average packs/day: 0.5 packs/day for 25.0 years (12.5 ttl pk-yrs)     Types: Cigarettes     Start date: 1992     Quit date: 2017     Years since quittin.7     Smokeless tobacco: Never   Vaping Use     Vaping status: Never Used   Substance and Sexual Activity     Alcohol use: Not Currently     Drug use: Yes     Types: Marijuana     Sexual activity: Yes     Partners: Male     Birth control/protection: None   Other Topics Concern     Parent/sibling w/ CABG, MI or angioplasty before 65F 55M? Yes   Social History Narrative     Not on file     Social Determinants of Health     Financial Resource Strain: Not on file   Food Insecurity: Not on file   Transportation Needs: Not on file   Physical Activity: Not on file   Stress: Not on file   Social Connections: Not on file   Interpersonal Safety: Not on file   Housing Stability: Not on file         FAMILY HISTORY:  Family History   Problem Relation Age of Onset     Heart Disease Mother      Lipids Mother      Hyperlipidemia Mother      Genitourinary Problems Father         prostate     Genetic Disorder Father         ulcer     Hypertension Father      Lipids Father      Prostate Cancer Father      Hyperlipidemia Sister      Hyperlipidemia Brother      Other Cancer  "Brother         Neck Cancer HPV (+)     Heart Disease Maternal Grandmother      Cerebrovascular Disease Maternal Grandmother      Heart Disease Maternal Grandfather      Heart Disease Maternal Uncle      Heart Disease Maternal Uncle         x  3     Cancer Nephew         Sister's Son       Family history of:  1.  Breast cancer including male breast cancer: negative   2. Ovarian cancer: negative  3.  Pancreatic cancer: negative  4.  Prostate cancer: father- ?in his 50s, other details unknown  5. Diffuse gastric cancer (if lobular breast CA): negative  6. Uterine cancer: negative  7. Colon cancer:  negative  6. Brother- head and neck, HPV +, had surgery, clinical trial and now cancer free      PHYSICAL EXAM:  Vital signs:  /79 (BP Location: Left arm, Patient Position: Chair, Cuff Size: Adult Large)   Pulse 67   Temp 98.6  F (37  C) (Oral)   Ht 1.676 m (5' 6\")   Wt 95.3 kg (210 lb)   LMP 11/22/2011 (Exact Date)   SpO2 98%   BMI 33.89 kg/m       ECOG:   GENERAL/CONSTITUTIONAL: No acute distress.  EYES: Pupils are equal and round. Extraocular movements intact without nystagmus.  No scleral icterus.  RESPIRATORY: Equal chest rise.   MUSCULOSKELETAL: Warm and well-perfused, no cyanosis, clubbing, or edema.   NEUROLOGIC: Cranial nerves are grossly intact. Alert, oriented to person, place and time, answers questions appropriately.  INTEGUMENTARY: No rashes or jaundice.  GAIT: Steady, does not use assistive device    LABS:   Latest Reference Range & Units 05/30/24 13:19   Sodium 135 - 145 mmol/L 142   Potassium 3.4 - 5.3 mmol/L 4.2   Chloride 98 - 107 mmol/L 104   Carbon Dioxide (CO2) 22 - 29 mmol/L 28   Urea Nitrogen 8.0 - 23.0 mg/dL 14.0   Creatinine 0.51 - 0.95 mg/dL 0.98 (H)   GFR Estimate >60 mL/min/1.73m2 65   Calcium 8.8 - 10.2 mg/dL 9.1   Anion Gap 7 - 15 mmol/L 10   Magnesium 1.7 - 2.3 mg/dL 2.0   Phosphorus 2.5 - 4.5 mg/dL 4.0   Albumin 3.5 - 5.2 g/dL 4.1   Protein Total 6.4 - 8.3 g/dL 6.7   Alkaline " Phosphatase 40 - 150 U/L 63   ALT 0 - 50 U/L 22   AST 0 - 45 U/L 29   Bilirubin Total <=1.2 mg/dL 0.2   Ferritin 11 - 328 ng/mL 355 (H)   Glucose 70 - 99 mg/dL 145 (H)   Iron 37 - 145 ug/dL 56   Iron Binding Capacity 240 - 430 ug/dL 307   Iron Sat Index 15 - 46 % 18   TSH 0.30 - 4.20 uIU/mL 1.19   WBC 4.0 - 11.0 10e3/uL 2.6 (L)   Hemoglobin 11.7 - 15.7 g/dL 11.2 (L)   Hematocrit 35.0 - 47.0 % 33.3 (L)   Platelet Count 150 - 450 10e3/uL 161   RBC Count 3.80 - 5.20 10e6/uL 3.05 (L)   MCV 78 - 100 fL 109 (H)   MCH 26.5 - 33.0 pg 36.7 (H)   MCHC 31.5 - 36.5 g/dL 33.6   RDW 10.0 - 15.0 % 15.9 (H)   % Neutrophils % 50   % Lymphocytes % 30   % Monocytes % 16   % Eosinophils % 1   % Basophils % 2   Absolute Basophils 0.0 - 0.2 10e3/uL 0.1   Absolute Eosinophils 0.0 - 0.7 10e3/uL 0.0   Absolute Immature Granulocytes <=0.4 10e3/uL 0.0   Absolute Lymphocytes 0.8 - 5.3 10e3/uL 0.8   Absolute Monocytes 0.0 - 1.3 10e3/uL 0.4   % Immature Granulocytes % 0   Absolute Neutrophils 1.6 - 8.3 10e3/uL 1.3 (L)   Absolute NRBCs 10e3/uL 0.0   NRBCs per 100 WBC <1 /100 0   % Retic % 2.3   Absolute Retic 10e6/uL 0.071   (H): Data is abnormally high  (L): Data is abnormally low    PATHOLOGY:    IMAGING:  EXAM: PET ONCOLOGY (EYES TO THIGHS), CT CHEST/ABDOMEN/PELVIS W CONTRAST  LOCATION[facility]  DATE: 5/11/2024     INDICATION: Restaging for malignant neoplasm overlapping sites of right female breast. Currently receiving chemotherapy. Modest response  COMPARISON: FDG PET/CT dated 2/17/2024  CONTRAST: 131 mL [id number]  TECHNIQUE: Serum glucose level 98 mg/dL. One hour post intravenous administration of 14.1 mCi F-18 FDG, PET imaging was performed from the skull vertex to mid thighs, utilizing attenuation correction with concurrent axial CT and PET/CT image fusion.   Separate diagnostic CT of the chest, abdomen, and pelvis was performed. Dose reduction techniques were used.     PET/CT FINDINGS: Redemonstrated  innumerable FDG avid osseous lesions, some of which are increasing in metabolic activity and suggest mild progression of disease including representative examples involving the inferior sternum (Max SUV 16.5, previously   7.9) and left distal femur (Max SUV 20.8, previously 11.3).     CT FINDINGS: Mild senescent intracranial changes. Postoperative changes of bilateral lenses. Mild paranasal sinus with also thickening. The aerodigestive tract and neck soft tissues are unremarkable. Mild coronary artery calcium. Anterior abdominal wall   hernia repair. Pelvic phleboliths. Multilevel degenerative changes of the spine.r                                                                      IMPRESSION:     Redemonstrated innumerable FDG avid osseous lesions, some of which are increasing in metabolic activity and suggest mild progression of disease including representative examples involving the inferior sternum and left distal femur.  Narrative & Impression   MRI OF THE BRAIN WITHOUT AND WITH CONTRAST 5/24/2024 10:22 AM      COMPARISON: Brain MRI 10/23/2023.     HISTORY:  New blurred vision, metastatic breast cancer. Carcinoma of  left breast metastatic to bone (H). Blurred vision.     TECHNIQUE: Axial diffusion-weighted with ADC map, axial T2-weighted  with fat saturation, axial T1-weighted, axial turboFLAIR and coronal  T1-weighted images of the brain were acquired without intravenous  contrast.  Following intravenous administration of gadolinium (9.5 mL  Gadavist), axial T1-weighted images of the brain were acquired.      FINDINGS: There is mild diffuse cerebral volume loss. There are few  tiny scattered focal areas of abnormal T2 signal hyperintensity in the  cerebral white matter bilaterally that are consistent with sequela of  chronic small vessel ischemic disease.     The ventricles and basal cisterns are within normal limits in  configuration given the degree of cerebral volume loss.  There is no  midline shift.   "There are no extra-axial fluid collections.  There is  no evidence for stroke or acute intracranial hemorrhage.  There is no  abnormal contrast enhancement in the brain or its coverings.      Foci of abnormal signal hypointensity in the C1, C2 and C3 vertebrae  and occipital condyles bilaterally again noted consistent with osseous  metastatic disease. Patchy contrast enhancement in the parietal bones  bilaterally (right greater than left) again noted without change  possibly representing calvarial metastases.     There is no sinusitis or mastoiditis.                                                                      IMPRESSION:   1. Presumed osseous metastases involving the occipital condyles  bilaterally, C1, C2 and C3 vertebrae again noted.  2. Questionable osseous metastatic disease involving the parietal  bones bilaterally again noted without change.  3. Diffuse cerebral volume loss and cerebral white matter changes  consistent with chronic small vessel ischemic disease. No evidence for  intracranial metastases.  4. No evidence for acute intracranial pathology.     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 Pt does not have visceral crisis, she mainly has extensive bone metastases and LN metastases   -9/23 diagnostic left breast mammogram, left breast targeted ultrasound showed 3.0 x 1.7 x 3.9 ill-defined shadowing hypoechoic mass, few small lymph nodes in the left axilla, one with cortical thickening measuring 0.7 x 0.6 cm  -9/23 ultrasound-guided LEFT breast core biopsy of 12:00 lesion with clip placement, \"Postbiopsy unilateral digital mammogram of the left breast showed the clip to be at the expected biopsy site\" AND " ultrasound-guided left axillary lymph node biopsy of lymph node with cortical thickening, PATHOLOGY:  A.  Breast, left, 12:00, biopsy:Lobular carcinoma in situ, Multiple foci. Invasive carcinoma is not identified.   B.  Lymph node, left axillary, biopsy:  -Positive for metastatic lobular carcinoma, grade 2, 0.7 cm, negative GREGG, Estrogen receptor: Positive (91 to 100%, strong), Progesterone receptor: Positive (91 to 100%, strong), HER2 2+ IHC, FISH negative  -10/23 MRI bilateral breast:  - Heterogeneously enhancing irregular mass in the subareolar left breast, 5.0 x 4.9 x 4.9 cm, with associated nipple retraction and nipple/areolar involvement.  This mass extends superiorly through 11: positions, from anterior to mid depth.  The HydroMARK breast biopsy clip (which showed LCIS) is 1.5 cm posterosuperior to this mass.  - Multiple suspicious left level 1 axillary lymph node noted, including biopsy-proven metastatic node with indwelling biopsy marker, biopsied node measures 1.3 x 1.0 cm  - Heterogeneous marrow appearance of sternum and ribs with heterogeneous enhancement and a peripheral enhancing focus within the mid body.    - 10/23 PET/CT:  Innumerable foci of FDG avid lesions are seen throughout the osseous structures of the head and neck, most pronounced on the calvarium and cervical spine, with prominently sclerotic appearance on CT correlate.  For example:  -Right frontal bone, SUV max 5.31.  -Left lateral mass of the atlas, SUV max 15.0  -Angle of the left mandible, SUV max 9.6  -C7 vertebral body, SUV max 17.7    Innumerable variable sized FDG avid lesions throughout the axial and appendicular spine, including but not limited to the cervical, thoracic, and lumbar spine, multilevel bilateral ribs, sternum, bilateral scapula, bilateral humeri, clavicles, pelvis, and bilateral femurs. A few of these lesions are described below:  -Large FDG avid sclerotic 3.4 cm lesion within the proximal left humeral head,  SUV max 17.24  -Sternal body, SUV max 20.35  -L2 vertebral body, SUV max 14.3 without  -Large ill-defined sclerotic lesion in the sacral body measuring up to at least 5.9 cm, SUV max 16.84  -FDG avid focus within the left femoral head, SUV max 13.65 without CT correlate.    Large irregular soft tissue FDG avid mass within the left breast measuring approximately 3.2 x 2.7 cm with  extension into the superficial soft tissues and associated left nipple retraction, SUV max 12.36 additional FDG avid. Left axillary lymph nodes, not definitively enlarged by short axis size criteria, for example, SUV max 5.93.    - 10/23 MRI brain:  - extensive osseous metastatic disease involving the calvarium, skull base w/involvement of occipital condyles, C1 and C2 vertebral bodies, and likely the mandible  -  Dominant calvarial lesions are located in the right frontal calvarium and right temporal calvarium without definite breach of the inner or outer tables of the calvarium. There is a suggestion of mild asymmetric linear extra-axial enhancement deep to the dominant right temporal calvarial lesion along the dural margin, though this may be vascular in etiology.  - No abnormal parenchymal or leptomeningeal enhancement.   - sequelae of mild chronic microvascular ischemic disease. Mild generalized cerebral atrophy.     -10/23 DEXA: osteopenia (T score -2.0 lumbar spine, -2.0 left hip femoral neck, The 10 year probability of major osteoporotic fracture is 12.5%, and of hip fracture is 1.8%, based on left femoral neck BMD)    - 11/23 orthopedic consult to determine stability of left femur and fracture risk given presence of mets in left femoral head: do not see any areas of impending cortical erosion or sites of high risk for fracture, observe      REGIMEN:  Ribociclib+letrozole  C1D1 11/27/23 ribociclib 400mg, C2D1 12/25/23 ribociclib 600mg (day 1-12 only 2/2 palliative RT to sacrum w/Dr. Mandujano 1/9/24- 1/22/24 3000cGy in 10 fractions),  C3D1 1/30/24, C4D1 3/6/24 (deferred 1 week 2/2 2/27/24 ANC 0.8->3/5/24 ANC 1.5), C5D1 4/3/24, C6D1 5/1/24  Ribociclib 600mg PO daily day 1-21 q28 days (11/27/23 started 400mg PO daily when LFTs improved to <3x ULN)  Letrozole 2.5mg PO daily (started 10/30/23)  Febrile neutropenia risk: neutropenia (69% to 78%; grade 3: 46% to 55%; grade 4: 7% to 10%), Febrile neutropenia (1%)    C7D1 5/30/24 planned pending labs  Pt w/progression of disease in bones on last PET  - Genetic counseling consult pending, # provided to pt previously, pt to call tomorrow. I have ordered labs for pre-auth for breast actionable panel to be done STAT for germline BRCA1/2  - check to following on previously obtained tissue to see if pt is candidate for targeted tx:  - BRAF, PIK3CA, AKT1, PTEN, ESR1 NGS  - NTRK, RET fusion  - MSI   - PDL1 w/TMB   - if pt is not a candidate for targeted tx, would switch to everolimus 10mg PO daily + exemestane 25mg PO daily  - will update pt and pharmacy team once the above results are back, for now, continue with current tx      Treatment related AE:  - neutropenia- ANC 1.3- ok to continue ribociclib at current dose, if needed- will dose reduce in future, neutropenic fever precautions discussed  - thrombocytopenia- intermittent and mild, monitor for bleeding and bruising while on effexor and eliquis  - macrocytic anemia- hgb 11, check B12, TSH, absolute retic, ferritin, iron studies; RBC folate next labs  - anxiety, mood changes- psychology referral   - hot flashes- continue effexor 75mg daily, refilled today  - DVT- continue eliquis, refilled today   - blurred vision- stable, no alarms sx, eye doctor visit 4/3/24; 5/24 MRI brain negative for mets; check vitamin A, B12, TSH, at next visit check RBC folate   - hypocalcemia- mild, continue BID calcium and daily vitamin D         IMAGING:  - next PET due 3 months after starting new tx- to be ordered later    - 5/24 EKG pending    - 5/11/24 PET CT  CAP:  Redemonstrated innumerable FDG avid osseous lesions, some of which are increasing in metabolic activity and suggest mild progression of disease including representative examples involving the inferior sternum (Max SUV 16.5, previously 7.9) and left distal femur (Max SUV 20.8, previously 11.3).    - 5/24/24 MRI brain w/wo contrast:  IMPRESSION:   1. Presumed osseous metastases involving the occipital condyles bilaterally, C1, C2 and C3 vertebrae again noted.  2. Questionable osseous metastatic disease involving the parietal bones bilaterally again noted without change.  3. Diffuse cerebral volume loss and cerebral white matter changes consistent with chronic small vessel ischemic disease. No evidence for intracranial metastases.  4. No evidence for acute intracranial pathology.    - 2/17/24 PET CT CAP:  Most of the extensive skeletal metastases have become sclerotic and non-FDG avid and those which remain avid have decreased in activity, for example in the proximal right humerus from SUVmax 19.3 to 13.9, in the proximal right femur from 16.5 to 12.2, and in the L2 vertebral body from 14.3 to 10.7.    TUMOR MARKERS:  10/23 CA 15-3= 261  12/23 CA 15-3= 553  1/24 CA 15-3= 480  2/24 CA 15-3 338  Repeat CA 15-3 pending 5/24    OTHER:  - continue zometa q4 weeks - zometa #1 1/4/24, last zometa 5/1/24, next due 5/30/24; if disease stable on next scan, will transition to 4mg q12 weeks   - continue calcium and vitamin D    # osteopenia  - 10/23 DEXA: osteopenia, T score - 2.0 left hip femoral neck and lumbar spine   - repeat DEXA 10/2025  - continue continue calcium and vitamin D  - zometa as above    # left posterior tibial DVT  -1/19/24 sx started- Left foot pain and swelling  -1/22/24 pt called in, 1/22/24 left LE doppler: DVT in 1 of 2 left distal posterior tibial veins  - continue eliquis, refilled today      # RA  - on plaquenil       RTC 6/26 for follow up with me, labs, EKG prior to visit- virtual    Mary  Taiwo DO  Hematology/Oncology  [facility]      Again, thank you for allowing me to participate in the care of your patient.        Sincerely,        MILLER ALTAMIRANO, DO

## 2024-05-30 NOTE — NURSING NOTE
"Oncology Rooming Note    May 30, 2024 2:43 PM   Kesha Zacarias is a 63 year old female who presents for:    Chief Complaint   Patient presents with    Oncology Clinic Visit     Follow up     Initial Vitals: LMP 11/22/2011 (Exact Date)  Estimated body mass index is 34.46 kg/m  as calculated from the following:    Height as of 3/28/24: 1.676 m (5' 6\").    Weight as of 5/1/24: 96.8 kg (213 lb 8 oz). There is no height or weight on file to calculate BSA.  Data Unavailable Comment: Data Unavailable   Patient's last menstrual period was 11/22/2011 (exact date).  Allergies reviewed: Yes  Medications reviewed: Yes    Medications: Medication refills not needed today.  Pharmacy name entered into [facility]:    [facility] - [address] [id number] - [place], [place] - [address]  [facility] - [address]    Frailty Screening:   Is the patient here for a new oncology consult visit in cancer care? 2. No      Clinical concerns: NO       Betty Rosales CMA              "

## 2024-05-30 NOTE — PROGRESS NOTES
Infusion Nursing Note:  Kesha Zacarias presents today for Zometa.    Patient seen by provider today: Yes: Dr. Maravilla   present during visit today: Not Applicable.    Note: Patient was assessed by Dr. Maravilla today.    Patient denies dental issues at this time. States last dental exam was February or March.  Patient will inform dentist that she got Zometa infusion, prior to any invasive dental work in the future.    Reminded patient to drink 6-8 glasses of water today, and tomorrow, to protect kidneys.     Intravenous Access:  Peripheral IV placed. Labs drawn without difficulty.    Treatment Conditions:  Lab Results   Component Value Date    HGB 11.2 (L) 05/30/2024    WBC 2.6 (L) 05/30/2024    ANEU 3.7 11/21/2005    ANEUTAUTO 1.3 (L) 05/30/2024     05/30/2024        Lab Results   Component Value Date     05/30/2024    POTASSIUM 4.2 05/30/2024    MAG 2.0 05/30/2024    CR 0.98 (H) 05/30/2024    NITISH 9.1 05/30/2024    BILITOTAL 0.2 05/30/2024    ALBUMIN 4.1 05/30/2024    ALT 22 05/30/2024    AST 29 05/30/2024   Patient confirms taking vitamin D and calcium as prescribed.     Results reviewed, labs MET treatment parameters, ok to proceed with treatment.      Post Infusion Assessment:  Patient tolerated infusion without incident.  Blood return noted pre and post infusion.  Site patent and intact, free from redness, edema or discomfort.  No evidence of extravasations.  Access discontinued per protocol.       Discharge Plan:   Patient and/or family verbalized understanding of discharge instructions and all questions answered.  AVS to patient via [employer].  Patient will return 6/27 for next appointment. Future appts have been reviewed and crosschecked with appt note and plan.  Advised patient to schedule more appts.  Patient discharged in stable condition accompanied by: self.  Departure Mode: Ambulatory.      Olga Lidia Howard RN

## 2024-05-31 ENCOUNTER — TRANSCRIBE ORDERS (OUTPATIENT)
Dept: ONCOLOGY | Facility: CLINIC | Age: 64
End: 2024-05-31
Payer: COMMERCIAL

## 2024-05-31 ENCOUNTER — PATIENT OUTREACH (OUTPATIENT)
Dept: ONCOLOGY | Facility: CLINIC | Age: 64
End: 2024-05-31

## 2024-05-31 DIAGNOSIS — C50.919 BREAST CANCER (H): Primary | ICD-10-CM

## 2024-05-31 LAB
CANCER AG15-3 SERPL-ACNC: 179 U/ML
VIT B12 SERPL-MCNC: 298 PG/ML (ref 232–1245)

## 2024-05-31 PROCEDURE — 81301 MICROSATELLITE INSTABILITY: CPT | Performed by: INTERNAL MEDICINE

## 2024-05-31 PROCEDURE — 81456 SO/HL 51/>GSAP RNA ALYS: CPT | Performed by: INTERNAL MEDICINE

## 2024-05-31 NOTE — PROGRESS NOTES
New Patient Oncology Nurse Navigator Note     Referring provider: Dr. SAI Maravilla     Referring Clinic/Organization: [facility]      Referred to (specialty):  Genetic Counseling    Date Referral Received: 5/31/2024     Evaluation for: Pt with metastatic breast cancer

## 2024-06-03 LAB
ANNOTATION COMMENT IMP: NORMAL
RETINYL PALMITATE SERPL-MCNC: 0.04 MG/L
VIT A SERPL-MCNC: 0.7 MG/L

## 2024-06-04 LAB — INTERPRETATION: NORMAL

## 2024-06-05 ENCOUNTER — LAB (OUTPATIENT)
Dept: LAB | Facility: CLINIC | Age: 64
End: 2024-06-05
Payer: COMMERCIAL

## 2024-06-05 DIAGNOSIS — C79.51 CARCINOMA OF LEFT BREAST METASTATIC TO BONE (H): Primary | ICD-10-CM

## 2024-06-05 DIAGNOSIS — C50.912 CARCINOMA OF LEFT BREAST METASTATIC TO BONE (H): Primary | ICD-10-CM

## 2024-06-05 PROCEDURE — 81162 BRCA1&2 GEN FULL SEQ DUP/DEL: CPT | Performed by: INTERNAL MEDICINE

## 2024-06-05 PROCEDURE — G0452 MOLECULAR PATHOLOGY INTERPR: HCPCS | Mod: 26 | Performed by: PATHOLOGY

## 2024-06-10 ENCOUNTER — TELEPHONE (OUTPATIENT)
Dept: ONCOLOGY | Facility: CLINIC | Age: 64
End: 2024-06-10
Payer: COMMERCIAL

## 2024-06-10 DIAGNOSIS — C79.51 CARCINOMA OF LEFT BREAST METASTATIC TO BONE (H): Primary | ICD-10-CM

## 2024-06-10 DIAGNOSIS — C50.912 CARCINOMA OF LEFT BREAST METASTATIC TO BONE (H): ICD-10-CM

## 2024-06-10 DIAGNOSIS — C79.51 CARCINOMA OF LEFT BREAST METASTATIC TO BONE (H): ICD-10-CM

## 2024-06-10 DIAGNOSIS — C50.912 CARCINOMA OF LEFT BREAST METASTATIC TO BONE (H): Primary | ICD-10-CM

## 2024-06-10 LAB
PATH REPORT.ADDENDUM SPEC: ABNORMAL
PATH REPORT.ADDENDUM SPEC: ABNORMAL
PATH REPORT.COMMENTS IMP SPEC: ABNORMAL
PATH REPORT.COMMENTS IMP SPEC: YES
PATH REPORT.FINAL DX SPEC: ABNORMAL
PATH REPORT.GROSS SPEC: ABNORMAL
PATH REPORT.MICROSCOPIC SPEC OTHER STN: ABNORMAL
PATH REPORT.RELEVANT HX SPEC: ABNORMAL
PATHOLOGY SYNOPTIC REPORT: ABNORMAL
PHOTO IMAGE: ABNORMAL

## 2024-06-10 NOTE — TELEPHONE ENCOUNTER
Prior Authorization Not Needed per Insurance    Medication: EXEMESTANE 25 MG PO TABS  Insurance Company: [employer] - Phone [phone] Fax [phone]  Expected CoPay: $ 0  Pharmacy Filling the Rx: [facility] - [place], [place] - [facility] [address]   Pharmacy Notified: Yes  Patient Notified: N/A - Pharmacy requested not to notify patient at this time.    Thank you,  Delmis Zuniga Oncology/Transplant Liaison  Phone: [phone]  Fax: [phone]

## 2024-06-10 NOTE — TELEPHONE ENCOUNTER
Prior Authorization Approval    Medication: EVEROLIMUS 10 MG PO TABS  Authorization Effective Date: 5/10/2024  Authorization Expiration Date: 12/10/2024  Approved Dose/Quantity: 28/28  Reference #: [id number]   Insurance Company: [employer] - Phone [phone] Fax [phone]  Expected CoPay: $ 0  CoPay Card Available: No    Financial Assistance Needed: N/A  Which Pharmacy is filling the prescription: [facility] - [place] [address]   Pharmacy Notified: Yes  Patient Notified: N/A - Pharmacy requested not to call patient yet.    Thank you,  Delmis Zuniga Oncology/Transplant Liaison  Phone: [phone]  Fax: [phone]

## 2024-06-10 NOTE — CONSULTS
Outpatient IR Biopsy Referral    Patient is a 64 y/o female with a PMH of rheumatoid arthritis on plaquenil, hyperlipidemia, osteoarthritis, bilateral cataracts and glaucoma s/p surgery, endocervical polyp s/p resection, vaginal atrophy with conjugated estrogen vaginal cream use, colon polyp (hyperplastic polyp and tubular adenoma), seasonal depression, history of tobacco use (17-58 y/o, about 1 ppd). De tavon metastatic left breast cancer, progression of disease, need additional tissue for targeted testing (previous tissue exhausted). Ongoing chemotherapy, left posterior tibial DVT on eliquis 1/24, osteopenia, new imaging notes progression. IR has been asked to biopsy bone most easily accessible lesion for forward treatment.     PET CT 5/11/24 IMPRESSION:   Redemonstrated innumerable FDG avid osseous lesions, some of which are increasing in metabolic activity and suggest mild progression of disease including representative examples involving the inferior sternum and left distal femur.       Current Outpatient Medications:     apixaban ANTICOAGULANT (ELIQUIS) 5 MG tablet, Take 1 tablet (5 mg) by mouth 2 times daily, Disp: 60 tablet, Rfl: 11    betamethasone dipropionate (DIPROSONE) 0.05 % external lotion, Apply topically 2 times daily, Disp: 60 mL, Rfl: 3    cyclobenzaprine (FLEXERIL) 5 MG tablet, TAKE 1 TABLET(5 MG) BY MOUTH AT BEDTIME, Disp: 90 tablet, Rfl: 3    docusate sodium (COLACE) 100 MG capsule, Take 1 capsule (100 mg) by mouth 3 times daily as needed for constipation, Disp: 90 capsule, Rfl: 3    fish oil-omega-3 fatty acids 1000 MG capsule, Take 2 g by mouth daily, Disp: , Rfl:     hydroxychloroquine (PLAQUENIL) 200 MG tablet, TAKE 2 AND 1/2 TABLETS BY MOUTH EVERY DAY, Disp: 225 tablet, Rfl: 3    letrozole (FEMARA) 2.5 MG tablet, Take 1 tablet (2.5 mg) by mouth every morning, Disp: 90 tablet, Rfl: 3    loperamide (IMODIUM A-D) 2 MG tablet, When diarrhea starts take 2 tabs (4mg), then with each  additional episode of diarrhea take 1 additional tab and if needing more than 8 tabs in 24 hrs call oncology, Disp: 30 tablet, Rfl: 3    magnesium 250 MG tablet, Take 1 tablet by mouth daily, Disp: , Rfl:     ondansetron (ZOFRAN) 4 MG tablet, Take 1 tablet (4 mg) by mouth every 6 hours as needed for nausea, Disp: 30 tablet, Rfl: 3    prochlorperazine (COMPAZINE) 10 MG tablet, Take 1 tablet (10 mg) by mouth every 6 hours as needed for nausea or vomiting, Disp: 30 tablet, Rfl: 2    ribociclib (KISQALI) (600 MG DOSE) 200 MG tablet therapy pack, Take 3 tablets (600 mg) by mouth every morning for 21 days , then off for 7 days., Disp: 63 tablet, Rfl: 0    venlafaxine (EFFEXOR XR) 75 MG 24 hr capsule, Take 1 capsule (75 mg) by mouth daily, Disp: 30 capsule, Rfl: 11     Case and imaging was reviewed with Dr. Brice from IR who recommends [facility] overread the PET CT 5/11/24 for more accurate details and comparisons to PET CT 2/17/24 for best practice biopsy planning. IR asks referring team to enter order to expedite IR review.     PENDING  overread     Procedure order, surgical pathology and leukemia lymphoma orders     CrCl 69.3 ml min Eliquis should be held for 4 doses prior to scheduled procedure.     If requesting team would like samples sent for anything else please enter them prior to scheduled procedure.    Primary team Dr. Maravilla made aware of IR recommendations via epic messaging.    Lorna YING  Interventional Radiology   IR on-call pager: [phone]     ADDENDUM 6/21/24: 0352    Following PET over read, IR has reviewed and plan is to biopsy the left prox femur lesion lateral to the lesser trochanter S4, image 283 from CT on 5/11/24. This was approved by Dr. Jose M Brice. There is a high risk for false negative. We will notify referring team of this.     Order for CT guided bone biopsy of left prox femur lesion placed. Orders for surgical path and leukemia orders placed.     CrCl 69.3 ml min Eliquis  should be held for 4 doses prior to scheduled procedure. A message sent to IR nurses will also be completed.       PET [facility] OVERREAD 6/20/24:  Addendum: Potential biopsy sites:   Another lesion that has a slight increase in uptake is in the left  femur, lateral to the lesser trochanter  The hypermetabolic portion is not sclerotic, but it is in  the lateral aspect of the femur, fairly good size and the lesser  trochanter could serve as a landmark.     The left humeral head lesion may also be a potential biopsy site max  SUV 10.4 (not significantly changed from prior 11). The lesion is in  the lateral aspect of the sclerotic lesion when the patient's arms are  in the up position.     The C2 lesion may be more difficult to assess given its anterior  medial position (max suv 14).

## 2024-06-10 NOTE — CONFIDENTIAL NOTE
Medical Oncology Update      Per my previous note:  Pt w/progression of disease in bones on last PET  - Genetic counseling consult pending, # provided to pt previously, pt to call tomorrow. I have ordered labs for pre-auth for breast actionable panel to be done STAT for germline BRCA1/2-- genetic counseling labs completed and results pending  - check to following on previously obtained tissue to see if pt is candidate for targeted tx:  - BRAF, PIK3CA, AKT1, PTEN, ESR1 NGS  - NTRK, RET fusion  - MSI   - PDL1 w/TMB   - received a note from pathology that tissue has been exhausted and there is not enough sample to run the above tests    At this time:  will consult IR for biopsy for additional tissue and when obtained, check the above  Order everolimus 10mg PO daily + exemestane 25mg PO daily. Continue current tx plan until this is approved.  Pharmacy team updated  RNCC to update pt, I am happy to discuss further with her at my next visit or sooner if pt requests     Mary DICKSON Maravilla.  Hematology/Oncology  [facility]

## 2024-06-10 NOTE — TELEPHONE ENCOUNTER
PA Initiation    Medication: EVEROLIMUS 10 MG PO TABS  Insurance Company: [employer] - Phone [phone] Fax [phone]  Start Date: 6/10/2024        Thank you,  Delmis Zuniga Oncology/Transplant Liaison  Phone: [phone]  Fax: [phone]

## 2024-06-11 ENCOUNTER — DOCUMENTATION ONLY (OUTPATIENT)
Dept: ONCOLOGY | Facility: CLINIC | Age: 64
End: 2024-06-11
Payer: COMMERCIAL

## 2024-06-11 DIAGNOSIS — C79.51 CARCINOMA OF LEFT BREAST METASTATIC TO BONE (H): Primary | ICD-10-CM

## 2024-06-11 DIAGNOSIS — C50.912 CARCINOMA OF LEFT BREAST METASTATIC TO BONE (H): Primary | ICD-10-CM

## 2024-06-11 RX ORDER — ONDANSETRON 4 MG/1
4-8 TABLET, ORALLY DISINTEGRATING ORAL EVERY 8 HOURS PRN
Qty: 30 TABLET | Refills: 2 | Status: SHIPPED | OUTPATIENT
Start: 2024-06-24

## 2024-06-11 RX ORDER — ONDANSETRON 4 MG/1
4-8 TABLET, ORALLY DISINTEGRATING ORAL EVERY 6 HOURS PRN
Status: CANCELLED
Start: 2024-06-24

## 2024-06-11 RX ORDER — DEXAMETHASONE 0.5 MG/5ML
SOLUTION ORAL
Qty: 1200 ML | Refills: 1 | Status: SHIPPED | OUTPATIENT
Start: 2024-06-24

## 2024-06-11 RX ORDER — EXEMESTANE 25 MG/1
25 TABLET ORAL DAILY
Qty: 28 TABLET | Refills: 0 | Status: ON HOLD | OUTPATIENT
Start: 2024-06-14 | End: 2024-07-15

## 2024-06-11 RX ORDER — EVEROLIMUS 10 MG/1
10 TABLET ORAL DAILY
Qty: 28 TABLET | Refills: 0 | Status: ON HOLD | OUTPATIENT
Start: 2024-06-14 | End: 2024-07-15

## 2024-06-11 NOTE — PROGRESS NOTES
Oral Chemotherapy Monitoring Program    Primary Oncologist: Dr. Maravilla  Primary Oncology Clinic: [facility]  Cancer Diagnosis: Breast Cancer    Drug: everolimus (Afinitor) 10 mg daily + exemestane (Aromasin) 25 mg daily; 28-days cycle  Start Date: TBD  Expected duration of therapy: Until disease progression or unacceptable toxicity    Drug Interaction Assessment: There were no significant drug interactions identified upon review of medication list.    Lab Monitoring Plan  Monthly CBC/CMP labs per treatment plan    Subjective/Objective:  Kesha Zacarias is a 63 year old female contacted by phone for an initial visit for oral chemotherapy education.          12/4/2023    10:00 AM 3/26/2024     1:00 PM 4/23/2024     3:00 PM 5/20/2024     4:00 PM 6/11/2024     8:00 AM 6/11/2024    12:00 PM   ORAL CHEMOTHERAPY   Assessment Type Initial Follow up Refill Refill Refill Initial Work up New Teach   Diagnosis Code Breast Cancer Breast Cancer Breast Cancer Breast Cancer Breast Cancer Breast Cancer   Providers Dr. Taiwo Maravilla   Clinic Name/Location [facility]   Drug Name Kisqali (ribociclib) Kisqali (ribociclib) Kisqali (ribociclib) Kisqali (ribociclib) Afinitor (everolimus) Afinitor (everolimus)   Dose 400 mg 600 mg 600 mg 600 mg 10 mg 10 mg   Current Schedule Daily Daily Daily Daily Daily Daily   Cycle Details 3 weeks on, 1 week off 3 weeks on, 1 week off 3 weeks on, 1 week off 3 weeks on, 1 week off Continuous Continuous   Start Date of Last Cycle 11/27/2023        Planned next cycle start date 12/25/2023        Doses missed in last 2 weeks 0        Adherence Assessment Adherent            Vitals:  BP:   BP Readings from Last 1 Encounters:   05/30/24 118/79     Wt Readings from Last 1 Encounters:   05/30/24 95.3 kg (210 lb)     Estimated body surface area is 2.11 meters squared as calculated from the following:    Height as of 5/30/24: 1.676  "m (5' 6\").    Weight as of 5/30/24: 95.3 kg (210 lb).    Labs:  _  Result Component Current Result Ref Range   Sodium 142 (5/30/2024) 135 - 145 mmol/L     _  Result Component Current Result Ref Range   Potassium 4.2 (5/30/2024) 3.4 - 5.3 mmol/L     _  Result Component Current Result Ref Range   Calcium 9.1 (5/30/2024) 8.8 - 10.2 mg/dL     _  Result Component Current Result Ref Range   Magnesium 2.0 (5/30/2024) 1.7 - 2.3 mg/dL     _  Result Component Current Result Ref Range   Phosphorus 4.0 (5/30/2024) 2.5 - 4.5 mg/dL     _  Result Component Current Result Ref Range   Albumin 4.1 (5/30/2024) 3.5 - 5.2 g/dL     _  Result Component Current Result Ref Range   Urea Nitrogen 14.0 (5/30/2024) 8.0 - 23.0 mg/dL     _  Result Component Current Result Ref Range   Creatinine 0.98 (H) (5/30/2024) 0.51 - 0.95 mg/dL       _  Result Component Current Result Ref Range   AST 29 (5/30/2024) 0 - 45 U/L     _  Result Component Current Result Ref Range   ALT 22 (5/30/2024) 0 - 50 U/L     _  Result Component Current Result Ref Range   Bilirubin Total 0.2 (5/30/2024) <=1.2 mg/dL       _  Result Component Current Result Ref Range   WBC Count 2.6 (L) (5/30/2024) 4.0 - 11.0 10e3/uL     _  Result Component Current Result Ref Range   Hemoglobin 11.2 (L) (5/30/2024) 11.7 - 15.7 g/dL     _  Result Component Current Result Ref Range   Platelet Count 161 (5/30/2024) 150 - 450 10e3/uL     No results found for ANC within last 30 days.         Assessment:  Patient is appropriate to start therapy.    Plan:  Basic chemotherapy teaching was reviewed with the patient including indication, start date of therapy, dose, administration, adverse effects, missed doses, food and drug interactions, monitoring, side effect management, office contact information, and safe handling. Written materials were provided and all questions answered.    Follow-Up:  TBD - 1-week follow-up phone call to verify start date and assess tolerability  6/26/2024 - Labs and follow-up " appointment with Dr. Taiwo Pardo  Oncology Pharmacy Intern  [facility] [place]  [phone]

## 2024-06-19 DIAGNOSIS — C50.912 CARCINOMA OF LEFT BREAST METASTATIC TO BONE (H): Primary | ICD-10-CM

## 2024-06-19 DIAGNOSIS — C79.51 CARCINOMA OF LEFT BREAST METASTATIC TO BONE (H): Primary | ICD-10-CM

## 2024-06-20 ENCOUNTER — HOSPITAL ENCOUNTER (OUTPATIENT)
Dept: GENERAL RADIOLOGY | Facility: CLINIC | Age: 64
Discharge: HOME | End: 2024-06-20
Attending: INTERNAL MEDICINE
Payer: COMMERCIAL

## 2024-06-20 DIAGNOSIS — C79.51 CARCINOMA OF LEFT BREAST METASTATIC TO BONE (H): ICD-10-CM

## 2024-06-20 DIAGNOSIS — C50.912 CARCINOMA OF LEFT BREAST METASTATIC TO BONE (H): ICD-10-CM

## 2024-06-20 PROCEDURE — 999N000122 CT MHEALTH OVERREAD

## 2024-06-20 PROCEDURE — 999N000122 PET MHEALTH OVERREAD

## 2024-06-20 PROCEDURE — 78815 PET IMAGE W/CT SKULL-THIGH: CPT | Mod: 26 | Performed by: RADIOLOGY

## 2024-06-20 NOTE — CONFIDENTIAL NOTE
Medical Oncology Update    Case and imaging was reviewed with Dr. Brice from IR who recommends [facility] overread the PET CT 5/11/24 for more accurate details and comparisons to PET CT 2/17/24 for best practice biopsy planning.    I have placed order. IR notified.     Mary Maravilla D.O.  Hematology/Oncology  [facility]

## 2024-06-21 ENCOUNTER — TELEPHONE (OUTPATIENT)
Dept: ONCOLOGY | Facility: CLINIC | Age: 64
End: 2024-06-21
Payer: COMMERCIAL

## 2024-06-21 NOTE — TELEPHONE ENCOUNTER
Oral Chemotherapy Monitoring Program    Primary Oncologist: Dr. Maravilla  Drug: everolimus/exetmestane  Start Date: 6/14/24    Subjective/Objective:  Kesha Zacarias is a 63 year old female contacted by phone for a follow-up visit for oral chemotherapy.          12/4/2023    10:00 AM 3/26/2024     1:00 PM 4/23/2024     3:00 PM 5/20/2024     4:00 PM 6/11/2024     8:00 AM 6/11/2024    12:00 PM 6/21/2024     9:00 AM   ORAL CHEMOTHERAPY   Assessment Type Initial Follow up Refill Refill Refill Initial Work up [place] Initial Follow up   Diagnosis Code Breast Cancer Breast Cancer Breast Cancer Breast Cancer Breast Cancer Breast Cancer Breast Cancer   Providers Dr. Taiwo Maravilla   Clinic Name/Location [facility]   Drug Name Kisqali (ribociclib) Kisqali (ribociclib) Kisqali (ribociclib) Kisqali (ribociclib) Afinitor (everolimus) Afinitor (everolimus) Afinitor (everolimus)   Dose 400 mg 600 mg 600 mg 600 mg 10 mg 10 mg 10 mg   Current Schedule Daily Daily Daily Daily Daily Daily Daily   Cycle Details 3 weeks on, 1 week off 3 weeks on, 1 week off 3 weeks on, 1 week off 3 weeks on, 1 week off Continuous Continuous Continuous   Start Date of Last Cycle 11/27/2023 6/14/2024   Planned next cycle start date 12/25/2023 7/12/2024   Doses missed in last 2 weeks 0      0   Adherence Assessment Adherent      Adherent   Adverse Effects       No AE identified during assessment   Home BPs       not needed   Any new drug interactions?       No   Is the dose as ordered appropriate for the patient?       Yes   Is the patient currently in pain?       No   Has the patient been assessed within the past 6 months for depression?       Yes   Has the patient missed any days of school, work, or other routine activity?       No   Since the last time we talked, have you been hospitalized or used the emergency room?       No       Vitals:  BP:  "  BP Readings from Last 1 Encounters:   05/30/24 118/79     Wt Readings from Last 1 Encounters:   05/30/24 95.3 kg (210 lb)     Estimated body surface area is 2.11 meters squared as calculated from the following:    Height as of 5/30/24: 1.676 m (5' 6\").    Weight as of 5/30/24: 95.3 kg (210 lb).    Labs:  _  Result Component Current Result Ref Range   Sodium 142 (5/30/2024) 135 - 145 mmol/L     _  Result Component Current Result Ref Range   Potassium 4.2 (5/30/2024) 3.4 - 5.3 mmol/L     _  Result Component Current Result Ref Range   Calcium 9.1 (5/30/2024) 8.8 - 10.2 mg/dL     _  Result Component Current Result Ref Range   Magnesium 2.0 (5/30/2024) 1.7 - 2.3 mg/dL     _  Result Component Current Result Ref Range   Phosphorus 4.0 (5/30/2024) 2.5 - 4.5 mg/dL     _  Result Component Current Result Ref Range   Albumin 4.1 (5/30/2024) 3.5 - 5.2 g/dL     _  Result Component Current Result Ref Range   Urea Nitrogen 14.0 (5/30/2024) 8.0 - 23.0 mg/dL     _  Result Component Current Result Ref Range   Creatinine 0.98 (H) (5/30/2024) 0.51 - 0.95 mg/dL       _  Result Component Current Result Ref Range   AST 29 (5/30/2024) 0 - 45 U/L     _  Result Component Current Result Ref Range   ALT 22 (5/30/2024) 0 - 50 U/L     _  Result Component Current Result Ref Range   Bilirubin Total 0.2 (5/30/2024) <=1.2 mg/dL       _  Result Component Current Result Ref Range   WBC Count 2.6 (L) (5/30/2024) 4.0 - 11.0 10e3/uL     _  Result Component Current Result Ref Range   Hemoglobin 11.2 (L) (5/30/2024) 11.7 - 15.7 g/dL     _  Result Component Current Result Ref Range   Platelet Count 161 (5/30/2024) 150 - 450 10e3/uL       Assessment/Plan:  I spoke with patient today. Patient reports doing well on therapy. No issues or concerns reported. Patient is compliant with therapy and no missed doses. Future appointments have been confirmed with patient. We will continue to follow.    Follow-Up:  6/26 Appt with Dr. Maravilla + labs    Meliton Redman, HakanD, " BCOP  Oncology Pharmacy Manager  [facility] [place]  [phone]

## 2024-06-26 ENCOUNTER — TELEPHONE (OUTPATIENT)
Dept: ONCOLOGY | Facility: CLINIC | Age: 64
End: 2024-06-26

## 2024-06-26 ENCOUNTER — HOSPITAL ENCOUNTER (OUTPATIENT)
Dept: CARDIOLOGY | Facility: CLINIC | Age: 64
Discharge: HOME | End: 2024-06-26
Attending: FAMILY MEDICINE | Admitting: FAMILY MEDICINE
Payer: COMMERCIAL

## 2024-06-26 ENCOUNTER — VIRTUAL VISIT (OUTPATIENT)
Dept: ONCOLOGY | Facility: CLINIC | Age: 64
End: 2024-06-26
Payer: COMMERCIAL

## 2024-06-26 ENCOUNTER — LAB (OUTPATIENT)
Dept: LAB | Facility: CLINIC | Age: 64
End: 2024-06-26
Payer: COMMERCIAL

## 2024-06-26 DIAGNOSIS — C79.52 SECONDARY MALIGNANT NEOPLASM OF BONE AND BONE MARROW (H): ICD-10-CM

## 2024-06-26 DIAGNOSIS — C50.912 CARCINOMA OF LEFT BREAST METASTATIC TO BONE (H): ICD-10-CM

## 2024-06-26 DIAGNOSIS — C79.51 CARCINOMA OF LEFT BREAST METASTATIC TO BONE (H): Primary | ICD-10-CM

## 2024-06-26 DIAGNOSIS — E78.5 HYPERLIPIDEMIA LDL GOAL <130: ICD-10-CM

## 2024-06-26 DIAGNOSIS — C79.51 SECONDARY MALIGNANT NEOPLASM OF BONE AND BONE MARROW (H): ICD-10-CM

## 2024-06-26 DIAGNOSIS — E61.1 IRON DEFICIENCY: ICD-10-CM

## 2024-06-26 DIAGNOSIS — C50.919 CARCINOMA OF BREAST METASTATIC TO BONE, UNSPECIFIED LATERALITY (H): ICD-10-CM

## 2024-06-26 DIAGNOSIS — C79.51 CARCINOMA OF LEFT BREAST METASTATIC TO BONE (H): ICD-10-CM

## 2024-06-26 DIAGNOSIS — C50.912 CARCINOMA OF LEFT BREAST METASTATIC TO BONE (H): Primary | ICD-10-CM

## 2024-06-26 DIAGNOSIS — E53.8 VITAMIN B12 DEFICIENCY (NON ANEMIC): ICD-10-CM

## 2024-06-26 DIAGNOSIS — C79.51 CARCINOMA OF BREAST METASTATIC TO BONE, UNSPECIFIED LATERALITY (H): ICD-10-CM

## 2024-06-26 LAB
ALBUMIN SERPL BCG-MCNC: 3.8 G/DL (ref 3.5–5.2)
ALP SERPL-CCNC: 74 U/L (ref 40–150)
ALT SERPL W P-5'-P-CCNC: 19 U/L (ref 0–50)
ANION GAP SERPL CALCULATED.3IONS-SCNC: 11 MMOL/L (ref 7–15)
AST SERPL W P-5'-P-CCNC: 27 U/L (ref 0–45)
BASOPHILS # BLD AUTO: 0.1 10E3/UL (ref 0–0.2)
BASOPHILS NFR BLD AUTO: 2 %
BILIRUB SERPL-MCNC: 0.2 MG/DL
BUN SERPL-MCNC: 15 MG/DL (ref 8–23)
CALCIUM SERPL-MCNC: 9 MG/DL (ref 8.8–10.2)
CANCER AG15-3 SERPL-ACNC: 159 U/ML
CHLORIDE SERPL-SCNC: 106 MMOL/L (ref 98–107)
CHOLEST SERPL-MCNC: 299 MG/DL
CREAT SERPL-MCNC: 0.95 MG/DL (ref 0.51–0.95)
DEPRECATED HCO3 PLAS-SCNC: 25 MMOL/L (ref 22–29)
EGFRCR SERPLBLD CKD-EPI 2021: 67 ML/MIN/1.73M2
EOSINOPHIL # BLD AUTO: 0.1 10E3/UL (ref 0–0.7)
EOSINOPHIL NFR BLD AUTO: 3 %
ERYTHROCYTE [DISTWIDTH] IN BLOOD BY AUTOMATED COUNT: 13.7 % (ref 10–15)
FASTING STATUS PATIENT QL REPORTED: YES
FASTING STATUS PATIENT QL REPORTED: YES
GLUCOSE SERPL-MCNC: 111 MG/DL (ref 70–99)
HBA1C MFR BLD: 5.6 %
HCT VFR BLD AUTO: 34.3 % (ref 35–47)
HDLC SERPL-MCNC: 63 MG/DL
HGB BLD-MCNC: 11.4 G/DL (ref 11.7–15.7)
IMM GRANULOCYTES # BLD: 0 10E3/UL
IMM GRANULOCYTES NFR BLD: 0 %
LDLC SERPL CALC-MCNC: 215 MG/DL
LYMPHOCYTES # BLD AUTO: 0.9 10E3/UL (ref 0.8–5.3)
LYMPHOCYTES NFR BLD AUTO: 35 %
MCH RBC QN AUTO: 35.6 PG (ref 26.5–33)
MCHC RBC AUTO-ENTMCNC: 33.2 G/DL (ref 31.5–36.5)
MCV RBC AUTO: 107 FL (ref 78–100)
MONOCYTES # BLD AUTO: 0.4 10E3/UL (ref 0–1.3)
MONOCYTES NFR BLD AUTO: 14 %
NEUTROPHILS # BLD AUTO: 1.2 10E3/UL (ref 1.6–8.3)
NEUTROPHILS NFR BLD AUTO: 46 %
NONHDLC SERPL-MCNC: 236 MG/DL
NRBC # BLD AUTO: 0 10E3/UL
NRBC BLD AUTO-RTO: 0 /100
PLATELET # BLD AUTO: 114 10E3/UL (ref 150–450)
POTASSIUM SERPL-SCNC: 4.2 MMOL/L (ref 3.4–5.3)
PROT SERPL-MCNC: 6.7 G/DL (ref 6.4–8.3)
RBC # BLD AUTO: 3.2 10E6/UL (ref 3.8–5.2)
SODIUM SERPL-SCNC: 142 MMOL/L (ref 135–145)
TRIGL SERPL-MCNC: 105 MG/DL
WBC # BLD AUTO: 2.6 10E3/UL (ref 4–11)

## 2024-06-26 PROCEDURE — 85025 COMPLETE CBC W/AUTO DIFF WBC: CPT | Performed by: INTERNAL MEDICINE

## 2024-06-26 PROCEDURE — 83036 HEMOGLOBIN GLYCOSYLATED A1C: CPT | Performed by: INTERNAL MEDICINE

## 2024-06-26 PROCEDURE — 93005 ELECTROCARDIOGRAM TRACING: CPT | Performed by: REHABILITATION PRACTITIONER

## 2024-06-26 PROCEDURE — 99214 OFFICE O/P EST MOD 30 MIN: CPT | Mod: 95 | Performed by: INTERNAL MEDICINE

## 2024-06-26 PROCEDURE — 36415 COLL VENOUS BLD VENIPUNCTURE: CPT

## 2024-06-26 PROCEDURE — 80061 LIPID PANEL: CPT | Performed by: INTERNAL MEDICINE

## 2024-06-26 PROCEDURE — 80053 COMPREHEN METABOLIC PANEL: CPT | Performed by: INTERNAL MEDICINE

## 2024-06-26 PROCEDURE — 86300 IMMUNOASSAY TUMOR CA 15-3: CPT | Performed by: INTERNAL MEDICINE

## 2024-06-26 PROCEDURE — 99207 EKG 12-LEAD CLINIC READ: CPT | Performed by: INTERNAL MEDICINE

## 2024-06-26 RX ORDER — ATORVASTATIN CALCIUM 10 MG/1
10 TABLET, FILM COATED ORAL DAILY
Qty: 90 TABLET | Refills: 1 | Status: SHIPPED | OUTPATIENT
Start: 2024-06-26

## 2024-06-26 RX ORDER — FERROUS SULFATE 325(65) MG
325 TABLET ORAL EVERY OTHER DAY
Qty: 90 TABLET | Refills: 0 | Status: SHIPPED | OUTPATIENT
Start: 2024-06-26 | End: 2024-09-03

## 2024-06-26 RX ORDER — CYANOCOBALAMIN (VITAMIN B-12) 2500 MCG
2500 TABLET, SUBLINGUAL SUBLINGUAL DAILY
Qty: 90 TABLET | Refills: 1 | Status: SHIPPED | OUTPATIENT
Start: 2024-06-26

## 2024-06-26 NOTE — LETTER
6/26/2024      Kesha Zacarias  [id number] [address] [place] [id number]      Dear Colleague,    Thank you for referring your patient, Kesha Zacarias, to the [facility] [place]. Please see a copy of my visit note below.    Video-Visit Details     Video Start Time: 8:51AM     Type of service:  Video Visit     Video End Time: 9:19AM    Originating Location (pt. Location): Home     Distant Location (provider location):  [facility] off site     Platform used for Video Visit: [facility]    Hematology/Oncology Established Patient Follow Up Note      Today's Date: 6/26/24    Reason for follow up: metastatic breast cancer    HISTORY OF PRESENT ILLNESS: Kesha Zacarias is a 64 year old female who presents for follow up    Patient has medical history including rheumatoid arthritis, hyperlipidemia, osteoarthritis, bilateral cataracts and glaucoma s/p surgery, endocervical polyp s/p resection, vaginal atrophy with conjugated estrogen vaginal cream use, colon polyp (hyperplastic polyp and tubular adenoma), seasonal depression, history of tobacco use ([age] y/o, about 1 ppd).    Regarding RA:  -dx over a decade ago, on plaquenil, managed by PCP  - arthritis is most severe in hands>knees, intermittent       - 3/23 bilateral screening mammogram [facility]  - a few months ago, noted nipple retraction  - 9/23 patient palpated mass in retroareolar region of left breast and w/nipple retraction, no discharge, skin thickening, no dimpling, no erythema  - 9/23 diagnostic LEFT breast mammogram: Focal dense tissue with some likely mild architectural distortion, some anterior skin thickening is noted  - 9/23 targeted LEFT breast ultrasound: Ill-defined shadowing hypoechoic mass, 3.0 x 1.7 x 3.9 cm.  In left axilla-few small lymph nodes, 1 normal-sized lymph node with cortical thickening, 0.7 x 0.6 cm.  -9/23 ultrasound-guided LEFT breast core biopsy of 12:00 lesion with clip  "placement, \"Postbiopsy unilateral digital mammogram of the left breast showed the clip to be at the expected biopsy site\" AND ultrasound-guided left axillary lymph node biopsy of lymph node with cortical thickening  PATHOLOGY  A.  Breast, left, 12:00, biopsy:  -Lobular carcinoma in situ.-Multiple foci  -Invasive carcinoma is not identified.     B.  Lymph node, left axillary, biopsy:  -Positive for metastatic lobular carcinoma, grade 2  -Largest metastatic focus is 7 mm.  -Negative for extranodal extension.  -Breast ancillary testing:  -Estrogen receptor: Positive (91 to 100%, strong)  -Progesterone receptor: Positive (91 to 100%, strong)  -HER2 2+ IHC, FISH negative    -10/23 MRI bilateral breast:  LEFT breast:  - Heterogeneously enhancing irregular mass in the subareolar left breast, 5.0 x 4.9 x 4.9 cm, with associated nipple retraction and nipple/areolar involvement.  This mass extends superiorly through 11: positions, from anterior to mid depth.  The HydroMARK breast biopsy clip (which showed LCIS) is 1.5 cm posterosuperior to this mass.  - Multiple suspicious left level 1 axillary lymph node noted, including biopsy-proven metastatic node with indwelling biopsy marker, biopsied node measures 1.3 x 1.0 cm  - Heterogeneous marrow appearance of sternum and ribs with heterogeneous enhancement and a peripheral enhancing focus within the mid body.  IMPRESSION:  1. Upon further review of previous imaging and pathology results,  recommend repeat ultrasound guided large core-needle biopsy of the  discordant dominant left breast mass given metastatic left axillary  lymph node and superoposteriorly displaced left breast biopsy marker.   2. Nonspecific heterogeneous enhancement of the sternum and ribs.  Consider nuclear medicine bone scan    Lifetime estrogen exposure:  Menarche: 13   Last menstrual period: 49   Age of first pregnancy: 18   Number of pregnancies: 2 (no living children)   Weight gain: 20lb weight gain " within a year  Exposure to exogenous estrogen: vaginal atrophy with conjugated estrogen vaginal cream use x4 years (intermittent), has not used it since 9/23. Birth control for about 13-15 years from her 20s-30s.      Pt reports 55lb weight loss in 1.5 years, this was intentional, was following weight watchers program (23 points available per day) 230lb->170lb->190lb (gained about 20lbs). Pt was walking on the treadmill and outside daily, about 30 mins to 1.5 miles.    10/23 labs:  AST 79, ALT 91,   CA 15-3 261    10/23 PET/CT:  Innumerable foci of FDG avid lesions are seen throughout the osseous  structures of the head and neck, most pronounced on the calvarium and  cervical spine, with prominently sclerotic appearance on CT correlate.  For example:  -Right frontal bone, SUV max 5.31.  -Left lateral mass of the atlas, SUV max 15.0  -Angle of the left mandible, SUV max 9.6  -C7 vertebral body, SUV max 17.7    Innumerable variable sized FDG avid lesions throughout the axial and  appendicular spine, including but not limited to the cervical,  thoracic, and lumbar spine, multilevel bilateral ribs, sternum,  bilateral scapula, bilateral humeri, clavicles, pelvis, and bilateral  femurs. A few of these lesions are described below:  -Large FDG avid sclerotic 3.4 cm lesion within the proximal left  humeral head, SUV max 17.24  -Sternal body, SUV max 20.35  -L2 vertebral body, SUV max 14.3 without  -Large ill-defined sclerotic lesion in the sacral body measuring up to  at least 5.9 cm, SUV max 16.84  -FDG avid focus within the left femoral head, SUV  max 13.65 without CT correlate.    Large irregular soft tissue FDG avid mass within the left breast   measuring approximately 3.2 x 2.7 cm with  extension into the superficial soft tissues and associated left nipple  retraction, SUV max 12.36 additional FDG avid. Left axillary lymph  nodes, not definitively enlarged by short axis size criteria, for  example, SUV max  5.93.    The liver is unremarkable.     Solid 3 mm non-FDG avid pulmonary nodule within the  right middle lobe    - 10/23 MRI brain:  - extensive osseous metastatic disease involving the calvarium, skull base w/involvement of occipital condyles, C1 and C2 vertebral bodies, and likely the mandible  -  Dominant calvarial lesions are located in the right frontal calvarium and right temporal calvarium without definite breach of the  inner or outer tables of the calvarium. There is a suggestion of mild asymmetric linear extra-axial enhancement deep to the dominant right temporal calvarial lesion along the dural margin, though this may be vascular in etiology.  - No abnormal parenchymal or leptomeningeal enhancement.   - sequelae of mild chronic microvascular ischemic disease. Mild generalized cerebral atrophy.       -supposed to start ribociclib 10/30/23 however, noted to have significantly elevated LFTS (10/26/23 , , alk phos 152)    10/17/23: AST 79, ALT 91, alk phos 116, t bili 0.5  10/26/23 , , alk phos 152  10/30/23: , , alk phos 178, coags WNL, ribociclib was not started, letrozole started, atorvastatin held  11/7/23: AST 96, , alk phos 169, MRI liver negative for mets  11/17/23: AST 81, , alk phos 163  11/27/23: AST 55, ALT 90, alk phos 128- started ribociclib 400mg  12/4/23: AST 26, ALT 35, alk phos 118    - 11/27/23 started ribociclib+ letrozole  - 11/27/23-5/30/24 ribociclib+ letrozole; C1D1 11/27/23 started ribociclib 400mg PO daily when LFTs improved to <3x ULN, C2D1 12/25/23 ribociclib 600mg (day 1-12 only 2/2 palliative RT to sacrum w/Dr. Mandujano 1/9/24- 1/22/24 3000cGy in 10 fractions), C3D1 1/30/24, C4D1 3/6/24 (deferred 1 week 2/2 2/27/24 ANC 0.8->3/5/24 ANC 1.5), C5D1 4/3/24, C6D1 5/1/24, C7D1 5/30/24->progression of disease in bones  - 1/9/24-1/22/24 palliative RT to sacrum w/Dr. Mandujano- 3000cGy in 10 fractions    - 2/17/24 PET CT CAP  PET/CT FINDINGS:  Most of the extensive skeletal metastases have become sclerotic and non-FDG avid and those which remain avid have decreased in activity, for example in the proximal right humerus from SUVmax 19.3 to 13.9, in the proximal right femur from 16.5 to 12.2, and in the L2 vertebral body from 14.3 to 10.7.      CT FINDINGS: Bilateral lens replacements. Calcified atherosclerosis, including moderate right coronary artery disease. Splenule. Cholelithiasis. Retroaortic left renal vein. Pelvic phleboliths. Appendectomy. Ventral hernia repair with mesh. Persistent   metopic suture. T11 vertebral body hemangioma. Mild degenerative change in the spine.                                                       IMPRESSION:  Partial response       - 5/11/24 PET CT CAP:  Redemonstrated innumerable FDG avid osseous lesions, some of which are increasing in metabolic activity and suggest mild progression of disease including representative examples involving the inferior sternum (Max SUV 16.5, previously 7.9) and left distal femur (Max SUV 20.8, previously 11.3).    - 5/24/24 MRI brain w/wo contrast:  IMPRESSION:   1. Presumed osseous metastases involving the occipital condyles  bilaterally, C1, C2 and C3 vertebrae again noted.  2. Questionable osseous metastatic disease involving the parietal  bones bilaterally again noted without change.  3. Diffuse cerebral volume loss and cerebral white matter changes  consistent with chronic small vessel ischemic disease. No evidence for  intracranial metastases.  4. No evidence for acute intracranial pathology.    INTERIM HISTORY:  - 11/27/23-5/30/24 ribociclib+ letrozole; C1D1 11/27/23 started ribociclib 400mg PO daily when LFTs improved to <3x ULN, C2D1 12/25/23 ribociclib 600mg (day 1-12 only 2/2 palliative RT to sacrum w/Dr. Mandujano 1/9/24- 1/22/24 3000cGy in 10 fractions), C3D1 1/30/24, C4D1 3/6/24 (deferred 1 week 2/2 2/27/24 ANC 0.8->3/5/24 ANC 1.5), C5D1 4/3/24, C6D1 5/1/24, C7D1  5/30/24->progression of disease in bones  - 6/14/24 everolimus+ exemestane    REGIMEN:  Everolimus + exemestane   C1D1 6/14/24  everolimus 10mg PO daily   exemestane 25mg PO daily  PLUS dexamethasone mouthwash     - baseline lipid panel and hgbA1c pending    Treatment Related AE:  - gum soreness- posterior, b/l- Left where she had tooth pulled- before starting everolimus, right- 1 week after starting everolimus, bleeding w/brushing teeth, due to see dentist 9/24 (q6 month follow up)  - mouth sore- has not used dexamethasone mouth wash  - hld- 6/24 total chol 299, - previously was on atorvastatin that was stopped when she started ribociclib   - hot flashes- 3/24 10x/day, last a few mins, daily, affecting quality of life including sleep; 4/24 started effexor 37.5mg x1 week, then 75mg thereafter and hot flashes improved to 2-3/day; no difference since starting exemestane  - Left posterior tibial DVT- 1/19/24 sx started- Left foot pain and swelling, 1/22/24 pt called in, 1/22/24 left LE doppler: DVT in 1 of 2 left distal posterior tibial veins, started on eliquis and stopped naproxen; no issues with bleeding but does have intermitting bruising when she bumps her hand  - blurry vision b/l- last saw eye doctor 3/23 and has trifocal glasses. She has dry eyes and uses lubricating eye drops., 10/23 MRI brain as above. eye doctor visit 4/3/24- got new glasses rx, 5/23/24 sent message regarding increased blurred vision- remain constant despite time of day, near vision, far vision, or the use of her corrective lenses; 5/24/24 MRI brain negative for intraparenchymal mets, osseous mets stable, still having intermittent blurred vision, lasts anywhere from 1-3 days  - macrocytic anemia- hgb 11, 5/24 B12- 298, TSH 1.19, absolute retic 0.071, ferritin 355, iron sat 18, TIBC 307, iron 56; RBC folate next labs, start B12 SL [id number] daily and oral ferrous sulfate 325mg every other day 6/24        - 6/24 IR requested PET  Overread:  - Relatively stable hypermetabolic left breast mass having SUV max of  5.7, previously, 6.7  - Redemonstration of hypermetabolic innumerable osseous metastatic  lesions following the axial and appendicular skeleton with involvement  of the marrow cavity in the long bones. These are predominantly  sclerotic with few lytic appearing lesions. Few lesions have increased  uptake compared to prior exam, a few have slightly decreased uptake ,  few remain overall unchanged. As index lesions:     Increased uptake:  -C2 vertebral body lesion with SUV max 14.4, previously 8.3. 1a. Of note - hypermetabolic C2 lesion with the posterior cortical breakthrough, no significant spinal cord impingement at this time but potentially at risk in the future.     Decreased uptake:  -L3 vertebral body with SUV max 7.4, previously 10.3.   -Sternal body lesion with SUV max 6.7, previously 10.9.   -Left ischial  tuberosity lesion with SUV max 3.5, previously 9.3.     Relatively stable uptake:  -Left humeral head lesion having SUV max 10.4, previously 11.1.   -Left sacral ala lesion with SUV max 7.2, previously 8.7.    Addendum: Potential biopsy sites:   Another lesion that has a slight increase in uptake is in the left  femur, lateral to the lesser trochanter (series 4 image 24) max SUV 23  (prior 18).  The hypermetabolic portion is not sclerotic, but it is in  the lateral aspect of the femur, fairly good size and the lesser  trochanter could serve as a landmark.     The left humeral head lesion may also be a potential biopsy site max  SUV 10.4 (not significantly changed from prior 11). The lesion is in  the lateral aspect of the sclerotic lesion when the patient's arms are  in the up position.     The C2 lesion may be more difficult to assess given its anterior  medial position (max suv 14).    - 6/24 germline genetic testing: NEGATIVE for BRENT, BARD1, BRCA1, BRCA2, CDH1, CHEK2, NF1, PALB2, PTEN, STK11, TP53.             REVIEW OF  SYSTEMS:   A 14 point ROS was reviewed with pertinent positives and negatives in the HPI.        HOME MEDICATIONS:  Current Outpatient Medications   Medication Sig Dispense Refill     apixaban ANTICOAGULANT (ELIQUIS) 5 MG tablet Take 1 tablet (5 mg) by mouth 2 times daily 60 tablet 11     betamethasone dipropionate (DIPROSONE) 0.05 % external lotion Apply topically 2 times daily 60 mL 3     cyclobenzaprine (FLEXERIL) 5 MG tablet TAKE 1 TABLET(5 MG) BY MOUTH AT BEDTIME 90 tablet 3     dexAMETHasone alcohol-free (DECADRON) 0.1 MG/ML solution Swish 10 mL in mouth for 2 min and spit, four times daily. 1200 mL 1     docusate sodium (COLACE) 100 MG capsule Take 1 capsule (100 mg) by mouth 3 times daily as needed for constipation 90 capsule 3     everolimus (AFINITOR) 10 MG tablet Take 1 tablet (10 mg) by mouth daily for 28 days Avoid grapefruit and grapefruit juice. Take with or without food, but should be consistent. 28 tablet 0     exemestane (AROMASIN) 25 MG tablet Take 1 tablet (25 mg) by mouth daily for 28 days Take after a meal. 28 tablet 0     fish oil-omega-3 fatty acids 1000 MG capsule Take 2 g by mouth daily       hydroxychloroquine (PLAQUENIL) 200 MG tablet TAKE 2 AND 1/2 TABLETS BY MOUTH EVERY  tablet 3     loperamide (IMODIUM A-D) 2 MG tablet When diarrhea starts take 2 tabs (4mg), then with each additional episode of diarrhea take 1 additional tab and if needing more than 8 tabs in 24 hrs call oncology 30 tablet 3     magnesium 250 MG tablet Take 1 tablet by mouth daily       ondansetron (ZOFRAN ODT) 4 MG ODT tab Take 1-2 tablets (4-8 mg) by mouth every 8 hours as needed for nausea 30 tablet 2     ondansetron (ZOFRAN) 4 MG tablet Take 1 tablet (4 mg) by mouth every 6 hours as needed for nausea 30 tablet 3     prochlorperazine (COMPAZINE) 10 MG tablet Take 1 tablet (10 mg) by mouth every 6 hours as needed for nausea or vomiting 30 tablet 2     venlafaxine (EFFEXOR XR) 75 MG 24 hr capsule Take 1 capsule  (75 mg) by mouth daily 30 capsule 11         ALLERGIES:  Allergies   Allergen Reactions     Ciprofloxacin      hives and was on flagyl too     Metronidazole      hives and was on cipro too         PAST MEDICAL HISTORY:  Past Medical History:   Diagnosis Date     Arthritis 2006     Breast cancer metastasized to bone (H) 10/12/2023     Chronic depressive personality disorder      Dysplasia of cervix (uteri)     Cryotherapy     Female infertility of unspecified origin      Glaucoma      Rheumatoid arthritis (H)          PAST SURGICAL HISTORY:  Past Surgical History:   Procedure Laterality Date     COLONOSCOPY       COLONOSCOPY N/A 2022    Procedure: COLONOSCOPY, WITH POLYPECTOMY AND BIOPSY;  Surgeon: Gagandeep Patterson MD;  Location:  GI     HC INTRODUCE CATH FALLOPIAN TUBE, RE-OPEN/DIAGNOSIS       HERNIA REPAIR, INCISIONAL  2009     JOINT REPLACEMENT Right 2017    knee     TONSILLECTOMY       ZZC APPENDECTOMY  2003     Z REMV CATARACT INTRACAP,INSERT LENS  2003    right         SOCIAL HISTORY:  Social History     Socioeconomic History     Marital status:      Spouse name: Bandar     Number of children: 1     Years of education: Not on file     Highest education level: Not on file   Occupational History     Not on file   Tobacco Use     Smoking status: Former     Current packs/day: 0.00     Average packs/day: 0.5 packs/day for 25.0 years (12.5 ttl pk-yrs)     Types: Cigarettes     Start date: 1992     Quit date: 2017     Years since quittin.7     Smokeless tobacco: Never   Vaping Use     Vaping status: Never Used   Substance and Sexual Activity     Alcohol use: Not Currently     Drug use: Yes     Types: Marijuana     Sexual activity: Yes     Partners: Male     Birth control/protection: None   Other Topics Concern     Parent/sibling w/ CABG, MI or angioplasty before 65F 55M? Yes   Social History Narrative     Not on file     Social Determinants of Health     Financial  Resource Strain: Not on file   Food Insecurity: Not on file   Transportation Needs: Not on file   Physical Activity: Not on file   Stress: Not on file   Social Connections: Not on file   Interpersonal Safety: Not on file   Housing Stability: Not on file         FAMILY HISTORY:  Family History   Problem Relation Age of Onset     Heart Disease Mother      Lipids Mother      Hyperlipidemia Mother      Genitourinary Problems Father         prostate     Genetic Disorder Father         ulcer     Hypertension Father      Lipids Father      Prostate Cancer Father      Hyperlipidemia Sister      Hyperlipidemia Brother      Other Cancer Brother         Neck Cancer HPV (+)     Heart Disease Maternal Grandmother      Cerebrovascular Disease Maternal Grandmother      Heart Disease Maternal Grandfather      Heart Disease Maternal Uncle      Heart Disease Maternal Uncle         x  3     Cancer Nephew         Sister's Son       Family history of:  1.  Breast cancer including male breast cancer: negative   2. Ovarian cancer: negative  3.  Pancreatic cancer: negative  4.  Prostate cancer: father- ?in his 50s, other details unknown  5. Diffuse gastric cancer (if lobular breast CA): negative  6. Uterine cancer: negative  7. Colon cancer:  negative  6. Brother- head and neck, HPV +, had surgery, clinical trial and now cancer free      PHYSICAL EXAM:  Vital signs:  LMP 11/22/2011 (Exact Date)          LABS:   Latest Reference Range & Units 06/26/24 07:41   Sodium 135 - 145 mmol/L 142   Potassium 3.4 - 5.3 mmol/L 4.2   Chloride 98 - 107 mmol/L 106   Carbon Dioxide (CO2) 22 - 29 mmol/L 25   Urea Nitrogen 8.0 - 23.0 mg/dL 15.0   Creatinine 0.51 - 0.95 mg/dL 0.95   GFR Estimate >60 mL/min/1.73m2 67   Calcium 8.8 - 10.2 mg/dL 9.0   Anion Gap 7 - 15 mmol/L 11   Albumin 3.5 - 5.2 g/dL 3.8   Protein Total 6.4 - 8.3 g/dL 6.7   Alkaline Phosphatase 40 - 150 U/L 74   ALT 0 - 50 U/L 19   AST 0 - 45 U/L 27   Bilirubin Total <=1.2 mg/dL 0.2  "  Cholesterol <200 mg/dL 299 (H)   Patient Fasting?  Yes  Yes   Glucose 70 - 99 mg/dL 111 (H)   HDL Cholesterol >=50 mg/dL 63   LDL Cholesterol Calculated <=100 mg/dL 215 (H)   Non HDL Cholesterol <130 mg/dL 236 (H)   Triglycerides <150 mg/dL 105   WBC 4.0 - 11.0 10e3/uL 2.6 (L)   Hemoglobin 11.7 - 15.7 g/dL 11.4 (L)   Hematocrit 35.0 - 47.0 % 34.3 (L)   Platelet Count 150 - 450 10e3/uL 114 (L)   RBC Count 3.80 - 5.20 10e6/uL 3.20 (L)   MCV 78 - 100 fL 107 (H)   MCH 26.5 - 33.0 pg 35.6 (H)   MCHC 31.5 - 36.5 g/dL 33.2   RDW 10.0 - 15.0 % 13.7   % Neutrophils % 46   % Lymphocytes % 35   % Monocytes % 14   % Eosinophils % 3   % Basophils % 2   Absolute Basophils 0.0 - 0.2 10e3/uL 0.1   Absolute Eosinophils 0.0 - 0.7 10e3/uL 0.1   Absolute Immature Granulocytes <=0.4 10e3/uL 0.0   Absolute Lymphocytes 0.8 - 5.3 10e3/uL 0.9   Absolute Monocytes 0.0 - 1.3 10e3/uL 0.4   % Immature Granulocytes % 0   Absolute Neutrophils 1.6 - 8.3 10e3/uL 1.2 (L)   Absolute NRBCs 10e3/uL 0.0   NRBCs per 100 WBC <1 /100 0   (H): Data is abnormally high  (L): Data is abnormally low    PATHOLOGY:    IMAGING:        ASSESSMENT/PLAN:  Kesha Zacarias is a 64 year old female with:    # de tavon metastatic left breast invasive lobular carcinoma, ER positive, [place] positive, her2 negative on FISH  #bone metastases   - pt has de tavon metastatic invasive lobular breast cancer, ER positive, [place] positive, her2 negative. Pt does not have visceral crisis, she mainly has extensive bone metastases and LN metastases   -9/23 diagnostic left breast mammogram, left breast targeted ultrasound showed 3.0 x 1.7 x 3.9 ill-defined shadowing hypoechoic mass, few small lymph nodes in the left axilla, one with cortical thickening measuring 0.7 x 0.6 cm  -9/23 ultrasound-guided LEFT breast core biopsy of 12:00 lesion with clip placement, \"Postbiopsy unilateral digital mammogram of the left breast showed the clip to be at the expected biopsy site\" AND ultrasound-guided left " axillary lymph node biopsy of lymph node with cortical thickening, PATHOLOGY:  A.  Breast, left, 12:00, biopsy:Lobular carcinoma in situ, Multiple foci. Invasive carcinoma is not identified.   B.  Lymph node, left axillary, biopsy:  -Positive for metastatic lobular carcinoma, grade 2, 0.7 cm, negative GREGG, Estrogen receptor: Positive (91 to 100%, strong), Progesterone receptor: Positive (91 to 100%, strong), HER2 2+ IHC, FISH negative  -10/23 MRI bilateral breast:  - Heterogeneously enhancing irregular mass in the subareolar left breast, 5.0 x 4.9 x 4.9 cm, with associated nipple retraction and nipple/areolar involvement.  This mass extends superiorly through 11: positions, from anterior to mid depth.  The HydroMARK breast biopsy clip (which showed LCIS) is 1.5 cm posterosuperior to this mass.  - Multiple suspicious left level 1 axillary lymph node noted, including biopsy-proven metastatic node with indwelling biopsy marker, biopsied node measures 1.3 x 1.0 cm  - Heterogeneous marrow appearance of sternum and ribs with heterogeneous enhancement and a peripheral enhancing focus within the mid body.    - 10/23 PET/CT:  Innumerable foci of FDG avid lesions are seen throughout the osseous structures of the head and neck, most pronounced on the calvarium and cervical spine, with prominently sclerotic appearance on CT correlate.  For example:  -Right frontal bone, SUV max 5.31.  -Left lateral mass of the atlas, SUV max 15.0  -Angle of the left mandible, SUV max 9.6  -C7 vertebral body, SUV max 17.7    Innumerable variable sized FDG avid lesions throughout the axial and appendicular spine, including but not limited to the cervical, thoracic, and lumbar spine, multilevel bilateral ribs, sternum, bilateral scapula, bilateral humeri, clavicles, pelvis, and bilateral femurs. A few of these lesions are described below:  -Large FDG avid sclerotic 3.4 cm lesion within the proximal left humeral head, SUV max 17.24  -Sternal  body, SUV max 20.35  -L2 vertebral body, SUV max 14.3 without  -Large ill-defined sclerotic lesion in the sacral body measuring up to at least 5.9 cm, SUV max 16.84  -FDG avid focus within the left femoral head, SUV max 13.65 without CT correlate.    Large irregular soft tissue FDG avid mass within the left breast measuring approximately 3.2 x 2.7 cm with  extension into the superficial soft tissues and associated left nipple retraction, SUV max 12.36 additional FDG avid. Left axillary lymph nodes, not definitively enlarged by short axis size criteria, for example, SUV max 5.93.    - 10/23 MRI brain:  - extensive osseous metastatic disease involving the calvarium, skull base w/involvement of occipital condyles, C1 and C2 vertebral bodies, and likely the mandible  -  Dominant calvarial lesions are located in the right frontal calvarium and right temporal calvarium without definite breach of the inner or outer tables of the calvarium. There is a suggestion of mild asymmetric linear extra-axial enhancement deep to the dominant right temporal calvarial lesion along the dural margin, though this may be vascular in etiology.  - No abnormal parenchymal or leptomeningeal enhancement.   - sequelae of mild chronic microvascular ischemic disease. Mild generalized cerebral atrophy.     -10/23 DEXA: osteopenia (T score -2.0 lumbar spine, -2.0 left hip femoral neck, The 10 year probability of major osteoporotic fracture is 12.5%, and of hip fracture is 1.8%, based on left femoral neck BMD)    - 11/23 orthopedic consult to determine stability of left femur and fracture risk given presence of mets in left femoral head: do not see any areas of impending cortical erosion or sites of high risk for fracture, observe    - 6/24 germline genetic testing: NEGATIVE for BRENT, BARD1, BRCA1, BRCA2, CDH1, CHEK2, NF1, PALB2, PTEN, STK11, TP53.     TREATMENT:  - 11/27/23-5/30/24 ribociclib+ letrozole; C1D1 11/27/23 started ribociclib 400mg PO  daily when LFTs improved to <3x ULN, C2D1 12/25/23 ribociclib 600mg (day 1-12 only 2/2 palliative RT to sacrum w/Dr. Mandujano 1/9/24- 1/22/24 3000cGy in 10 fractions), C3D1 1/30/24, C4D1 3/6/24 (deferred 1 week 2/2 2/27/24 ANC 0.8->3/5/24 ANC 1.5), C5D1 4/3/24, C6D1 5/1/24, C7D1 5/30/24->progression of disease in bones  - 6/14/24 everolimus+ exemestane      REGIMEN:  Everolimus + exemestane   C1D1 6/14/24  everolimus 10mg PO daily   exemestane 25mg PO daily    Treatment Related AE:  - neutropenia- monitor for neutropenic fever  - thrombocytopenia- monitor for bleeding while on eliquis  - macrocytic anemia- hgb 11, 5/24 B12- 298, TSH 1.19, absolute retic 0.071, ferritin 355, iron sat 18, TIBC 307, iron 56; RBC folate next labs, start B12 SL [id number] daily and oral ferrous sulfate 325mg every other day 6/24  - gum soreness- Hold zometa, see dentist  - mouth sore- start dexamethasone mouth wash, rx magic mouthwash  - hld- 6/24 total chol 299, - start atorvastatin 10mg (if LFTs remain stable, can increase)   - hot flashes- continue effexor  - left posterior DVT- continue eliquis  - intermittent blurred vision- stable, no brain mets on 5/24 MRI brain        IMAGING:  - check MRI cervical spine  - next PET due early 9/24- ordered today     - 6/24 IR requested PET Overread:  - Relatively stable hypermetabolic left breast mass having SUV max of  5.7, previously, 6.7  - Redemonstration of hypermetabolic innumerable osseous metastatic  lesions following the axial and appendicular skeleton with involvement  of the marrow cavity in the long bones. These are predominantly  sclerotic with few lytic appearing lesions. Few lesions have increased  uptake compared to prior exam, a few have slightly decreased uptake ,  few remain overall unchanged. As index lesions:     Increased uptake:  -C2 vertebral body lesion with SUV max 14.4, previously 8.3. 1a. Of note - hypermetabolic C2 lesion with the posterior cortical breakthrough, no  significant spinal cord impingement at this time but potentially at risk in the future.     Decreased uptake:  -L3 vertebral body with SUV max 7.4, previously 10.3.   -Sternal body lesion with SUV max 6.7, previously 10.9.   -Left ischial  tuberosity lesion with SUV max 3.5, previously 9.3.     Relatively stable uptake:  -Left humeral head lesion having SUV max 10.4, previously 11.1.   -Left sacral ala lesion with SUV max 7.2, previously 8.7.    Addendum: Potential biopsy sites:   Another lesion that has a slight increase in uptake is in the left  femur, lateral to the lesser trochanter (series 4 image 24) max SUV 23  (prior 18).  The hypermetabolic portion is not sclerotic, but it is in  the lateral aspect of the femur, fairly good size and the lesser  trochanter could serve as a landmark.     The left humeral head lesion may also be a potential biopsy site max  SUV 10.4 (not significantly changed from prior 11). The lesion is in  the lateral aspect of the sclerotic lesion when the patient's arms are  in the up position.     The C2 lesion may be more difficult to assess given its anterior  medial position (max suv 14).    - 5/11/24 PET CT CAP:  Redemonstrated innumerable FDG avid osseous lesions, some of which are increasing in metabolic activity and suggest mild progression of disease including representative examples involving the inferior sternum (Max SUV 16.5, previously 7.9) and left distal femur (Max SUV 20.8, previously 11.3).    - 5/24/24 MRI brain w/wo contrast:  IMPRESSION:   1. Presumed osseous metastases involving the occipital condyles bilaterally, C1, C2 and C3 vertebrae again noted.  2. Questionable osseous metastatic disease involving the parietal bones bilaterally again noted without change.  3. Diffuse cerebral volume loss and cerebral white matter changes consistent with chronic small vessel ischemic disease. No evidence for intracranial metastases.  4. No evidence for acute intracranial  pathology.    - 2/17/24 PET CT CAP:  Most of the extensive skeletal metastases have become sclerotic and non-FDG avid and those which remain avid have decreased in activity, for example in the proximal right humerus from SUVmax 19.3 to 13.9, in the proximal right femur from 16.5 to 12.2, and in the L2 vertebral body from 14.3 to 10.7.    TUMOR MARKERS:  10/23 CA 15-3= 261  12/23 CA 15-3= 553  1/24 CA 15-3= 480  2/24 CA 15-3 338  5/24 CA 15-3=179  Repeat CA 15-3 pending 6/24    OTHER:  - pt pending CT guided bone biopsy of left proximal femur lesion w/IR for additional tissue to test for the following:  - BRAF, PIK3CA, AKT1, PTEN, ESR1 NGS  - NTRK, RET fusion  - MSI   - PDL1 w/TMB   - continue zometa q4 weeks - zometa #1 1/4/24, last zometa 5/30/24, next due 6/27/24- on hold   - continue calcium and vitamin D    # osteopenia  - 10/23 DEXA: osteopenia, T score - 2.0 left hip femoral neck and lumbar spine   - repeat DEXA 10/2025  - continue continue calcium and vitamin D  - zometa as above    # left posterior tibial DVT  -1/19/24 sx started- Left foot pain and swelling  -1/22/24 pt called in, 1/22/24 left LE doppler: DVT in 1 of 2 left distal posterior tibial veins  - continue eliquis, refilled due 5/2025      # RA  - on plaquenil     HOLD zometa 6/27/24  RTC 7/12 for follow up with JUAN, labs prior to visit  RTC 8/9 for follow up with me, labs prior to visit  RTC 9/6 for follow up with JUAN, labs prior to visit  RTC 10/4 for follow up with physician, labs prior to visit    Miller Altamirano DO  Hematology/Oncology  [facility]      Again, thank you for allowing me to participate in the care of your patient.        Sincerely,        MILLER ALTAMIRANO DO

## 2024-06-26 NOTE — PROGRESS NOTES
"Video-Visit Details     Video Start Time: 8:51AM     Type of service:  Video Visit     Video End Time: 9:19AM    Originating Location (pt. Location): Home     Distant Location (provider location):  [facility] off site     Platform used for Video Visit: [facility]    Hematology/Oncology Established Patient Follow Up Note      Today's Date: 6/26/24    Reason for follow up: metastatic breast cancer    HISTORY OF PRESENT ILLNESS: Kesha Zacarias is a 64 year old female who presents for follow up    Patient has medical history including rheumatoid arthritis, hyperlipidemia, osteoarthritis, bilateral cataracts and glaucoma s/p surgery, endocervical polyp s/p resection, vaginal atrophy with conjugated estrogen vaginal cream use, colon polyp (hyperplastic polyp and tubular adenoma), seasonal depression, history of tobacco use ([age] y/o, about 1 ppd).    Regarding RA:  -dx over a decade ago, on plaquenil, managed by PCP  - arthritis is most severe in hands>knees, intermittent       - 3/23 bilateral screening mammogram [facility]  - a few months ago, noted nipple retraction  - 9/23 patient palpated mass in retroareolar region of left breast and w/nipple retraction, no discharge, skin thickening, no dimpling, no erythema  - 9/23 diagnostic LEFT breast mammogram: Focal dense tissue with some likely mild architectural distortion, some anterior skin thickening is noted  - 9/23 targeted LEFT breast ultrasound: Ill-defined shadowing hypoechoic mass, 3.0 x 1.7 x 3.9 cm.  In left axilla-few small lymph nodes, 1 normal-sized lymph node with cortical thickening, 0.7 x 0.6 cm.  -9/23 ultrasound-guided LEFT breast core biopsy of 12:00 lesion with clip placement, \"Postbiopsy unilateral digital mammogram of the left breast showed the clip to be at the expected biopsy site\" AND ultrasound-guided left axillary lymph node biopsy of lymph node with cortical thickening  PATHOLOGY  A.  Breast, left, 12:00, " biopsy:  -Lobular carcinoma in situ.-Multiple foci  -Invasive carcinoma is not identified.     B.  Lymph node, left axillary, biopsy:  -Positive for metastatic lobular carcinoma, grade 2  -Largest metastatic focus is 7 mm.  -Negative for extranodal extension.  -Breast ancillary testing:  -Estrogen receptor: Positive (91 to 100%, strong)  -Progesterone receptor: Positive (91 to 100%, strong)  -HER2 2+ IHC, FISH negative    -10/23 MRI bilateral breast:  LEFT breast:  - Heterogeneously enhancing irregular mass in the subareolar left breast, 5.0 x 4.9 x 4.9 cm, with associated nipple retraction and nipple/areolar involvement.  This mass extends superiorly through 11: positions, from anterior to mid depth.  The [employer] breast biopsy clip (which showed LCIS) is 1.5 cm posterosuperior to this mass.  - Multiple suspicious left level 1 axillary lymph node noted, including biopsy-proven metastatic node with indwelling biopsy marker, biopsied node measures 1.3 x 1.0 cm  - Heterogeneous marrow appearance of sternum and ribs with heterogeneous enhancement and a peripheral enhancing focus within the mid body.  IMPRESSION:  1. Upon further review of previous imaging and pathology results,  recommend repeat ultrasound guided large core-needle biopsy of the  discordant dominant left breast mass given metastatic left axillary  lymph node and superoposteriorly displaced left breast biopsy marker.   2. Nonspecific heterogeneous enhancement of the sternum and ribs.  Consider nuclear medicine bone scan    Lifetime estrogen exposure:  Menarche: 13   Last menstrual period: 49   Age of first pregnancy: 18   Number of pregnancies: 2 (no living children)   Weight gain: 20lb weight gain within a year  Exposure to exogenous estrogen: vaginal atrophy with conjugated estrogen vaginal cream use x4 years (intermittent), has not used it since 9/23. Birth control for about 13-15 years from her 20s-30s.      Pt reports 55lb weight loss in 1.5  years, this was intentional, was following weight watchers program (23 points available per day) 230lb->170lb->190lb (gained about 20lbs). Pt was walking on the treadmill and outside daily, about 30 mins to 1.5 miles.    10/23 labs:  AST 79, ALT 91,   CA 15-3 261    10/23 PET/CT:  Innumerable foci of FDG avid lesions are seen throughout the osseous  structures of the head and neck, most pronounced on the calvarium and  cervical spine, with prominently sclerotic appearance on CT correlate.  For example:  -Right frontal bone, SUV max 5.31.  -Left lateral mass of the atlas, SUV max 15.0  -Angle of the left mandible, SUV max 9.6  -C7 vertebral body, SUV max 17.7    Innumerable variable sized FDG avid lesions throughout the axial and  appendicular spine, including but not limited to the cervical,  thoracic, and lumbar spine, multilevel bilateral ribs, sternum,  bilateral scapula, bilateral humeri, clavicles, pelvis, and bilateral  femurs. A few of these lesions are described below:  -Large FDG avid sclerotic 3.4 cm lesion within the proximal left  humeral head, SUV max 17.24  -Sternal body, SUV max 20.35  -L2 vertebral body, SUV max 14.3 without  -Large ill-defined sclerotic lesion in the sacral body measuring up to  at least 5.9 cm, SUV max 16.84  -FDG avid focus within the left femoral head, SUV  max 13.65 without CT correlate.    Large irregular soft tissue FDG avid mass within the left breast   measuring approximately 3.2 x 2.7 cm with  extension into the superficial soft tissues and associated left nipple  retraction, SUV max 12.36 additional FDG avid. Left axillary lymph  nodes, not definitively enlarged by short axis size criteria, for  example, SUV max 5.93.    The liver is unremarkable.     Solid 3 mm non-FDG avid pulmonary nodule within the  right middle lobe    - 10/23 MRI brain:  - extensive osseous metastatic disease involving the calvarium, skull base w/involvement of occipital condyles, C1 and C2  vertebral bodies, and likely the mandible  -  Dominant calvarial lesions are located in the right frontal calvarium and right temporal calvarium without definite breach of the  inner or outer tables of the calvarium. There is a suggestion of mild asymmetric linear extra-axial enhancement deep to the dominant right temporal calvarial lesion along the dural margin, though this may be vascular in etiology.  - No abnormal parenchymal or leptomeningeal enhancement.   - sequelae of mild chronic microvascular ischemic disease. Mild generalized cerebral atrophy.       -supposed to start ribociclib 10/30/23 however, noted to have significantly elevated LFTS (10/26/23 , , alk phos 152)    10/17/23: AST 79, ALT 91, alk phos 116, t bili 0.5  10/26/23 , , alk phos 152  10/30/23: , , alk phos 178, coags WNL, ribociclib was not started, letrozole started, atorvastatin held  11/7/23: AST 96, , alk phos 169, MRI liver negative for mets  11/17/23: AST 81, , alk phos 163  11/27/23: AST 55, ALT 90, alk phos 128- started ribociclib 400mg  12/4/23: AST 26, ALT 35, alk phos 118    - 11/27/23 started ribociclib+ letrozole  - 11/27/23-5/30/24 ribociclib+ letrozole; C1D1 11/27/23 started ribociclib 400mg PO daily when LFTs improved to <3x ULN, C2D1 12/25/23 ribociclib 600mg (day 1-12 only 2/2 palliative RT to sacrum w/Dr. Mandujano 1/9/24- 1/22/24 3000cGy in 10 fractions), C3D1 1/30/24, C4D1 3/6/24 (deferred 1 week 2/2 2/27/24 ANC 0.8->3/5/24 ANC 1.5), C5D1 4/3/24, C6D1 5/1/24, C7D1 5/30/24->progression of disease in bones  - 1/9/24-1/22/24 palliative RT to sacrum w/Dr. Mandujano- 3000cGy in 10 fractions    - 2/17/24 PET CT CAP  PET/CT FINDINGS: Most of the extensive skeletal metastases have become sclerotic and non-FDG avid and those which remain avid have decreased in activity, for example in the proximal right humerus from SUVmax 19.3 to 13.9, in the proximal right femur from 16.5 to 12.2, and  in the L2 vertebral body from 14.3 to 10.7.      CT FINDINGS: Bilateral lens replacements. Calcified atherosclerosis, including moderate right coronary artery disease. Splenule. Cholelithiasis. Retroaortic left renal vein. Pelvic phleboliths. Appendectomy. Ventral hernia repair with mesh. Persistent   metopic suture. T11 vertebral body hemangioma. Mild degenerative change in the spine.                                                       IMPRESSION:  Partial response       - 5/11/24 PET CT CAP:  Redemonstrated innumerable FDG avid osseous lesions, some of which are increasing in metabolic activity and suggest mild progression of disease including representative examples involving the inferior sternum (Max SUV 16.5, previously 7.9) and left distal femur (Max SUV 20.8, previously 11.3).    - 5/24/24 MRI brain w/wo contrast:  IMPRESSION:   1. Presumed osseous metastases involving the occipital condyles  bilaterally, C1, C2 and C3 vertebrae again noted.  2. Questionable osseous metastatic disease involving the parietal  bones bilaterally again noted without change.  3. Diffuse cerebral volume loss and cerebral white matter changes  consistent with chronic small vessel ischemic disease. No evidence for  intracranial metastases.  4. No evidence for acute intracranial pathology.    INTERIM HISTORY:  - 11/27/23-5/30/24 ribociclib+ letrozole; C1D1 11/27/23 started ribociclib 400mg PO daily when LFTs improved to <3x ULN, C2D1 12/25/23 ribociclib 600mg (day 1-12 only 2/2 palliative RT to sacrum w/Dr. Mandujano 1/9/24- 1/22/24 3000cGy in 10 fractions), C3D1 1/30/24, C4D1 3/6/24 (deferred 1 week 2/2 2/27/24 ANC 0.8->3/5/24 ANC 1.5), C5D1 4/3/24, C6D1 5/1/24, C7D1 5/30/24->progression of disease in bones  - 6/14/24 everolimus+ exemestane    REGIMEN:  Everolimus + exemestane   C1D1 6/14/24  everolimus 10mg PO daily   exemestane 25mg PO daily  PLUS dexamethasone mouthwash     - baseline lipid panel and hgbA1c pending    Treatment  Related AE:  - gum soreness- posterior, b/l- Left where she had tooth pulled- before starting everolimus, right- 1 week after starting everolimus, bleeding w/brushing teeth, due to see dentist 9/24 (q6 month follow up)  - mouth sore- has not used dexamethasone mouth wash  - hld- 6/24 total chol 299, - previously was on atorvastatin that was stopped when she started ribociclib   - hot flashes- 3/24 10x/day, last a few mins, daily, affecting quality of life including sleep; 4/24 started effexor 37.5mg x1 week, then 75mg thereafter and hot flashes improved to 2-3/day; no difference since starting exemestane  - Left posterior tibial DVT- 1/19/24 sx started- Left foot pain and swelling, 1/22/24 pt called in, 1/22/24 left LE doppler: DVT in 1 of 2 left distal posterior tibial veins, started on eliquis and stopped naproxen; no issues with bleeding but does have intermitting bruising when she bumps her hand  - blurry vision b/l- last saw eye doctor 3/23 and has trifocal glasses. She has dry eyes and uses lubricating eye drops., 10/23 MRI brain as above. eye doctor visit 4/3/24- got new glasses rx, 5/23/24 sent message regarding increased blurred vision- remain constant despite time of day, near vision, far vision, or the use of her corrective lenses; 5/24/24 MRI brain negative for intraparenchymal mets, osseous mets stable, still having intermittent blurred vision, lasts anywhere from 1-3 days  - macrocytic anemia- hgb 11, 5/24 B12- 298, TSH 1.19, absolute retic 0.071, ferritin 355, iron sat 18, TIBC 307, iron 56; RBC folate next labs, start B12 SL [id number] daily and oral ferrous sulfate 325mg every other day 6/24        - 6/24 IR requested PET Overread:  - Relatively stable hypermetabolic left breast mass having SUV max of  5.7, previously, 6.7  - Redemonstration of hypermetabolic innumerable osseous metastatic  lesions following the axial and appendicular skeleton with involvement  of the marrow cavity in the long  bones. These are predominantly  sclerotic with few lytic appearing lesions. Few lesions have increased  uptake compared to prior exam, a few have slightly decreased uptake ,  few remain overall unchanged. As index lesions:     Increased uptake:  -C2 vertebral body lesion with SUV max 14.4, previously 8.3. 1a. Of note - hypermetabolic C2 lesion with the posterior cortical breakthrough, no significant spinal cord impingement at this time but potentially at risk in the future.     Decreased uptake:  -L3 vertebral body with SUV max 7.4, previously 10.3.   -Sternal body lesion with SUV max 6.7, previously 10.9.   -Left ischial  tuberosity lesion with SUV max 3.5, previously 9.3.     Relatively stable uptake:  -Left humeral head lesion having SUV max 10.4, previously 11.1.   -Left sacral ala lesion with SUV max 7.2, previously 8.7.    Addendum: Potential biopsy sites:   Another lesion that has a slight increase in uptake is in the left  femur, lateral to the lesser trochanter (series 4 image 24) max SUV 23  (prior 18).  The hypermetabolic portion is not sclerotic, but it is in  the lateral aspect of the femur, fairly good size and the lesser  trochanter could serve as a landmark.     The left humeral head lesion may also be a potential biopsy site max  SUV 10.4 (not significantly changed from prior 11). The lesion is in  the lateral aspect of the sclerotic lesion when the patient's arms are  in the up position.     The C2 lesion may be more difficult to assess given its anterior  medial position (max suv 14).    - 6/24 germline genetic testing: NEGATIVE for BRENT, BARD1, BRCA1, BRCA2, CDH1, CHEK2, NF1, PALB2, PTEN, STK11, TP53.             REVIEW OF SYSTEMS:   A 14 point ROS was reviewed with pertinent positives and negatives in the HPI.        HOME MEDICATIONS:  Current Outpatient Medications   Medication Sig Dispense Refill    apixaban ANTICOAGULANT (ELIQUIS) 5 MG tablet Take 1 tablet (5 mg) by mouth 2 times daily 60  tablet 11    betamethasone dipropionate (DIPROSONE) 0.05 % external lotion Apply topically 2 times daily 60 mL 3    cyclobenzaprine (FLEXERIL) 5 MG tablet TAKE 1 TABLET(5 MG) BY MOUTH AT BEDTIME 90 tablet 3    dexAMETHasone alcohol-free (DECADRON) 0.1 MG/ML solution Swish 10 mL in mouth for 2 min and spit, four times daily. 1200 mL 1    docusate sodium (COLACE) 100 MG capsule Take 1 capsule (100 mg) by mouth 3 times daily as needed for constipation 90 capsule 3    everolimus (AFINITOR) 10 MG tablet Take 1 tablet (10 mg) by mouth daily for 28 days Avoid grapefruit and grapefruit juice. Take with or without food, but should be consistent. 28 tablet 0    exemestane (AROMASIN) 25 MG tablet Take 1 tablet (25 mg) by mouth daily for 28 days Take after a meal. 28 tablet 0    fish oil-omega-3 fatty acids 1000 MG capsule Take 2 g by mouth daily      hydroxychloroquine (PLAQUENIL) 200 MG tablet TAKE 2 AND 1/2 TABLETS BY MOUTH EVERY  tablet 3    loperamide (IMODIUM A-D) 2 MG tablet When diarrhea starts take 2 tabs (4mg), then with each additional episode of diarrhea take 1 additional tab and if needing more than 8 tabs in 24 hrs call oncology 30 tablet 3    magnesium 250 MG tablet Take 1 tablet by mouth daily      ondansetron (ZOFRAN ODT) 4 MG ODT tab Take 1-2 tablets (4-8 mg) by mouth every 8 hours as needed for nausea 30 tablet 2    ondansetron (ZOFRAN) 4 MG tablet Take 1 tablet (4 mg) by mouth every 6 hours as needed for nausea 30 tablet 3    prochlorperazine (COMPAZINE) 10 MG tablet Take 1 tablet (10 mg) by mouth every 6 hours as needed for nausea or vomiting 30 tablet 2    venlafaxine (EFFEXOR XR) 75 MG 24 hr capsule Take 1 capsule (75 mg) by mouth daily 30 capsule 11         ALLERGIES:  Allergies   Allergen Reactions    Ciprofloxacin      hives and was on flagyl too    Metronidazole      hives and was on cipro too         PAST MEDICAL HISTORY:  Past Medical History:   Diagnosis Date    Arthritis 2006    Breast  cancer metastasized to bone (H) 10/12/2023    Chronic depressive personality disorder     Dysplasia of cervix (uteri)     Cryotherapy    Female infertility of unspecified origin     Glaucoma     Rheumatoid arthritis (H)          PAST SURGICAL HISTORY:  Past Surgical History:   Procedure Laterality Date    COLONOSCOPY      COLONOSCOPY N/A 2022    Procedure: COLONOSCOPY, WITH POLYPECTOMY AND BIOPSY;  Surgeon: Gagandeep Patterson MD;  Location:  GI    HC INTRODUCE CATH FALLOPIAN TUBE, RE-OPEN/DIAGNOSIS      HERNIA REPAIR, INCISIONAL  2009    JOINT REPLACEMENT Right 2017    knee    TONSILLECTOMY      ZZC APPENDECTOMY  2003    ZZC REMV CATARACT INTRACAP,INSERT LENS  2003    right         SOCIAL HISTORY:  Social History     Socioeconomic History    Marital status:      Spouse name: Bandar    Number of children: 1    Years of education: Not on file    Highest education level: Not on file   Occupational History    Not on file   Tobacco Use    Smoking status: Former     Current packs/day: 0.00     Average packs/day: 0.5 packs/day for 25.0 years (12.5 ttl pk-yrs)     Types: Cigarettes     Start date: 1992     Quit date: 2017     Years since quittin.7    Smokeless tobacco: Never   Vaping Use    Vaping status: Never Used   Substance and Sexual Activity    Alcohol use: Not Currently    Drug use: Yes     Types: Marijuana    Sexual activity: Yes     Partners: Male     Birth control/protection: None   Other Topics Concern    Parent/sibling w/ CABG, MI or angioplasty before 65F 55M? Yes   Social History Narrative    Not on file     Social Determinants of Health     Financial Resource Strain: Not on file   Food Insecurity: Not on file   Transportation Needs: Not on file   Physical Activity: Not on file   Stress: Not on file   Social Connections: Not on file   Interpersonal Safety: Not on file   Housing Stability: Not on file         FAMILY HISTORY:  Family History   Problem Relation  Age of Onset    Heart Disease Mother     Lipids Mother     Hyperlipidemia Mother     Genitourinary Problems Father         prostate    Genetic Disorder Father         ulcer    Hypertension Father     Lipids Father     Prostate Cancer Father     Hyperlipidemia Sister     Hyperlipidemia Brother     Other Cancer Brother         Neck Cancer HPV (+)    Heart Disease Maternal Grandmother     Cerebrovascular Disease Maternal Grandmother     Heart Disease Maternal Grandfather     Heart Disease Maternal Uncle     Heart Disease Maternal Uncle         x  3    Cancer Nephew         Sister's Son       Family history of:  1.  Breast cancer including male breast cancer: negative   2. Ovarian cancer: negative  3.  Pancreatic cancer: negative  4.  Prostate cancer: father- ?in his 50s, other details unknown  5. Diffuse gastric cancer (if lobular breast CA): negative  6. Uterine cancer: negative  7. Colon cancer:  negative  6. Brother- head and neck, HPV +, had surgery, clinical trial and now cancer free      PHYSICAL EXAM:  Vital signs:  LMP 11/22/2011 (Exact Date)          LABS:   Latest Reference Range & Units 06/26/24 07:41   Sodium 135 - 145 mmol/L 142   Potassium 3.4 - 5.3 mmol/L 4.2   Chloride 98 - 107 mmol/L 106   Carbon Dioxide (CO2) 22 - 29 mmol/L 25   Urea Nitrogen 8.0 - 23.0 mg/dL 15.0   Creatinine 0.51 - 0.95 mg/dL 0.95   GFR Estimate >60 mL/min/1.73m2 67   Calcium 8.8 - 10.2 mg/dL 9.0   Anion Gap 7 - 15 mmol/L 11   Albumin 3.5 - 5.2 g/dL 3.8   Protein Total 6.4 - 8.3 g/dL 6.7   Alkaline Phosphatase 40 - 150 U/L 74   ALT 0 - 50 U/L 19   AST 0 - 45 U/L 27   Bilirubin Total <=1.2 mg/dL 0.2   Cholesterol <200 mg/dL 299 (H)   Patient Fasting?  Yes  Yes   Glucose 70 - 99 mg/dL 111 (H)   HDL Cholesterol >=50 mg/dL 63   LDL Cholesterol Calculated <=100 mg/dL 215 (H)   Non HDL Cholesterol <130 mg/dL 236 (H)   Triglycerides <150 mg/dL 105   WBC 4.0 - 11.0 10e3/uL 2.6 (L)   Hemoglobin 11.7 - 15.7 g/dL 11.4 (L)   Hematocrit 35.0 -  "47.0 % 34.3 (L)   Platelet Count 150 - 450 10e3/uL 114 (L)   RBC Count 3.80 - 5.20 10e6/uL 3.20 (L)   MCV 78 - 100 fL 107 (H)   MCH 26.5 - 33.0 pg 35.6 (H)   MCHC 31.5 - 36.5 g/dL 33.2   RDW 10.0 - 15.0 % 13.7   % Neutrophils % 46   % Lymphocytes % 35   % Monocytes % 14   % Eosinophils % 3   % Basophils % 2   Absolute Basophils 0.0 - 0.2 10e3/uL 0.1   Absolute Eosinophils 0.0 - 0.7 10e3/uL 0.1   Absolute Immature Granulocytes <=0.4 10e3/uL 0.0   Absolute Lymphocytes 0.8 - 5.3 10e3/uL 0.9   Absolute Monocytes 0.0 - 1.3 10e3/uL 0.4   % Immature Granulocytes % 0   Absolute Neutrophils 1.6 - 8.3 10e3/uL 1.2 (L)   Absolute NRBCs 10e3/uL 0.0   NRBCs per 100 WBC <1 /100 0   (H): Data is abnormally high  (L): Data is abnormally low    PATHOLOGY:    IMAGING:        ASSESSMENT/PLAN:  Kesha Zacarias is a 64 year old female with:    # de tavon metastatic left breast invasive lobular carcinoma, ER positive, TX positive, her2 negative on FISH  #bone metastases   - pt has de tavon metastatic invasive lobular breast cancer, ER positive, TX positive, her2 negative. Pt does not have visceral crisis, she mainly has extensive bone metastases and LN metastases   -9/23 diagnostic left breast mammogram, left breast targeted ultrasound showed 3.0 x 1.7 x 3.9 ill-defined shadowing hypoechoic mass, few small lymph nodes in the left axilla, one with cortical thickening measuring 0.7 x 0.6 cm  -9/23 ultrasound-guided LEFT breast core biopsy of 12:00 lesion with clip placement, \"Postbiopsy unilateral digital mammogram of the left breast showed the clip to be at the expected biopsy site\" AND ultrasound-guided left axillary lymph node biopsy of lymph node with cortical thickening, PATHOLOGY:  A.  Breast, left, 12:00, biopsy:Lobular carcinoma in situ, Multiple foci. Invasive carcinoma is not identified.   B.  Lymph node, left axillary, biopsy:  -Positive for metastatic lobular carcinoma, grade 2, 0.7 cm, negative GREGG, Estrogen receptor: Positive " (91 to 100%, strong), Progesterone receptor: Positive (91 to 100%, strong), HER2 2+ IHC, FISH negative  -10/23 MRI bilateral breast:  - Heterogeneously enhancing irregular mass in the subareolar left breast, 5.0 x 4.9 x 4.9 cm, with associated nipple retraction and nipple/areolar involvement.  This mass extends superiorly through 11: positions, from anterior to mid depth.  The [employer] breast biopsy clip (which showed LCIS) is 1.5 cm posterosuperior to this mass.  - Multiple suspicious left level 1 axillary lymph node noted, including biopsy-proven metastatic node with indwelling biopsy marker, biopsied node measures 1.3 x 1.0 cm  - Heterogeneous marrow appearance of sternum and ribs with heterogeneous enhancement and a peripheral enhancing focus within the mid body.    - 10/23 PET/CT:  Innumerable foci of FDG avid lesions are seen throughout the osseous structures of the head and neck, most pronounced on the calvarium and cervical spine, with prominently sclerotic appearance on CT correlate.  For example:  -Right frontal bone, SUV max 5.31.  -Left lateral mass of the atlas, SUV max 15.0  -Angle of the left mandible, SUV max 9.6  -C7 vertebral body, SUV max 17.7    Innumerable variable sized FDG avid lesions throughout the axial and appendicular spine, including but not limited to the cervical, thoracic, and lumbar spine, multilevel bilateral ribs, sternum, bilateral scapula, bilateral humeri, clavicles, pelvis, and bilateral femurs. A few of these lesions are described below:  -Large FDG avid sclerotic 3.4 cm lesion within the proximal left humeral head, SUV max 17.24  -Sternal body, SUV max 20.35  -L2 vertebral body, SUV max 14.3 without  -Large ill-defined sclerotic lesion in the sacral body measuring up to at least 5.9 cm, SUV max 16.84  -FDG avid focus within the left femoral head, SUV max 13.65 without CT correlate.    Large irregular soft tissue FDG avid mass within the left breast measuring  approximately 3.2 x 2.7 cm with  extension into the superficial soft tissues and associated left nipple retraction, SUV max 12.36 additional FDG avid. Left axillary lymph nodes, not definitively enlarged by short axis size criteria, for example, SUV max 5.93.    - 10/23 MRI brain:  - extensive osseous metastatic disease involving the calvarium, skull base w/involvement of occipital condyles, C1 and C2 vertebral bodies, and likely the mandible  -  Dominant calvarial lesions are located in the right frontal calvarium and right temporal calvarium without definite breach of the inner or outer tables of the calvarium. There is a suggestion of mild asymmetric linear extra-axial enhancement deep to the dominant right temporal calvarial lesion along the dural margin, though this may be vascular in etiology.  - No abnormal parenchymal or leptomeningeal enhancement.   - sequelae of mild chronic microvascular ischemic disease. Mild generalized cerebral atrophy.     -10/23 DEXA: osteopenia (T score -2.0 lumbar spine, -2.0 left hip femoral neck, The 10 year probability of major osteoporotic fracture is 12.5%, and of hip fracture is 1.8%, based on left femoral neck BMD)    - 11/23 orthopedic consult to determine stability of left femur and fracture risk given presence of mets in left femoral head: do not see any areas of impending cortical erosion or sites of high risk for fracture, observe    - 6/24 germline genetic testing: NEGATIVE for BRENT, BARD1, BRCA1, BRCA2, CDH1, CHEK2, NF1, PALB2, PTEN, STK11, TP53.     TREATMENT:  - 11/27/23-5/30/24 ribociclib+ letrozole; C1D1 11/27/23 started ribociclib 400mg PO daily when LFTs improved to <3x ULN, C2D1 12/25/23 ribociclib 600mg (day 1-12 only 2/2 palliative RT to sacrum w/Dr. Mandujano 1/9/24- 1/22/24 3000cGy in 10 fractions), C3D1 1/30/24, C4D1 3/6/24 (deferred 1 week 2/2 2/27/24 ANC 0.8->3/5/24 ANC 1.5), C5D1 4/3/24, C6D1 5/1/24, C7D1 5/30/24->progression of disease in bones  - 6/14/24  everolimus+ exemestane      REGIMEN:  Everolimus + exemestane   C1D1 6/14/24  everolimus 10mg PO daily   exemestane 25mg PO daily    Treatment Related AE:  - neutropenia- monitor for neutropenic fever  - thrombocytopenia- monitor for bleeding while on eliquis  - macrocytic anemia- hgb 11, 5/24 B12- 298, TSH 1.19, absolute retic 0.071, ferritin 355, iron sat 18, TIBC 307, iron 56; RBC folate next labs, start B12 SL [id number] daily and oral ferrous sulfate 325mg every other day 6/24  - gum soreness- Hold zometa, see dentist  - mouth sore- start dexamethasone mouth wash, rx magic mouthwash  - hld- 6/24 total chol 299, - start atorvastatin 10mg (if LFTs remain stable, can increase)   - hot flashes- continue effexor  - left posterior DVT- continue eliquis  - intermittent blurred vision- stable, no brain mets on 5/24 MRI brain        IMAGING:  - check MRI cervical spine  - next PET due early 9/24- ordered today     - 6/24 IR requested PET Overread:  - Relatively stable hypermetabolic left breast mass having SUV max of  5.7, previously, 6.7  - Redemonstration of hypermetabolic innumerable osseous metastatic  lesions following the axial and appendicular skeleton with involvement  of the marrow cavity in the long bones. These are predominantly  sclerotic with few lytic appearing lesions. Few lesions have increased  uptake compared to prior exam, a few have slightly decreased uptake ,  few remain overall unchanged. As index lesions:     Increased uptake:  -C2 vertebral body lesion with SUV max 14.4, previously 8.3. 1a. Of note - hypermetabolic C2 lesion with the posterior cortical breakthrough, no significant spinal cord impingement at this time but potentially at risk in the future.     Decreased uptake:  -L3 vertebral body with SUV max 7.4, previously 10.3.   -Sternal body lesion with SUV max 6.7, previously 10.9.   -Left ischial  tuberosity lesion with SUV max 3.5, previously 9.3.     Relatively stable uptake:  -Left  humeral head lesion having SUV max 10.4, previously 11.1.   -Left sacral ala lesion with SUV max 7.2, previously 8.7.    Addendum: Potential biopsy sites:   Another lesion that has a slight increase in uptake is in the left  femur, lateral to the lesser trochanter (series 4 image 24) max SUV 23  (prior 18).  The hypermetabolic portion is not sclerotic, but it is in  the lateral aspect of the femur, fairly good size and the lesser  trochanter could serve as a landmark.     The left humeral head lesion may also be a potential biopsy site max  SUV 10.4 (not significantly changed from prior 11). The lesion is in  the lateral aspect of the sclerotic lesion when the patient's arms are  in the up position.     The C2 lesion may be more difficult to assess given its anterior  medial position (max suv 14).    - 5/11/24 PET CT CAP:  Redemonstrated innumerable FDG avid osseous lesions, some of which are increasing in metabolic activity and suggest mild progression of disease including representative examples involving the inferior sternum (Max SUV 16.5, previously 7.9) and left distal femur (Max SUV 20.8, previously 11.3).    - 5/24/24 MRI brain w/wo contrast:  IMPRESSION:   1. Presumed osseous metastases involving the occipital condyles bilaterally, C1, C2 and C3 vertebrae again noted.  2. Questionable osseous metastatic disease involving the parietal bones bilaterally again noted without change.  3. Diffuse cerebral volume loss and cerebral white matter changes consistent with chronic small vessel ischemic disease. No evidence for intracranial metastases.  4. No evidence for acute intracranial pathology.    - 2/17/24 PET CT CAP:  Most of the extensive skeletal metastases have become sclerotic and non-FDG avid and those which remain avid have decreased in activity, for example in the proximal right humerus from SUVmax 19.3 to 13.9, in the proximal right femur from 16.5 to 12.2, and in the L2 vertebral body from 14.3 to  10.7.    TUMOR MARKERS:  10/23 CA 15-3= 261  12/23 CA 15-3= 553  1/24 CA 15-3= 480  2/24 CA 15-3 338  5/24 CA 15-3=179  Repeat CA 15-3 pending 6/24    OTHER:  - pt pending CT guided bone biopsy of left proximal femur lesion w/IR for additional tissue to test for the following:  - BRAF, PIK3CA, AKT1, PTEN, ESR1 NGS  - NTRK, RET fusion  - MSI   - PDL1 w/TMB   - continue zometa q4 weeks - zometa #1 1/4/24, last zometa 5/30/24, next due 6/27/24- on hold   - continue calcium and vitamin D    # osteopenia  - 10/23 DEXA: osteopenia, T score - 2.0 left hip femoral neck and lumbar spine   - repeat DEXA 10/2025  - continue continue calcium and vitamin D  - zometa as above    # left posterior tibial DVT  -1/19/24 sx started- Left foot pain and swelling  -1/22/24 pt called in, 1/22/24 left LE doppler: DVT in 1 of 2 left distal posterior tibial veins  - continue eliquis, refilled due 5/2025      # RA  - on plaquenil     HOLD zometa 6/27/24  RTC 7/12 for follow up with JUAN, labs prior to visit  RTC 8/9 for follow up with me, labs prior to visit  RTC 9/6 for follow up with JUAN, labs prior to visit  RTC 10/4 for follow up with physician, labs prior to visit    Mary Maravilla DO  Hematology/Oncology  [facility]

## 2024-06-26 NOTE — TELEPHONE ENCOUNTER
Spoke with IR regarding need for scheduling, IR states LVM to return call. Spoke with patient to provided IR contact information #[phone]. This RN explained patient can pick date with  that works best for her. Patient was agreeable to plan. No further questions or concerns.  Felisha Cueto RNCC

## 2024-06-26 NOTE — LETTER
6/26/2024      Kesha Zacarias  [address] [address] [place] [id number]      Dear Colleague,    Thank you for referring your patient, Kesha Zacarias, to the [facility] [place]. Please see a copy of my visit note below.    Video-Visit Details     Video Start Time: 8:51AM     Type of service:  Video Visit     Video End Time: 9:19AM    Originating Location (pt. Location): Home     Distant Location (provider location):  [facility] off site     Platform used for Video Visit: [facility]    Hematology/Oncology Established Patient Follow Up Note      Today's Date: 6/26/24    Reason for follow up: metastatic breast cancer    HISTORY OF PRESENT ILLNESS: Kesha Zacarias is a 64 year old female who presents for follow up    Patient has medical history including rheumatoid arthritis, hyperlipidemia, osteoarthritis, bilateral cataracts and glaucoma s/p surgery, endocervical polyp s/p resection, vaginal atrophy with conjugated estrogen vaginal cream use, colon polyp (hyperplastic polyp and tubular adenoma), seasonal depression, history of tobacco use ([age] y/o, about 1 ppd).    Regarding RA:  -dx over a decade ago, on plaquenil, managed by PCP  - arthritis is most severe in hands>knees, intermittent       - 3/23 bilateral screening mammogram [facility]  - a few months ago, noted nipple retraction  - 9/23 patient palpated mass in retroareolar region of left breast and w/nipple retraction, no discharge, skin thickening, no dimpling, no erythema  - 9/23 diagnostic LEFT breast mammogram: Focal dense tissue with some likely mild architectural distortion, some anterior skin thickening is noted  - 9/23 targeted LEFT breast ultrasound: Ill-defined shadowing hypoechoic mass, 3.0 x 1.7 x 3.9 cm.  In left axilla-few small lymph nodes, 1 normal-sized lymph node with cortical thickening, 0.7 x 0.6 cm.  -9/23 ultrasound-guided LEFT breast core biopsy of 12:00 lesion with clip  "placement, \"Postbiopsy unilateral digital mammogram of the left breast showed the clip to be at the expected biopsy site\" AND ultrasound-guided left axillary lymph node biopsy of lymph node with cortical thickening  PATHOLOGY  A.  Breast, left, 12:00, biopsy:  -Lobular carcinoma in situ.-Multiple foci  -Invasive carcinoma is not identified.     B.  Lymph node, left axillary, biopsy:  -Positive for metastatic lobular carcinoma, grade 2  -Largest metastatic focus is 7 mm.  -Negative for extranodal extension.  -Breast ancillary testing:  -Estrogen receptor: Positive (91 to 100%, strong)  -Progesterone receptor: Positive (91 to 100%, strong)  -HER2 2+ IHC, FISH negative    -10/23 MRI bilateral breast:  LEFT breast:  - Heterogeneously enhancing irregular mass in the subareolar left breast, 5.0 x 4.9 x 4.9 cm, with associated nipple retraction and nipple/areolar involvement.  This mass extends superiorly through 11: positions, from anterior to mid depth.  The HydroMARK breast biopsy clip (which showed LCIS) is 1.5 cm posterosuperior to this mass.  - Multiple suspicious left level 1 axillary lymph node noted, including biopsy-proven metastatic node with indwelling biopsy marker, biopsied node measures 1.3 x 1.0 cm  - Heterogeneous marrow appearance of sternum and ribs with heterogeneous enhancement and a peripheral enhancing focus within the mid body.  IMPRESSION:  1. Upon further review of previous imaging and pathology results,  recommend repeat ultrasound guided large core-needle biopsy of the  discordant dominant left breast mass given metastatic left axillary  lymph node and superoposteriorly displaced left breast biopsy marker.   2. Nonspecific heterogeneous enhancement of the sternum and ribs.  Consider nuclear medicine bone scan    Lifetime estrogen exposure:  Menarche: 13   Last menstrual period: 49   Age of first pregnancy: 18   Number of pregnancies: 2 (no living children)   Weight gain: 20lb weight gain " within a year  Exposure to exogenous estrogen: vaginal atrophy with conjugated estrogen vaginal cream use x4 years (intermittent), has not used it since 9/23. Birth control for about 13-15 years from her 20s-30s.      Pt reports 55lb weight loss in 1.5 years, this was intentional, was following weight watchers program (23 points available per day) 230lb->170lb->190lb (gained about 20lbs). Pt was walking on the treadmill and outside daily, about 30 mins to 1.5 miles.    10/23 labs:  AST 79, ALT 91,   CA 15-3 261    10/23 PET/CT:  Innumerable foci of FDG avid lesions are seen throughout the osseous  structures of the head and neck, most pronounced on the calvarium and  cervical spine, with prominently sclerotic appearance on CT correlate.  For example:  -Right frontal bone, SUV max 5.31.  -Left lateral mass of the atlas, SUV max 15.0  -Angle of the left mandible, SUV max 9.6  -C7 vertebral body, SUV max 17.7    Innumerable variable sized FDG avid lesions throughout the axial and  appendicular spine, including but not limited to the cervical,  thoracic, and lumbar spine, multilevel bilateral ribs, sternum,  bilateral scapula, bilateral humeri, clavicles, pelvis, and bilateral  femurs. A few of these lesions are described below:  -Large FDG avid sclerotic 3.4 cm lesion within the proximal left  humeral head, SUV max 17.24  -Sternal body, SUV max 20.35  -L2 vertebral body, SUV max 14.3 without  -Large ill-defined sclerotic lesion in the sacral body measuring up to  at least 5.9 cm, SUV max 16.84  -FDG avid focus within the left femoral head, SUV  max 13.65 without CT correlate.    Large irregular soft tissue FDG avid mass within the left breast   measuring approximately 3.2 x 2.7 cm with  extension into the superficial soft tissues and associated left nipple  retraction, SUV max 12.36 additional FDG avid. Left axillary lymph  nodes, not definitively enlarged by short axis size criteria, for  example, SUV max  5.93.    The liver is unremarkable.     Solid 3 mm non-FDG avid pulmonary nodule within the  right middle lobe    - 10/23 MRI brain:  - extensive osseous metastatic disease involving the calvarium, skull base w/involvement of occipital condyles, C1 and C2 vertebral bodies, and likely the mandible  -  Dominant calvarial lesions are located in the right frontal calvarium and right temporal calvarium without definite breach of the  inner or outer tables of the calvarium. There is a suggestion of mild asymmetric linear extra-axial enhancement deep to the dominant right temporal calvarial lesion along the dural margin, though this may be vascular in etiology.  - No abnormal parenchymal or leptomeningeal enhancement.   - sequelae of mild chronic microvascular ischemic disease. Mild generalized cerebral atrophy.       -supposed to start ribociclib 10/30/23 however, noted to have significantly elevated LFTS (10/26/23 , , alk phos 152)    10/17/23: AST 79, ALT 91, alk phos 116, t bili 0.5  10/26/23 , , alk phos 152  10/30/23: , , alk phos 178, coags WNL, ribociclib was not started, letrozole started, atorvastatin held  11/7/23: AST 96, , alk phos 169, MRI liver negative for mets  11/17/23: AST 81, , alk phos 163  11/27/23: AST 55, ALT 90, alk phos 128- started ribociclib 400mg  12/4/23: AST 26, ALT 35, alk phos 118    - 11/27/23 started ribociclib+ letrozole  - 11/27/23-5/30/24 ribociclib+ letrozole; C1D1 11/27/23 started ribociclib 400mg PO daily when LFTs improved to <3x ULN, C2D1 12/25/23 ribociclib 600mg (day 1-12 only 2/2 palliative RT to sacrum w/Dr. Mandujano 1/9/24- 1/22/24 3000cGy in 10 fractions), C3D1 1/30/24, C4D1 3/6/24 (deferred 1 week 2/2 2/27/24 ANC 0.8->3/5/24 ANC 1.5), C5D1 4/3/24, C6D1 5/1/24, C7D1 5/30/24->progression of disease in bones  - 1/9/24-1/22/24 palliative RT to sacrum w/Dr. Mandujano- 3000cGy in 10 fractions    - 2/17/24 PET CT CAP  PET/CT FINDINGS:  Most of the extensive skeletal metastases have become sclerotic and non-FDG avid and those which remain avid have decreased in activity, for example in the proximal right humerus from SUVmax 19.3 to 13.9, in the proximal right femur from 16.5 to 12.2, and in the L2 vertebral body from 14.3 to 10.7.      CT FINDINGS: Bilateral lens replacements. Calcified atherosclerosis, including moderate right coronary artery disease. Splenule. Cholelithiasis. Retroaortic left renal vein. Pelvic phleboliths. Appendectomy. Ventral hernia repair with mesh. Persistent   metopic suture. T11 vertebral body hemangioma. Mild degenerative change in the spine.                                                       IMPRESSION:  Partial response       - 5/11/24 PET CT CAP:  Redemonstrated innumerable FDG avid osseous lesions, some of which are increasing in metabolic activity and suggest mild progression of disease including representative examples involving the inferior sternum (Max SUV 16.5, previously 7.9) and left distal femur (Max SUV 20.8, previously 11.3).    - 5/24/24 MRI brain w/wo contrast:  IMPRESSION:   1. Presumed osseous metastases involving the occipital condyles  bilaterally, C1, C2 and C3 vertebrae again noted.  2. Questionable osseous metastatic disease involving the parietal  bones bilaterally again noted without change.  3. Diffuse cerebral volume loss and cerebral white matter changes  consistent with chronic small vessel ischemic disease. No evidence for  intracranial metastases.  4. No evidence for acute intracranial pathology.    INTERIM HISTORY:  - 11/27/23-5/30/24 ribociclib+ letrozole; C1D1 11/27/23 started ribociclib 400mg PO daily when LFTs improved to <3x ULN, C2D1 12/25/23 ribociclib 600mg (day 1-12 only 2/2 palliative RT to sacrum w/Dr. Mandujano 1/9/24- 1/22/24 3000cGy in 10 fractions), C3D1 1/30/24, C4D1 3/6/24 (deferred 1 week 2/2 2/27/24 ANC 0.8->3/5/24 ANC 1.5), C5D1 4/3/24, C6D1 5/1/24, C7D1  5/30/24->progression of disease in bones  - 6/14/24 everolimus+ exemestane    REGIMEN:  Everolimus + exemestane   C1D1 6/14/24  everolimus 10mg PO daily   exemestane 25mg PO daily  PLUS dexamethasone mouthwash     - baseline lipid panel and hgbA1c pending    Treatment Related AE:  - gum soreness- posterior, b/l- Left where she had tooth pulled- before starting everolimus, right- 1 week after starting everolimus, bleeding w/brushing teeth, due to see dentist 9/24 (q6 month follow up)  - mouth sore- has not used dexamethasone mouth wash  - hld- 6/24 total chol 299, - previously was on atorvastatin that was stopped when she started ribociclib   - hot flashes- 3/24 10x/day, last a few mins, daily, affecting quality of life including sleep; 4/24 started effexor 37.5mg x1 week, then 75mg thereafter and hot flashes improved to 2-3/day; no difference since starting exemestane  - Left posterior tibial DVT- 1/19/24 sx started- Left foot pain and swelling, 1/22/24 pt called in, 1/22/24 left LE doppler: DVT in 1 of 2 left distal posterior tibial veins, started on eliquis and stopped naproxen; no issues with bleeding but does have intermitting bruising when she bumps her hand  - blurry vision b/l- last saw eye doctor 3/23 and has trifocal glasses. She has dry eyes and uses lubricating eye drops., 10/23 MRI brain as above. eye doctor visit 4/3/24- got new glasses rx, 5/23/24 sent message regarding increased blurred vision- remain constant despite time of day, near vision, far vision, or the use of her corrective lenses; 5/24/24 MRI brain negative for intraparenchymal mets, osseous mets stable, still having intermittent blurred vision, lasts anywhere from 1-3 days  - macrocytic anemia- hgb 11, 5/24 B12- 298, TSH 1.19, absolute retic 0.071, ferritin 355, iron sat 18, TIBC 307, iron 56; RBC folate next labs, start B12 SL [id number] daily and oral ferrous sulfate 325mg every other day 6/24        - 6/24 IR requested PET  Overread:  - Relatively stable hypermetabolic left breast mass having SUV max of  5.7, previously, 6.7  - Redemonstration of hypermetabolic innumerable osseous metastatic  lesions following the axial and appendicular skeleton with involvement  of the marrow cavity in the long bones. These are predominantly  sclerotic with few lytic appearing lesions. Few lesions have increased  uptake compared to prior exam, a few have slightly decreased uptake ,  few remain overall unchanged. As index lesions:     Increased uptake:  -C2 vertebral body lesion with SUV max 14.4, previously 8.3. 1a. Of note - hypermetabolic C2 lesion with the posterior cortical breakthrough, no significant spinal cord impingement at this time but potentially at risk in the future.     Decreased uptake:  -L3 vertebral body with SUV max 7.4, previously 10.3.   -Sternal body lesion with SUV max 6.7, previously 10.9.   -Left ischial  tuberosity lesion with SUV max 3.5, previously 9.3.     Relatively stable uptake:  -Left humeral head lesion having SUV max 10.4, previously 11.1.   -Left sacral ala lesion with SUV max 7.2, previously 8.7.    Addendum: Potential biopsy sites:   Another lesion that has a slight increase in uptake is in the left  femur, lateral to the lesser trochanter (series 4 image 24) max SUV 23  (prior 18).  The hypermetabolic portion is not sclerotic, but it is in  the lateral aspect of the femur, fairly good size and the lesser  trochanter could serve as a landmark.     The left humeral head lesion may also be a potential biopsy site max  SUV 10.4 (not significantly changed from prior 11). The lesion is in  the lateral aspect of the sclerotic lesion when the patient's arms are  in the up position.     The C2 lesion may be more difficult to assess given its anterior  medial position (max suv 14).    - 6/24 germline genetic testing: NEGATIVE for BRENT, BARD1, BRCA1, BRCA2, CDH1, CHEK2, NF1, PALB2, PTEN, STK11, TP53.             REVIEW OF  SYSTEMS:   A 14 point ROS was reviewed with pertinent positives and negatives in the HPI.        HOME MEDICATIONS:  Current Outpatient Medications   Medication Sig Dispense Refill     apixaban ANTICOAGULANT (ELIQUIS) 5 MG tablet Take 1 tablet (5 mg) by mouth 2 times daily 60 tablet 11     betamethasone dipropionate (DIPROSONE) 0.05 % external lotion Apply topically 2 times daily 60 mL 3     cyclobenzaprine (FLEXERIL) 5 MG tablet TAKE 1 TABLET(5 MG) BY MOUTH AT BEDTIME 90 tablet 3     dexAMETHasone alcohol-free (DECADRON) 0.1 MG/ML solution Swish 10 mL in mouth for 2 min and spit, four times daily. 1200 mL 1     docusate sodium (COLACE) 100 MG capsule Take 1 capsule (100 mg) by mouth 3 times daily as needed for constipation 90 capsule 3     everolimus (AFINITOR) 10 MG tablet Take 1 tablet (10 mg) by mouth daily for 28 days Avoid grapefruit and grapefruit juice. Take with or without food, but should be consistent. 28 tablet 0     exemestane (AROMASIN) 25 MG tablet Take 1 tablet (25 mg) by mouth daily for 28 days Take after a meal. 28 tablet 0     fish oil-omega-3 fatty acids 1000 MG capsule Take 2 g by mouth daily       hydroxychloroquine (PLAQUENIL) 200 MG tablet TAKE 2 AND 1/2 TABLETS BY MOUTH EVERY  tablet 3     loperamide (IMODIUM A-D) 2 MG tablet When diarrhea starts take 2 tabs (4mg), then with each additional episode of diarrhea take 1 additional tab and if needing more than 8 tabs in 24 hrs call oncology 30 tablet 3     magnesium 250 MG tablet Take 1 tablet by mouth daily       ondansetron (ZOFRAN ODT) 4 MG ODT tab Take 1-2 tablets (4-8 mg) by mouth every 8 hours as needed for nausea 30 tablet 2     ondansetron (ZOFRAN) 4 MG tablet Take 1 tablet (4 mg) by mouth every 6 hours as needed for nausea 30 tablet 3     prochlorperazine (COMPAZINE) 10 MG tablet Take 1 tablet (10 mg) by mouth every 6 hours as needed for nausea or vomiting 30 tablet 2     venlafaxine (EFFEXOR XR) 75 MG 24 hr capsule Take 1 capsule  (75 mg) by mouth daily 30 capsule 11         ALLERGIES:  Allergies   Allergen Reactions     Ciprofloxacin      hives and was on flagyl too     Metronidazole      hives and was on cipro too         PAST MEDICAL HISTORY:  Past Medical History:   Diagnosis Date     Arthritis 2006     Breast cancer metastasized to bone (H) 10/12/2023     Chronic depressive personality disorder      Dysplasia of cervix (uteri)     Cryotherapy     Female infertility of unspecified origin      Glaucoma      Rheumatoid arthritis (H)          PAST SURGICAL HISTORY:  Past Surgical History:   Procedure Laterality Date     COLONOSCOPY       COLONOSCOPY N/A 2022    Procedure: COLONOSCOPY, WITH POLYPECTOMY AND BIOPSY;  Surgeon: Gagandeep Patterson MD;  Location:  GI     HC INTRODUCE CATH FALLOPIAN TUBE, RE-OPEN/DIAGNOSIS       HERNIA REPAIR, INCISIONAL  2009     JOINT REPLACEMENT Right 2017    knee     TONSILLECTOMY       ZZC APPENDECTOMY  2003     Z REMV CATARACT INTRACAP,INSERT LENS  2003    right         SOCIAL HISTORY:  Social History     Socioeconomic History     Marital status:      Spouse name: Bandar     Number of children: 1     Years of education: Not on file     Highest education level: Not on file   Occupational History     Not on file   Tobacco Use     Smoking status: Former     Current packs/day: 0.00     Average packs/day: 0.5 packs/day for 25.0 years (12.5 ttl pk-yrs)     Types: Cigarettes     Start date: 1992     Quit date: 2017     Years since quittin.7     Smokeless tobacco: Never   Vaping Use     Vaping status: Never Used   Substance and Sexual Activity     Alcohol use: Not Currently     Drug use: Yes     Types: Marijuana     Sexual activity: Yes     Partners: Male     Birth control/protection: None   Other Topics Concern     Parent/sibling w/ CABG, MI or angioplasty before 65F 55M? Yes   Social History Narrative     Not on file     Social Determinants of Health     Financial  Resource Strain: Not on file   Food Insecurity: Not on file   Transportation Needs: Not on file   Physical Activity: Not on file   Stress: Not on file   Social Connections: Not on file   Interpersonal Safety: Not on file   Housing Stability: Not on file         FAMILY HISTORY:  Family History   Problem Relation Age of Onset     Heart Disease Mother      Lipids Mother      Hyperlipidemia Mother      Genitourinary Problems Father         prostate     Genetic Disorder Father         ulcer     Hypertension Father      Lipids Father      Prostate Cancer Father      Hyperlipidemia Sister      Hyperlipidemia Brother      Other Cancer Brother         Neck Cancer HPV (+)     Heart Disease Maternal Grandmother      Cerebrovascular Disease Maternal Grandmother      Heart Disease Maternal Grandfather      Heart Disease Maternal Uncle      Heart Disease Maternal Uncle         x  3     Cancer Nephew         Sister's Son       Family history of:  1.  Breast cancer including male breast cancer: negative   2. Ovarian cancer: negative  3.  Pancreatic cancer: negative  4.  Prostate cancer: father- ?in his 50s, other details unknown  5. Diffuse gastric cancer (if lobular breast CA): negative  6. Uterine cancer: negative  7. Colon cancer:  negative  6. Brother- head and neck, HPV +, had surgery, clinical trial and now cancer free      PHYSICAL EXAM:  Vital signs:  LMP 11/22/2011 (Exact Date)          LABS:   Latest Reference Range & Units 06/26/24 07:41   Sodium 135 - 145 mmol/L 142   Potassium 3.4 - 5.3 mmol/L 4.2   Chloride 98 - 107 mmol/L 106   Carbon Dioxide (CO2) 22 - 29 mmol/L 25   Urea Nitrogen 8.0 - 23.0 mg/dL 15.0   Creatinine 0.51 - 0.95 mg/dL 0.95   GFR Estimate >60 mL/min/1.73m2 67   Calcium 8.8 - 10.2 mg/dL 9.0   Anion Gap 7 - 15 mmol/L 11   Albumin 3.5 - 5.2 g/dL 3.8   Protein Total 6.4 - 8.3 g/dL 6.7   Alkaline Phosphatase 40 - 150 U/L 74   ALT 0 - 50 U/L 19   AST 0 - 45 U/L 27   Bilirubin Total <=1.2 mg/dL 0.2  "  Cholesterol <200 mg/dL 299 (H)   Patient Fasting?  Yes  Yes   Glucose 70 - 99 mg/dL 111 (H)   HDL Cholesterol >=50 mg/dL 63   LDL Cholesterol Calculated <=100 mg/dL 215 (H)   Non HDL Cholesterol <130 mg/dL 236 (H)   Triglycerides <150 mg/dL 105   WBC 4.0 - 11.0 10e3/uL 2.6 (L)   Hemoglobin 11.7 - 15.7 g/dL 11.4 (L)   Hematocrit 35.0 - 47.0 % 34.3 (L)   Platelet Count 150 - 450 10e3/uL 114 (L)   RBC Count 3.80 - 5.20 10e6/uL 3.20 (L)   MCV 78 - 100 fL 107 (H)   MCH 26.5 - 33.0 pg 35.6 (H)   MCHC 31.5 - 36.5 g/dL 33.2   RDW 10.0 - 15.0 % 13.7   % Neutrophils % 46   % Lymphocytes % 35   % Monocytes % 14   % Eosinophils % 3   % Basophils % 2   Absolute Basophils 0.0 - 0.2 10e3/uL 0.1   Absolute Eosinophils 0.0 - 0.7 10e3/uL 0.1   Absolute Immature Granulocytes <=0.4 10e3/uL 0.0   Absolute Lymphocytes 0.8 - 5.3 10e3/uL 0.9   Absolute Monocytes 0.0 - 1.3 10e3/uL 0.4   % Immature Granulocytes % 0   Absolute Neutrophils 1.6 - 8.3 10e3/uL 1.2 (L)   Absolute NRBCs 10e3/uL 0.0   NRBCs per 100 WBC <1 /100 0   (H): Data is abnormally high  (L): Data is abnormally low    PATHOLOGY:    IMAGING:        ASSESSMENT/PLAN:  Kesha Zacarias is a 64 year old female with:    # de tavon metastatic left breast invasive lobular carcinoma, ER positive, MT positive, her2 negative on FISH  #bone metastases   - pt has de tavon metastatic invasive lobular breast cancer, ER positive, MT positive, her2 negative. Pt does not have visceral crisis, she mainly has extensive bone metastases and LN metastases   -9/23 diagnostic left breast mammogram, left breast targeted ultrasound showed 3.0 x 1.7 x 3.9 ill-defined shadowing hypoechoic mass, few small lymph nodes in the left axilla, one with cortical thickening measuring 0.7 x 0.6 cm  -9/23 ultrasound-guided LEFT breast core biopsy of 12:00 lesion with clip placement, \"Postbiopsy unilateral digital mammogram of the left breast showed the clip to be at the expected biopsy site\" AND ultrasound-guided left " axillary lymph node biopsy of lymph node with cortical thickening, PATHOLOGY:  A.  Breast, left, 12:00, biopsy:Lobular carcinoma in situ, Multiple foci. Invasive carcinoma is not identified.   B.  Lymph node, left axillary, biopsy:  -Positive for metastatic lobular carcinoma, grade 2, 0.7 cm, negative GREGG, Estrogen receptor: Positive (91 to 100%, strong), Progesterone receptor: Positive (91 to 100%, strong), HER2 2+ IHC, FISH negative  -10/23 MRI bilateral breast:  - Heterogeneously enhancing irregular mass in the subareolar left breast, 5.0 x 4.9 x 4.9 cm, with associated nipple retraction and nipple/areolar involvement.  This mass extends superiorly through 11: positions, from anterior to mid depth.  The HydroMARK breast biopsy clip (which showed LCIS) is 1.5 cm posterosuperior to this mass.  - Multiple suspicious left level 1 axillary lymph node noted, including biopsy-proven metastatic node with indwelling biopsy marker, biopsied node measures 1.3 x 1.0 cm  - Heterogeneous marrow appearance of sternum and ribs with heterogeneous enhancement and a peripheral enhancing focus within the mid body.    - 10/23 PET/CT:  Innumerable foci of FDG avid lesions are seen throughout the osseous structures of the head and neck, most pronounced on the calvarium and cervical spine, with prominently sclerotic appearance on CT correlate.  For example:  -Right frontal bone, SUV max 5.31.  -Left lateral mass of the atlas, SUV max 15.0  -Angle of the left mandible, SUV max 9.6  -C7 vertebral body, SUV max 17.7    Innumerable variable sized FDG avid lesions throughout the axial and appendicular spine, including but not limited to the cervical, thoracic, and lumbar spine, multilevel bilateral ribs, sternum, bilateral scapula, bilateral humeri, clavicles, pelvis, and bilateral femurs. A few of these lesions are described below:  -Large FDG avid sclerotic 3.4 cm lesion within the proximal left humeral head, SUV max 17.24  -Sternal  body, SUV max 20.35  -L2 vertebral body, SUV max 14.3 without  -Large ill-defined sclerotic lesion in the sacral body measuring up to at least 5.9 cm, SUV max 16.84  -FDG avid focus within the left femoral head, SUV max 13.65 without CT correlate.    Large irregular soft tissue FDG avid mass within the left breast measuring approximately 3.2 x 2.7 cm with  extension into the superficial soft tissues and associated left nipple retraction, SUV max 12.36 additional FDG avid. Left axillary lymph nodes, not definitively enlarged by short axis size criteria, for example, SUV max 5.93.    - 10/23 MRI brain:  - extensive osseous metastatic disease involving the calvarium, skull base w/involvement of occipital condyles, C1 and C2 vertebral bodies, and likely the mandible  -  Dominant calvarial lesions are located in the right frontal calvarium and right temporal calvarium without definite breach of the inner or outer tables of the calvarium. There is a suggestion of mild asymmetric linear extra-axial enhancement deep to the dominant right temporal calvarial lesion along the dural margin, though this may be vascular in etiology.  - No abnormal parenchymal or leptomeningeal enhancement.   - sequelae of mild chronic microvascular ischemic disease. Mild generalized cerebral atrophy.     -10/23 DEXA: osteopenia (T score -2.0 lumbar spine, -2.0 left hip femoral neck, The 10 year probability of major osteoporotic fracture is 12.5%, and of hip fracture is 1.8%, based on left femoral neck BMD)    - 11/23 orthopedic consult to determine stability of left femur and fracture risk given presence of mets in left femoral head: do not see any areas of impending cortical erosion or sites of high risk for fracture, observe    - 6/24 germline genetic testing: NEGATIVE for BRENT, BARD1, BRCA1, BRCA2, CDH1, CHEK2, NF1, PALB2, PTEN, STK11, TP53.     TREATMENT:  - 11/27/23-5/30/24 ribociclib+ letrozole; C1D1 11/27/23 started ribociclib 400mg PO  daily when LFTs improved to <3x ULN, C2D1 12/25/23 ribociclib 600mg (day 1-12 only 2/2 palliative RT to sacrum w/Dr. Mandujano 1/9/24- 1/22/24 3000cGy in 10 fractions), C3D1 1/30/24, C4D1 3/6/24 (deferred 1 week 2/2 2/27/24 ANC 0.8->3/5/24 ANC 1.5), C5D1 4/3/24, C6D1 5/1/24, C7D1 5/30/24->progression of disease in bones  - 6/14/24 everolimus+ exemestane      REGIMEN:  Everolimus + exemestane   C1D1 6/14/24  everolimus 10mg PO daily   exemestane 25mg PO daily    Treatment Related AE:  - neutropenia- monitor for neutropenic fever  - thrombocytopenia- monitor for bleeding while on eliquis  - macrocytic anemia- hgb 11, 5/24 B12- 298, TSH 1.19, absolute retic 0.071, ferritin 355, iron sat 18, TIBC 307, iron 56; RBC folate next labs, start B12 SL [id number] daily and oral ferrous sulfate 325mg every other day 6/24  - gum soreness- Hold zometa, see dentist  - mouth sore- start dexamethasone mouth wash, rx magic mouthwash  - hld- 6/24 total chol 299, - start atorvastatin 10mg (if LFTs remain stable, can increase)   - hot flashes- continue effexor  - left posterior DVT- continue eliquis  - intermittent blurred vision- stable, no brain mets on 5/24 MRI brain        IMAGING:  - check MRI cervical spine  - next PET due early 9/24- ordered today     - 6/24 IR requested PET Overread:  - Relatively stable hypermetabolic left breast mass having SUV max of  5.7, previously, 6.7  - Redemonstration of hypermetabolic innumerable osseous metastatic  lesions following the axial and appendicular skeleton with involvement  of the marrow cavity in the long bones. These are predominantly  sclerotic with few lytic appearing lesions. Few lesions have increased  uptake compared to prior exam, a few have slightly decreased uptake ,  few remain overall unchanged. As index lesions:     Increased uptake:  -C2 vertebral body lesion with SUV max 14.4, previously 8.3. 1a. Of note - hypermetabolic C2 lesion with the posterior cortical breakthrough, no  significant spinal cord impingement at this time but potentially at risk in the future.     Decreased uptake:  -L3 vertebral body with SUV max 7.4, previously 10.3.   -Sternal body lesion with SUV max 6.7, previously 10.9.   -Left ischial  tuberosity lesion with SUV max 3.5, previously 9.3.     Relatively stable uptake:  -Left humeral head lesion having SUV max 10.4, previously 11.1.   -Left sacral ala lesion with SUV max 7.2, previously 8.7.    Addendum: Potential biopsy sites:   Another lesion that has a slight increase in uptake is in the left  femur, lateral to the lesser trochanter (series 4 image 24) max SUV 23  (prior 18).  The hypermetabolic portion is not sclerotic, but it is in  the lateral aspect of the femur, fairly good size and the lesser  trochanter could serve as a landmark.     The left humeral head lesion may also be a potential biopsy site max  SUV 10.4 (not significantly changed from prior 11). The lesion is in  the lateral aspect of the sclerotic lesion when the patient's arms are  in the up position.     The C2 lesion may be more difficult to assess given its anterior  medial position (max suv 14).    - 5/11/24 PET CT CAP:  Redemonstrated innumerable FDG avid osseous lesions, some of which are increasing in metabolic activity and suggest mild progression of disease including representative examples involving the inferior sternum (Max SUV 16.5, previously 7.9) and left distal femur (Max SUV 20.8, previously 11.3).    - 5/24/24 MRI brain w/wo contrast:  IMPRESSION:   1. Presumed osseous metastases involving the occipital condyles bilaterally, C1, C2 and C3 vertebrae again noted.  2. Questionable osseous metastatic disease involving the parietal bones bilaterally again noted without change.  3. Diffuse cerebral volume loss and cerebral white matter changes consistent with chronic small vessel ischemic disease. No evidence for intracranial metastases.  4. No evidence for acute intracranial  pathology.    - 2/17/24 PET CT CAP:  Most of the extensive skeletal metastases have become sclerotic and non-FDG avid and those which remain avid have decreased in activity, for example in the proximal right humerus from SUVmax 19.3 to 13.9, in the proximal right femur from 16.5 to 12.2, and in the L2 vertebral body from 14.3 to 10.7.    TUMOR MARKERS:  10/23 CA 15-3= 261  12/23 CA 15-3= 553  1/24 CA 15-3= 480  2/24 CA 15-3 338  5/24 CA 15-3=179  Repeat CA 15-3 pending 6/24    OTHER:  - pt pending CT guided bone biopsy of left proximal femur lesion w/IR for additional tissue to test for the following:  - BRAF, PIK3CA, AKT1, PTEN, ESR1 NGS  - NTRK, RET fusion  - MSI   - PDL1 w/TMB   - continue zometa q4 weeks - zometa #1 1/4/24, last zometa 5/30/24, next due 6/27/24- on hold   - continue calcium and vitamin D    # osteopenia  - 10/23 DEXA: osteopenia, T score - 2.0 left hip femoral neck and lumbar spine   - repeat DEXA 10/2025  - continue continue calcium and vitamin D  - zometa as above    # left posterior tibial DVT  -1/19/24 sx started- Left foot pain and swelling  -1/22/24 pt called in, 1/22/24 left LE doppler: DVT in 1 of 2 left distal posterior tibial veins  - continue eliquis, refilled due 5/2025      # RA  - on plaquenil     HOLD zometa 6/27/24  RTC 7/12 for follow up with JUAN, labs prior to visit  RTC 8/9 for follow up with me, labs prior to visit  RTC 9/6 for follow up with JUAN, labs prior to visit  RTC 10/4 for follow up with physician, labs prior to visit    Miller Altamirano DO  Hematology/Oncology  [facility]      Again, thank you for allowing me to participate in the care of your patient.        Sincerely,        MILLER ALTAMIRANO DO

## 2024-07-03 DIAGNOSIS — C50.912 CARCINOMA OF LEFT BREAST METASTATIC TO BONE (H): Primary | ICD-10-CM

## 2024-07-03 DIAGNOSIS — C79.51 CARCINOMA OF LEFT BREAST METASTATIC TO BONE (H): Primary | ICD-10-CM

## 2024-07-03 RX ORDER — EVEROLIMUS 10 MG/1
10 TABLET ORAL DAILY
Qty: 28 TABLET | Refills: 0 | Status: SHIPPED | OUTPATIENT
Start: 2024-07-12 | End: 2024-08-09

## 2024-07-03 RX ORDER — EXEMESTANE 25 MG/1
25 TABLET ORAL DAILY
Qty: 28 TABLET | Refills: 0 | Status: SHIPPED | OUTPATIENT
Start: 2024-07-12 | End: 2024-08-09

## 2024-07-10 ENCOUNTER — MYC MEDICAL ADVICE (OUTPATIENT)
Dept: INTERVENTIONAL RADIOLOGY/VASCULAR | Facility: CLINIC | Age: 64
End: 2024-07-10
Payer: COMMERCIAL

## 2024-07-10 NOTE — PROGRESS NOTES
"Oncology Follow Up Visit: July 11, 2024    Oncologist: Dr. 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 Pt does not have visceral crisis, she mainly has extensive bone metastases and LN metastases   -9/23 diagnostic left breast mammogram, left breast targeted ultrasound showed 3.0 x 1.7 x 3.9 ill-defined shadowing hypoechoic mass, few small lymph nodes in the left axilla, one with cortical thickening measuring 0.7 x 0.6 cm  -9/23 ultrasound-guided LEFT breast core biopsy of 12:00 lesion with clip placement, \"Postbiopsy unilateral digital mammogram of the left breast showed the clip to be at the expected biopsy site\" AND ultrasound-guided left axillary lymph node biopsy of lymph node with cortical thickening, PATHOLOGY:  A.  Breast, left, 12:00, biopsy:Lobular carcinoma in situ, Multiple foci. Invasive carcinoma is not identified.   B.  Lymph node, left axillary, biopsy:  -Positive for metastatic lobular carcinoma, grade 2, 0.7 cm, negative GREGG, Estrogen receptor: Positive (91 to 100%, strong), Progesterone receptor: Positive (91 to 100%, strong), HER2 2+ IHC, FISH negative  -10/23 MRI bilateral breast:  - Heterogeneously enhancing irregular mass in the subareolar left breast, 5.0 x 4.9 x 4.9 cm, with associated nipple retraction and nipple/areolar involvement.  This mass extends superiorly through 11: positions, from anterior to mid depth.  The CogheadMARK breast biopsy clip (which showed LCIS) is 1.5 cm posterosuperior to this mass.  - Multiple suspicious left level 1 axillary lymph node noted, including biopsy-proven metastatic node with indwelling biopsy marker, biopsied node measures 1.3 x 1.0 cm  - Heterogeneous marrow appearance of sternum and ribs with " heterogeneous enhancement and a peripheral enhancing focus within the mid body.     - 10/23 PET/CT:  Innumerable foci of FDG avid lesions are seen throughout the osseous structures of the head and neck, most pronounced on the calvarium and cervical spine, with prominently sclerotic appearance on CT correlate.  For example:  -Right frontal bone, SUV max 5.31.  -Left lateral mass of the atlas, SUV max 15.0  -Angle of the left mandible, SUV max 9.6  -C7 vertebral body, SUV max 17.7     Innumerable variable sized FDG avid lesions throughout the axial and appendicular spine, including but not limited to the cervical, thoracic, and lumbar spine, multilevel bilateral ribs, sternum, bilateral scapula, bilateral humeri, clavicles, pelvis, and bilateral femurs. A few of these lesions are described below:  -Large FDG avid sclerotic 3.4 cm lesion within the proximal left humeral head, SUV max 17.24  -Sternal body, SUV max 20.35  -L2 vertebral body, SUV max 14.3 without  -Large ill-defined sclerotic lesion in the sacral body measuring up to at least 5.9 cm, SUV max 16.84  -FDG avid focus within the left femoral head, SUV max 13.65 without CT correlate.     Large irregular soft tissue FDG avid mass within the left breast measuring approximately 3.2 x 2.7 cm with  extension into the superficial soft tissues and associated left nipple retraction, SUV max 12.36 additional FDG avid. Left axillary lymph nodes, not definitively enlarged by short axis size criteria, for example, SUV max 5.93.     - 10/23 MRI brain:  - extensive osseous metastatic disease involving the calvarium, skull base w/involvement of occipital condyles, C1 and C2 vertebral bodies, and likely the mandible  -  Dominant calvarial lesions are located in the right frontal calvarium and right temporal calvarium without definite breach of the inner or outer tables of the calvarium. There is a suggestion of mild asymmetric linear extra-axial enhancement deep to the  dominant right temporal calvarial lesion along the dural margin, though this may be vascular in etiology.  - No abnormal parenchymal or leptomeningeal enhancement.   - sequelae of mild chronic microvascular ischemic disease. Mild generalized cerebral atrophy.      -10/23 DEXA: osteopenia (T score -2.0 lumbar spine, -2.0 left hip femoral neck, The 10 year probability of major osteoporotic fracture is 12.5%, and of hip fracture is 1.8%, based on left femoral neck BMD)     - 11/23 orthopedic consult to determine stability of left femur and fracture risk given presence of mets in left femoral head: do not see any areas of impending cortical erosion or sites of high risk for fracture, observe    Treatment:  11/2023 - 5/2024 Ribociclib+letrozole --> progression of disease in bones   6/14/2024 started Everolimus and exemestane     Interval History:  Patient is seen in clinic prior to start of cycle 2 of everolimus and exemestane. Overall she is tolerating well with her only new side effects being a sore mouth/mouth sores, and hair loss. She is using the dexamethasone solution 4x/day, which helps but has an awful taste. She just picked up the magic mouth wash and hasn't tried yet. Using a waterpick to clean her teeth after meals. Has upcoming dental appointment for ongoing jaw and gum pain, feels as though something is poking through and she is concerned it is her bone. Exemestane causing more joint aches, particularly in her wrist and R) knee. So far it has been manageable. Hot flashes improved with effexor 75 mg daily. Otherwise in good health with no new concerns.     Patient denies any of the following except if noted above: fevers, chills, difficulty with energy, vision or hearing changes, chest pain, dyspnea, abdominal pain, nausea, vomiting, diarrhea, constipation, urinary concerns, headaches, numbness, tingling, issues with sleep or mood. She also denies lumps, bumps, rashes or skin lesions, bleeding or bruising  "issues.    Physical Exam:   /72 (BP Location: Right arm, Patient Position: Sitting, Cuff Size: Adult Regular)   Pulse 88   Temp 98.2  F (36.8  C) (Temporal)   Ht 1.676 m (5' 6\")   Wt 95.7 kg (211 lb)   LMP 11/22/2011 (Exact Date)   SpO2 96%   BMI 34.06 kg/m      Constitutional: No acute distress, pleasant, appropriately groomed.   ENT: PERRLA, sclera without erythema. Patent nasal passages. Appropriate dentition.  Neck: Trachea midline, no adenopathy.   Resp: No respiratory distress, adequate depth and rate of respirations.   Cardiac: No cyanosis, JVD, or peripheral edema.   MS:  5/5 muscle strength, adequate ROM.   Skin: No rashes, lesions, or wounds.  Neuro: A/O x 4, sensation intact.   Psych: Appropriate mentation and affect.     Laboratory Results:   Results for orders placed or performed in visit on 07/11/24   Comprehensive metabolic panel     Status: Abnormal   Result Value Ref Range    Sodium 143 135 - 145 mmol/L    Potassium 4.1 3.4 - 5.3 mmol/L    Carbon Dioxide (CO2) 26 22 - 29 mmol/L    Anion Gap 12 7 - 15 mmol/L    Urea Nitrogen 18.0 8.0 - 23.0 mg/dL    Creatinine 0.96 (H) 0.51 - 0.95 mg/dL    GFR Estimate 66 >60 mL/min/1.73m2    Calcium 9.2 8.8 - 10.2 mg/dL    Chloride 105 98 - 107 mmol/L    Glucose 116 (H) 70 - 99 mg/dL    Alkaline Phosphatase 80 40 - 150 U/L    AST 34 0 - 45 U/L    ALT 40 0 - 50 U/L    Protein Total 7.2 6.4 - 8.3 g/dL    Albumin 4.1 3.5 - 5.2 g/dL    Bilirubin Total 0.2 <=1.2 mg/dL   Phosphorus     Status: Normal   Result Value Ref Range    Phosphorus 3.4 2.5 - 4.5 mg/dL   Magnesium     Status: Normal   Result Value Ref Range    Magnesium 2.0 1.7 - 2.3 mg/dL   CBC with platelets and differential     Status: Abnormal   Result Value Ref Range    WBC Count 5.7 4.0 - 11.0 10e3/uL    RBC Count 3.22 (L) 3.80 - 5.20 10e6/uL    Hemoglobin 11.1 (L) 11.7 - 15.7 g/dL    Hematocrit 33.8 (L) 35.0 - 47.0 %     (H) 78 - 100 fL    MCH 34.5 (H) 26.5 - 33.0 pg    MCHC 32.8 31.5 - " 36.5 g/dL    RDW 14.6 10.0 - 15.0 %    Platelet Count 157 150 - 450 10e3/uL    % Neutrophils 67 %    % Lymphocytes 20 %    % Monocytes 10 %    % Eosinophils 2 %    % Basophils 1 %    % Immature Granulocytes 1 %    NRBCs per 100 WBC 0 <1 /100    Absolute Neutrophils 3.8 1.6 - 8.3 10e3/uL    Absolute Lymphocytes 1.2 0.8 - 5.3 10e3/uL    Absolute Monocytes 0.6 0.0 - 1.3 10e3/uL    Absolute Eosinophils 0.1 0.0 - 0.7 10e3/uL    Absolute Basophils 0.0 0.0 - 0.2 10e3/uL    Absolute Immature Granulocytes 0.0 <=0.4 10e3/uL    Absolute NRBCs 0.0 10e3/uL   CBC with platelets differential     Status: Abnormal    Narrative    The following orders were created for panel order CBC with platelets differential.  Procedure                               Abnormality         Status                     ---------                               -----------         ------                     CBC with platelets and d...[[id number]]  Abnormal            Final result                 Please view results for these tests on the individual orders.   I reviewed the above labs today.    Imaging:  PET [facility] Overread  Addendum: Addendum: Potential biopsy sites:    Another lesion that has a slight increase in uptake is in the left   femur, lateral to the lesser trochanter (series 4 image 24) max SUV 23   (prior 18).  The hypermetabolic portion is not sclerotic, but it is in   the lateral aspect of the femur, fairly good size and the lesser   trochanter could serve as a landmark.     The left humeral head lesion may also be a potential biopsy site max   SUV 10.4 (not significantly changed from prior 11). The lesion is in   the lateral aspect of the sclerotic lesion when the patient's arms are   in the up position.       The C2 lesion may be more difficult to assess given its anterior   medial position (max suv 14).     SHORTY MUSTAFA MD            SYSTEM ID:  [id number]  Narrative: PET [facility] OVERREAD    PET CT,  6/20/2024 12:00 AM:    Outside films  from  [facility], dated 5/11/2024 were  submitted for interpretation.  Images were acquired from the Vertex to  the proximal thighs.     1. PET of the neck, chest, abdomen, and pelvis.  2. PET CT fusion images of the chest, abdomen and pelvis.  3. 3D MIP and PET-CT fused images were processed on an independent  workstation and archived to PACS and reviewed by a radiologists.    Technique:   1. PET: The PET/CT was performed at an outside institution and the  specifics (FDG dose, patient weight, uptake time) of the technique  could not be verified. Images were evaluated in the axial, sagittal,  and coronal planes as well as the rotational whole body MIP.     2. PET CT Fusion for Attenuation Correction and Anatomical  Localization:  Images were evaluated in axial, coronal, and sagittal  planes in bone, soft tissue, and lung windows.      BACKGROUND:  Liver SUV max= 4.6, Aorta Blood SUV max= 3.9.     Indication: Carcinoma of the left breast with osseous metastases    PREVIOUS REPORT: The original interpretation was available for review  at the time of this dictation.     Additional Information: 63-year-old woman with metastatic left breast  lobular carcinoma having extensive osseous metastases. Over read  requested for IR guided biopsy evaluation.    Comparison: PET/CT- 2/17/2024    Findings:     Neck: No abnormal hypermetabolic uptake within the soft tissues of the  head and neck. No FDG avid cervical lymphadenopathy.  The major salivary appear unremarkable. Right thyroid gland hypodense  nodule without FDG uptake.  Major intracranial vasculature appears to be patent.  Paranasal sinuses are clear.  No abnormal hypermetabolic uptake seen within the brain. MRI brain has  superior resolution for evaluating cerebral metastases.  Osseous findings are detailed below.    Chest:   Relatively stable hypermetabolic left breast mass having SUV max of  5.7, previously, 6.7. Series 4, image 134.  -No hypermetabolic  mediastinal axillary or chest wall lymphadenopathy.  Both lung fields appear relatively clear. No pleural effusion or  pneumothorax.  Dilatation of the ascending thoracic aorta. Pulmonary artery appears  unremarkable. No pleural effusion.  Osseous lesions detailed below under bones.    Abdomen and Pelvis: Liver appears unremarkable, no FDG avid lesion.  Intra and extrahepatic biliary ducts are not dilated. Gallbladder  appears normal.  Spleen appears unremarkable.  Pancreas appears normal, pancreatic duct is not dilated.  Both adrenal glands appear normal.  Both kidneys are unremarkable. No hydronephrosis. Retroaortic left  renal vein, normal anatomic variant. Bladder appears unremarkable  No FDG avid abdominal lymphadenopathy.  Patent abdominal vasculature. Postoperative changes of prior abdominal  hernia repair.  Small gastric sliding hiatus hernia. Small and large bowel loops are  not dilated.  Reproductive organs are atrophic.  Osseous findings described below.     Bones:  Redemonstration of hypermetabolic innumerable osseous metastatic  lesions following the axial and appendicular skeleton with involvement  of the marrow cavity in the long bones. These are predominantly  sclerotic with few lytic appearing lesions. Few lesions have increased  uptake compared to prior exam, a few have slightly decreased uptake ,  few remain overall unchanged. As index lesions:    Increased uptake:  -C2 vertebral body lesion with SUV max 14.4, previously 8.3. Series  #4, image 63.    Decreased uptake:  -L3 vertebral body with SUV max 7.4, previously 10.3. Series 4, image  206.  -Sternal body lesion with SUV max 6.7, previously 10.9. Series 4,  image 30 and 28.  -Left ischial  tuberosity lesion with SUV max 3.5, previously 9.3.  Series 4, image 271.    Relatively stable uptake:  -Left humeral head lesion having SUV max 10.4, previously 11.1. Series  4, image 90  -Left sacral ala lesion with SUV max 7.2, previously 8.7. Series  4,  image 232.    Multilevel degenerative changes in the spine are again noted.    Context:  63-year-old woman with metastatic left breast cancer. Over-read  requested to evaluate osseous lesions for biopsy.  Impression: Impression:    1. There is mixed treatment response with few osseous lesions  demonstrating increased uptake, few demonstrating decreased uptake,  and others remain relatively stable as detailed above. Left humeral  head lesion remains relatively stable may be considered for biopsy.   1a. Of note - hypermetabolic C2 lesion with the posterior cortical  breakthrough, no significant spinal cord impingement at this time but  potentially at risk in the future.    2. Relatively stable hypermetabolic left breast mass likely  representing primary lesion.     3. MRI brain has superior resolution to evaluate for intracerebral  metastases    I have personally reviewed the examination and initial interpretation  and I agree with the findings.    SHORTY MUSTAFA MD         SYSTEM ID:  [id number]  I reviewed the above imaging report today.    Assessment and Plan:   # de tavon metastatic left breast invasive lobular carcinoma, ER positive, NV positive, her2 negative on FISH   - Progression of disease noted on last PET 5/2024 Treatment switched to Everolimus 10 mg PO daily + exemestane 25 mg PO daily.  - Tolerating well with manageable mouth sores and hair loss/thinning.   - Labs reviewed, okay to proceed with Cycle 2 without changes. Patient is agreeable.   - MR cervical spine imaging and PET scan end of August/early September   - 6/2024 germline genetic testing negative BRENT, BARD1, BRCA1, BRCA2, CDH1, CHEK2, NF1, PALB2, PTEN, STK11, TP53.   - Scheduled for Monday 7/15: CT guided bone biopsy of left proximal femur lesion w/IR for additional tissue to test for BRAF, PIK3CA, AKT1, PTEN, ESR1 NGS, NTRK, RET fusion, MSI, PDL1 w/TMB    Neutropenia - resolved   - ANC normal today  - Reviewed neutropenic  precautions    Thrombocytopenia   - Intermittent decreased platelet count  - On Eliquis for DVT - aware to stop prior to procedure on Monday  - Continue to monitor for bleeding    Anemia  - Started on B12 SL [id number] mcg daily and oral ferrous sulfate 325 mg every other day  - Discussed taking with vitamin C to increase absorption     Mouth sores  - Dexamethasone mouth wash and magic mouthwash    HLD   - Started on atorvastatin 10 mg    Hot flashes  - On Effexor 75 mg daily     Hx of DVT  - On Eliquis BID  - No s/s of bleeding  - Continue to monitor     Blurry vision   - Intermittent  - Brain MRI 5/2024 with no evidence of metastasis   - Has seen ophthalmologist with no concern, using eye drops daily  - Does work computer job, 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     R) knee pain  - More achey since start of exemestane, likely cause but does have hx of arthritis with L) knee arthroplasty  - Tolerable and not affecting QOL but can consider x-ray, PT referral and orthopedic referral if needed, patient prefers to monitor at this time        Kitty YING, CNP

## 2024-07-11 ENCOUNTER — ONCOLOGY VISIT (OUTPATIENT)
Dept: ONCOLOGY | Facility: CLINIC | Age: 64
End: 2024-07-11
Payer: COMMERCIAL

## 2024-07-11 ENCOUNTER — APPOINTMENT (OUTPATIENT)
Dept: LAB | Facility: CLINIC | Age: 64
End: 2024-07-11
Payer: COMMERCIAL

## 2024-07-11 VITALS
HEIGHT: 66 IN | OXYGEN SATURATION: 96 % | HEART RATE: 88 BPM | TEMPERATURE: 98.2 F | WEIGHT: 211 LBS | BODY MASS INDEX: 33.91 KG/M2 | SYSTOLIC BLOOD PRESSURE: 118 MMHG | DIASTOLIC BLOOD PRESSURE: 72 MMHG

## 2024-07-11 DIAGNOSIS — H53.8 BLURRED VISION: ICD-10-CM

## 2024-07-11 DIAGNOSIS — I82.402 ACUTE DEEP VEIN THROMBOSIS (DVT) OF LEFT LOWER EXTREMITY, UNSPECIFIED VEIN (H): ICD-10-CM

## 2024-07-11 DIAGNOSIS — E61.1 IRON DEFICIENCY: ICD-10-CM

## 2024-07-11 DIAGNOSIS — K13.79 MOUTH SORE SECONDARY TO CHEMOTHERAPY: ICD-10-CM

## 2024-07-11 DIAGNOSIS — C79.51 MALIGNANT NEOPLASM METASTATIC TO BONE (H): ICD-10-CM

## 2024-07-11 DIAGNOSIS — T45.1X5A MOUTH SORE SECONDARY TO CHEMOTHERAPY: ICD-10-CM

## 2024-07-11 DIAGNOSIS — C50.912 CARCINOMA OF LEFT BREAST METASTATIC TO BONE (H): ICD-10-CM

## 2024-07-11 DIAGNOSIS — E53.8 VITAMIN B12 DEFICIENCY (NON ANEMIC): ICD-10-CM

## 2024-07-11 DIAGNOSIS — E78.5 HYPERLIPIDEMIA LDL GOAL <130: ICD-10-CM

## 2024-07-11 DIAGNOSIS — C79.51 CARCINOMA OF LEFT BREAST METASTATIC TO BONE (H): Primary | ICD-10-CM

## 2024-07-11 DIAGNOSIS — C50.912 CARCINOMA OF LEFT BREAST METASTATIC TO BONE (H): Primary | ICD-10-CM

## 2024-07-11 DIAGNOSIS — C79.51 CARCINOMA OF LEFT BREAST METASTATIC TO BONE (H): ICD-10-CM

## 2024-07-11 LAB
ALBUMIN SERPL BCG-MCNC: 4.1 G/DL (ref 3.5–5.2)
ALP SERPL-CCNC: 80 U/L (ref 40–150)
ALT SERPL W P-5'-P-CCNC: 40 U/L (ref 0–50)
ANION GAP SERPL CALCULATED.3IONS-SCNC: 12 MMOL/L (ref 7–15)
AST SERPL W P-5'-P-CCNC: 34 U/L (ref 0–45)
BASOPHILS # BLD AUTO: 0 10E3/UL (ref 0–0.2)
BASOPHILS NFR BLD AUTO: 1 %
BILIRUB SERPL-MCNC: 0.2 MG/DL
BUN SERPL-MCNC: 18 MG/DL (ref 8–23)
CALCIUM SERPL-MCNC: 9.2 MG/DL (ref 8.8–10.2)
CHLORIDE SERPL-SCNC: 105 MMOL/L (ref 98–107)
CREAT SERPL-MCNC: 0.96 MG/DL (ref 0.51–0.95)
DEPRECATED HCO3 PLAS-SCNC: 26 MMOL/L (ref 22–29)
EGFRCR SERPLBLD CKD-EPI 2021: 66 ML/MIN/1.73M2
EOSINOPHIL # BLD AUTO: 0.1 10E3/UL (ref 0–0.7)
EOSINOPHIL NFR BLD AUTO: 2 %
ERYTHROCYTE [DISTWIDTH] IN BLOOD BY AUTOMATED COUNT: 14.6 % (ref 10–15)
GLUCOSE SERPL-MCNC: 116 MG/DL (ref 70–99)
HCT VFR BLD AUTO: 33.8 % (ref 35–47)
HGB BLD-MCNC: 11.1 G/DL (ref 11.7–15.7)
IMM GRANULOCYTES # BLD: 0 10E3/UL
IMM GRANULOCYTES NFR BLD: 1 %
LYMPHOCYTES # BLD AUTO: 1.2 10E3/UL (ref 0.8–5.3)
LYMPHOCYTES NFR BLD AUTO: 20 %
MAGNESIUM SERPL-MCNC: 2 MG/DL (ref 1.7–2.3)
MCH RBC QN AUTO: 34.5 PG (ref 26.5–33)
MCHC RBC AUTO-ENTMCNC: 32.8 G/DL (ref 31.5–36.5)
MCV RBC AUTO: 105 FL (ref 78–100)
MONOCYTES # BLD AUTO: 0.6 10E3/UL (ref 0–1.3)
MONOCYTES NFR BLD AUTO: 10 %
NEUTROPHILS # BLD AUTO: 3.8 10E3/UL (ref 1.6–8.3)
NEUTROPHILS NFR BLD AUTO: 67 %
NRBC # BLD AUTO: 0 10E3/UL
NRBC BLD AUTO-RTO: 0 /100
PHOSPHATE SERPL-MCNC: 3.4 MG/DL (ref 2.5–4.5)
PLATELET # BLD AUTO: 157 10E3/UL (ref 150–450)
POTASSIUM SERPL-SCNC: 4.1 MMOL/L (ref 3.4–5.3)
PROT SERPL-MCNC: 7.2 G/DL (ref 6.4–8.3)
RBC # BLD AUTO: 3.22 10E6/UL (ref 3.8–5.2)
SODIUM SERPL-SCNC: 143 MMOL/L (ref 135–145)
WBC # BLD AUTO: 5.7 10E3/UL (ref 4–11)

## 2024-07-11 PROCEDURE — 80053 COMPREHEN METABOLIC PANEL: CPT | Performed by: INTERNAL MEDICINE

## 2024-07-11 PROCEDURE — 85025 COMPLETE CBC W/AUTO DIFF WBC: CPT | Performed by: INTERNAL MEDICINE

## 2024-07-11 PROCEDURE — 36415 COLL VENOUS BLD VENIPUNCTURE: CPT

## 2024-07-11 PROCEDURE — 84100 ASSAY OF PHOSPHORUS: CPT

## 2024-07-11 PROCEDURE — 83735 ASSAY OF MAGNESIUM: CPT

## 2024-07-11 PROCEDURE — 99214 OFFICE O/P EST MOD 30 MIN: CPT

## 2024-07-11 ASSESSMENT — PAIN SCALES - GENERAL: PAINLEVEL: NO PAIN (0)

## 2024-07-11 NOTE — LETTER
"7/11/2024      Kesha Zacarias  [address] [address] [place] [id number]      Dear Colleague,    Thank you for referring your patient, Kesha Zacarias, to the [facility]. Please see a copy of my visit note below.    Oncology Follow Up Visit: July 11, 2024    Oncologist: Dr. Maravilla   PCP: Schoen, Gregory G    Reason for Visit: Pre-treatment follow-up    Diagnosis:  # de tavon metastatic left breast invasive lobular carcinoma, ER positive, [place] positive, her2 negative on FISH  - pt has de tavon metastatic invasive lobular breast cancer, ER positive, KY positive, her2 negative. Pt does not have visceral crisis, she mainly has extensive bone metastases and LN metastases   -9/23 diagnostic left breast mammogram, left breast targeted ultrasound showed 3.0 x 1.7 x 3.9 ill-defined shadowing hypoechoic mass, few small lymph nodes in the left axilla, one with cortical thickening measuring 0.7 x 0.6 cm  -9/23 ultrasound-guided LEFT breast core biopsy of 12:00 lesion with clip placement, \"Postbiopsy unilateral digital mammogram of the left breast showed the clip to be at the expected biopsy site\" AND ultrasound-guided left axillary lymph node biopsy of lymph node with cortical thickening, PATHOLOGY:  A.  Breast, left, 12:00, biopsy:Lobular carcinoma in situ, Multiple foci. Invasive carcinoma is not identified.   B.  Lymph node, left axillary, biopsy:  -Positive for metastatic lobular carcinoma, grade 2, 0.7 cm, negative GREGG, Estrogen receptor: Positive (91 to 100%, strong), Progesterone receptor: Positive (91 to 100%, strong), HER2 2+ IHC, FISH negative  -10/23 MRI bilateral breast:  - Heterogeneously enhancing irregular mass in the subareolar left breast, 5.0 x 4.9 x 4.9 cm, with associated nipple retraction and nipple/areolar involvement.  This mass extends superiorly through 11: positions, from anterior to mid depth.  The HydroMARK breast biopsy clip (which showed LCIS) is 1.5 cm " posterosuperior to this mass.  - Multiple suspicious left level 1 axillary lymph node noted, including biopsy-proven metastatic node with indwelling biopsy marker, biopsied node measures 1.3 x 1.0 cm  - Heterogeneous marrow appearance of sternum and ribs with heterogeneous enhancement and a peripheral enhancing focus within the mid body.     - 10/23 PET/CT:  Innumerable foci of FDG avid lesions are seen throughout the osseous structures of the head and neck, most pronounced on the calvarium and cervical spine, with prominently sclerotic appearance on CT correlate.  For example:  -Right frontal bone, SUV max 5.31.  -Left lateral mass of the atlas, SUV max 15.0  -Angle of the left mandible, SUV max 9.6  -C7 vertebral body, SUV max 17.7     Innumerable variable sized FDG avid lesions throughout the axial and appendicular spine, including but not limited to the cervical, thoracic, and lumbar spine, multilevel bilateral ribs, sternum, bilateral scapula, bilateral humeri, clavicles, pelvis, and bilateral femurs. A few of these lesions are described below:  -Large FDG avid sclerotic 3.4 cm lesion within the proximal left humeral head, SUV max 17.24  -Sternal body, SUV max 20.35  -L2 vertebral body, SUV max 14.3 without  -Large ill-defined sclerotic lesion in the sacral body measuring up to at least 5.9 cm, SUV max 16.84  -FDG avid focus within the left femoral head, SUV max 13.65 without CT correlate.     Large irregular soft tissue FDG avid mass within the left breast measuring approximately 3.2 x 2.7 cm with  extension into the superficial soft tissues and associated left nipple retraction, SUV max 12.36 additional FDG avid. Left axillary lymph nodes, not definitively enlarged by short axis size criteria, for example, SUV max 5.93.     - 10/23 MRI brain:  - extensive osseous metastatic disease involving the calvarium, skull base w/involvement of occipital condyles, C1 and C2 vertebral bodies, and likely the mandible  -   Dominant calvarial lesions are located in the right frontal calvarium and right temporal calvarium without definite breach of the inner or outer tables of the calvarium. There is a suggestion of mild asymmetric linear extra-axial enhancement deep to the dominant right temporal calvarial lesion along the dural margin, though this may be vascular in etiology.  - No abnormal parenchymal or leptomeningeal enhancement.   - sequelae of mild chronic microvascular ischemic disease. Mild generalized cerebral atrophy.      -10/23 DEXA: osteopenia (T score -2.0 lumbar spine, -2.0 left hip femoral neck, The 10 year probability of major osteoporotic fracture is 12.5%, and of hip fracture is 1.8%, based on left femoral neck BMD)     - 11/23 orthopedic consult to determine stability of left femur and fracture risk given presence of mets in left femoral head: do not see any areas of impending cortical erosion or sites of high risk for fracture, observe    Treatment:  11/2023 - 5/2024 Ribociclib+letrozole --> progression of disease in bones   6/14/2024 started Everolimus and exemestane     Interval History:  Patient is seen in clinic prior to start of cycle 2 of everolimus and exemestane. Overall she is tolerating well with her only new side effects being a sore mouth/mouth sores, and hair loss. She is using the dexamethasone solution 4x/day, which helps but has an awful taste. She just picked up the magic mouth wash and hasn't tried yet. Using a waterpick to clean her teeth after meals. Has upcoming dental appointment for ongoing jaw and gum pain, feels as though something is poking through and she is concerned it is her bone. Exemestane causing more joint aches, particularly in her wrist and R) knee. So far it has been manageable. Hot flashes improved with effexor 75 mg daily. Otherwise in good health with no new concerns.     Patient denies any of the following except if noted above: fevers, chills, difficulty with energy, vision  "or hearing changes, chest pain, dyspnea, abdominal pain, nausea, vomiting, diarrhea, constipation, urinary concerns, headaches, numbness, tingling, issues with sleep or mood. She also denies lumps, bumps, rashes or skin lesions, bleeding or bruising issues.    Physical Exam:   /72 (BP Location: Right arm, Patient Position: Sitting, Cuff Size: Adult Regular)   Pulse 88   Temp 98.2  F (36.8  C) (Temporal)   Ht 1.676 m (5' 6\")   Wt 95.7 kg (211 lb)   LMP 11/22/2011 (Exact Date)   SpO2 96%   BMI 34.06 kg/m      Constitutional: No acute distress, pleasant, appropriately groomed.   ENT: PERRLA, sclera without erythema. Patent nasal passages. Appropriate dentition.  Neck: Trachea midline, no adenopathy.   Resp: No respiratory distress, adequate depth and rate of respirations.   Cardiac: No cyanosis, JVD, or peripheral edema.   MS:  5/5 muscle strength, adequate ROM.   Skin: No rashes, lesions, or wounds.  Neuro: A/O x 4, sensation intact.   Psych: Appropriate mentation and affect.     Laboratory Results:   Results for orders placed or performed in visit on 07/11/24   Comprehensive metabolic panel     Status: Abnormal   Result Value Ref Range    Sodium 143 135 - 145 mmol/L    Potassium 4.1 3.4 - 5.3 mmol/L    Carbon Dioxide (CO2) 26 22 - 29 mmol/L    Anion Gap 12 7 - 15 mmol/L    Urea Nitrogen 18.0 8.0 - 23.0 mg/dL    Creatinine 0.96 (H) 0.51 - 0.95 mg/dL    GFR Estimate 66 >60 mL/min/1.73m2    Calcium 9.2 8.8 - 10.2 mg/dL    Chloride 105 98 - 107 mmol/L    Glucose 116 (H) 70 - 99 mg/dL    Alkaline Phosphatase 80 40 - 150 U/L    AST 34 0 - 45 U/L    ALT 40 0 - 50 U/L    Protein Total 7.2 6.4 - 8.3 g/dL    Albumin 4.1 3.5 - 5.2 g/dL    Bilirubin Total 0.2 <=1.2 mg/dL   Phosphorus     Status: Normal   Result Value Ref Range    Phosphorus 3.4 2.5 - 4.5 mg/dL   Magnesium     Status: Normal   Result Value Ref Range    Magnesium 2.0 1.7 - 2.3 mg/dL   CBC with platelets and differential     Status: Abnormal   Result " Value Ref Range    WBC Count 5.7 4.0 - 11.0 10e3/uL    RBC Count 3.22 (L) 3.80 - 5.20 10e6/uL    Hemoglobin 11.1 (L) 11.7 - 15.7 g/dL    Hematocrit 33.8 (L) 35.0 - 47.0 %     (H) 78 - 100 fL    MCH 34.5 (H) 26.5 - 33.0 pg    MCHC 32.8 31.5 - 36.5 g/dL    RDW 14.6 10.0 - 15.0 %    Platelet Count 157 150 - 450 10e3/uL    % Neutrophils 67 %    % Lymphocytes 20 %    % Monocytes 10 %    % Eosinophils 2 %    % Basophils 1 %    % Immature Granulocytes 1 %    NRBCs per 100 WBC 0 <1 /100    Absolute Neutrophils 3.8 1.6 - 8.3 10e3/uL    Absolute Lymphocytes 1.2 0.8 - 5.3 10e3/uL    Absolute Monocytes 0.6 0.0 - 1.3 10e3/uL    Absolute Eosinophils 0.1 0.0 - 0.7 10e3/uL    Absolute Basophils 0.0 0.0 - 0.2 10e3/uL    Absolute Immature Granulocytes 0.0 <=0.4 10e3/uL    Absolute NRBCs 0.0 10e3/uL   CBC with platelets differential     Status: Abnormal    Narrative    The following orders were created for panel order CBC with platelets differential.  Procedure                               Abnormality         Status                     ---------                               -----------         ------                     CBC with platelets and d...[[id number]]  Abnormal            Final result                 Please view results for these tests on the individual orders.   I reviewed the above labs today.    Imaging:  PET [facility] Overread  Addendum: Addendum: Potential biopsy sites:    Another lesion that has a slight increase in uptake is in the left   femur, lateral to the lesser trochanter (series 4 image 24) max SUV 23   (prior 18).  The hypermetabolic portion is not sclerotic, but it is in   the lateral aspect of the femur, fairly good size and the lesser   trochanter could serve as a landmark.     The left humeral head lesion may also be a potential biopsy site max   SUV 10.4 (not significantly changed from prior 11). The lesion is in   the lateral aspect of the sclerotic lesion when the patient's arms are   in the up  position.       The C2 lesion may be more difficult to assess given its anterior   medial position (max suv 14).     SHORTY MUSTAFA MD            SYSTEM ID:  [id number]  Narrative: PET [facility]    PET CT,  6/20/2024 12:00 AM:    Outside films from  [facility], dated 5/11/2024 were  submitted for interpretation.  Images were acquired from the Vertex to  the proximal thighs.     1. PET of the neck, chest, abdomen, and pelvis.  2. PET CT fusion images of the chest, abdomen and pelvis.  3. 3D MIP and PET-CT fused images were processed on an independent  workstation and archived to PACS and reviewed by a radiologists.    Technique:   1. PET: The PET/CT was performed at an outside institution and the  specifics (FDG dose, patient weight, uptake time) of the technique  could not be verified. Images were evaluated in the axial, sagittal,  and coronal planes as well as the rotational whole body MIP.     2. PET CT Fusion for Attenuation Correction and Anatomical  Localization:  Images were evaluated in axial, coronal, and sagittal  planes in bone, soft tissue, and lung windows.      BACKGROUND:  Liver SUV max= 4.6, Aorta Blood SUV max= 3.9.     Indication: Carcinoma of the left breast with osseous metastases    PREVIOUS REPORT: The original interpretation was available for review  at the time of this dictation.     Additional Information: 63-year-old woman with metastatic left breast  lobular carcinoma having extensive osseous metastases. Over read  requested for IR guided biopsy evaluation.    Comparison: PET/CT- 2/17/2024    Findings:     Neck: No abnormal hypermetabolic uptake within the soft tissues of the  head and neck. No FDG avid cervical lymphadenopathy.  The major salivary appear unremarkable. Right thyroid gland hypodense  nodule without FDG uptake.  Major intracranial vasculature appears to be patent.  Paranasal sinuses are clear.  No abnormal hypermetabolic uptake seen within the  brain. MRI brain has  superior resolution for evaluating cerebral metastases.  Osseous findings are detailed below.    Chest:   Relatively stable hypermetabolic left breast mass having SUV max of  5.7, previously, 6.7. Series 4, image 134.  -No hypermetabolic mediastinal axillary or chest wall lymphadenopathy.  Both lung fields appear relatively clear. No pleural effusion or  pneumothorax.  Dilatation of the ascending thoracic aorta. Pulmonary artery appears  unremarkable. No pleural effusion.  Osseous lesions detailed below under bones.    Abdomen and Pelvis: Liver appears unremarkable, no FDG avid lesion.  Intra and extrahepatic biliary ducts are not dilated. Gallbladder  appears normal.  Spleen appears unremarkable.  Pancreas appears normal, pancreatic duct is not dilated.  Both adrenal glands appear normal.  Both kidneys are unremarkable. No hydronephrosis. Retroaortic left  renal vein, normal anatomic variant. Bladder appears unremarkable  No FDG avid abdominal lymphadenopathy.  Patent abdominal vasculature. Postoperative changes of prior abdominal  hernia repair.  Small gastric sliding hiatus hernia. Small and large bowel loops are  not dilated.  Reproductive organs are atrophic.  Osseous findings described below.     Bones:  Redemonstration of hypermetabolic innumerable osseous metastatic  lesions following the axial and appendicular skeleton with involvement  of the marrow cavity in the long bones. These are predominantly  sclerotic with few lytic appearing lesions. Few lesions have increased  uptake compared to prior exam, a few have slightly decreased uptake ,  few remain overall unchanged. As index lesions:    Increased uptake:  -C2 vertebral body lesion with SUV max 14.4, previously 8.3. Series  #4, image 63.    Decreased uptake:  -L3 vertebral body with SUV max 7.4, previously 10.3. Series 4, image  206.  -Sternal body lesion with SUV max 6.7, previously 10.9. Series 4,  image 30 and 28.  -Left ischial   tuberosity lesion with SUV max 3.5, previously 9.3.  Series 4, image 271.    Relatively stable uptake:  -Left humeral head lesion having SUV max 10.4, previously 11.1. Series  4, image 90  -Left sacral ala lesion with SUV max 7.2, previously 8.7. Series 4,  image 232.    Multilevel degenerative changes in the spine are again noted.    Context:  63-year-old woman with metastatic left breast cancer. Over-read  requested to evaluate osseous lesions for biopsy.  Impression: Impression:    1. There is mixed treatment response with few osseous lesions  demonstrating increased uptake, few demonstrating decreased uptake,  and others remain relatively stable as detailed above. Left humeral  head lesion remains relatively stable may be considered for biopsy.   1a. Of note - hypermetabolic C2 lesion with the posterior cortical  breakthrough, no significant spinal cord impingement at this time but  potentially at risk in the future.    2. Relatively stable hypermetabolic left breast mass likely  representing primary lesion.     3. MRI brain has superior resolution to evaluate for intracerebral  metastases    I have personally reviewed the examination and initial interpretation  and I agree with the findings.    SHORTY MUSTAFA MD         SYSTEM ID:  [id number]  I reviewed the above imaging report today.    Assessment and Plan:   # de tavon metastatic left breast invasive lobular carcinoma, ER positive, [place] positive, her2 negative on FISH   - Progression of disease noted on last PET 5/2024 Treatment switched to Everolimus 10 mg PO daily + exemestane 25 mg PO daily.  - Tolerating well with manageable mouth sores and hair loss/thinning.   - Labs reviewed, okay to proceed with Cycle 2 without changes. Patient is agreeable.   - MR cervical spine imaging and PET scan end of August/early September   - 6/2024 germline genetic testing negative BRENT, BARD1, BRCA1, BRCA2, CDH1, CHEK2, NF1, PALB2, PTEN, STK11, TP53.   - Scheduled for Monday  7/15: CT guided bone biopsy of left proximal femur lesion w/IR for additional tissue to test for BRAF, PIK3CA, AKT1, PTEN, ESR1 NGS, NTRK, RET fusion, MSI, PDL1 w/TMB    Neutropenia - resolved   - ANC normal today  - Reviewed neutropenic precautions    Thrombocytopenia   - Intermittent decreased platelet count  - On Eliquis for DVT - aware to stop prior to procedure on Monday  - Continue to monitor for bleeding    Anemia  - Started on B12 SL 14744 mcg daily and oral ferrous sulfate 325 mg every other day  - Discussed taking with vitamin C to increase absorption     Mouth sores  - Dexamethasone mouth wash and magic mouthwash    HLD   - Started on atorvastatin 10 mg    Hot flashes  - On Effexor 75 mg daily     Hx of DVT  - On Eliquis BID  - No s/s of bleeding  - Continue to monitor     Blurry vision   - Intermittent  - Brain MRI 5/2024 with no evidence of metastasis   - Has seen ophthalmologist with no concern, using eye drops daily  - Does work computer job, 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     R) knee pain  - More achey since start of exemestane, likely cause but does have hx of arthritis with L) knee arthroplasty  - Tolerable and not affecting QOL but can consider x-ray, PT referral and orthopedic referral if needed, patient prefers to monitor at this time        Kitty YING, CNP      Again, thank you for allowing me to participate in the care of your patient.        Sincerely,        STEPAN Willams CNP

## 2024-07-15 ENCOUNTER — APPOINTMENT (OUTPATIENT)
Dept: INTERVENTIONAL RADIOLOGY/VASCULAR | Facility: CLINIC | Age: 64
End: 2024-07-15
Attending: INTERNAL MEDICINE
Payer: COMMERCIAL

## 2024-07-15 ENCOUNTER — HOSPITAL ENCOUNTER (OUTPATIENT)
Facility: CLINIC | Age: 64
Discharge: HOME | End: 2024-07-15
Attending: INTERNAL MEDICINE | Admitting: STUDENT IN AN ORGANIZED HEALTH CARE EDUCATION/TRAINING PROGRAM
Payer: COMMERCIAL

## 2024-07-15 ENCOUNTER — APPOINTMENT (OUTPATIENT)
Dept: MEDSURG UNIT | Facility: CLINIC | Age: 64
End: 2024-07-15
Attending: INTERNAL MEDICINE
Payer: COMMERCIAL

## 2024-07-15 VITALS
TEMPERATURE: 98.1 F | SYSTOLIC BLOOD PRESSURE: 125 MMHG | HEIGHT: 66 IN | WEIGHT: 212.3 LBS | DIASTOLIC BLOOD PRESSURE: 77 MMHG | BODY MASS INDEX: 34.12 KG/M2 | RESPIRATION RATE: 16 BRPM | OXYGEN SATURATION: 95 % | HEART RATE: 57 BPM

## 2024-07-15 DIAGNOSIS — C50.912 CARCINOMA OF LEFT BREAST METASTATIC TO BONE (H): ICD-10-CM

## 2024-07-15 DIAGNOSIS — C79.51 CARCINOMA OF LEFT BREAST METASTATIC TO BONE (H): ICD-10-CM

## 2024-07-15 LAB — INR PPP: 0.98 (ref 0.85–1.15)

## 2024-07-15 PROCEDURE — 88360 TUMOR IMMUNOHISTOCHEM/MANUAL: CPT | Mod: 26 | Performed by: PATHOLOGY

## 2024-07-15 PROCEDURE — 99152 MOD SED SAME PHYS/QHP 5/>YRS: CPT | Performed by: STUDENT IN AN ORGANIZED HEALTH CARE EDUCATION/TRAINING PROGRAM

## 2024-07-15 PROCEDURE — 88189 FLOWCYTOMETRY/READ 16 & >: CPT | Mod: GC | Performed by: STUDENT IN AN ORGANIZED HEALTH CARE EDUCATION/TRAINING PROGRAM

## 2024-07-15 PROCEDURE — 999N000133 HC STATISTIC PP CARE STAGE 2

## 2024-07-15 PROCEDURE — 272N000155 HC KIT CR15

## 2024-07-15 PROCEDURE — 36415 COLL VENOUS BLD VENIPUNCTURE: CPT | Performed by: NURSE PRACTITIONER

## 2024-07-15 PROCEDURE — 258N000003 HC RX IP 258 OP 636: Performed by: NURSE PRACTITIONER

## 2024-07-15 PROCEDURE — 999N000142 HC STATISTIC PROCEDURE PREP ONLY

## 2024-07-15 PROCEDURE — 88185 FLOWCYTOMETRY/TC ADD-ON: CPT | Performed by: INTERNAL MEDICINE

## 2024-07-15 PROCEDURE — 99152 MOD SED SAME PHYS/QHP 5/>YRS: CPT

## 2024-07-15 PROCEDURE — 77012 CT SCAN FOR NEEDLE BIOPSY: CPT | Mod: 26 | Performed by: STUDENT IN AN ORGANIZED HEALTH CARE EDUCATION/TRAINING PROGRAM

## 2024-07-15 PROCEDURE — 88360 TUMOR IMMUNOHISTOCHEM/MANUAL: CPT | Mod: TC | Performed by: INTERNAL MEDICINE

## 2024-07-15 PROCEDURE — 88305 TISSUE EXAM BY PATHOLOGIST: CPT | Mod: 26 | Performed by: PATHOLOGY

## 2024-07-15 PROCEDURE — 85610 PROTHROMBIN TIME: CPT | Performed by: NURSE PRACTITIONER

## 2024-07-15 PROCEDURE — 77012 CT SCAN FOR NEEDLE BIOPSY: CPT

## 2024-07-15 PROCEDURE — 250N000009 HC RX 250

## 2024-07-15 PROCEDURE — 250N000011 HC RX IP 250 OP 636

## 2024-07-15 PROCEDURE — 20225 BONE BIOPSY TROCAR/NDL DEEP: CPT | Mod: LT | Performed by: STUDENT IN AN ORGANIZED HEALTH CARE EDUCATION/TRAINING PROGRAM

## 2024-07-15 PROCEDURE — 88377 M/PHMTRC ALYS ISHQUANT/SEMIQ: CPT | Performed by: INTERNAL MEDICINE

## 2024-07-15 RX ORDER — FENTANYL CITRATE 50 UG/ML
25-50 INJECTION, SOLUTION INTRAMUSCULAR; INTRAVENOUS EVERY 5 MIN PRN
Status: DISCONTINUED | OUTPATIENT
Start: 2024-07-15 | End: 2024-07-15 | Stop reason: HOSPADM

## 2024-07-15 RX ORDER — NALOXONE HYDROCHLORIDE 0.4 MG/ML
0.2 INJECTION, SOLUTION INTRAMUSCULAR; INTRAVENOUS; SUBCUTANEOUS
Status: DISCONTINUED | OUTPATIENT
Start: 2024-07-15 | End: 2024-07-15 | Stop reason: HOSPADM

## 2024-07-15 RX ORDER — SODIUM CHLORIDE 9 MG/ML
INJECTION, SOLUTION INTRAVENOUS CONTINUOUS
Status: DISCONTINUED | OUTPATIENT
Start: 2024-07-15 | End: 2024-07-15 | Stop reason: HOSPADM

## 2024-07-15 RX ORDER — NALOXONE HYDROCHLORIDE 0.4 MG/ML
0.4 INJECTION, SOLUTION INTRAMUSCULAR; INTRAVENOUS; SUBCUTANEOUS
Status: DISCONTINUED | OUTPATIENT
Start: 2024-07-15 | End: 2024-07-15 | Stop reason: HOSPADM

## 2024-07-15 RX ORDER — LIDOCAINE 40 MG/G
CREAM TOPICAL
Status: DISCONTINUED | OUTPATIENT
Start: 2024-07-15 | End: 2024-07-15 | Stop reason: HOSPADM

## 2024-07-15 RX ORDER — FLUMAZENIL 0.1 MG/ML
0.2 INJECTION, SOLUTION INTRAVENOUS
Status: DISCONTINUED | OUTPATIENT
Start: 2024-07-15 | End: 2024-07-15 | Stop reason: HOSPADM

## 2024-07-15 RX ADMIN — FENTANYL CITRATE 25 MCG: 50 INJECTION, SOLUTION INTRAMUSCULAR; INTRAVENOUS at 12:12

## 2024-07-15 RX ADMIN — SODIUM CHLORIDE: 9 INJECTION, SOLUTION INTRAVENOUS at 09:29

## 2024-07-15 RX ADMIN — FENTANYL CITRATE 25 MCG: 50 INJECTION, SOLUTION INTRAMUSCULAR; INTRAVENOUS at 11:48

## 2024-07-15 RX ADMIN — MIDAZOLAM 0.5 MG: 1 INJECTION INTRAMUSCULAR; INTRAVENOUS at 11:48

## 2024-07-15 RX ADMIN — FENTANYL CITRATE 25 MCG: 50 INJECTION, SOLUTION INTRAMUSCULAR; INTRAVENOUS at 11:28

## 2024-07-15 RX ADMIN — MIDAZOLAM 0.5 MG: 1 INJECTION INTRAMUSCULAR; INTRAVENOUS at 11:28

## 2024-07-15 RX ADMIN — MIDAZOLAM 0.5 MG: 1 INJECTION INTRAMUSCULAR; INTRAVENOUS at 12:01

## 2024-07-15 RX ADMIN — FENTANYL CITRATE 25 MCG: 50 INJECTION, SOLUTION INTRAMUSCULAR; INTRAVENOUS at 12:01

## 2024-07-15 RX ADMIN — MIDAZOLAM 1 MG: 1 INJECTION INTRAMUSCULAR; INTRAVENOUS at 12:13

## 2024-07-15 RX ADMIN — MIDAZOLAM 0.5 MG: 1 INJECTION INTRAMUSCULAR; INTRAVENOUS at 12:08

## 2024-07-15 RX ADMIN — LIDOCAINE HYDROCHLORIDE 15 ML: 10 INJECTION, SOLUTION EPIDURAL; INFILTRATION; INTRACAUDAL; PERINEURAL at 12:20

## 2024-07-15 RX ADMIN — FENTANYL CITRATE 50 MCG: 50 INJECTION, SOLUTION INTRAMUSCULAR; INTRAVENOUS at 12:13

## 2024-07-15 ASSESSMENT — ACTIVITIES OF DAILY LIVING (ADL)
ADLS_ACUITY_SCORE: 35

## 2024-07-15 NOTE — PROGRESS NOTES
Pt is here for CT guided Left Femur Biopsy. Hx of metastatic breast cancer.  Pt is stable, AVSS and pain free.  PIV in and IVF. INR=0.98. CBC and BMP were done 7/11/24.   Pt has been holding her Eliquis since Friday evening. Consent is singed.     Polly (a family member)/ is here with pt. [phone]

## 2024-07-15 NOTE — IR NOTE
Patient Name: Kesha Zacarias  Medical Record Number: [id number]  Today's Date: 7/15/2024    Procedure: CT Guided Bone Biopsy of Left Femur  Proceduralist: Dr. Brice   Pathology present: Yes    Procedure Start: 1146  Procedure end: 1220  Sedation medications administered: Midazolam 3 mg, Fentanyl 150 mcg       Report given to: 2A RN  : NA    Other Notes: Pt arrived to IR room CT-2 from . Consent reviewed. Pt denies any questions or concerns regarding procedure. Pt positioned supine and monitored per protocol. Pt tolerated procedure without any noted complications. Pt transferred back to .     3 passes made and samples sent to lab as ordered.  Manual Pressure Held to Procedure Site.  Hemastatis achieved.  Patient to be on bedrest for 2 hours.

## 2024-07-15 NOTE — PROGRESS NOTES
D/I/A: pt is stable, AVSS and pain free.  Biopsy site with a scant oozing/no hematoma.  Pt tolerated PO fluid and solid intake.  Pt ambulated and urinated.  Discharge instructions reviewed with patient.  Questions answered at this time. Pt has not concern.  PIV out .  Pt discharge to home~ at

## 2024-07-15 NOTE — DISCHARGE INSTRUCTIONS
[facility]    Interventional Radiology  Patient Instructions Following Bone Biopsy    AFTER YOU GO HOME  If you were given sedation, for the first 24 hours: we recommend that an adult stay with you, DO NOT drive or operate machinery at home or at work, DO NOT smoke, DO NOT make any important or legal decisions.  DO NOT drink alcoholic beverages the day of your procedure  Drink plenty of fluids   Resume your regular diet, unless otherwise instructed by your Primary Physician  Relax and take it easy for 48 hours  DO NOT do any strenuous exercise or lifting (> 10 lbs) for at least 7 days following your procedure  Keep the dressing dry and in place for 24 hours. Replace with Band aid for 2 days.  Never leave a wet dressing in place.  Do not take a shower for at least 12 hours following your procedure. No tub bath, hot tub, or swimming for 5 days.  There should be minimum drainage from the biopsy site    CALL THE PHYSICIAN IF:  You start bleeding from the procedure site.  If you do start to bleed from that site, lie down flat and hold pressure on the site for a minimum of 10 minutes.  Your physician will tell you if you need to return to the hospital  You develop nausea or vomiting  You have excessive swelling, redness, or tenderness at the site  You have drainage that looks like it is infected.  You experience severe pain  You develop hives or a rash or unexplained itching  You develop shortness of breath  You develop a temperature of 101 degrees F or greater    :   ADDITIONAL INSTRUCTIONS : you may start your Eliquis tomorrow morning.     [facility] INTERVENTIONAL RADIOLOGY DEPARTMENT  Procedure Physician: Dr. Brice                                      Date of procedure: July 15, 2024  Telephone Numbers: [phone]      Monday-Friday 7:30 am to 4:00 pm  [phone] After 4:00 pm Monday-Friday, Weekends & Holidays.   Ask for the Interventional Radiologist on call.  Someone is on call 24 hrs/day  [facility]  toll free number: [phone] Monday-Friday 8:00 am to 4:30 pm  [facility] Emergency Dept: [phone]

## 2024-07-15 NOTE — PROGRESS NOTES
Pt returend from IR ~ at  1230 . S/p CT guided L femur biopsy.  Up on arrival to 2A: AVSS. Pt denied pain.  L upper leg/biopsy site drsg cdi/no bleeding or hematoma.     Bed rest x 2 hours/

## 2024-07-15 NOTE — PROCEDURES
[facility]    Procedure: IR Procedure Note    Date/Time: 7/15/2024 12:48 PM    Performed by: Jose M Brice MD  Authorized by: Jose M Brice MD      UNIVERSAL PROTOCOL   Site Marked: NA  Prior Images Obtained and Reviewed:  Yes  Required items: Required blood products, implants, devices and special equipment available    Patient identity confirmed:  Verbally with patient, arm band, provided demographic data and hospital-assigned identification number  Patient was reevaluated immediately before administering moderate or deep sedation or anesthesia  Confirmation Checklist:  Patient's identity using two indicators, relevant allergies, procedure was appropriate and matched the consent or emergent situation and correct equipment/implants were available  Time out: Immediately prior to the procedure a time out was called    Universal Protocol: the Joint Commission Universal Protocol was followed    Preparation: Patient was prepped and draped in usual sterile fashion       ANESTHESIA    Anesthesia:  Local infiltration  Local Anesthetic:  Lidocaine 1% without epinephrine      SEDATION  Patient Sedated: Yes    Vital signs: Vital signs monitored during sedation    See dictated procedure note for full details.  Findings: 3 cores obtained from left proximal femur     Specimens: core needle biopsy specimens sent for pathological analysis    Complications: None    Condition: Stable      PROCEDURE    Patient Tolerance:  Patient tolerated the procedure well with no immediate complications  Length of time physician/provider present for 1:1 monitoring during sedation: 30

## 2024-07-15 NOTE — PRE-PROCEDURE
GENERAL PRE-PROCEDURE:   Procedure:  CT guided biopsy of the left femur  Date/Time:  7/15/2024 9:33 AM    Written consent obtained?: Yes    Risks and benefits: Risks, benefits and alternatives were discussed    Consent given by:  Patient  Patient states understanding of procedure being performed: Yes    Patient's understanding of procedure matches consent: Yes    Procedure consent matches procedure scheduled: Yes    Expected level of sedation:  Moderate  Appropriately NPO:  Yes  Mallampati  :  Grade 1- soft palate, uvula, tonsillar pillars, and posterior pharyngeal wall visible  Lungs:  Lungs clear with good breath sounds bilaterally  Heart:  Normal heart sounds and rate  History & Physical reviewed:  History and physical reviewed and no updates needed  Statement of review:  I have reviewed the lab findings, diagnostic data, medications, and the plan for sedation    Stephani Hook MD  PGY-3 QUINCY/

## 2024-07-16 LAB
PATH REPORT.COMMENTS IMP SPEC: NORMAL
PATH REPORT.FINAL DX SPEC: NORMAL
PATH REPORT.MICROSCOPIC SPEC OTHER STN: NORMAL
PATH REPORT.RELEVANT HX SPEC: NORMAL

## 2024-07-17 ENCOUNTER — MYC MEDICAL ADVICE (OUTPATIENT)
Dept: ONCOLOGY | Facility: CLINIC | Age: 64
End: 2024-07-17
Payer: COMMERCIAL

## 2024-07-19 ENCOUNTER — HOSPITAL ENCOUNTER (OUTPATIENT)
Dept: MRI IMAGING | Facility: CLINIC | Age: 64
Discharge: HOME | End: 2024-07-19
Attending: INTERNAL MEDICINE | Admitting: INTERNAL MEDICINE
Payer: COMMERCIAL

## 2024-07-19 DIAGNOSIS — C79.51 CARCINOMA OF LEFT BREAST METASTATIC TO BONE (H): ICD-10-CM

## 2024-07-19 DIAGNOSIS — C50.912 CARCINOMA OF LEFT BREAST METASTATIC TO BONE (H): ICD-10-CM

## 2024-07-19 PROCEDURE — A9585 GADOBUTROL INJECTION: HCPCS | Performed by: INTERNAL MEDICINE

## 2024-07-19 PROCEDURE — 255N000002 HC RX 255 OP 636: Performed by: INTERNAL MEDICINE

## 2024-07-19 PROCEDURE — 72156 MRI NECK SPINE W/O & W/DYE: CPT

## 2024-07-19 RX ORDER — GADOBUTROL 604.72 MG/ML
10 INJECTION INTRAVENOUS ONCE
Status: COMPLETED | OUTPATIENT
Start: 2024-07-19 | End: 2024-07-19

## 2024-07-19 RX ADMIN — GADOBUTROL 10 ML: 604.72 INJECTION INTRAVENOUS at 14:39

## 2024-07-22 DIAGNOSIS — C79.51 CARCINOMA OF LEFT BREAST METASTATIC TO BONE (H): Primary | ICD-10-CM

## 2024-07-22 DIAGNOSIS — C50.912 CARCINOMA OF LEFT BREAST METASTATIC TO BONE (H): Primary | ICD-10-CM

## 2024-07-22 NOTE — CONFIDENTIAL NOTE
Medical Oncology Update:    7/15/24 bone biopsy pathology noted  The following was ordered:  - breast NGS BRAF, PIK3CA, AKT1, PTEN, ESR1 NGS  - NTRK, RET fusion  - MSI   - PDL1 w/TMB     Mary Maravilla D.O.  Hematology/Oncology  [facility]

## 2024-07-23 LAB
INTERPRETATION: NORMAL
LAB DIRECTOR COMMENTS: NORMAL
LAB DIRECTOR COMMENTS: NORMAL
LAB DIRECTOR DISCLAIMER: NORMAL
LAB DIRECTOR DISCLAIMER: NORMAL
LAB DIRECTOR INTERPRETATION: NORMAL
LAB DIRECTOR INTERPRETATION: NORMAL
LAB DIRECTOR METHODOLOGY: NORMAL
LAB DIRECTOR METHODOLOGY: NORMAL
LAB DIRECTOR RESULTS: NORMAL
LAB DIRECTOR RESULTS: NORMAL
SIGNIFICANT RESULTS: NORMAL
SPECIMEN DESCRIPTION: NORMAL
TEST DETAILS, MDL: NORMAL

## 2024-07-23 PROCEDURE — G0452 MOLECULAR PATHOLOGY INTERPR: HCPCS | Mod: 26 | Performed by: PATHOLOGY

## 2024-07-23 PROCEDURE — 81445 SO NEO GSAP 5-50DNA/DNA&RNA: CPT | Performed by: INTERNAL MEDICINE

## 2024-07-23 PROCEDURE — 81301 MICROSATELLITE INSTABILITY: CPT | Performed by: INTERNAL MEDICINE

## 2024-07-24 ENCOUNTER — TRANSFERRED RECORDS (OUTPATIENT)
Dept: HEALTH INFORMATION MANAGEMENT | Facility: CLINIC | Age: 64
End: 2024-07-24
Payer: COMMERCIAL

## 2024-07-24 LAB
INTERPRETATION: NORMAL
PATH REPORT.ADDENDUM SPEC: ABNORMAL
PATH REPORT.COMMENTS IMP SPEC: ABNORMAL
PATH REPORT.COMMENTS IMP SPEC: YES
PATH REPORT.FINAL DX SPEC: ABNORMAL
PATH REPORT.GROSS SPEC: ABNORMAL
PATH REPORT.MICROSCOPIC SPEC OTHER STN: ABNORMAL
PATH REPORT.RELEVANT HX SPEC: ABNORMAL
PATHOLOGY SYNOPTIC REPORT: ABNORMAL
PHOTO IMAGE: ABNORMAL

## 2024-07-25 LAB — BKR LAB AP ADDL TEST(S) ADDED: YES

## 2024-08-01 DIAGNOSIS — C50.912 CARCINOMA OF LEFT BREAST METASTATIC TO BONE (H): Primary | ICD-10-CM

## 2024-08-01 DIAGNOSIS — C79.51 CARCINOMA OF LEFT BREAST METASTATIC TO BONE (H): Primary | ICD-10-CM

## 2024-08-01 RX ORDER — EXEMESTANE 25 MG/1
25 TABLET ORAL DAILY
Qty: 28 TABLET | Refills: 0 | Status: SHIPPED | OUTPATIENT
Start: 2024-08-09 | End: 2024-09-06

## 2024-08-01 RX ORDER — EVEROLIMUS 10 MG/1
10 TABLET ORAL DAILY
Qty: 28 TABLET | Refills: 0 | Status: SHIPPED | OUTPATIENT
Start: 2024-08-09 | End: 2024-09-06

## 2024-08-08 ENCOUNTER — VIRTUAL VISIT (OUTPATIENT)
Dept: ONCOLOGY | Facility: CLINIC | Age: 64
End: 2024-08-08
Attending: INTERNAL MEDICINE
Payer: COMMERCIAL

## 2024-08-08 ENCOUNTER — LAB (OUTPATIENT)
Dept: LAB | Facility: CLINIC | Age: 64
End: 2024-08-08
Payer: COMMERCIAL

## 2024-08-08 VITALS — HEIGHT: 66 IN | WEIGHT: 211 LBS | BODY MASS INDEX: 33.91 KG/M2

## 2024-08-08 DIAGNOSIS — H53.8 BLURRED VISION: ICD-10-CM

## 2024-08-08 DIAGNOSIS — C79.51 CARCINOMA OF LEFT BREAST METASTATIC TO BONE (H): Primary | ICD-10-CM

## 2024-08-08 DIAGNOSIS — E61.1 IRON DEFICIENCY: ICD-10-CM

## 2024-08-08 DIAGNOSIS — T45.1X5A MOUTH SORE SECONDARY TO CHEMOTHERAPY: ICD-10-CM

## 2024-08-08 DIAGNOSIS — C50.912 CARCINOMA OF LEFT BREAST METASTATIC TO BONE (H): Primary | ICD-10-CM

## 2024-08-08 DIAGNOSIS — E78.5 HYPERLIPIDEMIA LDL GOAL <130: ICD-10-CM

## 2024-08-08 DIAGNOSIS — C79.51 MALIGNANT NEOPLASM METASTATIC TO BONE (H): ICD-10-CM

## 2024-08-08 DIAGNOSIS — N95.1 MENOPAUSAL SYNDROME (HOT FLASHES): ICD-10-CM

## 2024-08-08 DIAGNOSIS — K13.79 MOUTH SORE SECONDARY TO CHEMOTHERAPY: ICD-10-CM

## 2024-08-08 DIAGNOSIS — E53.8 VITAMIN B12 DEFICIENCY (NON ANEMIC): ICD-10-CM

## 2024-08-08 DIAGNOSIS — C79.51 CARCINOMA OF LEFT BREAST METASTATIC TO BONE (H): ICD-10-CM

## 2024-08-08 DIAGNOSIS — I82.402 ACUTE DEEP VEIN THROMBOSIS (DVT) OF LEFT LOWER EXTREMITY, UNSPECIFIED VEIN (H): ICD-10-CM

## 2024-08-08 DIAGNOSIS — C50.912 CARCINOMA OF LEFT BREAST METASTATIC TO BONE (H): ICD-10-CM

## 2024-08-08 LAB
ALBUMIN SERPL BCG-MCNC: 3.9 G/DL (ref 3.5–5.2)
ALP SERPL-CCNC: 86 U/L (ref 40–150)
ALT SERPL W P-5'-P-CCNC: 47 U/L (ref 0–50)
ANION GAP SERPL CALCULATED.3IONS-SCNC: 12 MMOL/L (ref 7–15)
AST SERPL W P-5'-P-CCNC: 37 U/L (ref 0–45)
BASOPHILS # BLD AUTO: 0 10E3/UL (ref 0–0.2)
BASOPHILS NFR BLD AUTO: 0 %
BILIRUB SERPL-MCNC: 0.3 MG/DL
BUN SERPL-MCNC: 16.4 MG/DL (ref 8–23)
CALCIUM SERPL-MCNC: 8.8 MG/DL (ref 8.8–10.4)
CHLORIDE SERPL-SCNC: 106 MMOL/L (ref 98–107)
CHOLEST SERPL-MCNC: 196 MG/DL
CREAT SERPL-MCNC: 1.12 MG/DL (ref 0.51–0.95)
EGFRCR SERPLBLD CKD-EPI 2021: 55 ML/MIN/1.73M2
EOSINOPHIL # BLD AUTO: 0.1 10E3/UL (ref 0–0.7)
EOSINOPHIL NFR BLD AUTO: 2 %
ERYTHROCYTE [DISTWIDTH] IN BLOOD BY AUTOMATED COUNT: 16.9 % (ref 10–15)
FASTING STATUS PATIENT QL REPORTED: NO
FASTING STATUS PATIENT QL REPORTED: NO
GLUCOSE SERPL-MCNC: 109 MG/DL (ref 70–99)
HCO3 SERPL-SCNC: 25 MMOL/L (ref 22–29)
HCT VFR BLD AUTO: 29.8 % (ref 35–47)
HDLC SERPL-MCNC: 50 MG/DL
HGB BLD-MCNC: 9.5 G/DL (ref 11.7–15.7)
IMM GRANULOCYTES # BLD: 0 10E3/UL
IMM GRANULOCYTES NFR BLD: 1 %
LDLC SERPL CALC-MCNC: 117 MG/DL
LYMPHOCYTES # BLD AUTO: 1 10E3/UL (ref 0.8–5.3)
LYMPHOCYTES NFR BLD AUTO: 18 %
MAGNESIUM SERPL-MCNC: 2.3 MG/DL (ref 1.7–2.3)
MCH RBC QN AUTO: 31.3 PG (ref 26.5–33)
MCHC RBC AUTO-ENTMCNC: 31.9 G/DL (ref 31.5–36.5)
MCV RBC AUTO: 98 FL (ref 78–100)
MONOCYTES # BLD AUTO: 0.4 10E3/UL (ref 0–1.3)
MONOCYTES NFR BLD AUTO: 8 %
NEUTROPHILS # BLD AUTO: 3.8 10E3/UL (ref 1.6–8.3)
NEUTROPHILS NFR BLD AUTO: 71 %
NONHDLC SERPL-MCNC: 146 MG/DL
NRBC # BLD AUTO: 0 10E3/UL
NRBC BLD AUTO-RTO: 0 /100
PHOSPHATE SERPL-MCNC: 2.7 MG/DL (ref 2.5–4.5)
PLATELET # BLD AUTO: 136 10E3/UL (ref 150–450)
POTASSIUM SERPL-SCNC: 3.9 MMOL/L (ref 3.4–5.3)
PROT SERPL-MCNC: 6.9 G/DL (ref 6.4–8.3)
RBC # BLD AUTO: 3.04 10E6/UL (ref 3.8–5.2)
SODIUM SERPL-SCNC: 143 MMOL/L (ref 135–145)
TRIGL SERPL-MCNC: 144 MG/DL
WBC # BLD AUTO: 5.3 10E3/UL (ref 4–11)

## 2024-08-08 PROCEDURE — 80061 LIPID PANEL: CPT | Performed by: INTERNAL MEDICINE

## 2024-08-08 PROCEDURE — 80053 COMPREHEN METABOLIC PANEL: CPT

## 2024-08-08 PROCEDURE — 84100 ASSAY OF PHOSPHORUS: CPT

## 2024-08-08 PROCEDURE — 36415 COLL VENOUS BLD VENIPUNCTURE: CPT

## 2024-08-08 PROCEDURE — 85025 COMPLETE CBC W/AUTO DIFF WBC: CPT

## 2024-08-08 PROCEDURE — 83735 ASSAY OF MAGNESIUM: CPT

## 2024-08-08 PROCEDURE — 99214 OFFICE O/P EST MOD 30 MIN: CPT | Mod: 95 | Performed by: INTERNAL MEDICINE

## 2024-08-08 RX ORDER — VENLAFAXINE HYDROCHLORIDE 75 MG/1
75 CAPSULE, EXTENDED RELEASE ORAL DAILY
Qty: 30 CAPSULE | Refills: 11 | Status: SHIPPED | OUTPATIENT
Start: 2024-08-08

## 2024-08-08 ASSESSMENT — PAIN SCALES - GENERAL: PAINLEVEL: NO PAIN (0)

## 2024-08-08 NOTE — PROGRESS NOTES
"Video-Visit Details     Video Start Time: 2:47PM    Type of service:  Video Visit     Video End Time: 3:10PM    Originating Location (pt. Location): Home     Distant Location (provider location):  [employer] [place] on site     Platform [facility] Visit: [facility]    Hematology/Oncology Established Patient Follow Up Note      Today's Date: 8/8/24    Reason for follow up: metastatic breast cancer    HISTORY OF PRESENT ILLNESS: Kesha Zacarias is a 64 year old female who presents for follow up    Patient has medical history including rheumatoid arthritis, hyperlipidemia, osteoarthritis, bilateral cataracts and glaucoma s/p surgery, endocervical polyp s/p resection, vaginal atrophy with conjugated estrogen vaginal cream use, colon polyp (hyperplastic polyp and tubular adenoma), seasonal depression, history of tobacco use ([age] y/o, about 1 ppd).    Regarding RA:  -dx over a decade ago, on plaquenil, managed by PCP  - arthritis is most severe in hands>knees, intermittent       - 3/23 bilateral screening mammogram [facility]  - a few months ago, noted nipple retraction  - 9/23 patient palpated mass in retroareolar region of left breast and w/nipple retraction, no discharge, skin thickening, no dimpling, no erythema  - 9/23 diagnostic LEFT breast mammogram: Focal dense tissue with some likely mild architectural distortion, some anterior skin thickening is noted  - 9/23 targeted LEFT breast ultrasound: Ill-defined shadowing hypoechoic mass, 3.0 x 1.7 x 3.9 cm.  In left axilla-few small lymph nodes, 1 normal-sized lymph node with cortical thickening, 0.7 x 0.6 cm.  -9/23 ultrasound-guided LEFT breast core biopsy of 12:00 lesion with clip placement, \"Postbiopsy unilateral digital mammogram of the left breast showed the clip to be at the expected biopsy site\" AND ultrasound-guided left axillary lymph node biopsy of lymph node with cortical thickening  PATHOLOGY  A.  Breast, left, 12:00, " biopsy:  -Lobular carcinoma in situ.-Multiple foci  -Invasive carcinoma is not identified.     B.  Lymph node, left axillary, biopsy:  -Positive for metastatic lobular carcinoma, grade 2  -Largest metastatic focus is 7 mm.  -Negative for extranodal extension.  -Breast ancillary testing:  -Estrogen receptor: Positive (91 to 100%, strong)  -Progesterone receptor: Positive (91 to 100%, strong)  -HER2 2+ IHC, FISH negative    -10/23 MRI bilateral breast:  LEFT breast:  - Heterogeneously enhancing irregular mass in the subareolar left breast, 5.0 x 4.9 x 4.9 cm, with associated nipple retraction and nipple/areolar involvement.  This mass extends superiorly through 11: positions, from anterior to mid depth.  The ComptTIAMARK breast biopsy clip (which showed LCIS) is 1.5 cm posterosuperior to this mass.  - Multiple suspicious left level 1 axillary lymph node noted, including biopsy-proven metastatic node with indwelling biopsy marker, biopsied node measures 1.3 x 1.0 cm  - Heterogeneous marrow appearance of sternum and ribs with heterogeneous enhancement and a peripheral enhancing focus within the mid body.  IMPRESSION:  1. Upon further review of previous imaging and pathology results,  recommend repeat ultrasound guided large core-needle biopsy of the  discordant dominant left breast mass given metastatic left axillary  lymph node and superoposteriorly displaced left breast biopsy marker.   2. Nonspecific heterogeneous enhancement of the sternum and ribs.  Consider nuclear medicine bone scan    Lifetime estrogen exposure:  Menarche: 13   Last menstrual period: 49   Age of first pregnancy: 18   Number of pregnancies: 2 (no living children)   Weight gain: 20lb weight gain within a year  Exposure to exogenous estrogen: vaginal atrophy with conjugated estrogen vaginal cream use x4 years (intermittent), has not used it since 9/23. Birth control for about 13-15 years from her 20s-30s.      Pt reports 55lb weight loss in 1.5  years, this was intentional, was following weight watchers program (23 points available per day) 230lb->170lb->190lb (gained about 20lbs). Pt was walking on the treadmill and outside daily, about 30 mins to 1.5 miles.    10/23 labs:  AST 79, ALT 91,   CA 15-3 261    10/23 PET/CT:  Innumerable foci of FDG avid lesions are seen throughout the osseous  structures of the head and neck, most pronounced on the calvarium and  cervical spine, with prominently sclerotic appearance on CT correlate.  For example:  -Right frontal bone, SUV max 5.31.  -Left lateral mass of the atlas, SUV max 15.0  -Angle of the left mandible, SUV max 9.6  -C7 vertebral body, SUV max 17.7    Innumerable variable sized FDG avid lesions throughout the axial and  appendicular spine, including but not limited to the cervical,  thoracic, and lumbar spine, multilevel bilateral ribs, sternum,  bilateral scapula, bilateral humeri, clavicles, pelvis, and bilateral  femurs. A few of these lesions are described below:  -Large FDG avid sclerotic 3.4 cm lesion within the proximal left  humeral head, SUV max 17.24  -Sternal body, SUV max 20.35  -L2 vertebral body, SUV max 14.3 without  -Large ill-defined sclerotic lesion in the sacral body measuring up to  at least 5.9 cm, SUV max 16.84  -FDG avid focus within the left femoral head, SUV  max 13.65 without CT correlate.    Large irregular soft tissue FDG avid mass within the left breast   measuring approximately 3.2 x 2.7 cm with  extension into the superficial soft tissues and associated left nipple  retraction, SUV max 12.36 additional FDG avid. Left axillary lymph  nodes, not definitively enlarged by short axis size criteria, for  example, SUV max 5.93.    The liver is unremarkable.     Solid 3 mm non-FDG avid pulmonary nodule within the  right middle lobe    - 10/23 MRI brain:  - extensive osseous metastatic disease involving the calvarium, skull base w/involvement of occipital condyles, C1 and C2  vertebral bodies, and likely the mandible  -  Dominant calvarial lesions are located in the right frontal calvarium and right temporal calvarium without definite breach of the  inner or outer tables of the calvarium. There is a suggestion of mild asymmetric linear extra-axial enhancement deep to the dominant right temporal calvarial lesion along the dural margin, though this may be vascular in etiology.  - No abnormal parenchymal or leptomeningeal enhancement.   - sequelae of mild chronic microvascular ischemic disease. Mild generalized cerebral atrophy.       -supposed to start ribociclib 10/30/23 however, noted to have significantly elevated LFTS (10/26/23 , , alk phos 152)    10/17/23: AST 79, ALT 91, alk phos 116, t bili 0.5  10/26/23 , , alk phos 152  10/30/23: , , alk phos 178, coags WNL, ribociclib was not started, letrozole started, atorvastatin held  11/7/23: AST 96, , alk phos 169, MRI liver negative for mets  11/17/23: AST 81, , alk phos 163  11/27/23: AST 55, ALT 90, alk phos 128- started ribociclib 400mg  12/4/23: AST 26, ALT 35, alk phos 118    - 11/27/23 started ribociclib+ letrozole  - 11/27/23-5/30/24 ribociclib+ letrozole; C1D1 11/27/23 started ribociclib 400mg PO daily when LFTs improved to <3x ULN, C2D1 12/25/23 ribociclib 600mg (day 1-12 only 2/2 palliative RT to sacrum w/Dr. Mandujano 1/9/24- 1/22/24 3000cGy in 10 fractions), C3D1 1/30/24, C4D1 3/6/24 (deferred 1 week 2/2 2/27/24 ANC 0.8->3/5/24 ANC 1.5), C5D1 4/3/24, C6D1 5/1/24, C7D1 5/30/24->progression of disease in bones  - 1/9/24-1/22/24 palliative RT to sacrum w/Dr. Mandujano- 3000cGy in 10 fractions    - 2/17/24 PET CT CAP  PET/CT FINDINGS: Most of the extensive skeletal metastases have become sclerotic and non-FDG avid and those which remain avid have decreased in activity, for example in the proximal right humerus from SUVmax 19.3 to 13.9, in the proximal right femur from 16.5 to 12.2, and  in the L2 vertebral body from 14.3 to 10.7.      CT FINDINGS: Bilateral lens replacements. Calcified atherosclerosis, including moderate right coronary artery disease. Splenule. Cholelithiasis. Retroaortic left renal vein. Pelvic phleboliths. Appendectomy. Ventral hernia repair with mesh. Persistent   metopic suture. T11 vertebral body hemangioma. Mild degenerative change in the spine.                                                       IMPRESSION:  Partial response       - 5/11/24 PET CT CAP:  Redemonstrated innumerable FDG avid osseous lesions, some of which are increasing in metabolic activity and suggest mild progression of disease including representative examples involving the inferior sternum (Max SUV 16.5, previously 7.9) and left distal femur (Max SUV 20.8, previously 11.3).    - 5/24/24 MRI brain w/wo contrast:  IMPRESSION:   1. Presumed osseous metastases involving the occipital condyles  bilaterally, C1, C2 and C3 vertebrae again noted.  2. Questionable osseous metastatic disease involving the parietal  bones bilaterally again noted without change.  3. Diffuse cerebral volume loss and cerebral white matter changes  consistent with chronic small vessel ischemic disease. No evidence for  intracranial metastases.  4. No evidence for acute intracranial pathology.    - 11/27/23-5/30/24 ribociclib+ letrozole; C1D1 11/27/23 started ribociclib 400mg PO daily when LFTs improved to <3x ULN, C2D1 12/25/23 ribociclib 600mg (day 1-12 only 2/2 palliative RT to sacrum w/Dr. Mandujano 1/9/24- 1/22/24 3000cGy in 10 fractions), C3D1 1/30/24, C4D1 3/6/24 (deferred 1 week 2/2 2/27/24 ANC 0.8->3/5/24 ANC 1.5), C5D1 4/3/24, C6D1 5/1/24, C7D1 5/30/24->progression of disease in bones    - 6/24 IR requested PET Overread:  - Relatively stable hypermetabolic left breast mass having SUV max of  5.7, previously, 6.7  - Redemonstration of hypermetabolic innumerable osseous metastatic  lesions following the axial and appendicular  skeleton with involvement  of the marrow cavity in the long bones. These are predominantly  sclerotic with few lytic appearing lesions. Few lesions have increased  uptake compared to prior exam, a few have slightly decreased uptake ,  few remain overall unchanged. As index lesions:     Increased uptake:  -C2 vertebral body lesion with SUV max 14.4, previously 8.3. 1a. Of note - hypermetabolic C2 lesion with the posterior cortical breakthrough, no significant spinal cord impingement at this time but potentially at risk in the future.     Decreased uptake:  -L3 vertebral body with SUV max 7.4, previously 10.3.   -Sternal body lesion with SUV max 6.7, previously 10.9.   -Left ischial  tuberosity lesion with SUV max 3.5, previously 9.3.     Relatively stable uptake:  -Left humeral head lesion having SUV max 10.4, previously 11.1.   -Left sacral ala lesion with SUV max 7.2, previously 8.7.    Addendum: Potential biopsy sites:   Another lesion that has a slight increase in uptake is in the left  femur, lateral to the lesser trochanter (series 4 image 24) max SUV 23  (prior 18).  The hypermetabolic portion is not sclerotic, but it is in  the lateral aspect of the femur, fairly good size and the lesser  trochanter could serve as a landmark.     The left humeral head lesion may also be a potential biopsy site max  SUV 10.4 (not significantly changed from prior 11). The lesion is in  the lateral aspect of the sclerotic lesion when the patient's arms are  in the up position.     The C2 lesion may be more difficult to assess given its anterior  medial position (max suv 14).    - 6/24 germline genetic testing: NEGATIVE for BRENT, BARD1, BRCA1, BRCA2, CDH1, CHEK2, NF1, PALB2, PTEN, STK11, TP53.     - 6/14/24 started everolimus+ exemestane    INTERIM HISTORY:    REGIMEN:  Everolimus + exemestane   C1D1 6/14/24, C2D1 7/12/24  everolimus 10mg PO daily   exemestane 25mg PO daily  PLUS dexamethasone mouthwash     C3D1  "8/9/24    Treatment Related AE:  - gum soreness/jaw pain- posterior, b/l- Left where she had tooth pulled- before starting everolimus, right- 1 week after starting everolimus, bleeding w/brushing teeth, 7/17/24- per pt, dentist said jawbone protruding. They recommended an oral surgeon to grind down the bone and cover with gum tissue. Zometa on hold. End of July- Pt saw oral surgeon (unknown name), protruding jaw bone is \"dead and will fall out\" and that they do not want pt to get additional zometa, pending follow up on 8/28/24  - mouth sore- improved with magic mouthwash, dexamethasone mouth wash  - hld- 6/24 total chol 299, - previously was on atorvastatin that was stopped when she started ribociclib   - hot flashes- 3/24 10x/day, last a few mins, daily, affecting quality of life including sleep; 4/24 started effexor 37.5mg x1 week, then 75mg thereafter and hot flashes improved to 2-3/day; no difference since starting exemestane  - Left posterior tibial DVT- 1/19/24 sx started- Left foot pain and swelling, 1/22/24 pt called in, 1/22/24 left LE doppler: DVT in 1 of 2 left distal posterior tibial veins, started on eliquis and stopped naproxen; no issues with bleeding but does have intermitting bruising when she bumps her hand  - blurry vision b/l- last saw eye doctor 3/23 and has trifocal glasses. She has dry eyes and uses lubricating eye drops., 10/23 MRI brain as above. eye doctor visit 4/3/24- got new glasses rx, 5/23/24 sent message regarding increased blurred vision- remain constant despite time of day, near vision, far vision, or the use of her corrective lenses; 5/24/24 MRI brain negative for intraparenchymal mets, osseous mets stable, still having intermittent blurred vision, lasts anywhere from 1-3 days- overall stable   - macrocytic anemia- hgb 11, 5/24 B12- 298, TSH 1.19, absolute retic 0.071, ferritin 355, iron sat 18, TIBC 307, iron 56; RBC folate next labs, 6/24 start B12 SL 64088gor daily and " "oral ferrous sulfate 325mg every other day   - thrombocytopenia- intermittent, some bruising, no bleeding while on eliquis     - unable to do NGS testing on previous biopsy specimen, therefore repeat biopsy completed  - 7/15/24 CT guided bone biopsy of left proximal femur lesion w/IR   PATHOLOGY:  - Metastatic lobular breast carcinoma   ER positive (%), AK negative (<1%), her2 2+ on IHC; FISH unable to be completed \"Three unstained slides of paraffin-embedded tissue were received and processed by the [facility] on 7-18-24 for HER2 FISH testing. However, the tumor cells in the accompanying H&E-stained slide could not be definitively identified on fluorescence microscopy; thus, assessment of the HER2 status of this patient's metastatic tumor cells could not be performed. If clinically indicated, analysis of HER2 can be repeated by FISH if additional material is obtained in the future.\"  RESULT FOR IMMUNOHISTOCHEMICAL VENTANA CLONE  PD-L1 ASSAY  TUMOR PROPORTION SCORE (TPS): 0%  INTERPRETATION: NEGATIVE PD-L1 EXPRESSION (TPS <1%)  ABEL  Breast NGS for AKT1; BRCA1; BRCA2; ERBB2; ESR1; PALB2; PIK3CA; PTEN; RET: negative, TMB 7.313 mut/Mb   Fusion for ACVR2A, AKT1, AKT2, AKT3, ALK, AR, KANDQY94, ARHGAP6, CAROL, BCOR, BRAF, BRD3, BRD4, CAMTA1, CCNB3, CCND1, ,CIC, CRTC1, CSF1, CSF1R, CTNNB1, DNAJB1, EGF, EGFR, EPC1, ERBB2, ERBB4, ERG, ESR1, ESRRA, ETV1, ETV4, ETV5, ETV6, EWSR1,FGF1, FGFR1, FGFR2, FGFR3, FGR, FOS, FOSB, FOXO1, FOXO4, FOXR2, FUS, GLI1, GRB7, HMGA2, HRAS, IDH1, IDH2, IGF1R, INSR,JAK2, JAK3, JAZF1, KIT, KRAS, MAML2, MAP2K1, MAST1, MAST2, MBTD1, MDM2, MEAF6, MET, MGEA5, MKL2, MN1, MSMB,  MUSK, MYB, MYBL1, MYC, MYOD1, NCOA1, NCOA2, NCOA3, NFATC2, NFE2L2, NFIB, NOTCH1, NOTCH2, NR4A3, NRAS, NRG1,  NTRK1, NTRK2, NTRK3, NUMBL, NUTM1, PAX3, PAX8, PDGFB, PDGFD, PDGFRA, PDGFRB, PHF1, PHKB, PIK3CA, PKN1, PLAG1,  PPARG, PRDM10, PRKACA, PRKACB, PRKCA, PRKCB, PRKCD, PRKD1, PRKD2, PRKD3, RAD51B, " RAF1, RELA, RET, ROS1, RSPO2,  RSPO3, SS18, SS18L1, STAT6, TAF15, TCF12, TERT, TFE3, TFEB, TFG, THADA, TMPRSS2, USP6, VGLL2, WWTR1, YAP1, YWHAE: negative        REVIEW OF SYSTEMS:   A 14 point ROS was reviewed with pertinent positives and negatives in the HPI.        HOME MEDICATIONS:  Current Outpatient Medications   Medication Sig Dispense Refill    apixaban ANTICOAGULANT (ELIQUIS) 5 MG tablet Take 1 tablet (5 mg) by mouth 2 times daily 60 tablet 11    atorvastatin (LIPITOR) 10 MG tablet Take 1 tablet (10 mg) by mouth daily 90 tablet 1    betamethasone dipropionate (DIPROSONE) 0.05 % external lotion Apply topically 2 times daily 60 mL 3    CALCIUM PO       cyclobenzaprine (FLEXERIL) 5 MG tablet TAKE 1 TABLET(5 MG) BY MOUTH AT BEDTIME 90 tablet 3    dexAMETHasone alcohol-free (DECADRON) 0.1 MG/ML solution Swish 10 mL in mouth for 2 min and spit, four times daily. 1200 mL 1    [START ON 8/9/2024] everolimus (AFINITOR) 10 MG tablet Take 1 tablet (10 mg) by mouth daily for 28 days Avoid grapefruit and grapefruit juice. Take with or without food, but should be consistent. 28 tablet 0    everolimus (AFINITOR) 10 MG tablet Take 1 tablet (10 mg) by mouth daily for 28 days Avoid grapefruit and grapefruit juice. Take with or without food, but should be consistent. 28 tablet 0    [START ON 8/9/2024] exemestane (AROMASIN) 25 MG tablet Take 1 tablet (25 mg) by mouth daily for 28 days Take after a meal. 28 tablet 0    exemestane (AROMASIN) 25 MG tablet Take 1 tablet (25 mg) by mouth daily for 28 days Take after a meal. 28 tablet 0    ferrous sulfate (FEROSUL) 325 (65 Fe) MG tablet Take 1 tablet (325 mg) by mouth every other day 90 tablet 0    fish oil-omega-3 fatty acids 1000 MG capsule Take 2 g by mouth daily      hydroxychloroquine (PLAQUENIL) 200 MG tablet TAKE 2 AND 1/2 TABLETS BY MOUTH EVERY  tablet 3    magic mouthwash suspension (diphenhydrAMINE, lidocaine, aluminum-magnesium & simethicone) Swish and swallow 10  mLs in mouth every 6 hours as needed for mouth sores 1200 mL 1    magnesium 250 MG tablet Take 1 tablet by mouth daily      ondansetron (ZOFRAN ODT) 4 MG ODT tab Take 1-2 tablets (4-8 mg) by mouth every 8 hours as needed for nausea 30 tablet 2    ondansetron (ZOFRAN) 4 MG tablet Take 1 tablet (4 mg) by mouth every 6 hours as needed for nausea 30 tablet 3    prochlorperazine (COMPAZINE) 10 MG tablet Take 1 tablet (10 mg) by mouth every 6 hours as needed for nausea or vomiting 30 tablet 2    venlafaxine (EFFEXOR XR) 75 MG 24 hr capsule Take 1 capsule (75 mg) by mouth daily 30 capsule 11    vitamin B-12 (CYANOCOBALAMIN) 2500 MCG sublingual tablet Take 1 tablet (2,500 mcg) by mouth daily 90 tablet 1    VITAMIN D PO            ALLERGIES:  Allergies   Allergen Reactions    Ciprofloxacin      hives and was on flagyl too    Metronidazole      hives and was on cipro too         PAST MEDICAL HISTORY:  Past Medical History:   Diagnosis Date    Arthritis 2006    Breast cancer metastasized to bone (H) 10/12/2023    Chronic depressive personality disorder     Dysplasia of cervix (uteri) 1988    Cryotherapy    Female infertility of unspecified origin     Glaucoma     Rheumatoid arthritis (H)          PAST SURGICAL HISTORY:  Past Surgical History:   Procedure Laterality Date    COLONOSCOPY      COLONOSCOPY N/A 01/14/2022    Procedure: COLONOSCOPY, WITH POLYPECTOMY AND BIOPSY;  Surgeon: Gagandeep Patterson MD;  Location:  GI    HC INTRODUCE CATH FALLOPIAN TUBE, RE-OPEN/DIAGNOSIS      HERNIA REPAIR, INCISIONAL  11/11/2009    JOINT REPLACEMENT Right 09/2017    knee    TONSILLECTOMY      C APPENDECTOMY  06/14/2003    [facility] REMV CATARACT INTRACAP,INSERT LENS  02/13/2003    right         SOCIAL HISTORY:  Social History     Socioeconomic History    Marital status:      Spouse name: Bandar    Number of children: 1    Years of education: Not on file    Highest education level: Not on file   Occupational History    Not on file   Tobacco  Use    Smoking status: Former     Current packs/day: 0.00     Average packs/day: 0.5 packs/day for 25.0 years (12.5 ttl pk-yrs)     Types: Cigarettes, Cigars     Start date: 1992     Quit date: 2017     Years since quittin.8    Smokeless tobacco: Never    Tobacco comments:     Cigar once in a while   Vaping Use    Vaping status: Never Used   Substance and Sexual Activity    Alcohol use: Yes     Comment: very little    Drug use: Yes     Types: Marijuana     Comment: once every 2 weeks    Sexual activity: Yes     Partners: Male     Birth control/protection: None   Other Topics Concern    Parent/sibling w/ CABG, MI or angioplasty before 65F 55M? Yes   Social History Narrative    Not on file     Social Determinants of Health     Financial Resource Strain: Not on file   Food Insecurity: Not on file   Transportation Needs: Not on file   Physical Activity: Not on file   Stress: Not on file   Social Connections: Not on file   Interpersonal Safety: Not on file   Housing Stability: Not on file         FAMILY HISTORY:  Family History   Problem Relation Age of Onset    Heart Disease Mother     Lipids Mother     Hyperlipidemia Mother     Genitourinary Problems Father         prostate    Genetic Disorder Father         ulcer    Hypertension Father     Lipids Father     Prostate Cancer Father     Hyperlipidemia Sister     Hyperlipidemia Brother     Other Cancer Brother         Neck Cancer HPV (+)    Heart Disease Maternal Grandmother     Cerebrovascular Disease Maternal Grandmother     Heart Disease Maternal Grandfather     Heart Disease Maternal Uncle     Heart Disease Maternal Uncle         x  3    Cancer Nephew         Sister's Son       Family history of:  1.  Breast cancer including male breast cancer: negative   2. Ovarian cancer: negative  3.  Pancreatic cancer: negative  4.  Prostate cancer: father- ?in his 50s, other details unknown  5. Diffuse gastric cancer (if lobular breast CA): negative  6. Uterine  cancer: negative  7. Colon cancer:  negative  6. Brother- head and neck, HPV +, had surgery, clinical trial and now cancer free      PHYSICAL EXAM:  Vital signs:  LMP 11/22/2011 (Exact Date)          LABS:   Latest Reference Range & Units 08/08/24 14:01   Sodium 135 - 145 mmol/L 143   Potassium 3.4 - 5.3 mmol/L 3.9   Chloride 98 - 107 mmol/L 106   Carbon Dioxide (CO2) 22 - 29 mmol/L 25   Urea Nitrogen 8.0 - 23.0 mg/dL 16.4   Creatinine 0.51 - 0.95 mg/dL 1.12 (H)   GFR Estimate >60 mL/min/1.73m2 55 (L)   Calcium 8.8 - 10.4 mg/dL 8.8   Anion Gap 7 - 15 mmol/L 12   Magnesium 1.7 - 2.3 mg/dL 2.3   Phosphorus 2.5 - 4.5 mg/dL 2.7   Albumin 3.5 - 5.2 g/dL 3.9   Protein Total 6.4 - 8.3 g/dL 6.9   Alkaline Phosphatase 40 - 150 U/L 86   ALT 0 - 50 U/L 47   AST 0 - 45 U/L 37   Bilirubin Total <=1.2 mg/dL 0.3   Cholesterol <200 mg/dL 196   Patient Fasting?  No  No   Glucose 70 - 99 mg/dL 109 (H)   HDL Cholesterol >=50 mg/dL 50   LDL Cholesterol Calculated <=100 mg/dL 117 (H)   Non HDL Cholesterol <130 mg/dL 146 (H)   Triglycerides <150 mg/dL 144   WBC 4.0 - 11.0 10e3/uL 5.3   Hemoglobin 11.7 - 15.7 g/dL 9.5 (L)   Hematocrit 35.0 - 47.0 % 29.8 (L)   Platelet Count 150 - 450 10e3/uL 136 (L)   RBC Count 3.80 - 5.20 10e6/uL 3.04 (L)   MCV 78 - 100 fL 98   MCH 26.5 - 33.0 pg 31.3   MCHC 31.5 - 36.5 g/dL 31.9   RDW 10.0 - 15.0 % 16.9 (H)   % Neutrophils % 71   % Lymphocytes % 18   % Monocytes % 8   % Eosinophils % 2   % Basophils % 0   Absolute Basophils 0.0 - 0.2 10e3/uL 0.0   Absolute Eosinophils 0.0 - 0.7 10e3/uL 0.1   Absolute Immature Granulocytes <=0.4 10e3/uL 0.0   Absolute Lymphocytes 0.8 - 5.3 10e3/uL 1.0   Absolute Monocytes 0.0 - 1.3 10e3/uL 0.4   % Immature Granulocytes % 1   Absolute Neutrophils 1.6 - 8.3 10e3/uL 3.8   Absolute NRBCs 10e3/uL 0.0   NRBCs per 100 WBC <1 /100 0   (H): Data is abnormally high  (L): Data is abnormally low    PATHOLOGY:    IMAGING:  Narrative & Impression   MR CERVICAL SPINE WITHOUT AND WITH  CONTRAST  7/19/2024 2:56 PM      HISTORY: Metastatic breast cancer, C2 lesion on PET, patient w/HA  intermittent vision changes; Carcinoma of left breast metastatic to  bone (H).     COMPARISON:  PET CT from 6/20/2024.     TECHNIQUE: Multiplanar multisequence cervical MR without and with  contrast.     Contrast: 10 mL Gadavist      FINDINGS:     Normal cervical vertebral alignment.      Grade 1 anterolisthesis at C5-6.     Diffusely heterogeneous bone marrow signal with associated enhancement  involving all the bones and ribs in the imaging field, consistent with  diffuse osseous metastasis. No evidence of pathologic compression  fracture. No evidence of extraosseous extension.     No abnormal spinal cord signal.     The findings on a level by level basis are as follows:     C2-3: Moderate disc height loss and disc degeneration. Uncovertebral  spurring. No significant spinal canal or neural foraminal stenosis.     C3-4: Mild to moderate disc height loss and disc degeneration. Left  eccentric disc osteophyte complex and uncinate spurring. Bilateral  facet arthropathy. No significant spinal canal or neural foraminal  stenosis.     C4-5: Tiny central protrusion. Left greater than right facet  arthropathy. No significant spinal canal or neural foraminal stenosis.     C5-6: Mild disc height loss and disc degeneration. Uncovertebral  spurring. No significant spinal canal or neural foraminal stenosis.     C6-7: Circumferential disc bulge. Bilateral facet arthropathy. No  significant spinal canal or neural foraminal stenosis.     C7-T1: Uncovertebral spurring. No significant spinal canal or neural  foraminal stenosis.     The visualized skull base, posterior fossa, and paraspinal soft  tissues are within normal limits.                                                                      IMPRESSION:  1. Diffusely heterogeneous bone marrow signal with associated  enhancement involving all the bones and ribs in the imaging  "field,  consistent with diffuse osseous metastasis. No evidence of pathologic  compression fracture. No evidence of extraosseous extension.  2. No abnormal spinal cord signal.  3. No high-grade spinal canal or neural foraminal stenosis.         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 Pt does not have visceral crisis, she mainly has extensive bone metastases and LN metastases   -9/23 diagnostic left breast mammogram, left breast targeted ultrasound showed 3.0 x 1.7 x 3.9 ill-defined shadowing hypoechoic mass, few small lymph nodes in the left axilla, one with cortical thickening measuring 0.7 x 0.6 cm  -9/23 ultrasound-guided LEFT breast core biopsy of 12:00 lesion with clip placement, \"Postbiopsy unilateral digital mammogram of the left breast showed the clip to be at the expected biopsy site\" AND ultrasound-guided left axillary lymph node biopsy of lymph node with cortical thickening, PATHOLOGY:  A.  Breast, left, 12:00, biopsy:Lobular carcinoma in situ, Multiple foci. Invasive carcinoma is not identified.   B.  Lymph node, left axillary, biopsy:  -Positive for metastatic lobular carcinoma, grade 2, 0.7 cm, negative GREGG, Estrogen receptor: Positive (91 to 100%, strong), Progesterone receptor: Positive (91 to 100%, strong), HER2 2+ IHC, FISH negative  -10/23 MRI bilateral breast:  - Heterogeneously enhancing irregular mass in the subareolar left breast, 5.0 x 4.9 x 4.9 cm, with associated nipple retraction and nipple/areolar involvement.  This mass extends superiorly through 11: positions, from anterior to mid depth.  The Double DoodsMARK breast biopsy clip (which showed LCIS) is 1.5 cm posterosuperior to this " mass.  - Multiple suspicious left level 1 axillary lymph node noted, including biopsy-proven metastatic node with indwelling biopsy marker, biopsied node measures 1.3 x 1.0 cm  - Heterogeneous marrow appearance of sternum and ribs with heterogeneous enhancement and a peripheral enhancing focus within the mid body.    - 10/23 PET/CT:  Innumerable foci of FDG avid lesions are seen throughout the osseous structures of the head and neck, most pronounced on the calvarium and cervical spine, with prominently sclerotic appearance on CT correlate.  For example:  -Right frontal bone, SUV max 5.31.  -Left lateral mass of the atlas, SUV max 15.0  -Angle of the left mandible, SUV max 9.6  -C7 vertebral body, SUV max 17.7    Innumerable variable sized FDG avid lesions throughout the axial and appendicular spine, including but not limited to the cervical, thoracic, and lumbar spine, multilevel bilateral ribs, sternum, bilateral scapula, bilateral humeri, clavicles, pelvis, and bilateral femurs. A few of these lesions are described below:  -Large FDG avid sclerotic 3.4 cm lesion within the proximal left humeral head, SUV max 17.24  -Sternal body, SUV max 20.35  -L2 vertebral body, SUV max 14.3 without  -Large ill-defined sclerotic lesion in the sacral body measuring up to at least 5.9 cm, SUV max 16.84  -FDG avid focus within the left femoral head, SUV max 13.65 without CT correlate.    Large irregular soft tissue FDG avid mass within the left breast measuring approximately 3.2 x 2.7 cm with  extension into the superficial soft tissues and associated left nipple retraction, SUV max 12.36 additional FDG avid. Left axillary lymph nodes, not definitively enlarged by short axis size criteria, for example, SUV max 5.93.    - 10/23 MRI brain:  - extensive osseous metastatic disease involving the calvarium, skull base w/involvement of occipital condyles, C1 and C2 vertebral bodies, and likely the mandible  -  Dominant calvarial lesions  "are located in the right frontal calvarium and right temporal calvarium without definite breach of the inner or outer tables of the calvarium. There is a suggestion of mild asymmetric linear extra-axial enhancement deep to the dominant right temporal calvarial lesion along the dural margin, though this may be vascular in etiology.  - No abnormal parenchymal or leptomeningeal enhancement.   - sequelae of mild chronic microvascular ischemic disease. Mild generalized cerebral atrophy.     -10/23 DEXA: osteopenia (T score -2.0 lumbar spine, -2.0 left hip femoral neck, The 10 year probability of major osteoporotic fracture is 12.5%, and of hip fracture is 1.8%, based on left femoral neck BMD)    - 11/23 orthopedic consult to determine stability of left femur and fracture risk given presence of mets in left femoral head: do not see any areas of impending cortical erosion or sites of high risk for fracture, observe    - 6/24 germline genetic testing: NEGATIVE for BRENT, BARD1, BRCA1, BRCA2, CDH1, CHEK2, NF1, PALB2, PTEN, STK11, TP53.     - unable to do NGS testing on previous biopsy specimen, therefore repeat biopsy completed  - 7/15/24 CT guided bone biopsy of left proximal femur lesion w/IR   PATHOLOGY:  - Metastatic lobular breast carcinoma   ER positive (%), MS negative (<1%), her2 2+ on IHC; FISH unable to be completed \"Three unstained slides of paraffin-embedded tissue were received and processed by the [facility] on 7-18-24 for HER2 FISH testing. However, the tumor cells in the accompanying H&E-stained slide could not be definitively identified on fluorescence microscopy; thus, assessment of the HER2 status of this patient's metastatic tumor cells could not be performed. If clinically indicated, analysis of HER2 can be repeated by FISH if additional material is obtained in the future.\"  RESULT FOR IMMUNOHISTOCHEMICAL VENTANA CLONE  PD-L1 ASSAY  TUMOR PROPORTION SCORE (TPS): 0%  INTERPRETATION: " NEGATIVE PD-L1 EXPRESSION (TPS <1%)  ABEL  Breast NGS for AKT1; BRCA1; BRCA2; ERBB2; ESR1; PALB2; PIK3CA; PTEN; RET: negative, TMB 7.313 mut/Mb   Fusion for ACVR2A, AKT1, AKT2, AKT3, ALK, AR, PTOSTS94, ARHGAP6, CAROL, BCOR, BRAF, BRD3, BRD4, CAMTA1, CCNB3, CCND1, , CIC, CRTC1, CSF1, CSF1R, CTNNB1, DNAJB1, EGF, EGFR, EPC1, ERBB2, ERBB4, ERG, ESR1, ESRRA, ETV1, ETV4, ETV5, ETV6, EWSR1,FGF1, FGFR1, FGFR2, FGFR3, FGR, FOS, FOSB, FOXO1, FOXO4, FOXR2, FUS, GLI1, GRB7, HMGA2, HRAS, IDH1, IDH2, IGF1R, INSR,JAK2, JAK3, JAZF1, KIT, KRAS, MAML2, MAP2K1, MAST1, MAST2, MBTD1, MDM2, MEAF6, MET, MGEA5, MKL2, MN1, MSMB,  MUSK, MYB, MYBL1, MYC, MYOD1, NCOA1, NCOA2, NCOA3, NFATC2, NFE2L2, NFIB, NOTCH1, NOTCH2, NR4A3, NRAS, NRG1,  NTRK1, NTRK2, NTRK3, NUMBL, NUTM1, PAX3, PAX8, PDGFB, PDGFD, PDGFRA, PDGFRB, PHF1, PHKB, PIK3CA, PKN1, PLAG1,  PPARG, PRDM10, PRKACA, PRKACB, PRKCA, PRKCB, PRKCD, PRKD1, PRKD2, PRKD3, RAD51B, RAF1, RELA, RET, ROS1, RSPO2,  RSPO3, SS18, SS18L1, STAT6, TAF15, TCF12, TERT, TFE3, TFEB, TFG, THADA, TMPRSS2, USP6, VGLL2, WWTR1, YAP1, YWHAE: negative    TREATMENT:  - 11/27/23-5/30/24 ribociclib+ letrozole; C1D1 11/27/23 started ribociclib 400mg PO daily when LFTs improved to <3x ULN, C2D1 12/25/23 ribociclib 600mg (day 1-12 only 2/2 palliative RT to sacrum w/Dr. Mandujano 1/9/24- 1/22/24 3000cGy in 10 fractions), C3D1 1/30/24, C4D1 3/6/24 (deferred 1 week 2/2 2/27/24 ANC 0.8->3/5/24 ANC 1.5), C5D1 4/3/24, C6D1 5/1/24, C7D1 5/30/24->progression of disease in bones  - 6/14/24 everolimus+ exemestane (additional tissue obtained, no targets available as above, ABEL, PDL1 negative, repeat biopsy her2 FISH unable to be completed)      REGIMEN:  Everolimus + exemestane   C1D1 6/14/24, C2D1 7/12/24  everolimus 10mg PO daily   exemestane 25mg PO daily  PLUS dexamethasone mouthwash     C3D1 8/9/24    Treatment Related AE:  - thrombocytopenia- monitor for bleeding while on eliquis  - macrocytic anemia- hgb 11-> 8/8/24 9.5, 5/24  B12- 298, TSH 1.19, absolute retic 0.071, ferritin 355, iron sat 18, TIBC 307, iron 56; check RBC folate with next labs, 6/24 started B12 SL [id number] daily and oral ferrous sulfate 325mg every other day 6/24, will continue. If ongoing anemia will need to repeat anemia work up  - gum soreness- Hold zometa, obtain records from oral surgeon (pt will send us contact info to request records) including note for 8/28 visit  - mouth sore- dexamethasone mouth wash, rx magic mouthwash  - hld- 6/24 total chol 299, - restarted atorvastatin 10mg, LFTs stable, 8/24 total chol 196,   - hot flashes- continue effexor, refilled today   - left posterior DVT- continue eliquis  - intermittent blurred vision- stable, no brain mets on 5/24 MRI brain      IMAGING:  - next PET 8/24/24    - 7/24 MRI cervical spine:  1. Diffusely heterogeneous bone marrow signal with associated  enhancement involving all the bones and ribs in the imaging field,  consistent with diffuse osseous metastasis. No evidence of pathologic  compression fracture. No evidence of extraosseous extension.  2. No abnormal spinal cord signal.  3. No high-grade spinal canal or neural foraminal stenosis.    - 6/24 IR requested PET Overread:  - Relatively stable hypermetabolic left breast mass having SUV max of  5.7, previously, 6.7  - Redemonstration of hypermetabolic innumerable osseous metastatic  lesions following the axial and appendicular skeleton with involvement  of the marrow cavity in the long bones. These are predominantly  sclerotic with few lytic appearing lesions. Few lesions have increased  uptake compared to prior exam, a few have slightly decreased uptake ,  few remain overall unchanged. As index lesions:     Increased uptake:  -C2 vertebral body lesion with SUV max 14.4, previously 8.3. 1a. Of note - hypermetabolic C2 lesion with the posterior cortical breakthrough, no significant spinal cord impingement at this time but potentially at risk in  the future.     Decreased uptake:  -L3 vertebral body with SUV max 7.4, previously 10.3.   -Sternal body lesion with SUV max 6.7, previously 10.9.   -Left ischial  tuberosity lesion with SUV max 3.5, previously 9.3.     Relatively stable uptake:  -Left humeral head lesion having SUV max 10.4, previously 11.1.   -Left sacral ala lesion with SUV max 7.2, previously 8.7.    Addendum: Potential biopsy sites:   Another lesion that has a slight increase in uptake is in the left  femur, lateral to the lesser trochanter (series 4 image 24) max SUV 23  (prior 18).  The hypermetabolic portion is not sclerotic, but it is in  the lateral aspect of the femur, fairly good size and the lesser  trochanter could serve as a landmark.     The left humeral head lesion may also be a potential biopsy site max  SUV 10.4 (not significantly changed from prior 11). The lesion is in  the lateral aspect of the sclerotic lesion when the patient's arms are  in the up position.     The C2 lesion may be more difficult to assess given its anterior  medial position (max suv 14).    - 5/11/24 PET CT CAP:  Redemonstrated innumerable FDG avid osseous lesions, some of which are increasing in metabolic activity and suggest mild progression of disease including representative examples involving the inferior sternum (Max SUV 16.5, previously 7.9) and left distal femur (Max SUV 20.8, previously 11.3).    - 5/24/24 MRI brain w/wo contrast:  IMPRESSION:   1. Presumed osseous metastases involving the occipital condyles bilaterally, C1, C2 and C3 vertebrae again noted.  2. Questionable osseous metastatic disease involving the parietal bones bilaterally again noted without change.  3. Diffuse cerebral volume loss and cerebral white matter changes consistent with chronic small vessel ischemic disease. No evidence for intracranial metastases.  4. No evidence for acute intracranial pathology.      TUMOR MARKERS:  10/23 CA 15-3= 261  12/23 CA 15-3= 553  1/24 CA  15-3= 480  2/24 CA 15-3 338  5/24 CA 15-3=179  6/24 CA 15-3=159  Repeat CA 15-3 pending     OTHER:  - continue zometa q4 weeks - zometa #1 1/4/24, last zometa 5/30/24, zometa on hold as above  - continue calcium and vitamin D    # osteopenia  - 10/23 DEXA: osteopenia, T score - 2.0 left hip femoral neck and lumbar spine   - repeat DEXA 10/2025  - continue continue calcium and vitamin D  - zometa on hold as above    # left posterior tibial DVT  -1/19/24 sx started- Left foot pain and swelling  -1/22/24 pt called in, 1/22/24 left LE doppler: DVT in 1 of 2 left distal posterior tibial veins  - continue eliquis, refilled due 5/2025      # RA  - on plaquenil     RTC 9/3 for follow up with me, labs  Move 9/5 labs to 9/3  Cancel zometa on 9/5  Cancel follow up with Kitty on 9/5  RTC 10/2 for follow up with Dr. Ramirez, labs prior to visit    Mary DO Taiwo  Hematology/Oncology  [facility]

## 2024-08-08 NOTE — LETTER
8/8/2024      Kesha Zacarias  [address] [address] [place] [id number]      Dear Colleague,    Thank you for referring your patient, Kesha Zacarias, to the [facility]. Please see a copy of my visit note below.    Video-Visit Details     Video Start Time: 2:47PM    Type of service:  Video Visit     Video End Time: 3:10PM    Originating Location (pt. Location): Home     Distant Location (provider location):  [facility] [place] [facility]     Platform used for Video Visit: [facility]    Hematology/Oncology Established Patient Follow Up Note      Today's Date: 8/8/24    Reason for follow up: metastatic breast cancer    HISTORY OF PRESENT ILLNESS: Kesha Zacarias is a 64 year old female who presents for follow up    Patient has medical history including rheumatoid arthritis, hyperlipidemia, osteoarthritis, bilateral cataracts and glaucoma s/p surgery, endocervical polyp s/p resection, vaginal atrophy with conjugated estrogen vaginal cream use, colon polyp (hyperplastic polyp and tubular adenoma), seasonal depression, history of tobacco use ([age] y/o, about 1 ppd).    Regarding RA:  -dx over a decade ago, on plaquenil, managed by PCP  - arthritis is most severe in hands>knees, intermittent       - 3/23 bilateral screening mammogram [facility]  - a few months ago, noted nipple retraction  - 9/23 patient palpated mass in retroareolar region of left breast and w/nipple retraction, no discharge, skin thickening, no dimpling, no erythema  - 9/23 diagnostic LEFT breast mammogram: Focal dense tissue with some likely mild architectural distortion, some anterior skin thickening is noted  - 9/23 targeted LEFT breast ultrasound: Ill-defined shadowing hypoechoic mass, 3.0 x 1.7 x 3.9 cm.  In left axilla-few small lymph nodes, 1 normal-sized lymph node with cortical thickening, 0.7 x 0.6 cm.  -9/23 ultrasound-guided LEFT breast core biopsy of 12:00 lesion with clip  "placement, \"Postbiopsy unilateral digital mammogram of the left breast showed the clip to be at the expected biopsy site\" AND ultrasound-guided left axillary lymph node biopsy of lymph node with cortical thickening  PATHOLOGY  A.  Breast, left, 12:00, biopsy:  -Lobular carcinoma in situ.-Multiple foci  -Invasive carcinoma is not identified.     B.  Lymph node, left axillary, biopsy:  -Positive for metastatic lobular carcinoma, grade 2  -Largest metastatic focus is 7 mm.  -Negative for extranodal extension.  -Breast ancillary testing:  -Estrogen receptor: Positive (91 to 100%, strong)  -Progesterone receptor: Positive (91 to 100%, strong)  -HER2 2+ IHC, FISH negative    -10/23 MRI bilateral breast:  LEFT breast:  - Heterogeneously enhancing irregular mass in the subareolar left breast, 5.0 x 4.9 x 4.9 cm, with associated nipple retraction and nipple/areolar involvement.  This mass extends superiorly through 11: positions, from anterior to mid depth.  The HydroMARK breast biopsy clip (which showed LCIS) is 1.5 cm posterosuperior to this mass.  - Multiple suspicious left level 1 axillary lymph node noted, including biopsy-proven metastatic node with indwelling biopsy marker, biopsied node measures 1.3 x 1.0 cm  - Heterogeneous marrow appearance of sternum and ribs with heterogeneous enhancement and a peripheral enhancing focus within the mid body.  IMPRESSION:  1. Upon further review of previous imaging and pathology results,  recommend repeat ultrasound guided large core-needle biopsy of the  discordant dominant left breast mass given metastatic left axillary  lymph node and superoposteriorly displaced left breast biopsy marker.   2. Nonspecific heterogeneous enhancement of the sternum and ribs.  Consider nuclear medicine bone scan    Lifetime estrogen exposure:  Menarche: 13   Last menstrual period: 49   Age of first pregnancy: 18   Number of pregnancies: 2 (no living children)   Weight gain: 20lb weight gain " within a year  Exposure to exogenous estrogen: vaginal atrophy with conjugated estrogen vaginal cream use x4 years (intermittent), has not used it since 9/23. Birth control for about 13-15 years from her 20s-30s.      Pt reports 55lb weight loss in 1.5 years, this was intentional, was following weight watchers program (23 points available per day) 230lb->170lb->190lb (gained about 20lbs). Pt was walking on the treadmill and outside daily, about 30 mins to 1.5 miles.    10/23 labs:  AST 79, ALT 91,   CA 15-3 261    10/23 PET/CT:  Innumerable foci of FDG avid lesions are seen throughout the osseous  structures of the head and neck, most pronounced on the calvarium and  cervical spine, with prominently sclerotic appearance on CT correlate.  For example:  -Right frontal bone, SUV max 5.31.  -Left lateral mass of the atlas, SUV max 15.0  -Angle of the left mandible, SUV max 9.6  -C7 vertebral body, SUV max 17.7    Innumerable variable sized FDG avid lesions throughout the axial and  appendicular spine, including but not limited to the cervical,  thoracic, and lumbar spine, multilevel bilateral ribs, sternum,  bilateral scapula, bilateral humeri, clavicles, pelvis, and bilateral  femurs. A few of these lesions are described below:  -Large FDG avid sclerotic 3.4 cm lesion within the proximal left  humeral head, SUV max 17.24  -Sternal body, SUV max 20.35  -L2 vertebral body, SUV max 14.3 without  -Large ill-defined sclerotic lesion in the sacral body measuring up to  at least 5.9 cm, SUV max 16.84  -FDG avid focus within the left femoral head, SUV  max 13.65 without CT correlate.    Large irregular soft tissue FDG avid mass within the left breast   measuring approximately 3.2 x 2.7 cm with  extension into the superficial soft tissues and associated left nipple  retraction, SUV max 12.36 additional FDG avid. Left axillary lymph  nodes, not definitively enlarged by short axis size criteria, for  example, SUV max  5.93.    The liver is unremarkable.     Solid 3 mm non-FDG avid pulmonary nodule within the  right middle lobe    - 10/23 MRI brain:  - extensive osseous metastatic disease involving the calvarium, skull base w/involvement of occipital condyles, C1 and C2 vertebral bodies, and likely the mandible  -  Dominant calvarial lesions are located in the right frontal calvarium and right temporal calvarium without definite breach of the  inner or outer tables of the calvarium. There is a suggestion of mild asymmetric linear extra-axial enhancement deep to the dominant right temporal calvarial lesion along the dural margin, though this may be vascular in etiology.  - No abnormal parenchymal or leptomeningeal enhancement.   - sequelae of mild chronic microvascular ischemic disease. Mild generalized cerebral atrophy.       -supposed to start ribociclib 10/30/23 however, noted to have significantly elevated LFTS (10/26/23 , , alk phos 152)    10/17/23: AST 79, ALT 91, alk phos 116, t bili 0.5  10/26/23 , , alk phos 152  10/30/23: , , alk phos 178, coags WNL, ribociclib was not started, letrozole started, atorvastatin held  11/7/23: AST 96, , alk phos 169, MRI liver negative for mets  11/17/23: AST 81, , alk phos 163  11/27/23: AST 55, ALT 90, alk phos 128- started ribociclib 400mg  12/4/23: AST 26, ALT 35, alk phos 118    - 11/27/23 started ribociclib+ letrozole  - 11/27/23-5/30/24 ribociclib+ letrozole; C1D1 11/27/23 started ribociclib 400mg PO daily when LFTs improved to <3x ULN, C2D1 12/25/23 ribociclib 600mg (day 1-12 only 2/2 palliative RT to sacrum w/Dr. Mandujano 1/9/24- 1/22/24 3000cGy in 10 fractions), C3D1 1/30/24, C4D1 3/6/24 (deferred 1 week 2/2 2/27/24 ANC 0.8->3/5/24 ANC 1.5), C5D1 4/3/24, C6D1 5/1/24, C7D1 5/30/24->progression of disease in bones  - 1/9/24-1/22/24 palliative RT to sacrum w/Dr. Mandujano- 3000cGy in 10 fractions    - 2/17/24 PET CT CAP  PET/CT FINDINGS:  Most of the extensive skeletal metastases have become sclerotic and non-FDG avid and those which remain avid have decreased in activity, for example in the proximal right humerus from SUVmax 19.3 to 13.9, in the proximal right femur from 16.5 to 12.2, and in the L2 vertebral body from 14.3 to 10.7.      CT FINDINGS: Bilateral lens replacements. Calcified atherosclerosis, including moderate right coronary artery disease. Splenule. Cholelithiasis. Retroaortic left renal vein. Pelvic phleboliths. Appendectomy. Ventral hernia repair with mesh. Persistent   metopic suture. T11 vertebral body hemangioma. Mild degenerative change in the spine.                                                       IMPRESSION:  Partial response       - 5/11/24 PET CT CAP:  Redemonstrated innumerable FDG avid osseous lesions, some of which are increasing in metabolic activity and suggest mild progression of disease including representative examples involving the inferior sternum (Max SUV 16.5, previously 7.9) and left distal femur (Max SUV 20.8, previously 11.3).    - 5/24/24 MRI brain w/wo contrast:  IMPRESSION:   1. Presumed osseous metastases involving the occipital condyles  bilaterally, C1, C2 and C3 vertebrae again noted.  2. Questionable osseous metastatic disease involving the parietal  bones bilaterally again noted without change.  3. Diffuse cerebral volume loss and cerebral white matter changes  consistent with chronic small vessel ischemic disease. No evidence for  intracranial metastases.  4. No evidence for acute intracranial pathology.    - 11/27/23-5/30/24 ribociclib+ letrozole; C1D1 11/27/23 started ribociclib 400mg PO daily when LFTs improved to <3x ULN, C2D1 12/25/23 ribociclib 600mg (day 1-12 only 2/2 palliative RT to sacrum w/Dr. Mandujano 1/9/24- 1/22/24 3000cGy in 10 fractions), C3D1 1/30/24, C4D1 3/6/24 (deferred 1 week 2/2 2/27/24 ANC 0.8->3/5/24 ANC 1.5), C5D1 4/3/24, C6D1 5/1/24, C7D1 5/30/24->progression of disease in  bones    - 6/24 IR requested PET Overread:  - Relatively stable hypermetabolic left breast mass having SUV max of  5.7, previously, 6.7  - Redemonstration of hypermetabolic innumerable osseous metastatic  lesions following the axial and appendicular skeleton with involvement  of the marrow cavity in the long bones. These are predominantly  sclerotic with few lytic appearing lesions. Few lesions have increased  uptake compared to prior exam, a few have slightly decreased uptake ,  few remain overall unchanged. As index lesions:     Increased uptake:  -C2 vertebral body lesion with SUV max 14.4, previously 8.3. 1a. Of note - hypermetabolic C2 lesion with the posterior cortical breakthrough, no significant spinal cord impingement at this time but potentially at risk in the future.     Decreased uptake:  -L3 vertebral body with SUV max 7.4, previously 10.3.   -Sternal body lesion with SUV max 6.7, previously 10.9.   -Left ischial  tuberosity lesion with SUV max 3.5, previously 9.3.     Relatively stable uptake:  -Left humeral head lesion having SUV max 10.4, previously 11.1.   -Left sacral ala lesion with SUV max 7.2, previously 8.7.    Addendum: Potential biopsy sites:   Another lesion that has a slight increase in uptake is in the left  femur, lateral to the lesser trochanter (series 4 image 24) max SUV 23  (prior 18).  The hypermetabolic portion is not sclerotic, but it is in  the lateral aspect of the femur, fairly good size and the lesser  trochanter could serve as a landmark.     The left humeral head lesion may also be a potential biopsy site max  SUV 10.4 (not significantly changed from prior 11). The lesion is in  the lateral aspect of the sclerotic lesion when the patient's arms are  in the up position.     The C2 lesion may be more difficult to assess given its anterior  medial position (max suv 14).    - 6/24 germline genetic testing: NEGATIVE for BRENT, BARD1, BRCA1, BRCA2, CDH1, CHEK2, NF1, PALB2, PTEN,  "STK11, TP53.     - 6/14/24 started everolimus+ exemestane    INTERIM HISTORY:    REGIMEN:  Everolimus + exemestane   C1D1 6/14/24, C2D1 7/12/24  everolimus 10mg PO daily   exemestane 25mg PO daily  PLUS dexamethasone mouthwash     C3D1 8/9/24    Treatment Related AE:  - gum soreness/jaw pain- posterior, b/l- Left where she had tooth pulled- before starting everolimus, right- 1 week after starting everolimus, bleeding w/brushing teeth, 7/17/24- per pt, dentist said jawbone protruding. They recommended an oral surgeon to grind down the bone and cover with gum tissue. Zometa on hold. End of July- Pt saw oral surgeon (unknown name), protruding jaw bone is \"dead and will fall out\" and that they do not want pt to get additional zometa, pending follow up on 8/28/24  - mouth sore- improved with magic mouthwash, dexamethasone mouth wash  - hld- 6/24 total chol 299, - previously was on atorvastatin that was stopped when she started ribociclib   - hot flashes- 3/24 10x/day, last a few mins, daily, affecting quality of life including sleep; 4/24 started effexor 37.5mg x1 week, then 75mg thereafter and hot flashes improved to 2-3/day; no difference since starting exemestane  - Left posterior tibial DVT- 1/19/24 sx started- Left foot pain and swelling, 1/22/24 pt called in, 1/22/24 left LE doppler: DVT in 1 of 2 left distal posterior tibial veins, started on eliquis and stopped naproxen; no issues with bleeding but does have intermitting bruising when she bumps her hand  - blurry vision b/l- last saw eye doctor 3/23 and has trifocal glasses. She has dry eyes and uses lubricating eye drops., 10/23 MRI brain as above. eye doctor visit 4/3/24- got new glasses rx, 5/23/24 sent message regarding increased blurred vision- remain constant despite time of day, near vision, far vision, or the use of her corrective lenses; 5/24/24 MRI brain negative for intraparenchymal mets, osseous mets stable, still having intermittent blurred " "vision, lasts anywhere from 1-3 days- overall stable   - macrocytic anemia- hgb 11, 5/24 B12- 298, TSH 1.19, absolute retic 0.071, ferritin 355, iron sat 18, TIBC 307, iron 56; RBC folate next labs, 6/24 start B12 SL [id number] daily and oral ferrous sulfate 325mg every other day   - thrombocytopenia- intermittent, some bruising, no bleeding while on eliquis     - unable to do NGS testing on previous biopsy specimen, therefore repeat biopsy completed  - 7/15/24 CT guided bone biopsy of left proximal femur lesion w/IR   PATHOLOGY:  - Metastatic lobular breast carcinoma   ER positive (%), MO negative (<1%), her2 2+ on IHC; FISH unable to be completed \"Three unstained slides of paraffin-embedded tissue were received and processed by the [facility] on 7-18-24 for HER2 FISH testing. However, the tumor cells in the accompanying H&E-stained slide could not be definitively identified on fluorescence microscopy; thus, assessment of the HER2 status of this patient's metastatic tumor cells could not be performed. If clinically indicated, analysis of HER2 can be repeated by FISH if additional material is obtained in the future.\"  RESULT FOR IMMUNOHISTOCHEMICAL VENTANA CLONE  PD-L1 ASSAY  TUMOR PROPORTION SCORE (TPS): 0%  INTERPRETATION: NEGATIVE PD-L1 EXPRESSION (TPS <1%)  ABEL  Breast NGS for AKT1; BRCA1; BRCA2; ERBB2; ESR1; PALB2; PIK3CA; PTEN; RET: negative, TMB 7.313 mut/Mb   Fusion for ACVR2A, AKT1, AKT2, AKT3, ALK, AR, ITGJOI96, ARHGAP6, CAROL, BCOR, BRAF, BRD3, BRD4, CAMTA1, CCNB3, CCND1, ,CIC, CRTC1, CSF1, CSF1R, CTNNB1, DNAJB1, EGF, EGFR, EPC1, ERBB2, ERBB4, ERG, ESR1, ESRRA, ETV1, ETV4, ETV5, ETV6, EWSR1,FGF1, FGFR1, FGFR2, FGFR3, FGR, FOS, FOSB, FOXO1, FOXO4, FOXR2, FUS, GLI1, GRB7, HMGA2, HRAS, IDH1, IDH2, IGF1R, INSR,JAK2, JAK3, JAZF1, KIT, KRAS, MAML2, MAP2K1, MAST1, MAST2, MBTD1, MDM2, MEAF6, MET, MGEA5, MKL2, MN1, MSMB,  MUSK, MYB, MYBL1, MYC, MYOD1, NCOA1, NCOA2, NCOA3, NFATC2, NFE2L2, " NFIB, NOTCH1, NOTCH2, NR4A3, NRAS, NRG1,  NTRK1, NTRK2, NTRK3, NUMBL, NUTM1, PAX3, PAX8, PDGFB, PDGFD, PDGFRA, PDGFRB, PHF1, PHKB, PIK3CA, PKN1, PLAG1,  PPARG, PRDM10, PRKACA, PRKACB, PRKCA, PRKCB, PRKCD, PRKD1, PRKD2, PRKD3, RAD51B, RAF1, RELA, RET, ROS1, RSPO2,  RSPO3, SS18, SS18L1, STAT6, TAF15, TCF12, TERT, TFE3, TFEB, TFG, THADA, TMPRSS2, USP6, VGLL2, WWTR1, YAP1, YWHAE: negative        REVIEW OF SYSTEMS:   A 14 point ROS was reviewed with pertinent positives and negatives in the HPI.        HOME MEDICATIONS:  Current Outpatient Medications   Medication Sig Dispense Refill     apixaban ANTICOAGULANT (ELIQUIS) 5 MG tablet Take 1 tablet (5 mg) by mouth 2 times daily 60 tablet 11     atorvastatin (LIPITOR) 10 MG tablet Take 1 tablet (10 mg) by mouth daily 90 tablet 1     betamethasone dipropionate (DIPROSONE) 0.05 % external lotion Apply topically 2 times daily 60 mL 3     CALCIUM PO        cyclobenzaprine (FLEXERIL) 5 MG tablet TAKE 1 TABLET(5 MG) BY MOUTH AT BEDTIME 90 tablet 3     dexAMETHasone alcohol-free (DECADRON) 0.1 MG/ML solution Swish 10 mL in mouth for 2 min and spit, four times daily. 1200 mL 1     [START ON 8/9/2024] everolimus (AFINITOR) 10 MG tablet Take 1 tablet (10 mg) by mouth daily for 28 days Avoid grapefruit and grapefruit juice. Take with or without food, but should be consistent. 28 tablet 0     everolimus (AFINITOR) 10 MG tablet Take 1 tablet (10 mg) by mouth daily for 28 days Avoid grapefruit and grapefruit juice. Take with or without food, but should be consistent. 28 tablet 0     [START ON 8/9/2024] exemestane (AROMASIN) 25 MG tablet Take 1 tablet (25 mg) by mouth daily for 28 days Take after a meal. 28 tablet 0     exemestane (AROMASIN) 25 MG tablet Take 1 tablet (25 mg) by mouth daily for 28 days Take after a meal. 28 tablet 0     ferrous sulfate (FEROSUL) 325 (65 Fe) MG tablet Take 1 tablet (325 mg) by mouth every other day 90 tablet 0     fish oil-omega-3 fatty acids 1000 MG capsule  Take 2 g by mouth daily       hydroxychloroquine (PLAQUENIL) 200 MG tablet TAKE 2 AND 1/2 TABLETS BY MOUTH EVERY  tablet 3     magic mouthwash suspension (diphenhydrAMINE, lidocaine, aluminum-magnesium & simethicone) Swish and swallow 10 mLs in mouth every 6 hours as needed for mouth sores 1200 mL 1     magnesium 250 MG tablet Take 1 tablet by mouth daily       ondansetron (ZOFRAN ODT) 4 MG ODT tab Take 1-2 tablets (4-8 mg) by mouth every 8 hours as needed for nausea 30 tablet 2     ondansetron (ZOFRAN) 4 MG tablet Take 1 tablet (4 mg) by mouth every 6 hours as needed for nausea 30 tablet 3     prochlorperazine (COMPAZINE) 10 MG tablet Take 1 tablet (10 mg) by mouth every 6 hours as needed for nausea or vomiting 30 tablet 2     venlafaxine (EFFEXOR XR) 75 MG 24 hr capsule Take 1 capsule (75 mg) by mouth daily 30 capsule 11     vitamin B-12 (CYANOCOBALAMIN) 2500 MCG sublingual tablet Take 1 tablet (2,500 mcg) by mouth daily 90 tablet 1     VITAMIN D PO            ALLERGIES:  Allergies   Allergen Reactions     Ciprofloxacin      hives and was on flagyl too     Metronidazole      hives and was on cipro too         PAST MEDICAL HISTORY:  Past Medical History:   Diagnosis Date     Arthritis 2006     Breast cancer metastasized to bone (H) 10/12/2023     Chronic depressive personality disorder      Dysplasia of cervix (uteri) 1988    Cryotherapy     Female infertility of unspecified origin      Glaucoma      Rheumatoid arthritis (H)          PAST SURGICAL HISTORY:  Past Surgical History:   Procedure Laterality Date     COLONOSCOPY       COLONOSCOPY N/A 01/14/2022    Procedure: COLONOSCOPY, WITH POLYPECTOMY AND BIOPSY;  Surgeon: Gagandeep Patterson MD;  Location: [facility]     [facility] INTRODUCE CATH FALLOPIAN TUBE, RE-OPEN/DIAGNOSIS       HERNIA REPAIR, INCISIONAL  11/11/2009     JOINT REPLACEMENT Right 09/2017    knee     TONSILLECTOMY       [facility] APPENDECTOMY  06/14/2003     [facility] REMV CATARACT INTRACAP,INSERT LENS  02/13/2003     right         SOCIAL HISTORY:  Social History     Socioeconomic History     Marital status:      Spouse name: Bandar     Number of children: 1     Years of education: Not on file     Highest education level: Not on file   Occupational History     Not on file   Tobacco Use     Smoking status: Former     Current packs/day: 0.00     Average packs/day: 0.5 packs/day for 25.0 years (12.5 ttl pk-yrs)     Types: Cigarettes, Cigars     Start date: 1992     Quit date: 2017     Years since quittin.8     Smokeless tobacco: Never     Tobacco comments:     Cigar once in a while   Vaping Use     Vaping status: Never Used   Substance and Sexual Activity     Alcohol use: Yes     Comment: very little     Drug use: Yes     Types: Marijuana     Comment: once every 2 weeks     Sexual activity: Yes     Partners: Male     Birth control/protection: None   Other Topics Concern     Parent/sibling w/ CABG, MI or angioplasty before 65F 55M? Yes   Social History Narrative     Not on file     Social Determinants of Health     Financial Resource Strain: Not on file   Food Insecurity: Not on file   Transportation Needs: Not on file   Physical Activity: Not on file   Stress: Not on file   Social Connections: Not on file   Interpersonal Safety: Not on file   Housing Stability: Not on file         FAMILY HISTORY:  Family History   Problem Relation Age of Onset     Heart Disease Mother      Lipids Mother      Hyperlipidemia Mother      Genitourinary Problems Father         prostate     Genetic Disorder Father         ulcer     Hypertension Father      Lipids Father      Prostate Cancer Father      Hyperlipidemia Sister      Hyperlipidemia Brother      Other Cancer Brother         Neck Cancer HPV (+)     Heart Disease Maternal Grandmother      Cerebrovascular Disease Maternal Grandmother      Heart Disease Maternal Grandfather      Heart Disease Maternal Uncle      Heart Disease Maternal Uncle         x  3     Cancer Nephew          Sister's Son       Family history of:  1.  Breast cancer including male breast cancer: negative   2. Ovarian cancer: negative  3.  Pancreatic cancer: negative  4.  Prostate cancer: father- ?in his 50s, other details unknown  5. Diffuse gastric cancer (if lobular breast CA): negative  6. Uterine cancer: negative  7. Colon cancer:  negative  6. Brother- head and neck, HPV +, had surgery, clinical trial and now cancer free      PHYSICAL EXAM:  Vital signs:  LMP 11/22/2011 (Exact Date)          LABS:   Latest Reference Range & Units 08/08/24 14:01   Sodium 135 - 145 mmol/L 143   Potassium 3.4 - 5.3 mmol/L 3.9   Chloride 98 - 107 mmol/L 106   Carbon Dioxide (CO2) 22 - 29 mmol/L 25   Urea Nitrogen 8.0 - 23.0 mg/dL 16.4   Creatinine 0.51 - 0.95 mg/dL 1.12 (H)   GFR Estimate >60 mL/min/1.73m2 55 (L)   Calcium 8.8 - 10.4 mg/dL 8.8   Anion Gap 7 - 15 mmol/L 12   Magnesium 1.7 - 2.3 mg/dL 2.3   Phosphorus 2.5 - 4.5 mg/dL 2.7   Albumin 3.5 - 5.2 g/dL 3.9   Protein Total 6.4 - 8.3 g/dL 6.9   Alkaline Phosphatase 40 - 150 U/L 86   ALT 0 - 50 U/L 47   AST 0 - 45 U/L 37   Bilirubin Total <=1.2 mg/dL 0.3   Cholesterol <200 mg/dL 196   Patient Fasting?  No  No   Glucose 70 - 99 mg/dL 109 (H)   HDL Cholesterol >=50 mg/dL 50   LDL Cholesterol Calculated <=100 mg/dL 117 (H)   Non HDL Cholesterol <130 mg/dL 146 (H)   Triglycerides <150 mg/dL 144   WBC 4.0 - 11.0 10e3/uL 5.3   Hemoglobin 11.7 - 15.7 g/dL 9.5 (L)   Hematocrit 35.0 - 47.0 % 29.8 (L)   Platelet Count 150 - 450 10e3/uL 136 (L)   RBC Count 3.80 - 5.20 10e6/uL 3.04 (L)   MCV 78 - 100 fL 98   MCH 26.5 - 33.0 pg 31.3   MCHC 31.5 - 36.5 g/dL 31.9   RDW 10.0 - 15.0 % 16.9 (H)   % Neutrophils % 71   % Lymphocytes % 18   % Monocytes % 8   % Eosinophils % 2   % Basophils % 0   Absolute Basophils 0.0 - 0.2 10e3/uL 0.0   Absolute Eosinophils 0.0 - 0.7 10e3/uL 0.1   Absolute Immature Granulocytes <=0.4 10e3/uL 0.0   Absolute Lymphocytes 0.8 - 5.3 10e3/uL 1.0   Absolute Monocytes 0.0 -  1.3 10e3/uL 0.4   % Immature Granulocytes % 1   Absolute Neutrophils 1.6 - 8.3 10e3/uL 3.8   Absolute NRBCs 10e3/uL 0.0   NRBCs per 100 WBC <1 /100 0   (H): Data is abnormally high  (L): Data is abnormally low    PATHOLOGY:    IMAGING:  Narrative & Impression   MR CERVICAL SPINE WITHOUT AND WITH CONTRAST  7/19/2024 2:56 PM      HISTORY: Metastatic breast cancer, C2 lesion on PET, patient w/HA  intermittent vision changes; Carcinoma of left breast metastatic to  bone (H).     COMPARISON:  PET CT from 6/20/2024.     TECHNIQUE: Multiplanar multisequence cervical MR without and with  contrast.     Contrast: 10 mL Gadavist      FINDINGS:     Normal cervical vertebral alignment.      Grade 1 anterolisthesis at C5-6.     Diffusely heterogeneous bone marrow signal with associated enhancement  involving all the bones and ribs in the imaging field, consistent with  diffuse osseous metastasis. No evidence of pathologic compression  fracture. No evidence of extraosseous extension.     No abnormal spinal cord signal.     The findings on a level by level basis are as follows:     C2-3: Moderate disc height loss and disc degeneration. Uncovertebral  spurring. No significant spinal canal or neural foraminal stenosis.     C3-4: Mild to moderate disc height loss and disc degeneration. Left  eccentric disc osteophyte complex and uncinate spurring. Bilateral  facet arthropathy. No significant spinal canal or neural foraminal  stenosis.     C4-5: Tiny central protrusion. Left greater than right facet  arthropathy. No significant spinal canal or neural foraminal stenosis.     C5-6: Mild disc height loss and disc degeneration. Uncovertebral  spurring. No significant spinal canal or neural foraminal stenosis.     C6-7: Circumferential disc bulge. Bilateral facet arthropathy. No  significant spinal canal or neural foraminal stenosis.     C7-T1: Uncovertebral spurring. No significant spinal canal or neural  foraminal stenosis.     The  "visualized skull base, posterior fossa, and paraspinal soft  tissues are within normal limits.                                                                      IMPRESSION:  1. Diffusely heterogeneous bone marrow signal with associated  enhancement involving all the bones and ribs in the imaging field,  consistent with diffuse osseous metastasis. No evidence of pathologic  compression fracture. No evidence of extraosseous extension.  2. No abnormal spinal cord signal.  3. No high-grade spinal canal or neural foraminal stenosis.         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 Pt does not have visceral crisis, she mainly has extensive bone metastases and LN metastases   -9/23 diagnostic left breast mammogram, left breast targeted ultrasound showed 3.0 x 1.7 x 3.9 ill-defined shadowing hypoechoic mass, few small lymph nodes in the left axilla, one with cortical thickening measuring 0.7 x 0.6 cm  -9/23 ultrasound-guided LEFT breast core biopsy of 12:00 lesion with clip placement, \"Postbiopsy unilateral digital mammogram of the left breast showed the clip to be at the expected biopsy site\" AND ultrasound-guided left axillary lymph node biopsy of lymph node with cortical thickening, PATHOLOGY:  A.  Breast, left, 12:00, biopsy:Lobular carcinoma in situ, Multiple foci. Invasive carcinoma is not identified.   B.  Lymph node, left axillary, biopsy:  -Positive for metastatic lobular carcinoma, grade 2, 0.7 cm, negative GREGG, Estrogen receptor: Positive (91 to 100%, strong), Progesterone receptor: Positive (91 to 100%, strong), HER2 2+ IHC, FISH negative  -10/23 MRI bilateral breast:  - Heterogeneously enhancing irregular " mass in the subareolar left breast, 5.0 x 4.9 x 4.9 cm, with associated nipple retraction and nipple/areolar involvement.  This mass extends superiorly through 11: positions, from anterior to mid depth.  The HydroMARK breast biopsy clip (which showed LCIS) is 1.5 cm posterosuperior to this mass.  - Multiple suspicious left level 1 axillary lymph node noted, including biopsy-proven metastatic node with indwelling biopsy marker, biopsied node measures 1.3 x 1.0 cm  - Heterogeneous marrow appearance of sternum and ribs with heterogeneous enhancement and a peripheral enhancing focus within the mid body.    - 10/23 PET/CT:  Innumerable foci of FDG avid lesions are seen throughout the osseous structures of the head and neck, most pronounced on the calvarium and cervical spine, with prominently sclerotic appearance on CT correlate.  For example:  -Right frontal bone, SUV max 5.31.  -Left lateral mass of the atlas, SUV max 15.0  -Angle of the left mandible, SUV max 9.6  -C7 vertebral body, SUV max 17.7    Innumerable variable sized FDG avid lesions throughout the axial and appendicular spine, including but not limited to the cervical, thoracic, and lumbar spine, multilevel bilateral ribs, sternum, bilateral scapula, bilateral humeri, clavicles, pelvis, and bilateral femurs. A few of these lesions are described below:  -Large FDG avid sclerotic 3.4 cm lesion within the proximal left humeral head, SUV max 17.24  -Sternal body, SUV max 20.35  -L2 vertebral body, SUV max 14.3 without  -Large ill-defined sclerotic lesion in the sacral body measuring up to at least 5.9 cm, SUV max 16.84  -FDG avid focus within the left femoral head, SUV max 13.65 without CT correlate.    Large irregular soft tissue FDG avid mass within the left breast measuring approximately 3.2 x 2.7 cm with  extension into the superficial soft tissues and associated left nipple retraction, SUV max 12.36 additional FDG avid. Left axillary lymph nodes, not  "definitively enlarged by short axis size criteria, for example, SUV max 5.93.    - 10/23 MRI brain:  - extensive osseous metastatic disease involving the calvarium, skull base w/involvement of occipital condyles, C1 and C2 vertebral bodies, and likely the mandible  -  Dominant calvarial lesions are located in the right frontal calvarium and right temporal calvarium without definite breach of the inner or outer tables of the calvarium. There is a suggestion of mild asymmetric linear extra-axial enhancement deep to the dominant right temporal calvarial lesion along the dural margin, though this may be vascular in etiology.  - No abnormal parenchymal or leptomeningeal enhancement.   - sequelae of mild chronic microvascular ischemic disease. Mild generalized cerebral atrophy.     -10/23 DEXA: osteopenia (T score -2.0 lumbar spine, -2.0 left hip femoral neck, The 10 year probability of major osteoporotic fracture is 12.5%, and of hip fracture is 1.8%, based on left femoral neck BMD)    - 11/23 orthopedic consult to determine stability of left femur and fracture risk given presence of mets in left femoral head: do not see any areas of impending cortical erosion or sites of high risk for fracture, observe    - 6/24 germline genetic testing: NEGATIVE for BRENT, BARD1, BRCA1, BRCA2, CDH1, CHEK2, NF1, PALB2, PTEN, STK11, TP53.     - unable to do NGS testing on previous biopsy specimen, therefore repeat biopsy completed  - 7/15/24 CT guided bone biopsy of left proximal femur lesion w/IR   PATHOLOGY:  - Metastatic lobular breast carcinoma   ER positive (%), VA negative (<1%), her2 2+ on IHC; FISH unable to be completed \"Three unstained slides of paraffin-embedded tissue were received and processed by the [facility] on 7-18-24 for HER2 FISH testing. However, the tumor cells in the accompanying H&E-stained slide could not be definitively identified on fluorescence microscopy; thus, assessment of the HER2 status " "of this patient's metastatic tumor cells could not be performed. If clinically indicated, analysis of HER2 can be repeated by FISH if additional material is obtained in the future.\"  RESULT FOR IMMUNOHISTOCHEMICAL VENTANA CLONE  PD-L1 ASSAY  TUMOR PROPORTION SCORE (TPS): 0%  INTERPRETATION: NEGATIVE PD-L1 EXPRESSION (TPS <1%)  ABEL  Breast NGS for AKT1; BRCA1; BRCA2; ERBB2; ESR1; PALB2; PIK3CA; PTEN; RET: negative, TMB 7.313 mut/Mb   Fusion for ACVR2A, AKT1, AKT2, AKT3, ALK, AR, TACKJG10, ARHGAP6, CAROL, BCOR, BRAF, BRD3, BRD4, CAMTA1, CCNB3, CCND1, , CIC, CRTC1, CSF1, CSF1R, CTNNB1, DNAJB1, EGF, EGFR, EPC1, ERBB2, ERBB4, ERG, ESR1, ESRRA, ETV1, ETV4, ETV5, ETV6, EWSR1,FGF1, FGFR1, FGFR2, FGFR3, FGR, FOS, FOSB, FOXO1, FOXO4, FOXR2, FUS, GLI1, GRB7, HMGA2, HRAS, IDH1, IDH2, IGF1R, INSR,JAK2, JAK3, JAZF1, KIT, KRAS, MAML2, MAP2K1, MAST1, MAST2, MBTD1, MDM2, MEAF6, MET, MGEA5, MKL2, MN1, MSMB,  MUSK, MYB, MYBL1, MYC, MYOD1, NCOA1, NCOA2, NCOA3, NFATC2, NFE2L2, NFIB, NOTCH1, NOTCH2, NR4A3, NRAS, NRG1,  NTRK1, NTRK2, NTRK3, NUMBL, NUTM1, PAX3, PAX8, PDGFB, PDGFD, PDGFRA, PDGFRB, PHF1, PHKB, PIK3CA, PKN1, PLAG1,  PPARG, PRDM10, PRKACA, PRKACB, PRKCA, PRKCB, PRKCD, PRKD1, PRKD2, PRKD3, RAD51B, RAF1, RELA, RET, ROS1, RSPO2,  RSPO3, SS18, SS18L1, STAT6, TAF15, TCF12, TERT, TFE3, TFEB, TFG, THADA, TMPRSS2, USP6, VGLL2, WWTR1, YAP1, YWHAE: negative    TREATMENT:  - 11/27/23-5/30/24 ribociclib+ letrozole; C1D1 11/27/23 started ribociclib 400mg PO daily when LFTs improved to <3x ULN, C2D1 12/25/23 ribociclib 600mg (day 1-12 only 2/2 palliative RT to sacrum w/Dr. Mandujano 1/9/24- 1/22/24 3000cGy in 10 fractions), C3D1 1/30/24, C4D1 3/6/24 (deferred 1 week 2/2 2/27/24 ANC 0.8->3/5/24 ANC 1.5), C5D1 4/3/24, C6D1 5/1/24, C7D1 5/30/24->progression of disease in bones  - 6/14/24 everolimus+ exemestane (additional tissue obtained, no targets available as above, ABEL, PDL1 negative, repeat biopsy her2 FISH unable to be " completed)      REGIMEN:  Everolimus + exemestane   C1D1 6/14/24, C2D1 7/12/24  everolimus 10mg PO daily   exemestane 25mg PO daily  PLUS dexamethasone mouthwash     C3D1 8/9/24    Treatment Related AE:  - thrombocytopenia- monitor for bleeding while on eliquis  - macrocytic anemia- hgb 11-> 8/8/24 9.5, 5/24 B12- 298, TSH 1.19, absolute retic 0.071, ferritin 355, iron sat 18, TIBC 307, iron 56; check RBC folate with next labs, 6/24 started B12 SL [id number] daily and oral ferrous sulfate 325mg every other day 6/24, will continue. If ongoing anemia will need to repeat anemia work up  - gum soreness- Hold zometa, obtain records from oral surgeon (pt will send us contact info to request records) including note for 8/28 visit  - mouth sore- dexamethasone mouth wash, rx magic mouthwash  - hld- 6/24 total chol 299, - restarted atorvastatin 10mg, LFTs stable, 8/24 total chol 196,   - hot flashes- continue effexor, refilled today   - left posterior DVT- continue eliquis  - intermittent blurred vision- stable, no brain mets on 5/24 MRI brain      IMAGING:  - next PET 8/24/24    - 7/24 MRI cervical spine:  1. Diffusely heterogeneous bone marrow signal with associated  enhancement involving all the bones and ribs in the imaging field,  consistent with diffuse osseous metastasis. No evidence of pathologic  compression fracture. No evidence of extraosseous extension.  2. No abnormal spinal cord signal.  3. No high-grade spinal canal or neural foraminal stenosis.    - 6/24 IR requested PET Overread:  - Relatively stable hypermetabolic left breast mass having SUV max of  5.7, previously, 6.7  - Redemonstration of hypermetabolic innumerable osseous metastatic  lesions following the axial and appendicular skeleton with involvement  of the marrow cavity in the long bones. These are predominantly  sclerotic with few lytic appearing lesions. Few lesions have increased  uptake compared to prior exam, a few have slightly  decreased uptake ,  few remain overall unchanged. As index lesions:     Increased uptake:  -C2 vertebral body lesion with SUV max 14.4, previously 8.3. 1a. Of note - hypermetabolic C2 lesion with the posterior cortical breakthrough, no significant spinal cord impingement at this time but potentially at risk in the future.     Decreased uptake:  -L3 vertebral body with SUV max 7.4, previously 10.3.   -Sternal body lesion with SUV max 6.7, previously 10.9.   -Left ischial  tuberosity lesion with SUV max 3.5, previously 9.3.     Relatively stable uptake:  -Left humeral head lesion having SUV max 10.4, previously 11.1.   -Left sacral ala lesion with SUV max 7.2, previously 8.7.    Addendum: Potential biopsy sites:   Another lesion that has a slight increase in uptake is in the left  femur, lateral to the lesser trochanter (series 4 image 24) max SUV 23  (prior 18).  The hypermetabolic portion is not sclerotic, but it is in  the lateral aspect of the femur, fairly good size and the lesser  trochanter could serve as a landmark.     The left humeral head lesion may also be a potential biopsy site max  SUV 10.4 (not significantly changed from prior 11). The lesion is in  the lateral aspect of the sclerotic lesion when the patient's arms are  in the up position.     The C2 lesion may be more difficult to assess given its anterior  medial position (max suv 14).    - 5/11/24 PET CT CAP:  Redemonstrated innumerable FDG avid osseous lesions, some of which are increasing in metabolic activity and suggest mild progression of disease including representative examples involving the inferior sternum (Max SUV 16.5, previously 7.9) and left distal femur (Max SUV 20.8, previously 11.3).    - 5/24/24 MRI brain w/wo contrast:  IMPRESSION:   1. Presumed osseous metastases involving the occipital condyles bilaterally, C1, C2 and C3 vertebrae again noted.  2. Questionable osseous metastatic disease involving the parietal bones bilaterally  again noted without change.  3. Diffuse cerebral volume loss and cerebral white matter changes consistent with chronic small vessel ischemic disease. No evidence for intracranial metastases.  4. No evidence for acute intracranial pathology.      TUMOR MARKERS:  10/23 CA 15-3= 261  12/23 CA 15-3= 553  1/24 CA 15-3= 480  2/24 CA 15-3 338  5/24 CA 15-3=179  6/24 CA 15-3=159  Repeat CA 15-3 pending     OTHER:  - continue zometa q4 weeks - zometa #1 1/4/24, last zometa 5/30/24, zometa on hold as above  - continue calcium and vitamin D    # osteopenia  - 10/23 DEXA: osteopenia, T score - 2.0 left hip femoral neck and lumbar spine   - repeat DEXA 10/2025  - continue continue calcium and vitamin D  - zometa on hold as above    # left posterior tibial DVT  -1/19/24 sx started- Left foot pain and swelling  -1/22/24 pt called in, 1/22/24 left LE doppler: DVT in 1 of 2 left distal posterior tibial veins  - continue eliquis, refilled due 5/2025      # RA  - on plaquenil     RTC 9/3 for follow up with me, labs  Move 9/5 labs to 9/3  Cancel zometa on 9/5  Cancel follow up with Kitty on 9/5  RTC 10/2 for follow up with Dr. Ramirez, labs prior to visit    Miller Altamirano DO  Hematology/Oncology  [facility]      Again, thank you for allowing me to participate in the care of your patient.        Sincerely,        MILLER ALTAMIRANO DO

## 2024-08-08 NOTE — NURSING NOTE
Patient confirms medications and allergies are accurate via patients echeck in completion, and or denies any changes since last reviewed/verified.       Current patient location:  [place]     Is the patient currently in the state of [place]? YES    Visit mode:VIDEO    If the visit is dropped, the patient can be reconnected by: VIDEO VISIT: Text to cell phone:   Telephone Information:   Mobile [phone]       Will anyone else be joining the visit?  Bandar  (If patient encounters technical issues they should call [phone]    How would you like to obtain your AVS? MyChart    Are changes needed to the allergy or medication list? No    Are refills needed on medications prescribed by this physician? NO    Rooming Documentation:  Assigned questionnaire(s) completed      Reason for visit: RECHECK    Lucia RIVERO

## 2024-08-14 ENCOUNTER — PATIENT OUTREACH (OUTPATIENT)
Dept: CARE COORDINATION | Facility: CLINIC | Age: 64
End: 2024-08-14
Payer: COMMERCIAL

## 2024-08-21 ENCOUNTER — DOCUMENTATION ONLY (OUTPATIENT)
Dept: ONCOLOGY | Facility: CLINIC | Age: 64
End: 2024-08-21
Payer: COMMERCIAL

## 2024-08-24 ENCOUNTER — HOSPITAL ENCOUNTER (OUTPATIENT)
Dept: PET IMAGING | Facility: CLINIC | Age: 64
Discharge: HOME | End: 2024-08-24
Attending: INTERNAL MEDICINE
Payer: COMMERCIAL

## 2024-08-24 DIAGNOSIS — C79.51 CARCINOMA OF LEFT BREAST METASTATIC TO BONE (H): ICD-10-CM

## 2024-08-24 DIAGNOSIS — C50.912 CARCINOMA OF LEFT BREAST METASTATIC TO BONE (H): ICD-10-CM

## 2024-08-24 PROCEDURE — 78815 PET IMAGE W/CT SKULL-THIGH: CPT | Mod: PS

## 2024-08-24 PROCEDURE — 343N000001 HC RX 343: Performed by: INTERNAL MEDICINE

## 2024-08-24 PROCEDURE — 70491 CT SOFT TISSUE NECK W/DYE: CPT

## 2024-08-24 PROCEDURE — A9552 F18 FDG: HCPCS | Performed by: INTERNAL MEDICINE

## 2024-08-24 PROCEDURE — 250N000011 HC RX IP 250 OP 636: Performed by: INTERNAL MEDICINE

## 2024-08-24 PROCEDURE — 71260 CT THORAX DX C+: CPT

## 2024-08-24 RX ORDER — FLUDEOXYGLUCOSE F 18 200 MCI/ML
10-18 INJECTION, SOLUTION INTRAVENOUS ONCE
Status: COMPLETED | OUTPATIENT
Start: 2024-08-24 | End: 2024-08-24

## 2024-08-24 RX ORDER — IOPAMIDOL 755 MG/ML
10-135 INJECTION, SOLUTION INTRAVASCULAR ONCE
Status: COMPLETED | OUTPATIENT
Start: 2024-08-24 | End: 2024-08-24

## 2024-08-24 RX ADMIN — FLUDEOXYGLUCOSE F 18 11.61 MILLICURIE: 200 INJECTION, SOLUTION INTRAVENOUS at 08:15

## 2024-08-24 RX ADMIN — IOPAMIDOL 130 ML: 755 INJECTION, SOLUTION INTRAVENOUS at 08:16

## 2024-08-28 ENCOUNTER — TRANSFERRED RECORDS (OUTPATIENT)
Dept: HEALTH INFORMATION MANAGEMENT | Facility: CLINIC | Age: 64
End: 2024-08-28
Payer: COMMERCIAL

## 2024-08-30 DIAGNOSIS — C79.51 CARCINOMA OF LEFT BREAST METASTATIC TO BONE (H): Primary | ICD-10-CM

## 2024-08-30 DIAGNOSIS — C50.912 CARCINOMA OF LEFT BREAST METASTATIC TO BONE (H): Primary | ICD-10-CM

## 2024-08-30 RX ORDER — EXEMESTANE 25 MG/1
25 TABLET ORAL DAILY
Qty: 28 TABLET | Refills: 0 | Status: SHIPPED | OUTPATIENT
Start: 2024-09-06 | End: 2024-10-04

## 2024-08-30 RX ORDER — EVEROLIMUS 10 MG/1
10 TABLET ORAL DAILY
Qty: 28 TABLET | Refills: 0 | Status: SHIPPED | OUTPATIENT
Start: 2024-09-06 | End: 2024-10-04

## 2024-09-03 ENCOUNTER — ONCOLOGY VISIT (OUTPATIENT)
Dept: ONCOLOGY | Facility: CLINIC | Age: 64
End: 2024-09-03
Payer: COMMERCIAL

## 2024-09-03 ENCOUNTER — LAB (OUTPATIENT)
Dept: LAB | Facility: CLINIC | Age: 64
End: 2024-09-03
Payer: COMMERCIAL

## 2024-09-03 VITALS
HEART RATE: 85 BPM | TEMPERATURE: 97.3 F | BODY MASS INDEX: 33.43 KG/M2 | SYSTOLIC BLOOD PRESSURE: 110 MMHG | DIASTOLIC BLOOD PRESSURE: 70 MMHG | HEIGHT: 66 IN | OXYGEN SATURATION: 97 % | WEIGHT: 208 LBS

## 2024-09-03 DIAGNOSIS — C79.51 CARCINOMA OF LEFT BREAST METASTATIC TO BONE (H): ICD-10-CM

## 2024-09-03 DIAGNOSIS — C50.919 CARCINOMA OF BREAST METASTATIC TO BONE, UNSPECIFIED LATERALITY (H): ICD-10-CM

## 2024-09-03 DIAGNOSIS — C79.51 CARCINOMA OF BREAST METASTATIC TO BONE, UNSPECIFIED LATERALITY (H): ICD-10-CM

## 2024-09-03 DIAGNOSIS — D53.9 MACROCYTIC ANEMIA: ICD-10-CM

## 2024-09-03 DIAGNOSIS — C79.51 CARCINOMA OF LEFT BREAST METASTATIC TO BONE (H): Primary | ICD-10-CM

## 2024-09-03 DIAGNOSIS — E61.1 IRON DEFICIENCY: ICD-10-CM

## 2024-09-03 DIAGNOSIS — T45.1X5A ANTINEOPLASTIC CHEMOTHERAPY INDUCED ANEMIA: ICD-10-CM

## 2024-09-03 DIAGNOSIS — C50.912 CARCINOMA OF LEFT BREAST METASTATIC TO BONE (H): Primary | ICD-10-CM

## 2024-09-03 DIAGNOSIS — C50.912 CARCINOMA OF LEFT BREAST METASTATIC TO BONE (H): ICD-10-CM

## 2024-09-03 DIAGNOSIS — D64.81 ANTINEOPLASTIC CHEMOTHERAPY INDUCED ANEMIA: ICD-10-CM

## 2024-09-03 DIAGNOSIS — C79.51 MALIGNANT NEOPLASM METASTATIC TO BONE (H): ICD-10-CM

## 2024-09-03 LAB
ALBUMIN SERPL BCG-MCNC: 3.7 G/DL (ref 3.5–5.2)
ALP SERPL-CCNC: 85 U/L (ref 40–150)
ALT SERPL W P-5'-P-CCNC: 32 U/L (ref 0–50)
ANION GAP SERPL CALCULATED.3IONS-SCNC: 13 MMOL/L (ref 7–15)
AST SERPL W P-5'-P-CCNC: 32 U/L (ref 0–45)
BASOPHILS # BLD AUTO: 0 10E3/UL (ref 0–0.2)
BASOPHILS NFR BLD AUTO: 0 %
BILIRUB SERPL-MCNC: 0.2 MG/DL
BUN SERPL-MCNC: 17.4 MG/DL (ref 8–23)
CALCIUM SERPL-MCNC: 8.5 MG/DL (ref 8.8–10.4)
CANCER AG15-3 SERPL-ACNC: 79 U/ML
CHLORIDE SERPL-SCNC: 107 MMOL/L (ref 98–107)
CREAT SERPL-MCNC: 1.07 MG/DL (ref 0.51–0.95)
EGFRCR SERPLBLD CKD-EPI 2021: 58 ML/MIN/1.73M2
EOSINOPHIL # BLD AUTO: 0.1 10E3/UL (ref 0–0.7)
EOSINOPHIL NFR BLD AUTO: 1 %
ERYTHROCYTE [DISTWIDTH] IN BLOOD BY AUTOMATED COUNT: 17.6 % (ref 10–15)
FERRITIN SERPL-MCNC: 448 NG/ML (ref 11–328)
GLUCOSE SERPL-MCNC: 95 MG/DL (ref 70–99)
HCO3 SERPL-SCNC: 23 MMOL/L (ref 22–29)
HCT VFR BLD AUTO: 27.8 % (ref 35–47)
HCT VFR BLD AUTO: 27.8 % (ref 35–47)
HGB BLD-MCNC: 8.5 G/DL (ref 11.7–15.7)
IMM GRANULOCYTES # BLD: 0 10E3/UL
IMM GRANULOCYTES NFR BLD: 0 %
IRON BINDING CAPACITY (ROCHE): 237 UG/DL (ref 240–430)
IRON SATN MFR SERPL: 16 % (ref 15–46)
IRON SERPL-MCNC: 37 UG/DL (ref 37–145)
LYMPHOCYTES # BLD AUTO: 0.8 10E3/UL (ref 0.8–5.3)
LYMPHOCYTES NFR BLD AUTO: 18 %
MAGNESIUM SERPL-MCNC: 2 MG/DL (ref 1.7–2.3)
MCH RBC QN AUTO: 28.3 PG (ref 26.5–33)
MCHC RBC AUTO-ENTMCNC: 30.6 G/DL (ref 31.5–36.5)
MCV RBC AUTO: 93 FL (ref 78–100)
MONOCYTES # BLD AUTO: 0.4 10E3/UL (ref 0–1.3)
MONOCYTES NFR BLD AUTO: 8 %
NEUTROPHILS # BLD AUTO: 3.3 10E3/UL (ref 1.6–8.3)
NEUTROPHILS NFR BLD AUTO: 73 %
NRBC # BLD AUTO: 0 10E3/UL
NRBC BLD AUTO-RTO: 0 /100
PHOSPHATE SERPL-MCNC: 3 MG/DL (ref 2.5–4.5)
PLATELET # BLD AUTO: 133 10E3/UL (ref 150–450)
POTASSIUM SERPL-SCNC: 3.9 MMOL/L (ref 3.4–5.3)
PROT SERPL-MCNC: 6.5 G/DL (ref 6.4–8.3)
RBC # BLD AUTO: 3 10E6/UL (ref 3.8–5.2)
SODIUM SERPL-SCNC: 143 MMOL/L (ref 135–145)
WBC # BLD AUTO: 4.5 10E3/UL (ref 4–11)

## 2024-09-03 PROCEDURE — 84100 ASSAY OF PHOSPHORUS: CPT

## 2024-09-03 PROCEDURE — 82747 ASSAY OF FOLIC ACID RBC: CPT | Mod: 90 | Performed by: INTERNAL MEDICINE

## 2024-09-03 PROCEDURE — 80053 COMPREHEN METABOLIC PANEL: CPT

## 2024-09-03 PROCEDURE — 85025 COMPLETE CBC W/AUTO DIFF WBC: CPT | Performed by: INTERNAL MEDICINE

## 2024-09-03 PROCEDURE — 86300 IMMUNOASSAY TUMOR CA 15-3: CPT | Performed by: INTERNAL MEDICINE

## 2024-09-03 PROCEDURE — 82728 ASSAY OF FERRITIN: CPT | Performed by: INTERNAL MEDICINE

## 2024-09-03 PROCEDURE — 83550 IRON BINDING TEST: CPT | Performed by: INTERNAL MEDICINE

## 2024-09-03 PROCEDURE — 83735 ASSAY OF MAGNESIUM: CPT

## 2024-09-03 PROCEDURE — 36415 COLL VENOUS BLD VENIPUNCTURE: CPT

## 2024-09-03 PROCEDURE — 83540 ASSAY OF IRON: CPT | Performed by: INTERNAL MEDICINE

## 2024-09-03 PROCEDURE — 99214 OFFICE O/P EST MOD 30 MIN: CPT | Performed by: INTERNAL MEDICINE

## 2024-09-03 PROCEDURE — 99000 SPECIMEN HANDLING OFFICE-LAB: CPT | Performed by: INTERNAL MEDICINE

## 2024-09-03 RX ORDER — HEPARIN SODIUM,PORCINE 10 UNIT/ML
5-20 VIAL (ML) INTRAVENOUS DAILY PRN
Status: CANCELLED | OUTPATIENT
Start: 2024-09-10

## 2024-09-03 RX ORDER — DIPHENHYDRAMINE HYDROCHLORIDE 50 MG/ML
50 INJECTION INTRAMUSCULAR; INTRAVENOUS
Status: CANCELLED
Start: 2024-09-10

## 2024-09-03 RX ORDER — METHYLPREDNISOLONE SODIUM SUCCINATE 125 MG/2ML
125 INJECTION, POWDER, LYOPHILIZED, FOR SOLUTION INTRAMUSCULAR; INTRAVENOUS
Status: CANCELLED
Start: 2024-09-10

## 2024-09-03 RX ORDER — ALBUTEROL SULFATE 0.83 MG/ML
2.5 SOLUTION RESPIRATORY (INHALATION)
Status: CANCELLED | OUTPATIENT
Start: 2024-09-10

## 2024-09-03 RX ORDER — MEPERIDINE HYDROCHLORIDE 25 MG/ML
25 INJECTION INTRAMUSCULAR; INTRAVENOUS; SUBCUTANEOUS EVERY 30 MIN PRN
Status: CANCELLED | OUTPATIENT
Start: 2024-09-10

## 2024-09-03 RX ORDER — EPINEPHRINE 1 MG/ML
0.3 INJECTION, SOLUTION, CONCENTRATE INTRAVENOUS EVERY 5 MIN PRN
Status: CANCELLED | OUTPATIENT
Start: 2024-09-10

## 2024-09-03 RX ORDER — ALBUTEROL SULFATE 90 UG/1
1-2 AEROSOL, METERED RESPIRATORY (INHALATION)
Status: CANCELLED
Start: 2024-09-10

## 2024-09-03 RX ORDER — HEPARIN SODIUM (PORCINE) LOCK FLUSH IV SOLN 100 UNIT/ML 100 UNIT/ML
5 SOLUTION INTRAVENOUS
Status: CANCELLED | OUTPATIENT
Start: 2024-09-10

## 2024-09-03 ASSESSMENT — PAIN SCALES - GENERAL: PAINLEVEL: NO PAIN (0)

## 2024-09-03 NOTE — PROGRESS NOTES
"[facility]    Hematology/Oncology Established Patient Follow Up Note      Today's Date: 9/3/24    Reason for follow up: metastatic breast cancer    HISTORY OF PRESENT ILLNESS: Kesha Zacarias is a 64 year old female who presents for follow up    Patient has medical history including rheumatoid arthritis, hyperlipidemia, osteoarthritis, bilateral cataracts and glaucoma s/p surgery, endocervical polyp s/p resection, vaginal atrophy with conjugated estrogen vaginal cream use, colon polyp (hyperplastic polyp and tubular adenoma), seasonal depression, history of tobacco use ([age] y/o, about 1 ppd).    Regarding RA:  -dx over a decade ago, on plaquenil, managed by PCP  - arthritis is most severe in hands>knees, intermittent     - 3/23 bilateral screening mammogram [facility]  - a few months ago, noted nipple retraction  - 9/23 patient palpated mass in retroareolar region of left breast and w/nipple retraction, no discharge, skin thickening, no dimpling, no erythema  - 9/23 diagnostic LEFT breast mammogram: Focal dense tissue with some likely mild architectural distortion, some anterior skin thickening is noted  - 9/23 targeted LEFT breast ultrasound: Ill-defined shadowing hypoechoic mass, 3.0 x 1.7 x 3.9 cm.  In left axilla-few small lymph nodes, 1 normal-sized lymph node with cortical thickening, 0.7 x 0.6 cm.  -9/23 ultrasound-guided LEFT breast core biopsy of 12:00 lesion with clip placement, \"Postbiopsy unilateral digital mammogram of the left breast showed the clip to be at the expected biopsy site\" AND ultrasound-guided left axillary lymph node biopsy of lymph node with cortical thickening  PATHOLOGY  A.  Breast, left, 12:00, biopsy:  -Lobular carcinoma in situ.-Multiple foci  -Invasive carcinoma is not identified.     B.  Lymph node, left axillary, biopsy:  -Positive for metastatic lobular carcinoma, grade 2  -Largest metastatic focus is 7 mm.  -Negative for extranodal extension.  -Breast " ancillary testing:  -Estrogen receptor: Positive (91 to 100%, strong)  -Progesterone receptor: Positive (91 to 100%, strong)  -HER2 2+ IHC, FISH negative    -10/23 MRI bilateral breast:  LEFT breast:  - Heterogeneously enhancing irregular mass in the subareolar left breast, 5.0 x 4.9 x 4.9 cm, with associated nipple retraction and nipple/areolar involvement.  This mass extends superiorly through 11: positions, from anterior to mid depth.  The HydroMARK breast biopsy clip (which showed LCIS) is 1.5 cm posterosuperior to this mass.  - Multiple suspicious left level 1 axillary lymph node noted, including biopsy-proven metastatic node with indwelling biopsy marker, biopsied node measures 1.3 x 1.0 cm  - Heterogeneous marrow appearance of sternum and ribs with heterogeneous enhancement and a peripheral enhancing focus within the mid body.  IMPRESSION:  1. Upon further review of previous imaging and pathology results,  recommend repeat ultrasound guided large core-needle biopsy of the  discordant dominant left breast mass given metastatic left axillary  lymph node and superoposteriorly displaced left breast biopsy marker.   2. Nonspecific heterogeneous enhancement of the sternum and ribs.  Consider nuclear medicine bone scan    Lifetime estrogen exposure:  Menarche: 13   Last menstrual period: 49   Age of first pregnancy: 18   Number of pregnancies: 2 (no living children)   Weight gain: 20lb weight gain within a year  Exposure to exogenous estrogen: vaginal atrophy with conjugated estrogen vaginal cream use x4 years (intermittent), has not used it since 9/23. Birth control for about 13-15 years from her 20s-30s.      Pt reports 55lb weight loss in 1.5 years, this was intentional, was following weight watchers program (23 points available per day) 230lb->170lb->190lb (gained about 20lbs). Pt was walking on the treadmill and outside daily, about 30 mins to 1.5 miles.    10/23 labs:  AST 79, ALT 91,   CA 15-3  261    10/23 PET/CT:  Innumerable foci of FDG avid lesions are seen throughout the osseous  structures of the head and neck, most pronounced on the calvarium and  cervical spine, with prominently sclerotic appearance on CT correlate.  For example:  -Right frontal bone, SUV max 5.31.  -Left lateral mass of the atlas, SUV max 15.0  -Angle of the left mandible, SUV max 9.6  -C7 vertebral body, SUV max 17.7    Innumerable variable sized FDG avid lesions throughout the axial and  appendicular spine, including but not limited to the cervical,  thoracic, and lumbar spine, multilevel bilateral ribs, sternum,  bilateral scapula, bilateral humeri, clavicles, pelvis, and bilateral  femurs. A few of these lesions are described below:  -Large FDG avid sclerotic 3.4 cm lesion within the proximal left  humeral head, SUV max 17.24  -Sternal body, SUV max 20.35  -L2 vertebral body, SUV max 14.3 without  -Large ill-defined sclerotic lesion in the sacral body measuring up to  at least 5.9 cm, SUV max 16.84  -FDG avid focus within the left femoral head, SUV  max 13.65 without CT correlate.    Large irregular soft tissue FDG avid mass within the left breast   measuring approximately 3.2 x 2.7 cm with  extension into the superficial soft tissues and associated left nipple  retraction, SUV max 12.36 additional FDG avid. Left axillary lymph  nodes, not definitively enlarged by short axis size criteria, for  example, SUV max 5.93.    The liver is unremarkable.     Solid 3 mm non-FDG avid pulmonary nodule within the  right middle lobe    - 10/23 MRI brain:  - extensive osseous metastatic disease involving the calvarium, skull base w/involvement of occipital condyles, C1 and C2 vertebral bodies, and likely the mandible  -  Dominant calvarial lesions are located in the right frontal calvarium and right temporal calvarium without definite breach of the  inner or outer tables of the calvarium. There is a suggestion of mild asymmetric linear  extra-axial enhancement deep to the dominant right temporal calvarial lesion along the dural margin, though this may be vascular in etiology.  - No abnormal parenchymal or leptomeningeal enhancement.   - sequelae of mild chronic microvascular ischemic disease. Mild generalized cerebral atrophy.       -supposed to start ribociclib 10/30/23 however, noted to have significantly elevated LFTS (10/26/23 , , alk phos 152)    10/17/23: AST 79, ALT 91, alk phos 116, t bili 0.5  10/26/23 , , alk phos 152  10/30/23: , , alk phos 178, coags WNL, ribociclib was not started, letrozole started, atorvastatin held  11/7/23: AST 96, , alk phos 169, MRI liver negative for mets  11/17/23: AST 81, , alk phos 163  11/27/23: AST 55, ALT 90, alk phos 128- started ribociclib 400mg  12/4/23: AST 26, ALT 35, alk phos 118    - 11/27/23 started ribociclib+ letrozole  - 11/27/23-5/30/24 ribociclib+ letrozole; C1D1 11/27/23 started ribociclib 400mg PO daily when LFTs improved to <3x ULN, C2D1 12/25/23 ribociclib 600mg (day 1-12 only 2/2 palliative RT to sacrum w/Dr. Mandujano 1/9/24- 1/22/24 3000cGy in 10 fractions), C3D1 1/30/24, C4D1 3/6/24 (deferred 1 week 2/2 2/27/24 ANC 0.8->3/5/24 ANC 1.5), C5D1 4/3/24, C6D1 5/1/24, C7D1 5/30/24->progression of disease in bones  - 1/9/24-1/22/24 palliative RT to sacrum w/Dr. Mandujano- 3000cGy in 10 fractions    - 2/17/24 PET CT CAP  PET/CT FINDINGS: Most of the extensive skeletal metastases have become sclerotic and non-FDG avid and those which remain avid have decreased in activity, for example in the proximal right humerus from SUVmax 19.3 to 13.9, in the proximal right femur from 16.5 to 12.2, and in the L2 vertebral body from 14.3 to 10.7.      CT FINDINGS: Bilateral lens replacements. Calcified atherosclerosis, including moderate right coronary artery disease. Splenule. Cholelithiasis. Retroaortic left renal vein. Pelvic phleboliths. Appendectomy. Ventral  hernia repair with mesh. Persistent   metopic suture. T11 vertebral body hemangioma. Mild degenerative change in the spine.                                                       IMPRESSION:  Partial response       - 5/11/24 PET CT CAP:  Redemonstrated innumerable FDG avid osseous lesions, some of which are increasing in metabolic activity and suggest mild progression of disease including representative examples involving the inferior sternum (Max SUV 16.5, previously 7.9) and left distal femur (Max SUV 20.8, previously 11.3).    - 5/24/24 MRI brain w/wo contrast:  IMPRESSION:   1. Presumed osseous metastases involving the occipital condyles  bilaterally, C1, C2 and C3 vertebrae again noted.  2. Questionable osseous metastatic disease involving the parietal  bones bilaterally again noted without change.  3. Diffuse cerebral volume loss and cerebral white matter changes  consistent with chronic small vessel ischemic disease. No evidence for  intracranial metastases.  4. No evidence for acute intracranial pathology.    - 11/27/23-5/30/24 ribociclib+ letrozole; C1D1 11/27/23 started ribociclib 400mg PO daily when LFTs improved to <3x ULN, C2D1 12/25/23 ribociclib 600mg (day 1-12 only 2/2 palliative RT to sacrum w/Dr. Mandujano 1/9/24- 1/22/24 3000cGy in 10 fractions), C3D1 1/30/24, C4D1 3/6/24 (deferred 1 week 2/2 2/27/24 ANC 0.8->3/5/24 ANC 1.5), C5D1 4/3/24, C6D1 5/1/24, C7D1 5/30/24->progression of disease in bones    - 6/24 IR requested PET Overread:  - Relatively stable hypermetabolic left breast mass having SUV max of  5.7, previously, 6.7  - Redemonstration of hypermetabolic innumerable osseous metastatic  lesions following the axial and appendicular skeleton with involvement  of the marrow cavity in the long bones. These are predominantly  sclerotic with few lytic appearing lesions. Few lesions have increased  uptake compared to prior exam, a few have slightly decreased uptake ,  few remain overall unchanged. As  "index lesions:     Increased uptake:  -C2 vertebral body lesion with SUV max 14.4, previously 8.3. 1a. Of note - hypermetabolic C2 lesion with the posterior cortical breakthrough, no significant spinal cord impingement at this time but potentially at risk in the future.     Decreased uptake:  -L3 vertebral body with SUV max 7.4, previously 10.3.   -Sternal body lesion with SUV max 6.7, previously 10.9.   -Left ischial  tuberosity lesion with SUV max 3.5, previously 9.3.     Relatively stable uptake:  -Left humeral head lesion having SUV max 10.4, previously 11.1.   -Left sacral ala lesion with SUV max 7.2, previously 8.7.    Addendum: Potential biopsy sites:   Another lesion that has a slight increase in uptake is in the left  femur, lateral to the lesser trochanter (series 4 image 24) max SUV 23  (prior 18).  The hypermetabolic portion is not sclerotic, but it is in  the lateral aspect of the femur, fairly good size and the lesser  trochanter could serve as a landmark.     The left humeral head lesion may also be a potential biopsy site max  SUV 10.4 (not significantly changed from prior 11). The lesion is in  the lateral aspect of the sclerotic lesion when the patient's arms are  in the up position.     The C2 lesion may be more difficult to assess given its anterior  medial position (max suv 14).    - 6/24 germline genetic testing: NEGATIVE for BRENT, BARD1, BRCA1, BRCA2, CDH1, CHEK2, NF1, PALB2, PTEN, STK11, TP53.     - 6/14/24 started everolimus+ exemestane    - unable to do NGS testing on previous biopsy specimen, therefore repeat biopsy completed  - 7/15/24 CT guided bone biopsy of left proximal femur lesion w/IR   PATHOLOGY:  - Metastatic lobular breast carcinoma   ER positive (%), WA negative (<1%), her2 2+ on IHC; FISH unable to be completed \"Three unstained slides of paraffin-embedded tissue were received and processed by the [facility] on 7-18-24 for HER2 FISH testing. However, the tumor " "cells in the accompanying H&E-stained slide could not be definitively identified on fluorescence microscopy; thus, assessment of the HER2 status of this patient's metastatic tumor cells could not be performed. If clinically indicated, analysis of HER2 can be repeated by FISH if additional material is obtained in the future.\"  RESULT FOR IMMUNOHISTOCHEMICAL VENTANA CLONE  PD-L1 ASSAY  TUMOR PROPORTION SCORE (TPS): 0%  INTERPRETATION: NEGATIVE PD-L1 EXPRESSION (TPS <1%)  ABEL  Breast NGS for AKT1; BRCA1; BRCA2; ERBB2; ESR1; PALB2; PIK3CA; PTEN; RET: negative, TMB 7.313 mut/Mb   Fusion for ACVR2A, AKT1, AKT2, AKT3, ALK, AR, USYBYD92, ARHGAP6, CAROL, BCOR, BRAF, BRD3, BRD4, CAMTA1, CCNB3, CCND1, ,CIC, CRTC1, CSF1, CSF1R, CTNNB1, DNAJB1, EGF, EGFR, EPC1, ERBB2, ERBB4, ERG, ESR1, ESRRA, ETV1, ETV4, ETV5, ETV6, EWSR1,FGF1, FGFR1, FGFR2, FGFR3, FGR, FOS, FOSB, FOXO1, FOXO4, FOXR2, FUS, GLI1, GRB7, HMGA2, HRAS, IDH1, IDH2, IGF1R, INSR,JAK2, JAK3, JAZF1, KIT, KRAS, MAML2, MAP2K1, MAST1, MAST2, MBTD1, MDM2, MEAF6, MET, MGEA5, MKL2, MN1, MSMB,  MUSK, MYB, MYBL1, MYC, MYOD1, NCOA1, NCOA2, NCOA3, NFATC2, NFE2L2, NFIB, NOTCH1, NOTCH2, NR4A3, NRAS, NRG1,  NTRK1, NTRK2, NTRK3, NUMBL, NUTM1, PAX3, PAX8, PDGFB, PDGFD, PDGFRA, PDGFRB, PHF1, PHKB, PIK3CA, PKN1, PLAG1,  PPARG, PRDM10, PRKACA, PRKACB, PRKCA, PRKCB, PRKCD, PRKD1, PRKD2, PRKD3, RAD51B, RAF1, RELA, RET, ROS1, RSPO2,  RSPO3, SS18, SS18L1, STAT6, TAF15, TCF12, TERT, TFE3, TFEB, TFG, THADA, TMPRSS2, USP6, VGLL2, WWTR1, YAP1, YWHAE: negative          INTERIM HISTORY:    REGIMEN:  Everolimus + exemestane   C1D1 6/14/24, C2D1 7/12/24, C3D1 8/9/24  everolimus 10mg PO daily   exemestane 25mg PO daily  PLUS dexamethasone mouthwash     C4D1 9/6/24 planned pending labs     Treatment Related AE:  - gum soreness/jaw pain- posterior, b/l- Left where she had tooth pulled- before starting everolimus, right- 1 week after starting everolimus, bleeding w/brushing teeth, 7/17/24- per pt, " "dentist said jawbone protruding. They recommended an oral surgeon to grind down the bone and cover with gum tissue. Zometa on hold. End of July- Pt saw oral surgeon (unknown name), protruding jaw bone is \"dead and will fall out\" and that they do not want pt to get additional zometa,  follow up on 8/28/24- pt reports nothing has changed, per pt \"bone that is sticking out will have gum grow underneath it and the bone will fall out\", her next follow up is 10/24   - mouth sore- improved with magic mouthwash, dexamethasone mouth wash  - hld- 6/24 total chol 299, - previously was on atorvastatin that was stopped when she started ribociclib . restarted atorvastatin 10mg, LFTs stable, 8/24 total chol 196,   - hot flashes- 3/24 10x/day, last a few mins, daily, affecting quality of life including sleep; 4/24 started effexor 37.5mg x1 week, then effexor 75mg thereafter and hot flashes improved to almost none  - Left posterior tibial DVT- 1/19/24 sx started- Left foot pain and swelling, 1/22/24 pt called in, 1/22/24 left LE doppler: DVT in 1 of 2 left distal posterior tibial veins, started on eliquis and stopped naproxen; no issues with bleeding but does have intermitting bruising when she bumps her hand  - blurry vision b/l- last saw eye doctor 3/23 and has trifocal glasses. She has dry eyes and uses lubricating eye drops., 10/23 MRI brain as above. eye doctor visit 4/3/24- got new glasses rx, 5/23/24 sent message regarding increased blurred vision- remain constant despite time of day, near vision, far vision, or the use of her corrective lenses; 5/24/24 MRI brain negative for intraparenchymal mets, osseous mets stable, still having intermittent blurred vision, lasts anywhere from 1-3 days- overall stable   -HA- 9/24 hx of chronic HA, a few weeks ago started having intermittent right temporal HA, sharp, lasts 25 min, spontaneously remits, does not need any medication for this, not associated with caffeine use, no " vision changes, nausea, focal weakness, personality changes, slurred speech  - macrocytic anemia- hgb 11, 5/24 B12- 298, TSH 1.19, absolute retic 0.071, ferritin 355, iron sat 18, TIBC 307, iron 56; 6/24 start B12 SL [id number] daily and oral ferrous sulfate 325mg every other day. 8/24 hgb 9.5, 9/24 hgb 8.5- no visible bleeding, does have bruising that is intermittent and stable- no hematoma   - thrombocytopenia- intermittent, some bruising, no bleeding while on eliquis       -8/24 PET CT NCAP  Decreasing metabolic activity of the innumerable FDG avid osseous lesions including examples in the superior sternum (Max SUV 4.0, previously 6.8), left proximal humerus (Max SUV 4.0, previously 9.8), and L2 vertebral body (Max SUV 8.9, previously 16.5) suggesting partial response to therapy.    REVIEW OF SYSTEMS:   A 14 point ROS was reviewed with pertinent positives and negatives in the HPI.        HOME MEDICATIONS:  Current Outpatient Medications   Medication Sig Dispense Refill    apixaban ANTICOAGULANT (ELIQUIS) 5 MG tablet Take 1 tablet (5 mg) by mouth 2 times daily 60 tablet 11    atorvastatin (LIPITOR) 10 MG tablet Take 1 tablet (10 mg) by mouth daily 90 tablet 1    betamethasone dipropionate (DIPROSONE) 0.05 % external lotion Apply topically 2 times daily 60 mL 3    CALCIUM PO       cyclobenzaprine (FLEXERIL) 5 MG tablet TAKE 1 TABLET(5 MG) BY MOUTH AT BEDTIME 90 tablet 3    dexAMETHasone alcohol-free (DECADRON) 0.1 MG/ML solution Swish 10 mL in mouth for 2 min and spit, four times daily. 1200 mL 1    [START ON 9/6/2024] everolimus (AFINITOR) 10 MG tablet Take 1 tablet (10 mg) by mouth daily for 28 days Avoid grapefruit and grapefruit juice. Take with or without food, but should be consistent. 28 tablet 0    everolimus (AFINITOR) 10 MG tablet Take 1 tablet (10 mg) by mouth daily for 28 days Avoid grapefruit and grapefruit juice. Take with or without food, but should be consistent. 28 tablet 0    [START ON 9/6/2024]  exemestane (AROMASIN) 25 MG tablet Take 1 tablet (25 mg) by mouth daily for 28 days. Take after a meal. Do not start before September 6, 2024. 28 tablet 0    exemestane (AROMASIN) 25 MG tablet Take 1 tablet (25 mg) by mouth daily for 28 days Take after a meal. 28 tablet 0    hydroxychloroquine (PLAQUENIL) 200 MG tablet TAKE 2 AND 1/2 TABLETS BY MOUTH EVERY  tablet 3    magic mouthwash suspension (diphenhydrAMINE, lidocaine, aluminum-magnesium & simethicone) Swish and swallow 10 mLs in mouth every 6 hours as needed for mouth sores 1200 mL 1    magnesium 250 MG tablet Take 1 tablet by mouth daily      venlafaxine (EFFEXOR XR) 75 MG 24 hr capsule Take 1 capsule (75 mg) by mouth daily 30 capsule 11    vitamin B-12 (CYANOCOBALAMIN) 2500 MCG sublingual tablet Take 1 tablet (2,500 mcg) by mouth daily 90 tablet 1    VITAMIN D PO       everolimus (AFINITOR) 10 MG tablet Take 1 tablet (10 mg) by mouth daily for 28 days Avoid grapefruit and grapefruit juice. Take with or without food, but should be consistent. 28 tablet 0    exemestane (AROMASIN) 25 MG tablet Take 1 tablet (25 mg) by mouth daily for 28 days Take after a meal. 28 tablet 0    fish oil-omega-3 fatty acids 1000 MG capsule Take 2 g by mouth daily (Patient not taking: Reported on 9/3/2024)      ondansetron (ZOFRAN ODT) 4 MG ODT tab Take 1-2 tablets (4-8 mg) by mouth every 8 hours as needed for nausea (Patient not taking: Reported on 9/3/2024) 30 tablet 2    ondansetron (ZOFRAN) 4 MG tablet Take 1 tablet (4 mg) by mouth every 6 hours as needed for nausea (Patient not taking: Reported on 9/3/2024) 30 tablet 3    prochlorperazine (COMPAZINE) 10 MG tablet Take 1 tablet (10 mg) by mouth every 6 hours as needed for nausea or vomiting (Patient not taking: Reported on 9/3/2024) 30 tablet 2         ALLERGIES:  Allergies   Allergen Reactions    Ciprofloxacin      hives and was on flagyl too    Metronidazole      hives and was on cipro too         PAST MEDICAL  HISTORY:  Past Medical History:   Diagnosis Date    Arthritis 2006    Breast cancer metastasized to bone (H) 10/12/2023    Chronic depressive personality disorder     Dysplasia of cervix (uteri) 1988    Cryotherapy    Female infertility of unspecified origin     Glaucoma     Rheumatoid arthritis (H)          PAST SURGICAL HISTORY:  Past Surgical History:   Procedure Laterality Date    COLONOSCOPY      COLONOSCOPY N/A 2022    Procedure: COLONOSCOPY, WITH POLYPECTOMY AND BIOPSY;  Surgeon: Gagandeep Patterson MD;  Location: [facility]    [facility] INTRODUCE CATH FALLOPIAN TUBE, RE-OPEN/DIAGNOSIS      HERNIA REPAIR, INCISIONAL  2009    JOINT REPLACEMENT Right 2017    knee    TONSILLECTOMY      ZZC APPENDECTOMY  2003    [facility] REMV CATARACT INTRACAP,INSERT LENS  2003    right         SOCIAL HISTORY:  Social History     Socioeconomic History    Marital status:      Spouse name: Bandar    Number of children: 1    Years of education: Not on file    Highest education level: Not on file   Occupational History    Not on file   Tobacco Use    Smoking status: Former     Current packs/day: 0.00     Average packs/day: 0.5 packs/day for 25.0 years (12.5 ttl pk-yrs)     Types: Cigarettes, Cigars     Start date: 1992     Quit date: 2017     Years since quittin.9    Smokeless tobacco: Never    Tobacco comments:     Cigar once in a while   Vaping Use    Vaping status: Never Used   Substance and Sexual Activity    Alcohol use: Yes     Comment: very little    Drug use: Yes     Types: Marijuana     Comment: once every 2 weeks    Sexual activity: Yes     Partners: Male     Birth control/protection: None   Other Topics Concern    Parent/sibling w/ CABG, MI or angioplasty before 65F 55M? Yes   Social History Narrative    Not on file     Social Determinants of Health     Financial Resource Strain: Not on file   Food Insecurity: Not on file   Transportation Needs: Not on file   Physical Activity: Not on file  "  Stress: Not on file   Social Connections: Not on file   Interpersonal Safety: Not on file   Housing Stability: Not on file         FAMILY HISTORY:  Family History   Problem Relation Age of Onset    Heart Disease Mother     Lipids Mother     Hyperlipidemia Mother     Genitourinary Problems Father         prostate    Genetic Disorder Father         ulcer    Hypertension Father     Lipids Father     Prostate Cancer Father     Hyperlipidemia Sister     Hyperlipidemia Brother     Other Cancer Brother         Neck Cancer HPV (+)    Heart Disease Maternal Grandmother     Cerebrovascular Disease Maternal Grandmother     Heart Disease Maternal Grandfather     Heart Disease Maternal Uncle     Heart Disease Maternal Uncle         x  3    Cancer Nephew         Sister's Son       Family history of:  1.  Breast cancer including male breast cancer: negative   2. Ovarian cancer: negative  3.  Pancreatic cancer: negative  4.  Prostate cancer: father- ?in his 50s, other details unknown  5. Diffuse gastric cancer (if lobular breast CA): negative  6. Uterine cancer: negative  7. Colon cancer:  negative  6. Brother- head and neck, HPV +, had surgery, clinical trial and now cancer free      PHYSICAL EXAM:  Vital signs:  /70 (BP Location: Right arm, Patient Position: Sitting, Cuff Size: Adult Regular)   Pulse 85   Temp 97.3  F (36.3  C) (Temporal)   Ht 1.676 m (5' 6\")   Wt 94.3 kg (208 lb)   LMP 11/22/2011 (Exact Date)   SpO2 97%   BMI 33.57 kg/m         ECOG:   GENERAL/CONSTITUTIONAL: No acute distress.  EYES: Pupils are equal and round. Extraocular movements intact without nystagmus.  No scleral icterus.  RESPIRATORY: Equal chest rise.   MUSCULOSKELETAL: Warm and well-perfused, no cyanosis, clubbing, or edema.   NEUROLOGIC: Cranial nerves are grossly intact. Alert, oriented to person, place and time, answers questions appropriately.  INTEGUMENTARY: No rashes or jaundice.  GAIT: Steady, does not use assistive " device      LABS:   Latest Reference Range & Units 08/08/24 14:01   Sodium 135 - 145 mmol/L 143   Potassium 3.4 - 5.3 mmol/L 3.9   Chloride 98 - 107 mmol/L 106   Carbon Dioxide (CO2) 22 - 29 mmol/L 25   Urea Nitrogen 8.0 - 23.0 mg/dL 16.4   Creatinine 0.51 - 0.95 mg/dL 1.12 (H)   GFR Estimate >60 mL/min/1.73m2 55 (L)   Calcium 8.8 - 10.4 mg/dL 8.8   Anion Gap 7 - 15 mmol/L 12   Magnesium 1.7 - 2.3 mg/dL 2.3   Phosphorus 2.5 - 4.5 mg/dL 2.7   Albumin 3.5 - 5.2 g/dL 3.9   Protein Total 6.4 - 8.3 g/dL 6.9   Alkaline Phosphatase 40 - 150 U/L 86   ALT 0 - 50 U/L 47   AST 0 - 45 U/L 37   Bilirubin Total <=1.2 mg/dL 0.3   Cholesterol <200 mg/dL 196   Patient Fasting?  No  No   Glucose 70 - 99 mg/dL 109 (H)   HDL Cholesterol >=50 mg/dL 50   LDL Cholesterol Calculated <=100 mg/dL 117 (H)   Non HDL Cholesterol <130 mg/dL 146 (H)   Triglycerides <150 mg/dL 144   WBC 4.0 - 11.0 10e3/uL 5.3   Hemoglobin 11.7 - 15.7 g/dL 9.5 (L)   Hematocrit 35.0 - 47.0 % 29.8 (L)   Platelet Count 150 - 450 10e3/uL 136 (L)   RBC Count 3.80 - 5.20 10e6/uL 3.04 (L)   MCV 78 - 100 fL 98   MCH 26.5 - 33.0 pg 31.3   MCHC 31.5 - 36.5 g/dL 31.9   RDW 10.0 - 15.0 % 16.9 (H)   % Neutrophils % 71   % Lymphocytes % 18   % Monocytes % 8   % Eosinophils % 2   % Basophils % 0   Absolute Basophils 0.0 - 0.2 10e3/uL 0.0   Absolute Eosinophils 0.0 - 0.7 10e3/uL 0.1   Absolute Immature Granulocytes <=0.4 10e3/uL 0.0   Absolute Lymphocytes 0.8 - 5.3 10e3/uL 1.0   Absolute Monocytes 0.0 - 1.3 10e3/uL 0.4   % Immature Granulocytes % 1   Absolute Neutrophils 1.6 - 8.3 10e3/uL 3.8   Absolute NRBCs 10e3/uL 0.0   NRBCs per 100 WBC <1 /100 0   (H): Data is abnormally high  (L): Data is abnormally low    PATHOLOGY:    IMAGING:  Narrative & Impression   EXAM: PET ONCOLOGY (EYES TO THIGHS), CT SOFT TISSUE NECK W CONTRAST, CT CHEST/ABDOMEN/PELVIS W CONTRAST  LOCATION: [facility]  DATE: 8/24/2024     INDICATION: Subsequent treatment planning and  restaging for malignant neoplasm overlapping sites of right female breast. Status post sacral radiation completed in January 2024, currently receiving and immunotherapy/hormonal therapy. Monitor treatment   response.  COMPARISON: FDG PET/CT dated 5/11/2024  CONTRAST: 130 mL [id number]  TECHNIQUE: Serum glucose level 105 mg/dL. One hour post intravenous administration of 11.6 mCi F-18 FDG, PET imaging was performed from the skull vertex to mid thighs, utilizing attenuation correction with concurrent axial CT and PET/CT image fusion.   Separate diagnostic CT of the neck, chest, abdomen, and pelvis was performed. Dose reduction techniques were used.     PET/CT FINDINGS: Decreasing metabolic activity of the innumerable FDG avid osseous lesions including examples in the superior sternum (Max SUV 4.0, previously 6.8), left proximal humerus (Max SUV 4.0, previously 9.8), and L2 vertebral body (Max SUV 8.9, previously 16.5) suggesting partial response to therapy. The remaining FDG uptake is physiologic from the skull vertex to mid thigh.     CT FINDINGS: Mild senescent intracranial changes. Postoperative changes of bilateral lenses. Mild paranasal sinus mucosal thickening. The aerodigestive tract and neck soft tissues are unremarkable. Mild coronary artery calcium. Left lower lobe   scarring/atelectatic change. Left upper quadrant splenule. Retroaortic left renal vein. Anterior abdominal wall hernia repair. Pelvic phleboliths. Multilevel degenerative changes of the spine.                                                                      IMPRESSION:     Decreasing metabolic activity of the innumerable FDG avid osseous lesions suggesting partial response to therapy.         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 Pt does not have visceral crisis, she mainly has extensive bone metastases and LN metastases   -9/23 diagnostic left breast mammogram, left breast targeted ultrasound showed 3.0 x 1.7 x 3.9 ill-defined shadowing hypoechoic mass, few small lymph nodes in the left axilla, one with cortical thickening measuring 0.7 x 0.6 cm  -9/23 ultrasound-guided LEFT breast core biopsy of 12:00 lesion with clip placement, \"Postbiopsy unilateral digital mammogram of the left breast showed the clip to be at the expected biopsy site\" AND ultrasound-guided left axillary lymph node biopsy of lymph node with cortical thickening, PATHOLOGY:  A.  Breast, left, 12:00, biopsy:Lobular carcinoma in situ, Multiple foci. Invasive carcinoma is not identified.   B.  Lymph node, left axillary, biopsy:  -Positive for metastatic lobular carcinoma, grade 2, 0.7 cm, negative GREGG, Estrogen receptor: Positive (91 to 100%, strong), Progesterone receptor: Positive (91 to 100%, strong), HER2 2+ IHC, FISH negative  -10/23 MRI bilateral breast:  - Heterogeneously enhancing irregular mass in the subareolar left breast, 5.0 x 4.9 x 4.9 cm, with associated nipple retraction and nipple/areolar involvement.  This mass extends superiorly through 11: positions, from anterior to mid depth.  The HydroMARK breast biopsy clip (which showed LCIS) is 1.5 cm posterosuperior to this mass.  - Multiple suspicious left level 1 axillary lymph node noted, including biopsy-proven metastatic node with indwelling biopsy marker, biopsied node measures 1.3 x 1.0 cm  - Heterogeneous marrow appearance of sternum and ribs with heterogeneous enhancement and a peripheral enhancing focus within the mid body.    - 10/23 PET/CT:  Innumerable foci of FDG avid lesions are seen throughout the osseous structures of the head and neck, most pronounced on the calvarium and cervical spine, with " prominently sclerotic appearance on CT correlate.  For example:  -Right frontal bone, SUV max 5.31.  -Left lateral mass of the atlas, SUV max 15.0  -Angle of the left mandible, SUV max 9.6  -C7 vertebral body, SUV max 17.7    Innumerable variable sized FDG avid lesions throughout the axial and appendicular spine, including but not limited to the cervical, thoracic, and lumbar spine, multilevel bilateral ribs, sternum, bilateral scapula, bilateral humeri, clavicles, pelvis, and bilateral femurs. A few of these lesions are described below:  -Large FDG avid sclerotic 3.4 cm lesion within the proximal left humeral head, SUV max 17.24  -Sternal body, SUV max 20.35  -L2 vertebral body, SUV max 14.3 without  -Large ill-defined sclerotic lesion in the sacral body measuring up to at least 5.9 cm, SUV max 16.84  -FDG avid focus within the left femoral head, SUV max 13.65 without CT correlate.    Large irregular soft tissue FDG avid mass within the left breast measuring approximately 3.2 x 2.7 cm with  extension into the superficial soft tissues and associated left nipple retraction, SUV max 12.36 additional FDG avid. Left axillary lymph nodes, not definitively enlarged by short axis size criteria, for example, SUV max 5.93.    - 10/23 MRI brain:  - extensive osseous metastatic disease involving the calvarium, skull base w/involvement of occipital condyles, C1 and C2 vertebral bodies, and likely the mandible  -  Dominant calvarial lesions are located in the right frontal calvarium and right temporal calvarium without definite breach of the inner or outer tables of the calvarium. There is a suggestion of mild asymmetric linear extra-axial enhancement deep to the dominant right temporal calvarial lesion along the dural margin, though this may be vascular in etiology.  - No abnormal parenchymal or leptomeningeal enhancement.   - sequelae of mild chronic microvascular ischemic disease. Mild generalized cerebral atrophy.     -10/23  "DEXA: osteopenia (T score -2.0 lumbar spine, -2.0 left hip femoral neck, The 10 year probability of major osteoporotic fracture is 12.5%, and of hip fracture is 1.8%, based on left femoral neck BMD)    - 11/23 orthopedic consult to determine stability of left femur and fracture risk given presence of mets in left femoral head: do not see any areas of impending cortical erosion or sites of high risk for fracture, observe    - 6/24 germline genetic testing: NEGATIVE for BRENT, BARD1, BRCA1, BRCA2, CDH1, CHEK2, NF1, PALB2, PTEN, STK11, TP53.     - unable to do NGS testing on previous biopsy specimen, therefore repeat biopsy completed  - 7/15/24 CT guided bone biopsy of left proximal femur lesion w/IR   PATHOLOGY:  - Metastatic lobular breast carcinoma   ER positive (%), ND negative (<1%), her2 2+ on IHC; FISH unable to be completed \"Three unstained slides of paraffin-embedded tissue were received and processed by the [facility] on 7-18-24 for HER2 FISH testing. However, the tumor cells in the accompanying H&E-stained slide could not be definitively identified on fluorescence microscopy; thus, assessment of the HER2 status of this patient's metastatic tumor cells could not be performed. If clinically indicated, analysis of HER2 can be repeated by FISH if additional material is obtained in the future.\"  RESULT FOR IMMUNOHISTOCHEMICAL VENTANA CLONE  PD-L1 ASSAY  TUMOR PROPORTION SCORE (TPS): 0%  INTERPRETATION: NEGATIVE PD-L1 EXPRESSION (TPS <1%)  ABEL  Breast NGS for AKT1; BRCA1; BRCA2; ERBB2; ESR1; PALB2; PIK3CA; PTEN; RET: negative, TMB 7.313 mut/Mb   Fusion: negative     TREATMENT:  - 11/27/23-5/30/24 ribociclib+ letrozole; C1D1 11/27/23 started ribociclib 400mg PO daily when LFTs improved to <3x ULN, C2D1 12/25/23 ribociclib 600mg (day 1-12 only 2/2 palliative RT to sacrum w/Dr. Mandujano 1/9/24- 1/22/24 3000cGy in 10 fractions), C3D1 1/30/24, C4D1 3/6/24 (deferred 1 week 2/2 2/27/24 ANC 0.8->3/5/24 " "ANC 1.5), C5D1 4/3/24, C6D1 5/1/24, C7D1 5/30/24->progression of disease in bones  - 6/14/24 everolimus+ exemestane (additional tissue obtained, no targets available as above, ABEL, PDL1 negative, repeat biopsy her2 FISH unable to be completed)      REGIMEN:  Everolimus + exemestane   C1D1 6/14/24, C2D1 7/12/24, C3D1 8/9/24  everolimus 10mg PO daily   exemestane 25mg PO daily  PLUS dexamethasone mouthwash     C4D1 9/6/24 planned pending labs     Treatment Related AE:  - thrombocytopenia- monitor for bleeding while on eliquis  - macrocytic anemia- hgb 11-> 8/8/24 9.5, 5/24 B12- 298, TSH 1.19, absolute retic 0.071, ferritin 355, iron sat 18, TIBC 307, iron 56; 8/24 hgb 9.5, 9/24 hgb 8.5,  B12 and RBC folate pending, ferritin 448, iron sat 16, TIBC 237, iron 37. 6/24 started B12 SL [id number] daily and oral ferrous sulfate 325mg every other day 6/24. Will stop oral iron and start IV iron. based on Ganzoni equation w/goal hgb 12 and 500mg needed for iron stores, pts iron deficit is 1300mg. Will rx ferric carboxymaltose 750mg x2 doses, 1 week apart. Risk of infusion rxn d/w pt and she would like to proceed.   - gum soreness- Hold zometa, obtain records from oral surgeon- Rocío Rodriguez at [facility] [place], [place]. [phone]. follow up on 8/28/24- pt reports nothing has changed, per pt \"bone that is sticking out will have gum grow underneath it and the bone will fall out\", her next follow up is 10/24   - mouth sore- dexamethasone mouth wash, magic mouthwash  - hld- 6/24 total chol 299, - restarted atorvastatin 10mg, LFTs stable, 8/24 total chol 196,   - hot flashes- continue effexor  - left posterior DVT- continue eliquis  - intermittent blurred vision- stable, no brain mets on 5/24 MRI brain  - HA- no alarm sx, monitor for now    IMAGING:  - next PET 11/2024- to be ordered later    - 8/24 PET CT NCAP  Decreasing metabolic activity of the innumerable FDG avid osseous " lesions including examples in the superior sternum (Max SUV 4.0, previously 6.8), left proximal humerus (Max SUV 4.0, previously 9.8), and L2 vertebral body (Max SUV 8.9, previously 16.5) suggesting partial response to therapy.    - 7/24 MRI cervical spine:  1. Diffusely heterogeneous bone marrow signal with associated  enhancement involving all the bones and ribs in the imaging field,  consistent with diffuse osseous metastasis. No evidence of pathologic  compression fracture. No evidence of extraosseous extension.  2. No abnormal spinal cord signal.  3. No high-grade spinal canal or neural foraminal stenosis.    - 5/24/24 MRI brain w/wo contrast:  IMPRESSION:   1. Presumed osseous metastases involving the occipital condyles bilaterally, C1, C2 and C3 vertebrae again noted.  2. Questionable osseous metastatic disease involving the parietal bones bilaterally again noted without change.  3. Diffuse cerebral volume loss and cerebral white matter changes consistent with chronic small vessel ischemic disease. No evidence for intracranial metastases.  4. No evidence for acute intracranial pathology.    TUMOR MARKERS:  10/23 CA 15-3= 261  12/23 CA 15-3= 553  1/24 CA 15-3= 480  2/24 CA 15-3 338  5/24 CA 15-3=179  6/24 CA 15-3=159  7/24 and 8/24 CA 15-3 appears they were done but I cannot see results  Repeat CA 15-3 pending     OTHER:  - continue zometa q4 weeks - zometa #1 1/4/24, last zometa 5/30/24, zometa on hold as above  - continue calcium and vitamin D    # osteopenia  - 10/23 DEXA: osteopenia, T score - 2.0 left hip femoral neck and lumbar spine   - repeat DEXA 10/2025  - continue continue calcium and vitamin D  - zometa on hold as above    # left posterior tibial DVT  -1/19/24 sx started- Left foot pain and swelling  -1/22/24 pt called in, 1/22/24 left LE doppler: DVT in 1 of 2 left distal posterior tibial veins  - continue eliquis, refilled due 5/2025    # RA  - on plaquenil     RTC 10/2 for follow up with   Adrian, labs including CA 15-3 prior to visit    Mary DO Taiwo  Hematology/Oncology  [facility]

## 2024-09-03 NOTE — LETTER
"9/3/2024      Kesha Zacarias  [address] [address] [place] [id number]      Dear Colleague,    Thank you for referring your patient, Kesha Zacarias, to the [facility]. Please see a copy of my visit note below.    [facility]    Hematology/Oncology Established Patient Follow Up Note      Today's Date: 9/3/24    Reason for follow up: metastatic breast cancer    HISTORY OF PRESENT ILLNESS: Kesha Zacarias is a 64 year old female who presents for follow up    Patient has medical history including rheumatoid arthritis, hyperlipidemia, osteoarthritis, bilateral cataracts and glaucoma s/p surgery, endocervical polyp s/p resection, vaginal atrophy with conjugated estrogen vaginal cream use, colon polyp (hyperplastic polyp and tubular adenoma), seasonal depression, history of tobacco use ([age] y/o, about 1 ppd).    Regarding RA:  -dx over a decade ago, on plaquenil, managed by PCP  - arthritis is most severe in hands>knees, intermittent     - 3/23 bilateral screening mammogram [facility]  - a few months ago, noted nipple retraction  - 9/23 patient palpated mass in retroareolar region of left breast and w/nipple retraction, no discharge, skin thickening, no dimpling, no erythema  - 9/23 diagnostic LEFT breast mammogram: Focal dense tissue with some likely mild architectural distortion, some anterior skin thickening is noted  - 9/23 targeted LEFT breast ultrasound: Ill-defined shadowing hypoechoic mass, 3.0 x 1.7 x 3.9 cm.  In left axilla-few small lymph nodes, 1 normal-sized lymph node with cortical thickening, 0.7 x 0.6 cm.  -9/23 ultrasound-guided LEFT breast core biopsy of 12:00 lesion with clip placement, \"Postbiopsy unilateral digital mammogram of the left breast showed the clip to be at the expected biopsy site\" AND ultrasound-guided left axillary lymph node biopsy of lymph node with cortical thickening  PATHOLOGY  A.  Breast, left, 12:00, biopsy:  -Lobular carcinoma " in situ.-Multiple foci  -Invasive carcinoma is not identified.     B.  Lymph node, left axillary, biopsy:  -Positive for metastatic lobular carcinoma, grade 2  -Largest metastatic focus is 7 mm.  -Negative for extranodal extension.  -Breast ancillary testing:  -Estrogen receptor: Positive (91 to 100%, strong)  -Progesterone receptor: Positive (91 to 100%, strong)  -HER2 2+ IHC, FISH negative    -10/23 MRI bilateral breast:  LEFT breast:  - Heterogeneously enhancing irregular mass in the subareolar left breast, 5.0 x 4.9 x 4.9 cm, with associated nipple retraction and nipple/areolar involvement.  This mass extends superiorly through 11: positions, from anterior to mid depth.  The Octopus Deploy breast biopsy clip (which showed LCIS) is 1.5 cm posterosuperior to this mass.  - Multiple suspicious left level 1 axillary lymph node noted, including biopsy-proven metastatic node with indwelling biopsy marker, biopsied node measures 1.3 x 1.0 cm  - Heterogeneous marrow appearance of sternum and ribs with heterogeneous enhancement and a peripheral enhancing focus within the mid body.  IMPRESSION:  1. Upon further review of previous imaging and pathology results,  recommend repeat ultrasound guided large core-needle biopsy of the  discordant dominant left breast mass given metastatic left axillary  lymph node and superoposteriorly displaced left breast biopsy marker.   2. Nonspecific heterogeneous enhancement of the sternum and ribs.  Consider nuclear medicine bone scan    Lifetime estrogen exposure:  Menarche: 13   Last menstrual period: 49   Age of first pregnancy: 18   Number of pregnancies: 2 (no living children)   Weight gain: 20lb weight gain within a year  Exposure to exogenous estrogen: vaginal atrophy with conjugated estrogen vaginal cream use x4 years (intermittent), has not used it since 9/23. Birth control for about 13-15 years from her 20s-30s.      Pt reports 55lb weight loss in 1.5 years, this was intentional,  was following weight watchers program (23 points available per day) 230lb->170lb->190lb (gained about 20lbs). Pt was walking on the treadmill and outside daily, about 30 mins to 1.5 miles.    10/23 labs:  AST 79, ALT 91,   CA 15-3 261    10/23 PET/CT:  Innumerable foci of FDG avid lesions are seen throughout the osseous  structures of the head and neck, most pronounced on the calvarium and  cervical spine, with prominently sclerotic appearance on CT correlate.  For example:  -Right frontal bone, SUV max 5.31.  -Left lateral mass of the atlas, SUV max 15.0  -Angle of the left mandible, SUV max 9.6  -C7 vertebral body, SUV max 17.7    Innumerable variable sized FDG avid lesions throughout the axial and  appendicular spine, including but not limited to the cervical,  thoracic, and lumbar spine, multilevel bilateral ribs, sternum,  bilateral scapula, bilateral humeri, clavicles, pelvis, and bilateral  femurs. A few of these lesions are described below:  -Large FDG avid sclerotic 3.4 cm lesion within the proximal left  humeral head, SUV max 17.24  -Sternal body, SUV max 20.35  -L2 vertebral body, SUV max 14.3 without  -Large ill-defined sclerotic lesion in the sacral body measuring up to  at least 5.9 cm, SUV max 16.84  -FDG avid focus within the left femoral head, SUV  max 13.65 without CT correlate.    Large irregular soft tissue FDG avid mass within the left breast   measuring approximately 3.2 x 2.7 cm with  extension into the superficial soft tissues and associated left nipple  retraction, SUV max 12.36 additional FDG avid. Left axillary lymph  nodes, not definitively enlarged by short axis size criteria, for  example, SUV max 5.93.    The liver is unremarkable.     Solid 3 mm non-FDG avid pulmonary nodule within the  right middle lobe    - 10/23 MRI brain:  - extensive osseous metastatic disease involving the calvarium, skull base w/involvement of occipital condyles, C1 and C2 vertebral bodies, and likely  the mandible  -  Dominant calvarial lesions are located in the right frontal calvarium and right temporal calvarium without definite breach of the  inner or outer tables of the calvarium. There is a suggestion of mild asymmetric linear extra-axial enhancement deep to the dominant right temporal calvarial lesion along the dural margin, though this may be vascular in etiology.  - No abnormal parenchymal or leptomeningeal enhancement.   - sequelae of mild chronic microvascular ischemic disease. Mild generalized cerebral atrophy.       -supposed to start ribociclib 10/30/23 however, noted to have significantly elevated LFTS (10/26/23 , , alk phos 152)    10/17/23: AST 79, ALT 91, alk phos 116, t bili 0.5  10/26/23 , , alk phos 152  10/30/23: , , alk phos 178, coags WNL, ribociclib was not started, letrozole started, atorvastatin held  11/7/23: AST 96, , alk phos 169, MRI liver negative for mets  11/17/23: AST 81, , alk phos 163  11/27/23: AST 55, ALT 90, alk phos 128- started ribociclib 400mg  12/4/23: AST 26, ALT 35, alk phos 118    - 11/27/23 started ribociclib+ letrozole  - 11/27/23-5/30/24 ribociclib+ letrozole; C1D1 11/27/23 started ribociclib 400mg PO daily when LFTs improved to <3x ULN, C2D1 12/25/23 ribociclib 600mg (day 1-12 only 2/2 palliative RT to sacrum w/Dr. Mandujano 1/9/24- 1/22/24 3000cGy in 10 fractions), C3D1 1/30/24, C4D1 3/6/24 (deferred 1 week 2/2 2/27/24 ANC 0.8->3/5/24 ANC 1.5), C5D1 4/3/24, C6D1 5/1/24, C7D1 5/30/24->progression of disease in bones  - 1/9/24-1/22/24 palliative RT to sacrum w/Dr. Mandujano- 3000cGy in 10 fractions    - 2/17/24 PET CT CAP  PET/CT FINDINGS: Most of the extensive skeletal metastases have become sclerotic and non-FDG avid and those which remain avid have decreased in activity, for example in the proximal right humerus from SUVmax 19.3 to 13.9, in the proximal right femur from 16.5 to 12.2, and in the L2 vertebral body  from 14.3 to 10.7.      CT FINDINGS: Bilateral lens replacements. Calcified atherosclerosis, including moderate right coronary artery disease. Splenule. Cholelithiasis. Retroaortic left renal vein. Pelvic phleboliths. Appendectomy. Ventral hernia repair with mesh. Persistent   metopic suture. T11 vertebral body hemangioma. Mild degenerative change in the spine.                                                       IMPRESSION:  Partial response       - 5/11/24 PET CT CAP:  Redemonstrated innumerable FDG avid osseous lesions, some of which are increasing in metabolic activity and suggest mild progression of disease including representative examples involving the inferior sternum (Max SUV 16.5, previously 7.9) and left distal femur (Max SUV 20.8, previously 11.3).    - 5/24/24 MRI brain w/wo contrast:  IMPRESSION:   1. Presumed osseous metastases involving the occipital condyles  bilaterally, C1, C2 and C3 vertebrae again noted.  2. Questionable osseous metastatic disease involving the parietal  bones bilaterally again noted without change.  3. Diffuse cerebral volume loss and cerebral white matter changes  consistent with chronic small vessel ischemic disease. No evidence for  intracranial metastases.  4. No evidence for acute intracranial pathology.    - 11/27/23-5/30/24 ribociclib+ letrozole; C1D1 11/27/23 started ribociclib 400mg PO daily when LFTs improved to <3x ULN, C2D1 12/25/23 ribociclib 600mg (day 1-12 only 2/2 palliative RT to sacrum w/Dr. Mandujano 1/9/24- 1/22/24 3000cGy in 10 fractions), C3D1 1/30/24, C4D1 3/6/24 (deferred 1 week 2/2 2/27/24 ANC 0.8->3/5/24 ANC 1.5), C5D1 4/3/24, C6D1 5/1/24, C7D1 5/30/24->progression of disease in bones    - 6/24 IR requested PET Overread:  - Relatively stable hypermetabolic left breast mass having SUV max of  5.7, previously, 6.7  - Redemonstration of hypermetabolic innumerable osseous metastatic  lesions following the axial and appendicular skeleton with involvement  of  the marrow cavity in the long bones. These are predominantly  sclerotic with few lytic appearing lesions. Few lesions have increased  uptake compared to prior exam, a few have slightly decreased uptake ,  few remain overall unchanged. As index lesions:     Increased uptake:  -C2 vertebral body lesion with SUV max 14.4, previously 8.3. 1a. Of note - hypermetabolic C2 lesion with the posterior cortical breakthrough, no significant spinal cord impingement at this time but potentially at risk in the future.     Decreased uptake:  -L3 vertebral body with SUV max 7.4, previously 10.3.   -Sternal body lesion with SUV max 6.7, previously 10.9.   -Left ischial  tuberosity lesion with SUV max 3.5, previously 9.3.     Relatively stable uptake:  -Left humeral head lesion having SUV max 10.4, previously 11.1.   -Left sacral ala lesion with SUV max 7.2, previously 8.7.    Addendum: Potential biopsy sites:   Another lesion that has a slight increase in uptake is in the left  femur, lateral to the lesser trochanter (series 4 image 24) max SUV 23  (prior 18).  The hypermetabolic portion is not sclerotic, but it is in  the lateral aspect of the femur, fairly good size and the lesser  trochanter could serve as a landmark.     The left humeral head lesion may also be a potential biopsy site max  SUV 10.4 (not significantly changed from prior 11). The lesion is in  the lateral aspect of the sclerotic lesion when the patient's arms are  in the up position.     The C2 lesion may be more difficult to assess given its anterior  medial position (max suv 14).    - 6/24 germline genetic testing: NEGATIVE for BRENT, BARD1, BRCA1, BRCA2, CDH1, CHEK2, NF1, PALB2, PTEN, STK11, TP53.     - 6/14/24 started everolimus+ exemestane    - unable to do NGS testing on previous biopsy specimen, therefore repeat biopsy completed  - 7/15/24 CT guided bone biopsy of left proximal femur lesion w/IR   PATHOLOGY:  - Metastatic lobular breast carcinoma   ER positive  "(%), IA negative (<1%), her2 2+ on IHC; FISH unable to be completed \"Three unstained slides of paraffin-embedded tissue were received and processed by the [facility] on 7-18-24 for HER2 FISH testing. However, the tumor cells in the accompanying H&E-stained slide could not be definitively identified on fluorescence microscopy; thus, assessment of the HER2 status of this patient's metastatic tumor cells could not be performed. If clinically indicated, analysis of HER2 can be repeated by FISH if additional material is obtained in the future.\"  RESULT FOR IMMUNOHISTOCHEMICAL VENTANA CLONE  PD-L1 ASSAY  TUMOR PROPORTION SCORE (TPS): 0%  INTERPRETATION: NEGATIVE PD-L1 EXPRESSION (TPS <1%)  ABEL  Breast NGS for AKT1; BRCA1; BRCA2; ERBB2; ESR1; PALB2; PIK3CA; PTEN; RET: negative, TMB 7.313 mut/Mb   Fusion for ACVR2A, AKT1, AKT2, AKT3, ALK, AR, XHZYUC64, ARHGAP6, CAROL, BCOR, BRAF, BRD3, BRD4, CAMTA1, CCNB3, CCND1, ,CIC, CRTC1, CSF1, CSF1R, CTNNB1, DNAJB1, EGF, EGFR, EPC1, ERBB2, ERBB4, ERG, ESR1, ESRRA, ETV1, ETV4, ETV5, ETV6, EWSR1,FGF1, FGFR1, FGFR2, FGFR3, FGR, FOS, FOSB, FOXO1, FOXO4, FOXR2, FUS, GLI1, GRB7, HMGA2, HRAS, IDH1, IDH2, IGF1R, INSR,JAK2, JAK3, JAZF1, KIT, KRAS, MAML2, MAP2K1, MAST1, MAST2, MBTD1, MDM2, MEAF6, MET, MGEA5, MKL2, MN1, MSMB,  MUSK, MYB, MYBL1, MYC, MYOD1, NCOA1, NCOA2, NCOA3, NFATC2, NFE2L2, NFIB, NOTCH1, NOTCH2, NR4A3, NRAS, NRG1,  NTRK1, NTRK2, NTRK3, NUMBL, NUTM1, PAX3, PAX8, PDGFB, PDGFD, PDGFRA, PDGFRB, PHF1, PHKB, PIK3CA, PKN1, PLAG1,  PPARG, PRDM10, PRKACA, PRKACB, PRKCA, PRKCB, PRKCD, PRKD1, PRKD2, PRKD3, RAD51B, RAF1, RELA, RET, ROS1, RSPO2,  RSPO3, SS18, SS18L1, STAT6, TAF15, TCF12, TERT, TFE3, TFEB, TFG, THADA, TMPRSS2, USP6, VGLL2, WWTR1, YAP1, YWHAE: negative          INTERIM HISTORY:    REGIMEN:  Everolimus + exemestane   C1D1 6/14/24, C2D1 7/12/24, C3D1 8/9/24  everolimus 10mg PO daily   exemestane 25mg PO daily  PLUS dexamethasone mouthwash     C4D1 " "9/6/24 planned pending labs     Treatment Related AE:  - gum soreness/jaw pain- posterior, b/l- Left where she had tooth pulled- before starting everolimus, right- 1 week after starting everolimus, bleeding w/brushing teeth, 7/17/24- per pt, dentist said jawbone protruding. They recommended an oral surgeon to grind down the bone and cover with gum tissue. Zometa on hold. End of July- Pt saw oral surgeon (unknown name), protruding jaw bone is \"dead and will fall out\" and that they do not want pt to get additional zometa,  follow up on 8/28/24- pt reports nothing has changed, per pt \"bone that is sticking out will have gum grow underneath it and the bone will fall out\", her next follow up is 10/24   - mouth sore- improved with magic mouthwash, dexamethasone mouth wash  - hld- 6/24 total chol 299, - previously was on atorvastatin that was stopped when she started ribociclib . restarted atorvastatin 10mg, LFTs stable, 8/24 total chol 196,   - hot flashes- 3/24 10x/day, last a few mins, daily, affecting quality of life including sleep; 4/24 started effexor 37.5mg x1 week, then effexor 75mg thereafter and hot flashes improved to almost none  - Left posterior tibial DVT- 1/19/24 sx started- Left foot pain and swelling, 1/22/24 pt called in, 1/22/24 left LE doppler: DVT in 1 of 2 left distal posterior tibial veins, started on eliquis and stopped naproxen; no issues with bleeding but does have intermitting bruising when she bumps her hand  - blurry vision b/l- last saw eye doctor 3/23 and has trifocal glasses. She has dry eyes and uses lubricating eye drops., 10/23 MRI brain as above. eye doctor visit 4/3/24- got new glasses rx, 5/23/24 sent message regarding increased blurred vision- remain constant despite time of day, near vision, far vision, or the use of her corrective lenses; 5/24/24 MRI brain negative for intraparenchymal mets, osseous mets stable, still having intermittent blurred vision, lasts anywhere " from 1-3 days- overall stable   -HA- 9/24 hx of chronic HA, a few weeks ago started having intermittent right temporal HA, sharp, lasts 25 min, spontaneously remits, does not need any medication for this, not associated with caffeine use, no vision changes, nausea, focal weakness, personality changes, slurred speech  - macrocytic anemia- hgb 11, 5/24 B12- 298, TSH 1.19, absolute retic 0.071, ferritin 355, iron sat 18, TIBC 307, iron 56; 6/24 start B12 SL [id number] daily and oral ferrous sulfate 325mg every other day. 8/24 hgb 9.5, 9/24 hgb 8.5- no visible bleeding, does have bruising that is intermittent and stable- no hematoma   - thrombocytopenia- intermittent, some bruising, no bleeding while on eliquis       -8/24 PET CT NCAP  Decreasing metabolic activity of the innumerable FDG avid osseous lesions including examples in the superior sternum (Max SUV 4.0, previously 6.8), left proximal humerus (Max SUV 4.0, previously 9.8), and L2 vertebral body (Max SUV 8.9, previously 16.5) suggesting partial response to therapy.    REVIEW OF SYSTEMS:   A 14 point ROS was reviewed with pertinent positives and negatives in the HPI.        HOME MEDICATIONS:  Current Outpatient Medications   Medication Sig Dispense Refill     apixaban ANTICOAGULANT (ELIQUIS) 5 MG tablet Take 1 tablet (5 mg) by mouth 2 times daily 60 tablet 11     atorvastatin (LIPITOR) 10 MG tablet Take 1 tablet (10 mg) by mouth daily 90 tablet 1     betamethasone dipropionate (DIPROSONE) 0.05 % external lotion Apply topically 2 times daily 60 mL 3     CALCIUM PO        cyclobenzaprine (FLEXERIL) 5 MG tablet TAKE 1 TABLET(5 MG) BY MOUTH AT BEDTIME 90 tablet 3     dexAMETHasone alcohol-free (DECADRON) 0.1 MG/ML solution Swish 10 mL in mouth for 2 min and spit, four times daily. 1200 mL 1     [START ON 9/6/2024] everolimus (AFINITOR) 10 MG tablet Take 1 tablet (10 mg) by mouth daily for 28 days Avoid grapefruit and grapefruit juice. Take with or without food, but  should be consistent. 28 tablet 0     everolimus (AFINITOR) 10 MG tablet Take 1 tablet (10 mg) by mouth daily for 28 days Avoid grapefruit and grapefruit juice. Take with or without food, but should be consistent. 28 tablet 0     [START ON 9/6/2024] exemestane (AROMASIN) 25 MG tablet Take 1 tablet (25 mg) by mouth daily for 28 days. Take after a meal. Do not start before September 6, 2024. 28 tablet 0     exemestane (AROMASIN) 25 MG tablet Take 1 tablet (25 mg) by mouth daily for 28 days Take after a meal. 28 tablet 0     hydroxychloroquine (PLAQUENIL) 200 MG tablet TAKE 2 AND 1/2 TABLETS BY MOUTH EVERY  tablet 3     magic mouthwash suspension (diphenhydrAMINE, lidocaine, aluminum-magnesium & simethicone) Swish and swallow 10 mLs in mouth every 6 hours as needed for mouth sores 1200 mL 1     magnesium 250 MG tablet Take 1 tablet by mouth daily       venlafaxine (EFFEXOR XR) 75 MG 24 hr capsule Take 1 capsule (75 mg) by mouth daily 30 capsule 11     vitamin B-12 (CYANOCOBALAMIN) 2500 MCG sublingual tablet Take 1 tablet (2,500 mcg) by mouth daily 90 tablet 1     VITAMIN D PO        everolimus (AFINITOR) 10 MG tablet Take 1 tablet (10 mg) by mouth daily for 28 days Avoid grapefruit and grapefruit juice. Take with or without food, but should be consistent. 28 tablet 0     exemestane (AROMASIN) 25 MG tablet Take 1 tablet (25 mg) by mouth daily for 28 days Take after a meal. 28 tablet 0     fish oil-omega-3 fatty acids 1000 MG capsule Take 2 g by mouth daily (Patient not taking: Reported on 9/3/2024)       ondansetron (ZOFRAN ODT) 4 MG ODT tab Take 1-2 tablets (4-8 mg) by mouth every 8 hours as needed for nausea (Patient not taking: Reported on 9/3/2024) 30 tablet 2     ondansetron (ZOFRAN) 4 MG tablet Take 1 tablet (4 mg) by mouth every 6 hours as needed for nausea (Patient not taking: Reported on 9/3/2024) 30 tablet 3     prochlorperazine (COMPAZINE) 10 MG tablet Take 1 tablet (10 mg) by mouth every 6 hours as  needed for nausea or vomiting (Patient not taking: Reported on 9/3/2024) 30 tablet 2         ALLERGIES:  Allergies   Allergen Reactions     Ciprofloxacin      hives and was on flagyl too     Metronidazole      hives and was on cipro too         PAST MEDICAL HISTORY:  Past Medical History:   Diagnosis Date     Arthritis 2006     Breast cancer metastasized to bone (H) 10/12/2023     Chronic depressive personality disorder      Dysplasia of cervix (uteri) 1988    Cryotherapy     Female infertility of unspecified origin      Glaucoma      Rheumatoid arthritis (H)          PAST SURGICAL HISTORY:  Past Surgical History:   Procedure Laterality Date     COLONOSCOPY       COLONOSCOPY N/A 2022    Procedure: COLONOSCOPY, WITH POLYPECTOMY AND BIOPSY;  Surgeon: Gagandeep Patterson MD;  Location: [facility]     HC INTRODUCE CATH FALLOPIAN TUBE, RE-OPEN/DIAGNOSIS       HERNIA REPAIR, INCISIONAL  2009     JOINT REPLACEMENT Right 2017    knee     TONSILLECTOMY       ZZC APPENDECTOMY  2003     ZZC REMV CATARACT INTRACAP,INSERT LENS  2003    right         SOCIAL HISTORY:  Social History     Socioeconomic History     Marital status:      Spouse name: Bandar     Number of children: 1     Years of education: Not on file     Highest education level: Not on file   Occupational History     Not on file   Tobacco Use     Smoking status: Former     Current packs/day: 0.00     Average packs/day: 0.5 packs/day for 25.0 years (12.5 ttl pk-yrs)     Types: Cigarettes, Cigars     Start date: 1992     Quit date: 2017     Years since quittin.9     Smokeless tobacco: Never     Tobacco comments:     Cigar once in a while   Vaping Use     Vaping status: Never Used   Substance and Sexual Activity     Alcohol use: Yes     Comment: very little     Drug use: Yes     Types: Marijuana     Comment: once every 2 weeks     Sexual activity: Yes     Partners: Male     Birth control/protection: None   Other Topics Concern      "Parent/sibling w/ CABG, MI or angioplasty before 65F 55M? Yes   Social History Narrative     Not on file     Social Determinants of Health     Financial Resource Strain: Not on file   Food Insecurity: Not on file   Transportation Needs: Not on file   Physical Activity: Not on file   Stress: Not on file   Social Connections: Not on file   Interpersonal Safety: Not on file   Housing Stability: Not on file         FAMILY HISTORY:  Family History   Problem Relation Age of Onset     Heart Disease Mother      Lipids Mother      Hyperlipidemia Mother      Genitourinary Problems Father         prostate     Genetic Disorder Father         ulcer     Hypertension Father      Lipids Father      Prostate Cancer Father      Hyperlipidemia Sister      Hyperlipidemia Brother      Other Cancer Brother         Neck Cancer HPV (+)     Heart Disease Maternal Grandmother      Cerebrovascular Disease Maternal Grandmother      Heart Disease Maternal Grandfather      Heart Disease Maternal Uncle      Heart Disease Maternal Uncle         x  3     Cancer Nephew         Sister's Son       Family history of:  1.  Breast cancer including male breast cancer: negative   2. Ovarian cancer: negative  3.  Pancreatic cancer: negative  4.  Prostate cancer: father- ?in his 50s, other details unknown  5. Diffuse gastric cancer (if lobular breast CA): negative  6. Uterine cancer: negative  7. Colon cancer:  negative  6. Brother- head and neck, HPV +, had surgery, clinical trial and now cancer free      PHYSICAL EXAM:  Vital signs:  /70 (BP Location: Right arm, Patient Position: Sitting, Cuff Size: Adult Regular)   Pulse 85   Temp 97.3  F (36.3  C) (Temporal)   Ht 1.676 m (5' 6\")   Wt 94.3 kg (208 lb)   LMP 11/22/2011 (Exact Date)   SpO2 97%   BMI 33.57 kg/m         ECOG:   GENERAL/CONSTITUTIONAL: No acute distress.  EYES: Pupils are equal and round. Extraocular movements intact without nystagmus.  No scleral icterus.  RESPIRATORY: Equal " chest rise.   MUSCULOSKELETAL: Warm and well-perfused, no cyanosis, clubbing, or edema.   NEUROLOGIC: Cranial nerves are grossly intact. Alert, oriented to person, place and time, answers questions appropriately.  INTEGUMENTARY: No rashes or jaundice.  GAIT: Steady, does not use assistive device      LABS:   Latest Reference Range & Units 08/08/24 14:01   Sodium 135 - 145 mmol/L 143   Potassium 3.4 - 5.3 mmol/L 3.9   Chloride 98 - 107 mmol/L 106   Carbon Dioxide (CO2) 22 - 29 mmol/L 25   Urea Nitrogen 8.0 - 23.0 mg/dL 16.4   Creatinine 0.51 - 0.95 mg/dL 1.12 (H)   GFR Estimate >60 mL/min/1.73m2 55 (L)   Calcium 8.8 - 10.4 mg/dL 8.8   Anion Gap 7 - 15 mmol/L 12   Magnesium 1.7 - 2.3 mg/dL 2.3   Phosphorus 2.5 - 4.5 mg/dL 2.7   Albumin 3.5 - 5.2 g/dL 3.9   Protein Total 6.4 - 8.3 g/dL 6.9   Alkaline Phosphatase 40 - 150 U/L 86   ALT 0 - 50 U/L 47   AST 0 - 45 U/L 37   Bilirubin Total <=1.2 mg/dL 0.3   Cholesterol <200 mg/dL 196   Patient Fasting?  No  No   Glucose 70 - 99 mg/dL 109 (H)   HDL Cholesterol >=50 mg/dL 50   LDL Cholesterol Calculated <=100 mg/dL 117 (H)   Non HDL Cholesterol <130 mg/dL 146 (H)   Triglycerides <150 mg/dL 144   WBC 4.0 - 11.0 10e3/uL 5.3   Hemoglobin 11.7 - 15.7 g/dL 9.5 (L)   Hematocrit 35.0 - 47.0 % 29.8 (L)   Platelet Count 150 - 450 10e3/uL 136 (L)   RBC Count 3.80 - 5.20 10e6/uL 3.04 (L)   MCV 78 - 100 fL 98   MCH 26.5 - 33.0 pg 31.3   MCHC 31.5 - 36.5 g/dL 31.9   RDW 10.0 - 15.0 % 16.9 (H)   % Neutrophils % 71   % Lymphocytes % 18   % Monocytes % 8   % Eosinophils % 2   % Basophils % 0   Absolute Basophils 0.0 - 0.2 10e3/uL 0.0   Absolute Eosinophils 0.0 - 0.7 10e3/uL 0.1   Absolute Immature Granulocytes <=0.4 10e3/uL 0.0   Absolute Lymphocytes 0.8 - 5.3 10e3/uL 1.0   Absolute Monocytes 0.0 - 1.3 10e3/uL 0.4   % Immature Granulocytes % 1   Absolute Neutrophils 1.6 - 8.3 10e3/uL 3.8   Absolute NRBCs 10e3/uL 0.0   NRBCs per 100 WBC <1 /100 0   (H): Data is abnormally high  (L): Data is  abnormally low    PATHOLOGY:    IMAGING:  Narrative & Impression   EXAM: PET ONCOLOGY (EYES TO THIGHS), CT SOFT TISSUE NECK W CONTRAST, CT CHEST/ABDOMEN/PELVIS W CONTRAST  LOCATION: [facility]  DATE: 8/24/2024     INDICATION: Subsequent treatment planning and restaging for malignant neoplasm overlapping sites of right female breast. Status post sacral radiation completed in January 2024, currently receiving and immunotherapy/hormonal therapy. Monitor treatment   response.  COMPARISON: FDG PET/CT dated 5/11/2024  CONTRAST: 130 mL Isovue-370  TECHNIQUE: Serum glucose level 105 mg/dL. One hour post intravenous administration of 11.6 mCi F-18 FDG, PET imaging was performed from the skull vertex to mid thighs, utilizing attenuation correction with concurrent axial CT and PET/CT image fusion.   Separate diagnostic CT of the neck, chest, abdomen, and pelvis was performed. Dose reduction techniques were used.     PET/CT FINDINGS: Decreasing metabolic activity of the innumerable FDG avid osseous lesions including examples in the superior sternum (Max SUV 4.0, previously 6.8), left proximal humerus (Max SUV 4.0, previously 9.8), and L2 vertebral body (Max SUV 8.9, previously 16.5) suggesting partial response to therapy. The remaining FDG uptake is physiologic from the skull vertex to mid thigh.     CT FINDINGS: Mild senescent intracranial changes. Postoperative changes of bilateral lenses. Mild paranasal sinus mucosal thickening. The aerodigestive tract and neck soft tissues are unremarkable. Mild coronary artery calcium. Left lower lobe   scarring/atelectatic change. Left upper quadrant splenule. Retroaortic left renal vein. Anterior abdominal wall hernia repair. Pelvic phleboliths. Multilevel degenerative changes of the spine.                                                                      IMPRESSION:     Decreasing metabolic activity of the innumerable FDG avid osseous lesions  "suggesting partial response to therapy.         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 Pt does not have visceral crisis, she mainly has extensive bone metastases and LN metastases   -9/23 diagnostic left breast mammogram, left breast targeted ultrasound showed 3.0 x 1.7 x 3.9 ill-defined shadowing hypoechoic mass, few small lymph nodes in the left axilla, one with cortical thickening measuring 0.7 x 0.6 cm  -9/23 ultrasound-guided LEFT breast core biopsy of 12:00 lesion with clip placement, \"Postbiopsy unilateral digital mammogram of the left breast showed the clip to be at the expected biopsy site\" AND ultrasound-guided left axillary lymph node biopsy of lymph node with cortical thickening, PATHOLOGY:  A.  Breast, left, 12:00, biopsy:Lobular carcinoma in situ, Multiple foci. Invasive carcinoma is not identified.   B.  Lymph node, left axillary, biopsy:  -Positive for metastatic lobular carcinoma, grade 2, 0.7 cm, negative GREGG, Estrogen receptor: Positive (91 to 100%, strong), Progesterone receptor: Positive (91 to 100%, strong), HER2 2+ IHC, FISH negative  -10/23 MRI bilateral breast:  - Heterogeneously enhancing irregular mass in the subareolar left breast, 5.0 x 4.9 x 4.9 cm, with associated nipple retraction and nipple/areolar involvement.  This mass extends superiorly through 11: positions, from anterior to mid depth.  The ScreenTagMARK breast biopsy clip (which showed LCIS) is 1.5 cm posterosuperior to this mass.  - Multiple suspicious left level 1 axillary lymph node noted, including biopsy-proven metastatic node with indwelling biopsy marker, biopsied node measures 1.3 x 1.0 cm  - Heterogeneous marrow " appearance of sternum and ribs with heterogeneous enhancement and a peripheral enhancing focus within the mid body.    - 10/23 PET/CT:  Innumerable foci of FDG avid lesions are seen throughout the osseous structures of the head and neck, most pronounced on the calvarium and cervical spine, with prominently sclerotic appearance on CT correlate.  For example:  -Right frontal bone, SUV max 5.31.  -Left lateral mass of the atlas, SUV max 15.0  -Angle of the left mandible, SUV max 9.6  -C7 vertebral body, SUV max 17.7    Innumerable variable sized FDG avid lesions throughout the axial and appendicular spine, including but not limited to the cervical, thoracic, and lumbar spine, multilevel bilateral ribs, sternum, bilateral scapula, bilateral humeri, clavicles, pelvis, and bilateral femurs. A few of these lesions are described below:  -Large FDG avid sclerotic 3.4 cm lesion within the proximal left humeral head, SUV max 17.24  -Sternal body, SUV max 20.35  -L2 vertebral body, SUV max 14.3 without  -Large ill-defined sclerotic lesion in the sacral body measuring up to at least 5.9 cm, SUV max 16.84  -FDG avid focus within the left femoral head, SUV max 13.65 without CT correlate.    Large irregular soft tissue FDG avid mass within the left breast measuring approximately 3.2 x 2.7 cm with  extension into the superficial soft tissues and associated left nipple retraction, SUV max 12.36 additional FDG avid. Left axillary lymph nodes, not definitively enlarged by short axis size criteria, for example, SUV max 5.93.    - 10/23 MRI brain:  - extensive osseous metastatic disease involving the calvarium, skull base w/involvement of occipital condyles, C1 and C2 vertebral bodies, and likely the mandible  -  Dominant calvarial lesions are located in the right frontal calvarium and right temporal calvarium without definite breach of the inner or outer tables of the calvarium. There is a suggestion of mild asymmetric linear  "extra-axial enhancement deep to the dominant right temporal calvarial lesion along the dural margin, though this may be vascular in etiology.  - No abnormal parenchymal or leptomeningeal enhancement.   - sequelae of mild chronic microvascular ischemic disease. Mild generalized cerebral atrophy.     -10/23 DEXA: osteopenia (T score -2.0 lumbar spine, -2.0 left hip femoral neck, The 10 year probability of major osteoporotic fracture is 12.5%, and of hip fracture is 1.8%, based on left femoral neck BMD)    - 11/23 orthopedic consult to determine stability of left femur and fracture risk given presence of mets in left femoral head: do not see any areas of impending cortical erosion or sites of high risk for fracture, observe    - 6/24 germline genetic testing: NEGATIVE for BRENT, BARD1, BRCA1, BRCA2, CDH1, CHEK2, NF1, PALB2, PTEN, STK11, TP53.     - unable to do NGS testing on previous biopsy specimen, therefore repeat biopsy completed  - 7/15/24 CT guided bone biopsy of left proximal femur lesion w/IR   PATHOLOGY:  - Metastatic lobular breast carcinoma   ER positive (%), ID negative (<1%), her2 2+ on IHC; FISH unable to be completed \"Three unstained slides of paraffin-embedded tissue were received and processed by the [facility] on 7-18-24 for HER2 FISH testing. However, the tumor cells in the accompanying H&E-stained slide could not be definitively identified on fluorescence microscopy; thus, assessment of the HER2 status of this patient's metastatic tumor cells could not be performed. If clinically indicated, analysis of HER2 can be repeated by FISH if additional material is obtained in the future.\"  RESULT FOR IMMUNOHISTOCHEMICAL VENTANA CLONE  PD-L1 ASSAY  TUMOR PROPORTION SCORE (TPS): 0%  INTERPRETATION: NEGATIVE PD-L1 EXPRESSION (TPS <1%)  ABEL  Breast NGS for AKT1; BRCA1; BRCA2; ERBB2; ESR1; PALB2; PIK3CA; PTEN; RET: negative, TMB 7.313 mut/Mb   Fusion: negative     TREATMENT:  - " "11/27/23-5/30/24 ribociclib+ letrozole; C1D1 11/27/23 started ribociclib 400mg PO daily when LFTs improved to <3x ULN, C2D1 12/25/23 ribociclib 600mg (day 1-12 only 2/2 palliative RT to sacrum w/Dr. Mandujano 1/9/24- 1/22/24 3000cGy in 10 fractions), C3D1 1/30/24, C4D1 3/6/24 (deferred 1 week 2/2 2/27/24 ANC 0.8->3/5/24 ANC 1.5), C5D1 4/3/24, C6D1 5/1/24, C7D1 5/30/24->progression of disease in bones  - 6/14/24 everolimus+ exemestane (additional tissue obtained, no targets available as above, ABEL, PDL1 negative, repeat biopsy her2 FISH unable to be completed)      REGIMEN:  Everolimus + exemestane   C1D1 6/14/24, C2D1 7/12/24, C3D1 8/9/24  everolimus 10mg PO daily   exemestane 25mg PO daily  PLUS dexamethasone mouthwash     C4D1 9/6/24 planned pending labs     Treatment Related AE:  - thrombocytopenia- monitor for bleeding while on eliquis  - macrocytic anemia- hgb 11-> 8/8/24 9.5, 5/24 B12- 298, TSH 1.19, absolute retic 0.071, ferritin 355, iron sat 18, TIBC 307, iron 56; 8/24 hgb 9.5, 9/24 hgb 8.5,  B12 and RBC folate pending, ferritin 448, iron sat 16, TIBC 237, iron 37. 6/24 started B12 SL [id number] daily and oral ferrous sulfate 325mg every other day 6/24. Will stop oral iron and start IV iron. based on Ganzoni equation w/goal hgb 12 and 500mg needed for iron stores, pts iron deficit is 1300mg. Will rx ferric carboxymaltose 750mg x2 doses, 1 week apart. Risk of infusion rxn d/w pt and she would like to proceed.   - gum soreness- Hold zometa, obtain records from oral surgeon- Rocío Rodriguez at [facility] [place], [place]. [phone]. follow up on 8/28/24- pt reports nothing has changed, per pt \"bone that is sticking out will have gum grow underneath it and the bone will fall out\", her next follow up is 10/24   - mouth sore- dexamethasone mouth wash, magic mouthwash  - hld- 6/24 total chol 299, - restarted atorvastatin 10mg, LFTs stable, 8/24 total chol 196,   - hot " flashes- continue effexor  - left posterior DVT- continue eliquis  - intermittent blurred vision- stable, no brain mets on 5/24 MRI brain  - HA- no alarm sx, monitor for now    IMAGING:  - next PET 11/2024- to be ordered later    - 8/24 PET CT NCAP  Decreasing metabolic activity of the innumerable FDG avid osseous lesions including examples in the superior sternum (Max SUV 4.0, previously 6.8), left proximal humerus (Max SUV 4.0, previously 9.8), and L2 vertebral body (Max SUV 8.9, previously 16.5) suggesting partial response to therapy.    - 7/24 MRI cervical spine:  1. Diffusely heterogeneous bone marrow signal with associated  enhancement involving all the bones and ribs in the imaging field,  consistent with diffuse osseous metastasis. No evidence of pathologic  compression fracture. No evidence of extraosseous extension.  2. No abnormal spinal cord signal.  3. No high-grade spinal canal or neural foraminal stenosis.    - 5/24/24 MRI brain w/wo contrast:  IMPRESSION:   1. Presumed osseous metastases involving the occipital condyles bilaterally, C1, C2 and C3 vertebrae again noted.  2. Questionable osseous metastatic disease involving the parietal bones bilaterally again noted without change.  3. Diffuse cerebral volume loss and cerebral white matter changes consistent with chronic small vessel ischemic disease. No evidence for intracranial metastases.  4. No evidence for acute intracranial pathology.    TUMOR MARKERS:  10/23 CA 15-3= 261  12/23 CA 15-3= 553  1/24 CA 15-3= 480  2/24 CA 15-3 338  5/24 CA 15-3=179  6/24 CA 15-3=159  7/24 and 8/24 CA 15-3 appears they were done but I cannot see results  Repeat CA 15-3 pending     OTHER:  - continue zometa q4 weeks - zometa #1 1/4/24, last zometa 5/30/24, zometa on hold as above  - continue calcium and vitamin D    # osteopenia  - 10/23 DEXA: osteopenia, T score - 2.0 left hip femoral neck and lumbar spine   - repeat DEXA 10/2025  - continue continue calcium and  vitamin D  - zometa on hold as above    # left posterior tibial DVT  -1/19/24 sx started- Left foot pain and swelling  -1/22/24 pt called in, 1/22/24 left LE doppler: DVT in 1 of 2 left distal posterior tibial veins  - continue eliquis, refilled due 5/2025    # RA  - on plaquenil     RTC 10/2 for follow up with Dr. Ramirez, labs including CA 15-3 prior to visit    Miller Altamirano DO  Hematology/Oncology  [facility]      Again, thank you for allowing me to participate in the care of your patient.        Sincerely,        MILLER ALTAMIRANO, DO

## 2024-09-06 LAB — FOLATE RBC-MCNC: 610 NG/ML

## 2024-09-11 ENCOUNTER — PATIENT OUTREACH (OUTPATIENT)
Dept: CARE COORDINATION | Facility: CLINIC | Age: 64
End: 2024-09-11
Payer: COMMERCIAL

## 2024-09-11 DIAGNOSIS — D64.9 ANEMIA: Primary | ICD-10-CM

## 2024-09-11 PROBLEM — E61.1 IRON DEFICIENCY: Status: ACTIVE | Noted: 2024-09-11

## 2024-09-23 ENCOUNTER — INFUSION THERAPY VISIT (OUTPATIENT)
Dept: INFUSION THERAPY | Facility: CLINIC | Age: 64
End: 2024-09-23
Payer: COMMERCIAL

## 2024-09-23 ENCOUNTER — APPOINTMENT (OUTPATIENT)
Dept: LAB | Facility: CLINIC | Age: 64
End: 2024-09-23
Payer: COMMERCIAL

## 2024-09-23 VITALS
DIASTOLIC BLOOD PRESSURE: 58 MMHG | SYSTOLIC BLOOD PRESSURE: 118 MMHG | OXYGEN SATURATION: 98 % | WEIGHT: 207.5 LBS | BODY MASS INDEX: 33.49 KG/M2 | TEMPERATURE: 98.2 F | RESPIRATION RATE: 18 BRPM | HEART RATE: 68 BPM

## 2024-09-23 DIAGNOSIS — C79.51 CARCINOMA OF LEFT BREAST METASTATIC TO BONE (H): Primary | ICD-10-CM

## 2024-09-23 DIAGNOSIS — E61.1 IRON DEFICIENCY: ICD-10-CM

## 2024-09-23 DIAGNOSIS — C50.912 CARCINOMA OF LEFT BREAST METASTATIC TO BONE (H): ICD-10-CM

## 2024-09-23 DIAGNOSIS — D64.9 ANEMIA: Primary | ICD-10-CM

## 2024-09-23 DIAGNOSIS — C50.912 CARCINOMA OF LEFT BREAST METASTATIC TO BONE (H): Primary | ICD-10-CM

## 2024-09-23 DIAGNOSIS — C79.51 CARCINOMA OF LEFT BREAST METASTATIC TO BONE (H): ICD-10-CM

## 2024-09-23 LAB
ALBUMIN SERPL BCG-MCNC: 3.4 G/DL (ref 3.5–5.2)
ALP SERPL-CCNC: 87 U/L (ref 40–150)
ALT SERPL W P-5'-P-CCNC: 58 U/L (ref 0–50)
ANION GAP SERPL CALCULATED.3IONS-SCNC: 10 MMOL/L (ref 7–15)
AST SERPL W P-5'-P-CCNC: 43 U/L (ref 0–45)
BASOPHILS # BLD AUTO: 0 10E3/UL (ref 0–0.2)
BASOPHILS NFR BLD AUTO: 0 %
BILIRUB SERPL-MCNC: 0.2 MG/DL
BUN SERPL-MCNC: 13.5 MG/DL (ref 8–23)
CALCIUM SERPL-MCNC: 8.3 MG/DL (ref 8.8–10.4)
CHLORIDE SERPL-SCNC: 108 MMOL/L (ref 98–107)
CREAT SERPL-MCNC: 1.03 MG/DL (ref 0.51–0.95)
EGFRCR SERPLBLD CKD-EPI 2021: 61 ML/MIN/1.73M2
EOSINOPHIL # BLD AUTO: 0.1 10E3/UL (ref 0–0.7)
EOSINOPHIL NFR BLD AUTO: 1 %
ERYTHROCYTE [DISTWIDTH] IN BLOOD BY AUTOMATED COUNT: 17.1 % (ref 10–15)
GLUCOSE SERPL-MCNC: 89 MG/DL (ref 70–99)
HCO3 SERPL-SCNC: 25 MMOL/L (ref 22–29)
HCT VFR BLD AUTO: 26.5 % (ref 35–47)
HGB BLD-MCNC: 8.2 G/DL (ref 11.7–15.7)
IMM GRANULOCYTES # BLD: 0 10E3/UL
IMM GRANULOCYTES NFR BLD: 1 %
LYMPHOCYTES # BLD AUTO: 0.6 10E3/UL (ref 0.8–5.3)
LYMPHOCYTES NFR BLD AUTO: 15 %
MCH RBC QN AUTO: 27 PG (ref 26.5–33)
MCHC RBC AUTO-ENTMCNC: 30.9 G/DL (ref 31.5–36.5)
MCV RBC AUTO: 87 FL (ref 78–100)
MONOCYTES # BLD AUTO: 0.3 10E3/UL (ref 0–1.3)
MONOCYTES NFR BLD AUTO: 7 %
NEUTROPHILS # BLD AUTO: 3.1 10E3/UL (ref 1.6–8.3)
NEUTROPHILS NFR BLD AUTO: 77 %
NRBC # BLD AUTO: 0 10E3/UL
NRBC BLD AUTO-RTO: 0 /100
PLATELET # BLD AUTO: 110 10E3/UL (ref 150–450)
POTASSIUM SERPL-SCNC: 4 MMOL/L (ref 3.4–5.3)
PROT SERPL-MCNC: 6 G/DL (ref 6.4–8.3)
RBC # BLD AUTO: 3.04 10E6/UL (ref 3.8–5.2)
SODIUM SERPL-SCNC: 143 MMOL/L (ref 135–145)
WBC # BLD AUTO: 4 10E3/UL (ref 4–11)

## 2024-09-23 PROCEDURE — 96366 THER/PROPH/DIAG IV INF ADDON: CPT

## 2024-09-23 PROCEDURE — 85048 AUTOMATED LEUKOCYTE COUNT: CPT

## 2024-09-23 PROCEDURE — 96376 TX/PRO/DX INJ SAME DRUG ADON: CPT

## 2024-09-23 PROCEDURE — 80053 COMPREHEN METABOLIC PANEL: CPT

## 2024-09-23 PROCEDURE — 36415 COLL VENOUS BLD VENIPUNCTURE: CPT

## 2024-09-23 PROCEDURE — 250N000011 HC RX IP 250 OP 636

## 2024-09-23 PROCEDURE — 258N000003 HC RX IP 258 OP 636

## 2024-09-23 PROCEDURE — 96365 THER/PROPH/DIAG IV INF INIT: CPT

## 2024-09-23 RX ORDER — ALBUTEROL SULFATE 90 UG/1
1-2 AEROSOL, METERED RESPIRATORY (INHALATION)
Status: CANCELLED
Start: 2024-09-23

## 2024-09-23 RX ORDER — EPINEPHRINE 1 MG/ML
0.3 INJECTION, SOLUTION, CONCENTRATE INTRAVENOUS EVERY 5 MIN PRN
Status: CANCELLED | OUTPATIENT
Start: 2024-09-23

## 2024-09-23 RX ORDER — EPINEPHRINE 1 MG/ML
0.3 INJECTION, SOLUTION, CONCENTRATE INTRAVENOUS EVERY 5 MIN PRN
Status: DISCONTINUED | OUTPATIENT
Start: 2024-09-23 | End: 2024-09-23 | Stop reason: HOSPADM

## 2024-09-23 RX ORDER — ALBUTEROL SULFATE 90 UG/1
1-2 AEROSOL, METERED RESPIRATORY (INHALATION)
Status: DISCONTINUED | OUTPATIENT
Start: 2024-09-23 | End: 2024-09-23 | Stop reason: HOSPADM

## 2024-09-23 RX ORDER — MEPERIDINE HYDROCHLORIDE 25 MG/ML
25 INJECTION INTRAMUSCULAR; INTRAVENOUS; SUBCUTANEOUS EVERY 30 MIN PRN
Status: DISCONTINUED | OUTPATIENT
Start: 2024-09-23 | End: 2024-09-23 | Stop reason: HOSPADM

## 2024-09-23 RX ORDER — MEPERIDINE HYDROCHLORIDE 25 MG/ML
25 INJECTION INTRAMUSCULAR; INTRAVENOUS; SUBCUTANEOUS EVERY 30 MIN PRN
Status: CANCELLED | OUTPATIENT
Start: 2024-09-23

## 2024-09-23 RX ORDER — HEPARIN SODIUM,PORCINE 10 UNIT/ML
5-20 VIAL (ML) INTRAVENOUS DAILY PRN
OUTPATIENT
Start: 2024-09-23

## 2024-09-23 RX ORDER — ALBUTEROL SULFATE 0.83 MG/ML
2.5 SOLUTION RESPIRATORY (INHALATION)
Status: DISCONTINUED | OUTPATIENT
Start: 2024-09-23 | End: 2024-09-23 | Stop reason: HOSPADM

## 2024-09-23 RX ORDER — DIPHENHYDRAMINE HYDROCHLORIDE 50 MG/ML
50 INJECTION INTRAMUSCULAR; INTRAVENOUS
Status: CANCELLED
Start: 2024-09-23

## 2024-09-23 RX ORDER — ALBUTEROL SULFATE 0.83 MG/ML
2.5 SOLUTION RESPIRATORY (INHALATION)
Status: CANCELLED | OUTPATIENT
Start: 2024-09-23

## 2024-09-23 RX ORDER — METHYLPREDNISOLONE SODIUM SUCCINATE 125 MG/2ML
125 INJECTION, POWDER, LYOPHILIZED, FOR SOLUTION INTRAMUSCULAR; INTRAVENOUS
Status: DISCONTINUED | OUTPATIENT
Start: 2024-09-23 | End: 2024-09-23 | Stop reason: HOSPADM

## 2024-09-23 RX ORDER — DIPHENHYDRAMINE HYDROCHLORIDE 50 MG/ML
50 INJECTION INTRAMUSCULAR; INTRAVENOUS
Status: DISCONTINUED | OUTPATIENT
Start: 2024-09-23 | End: 2024-09-23 | Stop reason: HOSPADM

## 2024-09-23 RX ORDER — METHYLPREDNISOLONE SODIUM SUCCINATE 125 MG/2ML
125 INJECTION, POWDER, LYOPHILIZED, FOR SOLUTION INTRAMUSCULAR; INTRAVENOUS
Status: CANCELLED
Start: 2024-09-23

## 2024-09-23 RX ORDER — HEPARIN SODIUM (PORCINE) LOCK FLUSH IV SOLN 100 UNIT/ML 100 UNIT/ML
5 SOLUTION INTRAVENOUS
OUTPATIENT
Start: 2024-09-23

## 2024-09-23 RX ADMIN — SODIUM CHLORIDE 25 MG: 9 INJECTION, SOLUTION INTRAVENOUS at 11:22

## 2024-09-23 RX ADMIN — SODIUM CHLORIDE 250 ML: 9 INJECTION, SOLUTION INTRAVENOUS at 11:05

## 2024-09-23 RX ADMIN — SODIUM CHLORIDE 1000 MG: 9 INJECTION, SOLUTION INTRAVENOUS at 12:50

## 2024-09-23 ASSESSMENT — PAIN SCALES - GENERAL: PAINLEVEL: MILD PAIN (2)

## 2024-09-23 NOTE — PROGRESS NOTES
Infusion Nursing Note:  Kesha Zacarias presents today for 1st dose Infed.    Patient seen by provider today: No.   present during visit today: Not Applicable.    Note: Patient arrives ambulatory with spouse. Has not had Iron infusion in the past. Was on oral Iron but was not effective. Moderate fatigue level. SOA with moderate exertion. Is on oral chemo. Mild jaw pain which is not new pain. See ONC flowsheet for today's full assessment. VSS, afebrile. Discussed Infed and possible side effects, possible signs/sx of reaction.       Intravenous Access:  Peripheral IV placed.    Treatment Conditions:  Not Applicable.      Post Infusion Assessment:  Patient tolerated infusion without incident. VSS, asymptomatic through entire infusion.   Blood return noted pre and post infusion.  Site patent and intact, free from redness, edema or discomfort.  No evidence of extravasations.  PIV access discontinued per protocol.       Discharge Plan:   AVS to patient via MYCHART. Infed pt education sheet attached to AVS.No further Infusion appts scheduled at this time. Pt will see Dr. Stephens via Video visit tomorrow.   Patient discharged in stable condition accompanied by: .  Departure Mode: Ambulatory.      Marsha Rome RN

## 2024-09-24 ENCOUNTER — VIRTUAL VISIT (OUTPATIENT)
Dept: ONCOLOGY | Facility: CLINIC | Age: 64
End: 2024-09-24
Attending: INTERNAL MEDICINE
Payer: COMMERCIAL

## 2024-09-24 VITALS — BODY MASS INDEX: 33.27 KG/M2 | HEIGHT: 66 IN | WEIGHT: 207 LBS

## 2024-09-24 DIAGNOSIS — C50.912 CARCINOMA OF LEFT BREAST METASTATIC TO BONE (H): Primary | ICD-10-CM

## 2024-09-24 DIAGNOSIS — C79.51 CARCINOMA OF LEFT BREAST METASTATIC TO BONE (H): Primary | ICD-10-CM

## 2024-09-24 DIAGNOSIS — T45.1X5A ANTINEOPLASTIC CHEMOTHERAPY INDUCED ANEMIA: ICD-10-CM

## 2024-09-24 DIAGNOSIS — C79.51 CARCINOMA OF BREAST METASTATIC TO BONE, UNSPECIFIED LATERALITY (H): ICD-10-CM

## 2024-09-24 DIAGNOSIS — D64.81 ANTINEOPLASTIC CHEMOTHERAPY INDUCED ANEMIA: ICD-10-CM

## 2024-09-24 DIAGNOSIS — C50.919 CARCINOMA OF BREAST METASTATIC TO BONE, UNSPECIFIED LATERALITY (H): ICD-10-CM

## 2024-09-24 PROCEDURE — G2211 COMPLEX E/M VISIT ADD ON: HCPCS | Mod: 95 | Performed by: INTERNAL MEDICINE

## 2024-09-24 PROCEDURE — 99215 OFFICE O/P EST HI 40 MIN: CPT | Mod: 95 | Performed by: INTERNAL MEDICINE

## 2024-09-24 ASSESSMENT — PAIN SCALES - GENERAL: PAINLEVEL: NO PAIN (0)

## 2024-09-24 NOTE — NURSING NOTE
Patient confirms medications and allergies are accurate via patients echeck in completion, and or denies any changes since last reviewed/verified.     Current patient location:  [place]     Is the patient currently in the state of [place]? YES    Visit mode:VIDEO    If the visit is dropped, the patient can be reconnected by: VIDEO VISIT: Text to cell phone:   Telephone Information:   Mobile [phone]       Will anyone else be joining the visit? Jez - Yes  (If patient encounters technical issues they should call [phone])    How would you like to obtain your AVS? MyChart    Are changes needed to the allergy or medication list? No    Are refills needed on medications prescribed by this physician? YES    Rooming Documentation:  Questionnaire(s) completed    Reason for visit: RECHECK    Lucia RIVERO

## 2024-09-24 NOTE — PROGRESS NOTES
"Virtual Visit Details    Type of service:  Video Visit     Originating Location (pt. Location): {video visit patient location:723396::\"Home\"}  {PROVIDER LOCATION On-site should be selected for visits conducted from your clinic location or adjoining [facility] hospital, academic office, or other nearby [facility] building. Off-site should be selected for all other provider locations, including home:966560}  Distant Location (provider location):  {virtual location provider:432838}  Platform used for Video Visit: {Virtual Visit Platforms:164680::\"Picklify\"}  "

## 2024-09-24 NOTE — LETTER
2024      Kesha Zacarias  [address] [address] [place] [id number]      Dear Colleague,    Thank you for referring your patient, Kesha Zacarias, to the [facility] [place]. Please see a copy of my visit note below.    [facility] Hematology / Oncology  Progress Note  Name: Kesha Zacarias  :  1960  MRN:  [id number]    --------------------    Assessment / Plan:  Stage IV, hormone positive, HER2 negative (2+), invasive lobular breast cancer with extensive bony involvement.  Status post Ribociclib plus AI 2023 - May 2024.  Ongoing everolimus plus AI 2024.    Continue everolimus plus AI; planning 3 to 4 months followed by repeat imaging.  Unclear why we are giving parenteral iron when it does not appear an iron anemia related issue.  Given bony involvement at time of diagnosis, I am worried about potential marrow involvement.  Will draw a number of additional blood tests looking for anemia.  Will arrange PET scan upcoming.  Remainder of chemistry stable.  Continue Zometa monthly.    Brayden Stephens MD    --------------------    Interval History:  Kesha presents for follow-up of breast cancer unaccompanied.  All in all, she is doing fairly well on current therapy.  No major toxicities from her everolimus or AI.  She does velasquez a lot of fatigue particularly recently.  She has received some IV iron without much improvement.    --------------------    Family History:  Family History   Problem Relation Age of Onset     Heart Disease Mother      Lipids Mother      Hyperlipidemia Mother      Genitourinary Problems Father         prostate     Genetic Disorder Father         ulcer     Hypertension Father      Lipids Father      Prostate Cancer Father      Hyperlipidemia Sister      Hyperlipidemia Brother      Other Cancer Brother         Neck Cancer HPV (+)     Heart Disease Maternal Grandmother      Cerebrovascular Disease Maternal Grandmother      Heart Disease  "Maternal Grandfather      Heart Disease Maternal Uncle      Heart Disease Maternal Uncle         x  3     Cancer Nephew         Sister's Son       Social History:  Social History     Tobacco Use     Smoking status: Former     Current packs/day: 0.00     Average packs/day: 0.5 packs/day for 25.0 years (12.5 ttl pk-yrs)     Types: Cigarettes, Cigars     Start date: 1992     Quit date: 2017     Years since quittin.0     Smokeless tobacco: Never     Tobacco comments:     Cigar once in a while   Vaping Use     Vaping status: Never Used   Substance Use Topics     Alcohol use: Yes     Comment: very little     Drug use: Yes     Types: Marijuana     Comment: once every 2 weeks       Medications / Allergies:  Reviewed in EMR.    --------------------    Physical Exam:  VS: Ht 1.676 m (5' 6\")   Wt 93.9 kg (207 lb)   LMP 2011 (Exact Date)   BMI 33.41 kg/m    GEN: Well appearing.    Labs / Imaging / Path:  Reviewed CBC, CMP, ferritin, iron panel.      Again, thank you for allowing me to participate in the care of your patient.        Sincerely,        Baryden Stephens MD  "

## 2024-09-27 DIAGNOSIS — C50.912 CARCINOMA OF LEFT BREAST METASTATIC TO BONE (H): Primary | ICD-10-CM

## 2024-09-27 DIAGNOSIS — C79.51 CARCINOMA OF LEFT BREAST METASTATIC TO BONE (H): Primary | ICD-10-CM

## 2024-09-27 RX ORDER — EXEMESTANE 25 MG/1
25 TABLET ORAL DAILY
Qty: 28 TABLET | Refills: 0 | Status: SHIPPED | OUTPATIENT
Start: 2024-10-06 | End: 2024-11-03

## 2024-09-27 RX ORDER — EVEROLIMUS 10 MG/1
10 TABLET ORAL DAILY
Qty: 28 TABLET | Refills: 0 | Status: SHIPPED | OUTPATIENT
Start: 2024-10-06 | End: 2024-11-03

## 2024-09-27 NOTE — PATIENT INSTRUCTIONS
1) JUAN 4 weeks w/ labs (CMP, CBC, ) and Zometa,  2) BJT MG 8 weeks w/ labs (CBC, CMP, ) and Zometa and PET scan.  3) Additional labs now (    Brayden Stephens MD.

## 2024-09-27 NOTE — PROGRESS NOTES
[facility] Hematology / Oncology  Progress Note  Name: Kesha Zacarias  :  1960  MRN:  [id number]    --------------------    Assessment / Plan:  Stage IV, hormone positive, HER2 negative (2+), invasive lobular breast cancer with extensive bony involvement.  Status post Ribociclib plus AI 2023 - May 2024.  Ongoing everolimus plus AI 2024.    Continue everolimus plus AI; planning 3 to 4 months followed by repeat imaging.  Unclear why we are giving parenteral iron when it does not appear an iron anemia related issue.  Given bony involvement at time of diagnosis, I am worried about potential marrow involvement.  Will draw a number of additional blood tests looking for anemia.  Will arrange PET scan upcoming.  Remainder of chemistry stable.  Continue Zometa monthly.    Brayden Stephens MD    --------------------    Interval History:  Kesha presents for follow-up of breast cancer unaccompanied.  All in all, she is doing fairly well on current therapy.  No major toxicities from her everolimus or AI.  She does velasquez a lot of fatigue particularly recently.  She has received some IV iron without much improvement.    --------------------    Family History:  Family History   Problem Relation Age of Onset    Heart Disease Mother     Lipids Mother     Hyperlipidemia Mother     Genitourinary Problems Father         prostate    Genetic Disorder Father         ulcer    Hypertension Father     Lipids Father     Prostate Cancer Father     Hyperlipidemia Sister     Hyperlipidemia Brother     Other Cancer Brother         Neck Cancer HPV (+)    Heart Disease Maternal Grandmother     Cerebrovascular Disease Maternal Grandmother     Heart Disease Maternal Grandfather     Heart Disease Maternal Uncle     Heart Disease Maternal Uncle         x  3    Cancer Nephew         Sister's Son       Social History:  Social History     Tobacco Use    Smoking status: Former     Current packs/day: 0.00     Average packs/day:  "0.5 packs/day for 25.0 years (12.5 ttl pk-yrs)     Types: Cigarettes, Cigars     Start date: 1992     Quit date: 2017     Years since quittin.0    Smokeless tobacco: Never    Tobacco comments:     Cigar once in a while   Vaping Use    Vaping status: Never Used   Substance Use Topics    Alcohol use: Yes     Comment: very little    Drug use: Yes     Types: Marijuana     Comment: once every 2 weeks       Medications / Allergies:  Reviewed in EMR.    --------------------    Physical Exam:  VS: Ht 1.676 m (5' 6\")   Wt 93.9 kg (207 lb)   LMP 2011 (Exact Date)   BMI 33.41 kg/m    GEN: Well appearing.    Labs / Imaging / Path:  Reviewed CBC, CMP, ferritin, iron panel.  "

## 2024-10-09 ENCOUNTER — TELEPHONE (OUTPATIENT)
Dept: ONCOLOGY | Facility: CLINIC | Age: 64
End: 2024-10-09
Payer: COMMERCIAL

## 2024-10-09 NOTE — TELEPHONE ENCOUNTER
Scheduling requesting checkout/orders for Zometa. Will send a message to Dr. Stephens/Kitty YING CNP for orders.  Felisha Cueto RNCC

## 2024-10-10 NOTE — CONFIDENTIAL NOTE
Oncology team aware. Will check in with patient in next month to get status update.  Felisha Cueto RNCC

## 2024-10-11 ENCOUNTER — LAB (OUTPATIENT)
Dept: LAB | Facility: CLINIC | Age: 64
End: 2024-10-11
Payer: COMMERCIAL

## 2024-10-11 DIAGNOSIS — T45.1X5A ANTINEOPLASTIC CHEMOTHERAPY INDUCED ANEMIA: ICD-10-CM

## 2024-10-11 DIAGNOSIS — C79.51 CARCINOMA OF LEFT BREAST METASTATIC TO BONE (H): ICD-10-CM

## 2024-10-11 DIAGNOSIS — C79.51 CARCINOMA OF BREAST METASTATIC TO BONE, UNSPECIFIED LATERALITY (H): ICD-10-CM

## 2024-10-11 DIAGNOSIS — C50.912 CARCINOMA OF LEFT BREAST METASTATIC TO BONE (H): ICD-10-CM

## 2024-10-11 DIAGNOSIS — D64.81 ANTINEOPLASTIC CHEMOTHERAPY INDUCED ANEMIA: ICD-10-CM

## 2024-10-11 DIAGNOSIS — C50.919 CARCINOMA OF BREAST METASTATIC TO BONE, UNSPECIFIED LATERALITY (H): ICD-10-CM

## 2024-10-11 LAB
ALBUMIN SERPL BCG-MCNC: 3.6 G/DL (ref 3.5–5.2)
ALP SERPL-CCNC: 90 U/L (ref 40–150)
ALT SERPL W P-5'-P-CCNC: 44 U/L (ref 0–50)
ANION GAP SERPL CALCULATED.3IONS-SCNC: 11 MMOL/L (ref 7–15)
AST SERPL W P-5'-P-CCNC: 34 U/L (ref 0–45)
BASOPHILS # BLD AUTO: 0 10E3/UL (ref 0–0.2)
BASOPHILS NFR BLD AUTO: 1 %
BILIRUB SERPL-MCNC: 0.2 MG/DL
BUN SERPL-MCNC: 18 MG/DL (ref 8–23)
CALCIUM SERPL-MCNC: 8.5 MG/DL (ref 8.8–10.4)
CANCER AG15-3 SERPL-ACNC: 64 U/ML
CHLORIDE SERPL-SCNC: 107 MMOL/L (ref 98–107)
CREAT SERPL-MCNC: 1.15 MG/DL (ref 0.51–0.95)
EGFRCR SERPLBLD CKD-EPI 2021: 53 ML/MIN/1.73M2
EOSINOPHIL # BLD AUTO: 0 10E3/UL (ref 0–0.7)
EOSINOPHIL NFR BLD AUTO: 0 %
ERYTHROCYTE [DISTWIDTH] IN BLOOD BY AUTOMATED COUNT: 16.7 % (ref 10–15)
ERYTHROCYTE [SEDIMENTATION RATE] IN BLOOD BY WESTERGREN METHOD: 52 MM/HR (ref 0–30)
GLUCOSE SERPL-MCNC: 102 MG/DL (ref 70–99)
HAPTOGLOB SERPL-MCNC: 441 MG/DL (ref 30–200)
HCO3 SERPL-SCNC: 25 MMOL/L (ref 22–29)
HCT VFR BLD AUTO: 29 % (ref 35–47)
HGB BLD-MCNC: 8.9 G/DL (ref 11.7–15.7)
IMM GRANULOCYTES # BLD: 0 10E3/UL
IMM GRANULOCYTES NFR BLD: 1 %
LDH SERPL L TO P-CCNC: 341 U/L (ref 0–250)
LYMPHOCYTES # BLD AUTO: 0.7 10E3/UL (ref 0.8–5.3)
LYMPHOCYTES NFR BLD AUTO: 19 %
MCH RBC QN AUTO: 26.3 PG (ref 26.5–33)
MCHC RBC AUTO-ENTMCNC: 30.7 G/DL (ref 31.5–36.5)
MCV RBC AUTO: 86 FL (ref 78–100)
MONOCYTES # BLD AUTO: 0.3 10E3/UL (ref 0–1.3)
MONOCYTES NFR BLD AUTO: 8 %
NEUTROPHILS # BLD AUTO: 2.7 10E3/UL (ref 1.6–8.3)
NEUTROPHILS NFR BLD AUTO: 70 %
NRBC # BLD AUTO: 0 10E3/UL
NRBC BLD AUTO-RTO: 0 /100
PLATELET # BLD AUTO: 140 10E3/UL (ref 150–450)
POTASSIUM SERPL-SCNC: 4.2 MMOL/L (ref 3.4–5.3)
PROT SERPL-MCNC: 6.5 G/DL (ref 6.4–8.3)
RBC # BLD AUTO: 3.39 10E6/UL (ref 3.8–5.2)
RETICS # AUTO: 0.08 10E6/UL (ref 0.03–0.1)
RETICS/RBC NFR AUTO: 2.5 % (ref 0.5–2)
SODIUM SERPL-SCNC: 143 MMOL/L (ref 135–145)
VIT B12 SERPL-MCNC: 1165 PG/ML (ref 232–1245)
WBC # BLD AUTO: 3.8 10E3/UL (ref 4–11)

## 2024-10-11 PROCEDURE — 83010 ASSAY OF HAPTOGLOBIN QUANT: CPT

## 2024-10-11 PROCEDURE — 82607 VITAMIN B-12: CPT

## 2024-10-11 PROCEDURE — 36415 COLL VENOUS BLD VENIPUNCTURE: CPT

## 2024-10-11 PROCEDURE — 85060 BLOOD SMEAR INTERPRETATION: CPT | Performed by: PATHOLOGY

## 2024-10-11 PROCEDURE — 85652 RBC SED RATE AUTOMATED: CPT

## 2024-10-11 PROCEDURE — 86300 IMMUNOASSAY TUMOR CA 15-3: CPT

## 2024-10-11 PROCEDURE — 85025 COMPLETE CBC W/AUTO DIFF WBC: CPT

## 2024-10-11 PROCEDURE — 83615 LACTATE (LD) (LDH) ENZYME: CPT

## 2024-10-11 PROCEDURE — 80053 COMPREHEN METABOLIC PANEL: CPT

## 2024-10-11 PROCEDURE — 85045 AUTOMATED RETICULOCYTE COUNT: CPT

## 2024-10-14 LAB
PATH REPORT.COMMENTS IMP SPEC: NORMAL
PATH REPORT.COMMENTS IMP SPEC: NORMAL
PATH REPORT.FINAL DX SPEC: NORMAL
PATH REPORT.MICROSCOPIC SPEC OTHER STN: NORMAL
PATH REPORT.MICROSCOPIC SPEC OTHER STN: NORMAL

## 2024-10-25 DIAGNOSIS — C50.912 CARCINOMA OF LEFT BREAST METASTATIC TO BONE (H): Primary | ICD-10-CM

## 2024-10-25 DIAGNOSIS — C79.51 CARCINOMA OF LEFT BREAST METASTATIC TO BONE (H): Primary | ICD-10-CM

## 2024-10-25 RX ORDER — EVEROLIMUS 10 MG/1
10 TABLET ORAL DAILY
Qty: 28 TABLET | Refills: 0 | Status: SHIPPED | OUTPATIENT
Start: 2024-11-03 | End: 2024-12-01

## 2024-10-25 RX ORDER — EXEMESTANE 25 MG/1
25 TABLET ORAL DAILY
Qty: 28 TABLET | Refills: 0 | Status: SHIPPED | OUTPATIENT
Start: 2024-11-03 | End: 2024-12-01

## 2024-10-30 ENCOUNTER — MYC MEDICAL ADVICE (OUTPATIENT)
Dept: ONCOLOGY | Facility: CLINIC | Age: 64
End: 2024-10-30

## 2024-10-30 ENCOUNTER — VIRTUAL VISIT (OUTPATIENT)
Dept: ONCOLOGY | Facility: CLINIC | Age: 64
End: 2024-10-30
Attending: GENETIC COUNSELOR, MS
Payer: COMMERCIAL

## 2024-10-30 DIAGNOSIS — C50.912 CARCINOMA OF LEFT BREAST METASTATIC TO BONE (H): Primary | ICD-10-CM

## 2024-10-30 DIAGNOSIS — C79.51 CARCINOMA OF LEFT BREAST METASTATIC TO BONE (H): Primary | ICD-10-CM

## 2024-10-30 PROCEDURE — 999N000069 HC STATISTIC GENETIC COUNSELING, < 16 MIN: Mod: GT,95 | Performed by: GENETIC COUNSELOR, MS

## 2024-10-30 NOTE — NURSING NOTE
Current patient location:  [address] [place] [address]    Is the patient currently in the state of [place]? YES    Visit mode:VIDEO    If the visit is dropped, the patient can be reconnected by: VIDEO VISIT: Text to cell phone:   Telephone Information:   Mobile [phone]       Will anyone else be joining the visit? NO  (If patient encounters technical issues they should call [phone]    Are changes needed to the allergy or medication list? N/A    Are refills needed on medications prescribed by this physician? NO    Rooming Documentation:  Not applicable    Reason for visit: Consult    BEE RIVERO

## 2024-10-30 NOTE — PROGRESS NOTES
Virtual Visit Details    Type of service:  Video Visit   Joined the call at 10/30/2024, 2:02:19 pm.  Left the call at 10/30/2024, 2:12:47 pm.  Originating Location (pt. Location): Home  Distant Location (provider location):  Off-site  Platform used for Video Visit: [employer]    10/30/2024    Referring Provider: Mary Maravilla DO    Presenting Information:   I met with Kesha Zacarias today for genetic counseling as part of the Cancer Risk Management Program (virtual visit) to discuss her personal and family history of cancer.  She is here today to review this history, cancer screening recommendations, and available genetic testing options.    Personal History:  Kesha is a 64 year old female. She was diagnosed with invasive lobular breast cancer.  Germline genetic testing was NORMAL.      Hereditary Cancer Breast Actionable: BRENT, BARD1, BRCA1, BRCA2, CDH1, CHEK2, NF1, PALB2, PTEN, STK11, TP53.     Family History: (Please see scanned pedigree for detailed family history information)  One nephew was diagnosed with prostate cancer when 40  Her brother has a history of throat (65) and skin cancer  Her father had a history of prostate cancer (50's) and passed away when 81  Limited family history information is available  Her reported ethnicity is Welsh and Yoruba.      Discussion:  The features of sporadic, familial, and hereditary cancers were discussed, as it pertains to Kesha's history of cancer. Due to Kesha's personal history of breast cancer, germline genetic testing was completed for treatment decision making/options.  This testing included 11 high-moderate risk breast cancer genes.       A detailed handout regarding hereditary cancer and the information we discussed can be found in the after visit summary. Topics included: inheritance pattern, cancer risks, cancer screening recommendations, as well as risks, benefits and limitations of testing.  We discussed that there are additional genes that could cause increased risk  for the cancers in Kesha's family, including lower risk breast cancer genes, and genes related to prostate and/or other cancers. As many of these genes present with overlapping features in a family and accurate cancer risk cannot always be established based upon the pedigree analysis alone, it would be reasonable for Kesha to consider panel genetic testing to analyze multiple genes at once.  Kesha felt comfortable declining expanded genetic testing today.    Testing did not detect any disease-causing changes in the genes analyzed.  The vast majority of cancer diagnoses are considered sporadic, and not primarily due to an inherited factor. Sporadic cancers may be caused by a combination of factors including lifestyle, environmental exposures, certain medical conditions, and aging.  Most cancers occur by random chance, due to an accumulation of non-inherited genetic changes within our cells as we age.    We reviewed the possible limitations of our current testing technology.  It remains possible that a different gene, or combination of genetic and other risk factors may be present in Kesha's family which could still contribute to increased cancer risks.    Genetic testing is rapidly advancing, and new cancer susceptibility genes will most likely be identified in the future. Therefore, I encouraged Kesha to contact me if there are changes in her personal or family history, or to revisit expanded testing option in the future. This may change how we assess her cancer risk, screening and targeted therapies, and the testing we would offer.    Genetic Testing: no genetic testing orders placed today     Bharati Mariscal MS, [place] [facility] [place]  Licensed, Certified Genetic Counselor

## 2024-10-30 NOTE — LETTER
10/30/2024      Kesha Zacarias  [address] [place] [id number]      Dear Colleague,    Thank you for referring your patient, Kesha Zacarias, to the [facility]. Please see a copy of my visit note below.    Virtual Visit Details    Type of service:  Video Visit   Joined the call at 10/30/2024, 2:02:19 pm.  Left the call at 10/30/2024, 2:12:47 pm.  Originating Location (pt. Location): Home  Distant Location (provider location):  Off-site  Platform used for Video Visit: [employer]    10/30/2024    Referring Provider: Mary Maravilla DO    Presenting Information:   I met with Kesha Zacarias today for genetic counseling as part of the Cancer Risk Management Program (virtual visit) to discuss her personal and family history of cancer.  She is here today to review this history, cancer screening recommendations, and available genetic testing options.    Personal History:  Kesha is a 64 year old female. She was diagnosed with invasive lobular breast cancer.  Germline genetic testing was NORMAL.      Hereditary Cancer Breast Actionable: BRENT, BARD1, BRCA1, BRCA2, CDH1, CHEK2, NF1, PALB2, PTEN, STK11, TP53.     Family History: (Please see scanned pedigree for detailed family history information)  One nephew was diagnosed with prostate cancer when 40  Her brother has a history of throat (65) and skin cancer  Her father had a history of prostate cancer (50's) and passed away when 81  Limited family history information is available  Her reported ethnicity is Serbian and Maltese.      Discussion:  The features of sporadic, familial, and hereditary cancers were discussed, as it pertains to Kesha's history of cancer. Due to Kesha's personal history of breast cancer, germline genetic testing was completed for treatment decision making/options.  This testing included 11 high-moderate risk breast cancer genes.       A detailed handout regarding hereditary cancer and the information we discussed can be  found in the after visit summary. Topics included: inheritance pattern, cancer risks, cancer screening recommendations, as well as risks, benefits and limitations of testing.  We discussed that there are additional genes that could cause increased risk for the cancers in Kesha's family, including lower risk breast cancer genes, and genes related to prostate and/or other cancers. As many of these genes present with overlapping features in a family and accurate cancer risk cannot always be established based upon the pedigree analysis alone, it would be reasonable for Kesha to consider panel genetic testing to analyze multiple genes at once.  Kesha felt comfortable declining expanded genetic testing today.    Testing did not detect any disease-causing changes in the genes analyzed.  The vast majority of cancer diagnoses are considered sporadic, and not primarily due to an inherited factor. Sporadic cancers may be caused by a combination of factors including lifestyle, environmental exposures, certain medical conditions, and aging.  Most cancers occur by random chance, due to an accumulation of non-inherited genetic changes within our cells as we age.    We reviewed the possible limitations of our current testing technology.  It remains possible that a different gene, or combination of genetic and other risk factors may be present in Kesha's family which could still contribute to increased cancer risks.    Genetic testing is rapidly advancing, and new cancer susceptibility genes will most likely be identified in the future. Therefore, I encouraged Kesha to contact me if there are changes in her personal or family history, or to revisit expanded testing option in the future. This may change how we assess her cancer risk, screening and targeted therapies, and the testing we would offer.    Genetic Testing: no genetic testing orders placed today     Bharati Mariscal MS, [facility] [place]  Licensed, Certified Genetic Counselor            Again,  thank you for allowing me to participate in the care of your patient.        Sincerely,        Bharati Mariscal GC

## 2024-10-30 NOTE — PATIENT INSTRUCTIONS
Assessing Cancer Risk  Cancer is a common diagnosis which impacts many families.  Individuals may develop cancer due to environmental factors (such as exposures and lifestyle), aging, genetic predisposition, or a combination of these factors.  The vast majority of cancer diagnoses are considered sporadic, and not primarily due to an inherited factor. Approximately 5-10% of cancer diagnoses are thought to be caused by inherited risk factors.       Many of the genes we are born with impact our risk of certain diseases, such as cancer.  When one of these genes is not working properly due to a mistake (known as a  mutation  or  variant ), this may lead to an increased risk of developing cancer.      Families impacted by a hereditary cancer syndrome are more likely to have relatives across several generations diagnosed with cancer, earlier ages of diagnoses (prior to age 50), certain rare tumors, or relatives diagnosed with multiple primary cancers.  However, this may not be the case for all families which carry a cancer risk gene, such as those with small family size or limited history information.         Genetic Testing  For some families, genetic testing may help to explain why their cancer developed, provide tailored management options, and clarify the risk of developing cancer in the future.      Genetic testing involves a simple blood or saliva test and will look at the genetic information in select genes for variants associated with cancer risk.  This testing may include analysis of a single gene due to a known variant in the family, multiple genes most associated with the cancers in a family, or an expanded panel of genes related to many types of cancers.    Results  There are several possible genetic test results, including:   Positive--a harmful mutation (also known as a  pathogenic  or  likely pathogenic  variant) was identified in a gene associated with increased cancer risk.  These risks, as well as  medical management options, depend on the specific genetic variant identified.    Negative--no variants were identified in the genes analyzed   Variant of unknown significance--a variant was identified in one or more genes, though it is currently unclear how this impacts cancer risk in the family.  Genetic testing labs are working to collect evidence about these uncertain variants and may provide updates in the future.    Medical Management  If a harmful mutation is found in a cancer risk gene, there may be increased cancer surveillance or preventative surgeries that can be offered. This information will be discussed after genetic testing is completed. If no mutations are found on genetic testing, screening is often recommended based on personal and/or family history of cancer. All cancer risk management options should be discussed in more detail with an individual's medical providers.    Inheritance   Variants in most cancer risk genes are inherited in an autosomal dominant pattern.  This means that if a parent has a variant, each of their children will have a 50% chance of inheriting that same variant.  Therefore, each child would have a 50% chance of being at increased risk for developing cancer.    Some cancer risk genes are inherited in an autosomal recessive pattern.  These risks are present when an individual inherits mutations from both parents in the same gene.         Genetic Information Nondiscrimination Act (JAGDISH)  The Genetic Information Nondiscrimination Act of 2008 (JAGDISH) is a federal law that protects individuals from health insurance or employment discrimination based on a genetic test result alone (with some exceptions, including employers with fewer than 15 employees, and ).  However, JAGDISH's protection does not cover life insurance, long term care, or disability insurances.  The [facility] [employer] [place] is a great resource to learn more.    Questions to Think About  Regarding Genetic Testing  What effect will the test result have on me and my relationship with my family members if I have an inherited gene mutation?  If I don't have a gene mutation?  Should I share my test results, and how will my family react to this news, which may also affect them?  Are my children ready to learn new information that may one day affect their own health?    Please call us if you have any questions or concerns   (Appointments: [phone])    Jairon Greer, MS [facility]  [phone]  Raisa Joy, MS, [facility] [phone]  Ca Barahona, MS, [facility]  [phone]  Bharati Mariscal, MS, [facility]  [phone]  Shelly Arnold, MS, [facility]  [phone]  Elena Urrutia, MS, [facility]  [phone]  Monica Saha, MS, [facility]  [phone]

## 2024-11-21 DIAGNOSIS — C50.912 CARCINOMA OF LEFT BREAST METASTATIC TO BONE (H): Primary | ICD-10-CM

## 2024-11-21 DIAGNOSIS — C79.51 CARCINOMA OF LEFT BREAST METASTATIC TO BONE (H): Primary | ICD-10-CM

## 2024-11-21 RX ORDER — EXEMESTANE 25 MG/1
25 TABLET ORAL DAILY
Qty: 28 TABLET | Refills: 0 | Status: SHIPPED | OUTPATIENT
Start: 2024-12-01 | End: 2024-12-29

## 2024-11-21 RX ORDER — EVEROLIMUS 10 MG/1
10 TABLET ORAL DAILY
Qty: 28 TABLET | Refills: 0 | OUTPATIENT
Start: 2024-12-01 | End: 2024-12-29

## 2024-11-23 ENCOUNTER — HOSPITAL ENCOUNTER (OUTPATIENT)
Dept: PET IMAGING | Facility: CLINIC | Age: 64
Discharge: HOME | End: 2024-11-23
Attending: INTERNAL MEDICINE | Admitting: INTERNAL MEDICINE
Payer: COMMERCIAL

## 2024-11-23 ENCOUNTER — LAB (OUTPATIENT)
Dept: LAB | Facility: CLINIC | Age: 64
End: 2024-11-23
Payer: COMMERCIAL

## 2024-11-23 DIAGNOSIS — T45.1X5A ANTINEOPLASTIC CHEMOTHERAPY INDUCED ANEMIA: ICD-10-CM

## 2024-11-23 DIAGNOSIS — C79.51 MALIGNANT NEOPLASM METASTATIC TO BONE (H): ICD-10-CM

## 2024-11-23 DIAGNOSIS — C50.919 CARCINOMA OF BREAST METASTATIC TO BONE, UNSPECIFIED LATERALITY (H): ICD-10-CM

## 2024-11-23 DIAGNOSIS — C50.912 CARCINOMA OF LEFT BREAST METASTATIC TO BONE (H): ICD-10-CM

## 2024-11-23 DIAGNOSIS — C79.51 CARCINOMA OF LEFT BREAST METASTATIC TO BONE (H): ICD-10-CM

## 2024-11-23 DIAGNOSIS — C79.51 CARCINOMA OF BREAST METASTATIC TO BONE, UNSPECIFIED LATERALITY (H): ICD-10-CM

## 2024-11-23 DIAGNOSIS — D64.81 ANTINEOPLASTIC CHEMOTHERAPY INDUCED ANEMIA: ICD-10-CM

## 2024-11-23 LAB
ALBUMIN SERPL BCG-MCNC: 3.8 G/DL (ref 3.5–5.2)
ALP SERPL-CCNC: 98 U/L (ref 40–150)
ALT SERPL W P-5'-P-CCNC: 28 U/L (ref 0–50)
ANION GAP SERPL CALCULATED.3IONS-SCNC: 12 MMOL/L (ref 7–15)
AST SERPL W P-5'-P-CCNC: 29 U/L (ref 0–45)
BASOPHILS # BLD AUTO: 0 10E3/UL (ref 0–0.2)
BASOPHILS NFR BLD AUTO: 1 %
BILIRUB SERPL-MCNC: 0.2 MG/DL
BUN SERPL-MCNC: 17.2 MG/DL (ref 8–23)
CALCIUM SERPL-MCNC: 8.6 MG/DL (ref 8.8–10.4)
CANCER AG15-3 SERPL-ACNC: 54 U/ML
CHLORIDE SERPL-SCNC: 105 MMOL/L (ref 98–107)
CREAT SERPL-MCNC: 1.15 MG/DL (ref 0.51–0.95)
EGFRCR SERPLBLD CKD-EPI 2021: 53 ML/MIN/1.73M2
EOSINOPHIL # BLD AUTO: 0.1 10E3/UL (ref 0–0.7)
EOSINOPHIL NFR BLD AUTO: 2 %
ERYTHROCYTE [DISTWIDTH] IN BLOOD BY AUTOMATED COUNT: 16.6 % (ref 10–15)
GLUCOSE SERPL-MCNC: 115 MG/DL (ref 70–99)
HCO3 SERPL-SCNC: 24 MMOL/L (ref 22–29)
HCT VFR BLD AUTO: 30.6 % (ref 35–47)
HGB BLD-MCNC: 9.2 G/DL (ref 11.7–15.7)
IMM GRANULOCYTES # BLD: 0 10E3/UL
IMM GRANULOCYTES NFR BLD: 1 %
LYMPHOCYTES # BLD AUTO: 0.8 10E3/UL (ref 0.8–5.3)
LYMPHOCYTES NFR BLD AUTO: 22 %
MAGNESIUM SERPL-MCNC: 1.9 MG/DL (ref 1.7–2.3)
MCH RBC QN AUTO: 25.3 PG (ref 26.5–33)
MCHC RBC AUTO-ENTMCNC: 30.1 G/DL (ref 31.5–36.5)
MCV RBC AUTO: 84 FL (ref 78–100)
MONOCYTES # BLD AUTO: 0.3 10E3/UL (ref 0–1.3)
MONOCYTES NFR BLD AUTO: 7 %
NEUTROPHILS # BLD AUTO: 2.5 10E3/UL (ref 1.6–8.3)
NEUTROPHILS NFR BLD AUTO: 68 %
NRBC # BLD AUTO: 0 10E3/UL
NRBC BLD AUTO-RTO: 0 /100
PHOSPHATE SERPL-MCNC: 3 MG/DL (ref 2.5–4.5)
PLATELET # BLD AUTO: 147 10E3/UL (ref 150–450)
POTASSIUM SERPL-SCNC: 4.1 MMOL/L (ref 3.4–5.3)
PROT SERPL-MCNC: 6.6 G/DL (ref 6.4–8.3)
RBC # BLD AUTO: 3.63 10E6/UL (ref 3.8–5.2)
SODIUM SERPL-SCNC: 141 MMOL/L (ref 135–145)
WBC # BLD AUTO: 3.7 10E3/UL (ref 4–11)

## 2024-11-23 PROCEDURE — 343N000001 HC RX 343 MED OP 636: Performed by: INTERNAL MEDICINE

## 2024-11-23 PROCEDURE — 78815 PET IMAGE W/CT SKULL-THIGH: CPT | Mod: PS

## 2024-11-23 PROCEDURE — A9552 F18 FDG: HCPCS | Performed by: INTERNAL MEDICINE

## 2024-11-23 PROCEDURE — 36415 COLL VENOUS BLD VENIPUNCTURE: CPT

## 2024-11-23 PROCEDURE — 80053 COMPREHEN METABOLIC PANEL: CPT

## 2024-11-23 PROCEDURE — 83735 ASSAY OF MAGNESIUM: CPT

## 2024-11-23 PROCEDURE — 85025 COMPLETE CBC W/AUTO DIFF WBC: CPT

## 2024-11-23 PROCEDURE — 84100 ASSAY OF PHOSPHORUS: CPT

## 2024-11-23 PROCEDURE — 86300 IMMUNOASSAY TUMOR CA 15-3: CPT

## 2024-11-23 RX ORDER — FLUDEOXYGLUCOSE F 18 200 MCI/ML
10-18 INJECTION, SOLUTION INTRAVENOUS ONCE
Status: COMPLETED | OUTPATIENT
Start: 2024-11-23 | End: 2024-11-23

## 2024-11-23 RX ADMIN — FLUDEOXYGLUCOSE F 18 12.81 MILLICURIE: 200 INJECTION, SOLUTION INTRAVENOUS at 08:14

## 2024-11-27 ENCOUNTER — VIRTUAL VISIT (OUTPATIENT)
Dept: ONCOLOGY | Facility: CLINIC | Age: 64
End: 2024-11-27
Attending: INTERNAL MEDICINE
Payer: COMMERCIAL

## 2024-11-27 VITALS — BODY MASS INDEX: 33.41 KG/M2 | HEIGHT: 66 IN

## 2024-11-27 DIAGNOSIS — Z79.01 CHRONIC ANTICOAGULATION: ICD-10-CM

## 2024-11-27 DIAGNOSIS — T45.1X5A ANTINEOPLASTIC CHEMOTHERAPY INDUCED ANEMIA: ICD-10-CM

## 2024-11-27 DIAGNOSIS — C50.912 LOBULAR CARCINOMA OF LEFT BREAST (H): Primary | ICD-10-CM

## 2024-11-27 DIAGNOSIS — D64.81 ANTINEOPLASTIC CHEMOTHERAPY INDUCED ANEMIA: ICD-10-CM

## 2024-11-27 DIAGNOSIS — C50.919 CARCINOMA OF BREAST METASTATIC TO BONE, UNSPECIFIED LATERALITY (H): ICD-10-CM

## 2024-11-27 DIAGNOSIS — R53.0 NEOPLASTIC (MALIGNANT) RELATED FATIGUE: ICD-10-CM

## 2024-11-27 DIAGNOSIS — C79.51 CARCINOMA OF BREAST METASTATIC TO BONE, UNSPECIFIED LATERALITY (H): ICD-10-CM

## 2024-11-27 ASSESSMENT — PAIN SCALES - GENERAL: PAINLEVEL_OUTOF10: NO PAIN (0)

## 2024-11-27 NOTE — PROGRESS NOTES
"Virtual Visit Details    Type of service:  Video Visit     Originating Location (pt. Location): {video visit patient location:976063::\"Home\"}  {PROVIDER LOCATION On-site should be selected for visits conducted from your clinic location or adjoining [facility] hospital, academic office, or other nearby [facility] building. Off-site should be selected for all other provider locations, including home:618607}  Distant Location (provider location):  {virtual location provider:498838}  Platform used for Video Visit: {Virtual Visit Platforms:140230::\"[employer]\"}  "

## 2024-11-27 NOTE — LETTER
2024      Kesha Zacarias  [address] [place] [id number]      Dear Colleague,    Thank you for referring your patient, Kesha Zacarias, to the [facility] [place]. Please see a copy of my visit note below.    [facility] Hematology / Oncology  Progress Note  Name: Kesha Zacarias  :  1960  MRN:  [id number]    --------------------    Subjective:  Kesha returns for follow-up of breast cancer accompanied by her .  All in all, she feels fairly good.  She struggles with fatigue.  She continues to work full-time.  She received clearance from her dentist to resume Zometa..    --------------------    Objective:  Video visit.    We reviewed CBC, CMP, .  We reviewed PET w/ independent review.    --------------------    Assessment / Plan:  Stage IV, hormone positive, HER2 negative (2+), invasive lobular, left-sided breast cancer with extensive bony involvement.  Status post Ribociclib plus AI 2023 - May 2024.  Ongoing everolimus plus AI 2024.  LLE DVT 2024.    Continue everolimus plus AI; planning another 4 months followed by repeat PET (bone involvement).  Suspect pancytopenia relates to anemia chronic disease, potential breast cancer marrow involvement, everolimus.  Reviewed fatigue likely multiple factors; offered a trial of Ritalin and Kesha will consider.  Hypocalcemia bears monitoring, but likely secondary to Zometa.  Kidney function bears monitoring; encourage fluids.  Resume Zometa monthly.  She prefers in person visits.  Continue chronic anticoagulation; history of LLE DVT and at risk w/ malignancy.    Patient Instructions   1) Treatment Cycles 8-11 w/ labs (CBC, CMP).  2) JUAN w/ Cycles 8 and 10 and Zometa.  3) BJT FL (in person) w/ Cycles 9 and 11 and Zometa.  4) PET scan and  prior to Cycle 11.    Brayden Stephens MD.    Video Visit:  Kesha is a 64 year old female who is being evaluated via a billable video visit.  }    Video  start time:  1:04 PM  Video end time:  1:29 PM  Provider location: [facility]  Patient location: Home  Mode of transmission: [employer] / [employer].            Again, thank you for allowing me to participate in the care of your patient.        Sincerely,        Brayden Stephens MD

## 2024-11-27 NOTE — NURSING NOTE
Current patient location:  [address] [place] [address]    Is the patient currently in the state of [place]? YES    Visit mode:VIDEO    If the visit is dropped, the patient can be reconnected by:VIDEO VISIT: Text to cell phone:   Telephone Information:   Mobile [phone]       Will anyone else be joining the visit? NO  (If patient encounters technical issues they should call [phone])    Are changes needed to the allergy or medication list? No, Pt stated no changes to allergies, and Pt stated no med changes    Are refills needed on medications prescribed by this physician? NO    Rooming Documentation:  Questionnaire(s) completed    Reason for visit: RECHECK (Return CCSL)    Sierra RIVERO

## 2024-11-28 RX ORDER — HEPARIN SODIUM (PORCINE) LOCK FLUSH IV SOLN 100 UNIT/ML 100 UNIT/ML
5 SOLUTION INTRAVENOUS
OUTPATIENT
Start: 2025-03-24

## 2024-11-28 RX ORDER — HEPARIN SODIUM (PORCINE) LOCK FLUSH IV SOLN 100 UNIT/ML 100 UNIT/ML
5 SOLUTION INTRAVENOUS
OUTPATIENT
Start: 2024-12-30

## 2024-11-28 RX ORDER — HEPARIN SODIUM,PORCINE 10 UNIT/ML
5-20 VIAL (ML) INTRAVENOUS DAILY PRN
OUTPATIENT
Start: 2025-01-27

## 2024-11-28 RX ORDER — HEPARIN SODIUM (PORCINE) LOCK FLUSH IV SOLN 100 UNIT/ML 100 UNIT/ML
5 SOLUTION INTRAVENOUS
OUTPATIENT
Start: 2025-04-21

## 2024-11-28 RX ORDER — ZOLEDRONIC ACID 0.04 MG/ML
4 INJECTION, SOLUTION INTRAVENOUS ONCE
OUTPATIENT
Start: 2025-01-27 | End: 2025-01-27

## 2024-11-28 RX ORDER — ZOLEDRONIC ACID 0.04 MG/ML
4 INJECTION, SOLUTION INTRAVENOUS ONCE
OUTPATIENT
Start: 2024-12-30 | End: 2024-12-30

## 2024-11-28 RX ORDER — ZOLEDRONIC ACID 0.04 MG/ML
4 INJECTION, SOLUTION INTRAVENOUS ONCE
OUTPATIENT
Start: 2025-03-24 | End: 2025-03-24

## 2024-11-28 RX ORDER — HEPARIN SODIUM,PORCINE 10 UNIT/ML
5-20 VIAL (ML) INTRAVENOUS DAILY PRN
OUTPATIENT
Start: 2025-04-21

## 2024-11-28 RX ORDER — HEPARIN SODIUM,PORCINE 10 UNIT/ML
5-20 VIAL (ML) INTRAVENOUS DAILY PRN
OUTPATIENT
Start: 2025-05-19

## 2024-11-28 RX ORDER — ZOLEDRONIC ACID 0.04 MG/ML
4 INJECTION, SOLUTION INTRAVENOUS ONCE
OUTPATIENT
Start: 2025-04-21 | End: 2025-04-21

## 2024-11-28 RX ORDER — HEPARIN SODIUM (PORCINE) LOCK FLUSH IV SOLN 100 UNIT/ML 100 UNIT/ML
5 SOLUTION INTRAVENOUS
OUTPATIENT
Start: 2025-02-24

## 2024-11-28 RX ORDER — HEPARIN SODIUM (PORCINE) LOCK FLUSH IV SOLN 100 UNIT/ML 100 UNIT/ML
5 SOLUTION INTRAVENOUS
OUTPATIENT
Start: 2025-01-27

## 2024-11-28 RX ORDER — HEPARIN SODIUM,PORCINE 10 UNIT/ML
5-20 VIAL (ML) INTRAVENOUS DAILY PRN
OUTPATIENT
Start: 2024-12-30

## 2024-11-28 RX ORDER — HEPARIN SODIUM,PORCINE 10 UNIT/ML
5-20 VIAL (ML) INTRAVENOUS DAILY PRN
OUTPATIENT
Start: 2025-02-24

## 2024-11-28 RX ORDER — HEPARIN SODIUM,PORCINE 10 UNIT/ML
5-20 VIAL (ML) INTRAVENOUS DAILY PRN
OUTPATIENT
Start: 2025-03-24

## 2024-11-28 RX ORDER — ZOLEDRONIC ACID 0.04 MG/ML
4 INJECTION, SOLUTION INTRAVENOUS ONCE
OUTPATIENT
Start: 2025-05-19 | End: 2025-05-19

## 2024-11-28 RX ORDER — HEPARIN SODIUM (PORCINE) LOCK FLUSH IV SOLN 100 UNIT/ML 100 UNIT/ML
5 SOLUTION INTRAVENOUS
OUTPATIENT
Start: 2025-05-19

## 2024-11-28 RX ORDER — ZOLEDRONIC ACID 0.04 MG/ML
4 INJECTION, SOLUTION INTRAVENOUS ONCE
OUTPATIENT
Start: 2025-02-24 | End: 2025-02-24

## 2024-11-28 NOTE — PATIENT INSTRUCTIONS
1) Treatment Cycles 8-11 w/ labs (CBC, CMP).  2) JUAN w/ Cycles 8 and 10 and Zometa.  3) BJT NE (in person) w/ Cycles 9 and 11 and Zometa.  4) PET scan and  prior to Cycle 11.    Brayden Stephens MD.

## 2024-11-28 NOTE — PROGRESS NOTES
[facility] Hematology / Oncology  Progress Note  Name: Kesha Zacarias  :  1960  MRN:  [id number]    --------------------    Subjective:  Kesha returns for follow-up of breast cancer accompanied by her .  All in all, she feels fairly good.  She struggles with fatigue.  She continues to work full-time.  She received clearance from her dentist to resume Zometa..    --------------------    Objective:  Video visit.    We reviewed CBC, CMP, .  We reviewed PET w/ independent review.    --------------------    Assessment / Plan:  Stage IV, hormone positive, HER2 negative (2+), invasive lobular, left-sided breast cancer with extensive bony involvement.  Status post Ribociclib plus AI 2023 - May 2024.  Ongoing everolimus plus AI 2024.  LLE DVT 2024.    Continue everolimus plus AI; planning another 4 months followed by repeat PET (bone involvement).  Suspect pancytopenia relates to anemia chronic disease, potential breast cancer marrow involvement, everolimus.  Reviewed fatigue likely multiple factors; offered a trial of Ritalin and Kesha will consider.  Hypocalcemia bears monitoring, but likely secondary to Zometa.  Kidney function bears monitoring; encourage fluids.  Resume Zometa monthly.  She prefers in person visits.  Continue chronic anticoagulation; history of LLE DVT and at risk w/ malignancy.    Patient Instructions   1) Treatment Cycles 8-11 w/ labs (CBC, CMP).  2) JUAN w/ Cycles 8 and 10 and Zometa.  3) BJT OK (in person) w/ Cycles 9 and 11 and Zometa.  4) PET scan and  prior to Cycle 11.    Brayden Stephens MD.    Video Visit:  Kesha is a 64 year old female who is being evaluated via a billable video visit.  }    Video start time:  1:04 PM  Video end time:  1:29 PM  Provider location: [facility]  Patient location: Home  Mode of transmission:  [employer] / [employer].

## 2024-12-09 ENCOUNTER — MYC MEDICAL ADVICE (OUTPATIENT)
Dept: ONCOLOGY | Facility: CLINIC | Age: 64
End: 2024-12-09

## 2024-12-09 DIAGNOSIS — M79.89 LEFT LEG SWELLING: Primary | ICD-10-CM

## 2024-12-09 NOTE — TELEPHONE ENCOUNTER
Called pt with recommendations from provider.      Jae Toribio MD Neubauer, Julie, RN10 minutes ago (4:31 PM)     TY  L ultrasound ordered.    Ochoa     Pt verbalized understanding. Assisted pt in scheduling US at [facility] on 12/11 at 4:50 pm. Advised that pt be seen in the ED with any alarm symptoms prior to this apt. Pt verbalized understanding.

## 2024-12-09 NOTE — TELEPHONE ENCOUNTER
S-(situation): pt reports left leg and foot swelling x1 month, rash x2 weeks    B-(background): pt being seen for lobular carcinoma of left breast, on eliquis with hx of dvt    A-(assessment): states that the left leg and foot has been swollen for the past month, getting slightly worse. She feels like it is better in the morning, but becomes swollen after about 2 hours on her feet and does not get better the rest of the day. She says that when she pushes her finger on the leg that it is pitting edema. She developed a new rash about 2 weeks ago on the same area of swelling, described as a bunch of small red dots that are very itchy. The rash seems to be getting worse over the course of the last 2 weeks. She reports that she has a hx of DVT in her feet. Denies numbness, tingling, or pain in her legs and feet. Taking eliquis as prescribed. Denies fever, shortness of breath, or chest pain.    R-(recommendations): Will defer to provider.

## 2024-12-11 ENCOUNTER — HOSPITAL ENCOUNTER (OUTPATIENT)
Dept: ULTRASOUND IMAGING | Facility: CLINIC | Age: 64
Discharge: HOME | End: 2024-12-11
Attending: INTERNAL MEDICINE

## 2024-12-11 ENCOUNTER — TELEPHONE (OUTPATIENT)
Dept: ONCOLOGY | Facility: CLINIC | Age: 64
End: 2024-12-11

## 2024-12-11 DIAGNOSIS — M79.89 LEFT LEG SWELLING: Primary | ICD-10-CM

## 2024-12-11 DIAGNOSIS — M79.89 LEFT LEG SWELLING: ICD-10-CM

## 2024-12-11 PROCEDURE — 93970 EXTREMITY STUDY: CPT

## 2024-12-12 DIAGNOSIS — I87.009 POST-PHLEBITIC SYNDROME: Primary | ICD-10-CM

## 2024-12-12 NOTE — CONFIDENTIAL NOTE
Spoke with patient regarding compression stockings recommendation per Dr. Toribio. Informed patient these would be measured to her by PT and ordered by [facility].    Compression Sleeve/Stocking(s) Supplies Documentation  The patient needs compression stockings for Other reason: [place] patient who has postphlebitic syndrome. Order prescribed a below-knee 20-30 mm compression stocking.    Patient verbalized understanding of plan. No further questions or concerns at this time.    Felisha Cueto, RN, BSN  [facility]  Oncology/Hematology Care Coordinator RN  [facility]  [phone]  12/12/2024, 9:43 AM

## 2024-12-12 NOTE — TELEPHONE ENCOUNTER
Called this patient regarding results of left leg venous Doppler which revealed no deep venous thrombosis.    She has discomfort in her left leg particularly as the day goes on.  There is no redness or weeping.  The leg is pale.  In the morning she is asymptomatic.    She developed a left distal deep venous thrombosis in January, 2024 and has been on apixaban since then.    Impression: Left leg postphlebitic syndrome    Plan: I wore to secure a 20-30 mm left below-knee compression stocking to be used.  I stressed to the patient that it should be fitted at a medical device store in a morning otherwise the stocking will not work..    I will have the staff get back with her in the morning to determine what durable medical equipment provider can see her, get it fitted and how we get this prescribed.  She understands and agrees.      Ochoa Toribio MD

## 2024-12-19 DIAGNOSIS — C50.912 CARCINOMA OF LEFT BREAST METASTATIC TO BONE (H): Primary | ICD-10-CM

## 2024-12-19 DIAGNOSIS — C79.51 CARCINOMA OF LEFT BREAST METASTATIC TO BONE (H): Primary | ICD-10-CM

## 2024-12-19 RX ORDER — EVEROLIMUS 10 MG/1
10 TABLET ORAL DAILY
Qty: 28 TABLET | Refills: 0 | OUTPATIENT
Start: 2024-12-29 | End: 2025-01-26

## 2024-12-19 RX ORDER — EXEMESTANE 25 MG/1
25 TABLET ORAL DAILY
Qty: 28 TABLET | Refills: 0 | Status: SHIPPED | OUTPATIENT
Start: 2024-12-29 | End: 2025-01-26

## 2024-12-21 ENCOUNTER — HEALTH MAINTENANCE LETTER (OUTPATIENT)
Age: 64
End: 2024-12-21

## 2024-12-23 ENCOUNTER — MYC REFILL (OUTPATIENT)
Dept: ONCOLOGY | Facility: CLINIC | Age: 64
End: 2024-12-23

## 2024-12-23 ENCOUNTER — THERAPY VISIT (OUTPATIENT)
Dept: PHYSICAL THERAPY | Facility: CLINIC | Age: 64
End: 2024-12-23
Attending: INTERNAL MEDICINE
Payer: COMMERCIAL

## 2024-12-23 DIAGNOSIS — I89.0 LYMPHEDEMA: Primary | ICD-10-CM

## 2024-12-23 DIAGNOSIS — E78.5 HYPERLIPIDEMIA LDL GOAL <130: ICD-10-CM

## 2024-12-23 DIAGNOSIS — M79.89 LEFT LEG SWELLING: ICD-10-CM

## 2024-12-23 PROCEDURE — 97161 PT EVAL LOW COMPLEX 20 MIN: CPT | Mod: GP | Performed by: PHYSICAL THERAPIST

## 2024-12-23 PROCEDURE — 97140 MANUAL THERAPY 1/> REGIONS: CPT | Mod: GP | Performed by: PHYSICAL THERAPIST

## 2024-12-23 RX ORDER — ATORVASTATIN CALCIUM 10 MG/1
10 TABLET, FILM COATED ORAL DAILY
Qty: 90 TABLET | Refills: 3 | Status: SHIPPED | OUTPATIENT
Start: 2024-12-23

## 2024-12-23 NOTE — PROGRESS NOTES
PHYSICAL THERAPY EVALUATION  Type of Visit: Evaluation             Subjective         Presenting condition or subjective complaint: (Patient-Rptd) Fitting for a compression sock Pt is a 63 yo female who presents to PT with L LE swelling which she reports started about 4-6 weeks ago, she began having trouble getting her shoes on and states it is achy. Pt reports she reduces overnight, but it quickly refills during the day while it is dependent. Pt has hx of DVT in L LE, January 2024, as well as TKA on that leg which she says was about 6 years ago.  Date of onset: 12/11/24    Relevant medical history: (Patient-Rptd) Anemia; Cancer; Cold or hot arm or leg; DVT (blood clot); Rheumatoid arthritis   Past Medical History:   Diagnosis Date    Arthritis 2006    Breast cancer metastasized to bone (H) 10/12/2023    Chronic depressive personality disorder     Dysplasia of cervix (uteri) 1988    Cryotherapy    Female infertility of unspecified origin     Glaucoma     Rheumatoid arthritis (H)        Dates & types of surgery:     Past Surgical History:   Procedure Laterality Date    COLONOSCOPY      COLONOSCOPY N/A 01/14/2022    Procedure: COLONOSCOPY, WITH POLYPECTOMY AND BIOPSY;  Surgeon: Gagandeep Patterson MD;  Location:  GI    HC INTRODUCE CATH FALLOPIAN TUBE, RE-OPEN/DIAGNOSIS      HERNIA REPAIR, INCISIONAL  11/11/2009    JOINT REPLACEMENT Right 09/2017    knee    TONSILLECTOMY      [facility] APPENDECTOMY  06/14/2003    [facility] REMV CATARACT INTRACAP,INSERT LENS  02/13/2003    right         Prior diagnostic imaging/testing results: (Patient-Rptd) X-ray     Prior therapy history for the same diagnosis, illness or injury: (Patient-Rptd) No      Prior Level of Function  Transfers:   Ambulation:   ADL:   IADL:     Living Environment  Social support: (Patient-Rptd) With a significant other or spouse   Type of home: (Patient-Rptd) House; Multi-level   Stairs to enter the home: (Patient-Rptd) No       Ramp: (Patient-Rptd) No   Stairs inside  the home: (Patient-Rptd) Yes (Patient-Rptd) 8 Is there a railing: (Patient-Rptd) Yes     Help at home:    Equipment owned:       Employment: (Patient-Rptd) Yes (Patient-Rptd) Office  Hobbies/Interests:      Patient goals for therapy: (Patient-Rptd) Wear normal shoes    Pain assessment:      Objective       EDEMA EVALUATION  Additional history:  Body part affected by edema: (Patient-Rptd) Left leg  If cancer related, treatment:    If not cancer related, problems with veins or cause of swelling: (Patient-Rptd) Varicose veins  Distance able to walk: (Patient-Rptd) Short walks  Time able to stand: (Patient-Rptd) Short amounts  Sensation problems in hands/feet: (Patient-Rptd) No    Edema etiology: Chemo, Chronic Venous Insufficiency, TKA, uncertain cause, but has multiple factors that could be contributing to difficulties with swelling in L vs R LE.    FUNCTIONAL SCALES       Cognitive Status Examination  Orientation: Oriented to person, place and time   Level of Consciousness: Alert  Follows Commands and Answers Questions: 100% of the time, Follows multi step instructions  Personal Safety and Judgement: Intact  Memory: Intact    EDEMA  Skin Condition: Pitting, Venous distention  Scar: Yes  L TKA  Capillary Refill: Symmetrical  Radial Pulse:   Dorsal Pedal Pulse:   Stemmer Sign: -  Ulceration: No    GIRTH MEASUREMENTS: Refer to separate girth measurement flowsheet for specific measurements.    VOLUME LE  Right LE Total Volume (mL): 3217.66  Left LE Total Volume (mL): 3958.66  LE Limb Comparison:  Identify AFFECTED Limb Volume-Vi (ml): 3958.7  Identify UNAFFECTED Limb Volume-Vu (ml): 3217.7  % LE Swellin.7  LE Limb % Difference: 23.03    RANGE OF MOTION:   STRENGTH:   POSTURE:   PALPATION: 3+ pitting noted in L LE, mild 2 pitting in R LE; pt with no c/o swelling in R LE  ACTIVITIES OF DAILY LIVING:   BED MOBILITY:   TRANSFERS:   GAIT/LOCOMOTION:   BALANCE:   SENSATION:  Pt denies sensation issues  VASCULAR:    COORDINATION:   MUSCLE TONE:     Assessment & Plan   CLINICAL IMPRESSIONS  Medical Diagnosis: Left leg swelling (M79.89)    Treatment Diagnosis: L LE edema   Impression/Assessment:  Pt presents to PT with increased swelling in L LE compared to R, pt reporting that it resolves overnight. Pt also with visible venous distension in B LE. Pt would benefit from skilled PT interventions in order to educate in her condition, assist in selection and ordering of appropriate compression garments to best manage her condition as it is still in the reversible stages. Pt would benefit from all of this for increased QOL.    Clinical Decision Making (Complexity):  Clinical Presentation: Stable/Uncomplicated  Clinical Presentation Rationale: based on medical and personal factors listed in PT evaluation  Clinical Decision Making (Complexity): Low complexity    PLAN OF CARE  Treatment Interventions:  Interventions: Manual Therapy, Therapeutic Activity, Therapeutic Exercise, Gradient Compression Bandaging    Long Term Goals     PT Goal 2  Goal Identifier: Garments  Goal Description: Patient will progress to compression garment for long term management of lymphedema and will demonstrate independence with donning/doffing garments.  Target Date: 01/20/25      Frequency of Treatment: 2-4 visits  Duration of Treatment: 4 weeks    Recommended Referrals to Other Professionals:   Education Assessment:   Learner/Method: Patient  Education Comments: Patient instructed in the anatomy and physiology of lymphatic system, including etiology of lymphedema. Verbalized understanding of lymphatic system and dysfunction that can lead to lymphedema. Components of complete decongestive therapy (CDT) were discussed, including focus on MLD, compression bandaging, exercises, self care, however, pt will be able to make transition to long term garments. Discussed options for long term garments, including stockings vs velcro. Patient is open to different  styles, is mainly concerned with making sure everything is well managed. Email sent to  with request for garment coverage information, pt has garment coverage, however, may have OOP deductible to meet if delivered in 2025.    Risks and benefits of evaluation/treatment have been explained.   Patient/Family/caregiver agrees with Plan of Care.     Evaluation Time:     PT Yeison Low Complexity Minutes (65451): 10       Signing Clinician: Hakan Johnson PT

## 2024-12-30 ENCOUNTER — INFUSION THERAPY VISIT (OUTPATIENT)
Dept: INFUSION THERAPY | Facility: CLINIC | Age: 64
End: 2024-12-30
Attending: INTERNAL MEDICINE
Payer: COMMERCIAL

## 2024-12-30 ENCOUNTER — VIRTUAL VISIT (OUTPATIENT)
Dept: ONCOLOGY | Facility: CLINIC | Age: 64
End: 2024-12-30
Attending: INTERNAL MEDICINE
Payer: COMMERCIAL

## 2024-12-30 ENCOUNTER — APPOINTMENT (OUTPATIENT)
Dept: LAB | Facility: CLINIC | Age: 64
End: 2024-12-30
Payer: COMMERCIAL

## 2024-12-30 VITALS
WEIGHT: 207.9 LBS | TEMPERATURE: 97.5 F | OXYGEN SATURATION: 98 % | DIASTOLIC BLOOD PRESSURE: 64 MMHG | SYSTOLIC BLOOD PRESSURE: 122 MMHG | RESPIRATION RATE: 16 BRPM | BODY MASS INDEX: 33.56 KG/M2 | HEART RATE: 73 BPM

## 2024-12-30 VITALS — BODY MASS INDEX: 33.11 KG/M2 | HEIGHT: 66 IN | WEIGHT: 206 LBS

## 2024-12-30 DIAGNOSIS — C50.919 CARCINOMA OF BREAST METASTATIC TO BONE, UNSPECIFIED LATERALITY (H): Primary | ICD-10-CM

## 2024-12-30 DIAGNOSIS — T45.1X5A ANTINEOPLASTIC CHEMOTHERAPY INDUCED ANEMIA: ICD-10-CM

## 2024-12-30 DIAGNOSIS — C79.51 CARCINOMA OF BREAST METASTATIC TO BONE, UNSPECIFIED LATERALITY (H): Primary | ICD-10-CM

## 2024-12-30 DIAGNOSIS — C79.51 CARCINOMA OF LEFT BREAST METASTATIC TO BONE (H): ICD-10-CM

## 2024-12-30 DIAGNOSIS — D64.81 ANTINEOPLASTIC CHEMOTHERAPY INDUCED ANEMIA: ICD-10-CM

## 2024-12-30 DIAGNOSIS — C50.912 CARCINOMA OF LEFT BREAST METASTATIC TO BONE (H): ICD-10-CM

## 2024-12-30 DIAGNOSIS — C79.51 MALIGNANT NEOPLASM METASTATIC TO BONE (H): ICD-10-CM

## 2024-12-30 LAB
ALBUMIN SERPL BCG-MCNC: 4.1 G/DL (ref 3.5–5.2)
ALP SERPL-CCNC: 107 U/L (ref 40–150)
ALT SERPL W P-5'-P-CCNC: 35 U/L (ref 0–50)
ANION GAP SERPL CALCULATED.3IONS-SCNC: 13 MMOL/L (ref 7–15)
AST SERPL W P-5'-P-CCNC: 29 U/L (ref 0–45)
BASOPHILS # BLD AUTO: 0 10E3/UL (ref 0–0.2)
BASOPHILS NFR BLD AUTO: 1 %
BILIRUB SERPL-MCNC: 0.2 MG/DL
BUN SERPL-MCNC: 19.8 MG/DL (ref 8–23)
CALCIUM SERPL-MCNC: 8.7 MG/DL (ref 8.8–10.4)
CANCER AG15-3 SERPL-ACNC: 49 U/ML
CHLORIDE SERPL-SCNC: 103 MMOL/L (ref 98–107)
CREAT SERPL-MCNC: 1.06 MG/DL (ref 0.51–0.95)
EGFRCR SERPLBLD CKD-EPI 2021: 59 ML/MIN/1.73M2
EOSINOPHIL # BLD AUTO: 0 10E3/UL (ref 0–0.7)
EOSINOPHIL NFR BLD AUTO: 0 %
ERYTHROCYTE [DISTWIDTH] IN BLOOD BY AUTOMATED COUNT: 16.2 % (ref 10–15)
GLUCOSE SERPL-MCNC: 91 MG/DL (ref 70–99)
HCO3 SERPL-SCNC: 24 MMOL/L (ref 22–29)
HCT VFR BLD AUTO: 29.6 % (ref 35–47)
HGB BLD-MCNC: 9.2 G/DL (ref 11.7–15.7)
IMM GRANULOCYTES # BLD: 0 10E3/UL
IMM GRANULOCYTES NFR BLD: 1 %
LYMPHOCYTES # BLD AUTO: 0.9 10E3/UL (ref 0.8–5.3)
LYMPHOCYTES NFR BLD AUTO: 20 %
MAGNESIUM SERPL-MCNC: 1.9 MG/DL (ref 1.7–2.3)
MCH RBC QN AUTO: 25.1 PG (ref 26.5–33)
MCHC RBC AUTO-ENTMCNC: 31.1 G/DL (ref 31.5–36.5)
MCV RBC AUTO: 81 FL (ref 78–100)
MONOCYTES # BLD AUTO: 0.4 10E3/UL (ref 0–1.3)
MONOCYTES NFR BLD AUTO: 9 %
NEUTROPHILS # BLD AUTO: 3.1 10E3/UL (ref 1.6–8.3)
NEUTROPHILS NFR BLD AUTO: 70 %
NRBC # BLD AUTO: 0 10E3/UL
NRBC BLD AUTO-RTO: 0 /100
PHOSPHATE SERPL-MCNC: 3.1 MG/DL (ref 2.5–4.5)
PLATELET # BLD AUTO: 148 10E3/UL (ref 150–450)
POTASSIUM SERPL-SCNC: 3.8 MMOL/L (ref 3.4–5.3)
PROT SERPL-MCNC: 6.7 G/DL (ref 6.4–8.3)
RBC # BLD AUTO: 3.66 10E6/UL (ref 3.8–5.2)
SODIUM SERPL-SCNC: 140 MMOL/L (ref 135–145)
WBC # BLD AUTO: 4.4 10E3/UL (ref 4–11)

## 2024-12-30 PROCEDURE — 99214 OFFICE O/P EST MOD 30 MIN: CPT | Mod: 95 | Performed by: NURSE PRACTITIONER

## 2024-12-30 PROCEDURE — 85004 AUTOMATED DIFF WBC COUNT: CPT

## 2024-12-30 PROCEDURE — 36415 COLL VENOUS BLD VENIPUNCTURE: CPT

## 2024-12-30 PROCEDURE — 96374 THER/PROPH/DIAG INJ IV PUSH: CPT

## 2024-12-30 PROCEDURE — 84100 ASSAY OF PHOSPHORUS: CPT

## 2024-12-30 PROCEDURE — 250N000011 HC RX IP 250 OP 636: Performed by: INTERNAL MEDICINE

## 2024-12-30 PROCEDURE — 86300 IMMUNOASSAY TUMOR CA 15-3: CPT

## 2024-12-30 PROCEDURE — 83735 ASSAY OF MAGNESIUM: CPT

## 2024-12-30 PROCEDURE — 80053 COMPREHEN METABOLIC PANEL: CPT

## 2024-12-30 RX ORDER — ZOLEDRONIC ACID 0.04 MG/ML
4 INJECTION, SOLUTION INTRAVENOUS ONCE
Status: COMPLETED | OUTPATIENT
Start: 2024-12-30 | End: 2024-12-30

## 2024-12-30 RX ADMIN — ZOLEDRONIC ACID 4 MG: 0.04 INJECTION, SOLUTION INTRAVENOUS at 14:44

## 2024-12-30 ASSESSMENT — PAIN SCALES - GENERAL
PAINLEVEL_OUTOF10: NO PAIN (0)
PAINLEVEL_OUTOF10: NO PAIN (0)

## 2024-12-30 NOTE — LETTER
12/30/2024      Kesha Zacarias  [address] [address] [place] [phone]      Dear Colleague,    Thank you for referring your patient, Kesha Zacarias, to the [facility]. Please see a copy of my visit note below.    Virtual Visit Details    Type of service:  Telephone visit 12 min     Oncology/Hematology Visit Note  Dec 30, 2024    Reason for Visit: follow up of left sided breast cancer- stage IV  Pt follows with Dr Stephens   Invasive lobular breast cancer with extensive bony involvement   Hormone positive HER2 negative  Status post Ribociclib plus AI Nov 2023 - May 2024.  Ongoing everolimus plus AI Jun 2024.      Interval History:  Patient reports feeling well.  Reports she has been tolerating treatment well.  Denies fever chills sweats cough shortness of breath chest pain nausea vomiting diarrhea abdominal pain or bleeding she reports she works with physical therapy they recommend Ace wrap for her bilateral lower extremity edema  Patient is taking Eliquis denies bleeding bruising    Review of Systems:  14 point ROS of systems including Constitutional, Eyes, Respiratory, Cardiovascular, Gastroenterology, Genitourinary, Integumentary, Muscularskeletal, Psychiatric were all negative except for pertinent positives noted in my HPI.      Physical Examination:  Telephone visit -- no audible cough or wheezing       Laboratory Data:  CBC CMP results reviewed    Assessment and Plan:      left sided breast cancer- stage IV  Pt follows with Dr Stephens   Invasive lobular breast cancer with extensive bony involvement   Hormone positive HER2 negative  Status post Ribociclib plus AI Nov 2023 - May 2024.  Ongoing everolimus plus AI Jun 2024.  Tolerating treatment well  Continue with treatment  Patient scheduled with Dr. Stephens  next month with lab  Plan for PET scan in March      Bone metastasis  Continue with calcium and vitamin D  Continue with monthly Zometa  In the event of jaw pain or dental  issues please call clinic      DVT-left lower extremity  Diagnosed January 2024  Continue with Eliquis    Bilateral lower extremity edema  12/11/2024-negative ultrasound for DVT  Compression socks  Ambulate  Low-salt diet  Continue with physical therapy  Please call clinic with any changes or worsening    Anemia-stable  Patient denies bleeding  Asymptomatic  Monitor    Thrombocytopenia secondary to treatment  Monitor closely  In the event of bleeding bruising fall injury please call our clinic    Mild elevation in creatinine   Stable  Proper hydration discussed    Mild hypocalcemia  Calcium supplement    Please call clinic with any changes health condition or questions      STEPAN Addison CNP  [facility]     Chart documentation with Dragon Voice recognition Software. Although reviewed after completion, some words and grammatical errors may remain.            Again, thank you for allowing me to participate in the care of your patient.        Sincerely,        STEPAN Addison CNP    Electronically signed

## 2024-12-30 NOTE — NURSING NOTE
Current patient location:  [facility]    Is the patient currently in the state of [place]? YES    Visit mode:VIDEO    If the visit is dropped, the patient can be reconnected by:VIDEO VISIT: Text to cell phone:   Telephone Information:   Mobile [phone]       Will anyone else be joining the visit? NO  (If patient encounters technical issues they should call [phone]    Are changes needed to the allergy or medication list? No    Are refills needed on medications prescribed by this physician? NO    Rooming Documentation:  Unable to complete questionnaire(s) due to time    Reason for visit: TC CATESF

## 2024-12-30 NOTE — PROGRESS NOTES
Infusion Nursing Note:  Kesha Zacarias presents today for Zometa.    Patient seen by provider today: Yes: Francisco Hernandez   present during visit today: Not Applicable.    Note: Patient arrived ambulatory after labs drawn and virtual visit. Received the OK to proceed with tx after Dental exam/DR. Stephens visit. Encouraged to drink plenty of water, take tylenol if needed. Patient to report to provider with any dental issues. VSS.       Intravenous Access:  Peripheral IV placed.    Treatment Conditions:  Lab Results   Component Value Date    HGB 9.2 (L) 12/30/2024    WBC 4.4 12/30/2024    ANEU 3.7 11/21/2005    ANEUTAUTO 3.1 12/30/2024     (L) 12/30/2024        Lab Results   Component Value Date     12/30/2024    POTASSIUM 3.8 12/30/2024    MAG 1.9 12/30/2024    CR 1.06 (H) 12/30/2024    NITISH 8.7 (L) 12/30/2024    BILITOTAL 0.2 12/30/2024    ALBUMIN 4.1 12/30/2024    ALT 35 12/30/2024    AST 29 12/30/2024       Results reviewed, labs MET treatment parameters, ok to proceed with treatment.      Post Infusion Assessment:  Patient tolerated infusion without incident.  Patient observed for 15 minutes post ZOMETA per protocol.  Site patent and intact, free from redness, edema or discomfort.  Access discontinued per protocol.       Discharge Plan:   Patient discharged in stable condition accompanied by: self.  Departure Mode: Ambulatory.      Sherri Candelario RN

## 2024-12-30 NOTE — PROGRESS NOTES
Virtual Visit Details    Type of service:  Telephone visit 12 min     Oncology/Hematology Visit Note  Dec 30, 2024    Reason for Visit: follow up of left sided breast cancer- stage IV  Pt follows with Dr Stephens   Invasive lobular breast cancer with extensive bony involvement   Hormone positive HER2 negative  Status post Ribociclib plus AI Nov 2023 - May 2024.  Ongoing everolimus plus AI Jun 2024.      Interval History:  Patient reports feeling well.  Reports she has been tolerating treatment well.  Denies fever chills sweats cough shortness of breath chest pain nausea vomiting diarrhea abdominal pain or bleeding she reports she works with physical therapy they recommend Ace wrap for her bilateral lower extremity edema  Patient is taking Eliquis denies bleeding bruising    Review of Systems:  14 point ROS of systems including Constitutional, Eyes, Respiratory, Cardiovascular, Gastroenterology, Genitourinary, Integumentary, Muscularskeletal, Psychiatric were all negative except for pertinent positives noted in my HPI.      Physical Examination:  Telephone visit -- no audible cough or wheezing       Laboratory Data:  CBC CMP results reviewed    Assessment and Plan:      left sided breast cancer- stage IV  Pt follows with Dr Stephens   Invasive lobular breast cancer with extensive bony involvement   Hormone positive HER2 negative  Status post Ribociclib plus AI Nov 2023 - May 2024.  Ongoing everolimus plus AI Jun 2024.  Tolerating treatment well  Continue with treatment  Patient scheduled with Dr. Stephens  next month with lab  Plan for PET scan in March      Bone metastasis  Continue with calcium and vitamin D  Continue with monthly Zometa  In the event of jaw pain or dental issues please call clinic      DVT-left lower extremity  Diagnosed January 2024  Continue with Eliquis    Bilateral lower extremity edema  12/11/2024-negative ultrasound for DVT  Compression socks  Ambulate  Low-salt diet  Continue with physical  therapy  Please call clinic with any changes or worsening    Anemia-stable  Patient denies bleeding  Asymptomatic  Monitor    Thrombocytopenia secondary to treatment  Monitor closely  In the event of bleeding bruising fall injury please call our clinic    Mild elevation in creatinine   Stable  Proper hydration discussed    Mild hypocalcemia  Calcium supplement    Please call clinic with any changes health condition or questions      STEPAN Addison [facility]     Chart documentation with Dragon Voice recognition Software. Although reviewed after completion, some words and grammatical errors may remain.

## 2025-01-09 DIAGNOSIS — M05.79 RHEUMATOID ARTHRITIS INVOLVING MULTIPLE SITES WITH POSITIVE RHEUMATOID FACTOR (H): ICD-10-CM

## 2025-01-09 RX ORDER — HYDROXYCHLOROQUINE SULFATE 200 MG/1
TABLET, FILM COATED ORAL
Qty: 225 TABLET | Refills: 3 | OUTPATIENT
Start: 2025-01-09

## 2025-01-09 RX ORDER — HYDROXYCHLOROQUINE SULFATE 200 MG/1
TABLET, FILM COATED ORAL
Qty: 225 TABLET | Refills: 3 | Status: CANCELLED | OUTPATIENT
Start: 2025-01-09

## 2025-01-16 DIAGNOSIS — C79.51 CARCINOMA OF LEFT BREAST METASTATIC TO BONE (H): Primary | ICD-10-CM

## 2025-01-16 DIAGNOSIS — C50.912 CARCINOMA OF LEFT BREAST METASTATIC TO BONE (H): Primary | ICD-10-CM

## 2025-01-16 RX ORDER — EXEMESTANE 25 MG/1
25 TABLET ORAL DAILY
Qty: 28 TABLET | Refills: 0 | Status: SHIPPED | OUTPATIENT
Start: 2025-01-26 | End: 2025-02-23

## 2025-01-16 RX ORDER — EVEROLIMUS 10 MG/1
10 TABLET ORAL DAILY
Qty: 28 TABLET | Refills: 0 | OUTPATIENT
Start: 2025-01-26 | End: 2025-02-23

## 2025-01-27 ENCOUNTER — ONCOLOGY VISIT (OUTPATIENT)
Dept: ONCOLOGY | Facility: CLINIC | Age: 65
End: 2025-01-27
Attending: INTERNAL MEDICINE
Payer: COMMERCIAL

## 2025-01-27 ENCOUNTER — LAB (OUTPATIENT)
Dept: INFUSION THERAPY | Facility: CLINIC | Age: 65
End: 2025-01-27
Attending: INTERNAL MEDICINE
Payer: COMMERCIAL

## 2025-01-27 VITALS
TEMPERATURE: 98.2 F | BODY MASS INDEX: 33.75 KG/M2 | HEART RATE: 75 BPM | WEIGHT: 210 LBS | OXYGEN SATURATION: 97 % | HEIGHT: 66 IN | DIASTOLIC BLOOD PRESSURE: 85 MMHG | SYSTOLIC BLOOD PRESSURE: 118 MMHG

## 2025-01-27 DIAGNOSIS — T45.1X5A ANTINEOPLASTIC CHEMOTHERAPY INDUCED ANEMIA: ICD-10-CM

## 2025-01-27 DIAGNOSIS — C50.919 CARCINOMA OF BREAST METASTATIC TO BONE, UNSPECIFIED LATERALITY (H): Primary | ICD-10-CM

## 2025-01-27 DIAGNOSIS — C79.51 CARCINOMA OF LEFT BREAST METASTATIC TO BONE (H): ICD-10-CM

## 2025-01-27 DIAGNOSIS — R53.0 NEOPLASTIC (MALIGNANT) RELATED FATIGUE: ICD-10-CM

## 2025-01-27 DIAGNOSIS — C50.912 CARCINOMA OF LEFT BREAST METASTATIC TO BONE (H): ICD-10-CM

## 2025-01-27 DIAGNOSIS — D64.81 ANTINEOPLASTIC CHEMOTHERAPY INDUCED ANEMIA: ICD-10-CM

## 2025-01-27 DIAGNOSIS — C50.919 CARCINOMA OF BREAST METASTATIC TO BONE, UNSPECIFIED LATERALITY (H): ICD-10-CM

## 2025-01-27 DIAGNOSIS — C79.51 CARCINOMA OF BREAST METASTATIC TO BONE, UNSPECIFIED LATERALITY (H): Primary | ICD-10-CM

## 2025-01-27 DIAGNOSIS — Z79.01 CHRONIC ANTICOAGULATION: ICD-10-CM

## 2025-01-27 DIAGNOSIS — C50.912 LOBULAR CARCINOMA OF LEFT BREAST (H): Primary | ICD-10-CM

## 2025-01-27 DIAGNOSIS — C79.51 CARCINOMA OF BREAST METASTATIC TO BONE, UNSPECIFIED LATERALITY (H): ICD-10-CM

## 2025-01-27 LAB
ALBUMIN SERPL BCG-MCNC: 3.8 G/DL (ref 3.5–5.2)
ALP SERPL-CCNC: 101 U/L (ref 40–150)
ALT SERPL W P-5'-P-CCNC: 29 U/L (ref 0–50)
ANION GAP SERPL CALCULATED.3IONS-SCNC: 14 MMOL/L (ref 7–15)
AST SERPL W P-5'-P-CCNC: 40 U/L (ref 0–45)
BASOPHILS # BLD AUTO: 0 10E3/UL (ref 0–0.2)
BASOPHILS NFR BLD AUTO: 1 %
BILIRUB SERPL-MCNC: 0.2 MG/DL
BUN SERPL-MCNC: 21 MG/DL (ref 8–23)
CALCIUM SERPL-MCNC: 8.9 MG/DL (ref 8.8–10.4)
CANCER AG15-3 SERPL-ACNC: 48 U/ML
CHLORIDE SERPL-SCNC: 107 MMOL/L (ref 98–107)
CREAT SERPL-MCNC: 1.19 MG/DL (ref 0.51–0.95)
EGFRCR SERPLBLD CKD-EPI 2021: 51 ML/MIN/1.73M2
EOSINOPHIL # BLD AUTO: 0 10E3/UL (ref 0–0.7)
EOSINOPHIL NFR BLD AUTO: 1 %
ERYTHROCYTE [DISTWIDTH] IN BLOOD BY AUTOMATED COUNT: 16.3 % (ref 10–15)
GLUCOSE SERPL-MCNC: 127 MG/DL (ref 70–99)
HCO3 SERPL-SCNC: 25 MMOL/L (ref 22–29)
HCT VFR BLD AUTO: 31.2 % (ref 35–47)
HGB BLD-MCNC: 9.3 G/DL (ref 11.7–15.7)
HOLD SPECIMEN: NORMAL
IMM GRANULOCYTES # BLD: 0 10E3/UL
IMM GRANULOCYTES NFR BLD: 1 %
LYMPHOCYTES # BLD AUTO: 0.6 10E3/UL (ref 0.8–5.3)
LYMPHOCYTES NFR BLD AUTO: 16 %
MCH RBC QN AUTO: 24.3 PG (ref 26.5–33)
MCHC RBC AUTO-ENTMCNC: 29.8 G/DL (ref 31.5–36.5)
MCV RBC AUTO: 82 FL (ref 78–100)
MONOCYTES # BLD AUTO: 0.2 10E3/UL (ref 0–1.3)
MONOCYTES NFR BLD AUTO: 7 %
NEUTROPHILS # BLD AUTO: 2.7 10E3/UL (ref 1.6–8.3)
NEUTROPHILS NFR BLD AUTO: 75 %
NRBC # BLD AUTO: 0 10E3/UL
NRBC BLD AUTO-RTO: 0 /100
PLATELET # BLD AUTO: 147 10E3/UL (ref 150–450)
POTASSIUM SERPL-SCNC: 3.3 MMOL/L (ref 3.4–5.3)
PROT SERPL-MCNC: 6.4 G/DL (ref 6.4–8.3)
RBC # BLD AUTO: 3.83 10E6/UL (ref 3.8–5.2)
SODIUM SERPL-SCNC: 146 MMOL/L (ref 135–145)
WBC # BLD AUTO: 3.6 10E3/UL (ref 4–11)

## 2025-01-27 PROCEDURE — 96365 THER/PROPH/DIAG IV INF INIT: CPT

## 2025-01-27 PROCEDURE — 250N000011 HC RX IP 250 OP 636: Performed by: INTERNAL MEDICINE

## 2025-01-27 PROCEDURE — 99215 OFFICE O/P EST HI 40 MIN: CPT | Performed by: INTERNAL MEDICINE

## 2025-01-27 PROCEDURE — 82565 ASSAY OF CREATININE: CPT

## 2025-01-27 PROCEDURE — 86300 IMMUNOASSAY TUMOR CA 15-3: CPT

## 2025-01-27 PROCEDURE — 99207 PR NO CHARGE LOS: CPT

## 2025-01-27 PROCEDURE — 84155 ASSAY OF PROTEIN SERUM: CPT

## 2025-01-27 PROCEDURE — 258N000003 HC RX IP 258 OP 636: Performed by: INTERNAL MEDICINE

## 2025-01-27 PROCEDURE — 36415 COLL VENOUS BLD VENIPUNCTURE: CPT

## 2025-01-27 PROCEDURE — 99214 OFFICE O/P EST MOD 30 MIN: CPT | Mod: 25 | Performed by: INTERNAL MEDICINE

## 2025-01-27 PROCEDURE — 85014 HEMATOCRIT: CPT

## 2025-01-27 RX ADMIN — ZOLEDRONIC ACID 3.5 MG: 4 INJECTION, SOLUTION, CONCENTRATE INTRAVENOUS at 14:11

## 2025-01-27 RX ADMIN — SODIUM CHLORIDE 100 ML: 9 INJECTION, SOLUTION INTRAVENOUS at 14:10

## 2025-01-27 ASSESSMENT — PAIN SCALES - GENERAL: PAINLEVEL_OUTOF10: NO PAIN (0)

## 2025-01-27 NOTE — NURSING NOTE
"Oncology Rooming Note    January 27, 2025 1:30 PM   Kesha Zacarias is a 64 year old female who presents for:    Chief Complaint   Patient presents with    Oncology Clinic Visit     Initial Vitals: /85 (BP Location: Right arm)   Pulse 75   Temp 98.2  F (36.8  C) (Oral)   Ht 1.676 m (5' 6\")   Wt 95.3 kg (210 lb)   LMP 11/22/2011 (Exact Date)   SpO2 97%   BMI 33.89 kg/m   Estimated body mass index is 33.89 kg/m  as calculated from the following:    Height as of this encounter: 1.676 m (5' 6\").    Weight as of this encounter: 95.3 kg (210 lb). Body surface area is 2.11 meters squared.  No Pain (0) Comment: Data Unavailable   Patient's last menstrual period was 11/22/2011 (exact date).  Allergies reviewed: Yes  Medications reviewed: Yes    Medications: Medication refills not needed today.  Pharmacy name entered into WedWu:    [facility] [place] - [place], [place] - [address] [id number] [place], [place] - [address]  [facility] - [place], [place] - [address]  [facility] - [place], [place] - [address]    Frailty Screening:   Is the patient here for a new oncology consult visit in cancer care? 2. No      Clinical concerns: Patient will discuss concerns with provider.       Negrita Hull MA            "

## 2025-01-27 NOTE — LETTER
"2025      Kesha Zacarias  [address] [address] [place] [phone]      Dear Colleague,    Thank you for referring your patient, Kesha Zacarias, to the [facility]. Please see a copy of my visit note below.    [facility] Hematology / Oncology  Progress Note  Name: Kesha Zacarias  :  1960  MRN:  [id number]    --------------------    Subjective:  Kesha returns for follow-up of breast cancer unaccompanied.  All in all, she remains okay.  She did well with her first dose of Zometa.  No major side effects from either everolimus or exemestane.  Biggest thing affecting her remains fatigue.  She is sleeping just fine.  She stays asleep.  She wakes up feeling well rested but poops out in the middle of the afternoon.    --------------------    Objective:  VS: /85 (BP Location: Right arm)   Pulse 75   Temp 98.2  F (36.8  C) (Oral)   Ht 1.676 m (5' 6\")   Wt 95.3 kg (210 lb)   LMP 2011 (Exact Date)   SpO2 97%   BMI 33.89 kg/m    Gen: Well-appearing.    We reviewed CBC, CMP, .    --------------------    Assessment / Plan:  Stage IV, hormone positive, HER2 negative (2+), invasive lobular, left-sided breast cancer with extensive bony involvement.  Status post Ribociclib plus AI 2023 - May 2024.  Ongoing everolimus plus AI 2024.  LLE DVT 2024.    Continue everolimus plus AI; planning another 4 months followed by repeat PET (bone involvement).  Suspect pancytopenia relates to anemia chronic disease, potential breast cancer marrow involvement, everolimus.  Reviewed fatigue likely multiple factors; trial of afternoon Ritalin 5 mg.  Chemistries notable for trace hypernatremia and hypokalemia and stable CKD IIIA; monitor.  Resume Zometa monthly.  She prefers in person visits.  Continue chronic anticoagulation; history of LLE DVT and at risk w/ malignancy.    Patient Instructions   Follow-up as planned.    Brayden Stephens, " MD.            Again, thank you for allowing me to participate in the care of your patient.        Sincerely,        Brayden Stephens MD    Electronically signed

## 2025-01-27 NOTE — PROGRESS NOTES
Infusion Nursing Note:  Kesha FARNSWORTH Deann presents today for Zometa.    Patient seen by provider today: Yes: Dr. Stephens   present during visit today: Not Applicable.    Note: Patient expressed no new medical concerns. Patient is taking calcium and vitamin D. Patient denied dental concerns or invasive procedures. Her last visit to the dentist was last Oct/Nov. Patient encouraged to drink plenty of water. No questions/concerns prior to infusion.    Intravenous Access:  Peripheral IV placed.    Treatment Conditions:  Lab Results   Component Value Date     (H) 01/27/2025    POTASSIUM 3.3 (L) 01/27/2025    MAG 1.9 12/30/2024    CR 1.19 (H) 01/27/2025    NITISH 8.9 01/27/2025    BILITOTAL 0.2 01/27/2025    ALBUMIN 3.8 01/27/2025    ALT 29 01/27/2025    AST 40 01/27/2025       Results reviewed, labs MET treatment parameters, ok to proceed with treatment.    Post Infusion Assessment:  Patient tolerated infusion without incident.  Blood return noted pre and post infusion.  Site patent and intact, free from redness, edema or discomfort.  No evidence of extravasations.  Access discontinued per protocol.     Discharge Plan:   [facility]  Patient will return 2/24/25 for next appointment.   Patient discharged in stable condition accompanied by: self.  Departure Mode: Ambulatory.    Daya Conner RN

## 2025-01-28 NOTE — PROGRESS NOTES
"[facility] Hematology / Oncology  Progress Note  Name: Kesha Zacarias  :  1960  MRN:  [id number]    --------------------    Subjective:  Kesha returns for follow-up of breast cancer unaccompanied.  All in all, she remains okay.  She did well with her first dose of Zometa.  No major side effects from either everolimus or exemestane.  Biggest thing affecting her remains fatigue.  She is sleeping just fine.  She stays asleep.  She wakes up feeling well rested but poops out in the middle of the afternoon.    --------------------    Objective:  VS: /85 (BP Location: Right arm)   Pulse 75   Temp 98.2  F (36.8  C) (Oral)   Ht 1.676 m (5' 6\")   Wt 95.3 kg (210 lb)   LMP 2011 (Exact Date)   SpO2 97%   BMI 33.89 kg/m    Gen: Well-appearing.    We reviewed CBC, CMP, .    --------------------    Assessment / Plan:  Stage IV, hormone positive, HER2 negative (2+), invasive lobular, left-sided breast cancer with extensive bony involvement.  Status post Ribociclib plus AI 2023 - May 2024.  Ongoing everolimus plus AI 2024.  LLE DVT 2024.    Continue everolimus plus AI; planning another 4 months followed by repeat PET (bone involvement).  Suspect pancytopenia relates to anemia chronic disease, potential breast cancer marrow involvement, everolimus.  Reviewed fatigue likely multiple factors; trial of afternoon Ritalin 5 mg.  Chemistries notable for trace hypernatremia and hypokalemia and stable CKD IIIA; monitor.  Resume Zometa monthly.  She prefers in person visits.  Continue chronic anticoagulation; history of LLE DVT and at risk w/ malignancy.    Patient Instructions   Follow-up as planned.    Brayden Stephens MD.            "

## 2025-02-13 DIAGNOSIS — C50.912 CARCINOMA OF LEFT BREAST METASTATIC TO BONE (H): Primary | ICD-10-CM

## 2025-02-13 DIAGNOSIS — C79.51 CARCINOMA OF LEFT BREAST METASTATIC TO BONE (H): Primary | ICD-10-CM

## 2025-02-13 RX ORDER — EXEMESTANE 25 MG/1
25 TABLET ORAL DAILY
Qty: 28 TABLET | Refills: 0 | Status: SHIPPED | OUTPATIENT
Start: 2025-02-23 | End: 2025-03-23

## 2025-02-13 RX ORDER — EVEROLIMUS 10 MG/1
10 TABLET ORAL DAILY
Qty: 28 TABLET | Refills: 0 | Status: SHIPPED | OUTPATIENT
Start: 2025-02-23 | End: 2025-03-23

## 2025-02-19 ENCOUNTER — MYC MEDICAL ADVICE (OUTPATIENT)
Dept: ONCOLOGY | Facility: CLINIC | Age: 65
End: 2025-02-19
Payer: COMMERCIAL

## 2025-02-19 DIAGNOSIS — R53.0 NEOPLASTIC (MALIGNANT) RELATED FATIGUE: Primary | ICD-10-CM

## 2025-02-19 RX ORDER — METHYLPHENIDATE HYDROCHLORIDE 5 MG/1
5 TABLET ORAL 2 TIMES DAILY
Qty: 60 TABLET | Refills: 0 | Status: SHIPPED | OUTPATIENT
Start: 2025-02-19

## 2025-02-21 NOTE — PROGRESS NOTES
Virtual Visit Details    Type of service:  Video Visit     Originating Location (pt. Location): Other [facility]    Distant Location (provider location):  On-site  Platform used for Video Visit: Negrita      2025    [facility] Hematology / Oncology  Progress Note  Name: Kesha Zacarias  :  1960  MRN:  [id number]    --------------------    Subjective:  Kesha reports she is doing well today. She feels she has low energy but feels this is stable. She does have occasional bone pain not requiring anything for pain management. She has intermittent, sporadic headaches that tend to be in the right frontal region. This is ongoing. No vision changes- history of blurred vision that his chronic. At the beginning of February she at virus with congestion, cough, and flu like symptoms. She didn't take her temperature. She has a slight lingering cough but significant cough. No shortness of breath or chest pain. She has a swelling in her legs over the past ~2 months. She has redness on the left calf. She has overall dry skin. This rash is not new and ongoing since the swelling began in 2024. She was seen by lymphedema PT and they recommended wraps but she could not afford these. She is using topical lotion to the skin. No warmth to the calf. No diarrhea. She has slight constipation. She has miralax as needed. No urinary concerns. No new dental pain or mouth pain. Tolerating zometa well. She tried ritalin on Saturday and  but felt a little off on these. She takes her everolimus and exemestane regularly.     --------------------    Objective:  Video physical exam  General: Patient appears well in no acute distress.   Skin: No visualized rash or lesions on visualized skin  Eyes: EOMI, no erythema, sclera icterus or discharge noted  Resp: Appears to be breathing comfortably without accessory muscle usage, speaking in full sentences, no cough  MSK: Appears to have normal range of motion based on  visualized movements  Neurologic: No apparent tremors, facial movements symmetric  Psych: affect bright, alert and oriented    Labs/Imaging:  Most Recent 3 CBC's:  Recent Labs   Lab Test 02/24/25  1321 01/27/25  1309 12/30/24  1306   WBC 4.7 3.6* 4.4   HGB 9.4* 9.3* 9.2*   MCV 81 82 81   * 147* 148*   ANEUTAUTO 3.3 2.7 3.1     Most Recent 3 BMP's:  Recent Labs   Lab Test 02/24/25  1321 01/27/25  1309 12/30/24  1306    146* 140   POTASSIUM 3.9 3.3* 3.8   CHLORIDE 107 107 103   CO2 22 25 24   BUN 18.5 21.0 19.8   CR 1.09* 1.19* 1.06*   ANIONGAP 14 14 13   NITISH 8.9 8.9 8.7*   * 127* 91   PROTTOTAL 6.8 6.4 6.7   ALBUMIN 3.7 3.8 4.1    Most Recent 3 LFT's:  Recent Labs   Lab Test 02/24/25  1321 01/27/25  1309 12/30/24  1306   AST 29 40 29   ALT 30 29 35   ALKPHOS 98 101 107   BILITOTAL 0.2 0.2 0.2    Most Recent 2 TSH and T4:  Recent Labs   Lab Test 05/30/24  1319 05/11/19  0823   TSH 1.19 1.44     Narrative & Impression   EXAM: PET ONCOLOGY (EYES TO THIGHS)  LOCATION: [facility]  DATE: 11/23/2024     INDICATION: Subsequent treatment planning and restaging for malignant neoplasm of overlapping sites of right breast. Metastatic breast cancer. Palliative radiation to sacrum January 2024. Continues on systemic/hormonal therapy.  COMPARISON: FDG PET/CT 08/24/2024.  CONTRAST: None.  TECHNIQUE: Serum glucose level 114 mg/dL. One hour post intravenous administration of 12.8 mCi F-18 FDG, PET imaging was performed from the skull vertex to mid thighs, utilizing attenuation correction with concurrent axial CT and PET/CT image fusion.   Dose reduction techniques were used.     PET/CT FINDINGS: Redemonstrated heterogeneous FDG osseous uptake with superimposed more focal areas of FDG uptake compatible with osseous metastatic disease. [place] of FDG avid osseous metastatic disease appears decreased compared to prior such as   decreased burden of focal FDG avid disease involving the  "bilateral proximal femurs. Residual FDG avid osseous metastatic foci are mildly decreased in avidity compared to prior such as the right distal femoral diaphysis (SUV max of 7.9 compared to 11.4),   left distal femoral diaphysis (SUV max of 11.0, previously 12.1), and L1 vertebral body (SUV max of 6.3, previously 8.9). No new site of FDG avid metastatic disease.     Inflammatory uptake of the right greater than left rotator cuffs. Inflammatory uptake of the bilateral hips.     CT FINDINGS: Mild mucosal thickening of the left maxillary sinus. Mild coronary artery calcifications. Linear left lower lobe atelectasis. Postsurgical changes of ventral hernia repair with mesh. Retroaortic left renal vein. Non-FDG avid sclerotic   osseous lesions which likely represent sites of treated/quiescent disease.                                                                      IMPRESSION:  Continued response to therapy with decreasing burden and FDG avidity of osseous metastases. No new site of FDG avid metastatic disease.     I reviewed the above labs today.    --------------------    Assessment / Plan:  Stage IV, hormone positive, HER2 negative (2+), invasive lobular, left-sided breast cancer with extensive bony involvement.  Status post Ribociclib plus AI Nov 2023 - May 2024.  Ongoing everolimus plus AI Jun 2024.  LLE DVT Jan 2024.    Continue everolimus plus AI.   Next PET scheduled 3/15/25.   Reviewed her CBC, CMP, and magnesium. Labs are stable. She has ongoing anemia and thrombocytopenia.   Tolerated zometa well, continue monthly. No contraindication to next dose 2/24/25.   Ritalin will remain available prn for fatigue.   For her peripheral edema, discussed starting compression stockings given wraps were ~$500. I have asked her to send me a photo of the \"rash\" if able but suspect given this is ongoing and unchanged from her last US DVT in December 2024 that this may be stasis changes. Unfortunately, I am not able to " directly visualize this on our visit today. I discussed red flags and when to be evaluated in person.   Continue chronic anticoagulation; history of LLE DVT and at risk w/ malignancy.      23 minutes spent on the date of the encounter doing chart review, review of test results, interpretation of tests, patient visit, and documentation     Kamila Cancino PA-C

## 2025-02-24 ENCOUNTER — APPOINTMENT (OUTPATIENT)
Dept: FAMILY MEDICINE | Facility: CLINIC | Age: 65
End: 2025-02-24
Payer: COMMERCIAL

## 2025-02-24 ENCOUNTER — VIRTUAL VISIT (OUTPATIENT)
Dept: ONCOLOGY | Facility: CLINIC | Age: 65
End: 2025-02-24
Payer: COMMERCIAL

## 2025-02-24 ENCOUNTER — INFUSION THERAPY VISIT (OUTPATIENT)
Dept: INFUSION THERAPY | Facility: CLINIC | Age: 65
End: 2025-02-24
Attending: INTERNAL MEDICINE
Payer: COMMERCIAL

## 2025-02-24 ENCOUNTER — APPOINTMENT (OUTPATIENT)
Dept: LAB | Facility: CLINIC | Age: 65
End: 2025-02-24
Payer: COMMERCIAL

## 2025-02-24 VITALS — HEIGHT: 66 IN | BODY MASS INDEX: 32.95 KG/M2 | WEIGHT: 205 LBS

## 2025-02-24 VITALS
BODY MASS INDEX: 33.28 KG/M2 | HEART RATE: 65 BPM | SYSTOLIC BLOOD PRESSURE: 118 MMHG | TEMPERATURE: 97.7 F | WEIGHT: 206.2 LBS | DIASTOLIC BLOOD PRESSURE: 68 MMHG | RESPIRATION RATE: 16 BRPM | OXYGEN SATURATION: 99 %

## 2025-02-24 DIAGNOSIS — C50.912 CARCINOMA OF LEFT BREAST METASTATIC TO BONE (H): Primary | ICD-10-CM

## 2025-02-24 DIAGNOSIS — C79.51 CARCINOMA OF LEFT BREAST METASTATIC TO BONE (H): Primary | ICD-10-CM

## 2025-02-24 DIAGNOSIS — C50.912 LOBULAR CARCINOMA OF LEFT BREAST (H): ICD-10-CM

## 2025-02-24 DIAGNOSIS — C50.919 CARCINOMA OF BREAST METASTATIC TO BONE, UNSPECIFIED LATERALITY (H): Primary | ICD-10-CM

## 2025-02-24 DIAGNOSIS — C79.51 CARCINOMA OF BREAST METASTATIC TO BONE, UNSPECIFIED LATERALITY (H): Primary | ICD-10-CM

## 2025-02-24 DIAGNOSIS — C79.51 MALIGNANT NEOPLASM METASTATIC TO BONE (H): ICD-10-CM

## 2025-02-24 LAB
ALBUMIN SERPL BCG-MCNC: 3.7 G/DL (ref 3.5–5.2)
ALP SERPL-CCNC: 98 U/L (ref 40–150)
ALT SERPL W P-5'-P-CCNC: 30 U/L (ref 0–50)
ANION GAP SERPL CALCULATED.3IONS-SCNC: 14 MMOL/L (ref 7–15)
AST SERPL W P-5'-P-CCNC: 29 U/L (ref 0–45)
BASOPHILS # BLD AUTO: 0 10E3/UL (ref 0–0.2)
BASOPHILS NFR BLD AUTO: 0 %
BILIRUB SERPL-MCNC: 0.2 MG/DL
BUN SERPL-MCNC: 18.5 MG/DL (ref 8–23)
CALCIUM SERPL-MCNC: 8.9 MG/DL (ref 8.8–10.4)
CHLORIDE SERPL-SCNC: 107 MMOL/L (ref 98–107)
CREAT SERPL-MCNC: 1.09 MG/DL (ref 0.51–0.95)
EGFRCR SERPLBLD CKD-EPI 2021: 57 ML/MIN/1.73M2
EOSINOPHIL # BLD AUTO: 0 10E3/UL (ref 0–0.7)
EOSINOPHIL NFR BLD AUTO: 0 %
ERYTHROCYTE [DISTWIDTH] IN BLOOD BY AUTOMATED COUNT: 16.8 % (ref 10–15)
GLUCOSE SERPL-MCNC: 110 MG/DL (ref 70–99)
HCO3 SERPL-SCNC: 22 MMOL/L (ref 22–29)
HCT VFR BLD AUTO: 30.9 % (ref 35–47)
HGB BLD-MCNC: 9.4 G/DL (ref 11.7–15.7)
IMM GRANULOCYTES # BLD: 0 10E3/UL
IMM GRANULOCYTES NFR BLD: 0 %
LYMPHOCYTES # BLD AUTO: 0.9 10E3/UL (ref 0.8–5.3)
LYMPHOCYTES NFR BLD AUTO: 20 %
MAGNESIUM SERPL-MCNC: 1.9 MG/DL (ref 1.7–2.3)
MCH RBC QN AUTO: 24.6 PG (ref 26.5–33)
MCHC RBC AUTO-ENTMCNC: 30.4 G/DL (ref 31.5–36.5)
MCV RBC AUTO: 81 FL (ref 78–100)
MONOCYTES # BLD AUTO: 0.4 10E3/UL (ref 0–1.3)
MONOCYTES NFR BLD AUTO: 9 %
NEUTROPHILS # BLD AUTO: 3.3 10E3/UL (ref 1.6–8.3)
NEUTROPHILS NFR BLD AUTO: 70 %
NRBC # BLD AUTO: 0 10E3/UL
NRBC BLD AUTO-RTO: 0 /100
PHOSPHATE SERPL-MCNC: 3 MG/DL (ref 2.5–4.5)
PLATELET # BLD AUTO: 133 10E3/UL (ref 150–450)
POTASSIUM SERPL-SCNC: 3.9 MMOL/L (ref 3.4–5.3)
PROT SERPL-MCNC: 6.8 G/DL (ref 6.4–8.3)
RBC # BLD AUTO: 3.82 10E6/UL (ref 3.8–5.2)
SODIUM SERPL-SCNC: 143 MMOL/L (ref 135–145)
WBC # BLD AUTO: 4.7 10E3/UL (ref 4–11)

## 2025-02-24 PROCEDURE — 36415 COLL VENOUS BLD VENIPUNCTURE: CPT

## 2025-02-24 PROCEDURE — 250N000011 HC RX IP 250 OP 636: Performed by: INTERNAL MEDICINE

## 2025-02-24 PROCEDURE — 83735 ASSAY OF MAGNESIUM: CPT

## 2025-02-24 PROCEDURE — 84100 ASSAY OF PHOSPHORUS: CPT

## 2025-02-24 PROCEDURE — 96365 THER/PROPH/DIAG IV INF INIT: CPT

## 2025-02-24 PROCEDURE — 258N000003 HC RX IP 258 OP 636: Performed by: INTERNAL MEDICINE

## 2025-02-24 PROCEDURE — 85041 AUTOMATED RBC COUNT: CPT

## 2025-02-24 PROCEDURE — 85004 AUTOMATED DIFF WBC COUNT: CPT

## 2025-02-24 PROCEDURE — 98006 SYNCH AUDIO-VIDEO EST MOD 30: CPT

## 2025-02-24 PROCEDURE — 84460 ALANINE AMINO (ALT) (SGPT): CPT

## 2025-02-24 RX ORDER — ZOLEDRONIC ACID 0.04 MG/ML
4 INJECTION, SOLUTION INTRAVENOUS ONCE
Status: COMPLETED | OUTPATIENT
Start: 2025-02-24 | End: 2025-02-24

## 2025-02-24 RX ADMIN — SODIUM CHLORIDE 100 ML: 9 INJECTION, SOLUTION INTRAVENOUS at 15:02

## 2025-02-24 RX ADMIN — ZOLEDRONIC ACID 4 MG: 0.04 INJECTION, SOLUTION INTRAVENOUS at 15:07

## 2025-02-24 ASSESSMENT — PAIN SCALES - GENERAL
PAINLEVEL_OUTOF10: NO PAIN (0)
PAINLEVEL_OUTOF10: NO PAIN (0)

## 2025-02-24 NOTE — Clinical Note
2025      Kesha Zacarias  [address] [address] [place] [id number]      Dear Colleague,    Thank you for referring your patient, Kesha Zacarias, to the [facility]. Please see a copy of my visit note below.    Virtual Visit Details    Type of service:  Video Visit     Originating Location (pt. Location): Other [facility]    Distant Location (provider location):  On-site  Platform used for Video Visit: AmHouserie      2025    [facility] Hematology / Oncology  Progress Note  Name: Kesha Zacarias  :  1960  MRN:  [id number]    --------------------    Subjective:  Kesha reports she is doing well today. She feels she has low energy but feels this is stable. She does have occasional bone pain not requiring anything for pain management. She has intermittent, sporadic headaches that tend to be in the right frontal region. This is ongoing. No vision changes- history of blurred vision that his chronic. At the beginning of February she at virus with congestion, cough, and flu like symptoms. She didn't take her temperature. She has a slight lingering cough but significant cough. No shortness of breath or chest pain. She has a swelling in her legs over the past 1-2 months. She has redness on the left calf. She has overall dry skin. This rash is not new and ongoing since the swelling began. No diarrhea. She has slight constipation. She has miralax as needed. No urinary concerns. No new dental pain or mouth pain. Tolerating zometa well. She tried ritalin on Saturday and  but felt a little off on these.     --------------------    Objective:  VS: LMP 2011 (Exact Date)   Gen: Well-appearing.    We reviewed CBC, CMP, .    --------------------    Assessment / Plan:  Stage IV, hormone positive, HER2 negative (2+), invasive lobular, left-sided breast cancer with extensive bony involvement.  Status post Ribociclib plus AI 2023 - May 2024.  Ongoing  everolimus plus AI 2024.  LLE DVT 2024.    Continue everolimus plus AI; planning another 4 months followed by repeat PET (bone involvement).  Suspect pancytopenia relates to anemia chronic disease, potential breast cancer marrow involvement, everolimus.  Reviewed fatigue likely multiple factors; trial of afternoon Ritalin 5 mg.  Chemistries notable for trace hypernatremia and hypokalemia and stable CKD IIIA; monitor.  Resume Zometa monthly.  She prefers in person visits.  Continue chronic anticoagulation; history of LLE DVT and at risk w/ malignancy.      *** minutes spent on the date of the encounter doing chart review, review of test results, interpretation of tests, patient visit, and documentation     Kamila Cancino PA-C          Virtual Visit Details    Type of service:  Video Visit     Originating Location (pt. Location): Other [facility]    Distant Location (provider location):  On-site  Platform used for Video Visit: Vaddio      2025    [facility] Hematology / Oncology  Progress Note  Name: Kesha Zacarias  :  1960  MRN:  [id number]    --------------------    Subjective:  Kesha reports she is doing well today. She feels she has low energy but feels this is stable. She does have occasional bone pain not requiring anything for pain management. She has intermittent, sporadic headaches that tend to be in the right frontal region. This is ongoing. No vision changes- history of blurred vision that his chronic. At the beginning of February she at virus with congestion, cough, and flu like symptoms. She didn't take her temperature. She has a slight lingering cough but significant cough. No shortness of breath or chest pain. She has a swelling in her legs over the past ~2 months. She has redness on the left calf. She has overall dry skin. This rash is not new and ongoing since the swelling began in 2024. She was seen by lymphedema PT and they recommended wraps but she  could not afford these. She is using topical lotion to the skin. No warmth to the calf. No diarrhea. She has slight constipation. She has miralax as needed. No urinary concerns. No new dental pain or mouth pain. Tolerating zometa well. She tried ritalin on Saturday and Sunday but felt a little off on these. She takes her everolimus and exemestane regularly.     --------------------    Objective:  Video physical exam  General: Patient appears well in no acute distress.   Skin: No visualized rash or lesions on visualized skin  Eyes: EOMI, no erythema, sclera icterus or discharge noted  Resp: Appears to be breathing comfortably without accessory muscle usage, speaking in full sentences, no cough  MSK: Appears to have normal range of motion based on visualized movements  Neurologic: No apparent tremors, facial movements symmetric  Psych: affect bright, alert and oriented    Labs/Imaging:  Most Recent 3 CBC's:  Recent Labs   Lab Test 02/24/25  1321 01/27/25  1309 12/30/24  1306   WBC 4.7 3.6* 4.4   HGB 9.4* 9.3* 9.2*   MCV 81 82 81   * 147* 148*   ANEUTAUTO 3.3 2.7 3.1     Most Recent 3 BMP's:  Recent Labs   Lab Test 02/24/25  1321 01/27/25  1309 12/30/24  1306    146* 140   POTASSIUM 3.9 3.3* 3.8   CHLORIDE 107 107 103   CO2 22 25 24   BUN 18.5 21.0 19.8   CR 1.09* 1.19* 1.06*   ANIONGAP 14 14 13   NITISH 8.9 8.9 8.7*   * 127* 91   PROTTOTAL 6.8 6.4 6.7   ALBUMIN 3.7 3.8 4.1    Most Recent 3 LFT's:  Recent Labs   Lab Test 02/24/25  1321 01/27/25  1309 12/30/24  1306   AST 29 40 29   ALT 30 29 35   ALKPHOS 98 101 107   BILITOTAL 0.2 0.2 0.2    Most Recent 2 TSH and T4:  Recent Labs   Lab Test 05/30/24  1319 05/11/19  0823   TSH 1.19 1.44     Narrative & Impression   EXAM: PET ONCOLOGY (EYES TO THIGHS)  LOCATION: [facility]  DATE: 11/23/2024     INDICATION: Subsequent treatment planning and restaging for malignant neoplasm of overlapping sites of right breast. Metastatic  breast cancer. Palliative radiation to sacrum January 2024. Continues on systemic/hormonal therapy.  COMPARISON: FDG PET/CT 08/24/2024.  CONTRAST: None.  TECHNIQUE: Serum glucose level 114 mg/dL. One hour post intravenous administration of 12.8 mCi F-18 FDG, PET imaging was performed from the skull vertex to mid thighs, utilizing attenuation correction with concurrent axial CT and PET/CT image fusion.   Dose reduction techniques were used.     PET/CT FINDINGS: Redemonstrated heterogeneous FDG osseous uptake with superimposed more focal areas of FDG uptake compatible with osseous metastatic disease. [place] of FDG avid osseous metastatic disease appears decreased compared to prior such as   decreased burden of focal FDG avid disease involving the bilateral proximal femurs. Residual FDG avid osseous metastatic foci are mildly decreased in avidity compared to prior such as the right distal femoral diaphysis (SUV max of 7.9 compared to 11.4),   left distal femoral diaphysis (SUV max of 11.0, previously 12.1), and L1 vertebral body (SUV max of 6.3, previously 8.9). No new site of FDG avid metastatic disease.     Inflammatory uptake of the right greater than left rotator cuffs. Inflammatory uptake of the bilateral hips.     CT FINDINGS: Mild mucosal thickening of the left maxillary sinus. Mild coronary artery calcifications. Linear left lower lobe atelectasis. Postsurgical changes of ventral hernia repair with mesh. Retroaortic left renal vein. Non-FDG avid sclerotic   osseous lesions which likely represent sites of treated/quiescent disease.                                                                      IMPRESSION:  Continued response to therapy with decreasing burden and FDG avidity of osseous metastases. No new site of FDG avid metastatic disease.     I reviewed the above labs today.    --------------------    Assessment / Plan:  Stage IV, hormone positive, HER2 negative (2+), invasive lobular, left-sided breast  "cancer with extensive bony involvement.  Status post Ribociclib plus AI Nov 2023 - May 2024.  Ongoing everolimus plus AI Jun 2024.  LLE DVT Jan 2024.    Continue everolimus plus AI.   Next PET scheduled 3/15/25.   Reviewed her CBC, CMP, and magnesium. Labs are stable. She has ongoing anemia and thrombocytopenia.   Tolerated zometa well, continue monthly. No contraindication to next dose 2/24/25.   Ritalin will remain available prn for fatigue.   For her peripheral edema, discussed starting compression stockings given wraps were ~$500. I have asked her to send me a photo of the \"rash\" if able but suspect given this is ongoing and unchanged from her last US DVT in December 2024 that this may be stasis changes. Unfortunately, I am not able to directly visualize this on our visit today. I discussed red flags and when to be evaluated in person.   For her  planning another 4 months followed by repeat PET (bone involvement).  Suspect pancytopenia relates to anemia chronic disease, potential breast cancer marrow involvement, everolimus.  Reviewed fatigue likely multiple factors; trial of afternoon Ritalin 5 mg.  Chemistries notable for trace hypernatremia and hypokalemia and stable CKD IIIA; monitor.  Resume Zometa monthly.  She prefers in person visits.  Continue chronic anticoagulation; history of LLE DVT and at risk w/ malignancy.      *** minutes spent on the date of the encounter doing chart review, review of test results, interpretation of tests, patient visit, and documentation     Kamila Cancino PA-C            Again, thank you for allowing me to participate in the care of your patient.        Sincerely,        Kamila Cancino PA-C    Electronically signed"

## 2025-02-24 NOTE — NURSING NOTE
Current patient location:  [facility]    Is the patient currently in the state of [place]? YES    Visit mode: VIDEO    If the visit is dropped, the patient can be reconnected by:VIDEO VISIT: Text to cell phone:   Telephone Information:   Mobile [phone]       Will anyone else be joining the visit? NO  (If patient encounters technical issues they should call [phone])    Are changes needed to the allergy or medication list? No    Are refills needed on medications prescribed by this physician? NO    Rooming Documentation:  Questionnaire(s) not done per department protocol    Reason for visit: TC RIVERO

## 2025-02-24 NOTE — PROGRESS NOTES
Infusion Nursing Note:  Kesha Zacarias presents today for zometa infusion.    Patient seen by provider today: Yes: Patient had virtual appt with Kamila Cancino   present during visit today: Not Applicable.    Note: Kesha is here today with her  for her Zometa infusion.  She is on oral chemotherapy. Had virtual provider visit today.  Things are going okay.  She does note that she gets tired in the afternoon.  Was given a prescription for Ritalin but isn't sure that she likes the feel of it. .      Intravenous Access:  Peripheral IV placed.    Treatment Conditions:  Lab Results   Component Value Date     02/24/2025    POTASSIUM 3.9 02/24/2025    MAG 1.9 02/24/2025    CR 1.09 (H) 02/24/2025    NITISH 8.9 02/24/2025    BILITOTAL 0.2 02/24/2025    ALBUMIN 3.7 02/24/2025    ALT 30 02/24/2025    AST 29 02/24/2025       Results reviewed, labs MET treatment parameters, ok to proceed with treatment.      Post Infusion Assessment:  Patient tolerated infusion without incident.  Access discontinued per protocol.       Discharge Plan:   Patient discharged in stable condition accompanied by: self and .  Departure Mode: Ambulatory.  Patient has next infusion appt on 03/19/2025.      Poly Holt RN

## 2025-03-01 ENCOUNTER — MYC REFILL (OUTPATIENT)
Dept: FAMILY MEDICINE | Facility: CLINIC | Age: 65
End: 2025-03-01
Payer: COMMERCIAL

## 2025-03-01 DIAGNOSIS — M05.79 RHEUMATOID ARTHRITIS INVOLVING MULTIPLE SITES WITH POSITIVE RHEUMATOID FACTOR (H): ICD-10-CM

## 2025-03-03 RX ORDER — HYDROXYCHLOROQUINE SULFATE 200 MG/1
TABLET, FILM COATED ORAL
Qty: 225 TABLET | Refills: 3 | Status: SHIPPED | OUTPATIENT
Start: 2025-03-03

## 2025-03-13 DIAGNOSIS — C50.912 CARCINOMA OF LEFT BREAST METASTATIC TO BONE (H): Primary | ICD-10-CM

## 2025-03-13 DIAGNOSIS — C79.51 CARCINOMA OF LEFT BREAST METASTATIC TO BONE (H): Primary | ICD-10-CM

## 2025-03-13 RX ORDER — EXEMESTANE 25 MG/1
25 TABLET ORAL DAILY
Qty: 28 TABLET | Refills: 0 | Status: SHIPPED | OUTPATIENT
Start: 2025-03-23 | End: 2025-04-20

## 2025-03-13 RX ORDER — EVEROLIMUS 10 MG/1
10 TABLET ORAL DAILY
Qty: 28 TABLET | Refills: 0 | OUTPATIENT
Start: 2025-03-23 | End: 2025-04-20

## 2025-03-15 ENCOUNTER — LAB (OUTPATIENT)
Dept: LAB | Facility: CLINIC | Age: 65
End: 2025-03-15
Payer: COMMERCIAL

## 2025-03-15 ENCOUNTER — HOSPITAL ENCOUNTER (OUTPATIENT)
Dept: PET IMAGING | Facility: CLINIC | Age: 65
Discharge: HOME | End: 2025-03-15
Attending: INTERNAL MEDICINE | Admitting: INTERNAL MEDICINE
Payer: COMMERCIAL

## 2025-03-15 DIAGNOSIS — C79.51 CARCINOMA OF BREAST METASTATIC TO BONE, UNSPECIFIED LATERALITY (H): ICD-10-CM

## 2025-03-15 DIAGNOSIS — C50.912 CARCINOMA OF LEFT BREAST METASTATIC TO BONE (H): ICD-10-CM

## 2025-03-15 DIAGNOSIS — T45.1X5A ANTINEOPLASTIC CHEMOTHERAPY INDUCED ANEMIA: ICD-10-CM

## 2025-03-15 DIAGNOSIS — C50.919 CARCINOMA OF BREAST METASTATIC TO BONE, UNSPECIFIED LATERALITY (H): ICD-10-CM

## 2025-03-15 DIAGNOSIS — C79.51 MALIGNANT NEOPLASM METASTATIC TO BONE (H): ICD-10-CM

## 2025-03-15 DIAGNOSIS — C79.51 CARCINOMA OF LEFT BREAST METASTATIC TO BONE (H): ICD-10-CM

## 2025-03-15 DIAGNOSIS — D64.81 ANTINEOPLASTIC CHEMOTHERAPY INDUCED ANEMIA: ICD-10-CM

## 2025-03-15 LAB
ALBUMIN SERPL BCG-MCNC: 3.9 G/DL (ref 3.5–5.2)
ALP SERPL-CCNC: 98 U/L (ref 40–150)
ALT SERPL W P-5'-P-CCNC: 32 U/L (ref 0–50)
ANION GAP SERPL CALCULATED.3IONS-SCNC: 13 MMOL/L (ref 7–15)
AST SERPL W P-5'-P-CCNC: 30 U/L (ref 0–45)
BASOPHILS # BLD AUTO: 0 10E3/UL (ref 0–0.2)
BASOPHILS NFR BLD AUTO: 0 %
BILIRUB SERPL-MCNC: 0.3 MG/DL
BUN SERPL-MCNC: 16.9 MG/DL (ref 8–23)
CALCIUM SERPL-MCNC: 8.5 MG/DL (ref 8.8–10.4)
CHLORIDE SERPL-SCNC: 106 MMOL/L (ref 98–107)
CREAT SERPL-MCNC: 1.04 MG/DL (ref 0.51–0.95)
EGFRCR SERPLBLD CKD-EPI 2021: 60 ML/MIN/1.73M2
EOSINOPHIL # BLD AUTO: 0.1 10E3/UL (ref 0–0.7)
EOSINOPHIL NFR BLD AUTO: 2 %
ERYTHROCYTE [DISTWIDTH] IN BLOOD BY AUTOMATED COUNT: 17.2 % (ref 10–15)
GLUCOSE SERPL-MCNC: 107 MG/DL (ref 70–99)
HCO3 SERPL-SCNC: 24 MMOL/L (ref 22–29)
HCT VFR BLD AUTO: 30.8 % (ref 35–47)
HGB BLD-MCNC: 9.5 G/DL (ref 11.7–15.7)
IMM GRANULOCYTES # BLD: 0 10E3/UL
IMM GRANULOCYTES NFR BLD: 0 %
LYMPHOCYTES # BLD AUTO: 0.9 10E3/UL (ref 0.8–5.3)
LYMPHOCYTES NFR BLD AUTO: 20 %
MAGNESIUM SERPL-MCNC: 1.8 MG/DL (ref 1.7–2.3)
MCH RBC QN AUTO: 24.4 PG (ref 26.5–33)
MCHC RBC AUTO-ENTMCNC: 30.8 G/DL (ref 31.5–36.5)
MCV RBC AUTO: 79 FL (ref 78–100)
MONOCYTES # BLD AUTO: 0.4 10E3/UL (ref 0–1.3)
MONOCYTES NFR BLD AUTO: 9 %
NEUTROPHILS # BLD AUTO: 3.1 10E3/UL (ref 1.6–8.3)
NEUTROPHILS NFR BLD AUTO: 69 %
NRBC # BLD AUTO: 0 10E3/UL
NRBC BLD AUTO-RTO: 0 /100
PHOSPHATE SERPL-MCNC: 3 MG/DL (ref 2.5–4.5)
PLATELET # BLD AUTO: 150 10E3/UL (ref 150–450)
POTASSIUM SERPL-SCNC: 4.1 MMOL/L (ref 3.4–5.3)
PROT SERPL-MCNC: 6.8 G/DL (ref 6.4–8.3)
RBC # BLD AUTO: 3.89 10E6/UL (ref 3.8–5.2)
SODIUM SERPL-SCNC: 143 MMOL/L (ref 135–145)
WBC # BLD AUTO: 4.5 10E3/UL (ref 4–11)

## 2025-03-15 PROCEDURE — 343N000001 HC RX 343 MED OP 636: Performed by: INTERNAL MEDICINE

## 2025-03-15 PROCEDURE — A9552 F18 FDG: HCPCS | Performed by: INTERNAL MEDICINE

## 2025-03-15 PROCEDURE — 85025 COMPLETE CBC W/AUTO DIFF WBC: CPT

## 2025-03-15 PROCEDURE — 86300 IMMUNOASSAY TUMOR CA 15-3: CPT

## 2025-03-15 PROCEDURE — 83735 ASSAY OF MAGNESIUM: CPT

## 2025-03-15 PROCEDURE — 80053 COMPREHEN METABOLIC PANEL: CPT

## 2025-03-15 PROCEDURE — 36415 COLL VENOUS BLD VENIPUNCTURE: CPT

## 2025-03-15 PROCEDURE — 84100 ASSAY OF PHOSPHORUS: CPT

## 2025-03-15 PROCEDURE — 78815 PET IMAGE W/CT SKULL-THIGH: CPT | Mod: PS

## 2025-03-15 RX ORDER — FLUDEOXYGLUCOSE F 18 200 MCI/ML
10-18 INJECTION, SOLUTION INTRAVENOUS ONCE
Status: COMPLETED | OUTPATIENT
Start: 2025-03-15 | End: 2025-03-15

## 2025-03-15 RX ADMIN — FLUDEOXYGLUCOSE F 18 11.83 MILLICURIE: 200 INJECTION, SOLUTION INTRAVENOUS at 08:08

## 2025-03-16 LAB — CANCER AG15-3 SERPL-ACNC: 45 U/ML

## 2025-03-17 ENCOUNTER — DOCUMENTATION ONLY (OUTPATIENT)
Dept: ONCOLOGY | Facility: CLINIC | Age: 65
End: 2025-03-17
Payer: COMMERCIAL

## 2025-03-19 ENCOUNTER — ONCOLOGY VISIT (OUTPATIENT)
Dept: ONCOLOGY | Facility: CLINIC | Age: 65
End: 2025-03-19
Attending: INTERNAL MEDICINE
Payer: COMMERCIAL

## 2025-03-19 VITALS
HEIGHT: 66 IN | TEMPERATURE: 98.3 F | DIASTOLIC BLOOD PRESSURE: 75 MMHG | SYSTOLIC BLOOD PRESSURE: 120 MMHG | HEART RATE: 77 BPM | RESPIRATION RATE: 16 BRPM | OXYGEN SATURATION: 97 % | WEIGHT: 207.2 LBS | BODY MASS INDEX: 33.3 KG/M2

## 2025-03-19 DIAGNOSIS — C50.912 LOBULAR CARCINOMA OF LEFT BREAST (H): Primary | ICD-10-CM

## 2025-03-19 DIAGNOSIS — I82.402 ACUTE DEEP VEIN THROMBOSIS (DVT) OF LEFT LOWER EXTREMITY, UNSPECIFIED VEIN (H): ICD-10-CM

## 2025-03-19 DIAGNOSIS — C50.912 CARCINOMA OF LEFT BREAST METASTATIC TO BONE (H): ICD-10-CM

## 2025-03-19 DIAGNOSIS — C50.912 CARCINOMA OF LEFT BREAST METASTATIC TO BONE (H): Primary | ICD-10-CM

## 2025-03-19 DIAGNOSIS — T45.1X5A ANTINEOPLASTIC CHEMOTHERAPY INDUCED ANEMIA: ICD-10-CM

## 2025-03-19 DIAGNOSIS — D64.81 ANTINEOPLASTIC CHEMOTHERAPY INDUCED ANEMIA: ICD-10-CM

## 2025-03-19 DIAGNOSIS — C79.51 CARCINOMA OF BREAST METASTATIC TO BONE, UNSPECIFIED LATERALITY (H): ICD-10-CM

## 2025-03-19 DIAGNOSIS — C79.51 CARCINOMA OF LEFT BREAST METASTATIC TO BONE (H): Primary | ICD-10-CM

## 2025-03-19 DIAGNOSIS — C50.919 CARCINOMA OF BREAST METASTATIC TO BONE, UNSPECIFIED LATERALITY (H): ICD-10-CM

## 2025-03-19 DIAGNOSIS — Z79.01 CHRONIC ANTICOAGULATION: ICD-10-CM

## 2025-03-19 DIAGNOSIS — C79.51 CARCINOMA OF LEFT BREAST METASTATIC TO BONE (H): ICD-10-CM

## 2025-03-19 PROCEDURE — 99214 OFFICE O/P EST MOD 30 MIN: CPT | Performed by: INTERNAL MEDICINE

## 2025-03-19 RX ORDER — LAMOTRIGINE 25 MG/1
500 TABLET ORAL
Status: CANCELLED
Start: 2025-06-15

## 2025-03-19 RX ORDER — HEPARIN SODIUM,PORCINE 10 UNIT/ML
5-20 VIAL (ML) INTRAVENOUS DAILY PRN
OUTPATIENT
Start: 2025-07-14

## 2025-03-19 RX ORDER — ZOLEDRONIC ACID 0.04 MG/ML
4 INJECTION, SOLUTION INTRAVENOUS ONCE
OUTPATIENT
Start: 2025-06-16 | End: 2025-06-16

## 2025-03-19 RX ORDER — LAMOTRIGINE 25 MG/1
500 TABLET ORAL
Status: CANCELLED
Start: 2025-04-06

## 2025-03-19 RX ORDER — LAMOTRIGINE 25 MG/1
500 TABLET ORAL
Status: CANCELLED
Start: 2025-04-20

## 2025-03-19 RX ORDER — HEPARIN SODIUM (PORCINE) LOCK FLUSH IV SOLN 100 UNIT/ML 100 UNIT/ML
5 SOLUTION INTRAVENOUS
OUTPATIENT
Start: 2025-06-16

## 2025-03-19 RX ORDER — LAMOTRIGINE 25 MG/1
500 TABLET ORAL
Status: CANCELLED
Start: 2025-03-23

## 2025-03-19 RX ORDER — HEPARIN SODIUM (PORCINE) LOCK FLUSH IV SOLN 100 UNIT/ML 100 UNIT/ML
5 SOLUTION INTRAVENOUS
OUTPATIENT
Start: 2025-07-14

## 2025-03-19 RX ORDER — HEPARIN SODIUM,PORCINE 10 UNIT/ML
5-20 VIAL (ML) INTRAVENOUS DAILY PRN
OUTPATIENT
Start: 2025-06-16

## 2025-03-19 RX ORDER — LAMOTRIGINE 25 MG/1
500 TABLET ORAL
Status: CANCELLED
Start: 2025-05-18

## 2025-03-19 RX ORDER — ZOLEDRONIC ACID 0.04 MG/ML
4 INJECTION, SOLUTION INTRAVENOUS ONCE
OUTPATIENT
Start: 2025-07-14 | End: 2025-07-14

## 2025-03-19 RX ORDER — LAMOTRIGINE 25 MG/1
500 TABLET ORAL
Status: CANCELLED
Start: 2025-07-13

## 2025-03-19 ASSESSMENT — PAIN SCALES - GENERAL: PAINLEVEL_OUTOF10: NO PAIN (0)

## 2025-03-19 NOTE — NURSING NOTE
"Oncology Rooming Note    March 19, 2025 8:26 AM   Kesha Zacarias is a 64 year old female who presents for:    Chief Complaint   Patient presents with    Oncology Clinic Visit     Initial Vitals: /75 (BP Location: Left arm)   Pulse 77   Temp 98.3  F (36.8  C) (Oral)   Resp 16   Ht 1.676 m (5' 6\")   Wt 94 kg (207 lb 3.2 oz)   LMP 11/22/2011 (Exact Date)   SpO2 97%   BMI 33.44 kg/m   Estimated body mass index is 33.44 kg/m  as calculated from the following:    Height as of this encounter: 1.676 m (5' 6\").    Weight as of this encounter: 94 kg (207 lb 3.2 oz). Body surface area is 2.09 meters squared.  No Pain (0) Comment: Data Unavailable   Patient's last menstrual period was 11/22/2011 (exact date).  Allergies reviewed: Yes  Medications reviewed: Yes    Medications: Medication refills not needed today.  Pharmacy name entered into [facility]:    [facility] [place] [address] [id number] [place], [place] - [address] [facility] [address]  [facility] - [place], [place] - [address]  [facility] - [place], [place] - [address]    Frailty Screening:   Is the patient here for a new oncology consult visit in cancer care? 2. No    PHQ9:  Did this patient require a PHQ9?: No      Clinical concerns: Patient will discuss concerns with provider.       Negrita Hull MA            "

## 2025-03-19 NOTE — PROGRESS NOTES
"[facility] Hematology / Oncology  Progress Note  Name: Kesha Zacarias  :  1960  MRN:  [id number]    --------------------    Subjective:  Kesha returns for follow-up of breast cancer unaccompanied.  All in all, she has been feeling well and actually kind of feeling normal.  She did suffer from a longstanding URI/LRI and is now completing a course of doxycycline.  No major symptoms from her cancer or from her treatments at this time.  No other health concerns or issues at this time.    --------------------    Objective:  VS: /75 (BP Location: Left arm)   Pulse 77   Temp 98.3  F (36.8  C) (Oral)   Resp 16   Ht 1.676 m (5' 6\")   Wt 94 kg (207 lb 3.2 oz)   LMP 2011 (Exact Date)   SpO2 97%   BMI 33.44 kg/m    Gen: Well-appearing.    We reviewed CBC, CMP, .  We reviewed PET with independent review.    --------------------    Assessment / Plan:  Stage IV, hormone positive, HER2 negative (2+), invasive lobular, left-sided breast cancer with extensive bony involvement.  Status post Ribociclib plus AI 2023 - May 2024.  Ongoing everolimus plus AI 2024.  LLE DVT 2024.    Radiographically, Kesha and I reviewed oligometastatic progression in several bony spots; reviewing past scans, I think these represent old spots recurring and not new spots.  We reviewed options of continuing with current therapy, substituting Faslodex for exemestane versus potentially switch to Enhertu, parenteral chemo or other oral targeted agents and SBRT.  After discussion, given that she is asymptomatic and this is soft oligometastatic progression, we agreed to proceed with everolimus and Faslodex.  Blood counts are stable including chemo related anemia with potential bone marrow involvement Sen[place].  Blood chemistries are notable for stable CKD stage II/IIIa.  We are continuing Zometa monthly.  Continue chronic anticoagulation for history of left lower extremity DVT and risk of recurrence    There are " no Patient Instructions on file for this visit.

## 2025-03-19 NOTE — LETTER
"3/19/2025      Kesha Zacarias  [address] [address] [place] [phone]      Dear Colleague,    Thank you for referring your patient, Kesha Zacarias, to the [facility] [place]. Please see a copy of my visit note below.    [facility] Hematology / Oncology  Progress Note  Name: Kesha Zacarias  :  1960  MRN:  [id number]    --------------------    Subjective:  Kesha returns for follow-up of breast cancer unaccompanied.  All in all, she has been feeling well and actually kind of feeling normal.  She did suffer from a longstanding URI/LRI and is now completing a course of doxycycline.  No major symptoms from her cancer or from her treatments at this time.  No other health concerns or issues at this time.    --------------------    Objective:  VS: /75 (BP Location: Left arm)   Pulse 77   Temp 98.3  F (36.8  C) (Oral)   Resp 16   Ht 1.676 m (5' 6\")   Wt 94 kg (207 lb 3.2 oz)   LMP 2011 (Exact Date)   SpO2 97%   BMI 33.44 kg/m    Gen: Well-appearing.    We reviewed CBC, CMP, .  We reviewed PET with independent review.    --------------------    Assessment / Plan:  Stage IV, hormone positive, HER2 negative (2+), invasive lobular, left-sided breast cancer with extensive bony involvement.  Status post Ribociclib plus AI 2023 - May 2024.  Ongoing everolimus plus AI 2024.  LLE DVT 2024.    Radiographically, Kesha and I reviewed oligometastatic progression in several bony spots; reviewing past scans, I think these represent old spots recurring and not new spots.  We reviewed options of continuing with current therapy, substituting Faslodex for exemestane versus potentially switch to Enhertu, parenteral chemo or other oral targeted agents and SBRT.  After discussion, given that she is asymptomatic and this is soft oligometastatic progression, we agreed to proceed with everolimus and Faslodex.  Blood counts are stable including chemo related anemia with " potential bone marrow involvement Sen[place].  Blood chemistries are notable for stable CKD stage II/IIIa.  We are continuing Zometa monthly.  Continue chronic anticoagulation for history of left lower extremity DVT and risk of recurrence    There are no Patient Instructions on file for this visit.            Again, thank you for allowing me to participate in the care of your patient.        Sincerely,        Brayden Stephens MD    Electronically signed

## 2025-03-22 ENCOUNTER — HEALTH MAINTENANCE LETTER (OUTPATIENT)
Age: 65
End: 2025-03-22

## 2025-03-24 ENCOUNTER — INFUSION THERAPY VISIT (OUTPATIENT)
Dept: INFUSION THERAPY | Facility: CLINIC | Age: 65
End: 2025-03-24
Attending: INTERNAL MEDICINE
Payer: COMMERCIAL

## 2025-03-24 VITALS
HEART RATE: 85 BPM | SYSTOLIC BLOOD PRESSURE: 135 MMHG | BODY MASS INDEX: 33.43 KG/M2 | DIASTOLIC BLOOD PRESSURE: 70 MMHG | WEIGHT: 208 LBS | OXYGEN SATURATION: 95 % | TEMPERATURE: 97.8 F | RESPIRATION RATE: 16 BRPM | HEIGHT: 66 IN

## 2025-03-24 DIAGNOSIS — C50.912 CARCINOMA OF LEFT BREAST METASTATIC TO BONE (H): ICD-10-CM

## 2025-03-24 DIAGNOSIS — C50.919 CARCINOMA OF BREAST METASTATIC TO BONE, UNSPECIFIED LATERALITY (H): Primary | ICD-10-CM

## 2025-03-24 DIAGNOSIS — C79.51 CARCINOMA OF BREAST METASTATIC TO BONE, UNSPECIFIED LATERALITY (H): Primary | ICD-10-CM

## 2025-03-24 DIAGNOSIS — C79.51 CARCINOMA OF LEFT BREAST METASTATIC TO BONE (H): ICD-10-CM

## 2025-03-24 PROCEDURE — 96402 CHEMO HORMON ANTINEOPL SQ/IM: CPT

## 2025-03-24 PROCEDURE — 250N000011 HC RX IP 250 OP 636: Performed by: INTERNAL MEDICINE

## 2025-03-24 PROCEDURE — 258N000003 HC RX IP 258 OP 636: Performed by: INTERNAL MEDICINE

## 2025-03-24 PROCEDURE — 96365 THER/PROPH/DIAG IV INF INIT: CPT

## 2025-03-24 RX ORDER — ZOLEDRONIC ACID 0.04 MG/ML
4 INJECTION, SOLUTION INTRAVENOUS ONCE
Status: COMPLETED | OUTPATIENT
Start: 2025-03-24 | End: 2025-03-24

## 2025-03-24 RX ORDER — LAMOTRIGINE 25 MG/1
500 TABLET ORAL ONCE
Status: COMPLETED | OUTPATIENT
Start: 2025-03-24 | End: 2025-03-24

## 2025-03-24 RX ADMIN — FULVESTRANT 500 MG: 250 INJECTION, SOLUTION INTRAMUSCULAR at 14:58

## 2025-03-24 RX ADMIN — SODIUM CHLORIDE 250 ML: 0.9 INJECTION, SOLUTION INTRAVENOUS at 14:32

## 2025-03-24 RX ADMIN — ZOLEDRONIC ACID 4 MG: 0.04 INJECTION, SOLUTION INTRAVENOUS at 14:29

## 2025-03-24 ASSESSMENT — PAIN SCALES - GENERAL: PAINLEVEL_OUTOF10: NO PAIN (0)

## 2025-03-24 NOTE — PROGRESS NOTES
Infusion Nursing Note:  Kesha Zacarias presents today for Faslodex IM and Zometa.    Patient seen by provider today: No   present during visit today: Not Applicable.    Note: Pt states she has been doing well.  Pt denies any new questions or concerns.  First dose of Faslodex given today.  Drug education printed and given to pt.  Vitally stable, afebrile. Tolerated infusion well without rxn.  Will return on 4/7/25 for next treatment.      Intravenous Access:  Peripheral IV placed.    Treatment Conditions:  Not Applicable.      Post Infusion Assessment:  Patient tolerated infusion without incident.  Patient tolerated injection without incident.  Blood return noted pre and post infusion.  Site patent and intact, free from redness, edema or discomfort.  No evidence of extravasations.  Access discontinued per protocol.       Discharge Plan:   Discharge instructions reviewed with: Patient.  Patient and/or family verbalized understanding of discharge instructions and all questions answered.  AVS to patient via [employer].  Patient will return 4/7/25 for next appointment.   Patient discharged in stable condition accompanied by: self.  Departure Mode: Ambulatory.      Lainey Blakely RN

## 2025-04-07 ENCOUNTER — INFUSION THERAPY VISIT (OUTPATIENT)
Dept: INFUSION THERAPY | Facility: CLINIC | Age: 65
End: 2025-04-07
Attending: INTERNAL MEDICINE
Payer: COMMERCIAL

## 2025-04-07 VITALS
OXYGEN SATURATION: 100 % | BODY MASS INDEX: 33.64 KG/M2 | HEART RATE: 78 BPM | SYSTOLIC BLOOD PRESSURE: 135 MMHG | TEMPERATURE: 97.5 F | DIASTOLIC BLOOD PRESSURE: 66 MMHG | WEIGHT: 208.4 LBS

## 2025-04-07 DIAGNOSIS — C79.51 CARCINOMA OF LEFT BREAST METASTATIC TO BONE (H): Primary | ICD-10-CM

## 2025-04-07 DIAGNOSIS — C50.912 CARCINOMA OF LEFT BREAST METASTATIC TO BONE (H): Primary | ICD-10-CM

## 2025-04-07 PROCEDURE — 96402 CHEMO HORMON ANTINEOPL SQ/IM: CPT

## 2025-04-07 PROCEDURE — 250N000011 HC RX IP 250 OP 636: Mod: JZ | Performed by: INTERNAL MEDICINE

## 2025-04-07 RX ORDER — LAMOTRIGINE 25 MG/1
500 TABLET ORAL ONCE
Status: COMPLETED | OUTPATIENT
Start: 2025-04-07 | End: 2025-04-07

## 2025-04-07 RX ADMIN — FULVESTRANT 500 MG: 250 INJECTION, SOLUTION INTRAMUSCULAR at 15:58

## 2025-04-07 NOTE — PROGRESS NOTES
Infusion Nursing Note:  Kesha Zacarias presents today for C11D15  Faslodex.    Patient seen by provider today: No   present during visit today: Not Applicable.    Note: Kesha is here today on her own for her Faslodex injection.  Patient has noted some nausea off and on over the last couple of weeks since starting the faslodex.  So far the nausea has been tolerable.  Patient was also given a prescription for Ritalin.  She has tried it some and it does seem to give her a little more energy on the days that  she takes it.  She states that she isn't always good at remembering to take it.  .      Intravenous Access:  No Intravenous access/labs at this visit.    Treatment Conditions:  Not Applicable.      Post Infusion Assessment:  Patient tolerated injection without incident.  Given bilaterally in gluteus amy.  Given at the same time.       Discharge Plan:   Patient discharged in stable condition accompanied by: self.  Departure Mode: Ambulatory.  Patient has appt to return to infusion on 04/21/2025.      Poly Holt RN

## 2025-04-21 ENCOUNTER — INFUSION THERAPY VISIT (OUTPATIENT)
Dept: INFUSION THERAPY | Facility: CLINIC | Age: 65
End: 2025-04-21
Attending: INTERNAL MEDICINE
Payer: COMMERCIAL

## 2025-04-21 ENCOUNTER — VIRTUAL VISIT (OUTPATIENT)
Dept: ONCOLOGY | Facility: CLINIC | Age: 65
End: 2025-04-21
Payer: COMMERCIAL

## 2025-04-21 ENCOUNTER — LAB (OUTPATIENT)
Dept: LAB | Facility: CLINIC | Age: 65
End: 2025-04-21
Payer: COMMERCIAL

## 2025-04-21 VITALS
RESPIRATION RATE: 18 BRPM | WEIGHT: 206 LBS | HEART RATE: 71 BPM | TEMPERATURE: 98.1 F | SYSTOLIC BLOOD PRESSURE: 125 MMHG | BODY MASS INDEX: 33.25 KG/M2 | OXYGEN SATURATION: 97 % | DIASTOLIC BLOOD PRESSURE: 60 MMHG

## 2025-04-21 DIAGNOSIS — Z79.01 CHRONIC ANTICOAGULATION: ICD-10-CM

## 2025-04-21 DIAGNOSIS — C50.912 LOBULAR CARCINOMA OF LEFT BREAST (H): Primary | ICD-10-CM

## 2025-04-21 DIAGNOSIS — D64.81 ANTINEOPLASTIC CHEMOTHERAPY INDUCED ANEMIA: ICD-10-CM

## 2025-04-21 DIAGNOSIS — C79.51 CARCINOMA OF BREAST METASTATIC TO BONE, UNSPECIFIED LATERALITY (H): ICD-10-CM

## 2025-04-21 DIAGNOSIS — C79.51 CARCINOMA OF LEFT BREAST METASTATIC TO BONE (H): Primary | ICD-10-CM

## 2025-04-21 DIAGNOSIS — C50.912 CARCINOMA OF LEFT BREAST METASTATIC TO BONE (H): ICD-10-CM

## 2025-04-21 DIAGNOSIS — C50.912 CARCINOMA OF LEFT BREAST METASTATIC TO BONE (H): Primary | ICD-10-CM

## 2025-04-21 DIAGNOSIS — C50.912 LOBULAR CARCINOMA OF LEFT BREAST (H): ICD-10-CM

## 2025-04-21 DIAGNOSIS — C79.51 CARCINOMA OF LEFT BREAST METASTATIC TO BONE (H): ICD-10-CM

## 2025-04-21 DIAGNOSIS — T45.1X5A ANTINEOPLASTIC CHEMOTHERAPY INDUCED ANEMIA: ICD-10-CM

## 2025-04-21 DIAGNOSIS — C50.919 CARCINOMA OF BREAST METASTATIC TO BONE, UNSPECIFIED LATERALITY (H): ICD-10-CM

## 2025-04-21 LAB
ALBUMIN SERPL BCG-MCNC: 3.9 G/DL (ref 3.5–5.2)
ALP SERPL-CCNC: 102 U/L (ref 40–150)
ALT SERPL W P-5'-P-CCNC: 30 U/L (ref 0–50)
ANION GAP SERPL CALCULATED.3IONS-SCNC: 12 MMOL/L (ref 7–15)
AST SERPL W P-5'-P-CCNC: 29 U/L (ref 0–45)
BASOPHILS # BLD AUTO: 0 10E3/UL (ref 0–0.2)
BASOPHILS NFR BLD AUTO: 1 %
BILIRUB SERPL-MCNC: 0.2 MG/DL
BUN SERPL-MCNC: 23.3 MG/DL (ref 8–23)
CALCIUM SERPL-MCNC: 8.8 MG/DL (ref 8.8–10.4)
CHLORIDE SERPL-SCNC: 105 MMOL/L (ref 98–107)
CREAT SERPL-MCNC: 1.12 MG/DL (ref 0.51–0.95)
EGFRCR SERPLBLD CKD-EPI 2021: 55 ML/MIN/1.73M2
EOSINOPHIL # BLD AUTO: 0.1 10E3/UL (ref 0–0.7)
EOSINOPHIL NFR BLD AUTO: 2 %
ERYTHROCYTE [DISTWIDTH] IN BLOOD BY AUTOMATED COUNT: 16.8 % (ref 10–15)
GLUCOSE SERPL-MCNC: 177 MG/DL (ref 70–99)
HCO3 SERPL-SCNC: 24 MMOL/L (ref 22–29)
HCT VFR BLD AUTO: 28.9 % (ref 35–47)
HGB BLD-MCNC: 9 G/DL (ref 11.7–15.7)
IMM GRANULOCYTES # BLD: 0 10E3/UL
IMM GRANULOCYTES NFR BLD: 0 %
LYMPHOCYTES # BLD AUTO: 0.9 10E3/UL (ref 0.8–5.3)
LYMPHOCYTES NFR BLD AUTO: 23 %
MCH RBC QN AUTO: 24.4 PG (ref 26.5–33)
MCHC RBC AUTO-ENTMCNC: 31.1 G/DL (ref 31.5–36.5)
MCV RBC AUTO: 78 FL (ref 78–100)
MONOCYTES # BLD AUTO: 0.4 10E3/UL (ref 0–1.3)
MONOCYTES NFR BLD AUTO: 10 %
NEUTROPHILS # BLD AUTO: 2.4 10E3/UL (ref 1.6–8.3)
NEUTROPHILS NFR BLD AUTO: 64 %
NRBC # BLD AUTO: 0 10E3/UL
NRBC BLD AUTO-RTO: 0 /100
PLATELET # BLD AUTO: 129 10E3/UL (ref 150–450)
POTASSIUM SERPL-SCNC: 3.8 MMOL/L (ref 3.4–5.3)
PROT SERPL-MCNC: 6.5 G/DL (ref 6.4–8.3)
RBC # BLD AUTO: 3.69 10E6/UL (ref 3.8–5.2)
SODIUM SERPL-SCNC: 141 MMOL/L (ref 135–145)
WBC # BLD AUTO: 3.7 10E3/UL (ref 4–11)

## 2025-04-21 PROCEDURE — 96402 CHEMO HORMON ANTINEOPL SQ/IM: CPT

## 2025-04-21 PROCEDURE — 86300 IMMUNOASSAY TUMOR CA 15-3: CPT

## 2025-04-21 PROCEDURE — 85025 COMPLETE CBC W/AUTO DIFF WBC: CPT

## 2025-04-21 PROCEDURE — 98006 SYNCH AUDIO-VIDEO EST MOD 30: CPT

## 2025-04-21 PROCEDURE — 96365 THER/PROPH/DIAG IV INF INIT: CPT

## 2025-04-21 PROCEDURE — 1125F AMNT PAIN NOTED PAIN PRSNT: CPT

## 2025-04-21 PROCEDURE — 36415 COLL VENOUS BLD VENIPUNCTURE: CPT

## 2025-04-21 PROCEDURE — 250N000011 HC RX IP 250 OP 636: Mod: JZ | Performed by: INTERNAL MEDICINE

## 2025-04-21 PROCEDURE — 80053 COMPREHEN METABOLIC PANEL: CPT

## 2025-04-21 PROCEDURE — 258N000003 HC RX IP 258 OP 636: Performed by: INTERNAL MEDICINE

## 2025-04-21 PROCEDURE — G2211 COMPLEX E/M VISIT ADD ON: HCPCS

## 2025-04-21 RX ORDER — LAMOTRIGINE 25 MG/1
500 TABLET ORAL ONCE
Status: COMPLETED | OUTPATIENT
Start: 2025-04-21 | End: 2025-04-21

## 2025-04-21 RX ORDER — FLUTICASONE PROPIONATE 50 MCG
2 SPRAY, SUSPENSION (ML) NASAL DAILY
COMMUNITY
Start: 2025-03-14

## 2025-04-21 RX ORDER — ZOLEDRONIC ACID 0.04 MG/ML
4 INJECTION, SOLUTION INTRAVENOUS ONCE
Status: COMPLETED | OUTPATIENT
Start: 2025-04-21 | End: 2025-04-21

## 2025-04-21 RX ADMIN — SODIUM CHLORIDE 100 ML: 9 INJECTION, SOLUTION INTRAVENOUS at 15:19

## 2025-04-21 RX ADMIN — ZOLEDRONIC ACID 4 MG: 0.04 INJECTION, SOLUTION INTRAVENOUS at 15:20

## 2025-04-21 RX ADMIN — FULVESTRANT 500 MG: 250 INJECTION, SOLUTION INTRAMUSCULAR at 15:52

## 2025-04-21 ASSESSMENT — PAIN SCALES - GENERAL: PAINLEVEL_OUTOF10: MILD PAIN (1)

## 2025-04-21 NOTE — PROGRESS NOTES
Infusion Nursing Note:  Kesha Zacarias presents today for Faslodex and zometa.    Patient seen by provider today: Yes: virtual visit with Kitty Candelario   present during visit today: Not Applicable.    Note: Patient is here today on her own for her faslodex injections.  She has been coming in every 2 weeks for these but now will be coming in monthly.  She also gets Zometa monthly and got that today as well. .      Intravenous Access:  Peripheral IV placed.    Treatment Conditions:  Lab Results   Component Value Date    HGB 9.0 (L) 04/21/2025    WBC 3.7 (L) 04/21/2025    ANEU 3.7 11/21/2005    ANEUTAUTO 2.4 04/21/2025     (L) 04/21/2025        Lab Results   Component Value Date     04/21/2025    POTASSIUM 3.8 04/21/2025    MAG 1.8 03/15/2025    CR 1.12 (H) 04/21/2025    NITISH 8.8 04/21/2025    BILITOTAL 0.2 04/21/2025    ALBUMIN 3.9 04/21/2025    ALT 30 04/21/2025    AST 29 04/21/2025       Results reviewed, labs MET treatment parameters, ok to proceed with treatment.      Post Infusion Assessment:  Patient tolerated infusion without incident.  Patient tolerated injection without incident.       Discharge Plan:   Patient discharged in stable condition accompanied by: self.  Departure Mode: Ambulatory.  Patient has appt to return to infusion on 05/19/2025.      Poly Holt RN

## 2025-04-21 NOTE — PROGRESS NOTES
Virtual Oncology Follow-Up Visit: April 21, 2025    Oncologist: Dr. Stephens     Reason for Visit: Pre-treatment follow-up    Diagnosis: Stage IV, hormone positive, HER2 negative (2+), invasive lobular, left-sided breast cancer with extensive bony involvement.     Treatment:  11/2023 - 5/2024 Ribociclib + letrozole; progression of disease in bones   1/2024 s/p palliative radiation to sacrum 3000 cGy in 300 cGy daily fractions   6/14/2024 - 3/2025 Everolimus and exemestane; oligometastatic progression   3/2025 Ongoing Everolimus with start of Faslodex     Interval History:  Pt is seen virtually for review of treatment. Mentions intermittent headaches that are likely related to lack of fluid intake. No associated debilitation, vision changes, extremity weakness, or speech changes. Mentions dry cough that is worse at night, had URI in early February and took several weeks for symptoms to resolve. No associated SOB, fevers, or chest pain. Has humidifier running, using OTC cough medications with no improvement. Mild hair thinning. Otherwise, appetite is good and she is eating well. No issues with bowels No N/V. No new aches or pains. No additional questions.    Patient denies any of the following except if noted above: fevers, chills, difficulty with energy, vision or hearing changes, chest pain, dyspnea, abdominal pain, nausea, vomiting, diarrhea, constipation, urinary concerns, headaches, numbness, tingling, issues with sleep or mood. He/She also denies lumps, bumps, rashes or skin lesions, bleeding or bruising issues.    Physical Exam: Limited due to virtual visit restrictions.  General: No acute distress. Appearance is well-groomed.  Resp: No respiratory distress. No cough.   Skin: No visible rash or lesions.  Neuro: A/O x 4. Appropriate mentation and speech.  Psych: Appropriate mood.     Laboratory Results:   Results for orders placed or performed in visit on 04/21/25   Comprehensive metabolic panel     Status:  Abnormal   Result Value Ref Range    Sodium 141 135 - 145 mmol/L    Potassium 3.8 3.4 - 5.3 mmol/L    Carbon Dioxide (CO2) 24 22 - 29 mmol/L    Anion Gap 12 7 - 15 mmol/L    Urea Nitrogen 23.3 (H) 8.0 - 23.0 mg/dL    Creatinine 1.12 (H) 0.51 - 0.95 mg/dL    GFR Estimate 55 (L) >60 mL/min/1.73m2    Calcium 8.8 8.8 - 10.4 mg/dL    Chloride 105 98 - 107 mmol/L    Glucose 177 (H) 70 - 99 mg/dL    Alkaline Phosphatase 102 40 - 150 U/L    AST 29 0 - 45 U/L    ALT 30 0 - 50 U/L    Protein Total 6.5 6.4 - 8.3 g/dL    Albumin 3.9 3.5 - 5.2 g/dL    Bilirubin Total 0.2 <=1.2 mg/dL   CBC with platelets and differential     Status: Abnormal   Result Value Ref Range    WBC Count 3.7 (L) 4.0 - 11.0 10e3/uL    RBC Count 3.69 (L) 3.80 - 5.20 10e6/uL    Hemoglobin 9.0 (L) 11.7 - 15.7 g/dL    Hematocrit 28.9 (L) 35.0 - 47.0 %    MCV 78 78 - 100 fL    MCH 24.4 (L) 26.5 - 33.0 pg    MCHC 31.1 (L) 31.5 - 36.5 g/dL    RDW 16.8 (H) 10.0 - 15.0 %    Platelet Count 129 (L) 150 - 450 10e3/uL    % Neutrophils 64 %    % Lymphocytes 23 %    % Monocytes 10 %    % Eosinophils 2 %    % Basophils 1 %    % Immature Granulocytes 0 %    NRBCs per 100 WBC 0 <1 /100    Absolute Neutrophils 2.4 1.6 - 8.3 10e3/uL    Absolute Lymphocytes 0.9 0.8 - 5.3 10e3/uL    Absolute Monocytes 0.4 0.0 - 1.3 10e3/uL    Absolute Eosinophils 0.1 0.0 - 0.7 10e3/uL    Absolute Basophils 0.0 0.0 - 0.2 10e3/uL    Absolute Immature Granulocytes 0.0 <=0.4 10e3/uL    Absolute NRBCs 0.0 10e3/uL   CBC with platelets differential     Status: Abnormal    Narrative    The following orders were created for panel order CBC with platelets differential.  Procedure                               Abnormality         Status                     ---------                               -----------         ------                     CBC with platelets and ...[[id number]]  Abnormal            Final result                 Please view results for these tests on the individual orders.     I reviewed  "the above labs today.      Assessment and Plan:   Stage IV, hormone positive, HER2 negative (2+), invasive lobular, left-sided breast cancer with extensive bony involvement.   - Oligometastatic progression on last PET 3/2025. Now on Everolimus PO daily + Faslodex.  - Tolerating well with manageable side effects.   - Labs reviewed. Continue to meet treatment goals, will continue with cycle 12 without changes.  - Repeat PET prior to cycle 14 followed by review with Dr. Stephens.   - 6/2024 germline genetic testing negative     Cough  - Likely s/t post-viral URI and side effect of Faslodex (5-15% per [employer])  - No associated infectious s/s, reviewed these  - Continue supportive cares (humdifity, OTC cough relief, hydration)  - Aware to notify provider if symptoms worsen    Headaches  - Likely s/t hydration status  - No concerning neurological s/s (debilitating headaches, vision changes, one-sided weakness, speech changes)  - Continue to monitor    Bone health  - Extensive osseous metastatic disease  - Osteopenia on DEXA scan 10/23 - lumbar spine, and left hip femoral neck   - On Zometa q4 weeks, does have protruding jaw bone that is \"dead\" per patient  - No current acute dental concerns  - Confirmed use of calcium 600 mg BID and vitamin D 1000 IU   - Encouraged pt to achieve weight-bearing exercises several times a week, if possible.     Thrombocytopenia   - Mildly decreased platelet count  - On Eliquis for DVT   - Continue to monitor for bleeding     Anemia  - [place] [address] mcg daily and oral ferrous sulfate 325 mg every other day  - Discussed taking with vitamin C to increase absorption      Hot flashes  - On Effexor 75 mg daily      Hx of LLE DVT  - On Eliquis BID  - No s/s of bleeding  - Continue to monitor     CKD Stage II/IIIa  - Creatinine stable   - Reviewed avoiding nephrotoxic agents  - Continue to monitor      Mouth sores - resolved   - Dexamethasone mouth wash and magic mouthwash       Kitty " Kodak YING CNP                Virtual Visit Details     Type of service:  Video Visit     Originating Location (pt. Location): Home  Distant Location (provider location): Off-site  Platform used for Video Visit: [employer]      Video Total Time: 1640      The longitudinal plan of care for the diagnosis(es)/condition(s) as documented were addressed during this visit. Due to the added complexity in care, I will continue to support Kesha in the subsequent management and with ongoing continuity of care.

## 2025-04-21 NOTE — Clinical Note
4/21/2025      Kesha Zacarias  [address] [address] [place] [id number]      Dear Colleague,    Thank you for referring your patient, Kesha Zacarias, to the [facility]. Please see a copy of my visit note below.    Virtual Oncology Follow-Up Visit: April 21, 2025    Oncologist: Dr. Stephens     Reason for Visit: Pre-treatment follow-up    Diagnosis: Stage IV, hormone positive, HER2 negative (2+), invasive lobular, left-sided breast cancer with extensive bony involvement.       Treatment:  11/2023 - 5/2024 Ribociclib + letrozole; progression of disease in bones   6/14/2024 - 3/2025 Everolimus and exemestane; oligometastatic progression   3/2025 Ongoing Everolimus with start of Faslodex     Interval History:  Pt is seen virtually for review of ***    Patient denies any of the following except if noted above: fevers, chills, difficulty with energy, vision or hearing changes, chest pain, dyspnea, abdominal pain, nausea, vomiting, diarrhea, constipation, urinary concerns, headaches, numbness, tingling, issues with sleep or mood. He/She also denies lumps, bumps, rashes or skin lesions, bleeding or bruising issues.    Physical Exam: Limited due to virtual visit restrictions.  General: No acute distress. Appearance is well-groomed.  Resp: No respiratory distress. No cough.   Skin: No visible rash or lesions.  Neuro: A/O x 4. Appropriate mentation and speech.  Psych: Appropriate mood.     Laboratory Results:   No results found for any visits on 04/21/25.  I reviewed the above labs today.    Imaging:  PET Oncology (Eyes to Thighs)  Narrative: EXAM: PET ONCOLOGY (EYES TO THIGHS)  LOCATION: [facility]  DATE: 3/15/2025    INDICATION: Subsequent treatment planning and restaging for malignant neoplasm overlapping sites of right female breast. Status post sacral radiation completed in January 2024, currently receiving and immunotherapy/hormonal therapy. Monitor  treatment   response.  COMPARISON: FDG PET/CT dated 11/23/2024  CONTRAST: None  TECHNIQUE: Serum glucose level 108 mg/dL. One hour post intravenous administration of 11.83 mCi F18 FDG, PET imaging was performed from the skull vertex to mid thighs, utilizing attenuation correction with concurrent axial CT and PET/CT image fusion.   Dose reduction techniques were used.    PET/CT FINDINGS: While there is broadly stable osseous lesions involving the mid right humeral diaphysis (Max SUV 6.7, previously 7.2), L2 vertebral body (Max SUV 5.0, previously 6.3), and mid left femoral diaphysis (Max SUV 10.3, previously 11.0), there   is development of multiple additional FDG avid lesions including examples in the left aspect of the manubrium (Max SUV 6.7), right proximal femur (Max SUV 15.0), and right iliac wing (Max SUV 8.9) suspicious for progression of disease    CT FINDINGS: Mild senescent intercranial changes. Postoperative change of bilateral lenses. Mild carotid artery bifurcation calcification. Mild to moderate mucosal sinus mucosal thickening. Mild coronary artery calcium. Left lower lobe   scarring/atelectatic change. Anterior abdominal wall hernia mesh repair. Pelvic phleboliths. Multilevel degenerative changes of the spine.  Impression: IMPRESSION:    While there is broadly stable osseous lesions involving the mid right humeral diaphysis, L2 vertebral body, and mid left femoral diaphysis, there is development of multiple additional FDG avid osseous lesions suspicious for progression of disease    I reviewed the above imaging report today.      Assessment and Plan:   Stage IV, hormone positive, HER2 negative (2+), invasive lobular, left-sided breast cancer with extensive bony involvement.   - Oligometastatic progression on last PET 3/2025.  - Now on treatment with Everolimus 10 mg PO daily + Faslodex.  - Tolerating well with manageable side effects.   - Labs reviewed. Continue to meet treatment goals, will continue  with cycle 12 without changes.  - Repeat PET prior to cycle 14 followed by review with Dr. Stephens.   - 6/2024 germline genetic testing negative     Thrombocytopenia   - Mildly decreased platelet count  - On Eliquis for DVT   - Continue to monitor for bleeding     Anemia  - [place]ed [place] [address] mcg daily and oral ferrous sulfate 325 mg every other day  - Discussed taking with vitamin C to increase absorption      Mouth sores  - Dexamethasone mouth wash and magic mouthwash     HLD   - Started on atorvastatin 10 mg     Hot flashes  - On Effexor 75 mg daily      Hx of LLE DVT  - On Eliquis BID  - No s/s of bleeding  - Continue to monitor      Blurry vision   - Intermittent  - Brain MRI 5/2024 with no evidence of metastasis   - Has seen ophthalmologist with no concern, using eye drops daily  - Does work computer job, 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        Kitty YING, CNP                Virtual Visit Details     Type of service:  Video Visit     Originating Location (pt. Location): Home  Distant Location (provider location): Off-site  Platform used for Video Visit: {[email or web address]}      Video Total Time:       Again, thank you for allowing me to participate in the care of your patient.        Sincerely,        STEPAN Willams CNP    Electronically signed

## 2025-04-21 NOTE — Clinical Note
4/21/2025      Kesha Zacarias  [address] [address] [place] [id number]      Dear Colleague,    Thank you for referring your patient, Kesha Zacarias, to the [facility]. Please see a copy of my visit note below.    Virtual Oncology Follow-Up Visit: April 21, 2025    Oncologist: Dr. Stephens     Reason for Visit: Pre-treatment follow-up    Diagnosis: Stage IV, hormone positive, HER2 negative (2+), invasive lobular, left-sided breast cancer with extensive bony involvement.       Treatment:  11/2023 - 5/2024 Ribociclib + letrozole; progression of disease in bones   6/14/2024 - 3/2025 Everolimus and exemestane; oligometastatic progression   3/2025 Ongoing Everolimus with start of Faslodex     Interval History:  Pt is seen virtually for review of ***    Patient denies any of the following except if noted above: fevers, chills, difficulty with energy, vision or hearing changes, chest pain, dyspnea, abdominal pain, nausea, vomiting, diarrhea, constipation, urinary concerns, headaches, numbness, tingling, issues with sleep or mood. He/She also denies lumps, bumps, rashes or skin lesions, bleeding or bruising issues.    Physical Exam: Limited due to virtual visit restrictions.  General: No acute distress. Appearance is well-groomed.  Resp: No respiratory distress. No cough.   Skin: No visible rash or lesions.  Neuro: A/O x 4. Appropriate mentation and speech.  Psych: Appropriate mood.     Laboratory Results:   No results found for any visits on 04/21/25.  I reviewed the above labs today.    Imaging:  PET Oncology (Eyes to Thighs)  Narrative: EXAM: PET ONCOLOGY (EYES TO THIGHS)  LOCATION: [facility]  DATE: 3/15/2025    INDICATION: Subsequent treatment planning and restaging for malignant neoplasm overlapping sites of right female breast. Status post sacral radiation completed in January 2024, currently receiving and immunotherapy/hormonal therapy. Monitor  treatment   response.  COMPARISON: FDG PET/CT dated 11/23/2024  CONTRAST: None  TECHNIQUE: Serum glucose level 108 mg/dL. One hour post intravenous administration of 11.83 mCi F18 FDG, PET imaging was performed from the skull vertex to mid thighs, utilizing attenuation correction with concurrent axial CT and PET/CT image fusion.   Dose reduction techniques were used.    PET/CT FINDINGS: While there is broadly stable osseous lesions involving the mid right humeral diaphysis (Max SUV 6.7, previously 7.2), L2 vertebral body (Max SUV 5.0, previously 6.3), and mid left femoral diaphysis (Max SUV 10.3, previously 11.0), there   is development of multiple additional FDG avid lesions including examples in the left aspect of the manubrium (Max SUV 6.7), right proximal femur (Max SUV 15.0), and right iliac wing (Max SUV 8.9) suspicious for progression of disease    CT FINDINGS: Mild senescent intercranial changes. Postoperative change of bilateral lenses. Mild carotid artery bifurcation calcification. Mild to moderate mucosal sinus mucosal thickening. Mild coronary artery calcium. Left lower lobe   scarring/atelectatic change. Anterior abdominal wall hernia mesh repair. Pelvic phleboliths. Multilevel degenerative changes of the spine.  Impression: IMPRESSION:    While there is broadly stable osseous lesions involving the mid right humeral diaphysis, L2 vertebral body, and mid left femoral diaphysis, there is development of multiple additional FDG avid osseous lesions suspicious for progression of disease    I reviewed the above imaging report today.      Assessment and Plan:   Stage IV, hormone positive, HER2 negative (2+), invasive lobular, left-sided breast cancer with extensive bony involvement.   - Oligometastatic progression on last PET 3/2025.  - Now on treatment with Everolimus 10 mg PO daily + Faslodex.  - Tolerating well with manageable side effects.   - Labs reviewed. Continue to meet treatment goals, will continue  with cycle 12 without changes.  - Repeat PET prior to cycle 14 followed by review with Dr. Stephens.   - 6/2024 germline genetic testing negative     Thrombocytopenia   - Mildly decreased platelet count  - On Eliquis for DVT   - Continue to monitor for bleeding     Anemia  - [place]ed [place] [address] mcg daily and oral ferrous sulfate 325 mg every other day  - Discussed taking with vitamin C to increase absorption      Mouth sores  - Dexamethasone mouth wash and magic mouthwash     HLD   - Started on atorvastatin 10 mg     Hot flashes  - On Effexor 75 mg daily      Hx of LLE DVT  - On Eliquis BID  - No s/s of bleeding  - Continue to monitor      Blurry vision   - Intermittent  - Brain MRI 5/2024 with no evidence of metastasis   - Has seen ophthalmologist with no concern, using eye drops daily  - Does work computer job, 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        Kitty YING, CNP                Virtual Visit Details     Type of service:  Video Visit     Originating Location (pt. Location): Home  Distant Location (provider location): Off-site  Platform used for Video Visit: {[email or web address]}      Video Total Time:       Again, thank you for allowing me to participate in the care of your patient.        Sincerely,        STEPAN Willams CNP    Electronically signed

## 2025-04-22 LAB — CANCER AG15-3 SERPL-ACNC: 50 U/ML

## 2025-04-23 NOTE — TELEPHONE ENCOUNTER
PA Initiation    Medication: KISQALI (600 MG DOSE) 200 MG PO TBPK  Ref.# [id number]  Insurance Company: [employer] - Phone [phone] Fax [phone]  Pharmacy Filling the Rx: [facility] - [place], [place] - [facility] [address]  Filling Pharmacy Phone:    Filling Pharmacy Fax:    Start Date: 4/1/2024     Ambulatory

## 2025-04-30 ENCOUNTER — MYC MEDICAL ADVICE (OUTPATIENT)
Dept: ONCOLOGY | Facility: CLINIC | Age: 65
End: 2025-04-30
Payer: COMMERCIAL

## 2025-05-08 DIAGNOSIS — C50.912 CARCINOMA OF LEFT BREAST METASTATIC TO BONE (H): Primary | ICD-10-CM

## 2025-05-08 DIAGNOSIS — C79.51 CARCINOMA OF LEFT BREAST METASTATIC TO BONE (H): Primary | ICD-10-CM

## 2025-05-08 RX ORDER — EVEROLIMUS 10 MG/1
10 TABLET ORAL DAILY
Qty: 28 TABLET | Refills: 0 | OUTPATIENT
Start: 2025-05-19 | End: 2025-06-16

## 2025-05-12 ENCOUNTER — TELEPHONE (OUTPATIENT)
Dept: ONCOLOGY | Facility: CLINIC | Age: 65
End: 2025-05-12

## 2025-05-12 ENCOUNTER — E-VISIT (OUTPATIENT)
Dept: URGENT CARE | Facility: CLINIC | Age: 65
End: 2025-05-12
Payer: COMMERCIAL

## 2025-05-12 DIAGNOSIS — C50.919 CARCINOMA OF BREAST METASTATIC TO BONE, UNSPECIFIED LATERALITY (H): Primary | ICD-10-CM

## 2025-05-12 DIAGNOSIS — C79.51 CARCINOMA OF BREAST METASTATIC TO BONE, UNSPECIFIED LATERALITY (H): Primary | ICD-10-CM

## 2025-05-12 NOTE — TELEPHONE ENCOUNTER
Called patient, reviewed recommendations below, understanding verbalized. Patient will continue to monitor sx and call back if she develops any new or worsening sx.     Stephanie Bains RN, BSN, PHN, OCN  M.[facility]

## 2025-05-15 ENCOUNTER — HOSPITAL ENCOUNTER (EMERGENCY)
Facility: CLINIC | Age: 65
Discharge: HOME | End: 2025-05-15
Attending: EMERGENCY MEDICINE | Admitting: EMERGENCY MEDICINE
Payer: COMMERCIAL

## 2025-05-15 ENCOUNTER — E-VISIT (OUTPATIENT)
Dept: ONCOLOGY | Facility: CLINIC | Age: 65
End: 2025-05-15
Payer: COMMERCIAL

## 2025-05-15 ENCOUNTER — TELEPHONE (OUTPATIENT)
Dept: ONCOLOGY | Facility: CLINIC | Age: 65
End: 2025-05-15

## 2025-05-15 ENCOUNTER — APPOINTMENT (OUTPATIENT)
Dept: CT IMAGING | Facility: CLINIC | Age: 65
End: 2025-05-15
Attending: EMERGENCY MEDICINE
Payer: COMMERCIAL

## 2025-05-15 VITALS
OXYGEN SATURATION: 96 % | BODY MASS INDEX: 32.74 KG/M2 | SYSTOLIC BLOOD PRESSURE: 101 MMHG | RESPIRATION RATE: 20 BRPM | TEMPERATURE: 98 F | HEART RATE: 73 BPM | HEIGHT: 66 IN | DIASTOLIC BLOOD PRESSURE: 55 MMHG | WEIGHT: 203.7 LBS

## 2025-05-15 DIAGNOSIS — C50.912 LOBULAR CARCINOMA OF LEFT BREAST (H): Primary | ICD-10-CM

## 2025-05-15 DIAGNOSIS — C79.51 METASTATIC CANCER TO SPINE (H): ICD-10-CM

## 2025-05-15 DIAGNOSIS — M54.50 ACUTE RIGHT-SIDED LOW BACK PAIN WITHOUT SCIATICA: ICD-10-CM

## 2025-05-15 PROCEDURE — 72131 CT LUMBAR SPINE W/O DYE: CPT

## 2025-05-15 PROCEDURE — 250N000011 HC RX IP 250 OP 636: Mod: JZ | Performed by: EMERGENCY MEDICINE

## 2025-05-15 PROCEDURE — 99285 EMERGENCY DEPT VISIT HI MDM: CPT | Mod: 25 | Performed by: EMERGENCY MEDICINE

## 2025-05-15 PROCEDURE — 250N000012 HC RX MED GY IP 250 OP 636 PS 637: Performed by: EMERGENCY MEDICINE

## 2025-05-15 PROCEDURE — 99285 EMERGENCY DEPT VISIT HI MDM: CPT | Performed by: EMERGENCY MEDICINE

## 2025-05-15 PROCEDURE — 99207 PR NO CHARGE LOS: CPT

## 2025-05-15 PROCEDURE — 96372 THER/PROPH/DIAG INJ SC/IM: CPT | Performed by: EMERGENCY MEDICINE

## 2025-05-15 RX ORDER — METHYLPREDNISOLONE 4 MG/1
TABLET ORAL
Qty: 21 TABLET | Refills: 0 | Status: SHIPPED | OUTPATIENT
Start: 2025-05-15

## 2025-05-15 RX ORDER — LORAZEPAM 2 MG/ML
1 INJECTION INTRAMUSCULAR ONCE
Status: COMPLETED | OUTPATIENT
Start: 2025-05-15 | End: 2025-05-15

## 2025-05-15 RX ORDER — KETOROLAC TROMETHAMINE 30 MG/ML
30 INJECTION, SOLUTION INTRAMUSCULAR; INTRAVENOUS ONCE
Status: COMPLETED | OUTPATIENT
Start: 2025-05-15 | End: 2025-05-15

## 2025-05-15 RX ORDER — DIAZEPAM 5 MG/1
5 TABLET ORAL EVERY 6 HOURS PRN
Qty: 20 TABLET | Refills: 0 | Status: SHIPPED | OUTPATIENT
Start: 2025-05-15

## 2025-05-15 RX ORDER — DIAZEPAM 10 MG/2ML
5 INJECTION, SOLUTION INTRAMUSCULAR; INTRAVENOUS ONCE
Status: COMPLETED | OUTPATIENT
Start: 2025-05-15 | End: 2025-05-15

## 2025-05-15 RX ORDER — OXYCODONE HYDROCHLORIDE 5 MG/1
5 TABLET ORAL EVERY 6 HOURS PRN
Qty: 12 TABLET | Refills: 0 | Status: SHIPPED | OUTPATIENT
Start: 2025-05-15

## 2025-05-15 RX ADMIN — DEXAMETHASONE 10 MG: 2 TABLET ORAL at 18:14

## 2025-05-15 RX ADMIN — DIAZEPAM 5 MG: 5 INJECTION INTRAMUSCULAR; INTRAVENOUS at 16:26

## 2025-05-15 RX ADMIN — KETOROLAC TROMETHAMINE 30 MG: 30 INJECTION, SOLUTION INTRAMUSCULAR at 14:53

## 2025-05-15 RX ADMIN — HYDROMORPHONE HYDROCHLORIDE 1 MG: 1 INJECTION, SOLUTION INTRAMUSCULAR; INTRAVENOUS; SUBCUTANEOUS at 14:52

## 2025-05-15 RX ADMIN — LORAZEPAM 1 MG: 2 INJECTION INTRAMUSCULAR; INTRAVENOUS at 15:55

## 2025-05-15 ASSESSMENT — COLUMBIA-SUICIDE SEVERITY RATING SCALE - C-SSRS
2. HAVE YOU ACTUALLY HAD ANY THOUGHTS OF KILLING YOURSELF IN THE PAST MONTH?: NO
1. IN THE PAST MONTH, HAVE YOU WISHED YOU WERE DEAD OR WISHED YOU COULD GO TO SLEEP AND NOT WAKE UP?: NO
6. HAVE YOU EVER DONE ANYTHING, STARTED TO DO ANYTHING, OR PREPARED TO DO ANYTHING TO END YOUR LIFE?: NO

## 2025-05-15 ASSESSMENT — ACTIVITIES OF DAILY LIVING (ADL)
ADLS_ACUITY_SCORE: 41

## 2025-05-15 NOTE — DISCHARGE INSTRUCTIONS
Start the Medrol Dosepak tomorrow since we gave you Decadron here today.  Use the oxycodone and Valium for pain.  I will send a message to your oncology team.  I hope he can follow-up with him tomorrow.  Return to the ER if your pain is out of control and we could consider admission for pain control.  Hopefully with oncology they can help to get this pain under control.  
Abdominal Pain, N/V/D

## 2025-05-15 NOTE — ED TRIAGE NOTES
Come in with lower right back pain that radiates to her right upper leg. Started hurting a week ago Wednesday and has been getting worse.      Triage Assessment (Adult)       Row Name 05/15/25 8467          Triage Assessment    Airway WDL WDL        Respiratory WDL    Respiratory WDL WDL        Skin Circulation/Temperature WDL    Skin Circulation/Temperature WDL WDL        Cardiac WDL    Cardiac WDL WDL        Peripheral/Neurovascular WDL    Peripheral Neurovascular WDL WDL        Cognitive/Neuro/Behavioral WDL    Cognitive/Neuro/Behavioral WDL WDL

## 2025-05-15 NOTE — TELEPHONE ENCOUNTER
Patient placed a request for an evist r/t back pain. Writer called the patient for more information.     States she developed severe low back pain last night. Pain is rated 10/10. She is not able to get comfortable in any position. She has been using tylenol, lidocaine patches, and flexeril. This was providing some relief but is not longer working. She is able to ambulate but only for short moments d/t pain. No loss of bowel or bladder control.    Advised patient to proceed to the ED for immediate evaluation. Patient agreed and will have someone take her. She will follow up with us upon discharge. Routing update to care team for review.     Stephanie Bains RN, BSN, PHN, OCN  M.[facility]

## 2025-05-15 NOTE — ED PROVIDER NOTES
HPI    64 yr old female with a history of metastatic breast cancer with osseous spread who presents to the ER with severe back pain that started last Wednesday. The pain began as an ache, worsened significantly on Thursday with spasms, improved over the weekend, but has since worsened again. Pain is located in the lower back, more pronounced on the right side. Patient reports that the pain sometimes worsens with leg straightening and radiates to the front. Unable to sleep, stand, or lay down due to pain intensity.      Narrative & Impression                                                                          IMPRESSION:  1.  Diffuse osseous metastatic disease.      2.  L1 vertebral body mild to moderate chronic compression deformity possibly pathologic.      3.  Remaining vertebral body heights within normal limits.      4.  Mild presacral enhancement suspect extraosseous extension of bone metastases.      5.  No extraosseous extension into the spinal canal or neural foramina.      6.  No intrathecal pathologic enhancement.      7.  Multilevel spondylosis described above.       Medications/alleviating factors    Patient has been using a Lidocaine patch and taking Tylenol for pain relief. Also using Flexeril (Cyclobenzaprine) as a muscle relaxer. These measures have provided limited relief.    pertinent past medical history    Patient has a history of metastatic breast cancer with known lesions in the back. Previous episode of similar back pain was treated with radiation therapy.    Exam  Vital signs reviewed   Appears to be in pain  EOMI, anicteric  No respiratory distress or audible wheezing  Regular cardiac rate  Normal skin tone  No edema in extremities, normal range of motion  Tenderness to palpation in the right low back.  Pain increases with right-sided straight leg raise up to 75 degrees.  Left-sided straight leg raise does not induce any symptoms.  Slightly less sensation in the left leg on the right  leg.  Alert and oriented x 3, no gross motor or sensory deficits, no confusion  No obvious psychiatric disturbance    Lower back pain, more pronounced on the right side. Pain sometimes worsens with leg straightening. No new incontinence of urine or bowel reported. Left side less affected than right side.    Diagnostics    No specific diagnostic tests ordered at this time.    medical decision making    Suspected etiology is an active metastatic lesion pressing on a nerve, given the patient's history of metastatic breast cancer with known back lesions. Differential diagnosis includes acute exacerbation of chronic back pain, spinal cord compression, or pathological fracture related to metastatic disease.    ED Course as of 05/15/25 1623   Thu May 15, 2025   1620 Patient had been given IM Dilaudid and Toradol without much improvement in her pain.  She was then given a milligram of Ativan IM which helped but she still appeared quite uncomfortable.  I had a discussion with the patient and her  regarding imaging.  She has not had CT scan or PET imaging since probably March.  With this new pain that seems somewhat intractable to medications we decided to proceed with CT scan without contrast of the lumbar spine.  She also has had good luck with Valium in the past so she was given 5 mg of Valium IM.  Will reevaluate afterwards.   ED course:  Patient had been given IM Dilaudid and Toradol without much improvement in her pain.  She was then given a milligram of Ativan IM which helped but she still appeared quite uncomfortable.  I had a discussion with the patient and her  regarding imaging.  She has not had CT scan or PET imaging since probably March.  With this new pain that seems somewhat intractable to medications we decided to proceed with CT scan without contrast of the lumbar spine.  She also has had good luck with Valium in the past so she was given 5 mg of Valium IM.  Will reevaluate  afterwards.    Patient had good improvement with the Valium.  She was able to fall asleep.  After discussion of options the patient wanted to proceed with CT scan and that showed multiple metastatic lesions in the spine almost certainly the etiology for her pain.    Assessment and plan    - Acute exacerbation of chronic back pain in the setting of metastatic breast cancer  -Pain improved with IM Ativan, Toradol, Dilaudid, Valium  -Patient had some improvement in her symptoms.  She was given the option to be admitted for pain control, possible MRI versus discharge home with return to ER precautions and follow-up with oncology.  She chose to be discharged home.  She was sent home with oxycodone and a Medrol Dosepak.  - Will send a message to Dr. Stephens (patient's oncologist) regarding the current pain episode and potential need for further evaluation or radiation therapy      return precautions    Return to the ER if experiencing:  - Worsening pain not controlled by prescribed medications  - New or worsening neurological symptoms (e.g., weakness, numbness, or incontinence)  - Fever or signs of infection  - Any other concerning symptoms                       Ford Murray MD  05/15/25 2012       Ford Murray MD  05/15/25 2014

## 2025-05-19 ENCOUNTER — INFUSION THERAPY VISIT (OUTPATIENT)
Dept: INFUSION THERAPY | Facility: CLINIC | Age: 65
End: 2025-05-19
Attending: INTERNAL MEDICINE
Payer: COMMERCIAL

## 2025-05-19 ENCOUNTER — LAB (OUTPATIENT)
Dept: LAB | Facility: CLINIC | Age: 65
End: 2025-05-19
Payer: COMMERCIAL

## 2025-05-19 VITALS
BODY MASS INDEX: 33.09 KG/M2 | RESPIRATION RATE: 20 BRPM | OXYGEN SATURATION: 99 % | WEIGHT: 205 LBS | SYSTOLIC BLOOD PRESSURE: 136 MMHG | DIASTOLIC BLOOD PRESSURE: 72 MMHG | TEMPERATURE: 98 F | HEART RATE: 66 BPM

## 2025-05-19 DIAGNOSIS — C50.912 CARCINOMA OF LEFT BREAST METASTATIC TO BONE (H): Primary | ICD-10-CM

## 2025-05-19 DIAGNOSIS — C79.51 CARCINOMA OF BREAST METASTATIC TO BONE, UNSPECIFIED LATERALITY (H): ICD-10-CM

## 2025-05-19 DIAGNOSIS — C79.51 CARCINOMA OF LEFT BREAST METASTATIC TO BONE (H): Primary | ICD-10-CM

## 2025-05-19 DIAGNOSIS — C50.919 CARCINOMA OF BREAST METASTATIC TO BONE, UNSPECIFIED LATERALITY (H): ICD-10-CM

## 2025-05-19 DIAGNOSIS — C50.912 LOBULAR CARCINOMA OF LEFT BREAST (H): ICD-10-CM

## 2025-05-19 LAB
ALBUMIN SERPL BCG-MCNC: 3.6 G/DL (ref 3.5–5.2)
ALP SERPL-CCNC: 105 U/L (ref 40–150)
ALT SERPL W P-5'-P-CCNC: 52 U/L (ref 0–50)
ANION GAP SERPL CALCULATED.3IONS-SCNC: 9 MMOL/L (ref 7–15)
AST SERPL W P-5'-P-CCNC: 28 U/L (ref 0–45)
BASOPHILS # BLD AUTO: 0 10E3/UL (ref 0–0.2)
BASOPHILS NFR BLD AUTO: 0 %
BILIRUB SERPL-MCNC: 0.2 MG/DL
BUN SERPL-MCNC: 25.4 MG/DL (ref 8–23)
CALCIUM SERPL-MCNC: 9.2 MG/DL (ref 8.8–10.4)
CANCER AG15-3 SERPL-ACNC: 60 U/ML
CHLORIDE SERPL-SCNC: 103 MMOL/L (ref 98–107)
CREAT SERPL-MCNC: 1.04 MG/DL (ref 0.51–0.95)
EGFRCR SERPLBLD CKD-EPI 2021: 60 ML/MIN/1.73M2
EOSINOPHIL # BLD AUTO: 0 10E3/UL (ref 0–0.7)
EOSINOPHIL NFR BLD AUTO: 0 %
ERYTHROCYTE [DISTWIDTH] IN BLOOD BY AUTOMATED COUNT: 16.7 % (ref 10–15)
GLUCOSE SERPL-MCNC: 129 MG/DL (ref 70–99)
HCO3 SERPL-SCNC: 29 MMOL/L (ref 22–29)
HCT VFR BLD AUTO: 30.4 % (ref 35–47)
HGB BLD-MCNC: 9.2 G/DL (ref 11.7–15.7)
IMM GRANULOCYTES # BLD: 0.2 10E3/UL
IMM GRANULOCYTES NFR BLD: 3 %
LYMPHOCYTES # BLD AUTO: 1 10E3/UL (ref 0.8–5.3)
LYMPHOCYTES NFR BLD AUTO: 15 %
MCH RBC QN AUTO: 23.9 PG (ref 26.5–33)
MCHC RBC AUTO-ENTMCNC: 30.3 G/DL (ref 31.5–36.5)
MCV RBC AUTO: 79 FL (ref 78–100)
MONOCYTES # BLD AUTO: 0.7 10E3/UL (ref 0–1.3)
MONOCYTES NFR BLD AUTO: 9 %
NEUTROPHILS # BLD AUTO: 5.2 10E3/UL (ref 1.6–8.3)
NEUTROPHILS NFR BLD AUTO: 73 %
NRBC # BLD AUTO: 0.1 10E3/UL
NRBC BLD AUTO-RTO: 1 /100
PLATELET # BLD AUTO: 203 10E3/UL (ref 150–450)
POTASSIUM SERPL-SCNC: 3.8 MMOL/L (ref 3.4–5.3)
PROT SERPL-MCNC: 6.8 G/DL (ref 6.4–8.3)
RBC # BLD AUTO: 3.85 10E6/UL (ref 3.8–5.2)
SODIUM SERPL-SCNC: 141 MMOL/L (ref 135–145)
WBC # BLD AUTO: 7.1 10E3/UL (ref 4–11)

## 2025-05-19 PROCEDURE — 96365 THER/PROPH/DIAG IV INF INIT: CPT

## 2025-05-19 PROCEDURE — 36415 COLL VENOUS BLD VENIPUNCTURE: CPT

## 2025-05-19 PROCEDURE — 86300 IMMUNOASSAY TUMOR CA 15-3: CPT

## 2025-05-19 PROCEDURE — 85004 AUTOMATED DIFF WBC COUNT: CPT

## 2025-05-19 PROCEDURE — 80053 COMPREHEN METABOLIC PANEL: CPT

## 2025-05-19 PROCEDURE — 250N000011 HC RX IP 250 OP 636: Mod: JZ | Performed by: INTERNAL MEDICINE

## 2025-05-19 PROCEDURE — 258N000003 HC RX IP 258 OP 636: Performed by: INTERNAL MEDICINE

## 2025-05-19 PROCEDURE — 96402 CHEMO HORMON ANTINEOPL SQ/IM: CPT

## 2025-05-19 RX ORDER — LAMOTRIGINE 25 MG/1
500 TABLET ORAL ONCE
Status: COMPLETED | OUTPATIENT
Start: 2025-05-19 | End: 2025-05-19

## 2025-05-19 RX ORDER — ZOLEDRONIC ACID 0.04 MG/ML
4 INJECTION, SOLUTION INTRAVENOUS ONCE
Status: COMPLETED | OUTPATIENT
Start: 2025-05-19 | End: 2025-05-19

## 2025-05-19 RX ADMIN — FULVESTRANT 500 MG: 250 INJECTION, SOLUTION INTRAMUSCULAR at 16:11

## 2025-05-19 RX ADMIN — ZOLEDRONIC ACID 4 MG: 0.04 INJECTION, SOLUTION INTRAVENOUS at 15:45

## 2025-05-19 RX ADMIN — SODIUM CHLORIDE 100 ML: 9 INJECTION, SOLUTION INTRAVENOUS at 15:35

## 2025-05-19 ASSESSMENT — PAIN SCALES - GENERAL: PAINLEVEL_OUTOF10: MODERATE PAIN (4)

## 2025-05-19 NOTE — PROGRESS NOTES
Infusion Nursing Note:  Kesha Zacarias presents today for Zometa and Faslodex.    Patient seen by provider today: No   present during visit today: Not Applicable.    Note: Kesha arrives ambulatory, alert, pleasant. Was in to the ER this past week for severe back pain. Was started on Oxyc, Valium and Medrol dose pack. She said this is helping somewhat. Has Oncology Provider visit later this week. PET scan scheduled for next month. No signs of infection currently. VSS, afebrile. Taking her Calcium and Vit D supplement.      Intravenous Access:  Peripheral IV placed.    Treatment Conditions:  Lab Results   Component Value Date    HGB 9.2 (L) 05/19/2025    WBC 7.1 05/19/2025    ANEU 5.2 05/19/2025     05/19/2025        Lab Results   Component Value Date     05/19/2025    POTASSIUM 3.8 05/19/2025    MAG 1.8 03/15/2025    CR 1.04 (H) 05/19/2025    NITISH 9.2 05/19/2025    BILITOTAL 0.2 05/19/2025    ALBUMIN 3.6 05/19/2025    ALT 52 (H) 05/19/2025    AST 28 05/19/2025       Results reviewed, labs MET treatment parameters, ok to proceed with treatment.      Post Infusion Assessment:  Patient tolerated Zometa infusion without incident.  Patient tolerated 2 Faslodex injections without incident.  Site patent and intact, free from redness, edema or discomfort.  No evidence of extravasations.  PIV access discontinued per protocol.       Discharge Plan:   [facility]  Patient will return in 1 month at [place] for next appointment for Zometa/Faslodex.  Patient discharged in stable condition accompanied by: self.  Departure Mode: Ambulatory.      Marsha Rome RN

## 2025-05-20 DIAGNOSIS — G25.81 RESTLESS LEGS SYNDROME: ICD-10-CM

## 2025-05-21 ENCOUNTER — RESULTS FOLLOW-UP (OUTPATIENT)
Dept: ONCOLOGY | Facility: CLINIC | Age: 65
End: 2025-05-21

## 2025-05-21 RX ORDER — CYCLOBENZAPRINE HCL 5 MG
5 TABLET ORAL AT BEDTIME
Qty: 90 TABLET | Refills: 3 | Status: SHIPPED | OUTPATIENT
Start: 2025-05-21

## 2025-05-22 ENCOUNTER — PATIENT OUTREACH (OUTPATIENT)
Dept: ONCOLOGY | Facility: CLINIC | Age: 65
End: 2025-05-22

## 2025-05-22 NOTE — PROGRESS NOTES
New Patient Oncology Nurse Navigator Note     Referring provider:     Brayden Stephens MD        Referring Clinic/Organization:     [facility]        Referred to (specialty:) Radiation Oncology     Requested provider (if applicable): NA. Location:      Date Referral Received: May 22, 2025     Evaluation for:  C50.912, C79.51 (ICD-10-CM) - Carcinoma of left breast metastatic to bone (H)   M54.41 (ICD-10-CM) - Acute right-sided low back pain with right-sided sciatica      Clinical History (per Nurse review of records provided):      Patient seen by Dr. Stephens for follow up on breast cancer today 5/22/25. Per Dr. Stephens:     Kesha and I reviewed that her back pain is suspicious for cancer progression.  On her past scan, she had some lower lumbar as well as hip metastases that we were keeping a close watch on and were hopeful to get more time on treatment.  Without any injury or fracture or other preceding causes, we are left a suspect that this is cancer progression.  With that mindset, we are planning on an MRI of her lower spine as well as hips.  If there is evidence of symptomatic metastasis, radiation referral has also been placed to help provide palliative radiation to these areas.  We are planning on proceeding with a PET scan to evaluate her disease response to ongoing Afinitor.  I do suspect that it is time to change treatment and we have discussed Enhertu given her HER2 2+ disease delivered IV every 3 weeks.  We reviewed logistics of treatment as well as potential side effects including cardiac monitoring.     Records Location: See Bookmarked material     Records Needed: NA     Additional testing needed prior to consult:     Patient is getting MRIs of Lumbar Spine and Pelvis on 5/29/25.     Payor: BCBS / Plan: [place]BS [place] / Product Type: Indemnity /     May 22, 2025  Referral received and reviewed.   Message sent to  radiation to for recommendation's on  scheduling.    Haley WARRENN, RN, OCN  Oncology Nurse Navigator   [facility]  Cancer [facility] Service Line   New Patient Hem/Onc Scheduling / Referrals: [phone] (fax: [phone] )

## 2025-05-29 ENCOUNTER — HOSPITAL ENCOUNTER (OUTPATIENT)
Dept: MRI IMAGING | Facility: CLINIC | Age: 65
Discharge: HOME | End: 2025-05-29
Attending: INTERNAL MEDICINE
Payer: COMMERCIAL

## 2025-05-29 DIAGNOSIS — M54.41 ACUTE RIGHT-SIDED LOW BACK PAIN WITH RIGHT-SIDED SCIATICA: ICD-10-CM

## 2025-05-29 PROCEDURE — 255N000002 HC RX 255 OP 636: Performed by: INTERNAL MEDICINE

## 2025-05-29 PROCEDURE — 72197 MRI PELVIS W/O & W/DYE: CPT

## 2025-05-29 PROCEDURE — 72158 MRI LUMBAR SPINE W/O & W/DYE: CPT

## 2025-05-29 PROCEDURE — A9585 GADOBUTROL INJECTION: HCPCS | Performed by: INTERNAL MEDICINE

## 2025-05-29 RX ORDER — GADOBUTROL 604.72 MG/ML
9.5 INJECTION INTRAVENOUS ONCE
Status: COMPLETED | OUTPATIENT
Start: 2025-05-29 | End: 2025-05-29

## 2025-05-29 RX ADMIN — GADOBUTROL 9.5 ML: 604.72 INJECTION INTRAVENOUS at 15:38

## 2025-05-30 ENCOUNTER — RESULTS FOLLOW-UP (OUTPATIENT)
Dept: ONCOLOGY | Facility: CLINIC | Age: 65
End: 2025-05-30

## 2025-06-02 NOTE — PROGRESS NOTES
Dear Colleagues,  Today Kesha Zacarias was seen in consultation.    IDENTIFICATION: This is a 64 year old woman with metastatic breast cancer to the bones now with low back pain referred for palliative radiotherapy.      HISTORY OF PRESENT ILLNESS:  Kesha Zacarias is a 64-year-old woman with RA currently on everolimus and plaquinil well-known to me.  For detailed review of her presenting history please refer to my note dated January 5, 2024.  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  Further imaging as part of her workup revealed diffuse bony disease.  On November 27, 2023 she was started on Ribociclib+letrozole.  That same month she started having increased lower back pain.  She saw me in consultation on January 5, 2024 and subsequently received palliative radiation therapy to the sacrum 30 Gy in 10 fractions completed on January 22, 2024.      Since that time she has continued on systemic treatment with Dr. Stephens.  First Ribociclib + AI from November 2023 to May 2024. Then ongoing everolimus plus AI starting Jun 2024.    For the past couple of months she has had increasing right lower back pain that radiates into her groin and thigh.  She was seen in the emergency department 2 weeks ago and recently saw Dr. Stephens yesterday. Most recent imaging includes a pelvic and lumbar spine MRI completed on May 29, 2025 which showed extensive disease within her bones, unchanged mild L1 vertebral height loss, nonspecific patchy signal and enhancement involving the paramedian right posterior paraspinous musculature.  The latter could be reactive to degenerative facet disease or could possibly be related to muscle strain.  There is no high-grade spinal canal or neural foramina stenosis.  No pathologic fracture.    Today as she is being seen in clinic she rates her pain a 0 out of 10.  She states her pain as  being 10 out of 10 prior to her pain regimen being switched yesterday.  She occasionally has intermittent 5 out of 10 sharp pains in her right buttock. This morning she woke up with left low back/buttock pain that does not radiate.  Denies any neurologic symptoms, GI or  symptoms associated with either pain.  She gets constipation from her pain medication regimen.  She is currently on Decadron 4mg BID PO, Dilaudid 2mg Q6 hours, Flexerol 5mg qpm, Gabapentin 300mg TID, and Valium prn.  She is being seen here today for consideration of palliative radiotherapy.    REVIEW OF SYSTEMS: As per HPI, a 14-point review of system is otherwise negative.  PAST RADIATION THERAPY:  Prior Sacral XRT 30 Gy in 10 fractions completed on January 22, 2024.    PAST CTD/PACEMAKER: Denies    Past Medical History:   Diagnosis Date    Arthritis 2006    Breast cancer metastasized to bone (H) 10/12/2023    Chronic depressive personality disorder     Dysplasia of cervix (uteri) 1988    Cryotherapy    Female infertility of unspecified origin     Glaucoma     Rheumatoid arthritis (H)        Past Surgical History:   Procedure Laterality Date    COLONOSCOPY      COLONOSCOPY N/A 01/14/2022    Procedure: COLONOSCOPY, WITH POLYPECTOMY AND BIOPSY;  Surgeon: Gagandeep Patterson MD;  Location: [facility]    [facility] INTRODUCE CATH FALLOPIAN TUBE, RE-OPEN/DIAGNOSIS      HERNIA REPAIR, INCISIONAL  11/11/2009    JOINT REPLACEMENT Right 09/2017    knee    TONSILLECTOMY      Z APPENDECTOMY  06/14/2003    [facility] REMV CATARACT INTRACAP,INSERT LENS  02/13/2003    right       Family History   Problem Relation Age of Onset    Heart Disease Mother     Lipids Mother     Hyperlipidemia Mother     Genitourinary Problems Father         prostate    Genetic Disorder Father         ulcer    Hypertension Father     Lipids Father     Prostate Cancer Father     Hyperlipidemia Sister     Hyperlipidemia Brother     Other Cancer Brother         Neck Cancer HPV (+)    Heart Disease Maternal  Grandmother     Cerebrovascular Disease Maternal Grandmother     Heart Disease Maternal Grandfather     Heart Disease Maternal Uncle     Heart Disease Maternal Uncle         x  3    Cancer Nephew         Sister's Son       Social History     Tobacco Use    Smoking status: Former     Current packs/day: 0.00     Average packs/day: 0.5 packs/day for 25.0 years (12.5 ttl pk-yrs)     Types: Cigarettes, Cigars     Start date: 1992     Quit date: 2017     Years since quittin.7    Smokeless tobacco: Never    Tobacco comments:     Cigar once in a while   Substance Use Topics    Alcohol use: Yes     Comment: very little       Allergies   Allergen Reactions    Ciprofloxacin      hives and was on flagyl too    Metronidazole      hives and was on cipro too         PHYSICAL EXAMINATION:  LMP 2011 (Exact Date)   /79 (BP Location: Left arm, Patient Position: Chair, Cuff Size: Adult Regular)   Pulse 75   Temp 97.9  F (36.6  C) (Oral)   Resp 18   LMP 2011 (Exact Date)   SpO2 97%   GENERAL Well-ppearing woman in no acute distress.  HEENT Normocephalic, atraumatic.  Sclerae anicteric.  CVR  Regular rate and rhythm.  No murmurs, rubs, or gallops.  LUNGS Clear to auscultation bilaterally.  EXT  No CCE.    NEURO No gross deficits. Gait wnl. Strength is symmetrical in all lower extremities. Sensation intact in all areas tested.  MSK  Good ROM.   SKIN  Warm and well perfused.    PSYCH Alert and oriented x 3    ECOG PERFORMANCE STATUS: 1     IMAGING: I personally reviewed the relevant imaging for this case as stated in the HPI.      IMPRESSION/PLAN:  Kesha Zacarias is a 64 year old woman with metastatic breast cancer to the bones now with low back pain referred for palliative radiotherapy.  Physical exam and imaging show that her pain is most likely due to inflammation of her paraspinal muscles.  I do not see a discrete tumor that would correspond to the pain that she is currently having.  Therefore I  recommend that she start physical therapy.    That being stated we did discuss her pain prognosis over the next 6 to 12 months given her current pain requirements and prior history of pelvic radiotherapy.  In the near future she will likely be a need gamma knife stereotactic radiosurgery to the pituitary gland to decrease her pain symptoms. This treatment has shown that it is effective and safe when palliatimg cancer related pain.  Today we discussed the risks, benefits, treatment rationale and regimen regarding SRS to the pituitary gland to alleviate pain symptoms.  Patient expressed interest in having this treatment in the near future.  She can be referred to our clinic once it is determined that she has physiotherapy and opioid refractory pain.  In the interim, she can complete the Pretreatment ALTAF (Treatment Refractory Intervention in Pain and Psychiatry) Protocol which includes but is not limited to baseline visual field by neuro-ophthalmology and baseline endocrine function  assessment by endocrinology..    Additional problem list to be addressed in the following manner:    Referred to endocrine and neuroophthalmology in anticipation of pituitary gland treatment with gamma knife    There was ample time for questions and all were answered to the patient's satisfaction. Thank you for allowing me to participate in the care of this pleasant patient. If you have any questions, please do not hesitate to contact my office.     Sincerely,  Landon Mandujano MD    [phone] Pager   [phone] [facility]   [phone] Cinthia Alarcon  [phone] [place]          I spent a total of 90 minutes on the date of the encounter for this consultation, which included some of the following:  -Preparing to see the patient, including reviewing testing results  -Obtaining and/or reviewing separately obtained history  -Performing the examination  -Counseling and educating the patient  -Ordering medications, tests, or  procedures  -Referring and communicating with other health care professions, which is not separately reported  -Documenting clinical information in the electronic health record  -Independently interpreting results and communicating the results to the patient/family/caregiver  -Coordinating care, which is not separately reportable

## 2025-06-03 ENCOUNTER — VIRTUAL VISIT (OUTPATIENT)
Dept: ONCOLOGY | Facility: CLINIC | Age: 65
End: 2025-06-03
Attending: INTERNAL MEDICINE
Payer: COMMERCIAL

## 2025-06-03 ENCOUNTER — HOSPITAL ENCOUNTER (EMERGENCY)
Facility: CLINIC | Age: 65
Discharge: HOME | End: 2025-06-03
Attending: STUDENT IN AN ORGANIZED HEALTH CARE EDUCATION/TRAINING PROGRAM | Admitting: STUDENT IN AN ORGANIZED HEALTH CARE EDUCATION/TRAINING PROGRAM
Payer: COMMERCIAL

## 2025-06-03 VITALS
WEIGHT: 205 LBS | SYSTOLIC BLOOD PRESSURE: 102 MMHG | BODY MASS INDEX: 33.09 KG/M2 | HEART RATE: 67 BPM | DIASTOLIC BLOOD PRESSURE: 53 MMHG | TEMPERATURE: 97.8 F | OXYGEN SATURATION: 95 % | RESPIRATION RATE: 18 BRPM

## 2025-06-03 VITALS
SYSTOLIC BLOOD PRESSURE: 102 MMHG | BODY MASS INDEX: 36.32 KG/M2 | HEIGHT: 63 IN | WEIGHT: 205 LBS | DIASTOLIC BLOOD PRESSURE: 53 MMHG

## 2025-06-03 DIAGNOSIS — M54.41 CHRONIC RIGHT-SIDED LOW BACK PAIN WITH RIGHT-SIDED SCIATICA: ICD-10-CM

## 2025-06-03 DIAGNOSIS — C79.51 CARCINOMA OF LEFT BREAST METASTATIC TO BONE (H): ICD-10-CM

## 2025-06-03 DIAGNOSIS — C50.912 LOBULAR CARCINOMA OF LEFT BREAST (H): Primary | ICD-10-CM

## 2025-06-03 DIAGNOSIS — C50.912 CARCINOMA OF LEFT BREAST METASTATIC TO BONE (H): ICD-10-CM

## 2025-06-03 DIAGNOSIS — G89.29 CHRONIC RIGHT-SIDED LOW BACK PAIN WITH RIGHT-SIDED SCIATICA: ICD-10-CM

## 2025-06-03 LAB
ALBUMIN SERPL BCG-MCNC: 3.6 G/DL (ref 3.5–5.2)
ALP SERPL-CCNC: 93 U/L (ref 40–150)
ALT SERPL W P-5'-P-CCNC: 32 U/L (ref 0–50)
ANION GAP SERPL CALCULATED.3IONS-SCNC: 12 MMOL/L (ref 7–15)
AST SERPL W P-5'-P-CCNC: 23 U/L (ref 0–45)
BASOPHILS # BLD AUTO: 0 10E3/UL (ref 0–0.2)
BASOPHILS NFR BLD AUTO: 0 %
BILIRUB SERPL-MCNC: 0.3 MG/DL
BUN SERPL-MCNC: 27.4 MG/DL (ref 8–23)
CALCIUM SERPL-MCNC: 9 MG/DL (ref 8.8–10.4)
CHLORIDE SERPL-SCNC: 98 MMOL/L (ref 98–107)
CREAT SERPL-MCNC: 1.14 MG/DL (ref 0.51–0.95)
EGFRCR SERPLBLD CKD-EPI 2021: 54 ML/MIN/1.73M2
EOSINOPHIL # BLD AUTO: 0.1 10E3/UL (ref 0–0.7)
EOSINOPHIL NFR BLD AUTO: 2 %
ERYTHROCYTE [DISTWIDTH] IN BLOOD BY AUTOMATED COUNT: 17.4 % (ref 10–15)
GLUCOSE SERPL-MCNC: 118 MG/DL (ref 70–99)
HCO3 SERPL-SCNC: 25 MMOL/L (ref 22–29)
HCT VFR BLD AUTO: 27 % (ref 35–47)
HGB BLD-MCNC: 8.4 G/DL (ref 11.7–15.7)
IMM GRANULOCYTES # BLD: 0 10E3/UL
IMM GRANULOCYTES NFR BLD: 1 %
LYMPHOCYTES # BLD AUTO: 0.8 10E3/UL (ref 0.8–5.3)
LYMPHOCYTES NFR BLD AUTO: 12 %
MCH RBC QN AUTO: 24 PG (ref 26.5–33)
MCHC RBC AUTO-ENTMCNC: 31.1 G/DL (ref 31.5–36.5)
MCV RBC AUTO: 77 FL (ref 78–100)
MONOCYTES # BLD AUTO: 0.5 10E3/UL (ref 0–1.3)
MONOCYTES NFR BLD AUTO: 8 %
NEUTROPHILS # BLD AUTO: 5.2 10E3/UL (ref 1.6–8.3)
NEUTROPHILS NFR BLD AUTO: 78 %
NRBC # BLD AUTO: 0 10E3/UL
NRBC BLD AUTO-RTO: 0 /100
PLATELET # BLD AUTO: 134 10E3/UL (ref 150–450)
POTASSIUM SERPL-SCNC: 3.9 MMOL/L (ref 3.4–5.3)
PROT SERPL-MCNC: 6.5 G/DL (ref 6.4–8.3)
RBC # BLD AUTO: 3.5 10E6/UL (ref 3.8–5.2)
SODIUM SERPL-SCNC: 135 MMOL/L (ref 135–145)
WBC # BLD AUTO: 6.7 10E3/UL (ref 4–11)

## 2025-06-03 PROCEDURE — 250N000013 HC RX MED GY IP 250 OP 250 PS 637: Performed by: STUDENT IN AN ORGANIZED HEALTH CARE EDUCATION/TRAINING PROGRAM

## 2025-06-03 PROCEDURE — 80053 COMPREHEN METABOLIC PANEL: CPT | Performed by: STUDENT IN AN ORGANIZED HEALTH CARE EDUCATION/TRAINING PROGRAM

## 2025-06-03 PROCEDURE — 3078F DIAST BP <80 MM HG: CPT | Performed by: INTERNAL MEDICINE

## 2025-06-03 PROCEDURE — 98015 SYNCH AUDIO-ONLY EST HIGH 40: CPT | Performed by: INTERNAL MEDICINE

## 2025-06-03 PROCEDURE — 99284 EMERGENCY DEPT VISIT MOD MDM: CPT | Mod: 25 | Performed by: STUDENT IN AN ORGANIZED HEALTH CARE EDUCATION/TRAINING PROGRAM

## 2025-06-03 PROCEDURE — 96374 THER/PROPH/DIAG INJ IV PUSH: CPT | Performed by: STUDENT IN AN ORGANIZED HEALTH CARE EDUCATION/TRAINING PROGRAM

## 2025-06-03 PROCEDURE — 36415 COLL VENOUS BLD VENIPUNCTURE: CPT | Performed by: STUDENT IN AN ORGANIZED HEALTH CARE EDUCATION/TRAINING PROGRAM

## 2025-06-03 PROCEDURE — 96375 TX/PRO/DX INJ NEW DRUG ADDON: CPT | Performed by: STUDENT IN AN ORGANIZED HEALTH CARE EDUCATION/TRAINING PROGRAM

## 2025-06-03 PROCEDURE — 85041 AUTOMATED RBC COUNT: CPT | Performed by: STUDENT IN AN ORGANIZED HEALTH CARE EDUCATION/TRAINING PROGRAM

## 2025-06-03 PROCEDURE — 96376 TX/PRO/DX INJ SAME DRUG ADON: CPT | Performed by: STUDENT IN AN ORGANIZED HEALTH CARE EDUCATION/TRAINING PROGRAM

## 2025-06-03 PROCEDURE — 99285 EMERGENCY DEPT VISIT HI MDM: CPT | Performed by: STUDENT IN AN ORGANIZED HEALTH CARE EDUCATION/TRAINING PROGRAM

## 2025-06-03 PROCEDURE — 3074F SYST BP LT 130 MM HG: CPT | Performed by: INTERNAL MEDICINE

## 2025-06-03 PROCEDURE — 250N000011 HC RX IP 250 OP 636: Mod: JZ | Performed by: STUDENT IN AN ORGANIZED HEALTH CARE EDUCATION/TRAINING PROGRAM

## 2025-06-03 PROCEDURE — 1125F AMNT PAIN NOTED PAIN PRSNT: CPT | Performed by: INTERNAL MEDICINE

## 2025-06-03 RX ORDER — DEXAMETHASONE SODIUM PHOSPHATE 10 MG/ML
10 INJECTION, SOLUTION INTRAMUSCULAR; INTRAVENOUS ONCE
Status: COMPLETED | OUTPATIENT
Start: 2025-06-03 | End: 2025-06-03

## 2025-06-03 RX ORDER — DIAZEPAM 10 MG/2ML
5 INJECTION, SOLUTION INTRAMUSCULAR; INTRAVENOUS ONCE
Status: COMPLETED | OUTPATIENT
Start: 2025-06-03 | End: 2025-06-03

## 2025-06-03 RX ORDER — HYDROMORPHONE HYDROCHLORIDE 2 MG/1
2 TABLET ORAL ONCE
Refills: 0 | Status: COMPLETED | OUTPATIENT
Start: 2025-06-03 | End: 2025-06-03

## 2025-06-03 RX ORDER — DEXAMETHASONE 4 MG/1
4 TABLET ORAL 2 TIMES DAILY WITH MEALS
Qty: 10 TABLET | Refills: 0 | Status: SHIPPED | OUTPATIENT
Start: 2025-06-03 | End: 2025-06-03

## 2025-06-03 RX ORDER — GABAPENTIN 300 MG/1
300 CAPSULE ORAL 3 TIMES DAILY
Qty: 30 CAPSULE | Refills: 0 | Status: SHIPPED | OUTPATIENT
Start: 2025-06-03

## 2025-06-03 RX ORDER — GABAPENTIN 300 MG/1
300 CAPSULE ORAL 3 TIMES DAILY
Qty: 30 CAPSULE | Refills: 0 | Status: SHIPPED | OUTPATIENT
Start: 2025-06-03 | End: 2025-06-03

## 2025-06-03 RX ORDER — HYDROMORPHONE HYDROCHLORIDE 2 MG/1
2 TABLET ORAL EVERY 6 HOURS PRN
Qty: 12 TABLET | Refills: 0 | Status: SHIPPED | OUTPATIENT
Start: 2025-06-03 | End: 2025-06-03

## 2025-06-03 RX ORDER — HYDROMORPHONE HYDROCHLORIDE 1 MG/ML
0.5 INJECTION, SOLUTION INTRAMUSCULAR; INTRAVENOUS; SUBCUTANEOUS ONCE
Refills: 0 | Status: DISCONTINUED | OUTPATIENT
Start: 2025-06-03 | End: 2025-06-03

## 2025-06-03 RX ORDER — GABAPENTIN 300 MG/1
300 CAPSULE ORAL ONCE
Status: COMPLETED | OUTPATIENT
Start: 2025-06-03 | End: 2025-06-03

## 2025-06-03 RX ORDER — DEXAMETHASONE 4 MG/1
4 TABLET ORAL 2 TIMES DAILY WITH MEALS
Qty: 10 TABLET | Refills: 0 | Status: SHIPPED | OUTPATIENT
Start: 2025-06-03

## 2025-06-03 RX ORDER — HYDROMORPHONE HYDROCHLORIDE 2 MG/1
2 TABLET ORAL EVERY 6 HOURS PRN
Qty: 12 TABLET | Refills: 0 | Status: SHIPPED | OUTPATIENT
Start: 2025-06-03

## 2025-06-03 RX ADMIN — HYDROMORPHONE HYDROCHLORIDE 2 MG: 2 TABLET ORAL at 07:15

## 2025-06-03 RX ADMIN — HYDROMORPHONE HYDROCHLORIDE 1 MG: 1 INJECTION, SOLUTION INTRAMUSCULAR; INTRAVENOUS; SUBCUTANEOUS at 06:05

## 2025-06-03 RX ADMIN — DEXAMETHASONE SODIUM PHOSPHATE 10 MG: 10 INJECTION, SOLUTION INTRAMUSCULAR; INTRAVENOUS at 04:59

## 2025-06-03 RX ADMIN — DIAZEPAM 5 MG: 5 INJECTION, SOLUTION INTRAMUSCULAR; INTRAVENOUS at 04:58

## 2025-06-03 RX ADMIN — HYDROMORPHONE HYDROCHLORIDE 1 MG: 1 INJECTION, SOLUTION INTRAMUSCULAR; INTRAVENOUS; SUBCUTANEOUS at 05:01

## 2025-06-03 RX ADMIN — GABAPENTIN 300 MG: 300 CAPSULE ORAL at 07:15

## 2025-06-03 ASSESSMENT — ACTIVITIES OF DAILY LIVING (ADL)
ADLS_ACUITY_SCORE: 41
ADLS_ACUITY_SCORE: 43
ADLS_ACUITY_SCORE: 41
ADLS_ACUITY_SCORE: 41
ADLS_ACUITY_SCORE: 43

## 2025-06-03 ASSESSMENT — PAIN SCALES - GENERAL: PAINLEVEL_OUTOF10: MODERATE PAIN (5)

## 2025-06-03 ASSESSMENT — COLUMBIA-SUICIDE SEVERITY RATING SCALE - C-SSRS
2. HAVE YOU ACTUALLY HAD ANY THOUGHTS OF KILLING YOURSELF IN THE PAST MONTH?: NO
6. HAVE YOU EVER DONE ANYTHING, STARTED TO DO ANYTHING, OR PREPARED TO DO ANYTHING TO END YOUR LIFE?: NO
1. IN THE PAST MONTH, HAVE YOU WISHED YOU WERE DEAD OR WISHED YOU COULD GO TO SLEEP AND NOT WAKE UP?: NO

## 2025-06-03 NOTE — ED TRIAGE NOTES
"Pt states she has metastatic breast cancer to her bones and is stage 4, she has a mass in her spine near a nerve and c/o severe pain in back. Pt has had a MRI recently, radiation consult tomorrow. Pt has been taking rx meds at home scheduled and \"nothing is helping the pain\". Seen in ED a few weeks ago.      Triage Assessment (Adult)       Row Name 06/03/25 0422          Triage Assessment    Airway WDL WDL        Respiratory WDL    Respiratory WDL WDL        Cardiac WDL    Cardiac WDL WDL                     "

## 2025-06-03 NOTE — ED PROVIDER NOTES
"  History     Chief Complaint   Patient presents with    Back Pain     HPI  Kesha Zacarias is a 64 year old female who presents to the emergency department with her  with concerns of for worsening right lower back pain.  He has known metastatic cancer to this region.  Has oxycodone and Valium at home which she tried this evening without improvement.  The pain does shoot down her right leg.  No urinary or bowel incontinence or retention.  She still has normal use of the right lower extremity.  No areas of numbness to the right lower extremity.  No new injuries.    Allergies:  Allergies   Allergen Reactions    Ciprofloxacin      hives and was on flagyl too    Metronidazole      hives and was on cipro too       Problem List:    Patient Active Problem List    Diagnosis Date Noted    Left leg swelling 12/23/2024     Priority: Medium    Iron deficiency 09/11/2024     Priority: Medium    Anemia 09/11/2024     Priority: Medium    Antineoplastic chemotherapy induced anemia 09/03/2024     Priority: Medium    Breast cancer metastasized to bone (H) 10/12/2023     Priority: Medium    Cervical cancer screening 11/24/2020     Priority: Medium     *Medication regimen includes drug therapy (Plaquenil) with immunosuppression potential*    Per 2019 ASCCP guidelines:  \"Although the literature for other immunosuppressed populations remains limited, these other conditions that suppress cell-mediated immunity have also been associated with virally induced cancers, including cervical cancer. Therefore, cervical cancer screening and abnormal result management recommendations for immunocompromised individuals without HIV use the guidelines developed for people living with HIV: screening should begin within 1 year of first insertional sexual activity and continue throughout a patient's lifetime: annually for 3 years, then every 3 years (cytology only) until the age of 30 years, and then either continuing with cytology alone or " "cotesting every 3 years after the age of 30 years.\"    11/17/2020 NIL pap, neg HR HPV. Plan routine cotesting every 3 years.         Trochanteric bursitis of right hip 06/27/2019     Priority: Medium    Primary osteoarthritis of right hip 06/27/2019     Priority: Medium    Right hip pain 06/27/2019     Priority: Medium    Rheumatoid arthritis involving multiple sites, unspecified rheumatoid factor presence 03/29/2017     Priority: Medium     IMO Regulatory Load OCT 2020      Counseling regarding advanced directives 12/19/2016     Priority: Medium    Peroneal tendonitis 08/15/2013     Priority: Medium    Menopausal syndrome (hot flashes) 11/23/2010     Priority: Medium    HYPERLIPIDEMIA LDL GOAL <130 10/31/2010     Priority: Medium    Nonsenile cataract 09/26/2003     Priority: Medium     Problem list name updated by automated process. Provider to review      Other specified glaucoma 09/26/2003     Priority: Medium    Dysplasia of cervix (uteri) 03/05/2003     Priority: Medium     Problem list name updated by automated process. Provider to review and confirm      Female infertility      Priority: Medium     Problem list name updated by automated process. Provider to review          Past Medical History:    Past Medical History:   Diagnosis Date    Arthritis 2006    Breast cancer metastasized to bone (H) 10/12/2023    Chronic depressive personality disorder     Dysplasia of cervix (uteri) 1988    Female infertility of unspecified origin     Glaucoma     Rheumatoid arthritis (H)        Past Surgical History:    Past Surgical History:   Procedure Laterality Date    COLONOSCOPY      COLONOSCOPY N/A 01/14/2022    Procedure: COLONOSCOPY, WITH POLYPECTOMY AND BIOPSY;  Surgeon: Gagandeep Patterson MD;  Location:  GI    HC INTRODUCE CATH FALLOPIAN TUBE, RE-OPEN/DIAGNOSIS      HERNIA REPAIR, INCISIONAL  11/11/2009    JOINT REPLACEMENT Right 09/2017    knee    TONSILLECTOMY      [facility] APPENDECTOMY  06/14/2003    [facility] REMV CATARACT " INTRACAP,INSERT LENS  2003    right       Family History:    Family History   Problem Relation Age of Onset    Heart Disease Mother     Lipids Mother     Hyperlipidemia Mother     Genitourinary Problems Father         prostate    Genetic Disorder Father         ulcer    Hypertension Father     Lipids Father     Prostate Cancer Father     Hyperlipidemia Sister     Hyperlipidemia Brother     Other Cancer Brother         Neck Cancer HPV (+)    Heart Disease Maternal Grandmother     Cerebrovascular Disease Maternal Grandmother     Heart Disease Maternal Grandfather     Heart Disease Maternal Uncle     Heart Disease Maternal Uncle         x  3    Cancer Nephew         Sister's Son       Social History:  Marital Status:   [2]  Social History     Tobacco Use    Smoking status: Former     Current packs/day: 0.00     Average packs/day: 0.5 packs/day for 25.0 years (12.5 ttl pk-yrs)     Types: Cigarettes, Cigars     Start date: 1992     Quit date: 2017     Years since quittin.7    Smokeless tobacco: Never    Tobacco comments:     Cigar once in a while   Vaping Use    Vaping status: Never Used   Substance Use Topics    Alcohol use: Yes     Comment: very little    Drug use: Yes     Types: Marijuana     Comment: once every 2 weeks        Medications:    dexAMETHasone (DECADRON) 4 MG tablet  gabapentin (NEURONTIN) 300 MG capsule  HYDROmorphone (DILAUDID) 2 MG tablet  apixaban ANTICOAGULANT (ELIQUIS) 5 MG tablet  atorvastatin (LIPITOR) 10 MG tablet  betamethasone dipropionate (DIPROSONE) 0.05 % external lotion  CALCIUM PO  cyclobenzaprine (FLEXERIL) 5 MG tablet  cyclobenzaprine (FLEXERIL) 5 MG tablet  dexAMETHasone alcohol-free (DECADRON) 0.1 MG/ML solution  diazepam (VALIUM) 5 MG tablet  everolimus (AFINITOR) 10 MG tablet  fluticasone (FLONASE) 50 MCG/ACT nasal spray  hydroxychloroquine (PLAQUENIL) 200 MG tablet  magic mouthwash suspension (diphenhydrAMINE, lidocaine, aluminum-magnesium &  simethicone)  methylphenidate (RITALIN) 5 MG tablet  methylPREDNISolone (MEDROL DOSEPAK) 4 MG tablet therapy pack  ondansetron (ZOFRAN ODT) 4 MG ODT tab  ondansetron (ZOFRAN) 4 MG tablet  oxyCODONE (ROXICODONE) 5 MG tablet  prochlorperazine (COMPAZINE) 10 MG tablet  venlafaxine (EFFEXOR XR) 75 MG 24 hr capsule  VITAMIN D PO          Review of Systems  Gen: No fevers, no unintentional weight changes  Head and neck: No headache, no neck pain  Eye: No eye pain, no vision changes  Respiratory: No shortness of breath, no cough  Cardiovascular: No chest pain, no palpitations  Abdominal: No vomiting, no constipation, no diarrhea  Urinary: No dysuria, no hematuria  Musculoskeletal: No joint redness, no trauma  Neurologic: No confusion, no weakness, no sensation changes  Psychiatric: No suicidal ideation, no homicidal ideation    Physical Exam   BP: 104/70  Pulse: 70  Temp: 97.8  F (36.6  C)  Resp: 18  Weight: 93 kg (205 lb)  SpO2: 96 %      Physical Exam  General: Alert, awake, no distress  Head: Normocephalic, atraumatic  Eyes: Grossly intact extraocular movements, conjunctiva clear, pupils equal   Mouth: Mucous membranes moist  Neck: Supple, grossly full range of motion, no observable masses  Respiratory: No distress,  clear to auscultation bilaterally  Cardiac: S1 and S2 cardiac sounds appreciated, normal rate, no edema  Abdominal: Soft, not distended, no tenderness appreciated  Neurologic: Normal level of consciousness, speech is clear and appropriate, moving all extremities grossly normal and symmetric  Musculoskeletal: Intact neurovascular exam of the right lower extremity, tenderness paraspinal right lower lumbar, no midline tenderness of the lumbar spine  Skin: No gross rashes or lesions  Psych: Normal mood and appropriate for situation        ED Course        Procedures                Results for orders placed or performed during the hospital encounter of 06/03/25 (from the past 24 hours)   CBC with platelets  differential    Narrative    The following orders were created for panel order CBC with platelets differential.  Procedure                               Abnormality         Status                     ---------                               -----------         ------                     CBC with platelets and ...[[id number]]  Abnormal            Final result                 Please view results for these tests on the individual orders.   Comprehensive metabolic panel   Result Value Ref Range    Sodium 135 135 - 145 mmol/L    Potassium 3.9 3.4 - 5.3 mmol/L    Carbon Dioxide (CO2) 25 22 - 29 mmol/L    Anion Gap 12 7 - 15 mmol/L    Urea Nitrogen 27.4 (H) 8.0 - 23.0 mg/dL    Creatinine 1.14 (H) 0.51 - 0.95 mg/dL    GFR Estimate 54 (L) >60 mL/min/1.73m2    Calcium 9.0 8.8 - 10.4 mg/dL    Chloride 98 98 - 107 mmol/L    Glucose 118 (H) 70 - 99 mg/dL    Alkaline Phosphatase 93 40 - 150 U/L    AST 23 0 - 45 U/L    ALT 32 0 - 50 U/L    Protein Total 6.5 6.4 - 8.3 g/dL    Albumin 3.6 3.5 - 5.2 g/dL    Bilirubin Total 0.3 <=1.2 mg/dL   CBC with platelets and differential   Result Value Ref Range    WBC Count 6.7 4.0 - 11.0 10e3/uL    RBC Count 3.50 (L) 3.80 - 5.20 10e6/uL    Hemoglobin 8.4 (L) 11.7 - 15.7 g/dL    Hematocrit 27.0 (L) 35.0 - 47.0 %    MCV 77 (L) 78 - 100 fL    MCH 24.0 (L) 26.5 - 33.0 pg    MCHC 31.1 (L) 31.5 - 36.5 g/dL    RDW 17.4 (H) 10.0 - 15.0 %    Platelet Count 134 (L) 150 - 450 10e3/uL    % Neutrophils 78 %    % Lymphocytes 12 %    % Monocytes 8 %    % Eosinophils 2 %    % Basophils 0 %    % Immature Granulocytes 1 %    NRBCs per 100 WBC 0 <1 /100    Absolute Neutrophils 5.2 1.6 - 8.3 10e3/uL    Absolute Lymphocytes 0.8 0.8 - 5.3 10e3/uL    Absolute Monocytes 0.5 0.0 - 1.3 10e3/uL    Absolute Eosinophils 0.1 0.0 - 0.7 10e3/uL    Absolute Basophils 0.0 0.0 - 0.2 10e3/uL    Absolute Immature Granulocytes 0.0 <=0.4 10e3/uL    Absolute NRBCs 0.0 10e3/uL       Medications   HYDROmorphone (DILAUDID) injection 1  mg (1 mg Intravenous $Given 6/3/25 0501)   diazepam (VALIUM) injection 5 mg (5 mg Intravenous $Given 6/3/25 7608)   dexAMETHasone PF (DECADRON) injection 10 mg (10 mg Intravenous $Given 6/3/25 0459)   HYDROmorphone (DILAUDID) injection 1 mg (1 mg Intravenous $Given 6/3/25 0605)   gabapentin (NEURONTIN) capsule 300 mg (300 mg Oral $Given 6/3/25 0715)   HYDROmorphone (DILAUDID) tablet 2 mg (2 mg Oral $Given 6/3/25 0715)       Assessments & Plan (with Medical Decision Making)   Vital signs reassuring.  Reviewed her previous EMR including oncology notes and recent ER visit.  Will give her combination of medications for her symptoms.    Blood work is overall reassuring today.  She has continued pain, required multiple doses of IV analgesics.  I discussed this case with neurosurgery, they were able to review her recent imaging and overall presentation today and EMR, they do not believe that any procedures from their perspective would benefit this patient.  I discussed her presentation today also with her oncologist, that we were havingDifficulty controlling her pain today, discussed that we may need to keep her in the hospital for pain control, he mentioned that hospitalizing her in the delay her targeted radiation in the next few days so he requested that we try various combinations of oral medications to control her pain that she can possibly go home with.  I have ordered gabapentin and oral Dilaudid and the patient will be signed out to the oncoming ER physician.    I have reviewed the nursing notes.    I have reviewed the findings, diagnosis, plan and need for follow up with the patient.          Discharge Medication List as of 6/3/2025  9:11 AM          Final diagnoses:   Chronic right-sided low back pain with right-sided sciatica       6/3/2025   [facility] EMERGENCY DEPT       Adalberto Sykes MD  06/03/25 9343

## 2025-06-03 NOTE — PROGRESS NOTES
Contacted regarding 65 yo female with metastatic breast cancer presenting to ER with back pain.    Recent out patient lumbar MRI with widespread metastatic disease.    Imaging:    Lumbar MRI 5/29/25    IMPRESSION:  1.  Extensive diffuse heterogeneous marrow signal abnormality and enhancement, in keeping with the patient's history of extensive osseous metastatic disease.  2.  Unchanged mild L1 vertebral height loss. No other significant vertebral height loss identified.  3.  Degenerative changes, as described. No high-grade spinal canal or neural foraminal stenosis.  4.  Nonspecific patchy STIR hyperintense signal and enhancement involving the paramedian right posterior paraspinous musculature. This could be reactive to degenerative facet disease or could possibly be related to muscle strain, although it is   nonspecific.  5.  Mildly low-lying conus terminating at the L3 superior endplate level.    RECOMMENDATIONS:  Oncology and radiation oncology consult for management.  No role for Neurosurgery.    Discussed with Dr. Shipman.    aKtarzyna Evans, MPAS  [facility] Neurosurgery  [address] [address]

## 2025-06-03 NOTE — NURSING NOTE
Current patient location: [address] [place] [id number]    Is the patient currently in the state of [place]? YES    Visit mode: TELEPHONE    If the visit is dropped, the patient can be reconnected by:TELEPHONE VISIT: Phone number:   Telephone Information:   Mobile [phone]       Will anyone else be joining the visit? NO  (If patient encounters technical issues they should call [phone])    Are changes needed to the allergy or medication list? No, Pt stated no changes to allergies, and Pt stated no med changes    Are refills needed on medications prescribed by this physician? Discuss with provider    Rooming Documentation:  Questionnaire(s) not done per department protocol    Reason for visit: RECHECK (Return [facility])    Sierra RIVERO

## 2025-06-03 NOTE — ED PROVIDER NOTES
Assumed care at shift change from Dr. Adalberto Sykes.  See his note for patient presenting details initial workup.  Briefly, this is a 64-year-old female who has diagnosis of breast cancer with metastases to spine.  She presented to the emergency room with uncontrolled pain.  Had been in the ED previously for this same issue and had responded to medications at that time.  She was given IV Dilaudid, Decadron, and Valium, but still did not have much relief.  Was given Dilaudid again and is still having pain.  Dr. Sykes discussed case with patient's oncologist, Dr. Stephens, he encouraged better pain control since patient has radiation oncology visit tomorrow that would be delayed if she were admitted.  Did not think that she needed to be transferred.  He had also spoken with neurosurgery who did not think there was anything they could offer for the spinal lesions.  Oncology recommended starting 300 mg of gabapentin orally, twice daily Decadron, and 2 mg of oral Dilaudid as needed for breakthrough pain.  Gabapentin and Decadron prescriptions have been sent to the pharmacy, but Dr. Sykes asked me to follow-up on patient's response to the Dilaudid and send a prescription for this when we know how much and how often she may need medication    Daytime RN had oral medications with patient, but they noted she was dipping oxygen saturations to 85% on 1 L, turned up to 2 L/min nasal cannula to maintain saturations.  There are no beds locally and patient is still having uncontrolled pain.  Will reassess after 1 hour, see if the IV medication has worn off somewhat and if oral medication has started to improve her symptoms.  Otherwise we may need to continue to board the patient or find a place for transfer to admit for pain control.    Patient was noted to still have some low saturations around the mid 80s, but when awake and good waveform was capturing, had normal saturations.  Went into see patient and discuss options.   She was awake, alert, says that her pain is better.  I discussed options, including staying in the ED longer, waiting for bed to become available to admit locally, trying to find another hospital with immediately available bed for admission, or discharge home.  Ultimately, she states that she would prefer to be at home.  Will send Dilaudid to the pharmacy and told her she can take 1 to 2 tablets of the 2 mg dose every 6 hours as needed for acute severe pain.  Her  will be at home so he can monitor, and I did instruct her about using this prescription cautiously since it can cause drowsiness.  Keep your appointment with radiation oncology tomorrow as scheduled.  After discharge, patient then requested the prescription sent to local pharmacy rather than [facility], so these prescriptions were changed.  She left the department in stable condition for safe ride home     Magda Izaguirre,   06/03/25 0936

## 2025-06-03 NOTE — PROGRESS NOTES
"Virtual Visit Details    Type of service:  Telephone Visit   Phone call duration: *** minutes   Originating Location (pt. Location): {patient location:573863::\"Home\"}  {PROVIDER LOCATION On-site should be selected for visits conducted from your clinic location or adjoining [facility] hospital, academic office, or other nearby [facility] building. Off-site should be selected for all other provider locations, including home:754278}  Distant Location (provider location):  {virtual location provider:384080}  Telephone visit completed due to {audio only reason:710580}  "

## 2025-06-03 NOTE — Clinical Note
"6/3/2025      Kesha Zacarias  [address] [address] [place] [id number]      Dear Colleague,    Thank you for referring your patient, Kesha Zacarias, to the [facility]. Please see a copy of my visit note below.    Virtual Visit Details    Type of service:  Telephone Visit   Phone call duration: *** minutes   Originating Location (pt. Location): {patient location:514280::\"Home\"}  {PROVIDER LOCATION On-site should be selected for visits conducted from your clinic location or adjoining [facility] hospital, academic office, or other nearby [facility] building. Off-site should be selected for all other provider locations, including home:254924}  Distant Location (provider location):  {virtual location provider:968161}  Telephone visit completed due to {audio only reason:133294}      Again, thank you for allowing me to participate in the care of your patient.        Sincerely,        Brayden Stephens MD    Electronically signed"

## 2025-06-03 NOTE — DISCHARGE INSTRUCTIONS
You were given a dose of gabapentin in the emergency room today.  You may take another dose around noon and then a dose in the evening.  Continue the 3 times daily dosing for the next 10 days.  Your primary doctor, oncologist, or other provider that is managing your pain medicine may eventually titrate this up to a higher dose if you are getting relief    You are also given a dose of Decadron in the emergency room today through the IV.  You should start the oral Decadron this evening.  Continue twice daily dosing for 5 days    You may take the Dilaudid, 1 to 2 tablets (2 to 4 mg) by mouth every 6 hours as needed for severe breakthrough pain.  Use caution since this medicine can make you drowsy, do not drive or drink alcohol taking this medicine    Follow-up at your radiation oncology appointment tomorrow as scheduled    If you develop any significant new or worsening symptoms do not hesitate to return to the emergency room for evaluation

## 2025-06-03 NOTE — ED NOTES
IV started, blood sent to lab. IV meds administered. VS monitoring. Call light in reach. Positioned for comfort.

## 2025-06-04 ENCOUNTER — OFFICE VISIT (OUTPATIENT)
Dept: RADIATION ONCOLOGY | Facility: CLINIC | Age: 65
End: 2025-06-04
Payer: COMMERCIAL

## 2025-06-04 VITALS
SYSTOLIC BLOOD PRESSURE: 138 MMHG | DIASTOLIC BLOOD PRESSURE: 79 MMHG | RESPIRATION RATE: 18 BRPM | OXYGEN SATURATION: 97 % | HEART RATE: 75 BPM | TEMPERATURE: 97.9 F

## 2025-06-04 DIAGNOSIS — M54.41 ACUTE RIGHT-SIDED LOW BACK PAIN WITH RIGHT-SIDED SCIATICA: ICD-10-CM

## 2025-06-04 DIAGNOSIS — G89.3 CANCER ASSOCIATED PAIN: Primary | ICD-10-CM

## 2025-06-04 PROCEDURE — G2211 COMPLEX E/M VISIT ADD ON: HCPCS | Performed by: RADIOLOGY

## 2025-06-04 PROCEDURE — 3075F SYST BP GE 130 - 139MM HG: CPT | Performed by: RADIOLOGY

## 2025-06-04 PROCEDURE — 3078F DIAST BP <80 MM HG: CPT | Performed by: RADIOLOGY

## 2025-06-04 PROCEDURE — 1126F AMNT PAIN NOTED NONE PRSNT: CPT | Performed by: RADIOLOGY

## 2025-06-04 PROCEDURE — 99417 PROLNG OP E/M EACH 15 MIN: CPT | Performed by: RADIOLOGY

## 2025-06-04 PROCEDURE — 99215 OFFICE O/P EST HI 40 MIN: CPT | Performed by: RADIOLOGY

## 2025-06-04 ASSESSMENT — PAIN SCALES - GENERAL: PAINLEVEL_OUTOF10: NO PAIN (0)

## 2025-06-04 NOTE — PATIENT INSTRUCTIONS
What to expect at your Simulation visit:    You will meet with a Radiation Therapist and other team members who will be doing a planning session called a  simulation  with you. This process will determine your daily treatment.    ~ You will lie on a flat table and have a treatment planning CT scan.  It is important during the scan to hold very still and breathe normally.    ~ Your therapist may construct a body mold to help you hold still for your treatments.    ~ If you are having treatment to the head or neck area you will be fitted with a plastic mesh mask that fits very snugly over your face and neck.     ~ Your therapist will be taking some digital photos that will go in your treatment chart.      ~Your therapist will make marks on your skin and take measurements. Your therapist may ask you about making small tattoos (a permanent small dot) over these marks.  These marks are used to position you daily for your radiation therapy treatments. Please do not wash off any marks until all of your radiation therapy treatments are complete unless you are instructed to do so by your therapist.    ~ Once the simulation is completed it can take from 3 to 10 business days before you start radiation therapy treatments.    ~ You may meet with a nurse who will go over management of treatment side effects and self care during your treatments. The nurse will help to plan care with other departments and physicians if needed.      Please contact [facility] RN with questions or concerns following today's appointment.    If you are a patient of Dr. Patton call: [phone]   If you are a patient of Dr. Mandujano call: [phone]    Please feel free to leave a detailed message if your call is not answered.    If your call is not received before 3:00 PM, it may not be returned until the following business day.    If you are receiving radiation treatment and need assistance after 3:00 PM or on the weekends,  please call [phone] and ask to speak to the radiation oncologist on-call.    Thank you!    [facility] Radiation Oncology Nursing

## 2025-06-04 NOTE — LETTER
6/4/2025      Kesha Zacarias  [address] [address] [place] [id number]      Dear Colleague,    Thank you for referring your patient, Kesha Zacarias, to the [facility] [place]. Please see a copy of my visit note below.    Dear Colleagues,  Today Kesha Zacarias was seen in consultation.    IDENTIFICATION: This is a 64 year old woman with metastatic breast cancer to the bones now with low back pain referred for palliative radiotherapy.      HISTORY OF PRESENT ILLNESS:  Kesha Zacarias is a 64-year-old woman with RA currently on everolimus and plaquinil well-known to me.  For detailed review of her presenting history please refer to my note dated January 5, 2024.  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  Further imaging as part of her workup revealed diffuse bony disease.  On November 27, 2023 she was started on Ribociclib+letrozole.  That same month she started having increased lower back pain.  She saw me in consultation on January 5, 2024 and subsequently received palliative radiation therapy to the sacrum 30 Gy in 10 fractions completed on January 22, 2024.      Since that time she has continued on systemic treatment with Dr. Stephens.  First Ribociclib + AI from November 2023 to May 2024. Then ongoing everolimus plus AI starting Jun 2024.    For the past couple of months she has had increasing right lower back pain that radiates into her groin and thigh.  She was seen in the emergency department 2 weeks ago and recently saw Dr. Stephens yesterday. Most recent imaging includes a pelvic and lumbar spine MRI completed on May 29, 2025 which showed extensive disease within her bones, unchanged mild L1 vertebral height loss, nonspecific patchy signal and enhancement involving the paramedian right posterior paraspinous musculature.  The latter could be reactive to  degenerative facet disease or could possibly be related to muscle strain.  There is no high-grade spinal canal or neural foramina stenosis.  No pathologic fracture.    Today as she is being seen in clinic she rates her pain a 0 out of 10.  She states her pain as being 10 out of 10 prior to her pain regimen being switched yesterday.  She occasionally has intermittent 5 out of 10 sharp pains in her right buttock. This morning she woke up with left low back/buttock pain that does not radiate.  Denies any neurologic symptoms, GI or  symptoms associated with either pain.  She gets constipation from her pain medication regimen.  She is currently on Decadron 4mg BID PO, Dilaudid 2mg Q6 hours, Flexerol 5mg qpm, Gabapentin 300mg TID, and Valium prn.  She is being seen here today for consideration of palliative radiotherapy.    REVIEW OF SYSTEMS: As per HPI, a 14-point review of system is otherwise negative.  PAST RADIATION THERAPY:  Prior Sacral XRT 30 Gy in 10 fractions completed on January 22, 2024.    PAST CTD/PACEMAKER: Denies    Past Medical History:   Diagnosis Date     Arthritis 2006     Breast cancer metastasized to bone (H) 10/12/2023     Chronic depressive personality disorder      Dysplasia of cervix (uteri) 1988    Cryotherapy     Female infertility of unspecified origin      Glaucoma      Rheumatoid arthritis (H)        Past Surgical History:   Procedure Laterality Date     COLONOSCOPY       COLONOSCOPY N/A 01/14/2022    Procedure: COLONOSCOPY, WITH POLYPECTOMY AND BIOPSY;  Surgeon: Gagandeep Patterson MD;  Location:  GI     HC INTRODUCE CATH FALLOPIAN TUBE, RE-OPEN/DIAGNOSIS       HERNIA REPAIR, INCISIONAL  11/11/2009     JOINT REPLACEMENT Right 09/2017    knee     TONSILLECTOMY       Z APPENDECTOMY  06/14/2003     [facility] REMV CATARACT INTRACAP,INSERT LENS  02/13/2003    right       Family History   Problem Relation Age of Onset     Heart Disease Mother      Lipids Mother      Hyperlipidemia Mother       Genitourinary Problems Father         prostate     Genetic Disorder Father         ulcer     Hypertension Father      Lipids Father      Prostate Cancer Father      Hyperlipidemia Sister      Hyperlipidemia Brother      Other Cancer Brother         Neck Cancer HPV (+)     Heart Disease Maternal Grandmother      Cerebrovascular Disease Maternal Grandmother      Heart Disease Maternal Grandfather      Heart Disease Maternal Uncle      Heart Disease Maternal Uncle         x  3     Cancer Nephew         Sister's Son       Social History     Tobacco Use     Smoking status: Former     Current packs/day: 0.00     Average packs/day: 0.5 packs/day for 25.0 years (12.5 ttl pk-yrs)     Types: Cigarettes, Cigars     Start date: 1992     Quit date: 2017     Years since quittin.7     Smokeless tobacco: Never     Tobacco comments:     Cigar once in a while   Substance Use Topics     Alcohol use: Yes     Comment: very little       Allergies   Allergen Reactions     Ciprofloxacin      hives and was on flagyl too     Metronidazole      hives and was on cipro too         PHYSICAL EXAMINATION:  LMP 2011 (Exact Date)   /79 (BP Location: Left arm, Patient Position: Chair, Cuff Size: Adult Regular)   Pulse 75   Temp 97.9  F (36.6  C) (Oral)   Resp 18   LMP 2011 (Exact Date)   SpO2 97%   GENERAL Well-ppearing woman in no acute distress.  HEENT Normocephalic, atraumatic.  Sclerae anicteric.  CVR  Regular rate and rhythm.  No murmurs, rubs, or gallops.  LUNGS Clear to auscultation bilaterally.  EXT  No CCE.    NEURO No gross deficits. Gait wnl. Strength is symmetrical in all lower extremities. Sensation intact in all areas tested.  MSK  Good ROM.   SKIN  Warm and well perfused.    PSYCH Alert and oriented x 3    ECOG PERFORMANCE STATUS: 1     IMAGING: I personally reviewed the relevant imaging for this case as stated in the HPI.      IMPRESSION/PLAN:  Kesha Zacarias is a 64 year old woman with metastatic  breast cancer to the bones now with low back pain referred for palliative radiotherapy.  Physical exam and imaging show that her pain is most likely due to inflammation of her paraspinal muscles.  I do not see a discrete tumor that would correspond to the pain that she is currently having.  Therefore I recommend that she start physical therapy.    That being stated we did discuss her pain prognosis over the next 6 to 12 months given her current pain requirements and prior history of pelvic radiotherapy.  In the near future she will likely be a need gamma knife stereotactic radiosurgery to the pituitary gland to decrease her pain symptoms. This treatment has shown that it is effective and safe when palliatimg cancer related pain.  Today we discussed the risks, benefits, treatment rationale and regimen regarding SRS to the pituitary gland to alleviate pain symptoms.  Patient expressed interest in having this treatment in the near future.  She can be referred to our clinic once it is determined that she has physiotherapy and opioid refractory pain.  In the interim, she can complete the Pretreatment ALTAF (Treatment Refractory Intervention in Pain and Psychiatry) Protocol which includes but is not limited to baseline visual field by neuro-ophthalmology and baseline endocrine function  assessment by endocrinology..    Additional problem list to be addressed in the following manner:    Referred to endocrine and neuroophthalmology in anticipation of pituitary gland treatment with gamma knife    There was ample time for questions and all were answered to the patient's satisfaction. Thank you for allowing me to participate in the care of this pleasant patient. If you have any questions, please do not hesitate to contact my office.     Sincerely,  Landon Mandujano MD    [phone] Pager   [phone] [facility]   [phone] Cinthia Alarcon  [phone] Simran          I spent a total of 90 minutes on the date of the encounter  for this consultation, which included some of the following:  -Preparing to see the patient, including reviewing testing results  -Obtaining and/or reviewing separately obtained history  -Performing the examination  -Counseling and educating the patient  -Ordering medications, tests, or procedures  -Referring and communicating with other health care professions, which is not separately reported  -Documenting clinical information in the electronic health record  -Independently interpreting results and communicating the results to the patient/family/caregiver  -Coordinating care, which is not separately reportable        Again, thank you for allowing me to participate in the care of your patient.        Sincerely,        LOBO Mandujano MD    Electronically signed

## 2025-06-04 NOTE — NURSING NOTE
"Oncology Rooming Note    June 4, 2025 10:01 AM   Kesha Zacarias is a 64 year old female who presents for:    Chief Complaint   Patient presents with    Cancer     Radiation oncology consultation with Dr. Mandujano     Initial Vitals: /79 (BP Location: Left arm, Patient Position: Chair, Cuff Size: Adult Regular)   Pulse 75   Temp 97.9  F (36.6  C) (Oral)   Resp 18   LMP 11/22/2011 (Exact Date)   SpO2 97%  Estimated body mass index is 36.31 kg/m  as calculated from the following:    Height as of 6/3/25: 1.6 m (5' 3\").    Weight as of 6/3/25: 93 kg (205 lb). There is no height or weight on file to calculate BSA.  No Pain (0) Comment: Data Unavailable   Patient's last menstrual period was 11/22/2011 (exact date).  Allergies reviewed: Yes  Medications reviewed: Yes    Medications: Medication refills not needed today.  Pharmacy name entered into [facility]:    [id number] [place], [place] - [address]  [facility] - [place], [place] - [address]     Frailty Screening:   Is the patient here for a new oncology consult visit in cancer care? 2. No    PHQ9:  Did this patient require a PHQ9?: No      Clinical concerns: patient presents for new patient consultation with her sister-in-law.  Patient reports pain is controlled today \"0/10\" with new pain regimen received in ED.  Patient reports pain of right low back with radiation of pain into right groin and right upper thigh, patient reports sharp shooting pains with movement of \"5/10\" today.  Patient reports taking Decadron 4 mg po BID, Dilaudid 2 mg po every 6 hours as needed, Flexeril 5 mg po at bedtime prn, Valium po prn and Gabapentin 300 mg po TID.     Patient with history of radiation therapy at [facility] and received 3,000 cGy to sacrum from 1/9/2024 to 1/22/2024 with Dr. Mandujano.    Patient reports taking Plaquenil.     Considerations for radiation treatment   Pregnancy status: " Female age 55+ , patient reports menopause at age 47.  Implanted Cardiac Devices: No   Any previous radiation therapy: Yes - see above.      Dr. Mandujano was notified.      [place] RN BSN OCN CBCN

## 2025-06-05 ENCOUNTER — PATIENT OUTREACH (OUTPATIENT)
Dept: CARE COORDINATION | Facility: CLINIC | Age: 65
End: 2025-06-05
Payer: COMMERCIAL

## 2025-06-05 DIAGNOSIS — C50.912 CARCINOMA OF LEFT BREAST METASTATIC TO BONE (H): Primary | ICD-10-CM

## 2025-06-05 DIAGNOSIS — C79.51 CARCINOMA OF LEFT BREAST METASTATIC TO BONE (H): Primary | ICD-10-CM

## 2025-06-05 RX ORDER — EVEROLIMUS 10 MG/1
10 TABLET ORAL DAILY
Qty: 28 TABLET | Refills: 0 | OUTPATIENT
Start: 2025-06-16 | End: 2025-07-14

## 2025-06-09 ENCOUNTER — PATIENT OUTREACH (OUTPATIENT)
Dept: CARE COORDINATION | Facility: CLINIC | Age: 65
End: 2025-06-09
Payer: COMMERCIAL

## 2025-06-10 ENCOUNTER — THERAPY VISIT (OUTPATIENT)
Dept: PHYSICAL THERAPY | Facility: CLINIC | Age: 65
End: 2025-06-10
Attending: RADIOLOGY
Payer: COMMERCIAL

## 2025-06-10 DIAGNOSIS — M54.41 ACUTE RIGHT-SIDED LOW BACK PAIN WITH RIGHT-SIDED SCIATICA: ICD-10-CM

## 2025-06-10 PROCEDURE — 97161 PT EVAL LOW COMPLEX 20 MIN: CPT | Mod: GP | Performed by: PHYSICAL THERAPIST

## 2025-06-10 PROCEDURE — 97110 THERAPEUTIC EXERCISES: CPT | Mod: GP | Performed by: PHYSICAL THERAPIST

## 2025-06-10 PROCEDURE — 97530 THERAPEUTIC ACTIVITIES: CPT | Mod: GP | Performed by: PHYSICAL THERAPIST

## 2025-06-10 NOTE — PROGRESS NOTES
PHYSICAL THERAPY EVALUATION  Type of Visit: Evaluation       Fall Risk Screen:  Have you fallen 2 or more times in the past year?: No  Have you fallen and had an injury in the past year?: No  Is patient receiving Physical Therapy Services?: Yes    Subjective         Presenting condition or subjective complaint: Lower back pain. Pt developed insidious onset of LBP 5/3/25 after working that led to ED visits 5/15 and 6/3/25. Pt with metastatic breast cancer with spread to bones per provider notes. Pt with low back aches off and on x 20 years with flare ups every few months, restless legs x 10 years, breast cancer since 2023.  Date of onset: 05/03/25    Relevant medical history: Anemia; Cancer; Pain at night or rest; Rheumatoid arthritis; Significant weakness   Dates & types of surgery: Knee replacement, glaucoma, hernia    Prior diagnostic imaging/testing results: MRI; CT scan   MRI: IMPRESSION:  1.  Extensive diffuse heterogeneous marrow signal abnormality and enhancement, in keeping with the patient's history of extensive osseous metastatic disease.  2.  Unchanged mild L1 vertebral height loss. No other significant vertebral height loss identified.  3.  Degenerative changes, as described. No high-grade spinal canal or neural foraminal stenosis.  4.  Nonspecific patchy STIR hyperintense signal and enhancement involving the paramedian right posterior paraspinous musculature. This could be reactive to degenerative facet disease or could possibly be related to muscle strain, although it is   nonspecific.  5.  Mildly low-lying conus terminating at the L3 superior endplate level.     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 B calf and shin and bottoms of feet cramping/restless legs  Pain Frequency: constant R LBP/buttock, other sx intermittent  Pain is Worst: end of day, stiff in am  Pain is Exacerbated By: bending, rising, standing>walking, prolonged sitting, lying supine, heat  Pain is Relieved By: lying on side  Pain Progression: Improved  INTEGUMENTARY (edema, incisions):   POSTURE: fair sitting and standing posture  GAIT:   Weightbearing Status:   Assistive Device(s): None  Gait Deviations: Antalgic  Lateral sway/lurch present  BALANCE/PROPRIOCEPTION:   WEIGHTBEARING ALIGNMENT:   NON-WEIGHTBEARING ALIGNMENT:    ROM: deferred  PELVIC/SI SCREEN:   STRENGTH:   MYOTOMES:   DTR S:   CORD SIGNS:   DERMATOMES:   NEURAL TENSION:   FLEXIBILITY:   LUMBAR/HIP Special Tests:  Static/Dynamic Force Analysis/Directional Preference Exam:  Symptoms prior to testing: in sitting R buttock 2/10 tennis ball size, R lateral hip 1-2/10 tennis ball size. Lumbar roll use for posture correction and R lateral hip decreased to 1/10 golf ball size, better.     Standing:deferred standing testing today.  Flexion (x1):   Extension (x1):     Hook Lying: LBP R 2-3/10 golf ball size, R lateral hip 2/10 golf ball size  Flexion (x1): deferred    Prone: abolished R lateral hip, R buttock tennis ball size and centralized to low back 3/10,  better.  Prone lying with 1 thin pillow under pelvis and sx less, better.   Prone on Elbows: deferred  Extension (x1):     PELVIS/SI SPECIAL TESTS:   FUNCTIONAL TESTS: poor body mechanics with transfers, excessive lumbar flexion  PALPATION: tender R lateral hip area  SPINAL SEGMENTAL CONCLUSIONS:       Assessment & Plan   CLINICAL IMPRESSIONS  Medical Diagnosis: Acute right-sided low back pain with right-sided sciatica    Treatment Diagnosis: Mechanical low back pain with right>left lower extremity pain/referral   Impression/Assessment: Patient is a 64 year old female with Mechanical low back pain with right>left lower extremity pain/referral  complaints.  The following significant findings have been identified: Pain, Decreased ROM/flexibility, Decreased strength, Impaired gait, Impaired muscle performance, Decreased activity tolerance, and Impaired posture. These impairments interfere with their ability to perform self care tasks, work tasks, recreational activities, household chores, driving , household mobility, and community mobility as compared to previous level of function.     Clinical Decision Making (Complexity):  Clinical Presentation: Stable/Uncomplicated  Clinical Presentation Rationale: based on medical and personal factors listed in PT evaluation  Clinical Decision Making (Complexity): Low complexity    PLAN OF CARE  Treatment Interventions:  Modalities: may not use due to cancer hx  Interventions: Manual Therapy, Neuromuscular Re-education, Therapeutic Activity, Therapeutic Exercise    Long Term Goals     PT Goal 1  Goal Identifier: Pain  Goal Description: pt will note a decrease in average R LBP from a 3-4/10 to a 1-2/10 or less so she can have improve sitting, standing and walking tolerance with work  Target Date: 07/10/25  PT Goal 2  Goal Identifier: Function  Goal Description: Pt will note a decrease in low back and LE symptoms and carry over to improved functional level as measured by Khris  decrease from high to low category and ALISSA from 37.78% to 20% or less  Target Date: 09/10/25      Frequency of Treatment: 1x per week or evrey 2 weeks, decrease as able  Duration of Treatment: 3 months, decrease as able    Recommended Referrals to Other Professionals:   Education Assessment:   Learner/Method: Patient;Listening;Reading;Demonstration;Pictures/Video;No Barriers to Learning  Education Comments: home program    Risks and benefits of evaluation/treatment have been explained.   Patient/Family/caregiver agrees with Plan of Care.     Evaluation Time:     PT Eval, Low Complexity Minutes (65315): 20       Signing Clinician: Ford Maciel PT

## 2025-06-11 ENCOUNTER — MYC MEDICAL ADVICE (OUTPATIENT)
Dept: ONCOLOGY | Facility: CLINIC | Age: 65
End: 2025-06-11
Payer: COMMERCIAL

## 2025-06-11 NOTE — TELEPHONE ENCOUNTER
Writer called patient for more information. States she is having one of her usual flare ups of jaw pain. Denies any CP, SOB, n/v, dizziness, weakness, fever, or chills. No bleeding, sores, or lesions. Flare up started 4-5 days ago. She has some swelling and redness. Pain is rated at 3/10. Pain is worse when she moves her jaw. No pain at rest. She is able to eat and drink. She takes tylenol 1000 mg every 6 hours as needed and uses a cold pack to the area with some relief. States she's seen a dentist and orthopedic surgeon in the past for this and there is nothing to do at this time expect monitor and wait for it to resolve. She normally has about 3-4 flare ups per year, all with similar presentation of sx.     Patient would like to know if ok to keep Zometa infusion appointment scheduled for 6/16/25 or if she should defer?    Routing to care team for further review and recommendations.     Stephanie Bains, RN, BSN, PHN, OCN  [facility]

## 2025-06-12 NOTE — TELEPHONE ENCOUNTER
Recommend getting in with dentist again to ensure nothing more serious is going on.   Recommend canceling / rescheduling zometa infusion until symptoms resolve.

## 2025-06-12 NOTE — TELEPHONE ENCOUNTER
EndPlay message sent to patient with response.     Stephanie Bains RN, BSN, PHN, OCN  [facility]

## 2025-06-14 ENCOUNTER — HOSPITAL ENCOUNTER (OUTPATIENT)
Dept: PET IMAGING | Facility: CLINIC | Age: 65
Discharge: HOME | End: 2025-06-14
Attending: INTERNAL MEDICINE | Admitting: INTERNAL MEDICINE
Payer: COMMERCIAL

## 2025-06-14 ENCOUNTER — LAB (OUTPATIENT)
Dept: LAB | Facility: CLINIC | Age: 65
End: 2025-06-14
Payer: COMMERCIAL

## 2025-06-14 DIAGNOSIS — C79.51 CARCINOMA OF LEFT BREAST METASTATIC TO BONE (H): ICD-10-CM

## 2025-06-14 DIAGNOSIS — C50.912 CARCINOMA OF LEFT BREAST METASTATIC TO BONE (H): ICD-10-CM

## 2025-06-14 DIAGNOSIS — C50.912 LOBULAR CARCINOMA OF LEFT BREAST (H): ICD-10-CM

## 2025-06-14 LAB
ALBUMIN SERPL BCG-MCNC: 3.7 G/DL (ref 3.5–5.2)
ALP SERPL-CCNC: 94 U/L (ref 40–150)
ALT SERPL W P-5'-P-CCNC: 39 U/L (ref 0–50)
ANION GAP SERPL CALCULATED.3IONS-SCNC: 12 MMOL/L (ref 7–15)
AST SERPL W P-5'-P-CCNC: 25 U/L (ref 0–45)
BASOPHILS # BLD AUTO: 0 10E3/UL (ref 0–0.2)
BASOPHILS NFR BLD AUTO: 0 %
BILIRUB SERPL-MCNC: 0.2 MG/DL
BUN SERPL-MCNC: 15.2 MG/DL (ref 8–23)
CALCIUM SERPL-MCNC: 8.9 MG/DL (ref 8.8–10.4)
CANCER AG15-3 SERPL-ACNC: 59 U/ML
CHLORIDE SERPL-SCNC: 104 MMOL/L (ref 98–107)
CREAT SERPL-MCNC: 1.15 MG/DL (ref 0.51–0.95)
EGFRCR SERPLBLD CKD-EPI 2021: 53 ML/MIN/1.73M2
EOSINOPHIL # BLD AUTO: 0.1 10E3/UL (ref 0–0.7)
EOSINOPHIL NFR BLD AUTO: 2 %
ERYTHROCYTE [DISTWIDTH] IN BLOOD BY AUTOMATED COUNT: 18.6 % (ref 10–15)
GLUCOSE SERPL-MCNC: 96 MG/DL (ref 70–99)
HCO3 SERPL-SCNC: 25 MMOL/L (ref 22–29)
HCT VFR BLD AUTO: 33.2 % (ref 35–47)
HGB BLD-MCNC: 9.9 G/DL (ref 11.7–15.7)
IMM GRANULOCYTES # BLD: 0.1 10E3/UL
IMM GRANULOCYTES NFR BLD: 1 %
LYMPHOCYTES # BLD AUTO: 1.2 10E3/UL (ref 0.8–5.3)
LYMPHOCYTES NFR BLD AUTO: 18 %
MCH RBC QN AUTO: 24.1 PG (ref 26.5–33)
MCHC RBC AUTO-ENTMCNC: 29.8 G/DL (ref 31.5–36.5)
MCV RBC AUTO: 81 FL (ref 78–100)
MONOCYTES # BLD AUTO: 0.7 10E3/UL (ref 0–1.3)
MONOCYTES NFR BLD AUTO: 12 %
NEUTROPHILS # BLD AUTO: 4.3 10E3/UL (ref 1.6–8.3)
NEUTROPHILS NFR BLD AUTO: 68 %
NRBC # BLD AUTO: 0 10E3/UL
NRBC BLD AUTO-RTO: 0 /100
PLATELET # BLD AUTO: 200 10E3/UL (ref 150–450)
POTASSIUM SERPL-SCNC: 4.1 MMOL/L (ref 3.4–5.3)
PROT SERPL-MCNC: 7 G/DL (ref 6.4–8.3)
RBC # BLD AUTO: 4.1 10E6/UL (ref 3.8–5.2)
SODIUM SERPL-SCNC: 141 MMOL/L (ref 135–145)
WBC # BLD AUTO: 6.3 10E3/UL (ref 4–11)

## 2025-06-14 PROCEDURE — A9552 F18 FDG: HCPCS | Performed by: INTERNAL MEDICINE

## 2025-06-14 PROCEDURE — 36415 COLL VENOUS BLD VENIPUNCTURE: CPT

## 2025-06-14 PROCEDURE — 343N000001 HC RX 343 MED OP 636: Performed by: INTERNAL MEDICINE

## 2025-06-14 PROCEDURE — 86300 IMMUNOASSAY TUMOR CA 15-3: CPT

## 2025-06-14 PROCEDURE — 85025 COMPLETE CBC W/AUTO DIFF WBC: CPT

## 2025-06-14 PROCEDURE — 78815 PET IMAGE W/CT SKULL-THIGH: CPT | Mod: PS

## 2025-06-14 PROCEDURE — 80053 COMPREHEN METABOLIC PANEL: CPT

## 2025-06-14 RX ORDER — FLUDEOXYGLUCOSE F 18 200 MCI/ML
10-18 INJECTION, SOLUTION INTRAVENOUS ONCE
Status: COMPLETED | OUTPATIENT
Start: 2025-06-14 | End: 2025-06-14

## 2025-06-14 RX ADMIN — FLUDEOXYGLUCOSE F 18 13.71 MILLICURIE: 200 INJECTION, SOLUTION INTRAVENOUS at 12:02

## 2025-06-16 ENCOUNTER — DOCUMENTATION ONLY (OUTPATIENT)
Dept: ONCOLOGY | Facility: CLINIC | Age: 65
End: 2025-06-16

## 2025-06-16 ENCOUNTER — ONCOLOGY VISIT (OUTPATIENT)
Dept: ONCOLOGY | Facility: CLINIC | Age: 65
End: 2025-06-16
Attending: INTERNAL MEDICINE
Payer: COMMERCIAL

## 2025-06-16 ENCOUNTER — INFUSION THERAPY VISIT (OUTPATIENT)
Dept: INFUSION THERAPY | Facility: CLINIC | Age: 65
End: 2025-06-16
Attending: INTERNAL MEDICINE
Payer: COMMERCIAL

## 2025-06-16 VITALS
RESPIRATION RATE: 18 BRPM | HEART RATE: 69 BPM | OXYGEN SATURATION: 97 % | BODY MASS INDEX: 32.4 KG/M2 | WEIGHT: 201.6 LBS | SYSTOLIC BLOOD PRESSURE: 121 MMHG | HEIGHT: 66 IN | DIASTOLIC BLOOD PRESSURE: 75 MMHG

## 2025-06-16 DIAGNOSIS — C79.51 CARCINOMA OF LEFT BREAST METASTATIC TO BONE (H): ICD-10-CM

## 2025-06-16 DIAGNOSIS — C50.919 CARCINOMA OF BREAST METASTATIC TO BONE, UNSPECIFIED LATERALITY (H): ICD-10-CM

## 2025-06-16 DIAGNOSIS — C50.912 LOBULAR CARCINOMA OF LEFT BREAST (H): Primary | ICD-10-CM

## 2025-06-16 DIAGNOSIS — Z79.01 CHRONIC ANTICOAGULATION: ICD-10-CM

## 2025-06-16 DIAGNOSIS — C50.912 CARCINOMA OF LEFT BREAST METASTATIC TO BONE (H): ICD-10-CM

## 2025-06-16 DIAGNOSIS — D64.81 ANTINEOPLASTIC CHEMOTHERAPY INDUCED ANEMIA: ICD-10-CM

## 2025-06-16 DIAGNOSIS — C79.51 CARCINOMA OF BREAST METASTATIC TO BONE, UNSPECIFIED LATERALITY (H): ICD-10-CM

## 2025-06-16 DIAGNOSIS — T45.1X5A ANTINEOPLASTIC CHEMOTHERAPY INDUCED ANEMIA: ICD-10-CM

## 2025-06-16 PROCEDURE — 99213 OFFICE O/P EST LOW 20 MIN: CPT | Performed by: INTERNAL MEDICINE

## 2025-06-16 PROCEDURE — G2211 COMPLEX E/M VISIT ADD ON: HCPCS | Performed by: INTERNAL MEDICINE

## 2025-06-16 PROCEDURE — 99215 OFFICE O/P EST HI 40 MIN: CPT | Performed by: INTERNAL MEDICINE

## 2025-06-16 RX ORDER — ALBUTEROL SULFATE 90 UG/1
1-2 INHALANT RESPIRATORY (INHALATION)
Start: 2025-06-17

## 2025-06-16 RX ORDER — HEPARIN SODIUM,PORCINE 10 UNIT/ML
5-20 VIAL (ML) INTRAVENOUS DAILY PRN
OUTPATIENT
Start: 2025-06-17

## 2025-06-16 RX ORDER — HEPARIN SODIUM (PORCINE) LOCK FLUSH IV SOLN 100 UNIT/ML 100 UNIT/ML
5 SOLUTION INTRAVENOUS
OUTPATIENT
Start: 2025-06-17

## 2025-06-16 RX ORDER — DIPHENHYDRAMINE HYDROCHLORIDE 50 MG/ML
50 INJECTION, SOLUTION INTRAMUSCULAR; INTRAVENOUS
Start: 2025-06-17

## 2025-06-16 RX ORDER — ALBUTEROL SULFATE 0.83 MG/ML
2.5 SOLUTION RESPIRATORY (INHALATION)
OUTPATIENT
Start: 2025-06-17

## 2025-06-16 RX ORDER — PALONOSETRON 0.05 MG/ML
0.25 INJECTION, SOLUTION INTRAVENOUS ONCE
OUTPATIENT
Start: 2025-06-17

## 2025-06-16 RX ORDER — METHYLPREDNISOLONE SODIUM SUCCINATE 40 MG/ML
40 INJECTION INTRAMUSCULAR; INTRAVENOUS
Start: 2025-06-17

## 2025-06-16 RX ORDER — MEPERIDINE HYDROCHLORIDE 25 MG/ML
25 INJECTION INTRAMUSCULAR; INTRAVENOUS; SUBCUTANEOUS
OUTPATIENT
Start: 2025-06-17

## 2025-06-16 RX ORDER — DIPHENHYDRAMINE HYDROCHLORIDE 50 MG/ML
25 INJECTION, SOLUTION INTRAMUSCULAR; INTRAVENOUS
Start: 2025-06-17

## 2025-06-16 RX ORDER — EPINEPHRINE 1 MG/ML
0.3 INJECTION, SOLUTION INTRAMUSCULAR; SUBCUTANEOUS EVERY 5 MIN PRN
OUTPATIENT
Start: 2025-06-17

## 2025-06-16 ASSESSMENT — PAIN SCALES - GENERAL: PAINLEVEL_OUTOF10: MODERATE PAIN (4)

## 2025-06-16 NOTE — PROGRESS NOTES
Thank you for the opportunity to be a part in the care of this patient's oral chemotherapy. The oncology pharmacy will no longer be following this patient for oral chemotherapy. If there are any questions or the plan changes, feel free to contact us.    Yasmin Etienne PharmD, MPH, BCPS  June 16, 2025

## 2025-06-16 NOTE — LETTER
"2025      Kesha Zacarias  [address] [address] [place] [id number]      Dear Colleague,    Thank you for referring your patient, Kesha Zacarias, to the [facility] [place]. Please see a copy of my visit note below.    [facility] Hematology / Oncology  Progress Note  Name: Kesha Zacarias  :  1960  MRN:  [id number]    --------------------    Subjective:  Kesha returns for follow-up of breast cancer accompanied by her .  Since our last visit, Kesha has remained stable from an overall health standpoint, but continues to deal with pain secondary to cancer progression.    --------------------    Objective:  VS: /75   Pulse 69   Resp 18   Ht 1.676 m (5' 6\")   Wt 91.4 kg (201 lb 9.6 oz)   LMP 2011 (Exact Date)   SpO2 97%   BMI 32.54 kg/m    Gen: Well-appearing.    We reviewed MRI lumbar spine and pelvis with independent review.  We reviewed PET with independent review.    --------------------    Assessment / Plan:  Stage IV, hormone positive, HER2 negative (2+), invasive lobular, left-sided breast cancer with extensive bony involvement.  Status post Ribociclib plus AI 2023 - May 2024.  Ongoing everolimus plus AI 2024.  LLE DVT 2024.    Kesha, her  and I reviewed plans to initiate therapy with Enhertu for 4 cycles followed by repeat imaging; she is in agreement.  We reviewed the logistics of therapy as well as potential side effects and the need for cardiac monitoring.  Continue chronic anticoagulation.    Patient Instructions   1) Cycles 1-5 Treatment.  2) ECHO prior to Cycle 2 and 5.  3) JUAN w/ Cycles 2, 4.  4) BJT w/ Cycles 3,5.  5) PET scan prior to Cycle 5.    Brayden Stephens MD.    Again, thank you for allowing me to participate in the care of your patient.        Sincerely,        Brayden Stephens MD    Electronically signed"

## 2025-06-16 NOTE — PROGRESS NOTES
Infusion Nursing Note:  Kesha Zacarias presents today for Faslodex- not given   Patient seen by provider today: Yes: Dr. Stephens   present during visit today: Not Applicable.    Note: Patient not seen in the infusion center today, per Dr. Stephens no Faslodex or Zometa today. Patient will be starting Enhertu, need insurance authorization prior to starting. Patient updated and will need to reschedule appointment for C1D1 Enhertu.     Tati Santacruz RN

## 2025-06-16 NOTE — PROGRESS NOTES
"[facility] Hematology / Oncology  Progress Note  Name: Kesha Zacarias  :  1960  MRN:  [id number]    --------------------    Subjective:  Kesha returns for follow-up of breast cancer accompanied by her .  Since our last visit, Kesha has remained stable from an overall health standpoint, but continues to deal with pain secondary to cancer progression.    --------------------    Objective:  VS: /75   Pulse 69   Resp 18   Ht 1.676 m (5' 6\")   Wt 91.4 kg (201 lb 9.6 oz)   LMP 2011 (Exact Date)   SpO2 97%   BMI 32.54 kg/m    Gen: Well-appearing.    We reviewed MRI lumbar spine and pelvis with independent review.  We reviewed PET with independent review.    --------------------    Assessment / Plan:  Stage IV, hormone positive, HER2 negative (2+), invasive lobular, left-sided breast cancer with extensive bony involvement.  Status post Ribociclib plus AI 2023 - May 2024.  Ongoing everolimus plus AI 2024.  LLE DVT 2024.    Kesha, her  and I reviewed plans to initiate therapy with Enhertu for 4 cycles followed by repeat imaging; she is in agreement.  We reviewed the logistics of therapy as well as potential side effects and the need for cardiac monitoring.  Continue chronic anticoagulation.    Patient Instructions   1) Cycles 1-5 Treatment.  2) ECHO prior to Cycle 2 and 5.  3) JUAN w/ Cycles 2, 4.  4) BJT w/ Cycles 3,5.  5) PET scan prior to Cycle 5.    Brayden Stephens MD.  "

## 2025-06-16 NOTE — NURSING NOTE
"Oncology Rooming Note    June 16, 2025 1:17 PM   Kesha Zacarias is a 64 year old female who presents for:    Chief Complaint   Patient presents with    Oncology Clinic Visit     Follow Up      Initial Vitals: /75   Pulse 69   Resp 18   Ht 1.676 m (5' 6\")   Wt 91.4 kg (201 lb 9.6 oz)   LMP 11/22/2011 (Exact Date)   SpO2 97%   BMI 32.54 kg/m   Estimated body mass index is 32.54 kg/m  as calculated from the following:    Height as of this encounter: 1.676 m (5' 6\").    Weight as of this encounter: 91.4 kg (201 lb 9.6 oz). Body surface area is 2.06 meters squared.  Moderate Pain (4) Comment: Data Unavailable   Patient's last menstrual period was 11/22/2011 (exact date).  Allergies reviewed: Yes  Medications reviewed: Yes    Medications: Medication refills not needed today.  Pharmacy name entered into [facility]:    [id number] [place], [place] - [address]  [facility] - [place], [place] - [address]    Frailty Screening:   Is the patient here for a new oncology consult visit in cancer care? 2. No    PHQ9:  Did this patient require a PHQ9?: No      Clinical concerns: Results       Emma Herr MA            "

## 2025-06-16 NOTE — PATIENT INSTRUCTIONS
1) Cycles 1-5 Treatment.  2) ECHO prior to Cycle 2 and 5.  3) JUAN w/ Cycles 2, 4.  4) BJT w/ Cycles 3,5.  5) PET scan prior to Cycle 5.    Brayden Stephens MD.

## 2025-06-17 ENCOUNTER — MYC MEDICAL ADVICE (OUTPATIENT)
Dept: ONCOLOGY | Facility: CLINIC | Age: 65
End: 2025-06-17
Payer: COMMERCIAL

## 2025-06-17 ENCOUNTER — PATIENT OUTREACH (OUTPATIENT)
Dept: ONCOLOGY | Facility: CLINIC | Age: 65
End: 2025-06-17
Payer: COMMERCIAL

## 2025-06-17 ENCOUNTER — MYC REFILL (OUTPATIENT)
Dept: FAMILY MEDICINE | Facility: CLINIC | Age: 65
End: 2025-06-17
Payer: COMMERCIAL

## 2025-06-17 DIAGNOSIS — I42.7 CHEMOTHERAPY INDUCED CARDIOMYOPATHY: ICD-10-CM

## 2025-06-17 DIAGNOSIS — F41.8 SITUATIONAL ANXIETY: Primary | ICD-10-CM

## 2025-06-17 DIAGNOSIS — C79.51 CARCINOMA OF LEFT BREAST METASTATIC TO BONE (H): Primary | ICD-10-CM

## 2025-06-17 DIAGNOSIS — C50.912 CARCINOMA OF LEFT BREAST METASTATIC TO BONE (H): Primary | ICD-10-CM

## 2025-06-17 DIAGNOSIS — I82.402 ACUTE DEEP VEIN THROMBOSIS (DVT) OF LEFT LOWER EXTREMITY, UNSPECIFIED VEIN (H): ICD-10-CM

## 2025-06-17 DIAGNOSIS — T45.1X5A CHEMOTHERAPY INDUCED CARDIOMYOPATHY: ICD-10-CM

## 2025-06-18 ENCOUNTER — RESULTS FOLLOW-UP (OUTPATIENT)
Dept: INFUSION THERAPY | Facility: CLINIC | Age: 65
End: 2025-06-18

## 2025-06-18 DIAGNOSIS — C50.912 CARCINOMA OF LEFT BREAST METASTATIC TO BONE (H): Primary | ICD-10-CM

## 2025-06-18 DIAGNOSIS — C79.51 CARCINOMA OF LEFT BREAST METASTATIC TO BONE (H): Primary | ICD-10-CM

## 2025-06-18 NOTE — TELEPHONE ENCOUNTER
Requested Prescriptions   Pending Prescriptions Disp Refills    apixaban ANTICOAGULANT (ELIQUIS) 5 MG tablet 60 tablet 11     Sig: Take 1 tablet (5 mg) by mouth 2 times daily.       There is no refill protocol information for this order          Unable to fill per RN protocol, will forward to provider for review.         Dilcia Stroud RN on 6/18/2025 at 8:57 AM

## 2025-06-19 ENCOUNTER — HOSPITAL ENCOUNTER (OUTPATIENT)
Dept: CARDIOLOGY | Facility: CLINIC | Age: 65
Discharge: HOME | End: 2025-06-19
Attending: INTERNAL MEDICINE
Payer: COMMERCIAL

## 2025-06-19 DIAGNOSIS — C79.51 CARCINOMA OF LEFT BREAST METASTATIC TO BONE (H): ICD-10-CM

## 2025-06-19 DIAGNOSIS — C50.912 CARCINOMA OF LEFT BREAST METASTATIC TO BONE (H): ICD-10-CM

## 2025-06-19 LAB — LVEF ECHO: NORMAL

## 2025-06-19 PROCEDURE — 80053 COMPREHEN METABOLIC PANEL: CPT | Performed by: INTERNAL MEDICINE

## 2025-06-19 PROCEDURE — 85004 AUTOMATED DIFF WBC COUNT: CPT | Performed by: INTERNAL MEDICINE

## 2025-06-19 PROCEDURE — 93306 TTE W/DOPPLER COMPLETE: CPT

## 2025-06-19 RX ORDER — DIAZEPAM 5 MG/1
5 TABLET ORAL EVERY 6 HOURS PRN
Qty: 20 TABLET | Refills: 5 | Status: SHIPPED | OUTPATIENT
Start: 2025-06-19

## 2025-06-19 NOTE — PROGRESS NOTES
"[facility]: Cancer Care Follow-Up Note                                    Discussion with Patient:                                                      Patient has no questions or concerns and is aware of all future appts.          Dates of Treatment:                                                      Infusion given in last 28 days       None          Treatment Plan Medications       OP ONC Breast Cancer - FAM-trastuzumab deruxtecan-nxki       Take Home Medications       Medication Sig Start/End Day/Cycle Status    prochlorperazine (COMPAZINE) 10 MG tablet Take 1 tablet (10 mg) by mouth every 6 hours as needed for nausea or vomiting. S to -- Day 1, Cycle 1 - 6/17/2025 Signed    ondansetron (ZOFRAN) 8 MG tablet Take 1 tablet (8 mg) by mouth every 8 hours as needed for nausea. S to -- Day 1, Cycle 1 - 6/17/2025 Signed    dexAMETHasone (DECADRON) 4 MG tablet Take 1 tablet (4 mg) by mouth See Admin Instructions. <span hidden class=\"[email or web address]\"></span>Take for 3 days, starting the day after chemo. Take with food. S to -- Day 1, Cycle 1 - 6/17/2025 Signed                            Assessment:                                                          RNCC Short Symptom Review:     Assessment completed with:: Patient    General/Short Assessment  Does the patient have all their medications?: Yes  Is the patient experiencing any new or worsening symptoms?: No  Does the patient need any referrals to supportive care services?: No  Discussion with patient: Answered patient's questions and addressed concerns, Reviewed how and when to contact clinic, Reviewed patient's future appointments    Pain  Patient Reported Pain?: No  Pain Score: No Pain (0)  (DVPRS) Pain Rating Score : 0-No pain    Patient Coping  Accepting    Clinic Utilization  Patient Aware of Next Appointment?: Yes    Other Patient Concerns  Other Patient Reported Concerns: No    Intervention/Education provided during outreach:                                    "                      Patient to follow up as scheduled at next appt  Patient to call/MyChart message with updates  Confirmed patient has clinic and triage numbers    Signature:  Dilcia Stroud RN

## 2025-06-19 NOTE — PROGRESS NOTES
Hem Onc Phone Note  June 19, 2025    History of Present Illness:  Karin shared a phone call to review her recent imaging.  Pain is currently controlled.    Objective:  We reviewed MRI of her lumbar spine and pelvis with independent review.    Assessment / Plan:  Kesha and ANDERS reviewed her recent imaging that reveals progressive metastatic disease in the bones of the spine and the pelvis.  Her pain is currently controlled.  She has plans to be evaluated by radiation oncology for palliative radiation for pain control.  We agreed to a PET scan in light of her bony metastatic disease not visualized well by CT imaging.  We had a good discussion in light of this progression of transitioning therapy from Afinitor or to Enhertu.  We reviewed the logistics of therapy as well as potential side effects including but not limited to cardiomyopathy, interstitial lung disease among others.  She is in agreement with the plan.    Brayden Stephens MD    Phone Visit:  Phone start time: 4:06 PM  Phone end time: 4:19 PM  Provider location: [facility]  Patient location: Home  Mode of transmission: Phone (planned).     Problem: PHYSICAL THERAPY ADULT  Goal: Performs mobility at highest level of function for planned discharge setting  See evaluation for individualized goals  Treatment/Interventions: Functional transfer training, LE strengthening/ROM, Elevations, Therapeutic exercise, Endurance training, Patient/family training, Equipment eval/education, Bed mobility, Gait training, Cognitive reorientation, Spoke to nursing, OT  Equipment Recommended: Merlin Sovereign       See flowsheet documentation for full assessment, interventions and recommendations  Outcome: Progressing  Prognosis: Good  Problem List: Decreased strength, Decreased endurance, Impaired balance, Decreased mobility, Decreased coordination, Pain  Assessment: Pt is making steady progress with functional mobility  Limited by pain and fatigue  Initially requires encouragement for full participation  Restorative present to assist with mobility  Min to mod assist required for transfers  Additional 10 min required due to use of commode  Gait is slow and unsteady requiring min assist with a chair follow  Completed seated exercises to conclude session  Pt would benefit from continued physical therapy to maximize functional mobility and safety  Barriers to Discharge: Inaccessible home environment, Decreased caregiver support (2 SH, caregiver to her )     Recommendation:  (Rehab)     PT - OK to Discharge: Yes    See flowsheet documentation for full assessment

## 2025-06-20 ENCOUNTER — RESULTS FOLLOW-UP (OUTPATIENT)
Dept: INFUSION THERAPY | Facility: CLINIC | Age: 65
End: 2025-06-20

## 2025-06-24 ENCOUNTER — THERAPY VISIT (OUTPATIENT)
Dept: PHYSICAL THERAPY | Facility: CLINIC | Age: 65
End: 2025-06-24
Attending: RADIOLOGY
Payer: COMMERCIAL

## 2025-06-24 DIAGNOSIS — M54.41 ACUTE RIGHT-SIDED LOW BACK PAIN WITH RIGHT-SIDED SCIATICA: Primary | ICD-10-CM

## 2025-06-24 PROCEDURE — 97110 THERAPEUTIC EXERCISES: CPT | Mod: GP

## 2025-06-27 ENCOUNTER — MYC REFILL (OUTPATIENT)
Dept: ONCOLOGY | Facility: CLINIC | Age: 65
End: 2025-06-27
Payer: COMMERCIAL

## 2025-06-27 DIAGNOSIS — N95.1 MENOPAUSAL SYNDROME (HOT FLASHES): ICD-10-CM

## 2025-06-27 NOTE — TELEPHONE ENCOUNTER
SUBJECTIVE/OBJECTIVE:                                                    Refill request receive for:  Venlafaxine    Last refill per fax: 4/1/2025  Date of LOV: 6/16/2025  Future appt scheduled? Yes: 7/16/2025   Date faxed to clinic: 6/27/2025    PLAN:                                                      Sent to provider to advise.

## 2025-06-30 ENCOUNTER — MYC MEDICAL ADVICE (OUTPATIENT)
Dept: ONCOLOGY | Facility: CLINIC | Age: 65
End: 2025-06-30
Payer: COMMERCIAL

## 2025-06-30 RX ORDER — VENLAFAXINE HYDROCHLORIDE 75 MG/1
75 CAPSULE, EXTENDED RELEASE ORAL DAILY
Qty: 30 CAPSULE | Refills: 11 | Status: SHIPPED | OUTPATIENT
Start: 2025-06-30

## 2025-06-30 NOTE — TELEPHONE ENCOUNTER
Requested Prescriptions   Pending Prescriptions Disp Refills    venlafaxine (EFFEXOR XR) 75 MG 24 hr capsule 30 capsule 11     Sig: Take 1 capsule (75 mg) by mouth daily.       Serotonin-Norepinephrine Reuptake Inhibitors  Passed - 6/30/2025 10:18 AM        Passed - Most recent blood pressure under 140/90 in past 12 months     BP Readings from Last 3 Encounters:   06/16/25 121/75   06/04/25 138/79   06/03/25 102/53       No data recorded            Passed - Medication is active on med list and the sig matches. RN to manually verify dose and sig if red X/fail.     If the protocol passes (green check), you do not need to verify med dose and sig.    A prescription matches if they are the same clinical intention.    For Example: once daily and every morning are the same.    The protocol can not identify upper and lower case letters as matching and will fail.     For Example: Take 1 tablet (50 mg) by mouth daily     TAKE 1 TABLET (50 MG) BY MOUTH DAILY    For all fails (red x), verify dose and sig.    If the refill does match what is on file, the RN can still proceed to approve the refill request.       If they do not match, route to the appropriate provider.             Passed - Recent (12 month) or future (90 days) visit with authorizing provider's specialty (provided they have been seen in the past 15 months)     The patient must have completed an in-person or virtual visit within the past 12 months or has a future visit scheduled within the next 90 days with the authorizing provider s specialty.  Urgent care and e-visits do not qualify as an office visit for this protocol.          Passed - Medication indicated for associated diagnosis     Medication is associated with one or more of the following diagnoses:  Anxiety  Bipolar  Chronic musculoskeletal pain  Depression  Fibromyalgia  Headache  Migraine  Neuropathy  Obsessive compulsive disorder  Panic disorder  Social phobia  Mood disorder  Menopause  Hot  flashes/Menopausal flushing  Fibromyitis  Flushing          Passed - Patient is age 18 or older        Passed - No active pregnancy on record        Passed - No positive pregnancy test in past 12 months             Rx approved per RN protocol.     Dilcia Stroud RN on 6/30/2025 at 10:18 AM

## 2025-07-01 NOTE — TELEPHONE ENCOUNTER
Please see [employer] message from patient regarding jaw pain, right side.    Writer called patient for more information. States this has been ongoing since the beginning of June. She spoke with Dr. Stephens about her sx briefly at her visit with him on 6/16/25. She was advised to see a dentist. She was seen by dentist on 6/26/25 with a negative work up. No dental problems or infection noted. Patient was advised to try a mouth guard. States she's tried however she's not able to open her mouth wide enough to get the mouth guard in. She's not sure what next steps should be to find source of pain and to help with sx.    Currently, patient's pain level is 6/10. Pain increased to 9/10 when tylenol wears off. She has been taking Tylenol 1000 mg every 5 hours with ok relief. This usually starts to wear off after 3 hours. She's not taking anything else for pain at this time. Pain makes it difficult to eat any solids. She is able to breath and swallow without any difficulty. Is tolerating some soft foods and no trouble with fluids. She has moderate swelling to her right jaw. No redness, open sores / lesions, fever, or chills. States her jaw muscles feel really stiff and pain in jaw leads to a HA. No visual disturbances, one sided weakness, dizziness, or balance issues. No other sx noted.       Patient will ice area and continue to monitor for any new or worsening sx. Reviewed s/sx that would warrant further evaluation via the ED, understanding verbalized.     Routing to care team for further review and recommendations.     Stephanie Bains, RN, BSN, PHN, OCN  [facility]

## 2025-07-02 ENCOUNTER — ONCOLOGY VISIT (OUTPATIENT)
Dept: ONCOLOGY | Facility: CLINIC | Age: 65
End: 2025-07-02
Payer: COMMERCIAL

## 2025-07-02 VITALS
RESPIRATION RATE: 16 BRPM | DIASTOLIC BLOOD PRESSURE: 66 MMHG | OXYGEN SATURATION: 99 % | TEMPERATURE: 97 F | HEIGHT: 66 IN | HEART RATE: 88 BPM | WEIGHT: 198.19 LBS | SYSTOLIC BLOOD PRESSURE: 100 MMHG | BODY MASS INDEX: 31.85 KG/M2

## 2025-07-02 DIAGNOSIS — R68.84 JAW PAIN: ICD-10-CM

## 2025-07-02 DIAGNOSIS — C50.912 CARCINOMA OF LEFT BREAST METASTATIC TO BONE (H): ICD-10-CM

## 2025-07-02 DIAGNOSIS — C79.51 CARCINOMA OF LEFT BREAST METASTATIC TO BONE (H): ICD-10-CM

## 2025-07-02 DIAGNOSIS — C50.912 LOBULAR CARCINOMA OF LEFT BREAST (H): Primary | ICD-10-CM

## 2025-07-02 DIAGNOSIS — T45.1X5A ANTINEOPLASTIC CHEMOTHERAPY INDUCED ANEMIA: ICD-10-CM

## 2025-07-02 DIAGNOSIS — Z79.01 CHRONIC ANTICOAGULATION: ICD-10-CM

## 2025-07-02 DIAGNOSIS — D64.81 ANTINEOPLASTIC CHEMOTHERAPY INDUCED ANEMIA: ICD-10-CM

## 2025-07-02 PROCEDURE — 99214 OFFICE O/P EST MOD 30 MIN: CPT

## 2025-07-02 PROCEDURE — G2211 COMPLEX E/M VISIT ADD ON: HCPCS

## 2025-07-02 RX ORDER — HYDROMORPHONE HYDROCHLORIDE 2 MG/1
2 TABLET ORAL EVERY 6 HOURS PRN
Qty: 12 TABLET | Refills: 0 | Status: SHIPPED | OUTPATIENT
Start: 2025-07-02

## 2025-07-02 ASSESSMENT — PAIN SCALES - GENERAL: PAINLEVEL_OUTOF10: MODERATE PAIN (5)

## 2025-07-02 NOTE — PROGRESS NOTES
"Oncology Follow-Up Visit: July 2, 2025    Oncologist: Dr. Stephens   PCP: Schoen, Gregory G    Reason for Visit: Jaw pain     Diagnosis: Stage IV, hormone positive, HER2 negative (2+), invasive lobular, left-sided breast cancer with extensive bony involvement.      Treatment:  11/2023 - 5/2024 Ribociclib + letrozole; progression of disease in bones   1/2024 s/p palliative radiation to sacrum 3000 cGy in 300 cGy daily fractions   6/14/2024 - 3/2025 Everolimus and exemestane; oligometastatic progression   3/2025 Ongoing Everolimus with start of Faslodex     Interval History:  Pt is seen in-person for evaluation of ongoing jaw pain. No correlation to zometa infusions. Has exposed jaw bone on the L) side that acts up occasionally but not causing issues now. Pain is on R) side of jaw. Radiates up her face. Causing headaches, ear discomfort, neck pain, toothaches. Was evaluated by dentist with no concerning findings on exam. Advised to wear nightguard, which she has and not noticing improvement yet. Using 1000 mg acetaminophen every 5 hours and flexeril at bedtime. Has required dilaudid 2-3 times the past week because of the pain. Pain is contributing to stress and waking her up at night. No additional questions or concerns.    Patient denies any of the following except if noted above: fevers, chills, difficulty with energy, vision or hearing changes, chest pain, dyspnea, abdominal pain, nausea, vomiting, diarrhea, constipation, urinary concerns, headaches, numbness, tingling, issues with sleep or mood. She also denies lumps, bumps, rashes or skin lesions, bleeding or bruising issues.    Physical Exam:  /66 (BP Location: Right arm, Patient Position: Sitting, Cuff Size: Adult Regular)   Pulse 88   Temp 97  F (36.1  C) (Temporal)   Resp 16   Ht 1.676 m (5' 6\")   Wt 89.9 kg (198 lb 3 oz)   LMP 11/22/2011 (Exact Date)   SpO2 99%   BMI 31.99 kg/m       BP Readings from Last 6 Encounters:   07/02/25 100/66 "   06/16/25 121/75   06/04/25 138/79   06/03/25 102/53   06/03/25 102/53   05/22/25 110/72     Wt Readings from Last 6 Encounters:   07/02/25 89.9 kg (198 lb 3 oz)   06/16/25 91.4 kg (201 lb 9.6 oz)   06/03/25 93 kg (205 lb)   06/03/25 93 kg (205 lb)   05/22/25 92.3 kg (203 lb 6 oz)   05/19/25 93 kg (205 lb)      Constitutional: No acute distress, pleasant, appropriately groomed.   ENT: PERRLA, sclera without erythema. R) jaw tender with palpation. No visible or palpable mass. No jaw click. No erythema, swelling or drainage. Decreased ROM.   Neck: Trachea midline, no adenopathy.   MS:  5/5 muscle strength, adequate ROM.   Skin: No rashes, lesions, or wounds on exposed skin.  Neuro: A/O x 4, sensation intact.   Psych: Appropriate mentation and affect.    Laboratory Results:   Most recent labs reviewed.   I reviewed the above labs today.    Assessment and Plan:   Stage IV, hormone positive, HER2 negative (2+), invasive lobular, left-sided breast cancer with extensive bony involvement.   - Mild progression of osseous lesions on PET 6/2025. Starting treatment with Enhertu.  - Seen today for ongoing R) jaw pain. Evaluated by dentist with no clear cause. Recent PET w/dental inflammation noted, however. Symptoms sound similar to TMJ. Plan discussed and patient agreeable:  Focus on self-care measures: altered diet, limiting grinding / clenching, jaw exercises, improve head / neck posture, mouth guard at bedtime.   Start low-dose ibuprofen to get inflammation / pain under control. Reviewed taking 200-400 mg twice daily with full glass of water and/or food to limit stomach irritation. Platelets mildly decreased. Reviewed alarm s/s of bleeding. Reviewed NSAIDs not a great long-term solution while on treatment for breast cancer. Continue acetaminophen but will reduce to every 6 hours. Alternate between acetaminophen / ibuprofen.   Increase bedtime dose of flexeril to 10 mg.   Increase relaxation and stress reduction. Take home  "Valium PRN when anxious.  Continue hydromorphone 2 mg every 6 hours PRN for severe pain / no relief from above mentioned measures. Refill provided.   Will continue to hold zometa until symptoms improve.  If no improvement, will place ENT referral for further evaluation.     We did not discuss at today's visit:  - 6/2024 germline genetic testing negative   - Patient instructions:  ECHO prior to Cycle 2 and 5.  JUAN w/ Cycles 2, 4.  BJT w/ Cycles 3,5.  PET scan prior to Cycle 5.    Headaches  - Likely s/t hydration status  - No concerning neurological s/s (debilitating headaches, vision changes, one-sided weakness, speech changes)  - Continue to monitor     Bone health  - Extensive osseous metastatic disease  - Osteopenia on DEXA scan 10/23 - lumbar spine, and left hip femoral neck   - On Zometa q4 weeks, does have protruding jaw bone that is \"dead\" per patient  - No current acute dental concerns  - Confirmed use of calcium 600 mg BID and vitamin D 1000 IU   - Encouraged pt to achieve weight-bearing exercises several times a week, if possible.      Thrombocytopenia   - Mildly decreased platelet count  - On Eliquis for DVT   - Continue to monitor for bleeding     Anemia  - [place]ed [place] [address] mcg daily and oral ferrous sulfate 325 mg every other day  - Discussed taking with vitamin C to increase absorption      Hot flashes  - On Effexor 75 mg daily      Hx of LLE DVT  - On Eliquis BID  - No s/s of bleeding  - Continue to monitor      CKD Stage II/IIIa  - Creatinine stable   - Reviewed avoiding nephrotoxic agents  - Continue to monitor      Mouth sores - resolved   - Dexamethasone mouth wash and magic mouthwash    Cough; resolved  - Likely s/t post-viral URI and side effect of Faslodex (5-15% per UpToDate)  - No associated infectious s/s, reviewed these  - Continue supportive cares (humdifity, OTC cough relief, hydration)  - Aware to notify provider if symptoms worsen      Kitty YING, [facility] " [address] [address]      The longitudinal plan of care for the diagnosis(es)/condition(s) as documented were addressed during this visit. Due to the added complexity in care, I will continue to support Kesha in the subsequent management and with ongoing continuity of care.

## 2025-07-02 NOTE — Clinical Note
7/2/2025      Kesha Zacarias  [address] [address] [place] [id number]      Dear Colleague,    Thank you for referring your patient, Kesha Zacarias, to the [facility]. Please see a copy of my visit note below.    Oncology Follow-Up Visit: July 2, 2025    Oncologist: Dr. Stephens   PCP: Schoen, Gregory G    Reason for Visit: Jaw pain     Diagnosis: Stage IV, hormone positive, HER2 negative (2+), invasive lobular, left-sided breast cancer with extensive bony involvement.      Treatment:  11/2023 - 5/2024 Ribociclib + letrozole; progression of disease in bones   1/2024 s/p palliative radiation to sacrum 3000 cGy in 300 cGy daily fractions   6/14/2024 - 3/2025 Everolimus and exemestane; oligometastatic progression   3/2025 Ongoing Everolimus with start of Faslodex     Interval History:  Pt is seen in-person for review of ***    Patient denies any of the following except if noted above: fevers, chills, difficulty with energy, vision or hearing changes, chest pain, dyspnea, abdominal pain, nausea, vomiting, diarrhea, constipation, urinary concerns, headaches, numbness, tingling, issues with sleep or mood. {[facility]:[id number]} also denies lumps, bumps, rashes or skin lesions, bleeding or bruising issues.    Physical Exam:  LMP 11/22/2011 (Exact Date)    BP Readings from Last 6 Encounters:   06/16/25 121/75   06/04/25 138/79   06/03/25 102/53   06/03/25 102/53   05/22/25 110/72   05/19/25 136/72     Wt Readings from Last 6 Encounters:   06/16/25 91.4 kg (201 lb 9.6 oz)   06/03/25 93 kg (205 lb)   06/03/25 93 kg (205 lb)   05/22/25 92.3 kg (203 lb 6 oz)   05/19/25 93 kg (205 lb)   05/15/25 92.4 kg (203 lb 11.2 oz)      Constitutional: No acute distress, pleasant, appropriately groomed.   ENT: PERRLA, sclera without erythema. Appropriate dentition.  Neck: Trachea midline, no adenopathy.   Resp: CTA, adequate depth and rate of respirations.   Cardiac: S1/S2, RRR, no murmurs.   Abdomen: BS  "active, abdomen soft and non-tender. No masses or organomegaly.   MS:  5/5 muscle strength, adequate ROM.   Skin: No rashes, lesions, or wounds on exposed skin.  Neuro: A/O x 4, sensation intact.   Lymph: No palpable anterior/posterior cervical, axillary, or supraclavicular nodes.   Psych: Appropriate mentation and affect.    Laboratory Results:   No results found for any visits on 07/02/25.  I reviewed the above labs today.      Assessment and Plan:   Stage IV, hormone positive, HER2 negative (2+), invasive lobular, left-sided breast cancer with extensive bony involvement.   - Mild progression of osseous lesions on PET 6/2025. Now on treatment with Enhertu.  - Tolerated cycle 1 with ***  - Seen today for ongoing jaw pain. Evaluated by dentist with no clear cause.   - 6/2024 germline genetic testing negative   - Patient instructions:  ECHO prior to Cycle 2 and 5.  JUAN w/ Cycles 2, 4.  BJT w/ Cycles 3,5.  PET scan prior to Cycle 5.      Headaches  - Likely s/t hydration status  - No concerning neurological s/s (debilitating headaches, vision changes, one-sided weakness, speech changes)  - Continue to monitor     Bone health  - Extensive osseous metastatic disease  - Osteopenia on DEXA scan 10/23 - lumbar spine, and left hip femoral neck   - On Zometa q4 weeks, does have protruding jaw bone that is \"dead\" per patient  - No current acute dental concerns  - Confirmed use of calcium 600 mg BID and vitamin D 1000 IU   - Encouraged pt to achieve weight-bearing exercises several times a week, if possible.      Thrombocytopenia   - Mildly decreased platelet count  - On Eliquis for DVT   - Continue to monitor for bleeding     Anemia  - [place]ed [place] [address] mcg daily and oral ferrous sulfate 325 mg every other day  - Discussed taking with vitamin C to increase absorption      Hot flashes  - On Effexor 75 mg daily      Hx of LLE DVT  - On Eliquis BID  - No s/s of bleeding  - Continue to monitor      CKD Stage II/IIIa  - " Creatinine stable   - Reviewed avoiding nephrotoxic agents  - Continue to monitor      Mouth sores - resolved   - Dexamethasone mouth wash and magic mouthwash    Cough; resolved  - Likely s/t post-viral URI and side effect of Faslodex (5-15% per UpToDate)  - No associated infectious s/s, reviewed these  - Continue supportive cares (humdifity, OTC cough relief, hydration)  - Aware to notify provider if symptoms worsen      Kitty YING, [place] [address]      The longitudinal plan of care for the diagnosis(es)/condition(s) as documented were addressed during this visit. Due to the added complexity in care, I will continue to support Kesha in the subsequent management and with ongoing continuity of care.      Oncology Follow-Up Visit: July 2, 2025    Oncologist: Dr. Stephens   PCP: Schoen, Gregory G    Reason for Visit: Jaw pain     Diagnosis: Stage IV, hormone positive, HER2 negative (2+), invasive lobular, left-sided breast cancer with extensive bony involvement.      Treatment:  11/2023 - 5/2024 Ribociclib + letrozole; progression of disease in bones   1/2024 s/p palliative radiation to sacrum 3000 cGy in 300 cGy daily fractions   6/14/2024 - 3/2025 Everolimus and exemestane; oligometastatic progression   3/2025 Ongoing Everolimus with start of Faslodex     Interval History:  Pt is seen in-person for evaluation of ongoing jaw pain.     Patient denies any of the following except if noted above: fevers, chills, difficulty with energy, vision or hearing changes, chest pain, dyspnea, abdominal pain, nausea, vomiting, diarrhea, constipation, urinary concerns, headaches, numbness, tingling, issues with sleep or mood. {[facility]:[id number]} also denies lumps, bumps, rashes or skin lesions, bleeding or bruising issues.    Physical Exam:  LMP 11/22/2011 (Exact Date)    BP Readings from Last 6 Encounters:   06/16/25 121/75   06/04/25 138/79  "  06/03/25 102/53   06/03/25 102/53   05/22/25 110/72   05/19/25 136/72     Wt Readings from Last 6 Encounters:   06/16/25 91.4 kg (201 lb 9.6 oz)   06/03/25 93 kg (205 lb)   06/03/25 93 kg (205 lb)   05/22/25 92.3 kg (203 lb 6 oz)   05/19/25 93 kg (205 lb)   05/15/25 92.4 kg (203 lb 11.2 oz)      Constitutional: No acute distress, pleasant, appropriately groomed.   ENT: PERRLA, sclera without erythema. Appropriate dentition.  Neck: Trachea midline, no adenopathy.   Resp: CTA, adequate depth and rate of respirations.   Cardiac: S1/S2, RRR, no murmurs.   Abdomen: BS active, abdomen soft and non-tender. No masses or organomegaly.   MS:  5/5 muscle strength, adequate ROM.   Skin: No rashes, lesions, or wounds on exposed skin.  Neuro: A/O x 4, sensation intact.   Lymph: No palpable anterior/posterior cervical, axillary, or supraclavicular nodes.   Psych: Appropriate mentation and affect.    Laboratory Results:   No results found for any visits on 07/02/25.  I reviewed the above labs today.      Assessment and Plan:   Stage IV, hormone positive, HER2 negative (2+), invasive lobular, left-sided breast cancer with extensive bony involvement.   - Mild progression of osseous lesions on PET 6/2025. Now on treatment with Enhertu.  - Tolerated cycle 1 with ***  - Seen today for ongoing jaw pain. Evaluated by dentist with no clear cause.   - 6/2024 germline genetic testing negative   - Patient instructions:  ECHO prior to Cycle 2 and 5.  JUAN w/ Cycles 2, 4.  BJT w/ Cycles 3,5.  PET scan prior to Cycle 5.      Headaches  - Likely s/t hydration status  - No concerning neurological s/s (debilitating headaches, vision changes, one-sided weakness, speech changes)  - Continue to monitor     Bone health  - Extensive osseous metastatic disease  - Osteopenia on DEXA scan 10/23 - lumbar spine, and left hip femoral neck   - On Zometa q4 weeks, does have protruding jaw bone that is \"dead\" per patient  - No current acute dental concerns  - " Confirmed use of calcium 600 mg BID and vitamin D 1000 IU   - Encouraged pt to achieve weight-bearing exercises several times a week, if possible.      Thrombocytopenia   - Mildly decreased platelet count  - On Eliquis for DVT   - Continue to monitor for bleeding     Anemia  - [place]ed [place] [address] mcg daily and oral ferrous sulfate 325 mg every other day  - Discussed taking with vitamin C to increase absorption      Hot flashes  - On Effexor 75 mg daily      Hx of LLE DVT  - On Eliquis BID  - No s/s of bleeding  - Continue to monitor      CKD Stage II/IIIa  - Creatinine stable   - Reviewed avoiding nephrotoxic agents  - Continue to monitor      Mouth sores - resolved   - Dexamethasone mouth wash and magic mouthwash    Cough; resolved  - Likely s/t post-viral URI and side effect of Faslodex (5-15% per UpToDate)  - No associated infectious s/s, reviewed these  - Continue supportive cares (humdifity, OTC cough relief, hydration)  - Aware to notify provider if symptoms worsen      [place]ty [place], [place] [address]      The longitudinal plan of care for the diagnosis(es)/condition(s) as documented were addressed during this visit. Due to the added complexity in care, I will continue to support Kesha in the subsequent management and with ongoing continuity of care.        Again, thank you for allowing me to participate in the care of your patient.        Sincerely,        STEPAN Willams CNP    Electronically signed

## 2025-07-08 ENCOUNTER — TELEPHONE (OUTPATIENT)
Dept: ENDOCRINOLOGY | Facility: CLINIC | Age: 65
End: 2025-07-08
Payer: COMMERCIAL

## 2025-07-08 NOTE — TELEPHONE ENCOUNTER
Patient confirmed scheduled appointment:  Date: 07/30/25  Time: 1:00  Visit type: NEW PITUITARY  Provider: Beba Coy MD  Location: [facility]  Testing/imaging: N/A  Additional notes: Scheduled per pt, will be in [place] for visit. Pt does not have sx date yet, Dr. Mandujano wanted her to meet with specialties ASAP since it can take a while to get into the consultations and she does not want to delay Sx.    Scarlett Kong, RN  [id number]  Please help schedule with any provider who manages pituitary. Surgical date not noted.  2-3 weeks pre-surgery for baseline and 2-3 weeks post surgery for surgical follow up.  Should Pt ask - we do not manage pain.    Charlette Nur on 7/8/2025 at 12:06 PM

## 2025-07-10 RX ORDER — VENLAFAXINE HYDROCHLORIDE 75 MG/1
75 CAPSULE, EXTENDED RELEASE ORAL DAILY
Qty: 30 CAPSULE | Refills: 11 | Status: SHIPPED | OUTPATIENT
Start: 2025-07-10

## 2025-07-11 RX ORDER — ONDANSETRON 8 MG/1
8 TABLET, FILM COATED ORAL EVERY 8 HOURS PRN
Qty: 30 TABLET | Refills: 2 | Status: SHIPPED | OUTPATIENT
Start: 2025-07-15

## 2025-07-11 RX ORDER — DEXAMETHASONE 4 MG/1
4 TABLET ORAL SEE ADMIN INSTRUCTIONS
Qty: 6 TABLET | Refills: 2 | Status: SHIPPED | OUTPATIENT
Start: 2025-07-15

## 2025-07-11 RX ORDER — PROCHLORPERAZINE MALEATE 10 MG
10 TABLET ORAL EVERY 6 HOURS PRN
Qty: 30 TABLET | Refills: 2 | Status: SHIPPED | OUTPATIENT
Start: 2025-07-15

## 2025-07-15 ENCOUNTER — ONCOLOGY VISIT (OUTPATIENT)
Dept: ONCOLOGY | Facility: CLINIC | Age: 65
End: 2025-07-15
Payer: COMMERCIAL

## 2025-07-15 ENCOUNTER — APPOINTMENT (OUTPATIENT)
Dept: LAB | Facility: CLINIC | Age: 65
End: 2025-07-15
Payer: COMMERCIAL

## 2025-07-15 ENCOUNTER — INFUSION THERAPY VISIT (OUTPATIENT)
Dept: INFUSION THERAPY | Facility: CLINIC | Age: 65
End: 2025-07-15
Attending: INTERNAL MEDICINE
Payer: COMMERCIAL

## 2025-07-15 VITALS
HEIGHT: 66 IN | SYSTOLIC BLOOD PRESSURE: 110 MMHG | BODY MASS INDEX: 32.21 KG/M2 | DIASTOLIC BLOOD PRESSURE: 64 MMHG | HEART RATE: 65 BPM

## 2025-07-15 VITALS
RESPIRATION RATE: 14 BRPM | DIASTOLIC BLOOD PRESSURE: 72 MMHG | HEIGHT: 66 IN | SYSTOLIC BLOOD PRESSURE: 112 MMHG | OXYGEN SATURATION: 100 % | TEMPERATURE: 97.1 F | HEART RATE: 91 BPM | BODY MASS INDEX: 31.83 KG/M2 | WEIGHT: 198.06 LBS

## 2025-07-15 DIAGNOSIS — C50.912 CARCINOMA OF LEFT BREAST METASTATIC TO BONE (H): Primary | ICD-10-CM

## 2025-07-15 DIAGNOSIS — Z79.01 CHRONIC ANTICOAGULATION: ICD-10-CM

## 2025-07-15 DIAGNOSIS — H53.8 BLURRED VISION: ICD-10-CM

## 2025-07-15 DIAGNOSIS — T45.1X5A ANTINEOPLASTIC CHEMOTHERAPY INDUCED ANEMIA: ICD-10-CM

## 2025-07-15 DIAGNOSIS — H53.9 VISION CHANGES: ICD-10-CM

## 2025-07-15 DIAGNOSIS — C50.912 LOBULAR CARCINOMA OF LEFT BREAST (H): ICD-10-CM

## 2025-07-15 DIAGNOSIS — R68.84 JAW PAIN: ICD-10-CM

## 2025-07-15 DIAGNOSIS — C79.51 CARCINOMA OF LEFT BREAST METASTATIC TO BONE (H): Primary | ICD-10-CM

## 2025-07-15 DIAGNOSIS — D64.81 ANTINEOPLASTIC CHEMOTHERAPY INDUCED ANEMIA: ICD-10-CM

## 2025-07-15 LAB
ALBUMIN SERPL BCG-MCNC: 3.5 G/DL (ref 3.5–5.2)
ALP SERPL-CCNC: 73 U/L (ref 40–150)
ALT SERPL W P-5'-P-CCNC: 19 U/L (ref 0–50)
ANION GAP SERPL CALCULATED.3IONS-SCNC: 7 MMOL/L (ref 7–15)
AST SERPL W P-5'-P-CCNC: 22 U/L (ref 0–45)
BASOPHILS # BLD AUTO: 0 10E3/UL (ref 0–0.2)
BASOPHILS NFR BLD AUTO: 1 %
BILIRUB SERPL-MCNC: 0.2 MG/DL
BUN SERPL-MCNC: 19.1 MG/DL (ref 8–23)
CALCIUM SERPL-MCNC: 8.9 MG/DL (ref 8.8–10.4)
CHLORIDE SERPL-SCNC: 107 MMOL/L (ref 98–107)
CREAT SERPL-MCNC: 1.06 MG/DL (ref 0.51–0.95)
EGFRCR SERPLBLD CKD-EPI 2021: 58 ML/MIN/1.73M2
EOSINOPHIL # BLD AUTO: 0.1 10E3/UL (ref 0–0.7)
EOSINOPHIL NFR BLD AUTO: 2 %
ERYTHROCYTE [DISTWIDTH] IN BLOOD BY AUTOMATED COUNT: 19.1 % (ref 10–15)
GLUCOSE SERPL-MCNC: 140 MG/DL (ref 70–99)
HCO3 SERPL-SCNC: 28 MMOL/L (ref 22–29)
HCT VFR BLD AUTO: 29.7 % (ref 35–47)
HGB BLD-MCNC: 8.9 G/DL (ref 11.7–15.7)
IMM GRANULOCYTES # BLD: 0 10E3/UL
IMM GRANULOCYTES NFR BLD: 1 %
LYMPHOCYTES # BLD AUTO: 0.7 10E3/UL (ref 0.8–5.3)
LYMPHOCYTES NFR BLD AUTO: 17 %
MCH RBC QN AUTO: 24.3 PG (ref 26.5–33)
MCHC RBC AUTO-ENTMCNC: 30 G/DL (ref 31.5–36.5)
MCV RBC AUTO: 81 FL (ref 78–100)
MONOCYTES # BLD AUTO: 0.3 10E3/UL (ref 0–1.3)
MONOCYTES NFR BLD AUTO: 9 %
NEUTROPHILS # BLD AUTO: 2.8 10E3/UL (ref 1.6–8.3)
NEUTROPHILS NFR BLD AUTO: 72 %
NRBC # BLD AUTO: 0 10E3/UL
NRBC BLD AUTO-RTO: 0 /100
PLATELET # BLD AUTO: 196 10E3/UL (ref 150–450)
POTASSIUM SERPL-SCNC: 3.9 MMOL/L (ref 3.4–5.3)
PROT SERPL-MCNC: 6.4 G/DL (ref 6.4–8.3)
RBC # BLD AUTO: 3.67 10E6/UL (ref 3.8–5.2)
SODIUM SERPL-SCNC: 142 MMOL/L (ref 135–145)
WBC # BLD AUTO: 3.9 10E3/UL (ref 4–11)

## 2025-07-15 PROCEDURE — 96367 TX/PROPH/DG ADDL SEQ IV INF: CPT

## 2025-07-15 PROCEDURE — 99215 OFFICE O/P EST HI 40 MIN: CPT

## 2025-07-15 PROCEDURE — 258N000003 HC RX IP 258 OP 636: Performed by: INTERNAL MEDICINE

## 2025-07-15 PROCEDURE — 36415 COLL VENOUS BLD VENIPUNCTURE: CPT | Performed by: INTERNAL MEDICINE

## 2025-07-15 PROCEDURE — 84155 ASSAY OF PROTEIN SERUM: CPT | Performed by: INTERNAL MEDICINE

## 2025-07-15 PROCEDURE — 96413 CHEMO IV INFUSION 1 HR: CPT

## 2025-07-15 PROCEDURE — 85004 AUTOMATED DIFF WBC COUNT: CPT | Performed by: INTERNAL MEDICINE

## 2025-07-15 PROCEDURE — 250N000011 HC RX IP 250 OP 636: Performed by: INTERNAL MEDICINE

## 2025-07-15 PROCEDURE — 96375 TX/PRO/DX INJ NEW DRUG ADDON: CPT

## 2025-07-15 PROCEDURE — G2211 COMPLEX E/M VISIT ADD ON: HCPCS

## 2025-07-15 RX ORDER — MEPERIDINE HYDROCHLORIDE 25 MG/ML
25 INJECTION INTRAMUSCULAR; INTRAVENOUS; SUBCUTANEOUS
Status: DISCONTINUED | OUTPATIENT
Start: 2025-07-15 | End: 2025-07-15 | Stop reason: HOSPADM

## 2025-07-15 RX ORDER — DIPHENHYDRAMINE HYDROCHLORIDE 50 MG/ML
50 INJECTION, SOLUTION INTRAMUSCULAR; INTRAVENOUS
Status: DISCONTINUED | OUTPATIENT
Start: 2025-07-15 | End: 2025-07-15 | Stop reason: HOSPADM

## 2025-07-15 RX ORDER — DIPHENHYDRAMINE HYDROCHLORIDE 50 MG/ML
25 INJECTION, SOLUTION INTRAMUSCULAR; INTRAVENOUS
Status: DISCONTINUED | OUTPATIENT
Start: 2025-07-15 | End: 2025-07-15 | Stop reason: HOSPADM

## 2025-07-15 RX ORDER — PALONOSETRON 0.05 MG/ML
0.25 INJECTION, SOLUTION INTRAVENOUS ONCE
Status: COMPLETED | OUTPATIENT
Start: 2025-07-15 | End: 2025-07-15

## 2025-07-15 RX ORDER — EPINEPHRINE 1 MG/ML
0.3 INJECTION, SOLUTION, CONCENTRATE INTRAVENOUS EVERY 5 MIN PRN
Status: DISCONTINUED | OUTPATIENT
Start: 2025-07-15 | End: 2025-07-15 | Stop reason: HOSPADM

## 2025-07-15 RX ORDER — PREDNISONE 20 MG/1
TABLET ORAL
Qty: 20 TABLET | Refills: 0 | Status: SHIPPED | OUTPATIENT
Start: 2025-07-15

## 2025-07-15 RX ORDER — METHYLPREDNISOLONE SODIUM SUCCINATE 40 MG/ML
40 INJECTION INTRAMUSCULAR; INTRAVENOUS
Status: DISCONTINUED | OUTPATIENT
Start: 2025-07-15 | End: 2025-07-15 | Stop reason: HOSPADM

## 2025-07-15 RX ORDER — ALBUTEROL SULFATE 0.83 MG/ML
2.5 SOLUTION RESPIRATORY (INHALATION)
Status: DISCONTINUED | OUTPATIENT
Start: 2025-07-15 | End: 2025-07-15 | Stop reason: HOSPADM

## 2025-07-15 RX ORDER — ALBUTEROL SULFATE 90 UG/1
1-2 INHALANT RESPIRATORY (INHALATION)
Status: DISCONTINUED | OUTPATIENT
Start: 2025-07-15 | End: 2025-07-15 | Stop reason: HOSPADM

## 2025-07-15 RX ADMIN — FAMOTIDINE 20 MG: 10 INJECTION, SOLUTION INTRAVENOUS at 11:02

## 2025-07-15 RX ADMIN — PALONOSETRON HYDROCHLORIDE 0.25 MG: 0.25 INJECTION INTRAVENOUS at 11:10

## 2025-07-15 RX ADMIN — FAM-TRASTUZUMAB DERUXTECAN-NXKI 500 MG: 100 INJECTION, POWDER, LYOPHILIZED, FOR SOLUTION INTRAVENOUS at 11:42

## 2025-07-15 RX ADMIN — FOSAPREPITANT: 150 INJECTION, POWDER, LYOPHILIZED, FOR SOLUTION INTRAVENOUS at 11:06

## 2025-07-15 RX ADMIN — DEXTROSE 250 ML: 5 SOLUTION INTRAVENOUS at 11:00

## 2025-07-15 ASSESSMENT — PAIN SCALES - GENERAL: PAINLEVEL_OUTOF10: MODERATE PAIN (6)

## 2025-07-15 NOTE — PROGRESS NOTES
"Oncology Follow-Up Visit: July 15, 2025    Oncologist: Dr. Stephens   PCP: Schoen, Gregory G     Reason for Visit: Pre-treatment follow-up     Diagnosis: Stage IV, hormone positive, HER2 negative (2+), invasive lobular, left-sided breast cancer with extensive bony involvement.      Treatment:  11/2023 - 5/2024 Ribociclib + letrozole; progression of disease in bones   1/2024 s/p palliative radiation to sacrum 3000 cGy in 300 cGy daily fractions   6/14/2024 - 3/2025 Everolimus and exemestane; oligometastatic progression   3/2025 Everolimus with Faslodex; progression  7/2025 start of Enhertu    Interval History:  Pt is seen in clinic today for follow-up prior to start of Enhertu. Tearful and frustrated due to ongoing and worsening jaw pain. No improvement w/supportive cares discussed at last visit. Using scheduled tylenol and ibuprofen without relief. Now requiring both oxycodone and dilaudid. Worse in the PM hours, affecting sleep. Having debilitating headaches and associated vision changes and neck pain. Asking for steroids. Unsure what to do next.    In regards to Enhertu, feeling well enough (aside from jaw pain) to proceed. No urgent questions.    Patient denies any of the following except if noted above: fevers, chills, difficulty with energy, vision or hearing changes, chest pain, dyspnea, abdominal pain, nausea, vomiting, diarrhea, constipation, urinary concerns, headaches, numbness, tingling, issues with sleep or mood. She also denies lumps, bumps, rashes or skin lesions, bleeding or bruising issues.    Physical Exam:  /72 (BP Location: Right arm, Patient Position: Sitting, Cuff Size: Adult Regular)   Pulse 91   Temp 97.1  F (36.2  C) (Temporal)   Resp 14   Ht 1.676 m (5' 6\")   Wt 89.8 kg (198 lb 1 oz)   LMP 11/22/2011 (Exact Date)   SpO2 100%   BMI 31.97 kg/m       BP Readings from Last 6 Encounters:   07/15/25 112/72   07/02/25 100/66   06/16/25 121/75   06/04/25 138/79   06/03/25 102/53 "   06/03/25 102/53     Wt Readings from Last 6 Encounters:   07/15/25 89.8 kg (198 lb 1 oz)   07/02/25 89.9 kg (198 lb 3 oz)   06/16/25 91.4 kg (201 lb 9.6 oz)   06/03/25 93 kg (205 lb)   06/03/25 93 kg (205 lb)   05/22/25 92.3 kg (203 lb 6 oz)      Constitutional: No acute distress but visibly in pain and uncomfortable.  ENT: PERRLA, sclera without erythema.   Neck: Trachea midline, no adenopathy.   Resp: CTA, adequate depth and rate of respirations.   Cardiac: S1/S2, RRR, no murmurs.   MS:  5/5 muscle strength, adequate ROM.   Skin: No rashes, lesions, or wounds on exposed skin.  Neuro: A/O x 4, sensation intact. Equal strength to BUE.   Lymph: No palpable anterior/posterior cervical, axillary, or supraclavicular nodes.   Psych: Appropriate mentation and affect but tearful.    Laboratory Results:   Results for orders placed or performed in visit on 07/15/25   Comprehensive metabolic panel     Status: Abnormal   Result Value Ref Range    Sodium 142 135 - 145 mmol/L    Potassium 3.9 3.4 - 5.3 mmol/L    Carbon Dioxide (CO2) 28 22 - 29 mmol/L    Anion Gap 7 7 - 15 mmol/L    Urea Nitrogen 19.1 8.0 - 23.0 mg/dL    Creatinine 1.06 (H) 0.51 - 0.95 mg/dL    GFR Estimate 58 (L) >60 mL/min/1.73m2    Calcium 8.9 8.8 - 10.4 mg/dL    Chloride 107 98 - 107 mmol/L    Glucose 140 (H) 70 - 99 mg/dL    Alkaline Phosphatase 73 40 - 150 U/L    AST 22 0 - 45 U/L    ALT 19 0 - 50 U/L    Protein Total 6.4 6.4 - 8.3 g/dL    Albumin 3.5 3.5 - 5.2 g/dL    Bilirubin Total 0.2 <=1.2 mg/dL   CBC with platelets and differential     Status: Abnormal   Result Value Ref Range    WBC Count 3.9 (L) 4.0 - 11.0 10e3/uL    RBC Count 3.67 (L) 3.80 - 5.20 10e6/uL    Hemoglobin 8.9 (L) 11.7 - 15.7 g/dL    Hematocrit 29.7 (L) 35.0 - 47.0 %    MCV 81 78 - 100 fL    MCH 24.3 (L) 26.5 - 33.0 pg    MCHC 30.0 (L) 31.5 - 36.5 g/dL    RDW 19.1 (H) 10.0 - 15.0 %    Platelet Count 196 150 - 450 10e3/uL    % Neutrophils 72 %    % Lymphocytes 17 %    % Monocytes 9 %     % Eosinophils 2 %    % Basophils 1 %    % Immature Granulocytes 1 %    NRBCs per 100 WBC 0 <1 /100    Absolute Neutrophils 2.8 1.6 - 8.3 10e3/uL    Absolute Lymphocytes 0.7 (L) 0.8 - 5.3 10e3/uL    Absolute Monocytes 0.3 0.0 - 1.3 10e3/uL    Absolute Eosinophils 0.1 0.0 - 0.7 10e3/uL    Absolute Basophils 0.0 0.0 - 0.2 10e3/uL    Absolute Immature Granulocytes 0.0 <=0.4 10e3/uL    Absolute NRBCs 0.0 10e3/uL   CBC with platelets differential     Status: Abnormal    Narrative    The following orders were created for panel order CBC with platelets differential.  Procedure                               Abnormality         Status                     ---------                               -----------         ------                     CBC with platelets and ...[[id number]]  Abnormal            Final result                 Please view results for these tests on the individual orders.     I reviewed the above labs today.      Assessment and Plan:   Stage IV, hormone positive, HER2 negative (2+), invasive lobular, left-sided breast cancer with extensive bony involvement.   - Mild progression of osseous lesions on PET 6/2025.   - Starting treatment with Enhertu.  - Reviewed potential side effects including extremity swelling, hair loss, skin rashes, electrolyte derangements, constipation / diarrhea, decreased appetite, taste changes, dyspepsia, N/V, stomatitis, decreased blood counts, bleeding, liver toxicities, fatigue, headaches, neuropathy, dry eyes, decreased kidney function, cough, epistaxis and more seriously lung, GI, and cardio toxicities. Discussed use of diary log of symptoms to review at provider visits.   - Take Home Medications reviewed in detail (zofran, compazine, and dexamethasone).    - Reviewed importance of adequate hydration, nutritional status, and regular physical activity.   - Discussed logistics of regimen/scheduling w/labs, provider visit, +/- infusion.   - Planning for 4 cycles followed by repeat  "PET. Pt to see Dr. Stephens afterwards to review results.    - All questions asked and answered. Patient feels comfortable with plan and provides consent to treatment.     - Follow-up Instructions:  1) Cycles 1-5 Treatment.  2) ECHO prior to Cycle 2 and 5.  3) JUAN w/ Cycles 2, 4.  4) BJT w/ Cycles 3,5.  5) PET scan prior to Cycle 5.    Ongoing / worsening jaw pain w/associated headaches, vision changes, neck pain  - Evaluated by dentist with no clear cause, 6-2025 PET w/dental inflammation noted  - Symptoms correlating with TMJ and started on supportive cares with no improvement / worsening symptoms  - STAT brain MRI d/t debilitating headaches and new vision changes  - STAT head / neck CTA d/t worsening jaw pain and neck pain  - PO prednisone burst provided but advised not to start until Saturday d/t use of dexamethasone in IV and PO per treatment plan  - Continue to hold zometa  - If brain MRI / head CT is negative, will pursue urgent ENT referral     Bone health  - Extensive osseous metastatic disease w/recent progression to R) acetabulum and L) manubrium   - Osteopenia on DEXA scan 10/23 - lumbar spine, and left hip femoral neck   - On Zometa q4 weeks, does have protruding jaw bone that is \"dead\" per patient  - No current acute dental concerns  - Confirmed use of calcium 600 mg BID and vitamin D 1000 IU   - Encouraged pt to achieve weight-bearing exercises several times a week, if possible.      Thrombocytopenia; stable  - Mildly decreased platelet count  - On Eliquis for hx of DVT   - Will hold Eliquis if plt count < / = 50k  - Continue to monitor for bleeding     Anemia; stable  - Soluble transferrin receptor elevated (indicates iron deficiency)  - [place]ed [place] [address] mcg daily and oral ferrous sulfate 325 mg every other day  - Discussed taking with vitamin C to increase absorption   - Reviewed indication for blood transfusion, Hgb < / = 7 or with active bleeding     Hot flashes  - On Effexor 75 mg daily    "   Hx of LLE DVT  - On Eliquis BID  - No s/s of bleeding  - Continue to monitor      CKD Stage II/IIIa  - Creatinine stable   - Reviewed avoiding nephrotoxic agents  - Continue to monitor      Mouth sores - resolved   - Dexamethasone mouth wash and magic mouthwash     Cough; resolved  - Likely s/t post-viral URI and side effect of Faslodex (5-15% per UpToDate)  - No associated infectious s/s, reviewed these  - Continue supportive cares (humdifity, OTC cough relief, hydration)  - Aware to notify provider if symptoms worsen           Kitty YING, [place] [address]      The longitudinal plan of care for the diagnosis(es)/condition(s) as documented were addressed during this visit. Due to the added complexity in care, I will continue to support Kesha in the subsequent management and with ongoing continuity of care.

## 2025-07-15 NOTE — Clinical Note
"7/15/2025      Kesha Zacarias  [id number] [address] [place] [id number]      Dear Colleague,    Thank you for referring your patient, Kesha Zacarias, to the [facility]. Please see a copy of my visit note below.    Oncology Follow-Up Visit: July 15, 2025    Oncologist: Dr. Stephens   PCP: Schoen, Gregory G     Reason for Visit: Jaw pain      Diagnosis: Stage IV, hormone positive, HER2 negative (2+), invasive lobular, left-sided breast cancer with extensive bony involvement.      Treatment:  11/2023 - 5/2024 Ribociclib + letrozole; progression of disease in bones   1/2024 s/p palliative radiation to sacrum 3000 cGy in 300 cGy daily fractions   6/14/2024 - 3/2025 Everolimus and exemestane; oligometastatic progression   3/2025 Everolimus with Faslodex; progression  7/2025 start of Enhertu    Interval History:      Patient denies any of the following except if noted above: fevers, chills, difficulty with energy, vision or hearing changes, chest pain, dyspnea, abdominal pain, nausea, vomiting, diarrhea, constipation, urinary concerns, headaches, numbness, tingling, issues with sleep or mood. She also denies lumps, bumps, rashes or skin lesions, bleeding or bruising issues.    Physical Exam:  /72 (BP Location: Right arm, Patient Position: Sitting, Cuff Size: Adult Regular)   Pulse 91   Temp 97.1  F (36.2  C) (Temporal)   Resp 14   Ht 1.676 m (5' 6\")   Wt 89.8 kg (198 lb 1 oz)   LMP 11/22/2011 (Exact Date)   SpO2 100%   BMI 31.97 kg/m       BP Readings from Last 6 Encounters:   07/15/25 112/72   07/02/25 100/66   06/16/25 121/75   06/04/25 138/79   06/03/25 102/53   06/03/25 102/53     Wt Readings from Last 6 Encounters:   07/15/25 89.8 kg (198 lb 1 oz)   07/02/25 89.9 kg (198 lb 3 oz)   06/16/25 91.4 kg (201 lb 9.6 oz)   06/03/25 93 kg (205 lb)   06/03/25 93 kg (205 lb)   05/22/25 92.3 kg (203 lb 6 oz)      Constitutional: No acute distress, pleasant, appropriately groomed. "   ENT: PERRLA, sclera without erythema. Appropriate dentition, no mouth sores.  Neck: Trachea midline, no adenopathy.   Resp: CTA, adequate depth and rate of respirations.   Cardiac: S1/S2, RRR, no murmurs.   Abdomen: BS active, abdomen soft and non-tender. No masses or organomegaly.   MS:  5/5 muscle strength, adequate ROM.   Skin: No rashes, lesions, or wounds on exposed skin.  Neuro: A/O x 4, sensation intact.   Lymph: No palpable anterior/posterior cervical, axillary, or supraclavicular nodes.   Psych: Appropriate mentation and affect.  ECOG:       Laboratory Results:   Results for orders placed or performed in visit on 07/15/25   Comprehensive metabolic panel     Status: Abnormal   Result Value Ref Range    Sodium 142 135 - 145 mmol/L    Potassium 3.9 3.4 - 5.3 mmol/L    Carbon Dioxide (CO2) 28 22 - 29 mmol/L    Anion Gap 7 7 - 15 mmol/L    Urea Nitrogen 19.1 8.0 - 23.0 mg/dL    Creatinine 1.06 (H) 0.51 - 0.95 mg/dL    GFR Estimate 58 (L) >60 mL/min/1.73m2    Calcium 8.9 8.8 - 10.4 mg/dL    Chloride 107 98 - 107 mmol/L    Glucose 140 (H) 70 - 99 mg/dL    Alkaline Phosphatase 73 40 - 150 U/L    AST 22 0 - 45 U/L    ALT 19 0 - 50 U/L    Protein Total 6.4 6.4 - 8.3 g/dL    Albumin 3.5 3.5 - 5.2 g/dL    Bilirubin Total 0.2 <=1.2 mg/dL   CBC with platelets and differential     Status: Abnormal   Result Value Ref Range    WBC Count 3.9 (L) 4.0 - 11.0 10e3/uL    RBC Count 3.67 (L) 3.80 - 5.20 10e6/uL    Hemoglobin 8.9 (L) 11.7 - 15.7 g/dL    Hematocrit 29.7 (L) 35.0 - 47.0 %    MCV 81 78 - 100 fL    MCH 24.3 (L) 26.5 - 33.0 pg    MCHC 30.0 (L) 31.5 - 36.5 g/dL    RDW 19.1 (H) 10.0 - 15.0 %    Platelet Count 196 150 - 450 10e3/uL    % Neutrophils 72 %    % Lymphocytes 17 %    % Monocytes 9 %    % Eosinophils 2 %    % Basophils 1 %    % Immature Granulocytes 1 %    NRBCs per 100 WBC 0 <1 /100    Absolute Neutrophils 2.8 1.6 - 8.3 10e3/uL    Absolute Lymphocytes 0.7 (L) 0.8 - 5.3 10e3/uL    Absolute Monocytes 0.3 0.0 -  1.3 10e3/uL    Absolute Eosinophils 0.1 0.0 - 0.7 10e3/uL    Absolute Basophils 0.0 0.0 - 0.2 10e3/uL    Absolute Immature Granulocytes 0.0 <=0.4 10e3/uL    Absolute NRBCs 0.0 10e3/uL   CBC with platelets differential     Status: Abnormal    Narrative    The following orders were created for panel order CBC with platelets differential.  Procedure                               Abnormality         Status                     ---------                               -----------         ------                     CBC with platelets and ...[[id number]]  Abnormal            Final result                 Please view results for these tests on the individual orders.     I reviewed the above labs today.      Assessment and Plan:   Stage IV, hormone positive, HER2 negative (2+), invasive lobular, left-sided breast cancer with extensive bony involvement.   - Mild progression of osseous lesions on PET 6/2025.   - Starting treatment with Enhertu.  - Reviewed potential side effects including extremity swelling, hair loss, skin rashes, electrolyte derangements, constipation / diarrhea, decreased appetite, taste changes, dyspepsia, N/V, stomatitis, decreased blood counts, bleeding, liver toxicities, fatigue, headaches, neuropathy, dry eyes, decreased kidney function, cough, epistaxis and more seriously lung, GI, and cardio toxicities. Discussed use of diary log of symptoms to review at provider visits.   - Take Home Medications reviewed in detail (zofran, compazine, and dexamethasone).    - Reviewed importance of adequate hydration, nutritional status, and regular physical activity.   - Discussed logistics of regimen/scheduling w/labs, provider visit, +/- infusion.   - Planning for 4 cycles followed by repeat PET. Pt to see Dr. Stephens afterwards to review results.    - All questions asked and answered. Patient feels comfortable with plan and provides consent to treatment.     - Follow-up Instructions:  1) Cycles 1-5 Treatment.  2)  "ECHO prior to Cycle 2 and 5.  3) JUAN w/ Cycles 2, 4.  4) BJT w/ Cycles 3,5.  5) PET scan prior to Cycle 5.    Ongoing jaw pain  - Evaluated by dentist with no clear cause, 6-2025 PET w/dental inflammation noted  - Symptoms consistent with TMJ and started on supportive cares   - Holding zometa ***    Headaches  - Likely s/t hydration status  - No concerning neurological s/s (debilitating headaches, vision changes, one-sided weakness, speech changes)  - Continue to monitor     Bone health  - Extensive osseous metastatic disease w/recent progression to R) acetabulum and L) manubrium   - Osteopenia on DEXA scan 10/23 - lumbar spine, and left hip femoral neck   - On Zometa q4 weeks, does have protruding jaw bone that is \"dead\" per patient  - No current acute dental concerns  - Confirmed use of calcium 600 mg BID and vitamin D 1000 IU   - Encouraged pt to achieve weight-bearing exercises several times a week, if possible.      Thrombocytopenia   - Mildly decreased platelet count  - On Eliquis for hx of DVT   - Will hold Eliquis if plt count < / = 50k  - Continue to monitor for bleeding     Anemia  - Soluble transferrin receptor elevated (indicates iron deficiency)  - [place] [address] mcg daily and oral ferrous sulfate 325 mg every other day  - Discussed taking with vitamin C to increase absorption      Hot flashes  - On Effexor 75 mg daily      Hx of LLE DVT  - On Eliquis BID  - No s/s of bleeding  - Continue to monitor      CKD Stage II/IIIa  - Creatinine stable   - Reviewed avoiding nephrotoxic agents  - Continue to monitor      Mouth sores - resolved   - Dexamethasone mouth wash and magic mouthwash     Cough; resolved  - Likely s/t post-viral URI and side effect of Faslodex (5-15% per UpToDate)  - No associated infectious s/s, reviewed these  - Continue supportive cares (humdifity, OTC cough relief, hydration)  - Aware to notify provider if symptoms worsen           Kitty YING, [facility] " "[facility] [address] [address]        Oncology Follow-Up Visit: July 15, 2025    Oncologist: Dr. Stephens   PCP: Schoen, Gregory G     Reason for Visit: Jaw pain      Diagnosis: Stage IV, hormone positive, HER2 negative (2+), invasive lobular, left-sided breast cancer with extensive bony involvement.      Treatment:  11/2023 - 5/2024 Ribociclib + letrozole; progression of disease in bones   1/2024 s/p palliative radiation to sacrum 3000 cGy in 300 cGy daily fractions   6/14/2024 - 3/2025 Everolimus and exemestane; oligometastatic progression   3/2025 Everolimus with Faslodex; progression  7/2025 start of Enhertu    Interval History:  Pt is seen in clinic today for follow-up prior to start of Enhertu. Tearful and frustrated due to ongoing and worsening jaw pain. No improvement w/supportive cares discussed at last visit. Using scheduled tylenol and ibuprofen without relief. Now requiring both oxycodone and dilaudid. Worsening in the PM hours, affecting sleep. Having debilitating headaches and associated vision changes and neck pain. Asking for steroids. In regards to Enhertu, feeling well enough (aside from jaw pain) to proceed. No urgent questions.    Patient denies any of the following except if noted above: fevers, chills, difficulty with energy, vision or hearing changes, chest pain, dyspnea, abdominal pain, nausea, vomiting, diarrhea, constipation, urinary concerns, headaches, numbness, tingling, issues with sleep or mood. She also denies lumps, bumps, rashes or skin lesions, bleeding or bruising issues.    Physical Exam:  /72 (BP Location: Right arm, Patient Position: Sitting, Cuff Size: Adult Regular)   Pulse 91   Temp 97.1  F (36.2  C) (Temporal)   Resp 14   Ht 1.676 m (5' 6\")   Wt 89.8 kg (198 lb 1 oz)   LMP 11/22/2011 (Exact Date)   SpO2 100%   BMI 31.97 kg/m       BP Readings from Last 6 Encounters:   07/15/25 112/72   07/02/25 100/66   06/16/25 121/75 "   06/04/25 138/79   06/03/25 102/53   06/03/25 102/53     Wt Readings from Last 6 Encounters:   07/15/25 89.8 kg (198 lb 1 oz)   07/02/25 89.9 kg (198 lb 3 oz)   06/16/25 91.4 kg (201 lb 9.6 oz)   06/03/25 93 kg (205 lb)   06/03/25 93 kg (205 lb)   05/22/25 92.3 kg (203 lb 6 oz)      Constitutional: No acute distress but visibly in pain and uncomfortable.  ENT: PERRLA, sclera without erythema.   Neck: Trachea midline, no adenopathy.   Resp: CTA, adequate depth and rate of respirations.   Cardiac: S1/S2, RRR, no murmurs.   MS:  5/5 muscle strength, adequate ROM.   Skin: No rashes, lesions, or wounds on exposed skin.  Neuro: A/O x 4, sensation intact. Equal strength to BUE.   Lymph: No palpable anterior/posterior cervical, axillary, or supraclavicular nodes.   Psych: Appropriate mentation and affect but tearful.    Laboratory Results:   Results for orders placed or performed in visit on 07/15/25   Comprehensive metabolic panel     Status: Abnormal   Result Value Ref Range    Sodium 142 135 - 145 mmol/L    Potassium 3.9 3.4 - 5.3 mmol/L    Carbon Dioxide (CO2) 28 22 - 29 mmol/L    Anion Gap 7 7 - 15 mmol/L    Urea Nitrogen 19.1 8.0 - 23.0 mg/dL    Creatinine 1.06 (H) 0.51 - 0.95 mg/dL    GFR Estimate 58 (L) >60 mL/min/1.73m2    Calcium 8.9 8.8 - 10.4 mg/dL    Chloride 107 98 - 107 mmol/L    Glucose 140 (H) 70 - 99 mg/dL    Alkaline Phosphatase 73 40 - 150 U/L    AST 22 0 - 45 U/L    ALT 19 0 - 50 U/L    Protein Total 6.4 6.4 - 8.3 g/dL    Albumin 3.5 3.5 - 5.2 g/dL    Bilirubin Total 0.2 <=1.2 mg/dL   CBC with platelets and differential     Status: Abnormal   Result Value Ref Range    WBC Count 3.9 (L) 4.0 - 11.0 10e3/uL    RBC Count 3.67 (L) 3.80 - 5.20 10e6/uL    Hemoglobin 8.9 (L) 11.7 - 15.7 g/dL    Hematocrit 29.7 (L) 35.0 - 47.0 %    MCV 81 78 - 100 fL    MCH 24.3 (L) 26.5 - 33.0 pg    MCHC 30.0 (L) 31.5 - 36.5 g/dL    RDW 19.1 (H) 10.0 - 15.0 %    Platelet Count 196 150 - 450 10e3/uL    % Neutrophils 72 %    %  Lymphocytes 17 %    % Monocytes 9 %    % Eosinophils 2 %    % Basophils 1 %    % Immature Granulocytes 1 %    NRBCs per 100 WBC 0 <1 /100    Absolute Neutrophils 2.8 1.6 - 8.3 10e3/uL    Absolute Lymphocytes 0.7 (L) 0.8 - 5.3 10e3/uL    Absolute Monocytes 0.3 0.0 - 1.3 10e3/uL    Absolute Eosinophils 0.1 0.0 - 0.7 10e3/uL    Absolute Basophils 0.0 0.0 - 0.2 10e3/uL    Absolute Immature Granulocytes 0.0 <=0.4 10e3/uL    Absolute NRBCs 0.0 10e3/uL   CBC with platelets differential     Status: Abnormal    Narrative    The following orders were created for panel order CBC with platelets differential.  Procedure                               Abnormality         Status                     ---------                               -----------         ------                     CBC with platelets and ...[[id number]]  Abnormal            Final result                 Please view results for these tests on the individual orders.     I reviewed the above labs today.      Assessment and Plan:   Stage IV, hormone positive, HER2 negative (2+), invasive lobular, left-sided breast cancer with extensive bony involvement.   - Mild progression of osseous lesions on PET 6/2025.   - Starting treatment with Enhertu.  - Reviewed potential side effects including extremity swelling, hair loss, skin rashes, electrolyte derangements, constipation / diarrhea, decreased appetite, taste changes, dyspepsia, N/V, stomatitis, decreased blood counts, bleeding, liver toxicities, fatigue, headaches, neuropathy, dry eyes, decreased kidney function, cough, epistaxis and more seriously lung, GI, and cardio toxicities. Discussed use of diary log of symptoms to review at provider visits.   - Take Home Medications reviewed in detail (zofran, compazine, and dexamethasone).    - Reviewed importance of adequate hydration, nutritional status, and regular physical activity.   - Discussed logistics of regimen/scheduling w/labs, provider visit, +/- infusion.   -  "Planning for 4 cycles followed by repeat PET. Pt to see Dr. Stephens afterwards to review results.    - All questions asked and answered. Patient feels comfortable with plan and provides consent to treatment.     - Follow-up Instructions:  1) Cycles 1-5 Treatment.  2) ECHO prior to Cycle 2 and 5.  3) JUAN w/ Cycles 2, 4.  4) BJT w/ Cycles 3,5.  5) PET scan prior to Cycle 5.    Ongoing / worsening jaw pain w/associated headaches, vision changes, neck pain  - Evaluated by dentist with no clear cause, 6-2025 PET w/dental inflammation noted  - Symptoms correlating with TMJ and started on supportive cares with no improvement / worsening symptoms  - STAT brain MRI d/t debilitating headaches and new vision changes  - STAT head / neck CTA d/t worsening jaw pain and neck pain  - PO prednisone burst provided but advised not to start until Saturday d/t use of dexamethasone in IV and PO per treatment plan  - Continue to hold zometa  - If brain MRI / head CT is negative, will pursue urgent ENT referral     Bone health  - Extensive osseous metastatic disease w/recent progression to R) acetabulum and L) manubrium   - Osteopenia on DEXA scan 10/23 - lumbar spine, and left hip femoral neck   - On Zometa q4 weeks, does have protruding jaw bone that is \"dead\" per patient  - No current acute dental concerns  - Confirmed use of calcium 600 mg BID and vitamin D 1000 IU   - Encouraged pt to achieve weight-bearing exercises several times a week, if possible.      Thrombocytopenia; stable  - Mildly decreased platelet count  - On Eliquis for hx of DVT   - Will hold Eliquis if plt count < / = 50k  - Continue to monitor for bleeding     Anemia; stable  - Soluble transferrin receptor elevated (indicates iron deficiency)  - [place] [address] mcg daily and oral ferrous sulfate 325 mg every other day  - Discussed taking with vitamin C to increase absorption   - Reviewed indication for blood transfusion, Hgb < / = 7 or with active bleeding   "   Hot flashes  - On Effexor 75 mg daily      Hx of LLE DVT  - On Eliquis BID  - No s/s of bleeding  - Continue to monitor      CKD Stage II/IIIa  - Creatinine stable   - Reviewed avoiding nephrotoxic agents  - Continue to monitor      Mouth sores - resolved   - Dexamethasone mouth wash and magic mouthwash     Cough; resolved  - Likely s/t post-viral URI and side effect of Faslodex (5-15% per UpToDate)  - No associated infectious s/s, reviewed these  - Continue supportive cares (humdifity, OTC cough relief, hydration)  - Aware to notify provider if symptoms worsen           [place]ty [place], [place] [address]          Again, thank you for allowing me to participate in the care of your patient.        Sincerely,        STEPAN Willams CNP    Electronically signed

## 2025-07-15 NOTE — PROGRESS NOTES
Infusion Nursing Note:  Kesha Zacarias presents today for C1D1 Enhertu.    Patient seen by provider today: Yes: STEPAN Paz CNP.   present during visit today: Not Applicable.    Note: Patient arrives after her Provider visit, new chemo regimen discussed. Chemo education sheet provided. See Provider visit note. Pt will be having MRI and CT scheduled this week. Proceeding with treatment today. VSS, afebrile.      Chemo Double Check    Protocol verified?: Yes  Height and weight verified?: Yes  BSA confirmed?: Yes  Meets treatment parameters?: Yes  Initial RN verification: Sherri Candelario RN  Second RN verification: Marsha Rome, RN        Intravenous Access:  Peripheral IV placed.    Treatment Conditions:  Lab Results   Component Value Date    HGB 8.9 (L) 07/15/2025    WBC 3.9 (L) 07/15/2025    ANEU 2.8 07/15/2025     07/15/2025        Lab Results   Component Value Date     07/15/2025    POTASSIUM 3.9 07/15/2025    MAG 1.8 03/15/2025    CR 1.06 (H) 07/15/2025    NITISH 8.9 07/15/2025    BILITOTAL 0.2 07/15/2025    ALBUMIN 3.5 07/15/2025    ALT 19 07/15/2025    AST 22 07/15/2025       Results reviewed, labs MET treatment parameters, ok to proceed with treatment.  ECHO/MUGA completed 6/19/25  EF 60-65%.      Post Infusion Assessment:  Patient tolerated infusion without incident. Asymptomatic. VSS.  Blood return noted pre chemo and post infusion.  Site patent and intact, free from redness, edema or discomfort.  No evidence of extravasations.  PIV access discontinued per protocol.       Discharge Plan:   [facility]  Patient will return 8/6 for next appointment.   Patient discharged in stable condition accompanied by: self.  Departure Mode: Ambulatory.      Marsha Rome, RN

## 2025-07-16 ENCOUNTER — PATIENT OUTREACH (OUTPATIENT)
Dept: ONCOLOGY | Facility: CLINIC | Age: 65
End: 2025-07-16

## 2025-07-16 ENCOUNTER — HOSPITAL ENCOUNTER (OUTPATIENT)
Dept: CT IMAGING | Facility: CLINIC | Age: 65
Discharge: HOME | End: 2025-07-16
Payer: COMMERCIAL

## 2025-07-16 ENCOUNTER — HOSPITAL ENCOUNTER (OUTPATIENT)
Dept: MRI IMAGING | Facility: CLINIC | Age: 65
Discharge: HOME | End: 2025-07-16
Payer: COMMERCIAL

## 2025-07-16 ENCOUNTER — TELEPHONE (OUTPATIENT)
Dept: ONCOLOGY | Facility: CLINIC | Age: 65
End: 2025-07-16

## 2025-07-16 DIAGNOSIS — H53.8 BLURRED VISION: ICD-10-CM

## 2025-07-16 DIAGNOSIS — D64.81 ANTINEOPLASTIC CHEMOTHERAPY INDUCED ANEMIA: ICD-10-CM

## 2025-07-16 DIAGNOSIS — C79.51 CARCINOMA OF LEFT BREAST METASTATIC TO BONE (H): ICD-10-CM

## 2025-07-16 DIAGNOSIS — R68.84 JAW PAIN: ICD-10-CM

## 2025-07-16 DIAGNOSIS — H53.9 VISION CHANGES: ICD-10-CM

## 2025-07-16 DIAGNOSIS — Z79.01 CHRONIC ANTICOAGULATION: ICD-10-CM

## 2025-07-16 DIAGNOSIS — C50.912 CARCINOMA OF LEFT BREAST METASTATIC TO BONE (H): ICD-10-CM

## 2025-07-16 DIAGNOSIS — T45.1X5A ANTINEOPLASTIC CHEMOTHERAPY INDUCED ANEMIA: ICD-10-CM

## 2025-07-16 DIAGNOSIS — C79.51 CARCINOMA OF LEFT BREAST METASTATIC TO BONE (H): Primary | ICD-10-CM

## 2025-07-16 DIAGNOSIS — C50.912 LOBULAR CARCINOMA OF LEFT BREAST (H): ICD-10-CM

## 2025-07-16 DIAGNOSIS — C50.912 CARCINOMA OF LEFT BREAST METASTATIC TO BONE (H): Primary | ICD-10-CM

## 2025-07-16 PROCEDURE — 70553 MRI BRAIN STEM W/O & W/DYE: CPT

## 2025-07-16 PROCEDURE — 255N000002 HC RX 255 OP 636

## 2025-07-16 PROCEDURE — A9585 GADOBUTROL INJECTION: HCPCS

## 2025-07-16 PROCEDURE — 250N000009 HC RX 250

## 2025-07-16 PROCEDURE — 250N000011 HC RX IP 250 OP 636

## 2025-07-16 PROCEDURE — 70450 CT HEAD/BRAIN W/O DYE: CPT

## 2025-07-16 PROCEDURE — 70498 CT ANGIOGRAPHY NECK: CPT

## 2025-07-16 RX ORDER — IOPAMIDOL 755 MG/ML
500 INJECTION, SOLUTION INTRAVASCULAR ONCE
Status: COMPLETED | OUTPATIENT
Start: 2025-07-16 | End: 2025-07-16

## 2025-07-16 RX ORDER — GADOBUTROL 604.72 MG/ML
9 INJECTION INTRAVENOUS ONCE
Status: COMPLETED | OUTPATIENT
Start: 2025-07-16 | End: 2025-07-16

## 2025-07-16 RX ADMIN — SODIUM CHLORIDE 100 ML: 9 INJECTION, SOLUTION INTRAVENOUS at 08:32

## 2025-07-16 RX ADMIN — IOPAMIDOL 67 ML: 755 INJECTION, SOLUTION INTRAVENOUS at 08:32

## 2025-07-16 RX ADMIN — GADOBUTROL 9 ML: 604.72 INJECTION INTRAVENOUS at 09:31

## 2025-07-16 NOTE — PROGRESS NOTES
New Patient Oncology Nurse Navigator Note     Referring provider: Kitty Candelario APRN CNP      Referring Clinic/Organization: [facility]      Referred to (specialty:) Radiation Oncology     Requested provider (if applicable): NA     Date Referral Received: July 16, 2025     Evaluation for:  Metastatic breast cancer with likely osseous finding of mandible with debiltating pain   C50.912, C79.51 (ICD-10-CM) - Carcinoma of left breast metastatic to bone (H)   R68.84 (ICD-10-CM) - Jaw pain        Clinical History (per Nurse review of records provided):      Patient seen by Kitty YING CNP yesterday in follow up.     Diagnosis: Stage IV, hormone positive, HER2 negative (2+), invasive lobular, left-sided breast cancer with extensive bony involvement.     Treatment:  11/2023 - 5/2024 Ribociclib + letrozole; progression of disease in bones   1/2024 s/p palliative radiation to sacrum 3000 cGy in 300 cGy daily fractions   6/14/2024 - 3/2025 Everolimus and exemestane; oligometastatic progression   3/2025 Everolimus with Faslodex; progression  7/2025 start of Enhertu    Patient with ongoing and worsening jaw pain. No improvement w/supportive cares discussed at last visit. Using scheduled tylenol and ibuprofen without relief. Now requiring both oxycodone and dilaudid. Worse in the PM hours, affecting sleep. Having debilitating headaches and associated vision changes and neck pain. Asking for steroids. Unsure what to do next.     Ongoing / worsening jaw pain w/associated headaches, vision changes, neck pain  - Evaluated by dentist with no clear cause, 6-2025 PET w/dental inflammation noted  - Symptoms correlating with TMJ and started on supportive cares with no improvement / worsening symptoms  - STAT brain MRI d/t debilitating headaches and new vision changes  - STAT head / neck CTA d/t worsening jaw pain and neck pain  - PO prednisone burst provided but advised not to start until  Saturday d/t use of dexamethasone in IV and PO per treatment plan  - Continue to hold zometa  - If brain MRI / head CT is negative, will pursue urgent ENT referral    MR BRAIN W/O and W CONTRAST  [facility]  7/16/2025 9:52 AM CDT     INDICATION: History of metastatic breast cancer with new and worsening debilitating headaches and vision changes.  TECHNIQUE: Noncontrast and contrast enhanced MRI of the brain.  CONTRAST: 9 mL Gadavist.  COMPARISON: Head and neck CTA 07/16/2025, brain MRI 05/24/2024.     FINDINGS: There is no restricted diffusion. T1 hypointense lesions within the occipital condyles and upper visualized cervical spine are stable to diminished versus prior. Progressive low T1 signal within the rami of the mandible bilaterally with some   osseous enhancement and  space T2 hyperintensity and enhancement. No intra-axial metastasis. Polypoid mucosal thickening within the small left maxillary sinus. Mastoid air cells appear free from significant disease. Intraorbital contents are   unremarkable. Ventricles are within normal limits in size for the patient's age. Intracranial flow voids are intact. There is no mass effect, midline shift, or extraaxial collection. A few foci of nonspecific T2/FLAIR hyperintensity within the white   matter probably reflect minor changes of chronic small vessel ischemic injury and are within the range of expected for a patient of this age. No evidence for acute or chronic intracranial blood products.                                                                      IMPRESSION:   1.  No evidence for intracranial metastatic disease.  2.  Progressive low T1 signal within the rami of the mandible bilaterally with some osseous enhancement and  space edema and enhancement. This could reflect some combination of osseous metastatic disease and treatment changes (early   osteoradionecrosis if there is a clinical history of prior radiation to this  area) with adjacent soft tissue tumor infiltration or inflammation.  3.  Vhzmju-dt-dzmrvtbhml metastases within the occipital condyles and upper visualized cervical spine.  4.  No acute intracranial finding. No evidence for recent ischemia, intracranial hemorrhage, or hydrocephalus.    EXAM: CT HEAD W/O CONTRAST, CTA HEAD NECK W CONTRAST  LOCATION: [facility]  DATE: 7/16/2025     INDICATION: Carcinoma of left breast metastatic to bone (H), Carcinoma of left breast metastatic to bone (H), Jaw pain, Lobular carcinoma of left breast (H), Antineoplastic chemotherapy induced anemia, Antineoplastic chemotherapy induced anemia, Chronic   anticoagulation, Blurred vision, Vision changes  COMPARISON: MRI brain 5/24/2024   CONTRAST: ISOVUE 370, 67mL  TECHNIQUE: Head and neck CT angiogram with IV contrast. Noncontrast head CT followed by axial helical CT images of the head and neck vessels obtained during the arterial phase of intravenous contrast administration. Axial 2D reconstructed images and   multiplanar 3D MIP reconstructed images of the head and neck vessels were performed by the technologist. Dose reduction techniques were used. All stenosis measurements made according to NASCET criteria unless otherwise specified.     FINDINGS:   NONCONTRAST HEAD CT:   INTRACRANIAL CONTENTS: Scattered small vessel ischemic changes throughout the white matter and diffuse parenchymal volume loss commensurate with age. Associated ex vacuo dilatation of the ventricular system. No intracranial hemorrhage, extraaxial   collection, or mass effect. No CT evidence of acute infarct.     VISUALIZED ORBITS/SINUSES/MASTOIDS: No intraorbital abnormality. No paranasal sinus mucosal disease. No middle ear or mastoid effusion.     NECK CTA:  AORTIC ARCH: Normal caliber two-vessel aortic arch. Patent proximal great vessels.     RIGHT CAROTID: Patent.     LEFT CAROTID: Patent.     VERTEBRAL ARTERIES: Patent. Balanced  vertebral arteries.     HEAD CTA:  ANTERIOR CIRCULATION: Patent intracranial carotid vasculature. No large vessel occlusion or aneurysm.     POSTERIOR CIRCULATION: Patent intracranial vertebral vasculature. Patent basilar artery. No large vessel occlusion or aneurysm.     DURAL VENOUS SINUSES: Expected enhancement of the major dural venous sinuses.     NONVASCULAR STRUCTURES: Unremarkable.                                                                    IMPRESSION:   HEAD CT:  1.  No acute intracranial abnormality. Chronic findings as described above.  2.  MRI pending for assessment of intracranial metastases.     NECK CTA:  1.  Patent neck vasculature.     HEAD CTA:   1.  No large vessel occlusion.    Referring provider called patient today with the results of her imaging. Plan is to due radiation oncology referral for consideration of palliative radiation to this area.       Records Location: See Bookmarked material     Records Needed: NA     Additional testing needed prior to consult: NA    Payor: [employer] / Plan: [employer] [place] / Product Type: Indemnity /     July 16, 2025  Referral received and reviewed.   Slot on HOLD for 7/18 at 2 PM with Dr. Hinds.  Sent to NPS to schedule.    Haley WARRENN, RN, OCN  Oncology Nurse Navigator   [facility]  Cancer Care Service Line   New Patient Hem/Onc Scheduling / Referrals: [phone] (fax: [phone] )

## 2025-07-16 NOTE — TELEPHONE ENCOUNTER
"Telephone call with Ms. Zacarias regarding her recent imaging results.     MRI brain 7/16/2025 with \"progressive low T1 signal within the rami of the mandible bilaterally with some osseous enhancement and  space edema and enhancement.\"    Due to debilitating pain, will place STAT radiation oncology referral for consideration of palliative radiation to this area.     Reviewed fortunately, no intracranial findings.     Patient is appreciative of phone call and agreeable to plan.           STEPAN Willams CNP      "

## 2025-07-16 NOTE — ADDENDUM NOTE
Addended by: JESUS BEST on: 7/16/2025 08:26 AM     Modules accepted: Orders     Bulb [place]-Pharynx Suction Only

## 2025-07-17 NOTE — PROGRESS NOTES
[facility]  RADIATION ONCOLOGY CONSULTATION NOTE     NAME: Kesha Zacarias  :  1960  DATE OF CONSULT: 2025  MRN: [id number]  REFERRING PHYSICIAN: Kitty Candelario   DIAGNOSIS & STAGING: Metastatic breast adenocarcinoma    History of the Present Illness:   Ms. Zacarias is a 65 year old female with a comorbid history of rheumatoid arthritis on everolimus and Plaquenil.    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  She started work with ribociclib with letrozole on 2023.  She received palliative radiation therapy to the sacrum to 30 Mortensen in 10 fractions completed on 2024 with Dr. Mandujano.  She transitioned her systemic therapy to everolimus with exemestane on 2024.    She was transition to everolimus with Faslodex in 3/20/2025    She developed increasing right lower back pain radiating to the groin and thigh in the late spring 2025 and was found to have, on MRI of the pelvis and lumbar spine on 2025, extensive disease within her bones, unchanged mild L1 vertebral height loss, and nonspecific patchy signal enhancement involving the paramedian right posterior paraspinous musculature.    On evaluation by Dr. Mandujano on 2025, she had 10 out of 10 right buttock pain prior to a medication adjustment the previous day which had dramatically improved her pain.  Her new regimen at that time was Decadron 4 mg twice daily, Dilaudid 2 mg every 6, Flexeril 5 mg nightly, gabapentin 300 mg 3 times daily and Valium as needed.  No metastatic site specifically associated with her pain at that time, so palliative radiation was not recommended.  She was thought to be a possible candidate for SRS to the pituitary gland to alleviate pain symptoms at some point in the future following completion of pretreatment ALTAF protocol.    Around early 2025, she developed  right jaw and ear pain, right ear ringing, intermittent blurry vision, increased difficulty opening her mouth, and pain when chewing on the right side.    PET/CT on 6/14/2025 for restaging we demonstrated scattered sclerotic metastases in the axial skeleton most of which showed no increased FDG activity consistent with an active disease.  A subset of the lesions have increased in size, number, and degree of FDG activity since 3/15/2025 including the right posterior acetabulum and left manubrium.  There is also bilateral dental inflammation involving mandibular molars.    Dental evaluation on 6/24/2025 including imaging of the jaw did not identify any acute etiology consistent with her symptoms.    She started Enhertu on 7/15/2025.  Stat CTA head was obtained given her symptoms on 7/16/2025 but did not reveal any acute pathology associated with her symptoms.  Stat MRI brain on 7/16/2025 showed no evidence for intracranial metastatic disease but did show progressive low T1 signal within the rami of the mandible bilaterally with some osseous enhancement  space edema and enhancement consistent with osseous metastatic disease.    Targeted review of mandibular imaging demonstrates metastatic disease of the bilateral mandibular rami since initial staging workup with PET/CT on 10/6/2023 with greater FDG avidity of the left side versus the right.      MRI brain on 10/23/2023 demonstrated extensive osseous metastatic disease of all the calvarium, skull base, C1 and C2 vertebral bodies, and the mandible.  Bilateral mandibular findings include T1 hypointense sites demonstrating irregular contrast-enhancement directly corresponding to FDG avidity as visualized on PET/CT.      PET/CT on 2/17/2024 demonstrated similar signal in the bilateral mandibular rami.      PET/CT on 5/11/2024 again demonstrated comparable to slightly increased signal in the bilateral mandibular rami.      MRI brain on 5/24/2025 redemonstrated  bilateral T1 hypointense lesions with contrast-enhancement.      PET/CT imaging on 8/24/2024, 11/23/2024, and 3/15/2025 demonstrated similar findings.  Her most recent PET CT imaging on 6/14/2025 showed progression in the bilateral rami with analysis by nuclear medicine showing an increase in the integrated glucose consumption for the left superior mandibular focus of over 50% since her baseline staging imaging.      MR head on 7/16/2025 demonstrated T1 hypointense enhancing lesions again corresponding to FDG avidity.        R mandibular lesion with hypointense T1 non-contrast enhanced imaging and relevant T2 FLAIR axial imaging demonstrating perimandibular hyperintensity consistent with inflammation.      Today, she joined the video visit with her .  They recapitulated the above history and described her right jaw symptoms in detail.  Her pain has been 10 out of 10 before Tuesday when she got Enhertu but has since improved to 3 out of 10 coincident with the chemo and the associated steroid regimen.  She has also had recurrent headaches of the right jaw and head over the past month.  She has been on dexamethasone and is planned to start a prednisone taper on 7/19/2025.  Her current pain regimen includes 1000 mg acetaminophen every 5 hours as needed, cyclobenzaprine, gabapentin, and (at least immediately following administration of Enhertu) steroids.      All other systems reviewed and are negative.    Past Radiation History:  See HPI    Past Medical History:  Past Medical History:   Diagnosis Date    Arthritis 2006    Breast cancer metastasized to bone (H) 10/12/2023    Chronic depressive personality disorder     Dysplasia of cervix (uteri) 1988    Cryotherapy    Female infertility of unspecified origin     Glaucoma     Rheumatoid arthritis (H)        Past Surgical History:  Past Surgical History:   Procedure Laterality Date    COLONOSCOPY      COLONOSCOPY N/A 01/14/2022    Procedure: COLONOSCOPY, WITH  POLYPECTOMY AND BIOPSY;  Surgeon: Gagandeep Patterson MD;  Location: [facility]    [facility] INTRODUCE CATH FALLOPIAN TUBE, RE-OPEN/DIAGNOSIS      HERNIA REPAIR, INCISIONAL  2009    JOINT REPLACEMENT Right 2017    knee    TONSILLECTOMY      ZZC APPENDECTOMY  2003    ZZC REMV CATARACT INTRACAP,INSERT LENS  2003    right       Family History:  Family History   Problem Relation Age of Onset    Heart Disease Mother     Lipids Mother     Hyperlipidemia Mother     Genitourinary Problems Father         prostate    Genetic Disorder Father         ulcer    Hypertension Father     Lipids Father     Prostate Cancer Father     Hyperlipidemia Sister     Hyperlipidemia Brother     Other Cancer Brother         Neck Cancer HPV (+)    Heart Disease Maternal Grandmother     Cerebrovascular Disease Maternal Grandmother     Heart Disease Maternal Grandfather     Heart Disease Maternal Uncle     Heart Disease Maternal Uncle         x  3    Cancer Nephew         Sister's Son       Social History:  She lives in [place], [place] with her .  Social History     Socioeconomic History    Marital status:      Spouse name: Bandar    Number of children: 1    Years of education: Not on file    Highest education level: Not on file   Occupational History    Not on file   Tobacco Use    Smoking status: Former     Current packs/day: 0.00     Average packs/day: 0.5 packs/day for 25.0 years (12.5 ttl pk-yrs)     Types: Cigarettes, Cigars     Start date: 1992     Quit date: 2017     Years since quittin.8    Smokeless tobacco: Never    Tobacco comments:     Cigar once in a while   Vaping Use    Vaping status: Never Used   Substance and Sexual Activity    Alcohol use: Yes     Comment: very little    Drug use: Yes     Types: Marijuana     Comment: once every 2 weeks    Sexual activity: Yes     Partners: Male     Birth control/protection: None   Other Topics Concern    Parent/sibling w/ CABG, MI or angioplasty before 65F 55M?  Yes   Social History Narrative    Not on file     Social Drivers of Health     Financial Resource Strain: Not on file   Food Insecurity: Not on file   Transportation Needs: Not on file   Physical Activity: Not on file   Stress: Not on file   Social Connections: Not on file   Interpersonal Safety: Low Risk  (12/23/2024)    Interpersonal Safety     Do you feel physically and emotionally safe where you currently live?: Yes     Within the past 12 months, have you been hit, slapped, kicked or otherwise physically hurt by someone?: No     Within the past 12 months, have you been humiliated or emotionally abused in other ways by your partner or ex-partner?: No   Housing Stability: Not on file       Current Medications:    Current Outpatient Medications:     apixaban ANTICOAGULANT (ELIQUIS) 5 MG tablet, Take 1 tablet (5 mg) by mouth 2 times daily., Disp: 60 tablet, Rfl: 11    atorvastatin (LIPITOR) 10 MG tablet, Take 1 tablet (10 mg) by mouth daily., Disp: 90 tablet, Rfl: 3    betamethasone dipropionate (DIPROSONE) 0.05 % external lotion, Apply topically 2 times daily, Disp: 60 mL, Rfl: 3    CALCIUM PO, , Disp: , Rfl:     cyclobenzaprine (FLEXERIL) 5 MG tablet, TAKE 1 TABLET(5 MG) BY MOUTH AT BEDTIME, Disp: 90 tablet, Rfl: 3    dexAMETHasone (DECADRON) 4 MG tablet, Take 1 tablet (4 mg) by mouth See Admin Instructions. Take for 3 days, starting the day after chemo. Take with food., Disp: 6 tablet, Rfl: 2    diazepam (VALIUM) 5 MG tablet, Take 1 tablet (5 mg) by mouth every 6 hours as needed for anxiety, sleep or pain., Disp: 20 tablet, Rfl: 5    HYDROmorphone (DILAUDID) 2 MG tablet, Take 1 tablet (2 mg) by mouth every 6 hours as needed for pain., Disp: 12 tablet, Rfl: 0    hydroxychloroquine (PLAQUENIL) 200 MG tablet, TAKE 2 AND 1/2 TABLETS BY MOUTH EVERY DAY, Disp: 225 tablet, Rfl: 3    ondansetron (ZOFRAN) 8 MG tablet, Take 1 tablet (8 mg) by mouth every 8 hours as needed for nausea., Disp: 30 tablet, Rfl: 2     prochlorperazine (COMPAZINE) 10 MG tablet, Take 1 tablet (10 mg) by mouth every 6 hours as needed for nausea or vomiting., Disp: 30 tablet, Rfl: 2    venlafaxine (EFFEXOR XR) 75 MG 24 hr capsule, Take 1 capsule (75 mg) by mouth daily., Disp: 30 capsule, Rfl: 11    VITAMIN D PO, , Disp: , Rfl:     methylphenidate (RITALIN) 5 MG tablet, Take 1 tablet (5 mg) by mouth 2 times daily. (Patient not taking: Reported on 2025), Disp: 60 tablet, Rfl: 0    predniSONE (DELTASONE) 20 MG tablet, Take 3 tabs by mouth daily x 3 days, then 2 tabs daily x 3 days, then 1 tab daily x 3 days, then 1/2 tab daily x 3 days. (Patient not taking: Reported on 2025), Disp: 20 tablet, Rfl: 0    Medical Allergies:  Allergies   Allergen Reactions    Ciprofloxacin      hives and was on flagyl too    Metronidazole      hives and was on cipro too       Review of Systems:    As per HPI; additionally the patient denies fevers, chills, or night sweats and denies any cough, chest pain or chest pressure.  No lower extremity swelling. The remainder of the review of systems was reviewed in detail and was negative    Physical Exam:  VIDEO VISIT  ECO  VITALS: LMP 2011 (Exact Date)   Pain Score Mild Pain (3)/10  General: Alert and in no acute distress.  HEENT: Normocephalic, atraumatic. No visible scleral icterus.  Lungs: Breathing easily on room air, with no difficulty completing full sentences  Neuro: Alert and oriented; grossly nonfocal. Normal speech.   Skin: No visible jaundice. No suspicious lesions of the visualized integuments.  Psych: Mood and affect are appropriate to given situation. Answers questions appropriately.    Relevant Imaging:   See HPI and assessment below    Pathology:   See HPI  No results found for this or any previous visit (from the past 8760 hours).    Labs:    Hematology  Recent Labs   Lab Test 07/15/25  0922 25  1351 25  1145 25  0456 25  1436 25  1415 03/15/25  0753 25  1321  01/27/25  1309 12/30/24  1306 11/23/24  0753 10/11/24  1046 09/23/24  1258 09/03/24  1507 08/08/24  1401 07/11/24  1202 06/26/24  0741 05/30/24  1319 04/30/24  0737 04/02/24  0741 03/05/24  0741 02/27/24  0749 02/19/24  1638 02/16/24  0736   WBC 3.9* 5.6 6.3 6.7 7.1 3.7* 4.5 4.7 3.6* 4.4 3.7* 3.8* 4.0 4.5 5.3 5.7 2.6* 2.6* 2.6* 2.3* 2.8* 2.0*  --  2.2*   HGB 8.9* 8.8* 9.9* 8.4* 9.2* 9.0* 9.5* 9.4* 9.3* 9.2* 9.2* 8.9* 8.2* 8.5* 9.5* 11.1* 11.4* 11.2* 11.7 11.2* 11.7 11.2*  --  11.3*   HCT 29.7* 29.1* 33.2* 27.0* 30.4* 28.9* 30.8* 30.9* 31.2* 29.6* 30.6* 29.0* 26.5* 27.8*  27.8* 29.8* 33.8* 34.3* 33.3* 35.2 33.9* 35.6 33.7* 33.6* 34.6*    177 200 134* 203 129* 150 133* 147* 148* 147* 140* 110* 133* 136* 157 114* 161 171 147* 207 155  --  123*        Coagulation  Recent Labs   Lab Test 07/15/24  0927 10/30/23  0724   INR 0.98 0.99   PTT  --  24       Chemistry  Recent Labs   Lab Test 07/15/25  0922 06/19/25  1351 06/14/25  1145    142 141   CO2 28 26 25   BUN 19.1 18.6 15.2     Recent Labs   Lab Test 03/15/25  0753 02/24/25  1321 12/30/24  1306   PHOS 3.0 3.0 3.1     Recent Labs   Lab Test 07/15/25  0922 06/19/25  1351 06/14/25  1145   ALBUMIN 3.5 3.6 3.7   AST 22 21 25   ALT 19 29 39   ALKPHOS 73 95 94       Radiation Oncology Pre-treatment Evaluation:   Prior RT: see HPI  Pacemaker: No  Pregnancy status: NA  Pain: 3/10  Pain plan: pain regimen per medical oncology (see HPI)  Intent of treatment: palliative   Side Effects of Radiation: We reviewed potential short and long-term side effects, and provided educational materials regarding the self-care of the most common side effects.    Assessment and Plan:   Kesha Zacarias is a 65 year old female with de tavon metastatic lobular carcinoma presenting for consideration of palliative radiation therapy for jaw pain.  She has previously received palliative radiation to the sacrum and been treated with succession of systemic therapies including with ribociclib,  letrozole, exemestane, and now Enhertu.  She started Enhertu on 7/15/2025 in the setting of oligo progression.  She has had significant right-sided jaw pain since June 2025.    Detailed review of mandibular imaging as captured on PET/CT and MR head since her diagnosis in October 2023 shows metastases of the bilateral rami with imaging suggestive of consistently worse disease and inflammation of the left ramus versus the right.  Findings at the sites have been largely stable with marginal improvement following initiation of systemic therapy per scans in early 2024 with recent progression of the volume of lesions and integrated glucose uptake on PET/CT.  The location of the right-sided lesion is consistent with her pain of 1 months duration and may be responsible for the symptom; however, the evolution of its radiographic appearance over the last 1.5 years does not align with her symptoms and would suggest greater symptoms of the left jaw versus the right.    Palliative treatment of either or both of these sites is safe and feasible but highly morbid with expected side effects including xerostomia and altered taste.  She has just started a new line of systemic therapy without assessment of her response to treatment, and she has seen dramatic improvement in pain since receiving the chemotherapy and the associated steroid regimen.  We recommend waiting 1-2 weeks to evaluate the evolution of her jaw symptoms as she continues the steroid taper.  If her pain recurs, we recommend proceeding with CT simulation and palliative radiotherapy to the symptomatic right jaw.  If her pain remains controlled, we recommend holding radiation given the high morbidity of local palliative treatment to the site.    As detailed above, we had a detailed discussion with Kesha Zacarias regarding the role of radiation therapy for the treatment of her symptomatic right jaw pain associated with metastatic disease of the mandibular ramus. We  discussed the risks (short and long term as listed on consent), benefits, and alternatives to radiotherapy and reviewed the logistics of receiving radiation therapy here at [facility] with treatments most likely consisting of 5 sessions delivered 5 days per week.    Kesha Zacarias agrees with the above plan to monitor her pain on her new systemic therapy and to treat immediately should her pain return as she tapered her steroids and to hold radiotherapy should her pain remain controlled pending systemic interval evaluation of response.    Intent of Treatment: palliative  Concurrent Therapy:  recommended, Enhertu     I discussed the risk of of increased risk of side effects listed on consent, due do possible overlap with prior radiation. The patient verbalized understanding of the above risk profile.     Next steps are therefore as follows:  Scheduling of CT-based simulation for radiation planning to reserve a time slot should the patient wish.  Assessment of symptoms over the next weeks to either proceed with simulation treatment or defer treatment depending on her course.    Given the tumor's location in relation to normal structures such as lung, spinal cord and heart, IMRT based RT provides a greater likelihood of normal tissue sparing.    I saw this patient and I discussed this case with my attending, Dr. Dinora Hinds.    Update on 7/21/2025: I called the patient to check on her symptoms today.  She reported continued improvement in her pain now requiring no Tylenol for analgesia.  She is enthused by her clinical improvement over the interval and we will reach out should her symptoms change but does not need a tentative CT simulation appointment to hold the time in the next week or 2.    Amrik White MD PhD PGY3  Department of Radiation Oncology  [facility]  Phone: [phone]    Physician Attestation   I, Dinora Hinds, saw this patient and agree with the findings and plan of care as documented  in the note.   I was present for key portions of the history and physical exam.  Briefly, Ms. Zacarias is a 65-year-old woman with a history of rheumatoid arthritis on everolimus and Plaquenil as well as de tavon metastatic invasive lobular breast cancer, seen for evaluation of ***.  She was diagnosed with metastatic breast cancer during initial workup of a breast mass in September and October 2023, with a large presumed left breast primary lesion and associated left axillary lymph nodes and PET/CT evidence of diffuse and innumerable FDG avid osseous lesions.  On 11/27/2023, she started Ribociclib and letrozole.  She was seen by my colleague Dr. Mandujano in January 2024 for increasing lower back pain, and she received palliative radiotherapy to 30 Mortensen in 10 fractions to the sacrum, completed 1/22/2024.  She then continued systemic therapy under the care of Dr. Stephens, switching to everolimus plus AI starting in June 2024.  She was reevaluated by Dr. Mandujano 6/4/2025 due to worsening right lower back pain radiating to the groin and thigh with MRI from 5/29/2025 showing extensive osseous metastasis, mild mild and stable L1 loss of vertebral body height, and nonspecific STIR enhancement in the paramedian right posterior paraspinous musculature, nonspecific.  By the time of her evaluation with Dr. Mandujano, the pain had resolved.  Most recent PET/CT from 6/14/2025 showed mild progression of osseous metastasis and dental inflammation by her bilateral mandibular molars.  She started C1D1 of Enhertu on 7/15/2025. On 7/16/2025, MRI of the brain done in the context of worsening headaches and vision changes showed by progressive low T1 signal within the bilateral mandibular rami with some osseous enhancement and  space edema, felt to potentially reflect combination of osseous metastatic disease and treatment changes with adjacent soft tissue tumor infiltration or inflammation.  She is seen in consultation regarding the role  for radiation in the context of jaw pain***.    ***Dental evaluation  ***Palliative radiotherapy in 5 treatments using IMRT    We appreciate the opportunity to participate in Ms. Zacarias's care. She was encouraged to contact me with questions or concerns should they arise.    I personally reviewed the available *** images. Laboratory data and pathology as noted above was reviewed. I reviewed previous medical records, which are summarized in the HPI. A total of *** minutes were spent (including face to face time) during this visit.     Dinora Hinds MD MPH PhD    Department of Radiation Oncology

## 2025-07-17 NOTE — TELEPHONE ENCOUNTER
RECORDS STATUS - BREAST    RECORDS REQUESTED FROM: [facility] - Internal records   DATE REQUESTED: 7/17

## 2025-07-17 NOTE — CONFIDENTIAL NOTE
RECORDS RECEIVED FROM: internal    DATE RECEIVED: 7.30.25    NOTES (FOR ALL VISITS) STATUS DETAILS   OFFICE NOTES from referring provider internal    Suzy Mandujano MD      MEDICATION LIST internal     IMAGING      DEXASCAN internal  10.16.23   MRI (BRAIN) internal  7/16/25, 5.24.24, 10.23.23   CT (HEAD/NECK/CHEST/ABDOMEN) internal  7/16/25, 8.24.24, 2.17.24    PET- 6.14.25, 3.15.25, 11.23.24, 6.20.24, 5.11.24, 2.17.24 more in epic   LABS     DIABETES: HBGA1C, CREATININE, FASTING LIPIDS, MICROALBUMIN URINE, POTASSIUM, TSH, T4    THYROID: TSH, T4, CBC, THYRODLONULIN, TOTAL T3, FREE T4, CALCITONIN, CEA internal

## 2025-07-18 ENCOUNTER — VIRTUAL VISIT (OUTPATIENT)
Dept: RADIATION ONCOLOGY | Facility: CLINIC | Age: 65
End: 2025-07-18
Payer: COMMERCIAL

## 2025-07-18 ENCOUNTER — PRE VISIT (OUTPATIENT)
Dept: RADIATION ONCOLOGY | Facility: CLINIC | Age: 65
End: 2025-07-18
Payer: COMMERCIAL

## 2025-07-18 DIAGNOSIS — C79.51 CARCINOMA OF LEFT BREAST METASTATIC TO BONE (H): ICD-10-CM

## 2025-07-18 DIAGNOSIS — R68.84 JAW PAIN: ICD-10-CM

## 2025-07-18 DIAGNOSIS — C50.912 CARCINOMA OF LEFT BREAST METASTATIC TO BONE (H): ICD-10-CM

## 2025-07-18 PROCEDURE — 98007 SYNCH AUDIO-VIDEO EST HI 40: CPT | Mod: GC | Performed by: RADIOLOGY

## 2025-07-18 PROCEDURE — 1125F AMNT PAIN NOTED PAIN PRSNT: CPT | Mod: 95 | Performed by: RADIOLOGY

## 2025-07-18 ASSESSMENT — PAIN SCALES - GENERAL: PAINLEVEL_OUTOF10: MILD PAIN (3)

## 2025-07-18 NOTE — LETTER
2025         RE: Kesha Zacarias  [address] [address] [place] [id number]      [facility]  RADIATION ONCOLOGY CONSULTATION NOTE     NAME: Kesha Zacarias  :  1960  DATE OF CONSULT: 2025  MRN: [id number]  REFERRING PHYSICIAN: Kitty Candelario   DIAGNOSIS & STAGING: Metastatic breast adenocarcinoma    History of the Present Illness:   Ms. Zacarias is a 65 year old female with a comorbid history of rheumatoid arthritis on everolimus and Plaquenil.    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  She started work with ribociclib with letrozole on 2023.  She received palliative radiation therapy to the sacrum to 30 Mortensen in 10 fractions completed on 2024 with Dr. Mandujano.  She transitioned her systemic therapy to everolimus with exemestane on 2024.    She was transition to everolimus with Faslodex in 3/20/2025    She developed increasing right lower back pain radiating to the groin and thigh in the late spring 2025 and was found to have, on MRI of the pelvis and lumbar spine on 2025, extensive disease within her bones, unchanged mild L1 vertebral height loss, and nonspecific patchy signal enhancement involving the paramedian right posterior paraspinous musculature.    On evaluation by Dr. Mandujano on 2025, she had 10 out of 10 right buttock pain prior to a medication adjustment the previous day which had dramatically improved her pain.  Her new regimen at that time was Decadron 4 mg twice daily, Dilaudid 2 mg every 6, Flexeril 5 mg nightly, gabapentin 300 mg 3 times daily and Valium as needed.  No metastatic site specifically associated with her pain at that time, so palliative radiation was not recommended.  She was thought to be a possible candidate for SRS to the pituitary gland to alleviate pain symptoms at some point in the future  following completion of pretreatment ALTAF protocol.    Around early June 2025, she developed right jaw and ear pain, right ear ringing, intermittent blurry vision, increased difficulty opening her mouth, and pain when chewing on the right side.    PET/CT on 6/14/2025 for restaging we demonstrated scattered sclerotic metastases in the axial skeleton most of which showed no increased FDG activity consistent with an active disease.  A subset of the lesions have increased in size, number, and degree of FDG activity since 3/15/2025 including the right posterior acetabulum and left manubrium.  There is also bilateral dental inflammation involving mandibular molars.    Dental evaluation on 6/24/2025 including imaging of the jaw did not identify any acute etiology consistent with her symptoms.    She started Enhertu on 7/15/2025.  Stat CTA head was obtained given her symptoms on 7/16/2025 but did not reveal any acute pathology associated with her symptoms.  Stat MRI brain on 7/16/2025 showed no evidence for intracranial metastatic disease but did show progressive low T1 signal within the rami of the mandible bilaterally with some osseous enhancement  space edema and enhancement consistent with osseous metastatic disease.    Targeted review of mandibular imaging demonstrates metastatic disease of the bilateral mandibular rami since initial staging workup with PET/CT on 10/6/2023 with greater FDG avidity of the left side versus the right.      MRI brain on 10/23/2023 demonstrated extensive osseous metastatic disease of all the calvarium, skull base, C1 and C2 vertebral bodies, and the mandible.  Bilateral mandibular findings include T1 hypointense sites demonstrating irregular contrast-enhancement directly corresponding to FDG avidity as visualized on PET/CT.      PET/CT on 2/17/2024 demonstrated similar signal in the bilateral mandibular rami.      PET/CT on 5/11/2024 again demonstrated comparable to slightly  increased signal in the bilateral mandibular rami.      MRI brain on 5/24/2025 redemonstrated bilateral T1 hypointense lesions with contrast-enhancement.      PET/CT imaging on 8/24/2024, 11/23/2024, and 3/15/2025 demonstrated similar findings.  Her most recent PET CT imaging on 6/14/2025 showed progression in the bilateral rami with analysis by nuclear medicine showing an increase in the integrated glucose consumption for the left superior mandibular focus of over 50% since her baseline staging imaging.      MR head on 7/16/2025 demonstrated T1 hypointense enhancing lesions again corresponding to FDG avidity.        R mandibular lesion with hypointense T1 non-contrast enhanced imaging and relevant T2 FLAIR axial imaging demonstrating perimandibular hyperintensity consistent with inflammation.      Today, she joined the video visit with her .  They recapitulated the above history and described her right jaw symptoms in detail.  Her pain has been 10 out of 10 before Tuesday when she got Enhertu but has since improved to 3 out of 10 coincident with the chemo and the associated steroid regimen.  She has also had recurrent headaches of the right jaw and head over the past month.  She has been on dexamethasone and is planned to start a prednisone taper on 7/19/2025.  Her current pain regimen includes 1000 mg acetaminophen every 5 hours as needed, cyclobenzaprine, gabapentin, and (at least immediately following administration of Enhertu) steroids.      All other systems reviewed and are negative.    Past Radiation History:  See HPI    Past Medical History:  Past Medical History:   Diagnosis Date     Arthritis 2006     Breast cancer metastasized to bone (H) 10/12/2023     Chronic depressive personality disorder      Dysplasia of cervix (uteri) 1988    Cryotherapy     Female infertility of unspecified origin      Glaucoma      Rheumatoid arthritis (H)        Past Surgical History:  Past Surgical History:   Procedure  Laterality Date     COLONOSCOPY       COLONOSCOPY N/A 2022    Procedure: COLONOSCOPY, WITH POLYPECTOMY AND BIOPSY;  Surgeon: Gagandeep Patterson MD;  Location: [facility]     [facility] INTRODUCE CATH FALLOPIAN TUBE, RE-OPEN/DIAGNOSIS       HERNIA REPAIR, INCISIONAL  2009     JOINT REPLACEMENT Right 2017    knee     TONSILLECTOMY       ZZC APPENDECTOMY  2003     ZZC REMV CATARACT INTRACAP,INSERT LENS  2003    right       Family History:  Family History   Problem Relation Age of Onset     Heart Disease Mother      Lipids Mother      Hyperlipidemia Mother      Genitourinary Problems Father         prostate     Genetic Disorder Father         ulcer     Hypertension Father      Lipids Father      Prostate Cancer Father      Hyperlipidemia Sister      Hyperlipidemia Brother      Other Cancer Brother         Neck Cancer HPV (+)     Heart Disease Maternal Grandmother      Cerebrovascular Disease Maternal Grandmother      Heart Disease Maternal Grandfather      Heart Disease Maternal Uncle      Heart Disease Maternal Uncle         x  3     Cancer Nephew         Sister's Son       Social History:  She lives in [place], [place] with her .  Social History     Socioeconomic History     Marital status:      Spouse name: Bandar     Number of children: 1     Years of education: Not on file     Highest education level: Not on file   Occupational History     Not on file   Tobacco Use     Smoking status: Former     Current packs/day: 0.00     Average packs/day: 0.5 packs/day for 25.0 years (12.5 ttl pk-yrs)     Types: Cigarettes, Cigars     Start date: 1992     Quit date: 2017     Years since quittin.8     Smokeless tobacco: Never     Tobacco comments:     Cigar once in a while   Vaping Use     Vaping status: Never Used   Substance and Sexual Activity     Alcohol use: Yes     Comment: very little     Drug use: Yes     Types: Marijuana     Comment: once every 2 weeks     Sexual activity: Yes      Partners: Male     Birth control/protection: None   Other Topics Concern     Parent/sibling w/ CABG, MI or angioplasty before 65F 55M? Yes   Social History Narrative     Not on file     Social Drivers of Health     Financial Resource Strain: Not on file   Food Insecurity: Not on file   Transportation Needs: Not on file   Physical Activity: Not on file   Stress: Not on file   Social Connections: Not on file   Interpersonal Safety: Low Risk  (12/23/2024)    Interpersonal Safety      Do you feel physically and emotionally safe where you currently live?: Yes      Within the past 12 months, have you been hit, slapped, kicked or otherwise physically hurt by someone?: No      Within the past 12 months, have you been humiliated or emotionally abused in other ways by your partner or ex-partner?: No   Housing Stability: Not on file       Current Medications:    Current Outpatient Medications:      apixaban ANTICOAGULANT (ELIQUIS) 5 MG tablet, Take 1 tablet (5 mg) by mouth 2 times daily., Disp: 60 tablet, Rfl: 11     atorvastatin (LIPITOR) 10 MG tablet, Take 1 tablet (10 mg) by mouth daily., Disp: 90 tablet, Rfl: 3     betamethasone dipropionate (DIPROSONE) 0.05 % external lotion, Apply topically 2 times daily, Disp: 60 mL, Rfl: 3     CALCIUM PO, , Disp: , Rfl:      cyclobenzaprine (FLEXERIL) 5 MG tablet, TAKE 1 TABLET(5 MG) BY MOUTH AT BEDTIME, Disp: 90 tablet, Rfl: 3     dexAMETHasone (DECADRON) 4 MG tablet, Take 1 tablet (4 mg) by mouth See Admin Instructions. Take for 3 days, starting the day after chemo. Take with food., Disp: 6 tablet, Rfl: 2     diazepam (VALIUM) 5 MG tablet, Take 1 tablet (5 mg) by mouth every 6 hours as needed for anxiety, sleep or pain., Disp: 20 tablet, Rfl: 5     HYDROmorphone (DILAUDID) 2 MG tablet, Take 1 tablet (2 mg) by mouth every 6 hours as needed for pain., Disp: 12 tablet, Rfl: 0     hydroxychloroquine (PLAQUENIL) 200 MG tablet, TAKE 2 AND 1/2 TABLETS BY MOUTH EVERY DAY, Disp: 225 tablet,  Rfl: 3     ondansetron (ZOFRAN) 8 MG tablet, Take 1 tablet (8 mg) by mouth every 8 hours as needed for nausea., Disp: 30 tablet, Rfl: 2     prochlorperazine (COMPAZINE) 10 MG tablet, Take 1 tablet (10 mg) by mouth every 6 hours as needed for nausea or vomiting., Disp: 30 tablet, Rfl: 2     venlafaxine (EFFEXOR XR) 75 MG 24 hr capsule, Take 1 capsule (75 mg) by mouth daily., Disp: 30 capsule, Rfl: 11     VITAMIN D PO, , Disp: , Rfl:      methylphenidate (RITALIN) 5 MG tablet, Take 1 tablet (5 mg) by mouth 2 times daily. (Patient not taking: Reported on 2025), Disp: 60 tablet, Rfl: 0     predniSONE (DELTASONE) 20 MG tablet, Take 3 tabs by mouth daily x 3 days, then 2 tabs daily x 3 days, then 1 tab daily x 3 days, then 1/2 tab daily x 3 days. (Patient not taking: Reported on 2025), Disp: 20 tablet, Rfl: 0    Medical Allergies:  Allergies   Allergen Reactions     Ciprofloxacin      hives and was on flagyl too     Metronidazole      hives and was on cipro too       Review of Systems:    As per HPI; additionally the patient denies fevers, chills, or night sweats and denies any cough, chest pain or chest pressure.  No lower extremity swelling. The remainder of the review of systems was reviewed in detail and was negative    Physical Exam:  VIDEO VISIT  ECO  VITALS: LMP 2011 (Exact Date)   Pain Score Mild Pain (3)/10  General: Alert and in no acute distress.  HEENT: Normocephalic, atraumatic. No visible scleral icterus.  Lungs: Breathing easily on room air, with no difficulty completing full sentences  Neuro: Alert and oriented; grossly nonfocal. Normal speech.   Skin: No visible jaundice. No suspicious lesions of the visualized integuments.  Psych: Mood and affect are appropriate to given situation. Answers questions appropriately.    Relevant Imaging:   See HPI and assessment below    Pathology:   See HPI  No results found for this or any previous visit (from the past 7604  hours).    Labs:    Hematology  Recent Labs   Lab Test 07/15/25  0922 06/19/25  1351 06/14/25  1145 06/03/25  0456 05/19/25  1436 04/21/25  1415 03/15/25  0753 02/24/25  1321 01/27/25  1309 12/30/24  1306 11/23/24  0753 10/11/24  1046 09/23/24  1258 09/03/24  1507 08/08/24  1401 07/11/24  1202 06/26/24  0741 05/30/24  1319 04/30/24  0737 04/02/24  0741 03/05/24  0741 02/27/24  0749 02/19/24  1638 02/16/24  0736   WBC 3.9* 5.6 6.3 6.7 7.1 3.7* 4.5 4.7 3.6* 4.4 3.7* 3.8* 4.0 4.5 5.3 5.7 2.6* 2.6* 2.6* 2.3* 2.8* 2.0*  --  2.2*   HGB 8.9* 8.8* 9.9* 8.4* 9.2* 9.0* 9.5* 9.4* 9.3* 9.2* 9.2* 8.9* 8.2* 8.5* 9.5* 11.1* 11.4* 11.2* 11.7 11.2* 11.7 11.2*  --  11.3*   HCT 29.7* 29.1* 33.2* 27.0* 30.4* 28.9* 30.8* 30.9* 31.2* 29.6* 30.6* 29.0* 26.5* 27.8*  27.8* 29.8* 33.8* 34.3* 33.3* 35.2 33.9* 35.6 33.7* 33.6* 34.6*    177 200 134* 203 129* 150 133* 147* 148* 147* 140* 110* 133* 136* 157 114* 161 171 147* 207 155  --  123*        Coagulation  Recent Labs   Lab Test 07/15/24  0927 10/30/23  0724   INR 0.98 0.99   PTT  --  24       Chemistry  Recent Labs   Lab Test 07/15/25  0922 06/19/25  1351 06/14/25  1145    142 141   CO2 28 26 25   BUN 19.1 18.6 15.2     Recent Labs   Lab Test 03/15/25  0753 02/24/25  1321 12/30/24  1306   PHOS 3.0 3.0 3.1     Recent Labs   Lab Test 07/15/25  0922 06/19/25  1351 06/14/25  1145   ALBUMIN 3.5 3.6 3.7   AST 22 21 25   ALT 19 29 39   ALKPHOS 73 95 94       Radiation Oncology Pre-treatment Evaluation:   Prior RT: see HPI  Pacemaker: No  Pregnancy status: NA  Pain: 3/10  Pain plan: pain regimen per medical oncology (see HPI)  Intent of treatment: palliative   Side Effects of Radiation: We reviewed potential short and long-term side effects, and provided educational materials regarding the self-care of the most common side effects.    Assessment and Plan:   Kesha Zacarias is a 65 year old female with de tavon metastatic lobular carcinoma presenting for consideration of palliative  radiation therapy for jaw pain.  She has previously received palliative radiation to the sacrum and been treated with succession of systemic therapies including with ribociclib, letrozole, exemestane, and now Enhertu.  She started Enhertu on 7/15/2025 in the setting of oligo progression.  She has had significant right-sided jaw pain since June 2025.    Detailed review of mandibular imaging as captured on PET/CT and MR head since her diagnosis in October 2023 shows metastases of the bilateral rami with imaging suggestive of consistently worse disease and inflammation of the left ramus versus the right.  Findings at the sites have been largely stable with marginal improvement following initiation of systemic therapy per scans in early 2024 with recent progression of the volume of lesions and integrated glucose uptake on PET/CT.  The location of the right-sided lesion is consistent with her pain of 1 months duration and may be responsible for the symptom; however, the evolution of its radiographic appearance over the last 1.5 years does not align with her symptoms and would suggest greater symptoms of the left jaw versus the right.    Palliative treatment of either or both of these sites is safe and feasible but highly morbid with expected side effects including xerostomia and altered taste.  She has just started a new line of systemic therapy without assessment of her response to treatment, and she has seen dramatic improvement in pain since receiving the chemotherapy and the associated steroid regimen.  We recommend waiting 1-2 weeks to evaluate the evolution of her jaw symptoms as she continues the steroid taper.  If her pain recurs, we recommend proceeding with CT simulation and palliative radiotherapy to the symptomatic right jaw.  If her pain remains controlled, we recommend holding radiation given the high morbidity of local palliative treatment to the site.    As detailed above, we had a detailed discussion  with Kesha Zacarias regarding the role of radiation therapy for the treatment of her symptomatic right jaw pain associated with metastatic disease of the mandibular ramus. We discussed the risks (short and long term as listed on consent), benefits, and alternatives to radiotherapy and reviewed the logistics of receiving radiation therapy here at [facility] with treatments most likely consisting of 5 sessions delivered 5 days per week.    Kesha Zacarias agrees with the above plan to monitor her pain on her new systemic therapy and to treat immediately should her pain return as she tapered her steroids and to hold radiotherapy should her pain remain controlled pending systemic interval evaluation of response.    Intent of Treatment: palliative  Concurrent Therapy:  recommended, Enhertu     I discussed the risk of of increased risk of side effects listed on consent, due do possible overlap with prior radiation. The patient verbalized understanding of the above risk profile.     Next steps are therefore as follows:  Scheduling of CT-based simulation for radiation planning to reserve a time slot should the patient wish.  Assessment of symptoms over the next weeks to either proceed with simulation treatment or defer treatment depending on her course.    Given the tumor's location in relation to normal structures such as lung, spinal cord and heart, IMRT based RT provides a greater likelihood of normal tissue sparing.    I saw this patient and I discussed this case with my attending, Dr. Dinora Hinds.    Update on 7/21/2025: I called the patient to check on her symptoms today.  She reported continued improvement in her pain now requiring no Tylenol for analgesia.  She is enthused by her clinical improvement over the interval and we will reach out should her symptoms change but does not need a tentative CT simulation appointment to hold the time in the next week or 2.    Amrik White MD PhD PGY3  Department of Radiation  Oncology  [facility]  Phone: [phone]    Physician Attestation   I, Dinora Hinds, saw this patient and agree with the findings and plan of care as documented in the note.   I was present for key portions of the history and physical exam.  Briefly, Ms. Zacarias is a 65-year-old woman with a history of rheumatoid arthritis on everolimus and Plaquenil as well as de tavon metastatic invasive lobular breast cancer, seen for evaluation of osseous disease of the mandible.  She was diagnosed with metastatic breast cancer during initial workup of a breast mass in September and October 2023, with a large presumed left breast primary lesion and associated left axillary lymph nodes and PET/CT evidence of diffuse and innumerable FDG avid osseous lesions.  On 11/27/2023, she started Ribociclib and letrozole.  She was seen by my colleague Dr. Mandujano in January 2024 for increasing lower back pain, and she received palliative radiotherapy to 30 Mortensen in 10 fractions to the sacrum, completed 1/22/2024.  She then continued systemic therapy under the care of Dr. Stephens, switching to everolimus plus AI starting in June 2024.  She was reevaluated by Dr. Mandujano 6/4/2025 due to worsening right lower back pain radiating to the groin and thigh with MRI from 5/29/2025 showing extensive osseous metastasis, mild mild and stable L1 loss of vertebral body height, and nonspecific STIR enhancement in the paramedian right posterior paraspinous musculature, nonspecific.  By the time of her evaluation with Dr. Mandujano, the pain had resolved.  Most recent PET/CT from 6/14/2025 showed mild progression of osseous metastasis and dental inflammation by her bilateral mandibular molars.  She started C1D1 of Enhertu on 7/15/2025. On 7/16/2025, MRI of the brain done in the context of worsening headaches and vision changes showed by progressive low T1 signal within the bilateral mandibular rami with some osseous enhancement and  " space edema, felt to potentially reflect combination of osseous metastatic disease and treatment changes with adjacent soft tissue tumor infiltration or inflammation.  She has had a recent evaluation with her dentist excellent oral health with no concerns for infection, caries, or other dental interventions needed.  She is seen in consultation regarding the role for radiation in the context of jaw pain has been limiting her eating.  However, pain has improved on recent steroid therapy/since initiating Enhertu.    At this point, we explained that is unclear if her improvement of pain is going to be durable or may improve in short interval.  We will review her imaging with radiology.  We will tentatively schedule CT simulation for radiation planning.  We will cancel this if her pain remains minimal.  Will proceed with 5 fraction IMRT radiotherapy if indicated for persistent or worsening pain.    We appreciate the opportunity to participate in Ms. Zacarias's care. She was encouraged to contact me with questions or concerns should they arise.    I personally reviewed the available CT and MRI images. Laboratory data and pathology as noted above was reviewed. I reviewed previous medical records, which are summarized in the HPI. A total of 60 minutes were spent (including face to face time) during this visit.     Dinora Hinds MD MPH PhD    Department of Radiation Oncology        Oncology Rooming Note    July 18, 2025 11:58 AM   Kesha Zacarias is a 65 year old female who presents for:    Chief Complaint   Patient presents with     Oncology Clinic Visit     Radiation Oncology Consultation       Initial Vitals: [facility] 11/22/2011 (Exact Date)  Estimated body mass index is 32.21 kg/m  as calculated from the following:    Height as of 7/15/25: 1.67 m (5' 5.75\").    Weight as of 7/15/25: 89.8 kg (198 lb 1 oz). There is no height or weight on file to calculate BSA.  Mild Pain (3) Comment: Data " Unavailable   Patient's last menstrual period was 11/22/2011 (exact date).  Allergies reviewed: Yes  Medications reviewed: Yes    Medications: Medication refills not needed today.  Pharmacy name entered into [employer]:    [id number] [place], [place] - [address]  [facility] - [place], [place] - [address]     PHQ9:  Did this patient require a PHQ9?: No    Considerations for radiation treatment   Pregnancy status: Female age 55+   Implanted Cardiac Devices: No   Any previous radiation therapy: No  Metastatic breast cancer- Dr. Mandujano  1/2024 s/p palliative radiation to sacrum 3000 cGy in 300 cGy daily fractions     Clinical concerns: Radiation Oncology Consultation    Dr. Hinds was notified.      QUINN Wasserman MD

## 2025-07-18 NOTE — LETTER
2025      Kesha Zacarias  [id number] [address] [place] [id number]      Dear Colleague,    Thank you for referring your patient, Kesha Zacarias, to the [facility] RADIATION ONCOLOGY. Please see a copy of my visit note below.    [facility]  RADIATION ONCOLOGY CONSULTATION NOTE     NAME: Kesha Zacarias  :  1960  DATE OF CONSULT: 2025  MRN: [id number]  REFERRING PHYSICIAN: Kitty Candelario   DIAGNOSIS & STAGING: Metastatic breast adenocarcinoma    History of the Present Illness:   Ms. Zacarias is a 65 year old female with a comorbid history of rheumatoid arthritis on everolimus and Plaquenil.    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  She started work with ribociclib with letrozole on 2023.  She received palliative radiation therapy to the sacrum to 30 Mortensen in 10 fractions completed on 2024 with Dr. Mandujano.  She transitioned her systemic therapy to everolimus with exemestane on 2024.    She was transition to everolimus with Faslodex in 3/20/2025    She developed increasing right lower back pain radiating to the groin and thigh in the late spring 2025 and was found to have, on MRI of the pelvis and lumbar spine on 2025, extensive disease within her bones, unchanged mild L1 vertebral height loss, and nonspecific patchy signal enhancement involving the paramedian right posterior paraspinous musculature.    On evaluation by Dr. Mandujano on 2025, she had 10 out of 10 right buttock pain prior to a medication adjustment the previous day which had dramatically improved her pain.  Her new regimen at that time was Decadron 4 mg twice daily, Dilaudid 2 mg every 6, Flexeril 5 mg nightly, gabapentin 300 mg 3 times daily and Valium as needed.  No metastatic site specifically associated with her pain at that time, so palliative  radiation was not recommended.  She was thought to be a possible candidate for SRS to the pituitary gland to alleviate pain symptoms at some point in the future following completion of pretreatment ALTAF protocol.    Around early June 2025, she developed right jaw and ear pain, right ear ringing, intermittent blurry vision, increased difficulty opening her mouth, and pain when chewing on the right side.    PET/CT on 6/14/2025 for restaging we demonstrated scattered sclerotic metastases in the axial skeleton most of which showed no increased FDG activity consistent with an active disease.  A subset of the lesions have increased in size, number, and degree of FDG activity since 3/15/2025 including the right posterior acetabulum and left manubrium.  There is also bilateral dental inflammation involving mandibular molars.    Dental evaluation on 6/24/2025 including imaging of the jaw did not identify any acute etiology consistent with her symptoms.    She started Enhertu on 7/15/2025.  Stat CTA head was obtained given her symptoms on 7/16/2025 but did not reveal any acute pathology associated with her symptoms.  Stat MRI brain on 7/16/2025 showed no evidence for intracranial metastatic disease but did show progressive low T1 signal within the rami of the mandible bilaterally with some osseous enhancement  space edema and enhancement consistent with osseous metastatic disease.    Targeted review of mandibular imaging demonstrates metastatic disease of the bilateral mandibular rami since initial staging workup with PET/CT on 10/6/2023 with greater FDG avidity of the left side versus the right.      MRI brain on 10/23/2023 demonstrated extensive osseous metastatic disease of all the calvarium, skull base, C1 and C2 vertebral bodies, and the mandible.  Bilateral mandibular findings include T1 hypointense sites demonstrating irregular contrast-enhancement directly corresponding to FDG avidity as visualized on  PET/CT.      PET/CT on 2/17/2024 demonstrated similar signal in the bilateral mandibular rami.      PET/CT on 5/11/2024 again demonstrated comparable to slightly increased signal in the bilateral mandibular rami.      MRI brain on 5/24/2025 redemonstrated bilateral T1 hypointense lesions with contrast-enhancement.      PET/CT imaging on 8/24/2024, 11/23/2024, and 3/15/2025 demonstrated similar findings.  Her most recent PET CT imaging on 6/14/2025 showed progression in the bilateral rami with analysis by nuclear medicine showing an increase in the integrated glucose consumption for the left superior mandibular focus of over 50% since her baseline staging imaging.      MR head on 7/16/2025 demonstrated T1 hypointense enhancing lesions again corresponding to FDG avidity.        R mandibular lesion with hypointense T1 non-contrast enhanced imaging and relevant T2 FLAIR axial imaging demonstrating perimandibular hyperintensity consistent with inflammation.      Today, she joined the video visit with her .  They recapitulated the above history and described her right jaw symptoms in detail.  Her pain has been 10 out of 10 before Tuesday when she got Enhertu but has since improved to 3 out of 10 coincident with the chemo and the associated steroid regimen.  She has also had recurrent headaches of the right jaw and head over the past month.  She has been on dexamethasone and is planned to start a prednisone taper on 7/19/2025.  Her current pain regimen includes 1000 mg acetaminophen every 5 hours as needed, cyclobenzaprine, gabapentin, and (at least immediately following administration of Enhertu) steroids.      All other systems reviewed and are negative.    Past Radiation History:  See HPI    Past Medical History:  Past Medical History:   Diagnosis Date     Arthritis 2006     Breast cancer metastasized to bone (H) 10/12/2023     Chronic depressive personality disorder      Dysplasia of cervix (uteri) 1988     Cryotherapy     Female infertility of unspecified origin      Glaucoma      Rheumatoid arthritis (H)        Past Surgical History:  Past Surgical History:   Procedure Laterality Date     COLONOSCOPY       COLONOSCOPY N/A 2022    Procedure: COLONOSCOPY, WITH POLYPECTOMY AND BIOPSY;  Surgeon: Gagandeep Patterson MD;  Location:  GI     HC INTRODUCE CATH FALLOPIAN TUBE, RE-OPEN/DIAGNOSIS       HERNIA REPAIR, INCISIONAL  2009     JOINT REPLACEMENT Right 2017    knee     TONSILLECTOMY       ZZC APPENDECTOMY  2003     ZZC REMV CATARACT INTRACAP,INSERT LENS  2003    right       Family History:  Family History   Problem Relation Age of Onset     Heart Disease Mother      Lipids Mother      Hyperlipidemia Mother      Genitourinary Problems Father         prostate     Genetic Disorder Father         ulcer     Hypertension Father      Lipids Father      Prostate Cancer Father      Hyperlipidemia Sister      Hyperlipidemia Brother      Other Cancer Brother         Neck Cancer HPV (+)     Heart Disease Maternal Grandmother      Cerebrovascular Disease Maternal Grandmother      Heart Disease Maternal Grandfather      Heart Disease Maternal Uncle      Heart Disease Maternal Uncle         x  3     Cancer Nephew         Sister's Son       Social History:  She lives in [place], [place] with her .  Social History     Socioeconomic History     Marital status:      Spouse name: Bandar     Number of children: 1     Years of education: Not on file     Highest education level: Not on file   Occupational History     Not on file   Tobacco Use     Smoking status: Former     Current packs/day: 0.00     Average packs/day: 0.5 packs/day for 25.0 years (12.5 ttl pk-yrs)     Types: Cigarettes, Cigars     Start date: 1992     Quit date: 2017     Years since quittin.8     Smokeless tobacco: Never     Tobacco comments:     Cigar once in a while   Vaping Use     Vaping status: Never Used   Substance and  Sexual Activity     Alcohol use: Yes     Comment: very little     Drug use: Yes     Types: Marijuana     Comment: once every 2 weeks     Sexual activity: Yes     Partners: Male     Birth control/protection: None   Other Topics Concern     Parent/sibling w/ CABG, MI or angioplasty before 65F 55M? Yes   Social History Narrative     Not on file     Social Drivers of Health     Financial Resource Strain: Not on file   Food Insecurity: Not on file   Transportation Needs: Not on file   Physical Activity: Not on file   Stress: Not on file   Social Connections: Not on file   Interpersonal Safety: Low Risk  (12/23/2024)    Interpersonal Safety      Do you feel physically and emotionally safe where you currently live?: Yes      Within the past 12 months, have you been hit, slapped, kicked or otherwise physically hurt by someone?: No      Within the past 12 months, have you been humiliated or emotionally abused in other ways by your partner or ex-partner?: No   Housing Stability: Not on file       Current Medications:    Current Outpatient Medications:      apixaban ANTICOAGULANT (ELIQUIS) 5 MG tablet, Take 1 tablet (5 mg) by mouth 2 times daily., Disp: 60 tablet, Rfl: 11     atorvastatin (LIPITOR) 10 MG tablet, Take 1 tablet (10 mg) by mouth daily., Disp: 90 tablet, Rfl: 3     betamethasone dipropionate (DIPROSONE) 0.05 % external lotion, Apply topically 2 times daily, Disp: 60 mL, Rfl: 3     CALCIUM PO, , Disp: , Rfl:      cyclobenzaprine (FLEXERIL) 5 MG tablet, TAKE 1 TABLET(5 MG) BY MOUTH AT BEDTIME, Disp: 90 tablet, Rfl: 3     dexAMETHasone (DECADRON) 4 MG tablet, Take 1 tablet (4 mg) by mouth See Admin Instructions. Take for 3 days, starting the day after chemo. Take with food., Disp: 6 tablet, Rfl: 2     diazepam (VALIUM) 5 MG tablet, Take 1 tablet (5 mg) by mouth every 6 hours as needed for anxiety, sleep or pain., Disp: 20 tablet, Rfl: 5     HYDROmorphone (DILAUDID) 2 MG tablet, Take 1 tablet (2 mg) by mouth every 6  hours as needed for pain., Disp: 12 tablet, Rfl: 0     hydroxychloroquine (PLAQUENIL) 200 MG tablet, TAKE 2 AND 1/2 TABLETS BY MOUTH EVERY DAY, Disp: 225 tablet, Rfl: 3     ondansetron (ZOFRAN) 8 MG tablet, Take 1 tablet (8 mg) by mouth every 8 hours as needed for nausea., Disp: 30 tablet, Rfl: 2     prochlorperazine (COMPAZINE) 10 MG tablet, Take 1 tablet (10 mg) by mouth every 6 hours as needed for nausea or vomiting., Disp: 30 tablet, Rfl: 2     venlafaxine (EFFEXOR XR) 75 MG 24 hr capsule, Take 1 capsule (75 mg) by mouth daily., Disp: 30 capsule, Rfl: 11     VITAMIN D PO, , Disp: , Rfl:      methylphenidate (RITALIN) 5 MG tablet, Take 1 tablet (5 mg) by mouth 2 times daily. (Patient not taking: Reported on 2025), Disp: 60 tablet, Rfl: 0     predniSONE (DELTASONE) 20 MG tablet, Take 3 tabs by mouth daily x 3 days, then 2 tabs daily x 3 days, then 1 tab daily x 3 days, then 1/2 tab daily x 3 days. (Patient not taking: Reported on 2025), Disp: 20 tablet, Rfl: 0    Medical Allergies:  Allergies   Allergen Reactions     Ciprofloxacin      hives and was on flagyl too     Metronidazole      hives and was on cipro too       Review of Systems:    As per HPI; additionally the patient denies fevers, chills, or night sweats and denies any cough, chest pain or chest pressure.  No lower extremity swelling. The remainder of the review of systems was reviewed in detail and was negative    Physical Exam:  VIDEO VISIT  ECO  VITALS: LMP 2011 (Exact Date)   Pain Score Mild Pain (3)/10  General: Alert and in no acute distress.  HEENT: Normocephalic, atraumatic. No visible scleral icterus.  Lungs: Breathing easily on room air, with no difficulty completing full sentences  Neuro: Alert and oriented; grossly nonfocal. Normal speech.   Skin: No visible jaundice. No suspicious lesions of the visualized integuments.  Psych: Mood and affect are appropriate to given situation. Answers questions appropriately.    Relevant  Imaging:   See HPI and assessment below    Pathology:   See HPI  No results found for this or any previous visit (from the past 8760 hours).    Labs:    Hematology  Recent Labs   Lab Test 07/15/25  0922 06/19/25  1351 06/14/25  1145 06/03/25  0456 05/19/25  1436 04/21/25  1415 03/15/25  0753 02/24/25  1321 01/27/25  1309 12/30/24  1306 11/23/24  0753 10/11/24  1046 09/23/24  1258 09/03/24  1507 08/08/24  1401 07/11/24  1202 06/26/24  0741 05/30/24  1319 04/30/24  0737 04/02/24  0741 03/05/24  0741 02/27/24  0749 02/19/24  1638 02/16/24  0736   WBC 3.9* 5.6 6.3 6.7 7.1 3.7* 4.5 4.7 3.6* 4.4 3.7* 3.8* 4.0 4.5 5.3 5.7 2.6* 2.6* 2.6* 2.3* 2.8* 2.0*  --  2.2*   HGB 8.9* 8.8* 9.9* 8.4* 9.2* 9.0* 9.5* 9.4* 9.3* 9.2* 9.2* 8.9* 8.2* 8.5* 9.5* 11.1* 11.4* 11.2* 11.7 11.2* 11.7 11.2*  --  11.3*   HCT 29.7* 29.1* 33.2* 27.0* 30.4* 28.9* 30.8* 30.9* 31.2* 29.6* 30.6* 29.0* 26.5* 27.8*  27.8* 29.8* 33.8* 34.3* 33.3* 35.2 33.9* 35.6 33.7* 33.6* 34.6*    177 200 134* 203 129* 150 133* 147* 148* 147* 140* 110* 133* 136* 157 114* 161 171 147* 207 155  --  123*        Coagulation  Recent Labs   Lab Test 07/15/24  0927 10/30/23  0724   INR 0.98 0.99   PTT  --  24       Chemistry  Recent Labs   Lab Test 07/15/25  0922 06/19/25  1351 06/14/25  1145    142 141   CO2 28 26 25   BUN 19.1 18.6 15.2     Recent Labs   Lab Test 03/15/25  0753 02/24/25  1321 12/30/24  1306   PHOS 3.0 3.0 3.1     Recent Labs   Lab Test 07/15/25  0922 06/19/25  1351 06/14/25  1145   ALBUMIN 3.5 3.6 3.7   AST 22 21 25   ALT 19 29 39   ALKPHOS 73 95 94       Radiation Oncology Pre-treatment Evaluation:   Prior RT: see HPI  Pacemaker: No  Pregnancy status: NA  Pain: 3/10  Pain plan: pain regimen per medical oncology (see HPI)  Intent of treatment: palliative   Side Effects of Radiation: We reviewed potential short and long-term side effects, and provided educational materials regarding the self-care of the most common side effects.    Assessment and  Plan:   Kesha Zacarias is a 65 year old female with de tavon metastatic lobular carcinoma presenting for consideration of palliative radiation therapy for jaw pain.  She has previously received palliative radiation to the sacrum and been treated with succession of systemic therapies including with ribociclib, letrozole, exemestane, and now Enhertu.  She started Enhertu on 7/15/2025 in the setting of oligo progression.  She has had significant right-sided jaw pain since June 2025.    Detailed review of mandibular imaging as captured on PET/CT and MR head since her diagnosis in October 2023 shows metastases of the bilateral rami with imaging suggestive of consistently worse disease and inflammation of the left ramus versus the right.  Findings at the sites have been largely stable with marginal improvement following initiation of systemic therapy per scans in early 2024 with recent progression of the volume of lesions and integrated glucose uptake on PET/CT.  The location of the right-sided lesion is consistent with her pain of 1 months duration and may be responsible for the symptom; however, the evolution of its radiographic appearance over the last 1.5 years does not align with her symptoms and would suggest greater symptoms of the left jaw versus the right.    Palliative treatment of either or both of these sites is safe and feasible but highly morbid with expected side effects including xerostomia and altered taste.  She has just started a new line of systemic therapy without assessment of her response to treatment, and she has seen dramatic improvement in pain since receiving the chemotherapy and the associated steroid regimen.  We recommend waiting 1-2 weeks to evaluate the evolution of her jaw symptoms as she continues the steroid taper.  If her pain recurs, we recommend proceeding with CT simulation and palliative radiotherapy to the symptomatic right jaw.  If her pain remains controlled, we recommend holding  radiation given the high morbidity of local palliative treatment to the site.    As detailed above, we had a detailed discussion with Kesha Zacarias regarding the role of radiation therapy for the treatment of her symptomatic right jaw pain associated with metastatic disease of the mandibular ramus. We discussed the risks (short and long term as listed on consent), benefits, and alternatives to radiotherapy and reviewed the logistics of receiving radiation therapy here at [facility] with treatments most likely consisting of 5 sessions delivered 5 days per week.    Kesha Zacarias agrees with the above plan to monitor her pain on her new systemic therapy and to treat immediately should her pain return as she tapered her steroids and to hold radiotherapy should her pain remain controlled pending systemic interval evaluation of response.    Intent of Treatment: palliative  Concurrent Therapy:  recommended, Enhertu     I discussed the risk of of increased risk of side effects listed on consent, due do possible overlap with prior radiation. The patient verbalized understanding of the above risk profile.     Next steps are therefore as follows:  Scheduling of CT-based simulation for radiation planning to reserve a time slot should the patient wish.  Assessment of symptoms over the next weeks to either proceed with simulation treatment or defer treatment depending on her course.    Given the tumor's location in relation to normal structures such as lung, spinal cord and heart, IMRT based RT provides a greater likelihood of normal tissue sparing.    I saw this patient and I discussed this case with my attending, Dr. Dinora Hinds.    Update on 7/21/2025: I called the patient to check on her symptoms today.  She reported continued improvement in her pain now requiring no Tylenol for analgesia.  She is enthused by her clinical improvement over the interval and we will reach out should her symptoms change but does not need a tentative  CT simulation appointment to hold the time in the next week or 2.    Amrik White MD PhD PGY3  Department of Radiation Oncology  [facility]  Phone: [phone]    Physician Attestation   I, Dinora Hinds, saw this patient and agree with the findings and plan of care as documented in the note.   I was present for key portions of the history and physical exam.  Briefly, Ms. Zacarias is a 65-year-old woman with a history of rheumatoid arthritis on everolimus and Plaquenil as well as de tavon metastatic invasive lobular breast cancer, seen for evaluation of osseous disease of the mandible.  She was diagnosed with metastatic breast cancer during initial workup of a breast mass in September and October 2023, with a large presumed left breast primary lesion and associated left axillary lymph nodes and PET/CT evidence of diffuse and innumerable FDG avid osseous lesions.  On 11/27/2023, she started Ribociclib and letrozole.  She was seen by my colleague Dr. Mandujano in January 2024 for increasing lower back pain, and she received palliative radiotherapy to 30 Mortensen in 10 fractions to the sacrum, completed 1/22/2024.  She then continued systemic therapy under the care of Dr. Stephens, switching to everolimus plus AI starting in June 2024.  She was reevaluated by Dr. Mandujano 6/4/2025 due to worsening right lower back pain radiating to the groin and thigh with MRI from 5/29/2025 showing extensive osseous metastasis, mild mild and stable L1 loss of vertebral body height, and nonspecific STIR enhancement in the paramedian right posterior paraspinous musculature, nonspecific.  By the time of her evaluation with Dr. Mandujano, the pain had resolved.  Most recent PET/CT from 6/14/2025 showed mild progression of osseous metastasis and dental inflammation by her bilateral mandibular molars.  She started C1D1 of Enhertu on 7/15/2025. On 7/16/2025, MRI of the brain done in the context of worsening headaches and vision  "changes showed by progressive low T1 signal within the bilateral mandibular rami with some osseous enhancement and  space edema, felt to potentially reflect combination of osseous metastatic disease and treatment changes with adjacent soft tissue tumor infiltration or inflammation.  She has had a recent evaluation with her dentist excellent oral health with no concerns for infection, caries, or other dental interventions needed.  She is seen in consultation regarding the role for radiation in the context of jaw pain has been limiting her eating.  However, pain has improved on recent steroid therapy/since initiating Enhertu.    At this point, we explained that is unclear if her improvement of pain is going to be durable or may improve in short interval.  We will review her imaging with radiology.  We will tentatively schedule CT simulation for radiation planning.  We will cancel this if her pain remains minimal.  Will proceed with 5 fraction IMRT radiotherapy if indicated for persistent or worsening pain.    We appreciate the opportunity to participate in Ms. Zacarias's care. She was encouraged to contact me with questions or concerns should they arise.    I personally reviewed the available CT and MRI images. Laboratory data and pathology as noted above was reviewed. I reviewed previous medical records, which are summarized in the HPI. A total of 60 minutes were spent (including face to face time) during this visit.     Dinora Hinds MD MPH PhD    Department of Radiation Oncology        Oncology Rooming Note    July 18, 2025 11:58 AM   Kesha Zacarias is a 65 year old female who presents for:    Chief Complaint   Patient presents with     Oncology Clinic Visit     Radiation Oncology Consultation       Initial Vitals: LMP 11/22/2011 (Exact Date)  Estimated body mass index is 32.21 kg/m  as calculated from the following:    Height as of 7/15/25: 1.67 m (5' 5.75\").    Weight as of " 7/15/25: 89.8 kg (198 lb 1 oz). There is no height or weight on file to calculate BSA.  Mild Pain (3) Comment: Data Unavailable   Patient's last menstrual period was 11/22/2011 (exact date).  Allergies reviewed: Yes  Medications reviewed: Yes    Medications: Medication refills not needed today.  Pharmacy name entered into [facility]:    [id number] [place], [place] - [address]  [facility] - [place], [place] - [address]     PHQ9:  Did this patient require a PHQ9?: No    Considerations for radiation treatment   Pregnancy status: Female age 55+   Implanted Cardiac Devices: No   Any previous radiation therapy: No  Metastatic breast cancer- Dr. Mandujano  1/2024 s/p palliative radiation to sacrum 3000 cGy in 300 cGy daily fractions     Clinical concerns: Radiation Oncology Consultation    Dr. Hinds was notified.      Mary Grace Gonzalez RN              Again, thank you for allowing me to participate in the care of your patient.        Sincerely,        Dinora Hinds MD    Electronically signed

## 2025-07-18 NOTE — PROGRESS NOTES
"Oncology Rooming Note    July 18, 2025 11:58 AM   Kesha Zacarias is a 65 year old female who presents for:    Chief Complaint   Patient presents with    Oncology Clinic Visit     Radiation Oncology Consultation       Initial Vitals: LMP 11/22/2011 (Exact Date)  Estimated body mass index is 32.21 kg/m  as calculated from the following:    Height as of 7/15/25: 1.67 m (5' 5.75\").    Weight as of 7/15/25: 89.8 kg (198 lb 1 oz). There is no height or weight on file to calculate BSA.  Mild Pain (3) Comment: Data Unavailable   Patient's last menstrual period was 11/22/2011 (exact date).  Allergies reviewed: Yes  Medications reviewed: Yes    Medications: Medication refills not needed today.  Pharmacy name entered into [facility]:    [facility][id number] [place], [place] - [address]  [facility] - [place], [place] - [address]     PHQ9:  Did this patient require a PHQ9?: No    Considerations for radiation treatment   Pregnancy status: Female age 55+   Implanted Cardiac Devices: No   Any previous radiation therapy: No  Metastatic breast cancer- Dr. Mandujano  1/2024 s/p palliative radiation to sacrum 3000 cGy in 300 cGy daily fractions     Clinical concerns: Radiation Oncology Consultation    Dr. Hinds was notified.      Mary Grace Gonzalez RN            "

## 2025-07-21 DIAGNOSIS — H40.9 GLAUCOMA: Primary | ICD-10-CM

## 2025-07-22 ENCOUNTER — THERAPY VISIT (OUTPATIENT)
Dept: PHYSICAL THERAPY | Facility: CLINIC | Age: 65
End: 2025-07-22
Attending: RADIOLOGY
Payer: COMMERCIAL

## 2025-07-22 DIAGNOSIS — M54.41 ACUTE RIGHT-SIDED LOW BACK PAIN WITH RIGHT-SIDED SCIATICA: Primary | ICD-10-CM

## 2025-07-22 PROCEDURE — 97110 THERAPEUTIC EXERCISES: CPT | Mod: GP | Performed by: PHYSICAL THERAPIST

## 2025-07-22 PROCEDURE — 97112 NEUROMUSCULAR REEDUCATION: CPT | Mod: GP | Performed by: PHYSICAL THERAPIST

## 2025-07-22 PROCEDURE — 97530 THERAPEUTIC ACTIVITIES: CPT | Mod: GP | Performed by: PHYSICAL THERAPIST

## 2025-07-23 NOTE — PROGRESS NOTES
Kesha Zacarias is a 65 year old female who continued glaucoma.    She currently sees [facility] for her eye care once per year. She has had surgery for glaucoma and cataracts in the early/mid 2000's with the [facility]. She was referred to [facility] ophthalmology in anticipation of pituitary gland treatment with gamma knife.     She has a history of stage IV, hormone positive, HER2 negative (2+), invasive lobular, left-sided breast cancer with extensive bony involvement. The anticipated need for gamma knife treatment of the pituitary gland would be intended to improve pain symptoms.     She also anticipates radiation treatment for the jaw due to recently discovered bony metastatic disease in that region.     Comorbidities include RA on everolimus and plaquenil. Current plaquenil dose is 500 mg/day, 5.56 mg/kg/day.     Maximum intraocular pressure 5/2 in our system  Family history: uncertain, none to her knowledge maybe sister  Trauma history: negative  Gonioscopy: open  Pachmetry: 465/444  Previous eye surgery: CE/IOL and trab OU in 2000's      Meds: none  AT Prn      10+ review of systems were otherwise negative except for that which has been stated above.      The history, exam, assessment and plan of the learner, and technician if present, have been reviewed and I personally examined the patient.  I have reviewed the PMH, SH, FHX, ROS, MEDS, ALLERGIES and HPI, and have updated the computerized patient record appropriately.    1. Primary open angle glaucoma of both eyes, moderate stage    2. Acute right-sided low back pain with right-sided sciatica    3. Cancer associated pain    4. Hypotonus    5. Pseudophakia, both eyes    6. Temporal arteritis syndrome (H)        Current Outpatient Medications   Medication Sig Dispense Refill    apixaban ANTICOAGULANT (ELIQUIS) 5 MG tablet Take 1 tablet (5 mg) by mouth 2 times daily. 60 tablet 11    atorvastatin (LIPITOR) 10 MG tablet Take 1 tablet (10 mg) by mouth daily.  90 tablet 3    betamethasone dipropionate (DIPROSONE) 0.05 % external lotion Apply topically 2 times daily 60 mL 3    CALCIUM PO       cyclobenzaprine (FLEXERIL) 5 MG tablet TAKE 1 TABLET(5 MG) BY MOUTH AT BEDTIME 90 tablet 3    dexAMETHasone (DECADRON) 4 MG tablet Take 1 tablet (4 mg) by mouth See Admin Instructions. Take for 3 days, starting the day after chemo. Take with food. 6 tablet 2    diazepam (VALIUM) 5 MG tablet Take 1 tablet (5 mg) by mouth every 6 hours as needed for anxiety, sleep or pain. 20 tablet 5    HYDROmorphone (DILAUDID) 2 MG tablet Take 1 tablet (2 mg) by mouth every 6 hours as needed for pain. 12 tablet 0    hydroxychloroquine (PLAQUENIL) 200 MG tablet TAKE 2 AND 1/2 TABLETS BY MOUTH EVERY  tablet 3    ondansetron (ZOFRAN) 8 MG tablet Take 1 tablet (8 mg) by mouth every 8 hours as needed for nausea. 30 tablet 2    prochlorperazine (COMPAZINE) 10 MG tablet Take 1 tablet (10 mg) by mouth every 6 hours as needed for nausea or vomiting. 30 tablet 2    venlafaxine (EFFEXOR XR) 75 MG 24 hr capsule Take 1 capsule (75 mg) by mouth daily. 30 capsule 11    VITAMIN D PO       methylphenidate (RITALIN) 5 MG tablet Take 1 tablet (5 mg) by mouth 2 times daily. (Patient not taking: Reported on 7/2/2025) 60 tablet 0    predniSONE (DELTASONE) 20 MG tablet Take 3 tabs by mouth daily x 3 days, then 2 tabs daily x 3 days, then 1 tab daily x 3 days, then 1/2 tab daily x 3 days. (Patient not taking: Reported on 7/18/2025) 20 tablet 0        Tonometry (Tonopen, 9:07 AM)         Right Left    Pressure 06 05              Tonometry #2 (Applanation, 10:36 AM)         Right Left    Pressure 5 2              Tonometry #3 (SON hermilo, 11:14 AM)         Right Left    Pressure 4 3              Edited by: Keiry Rodriguez; Dakota Interiano MD; Ada Pratt MD                  Target IOP achieved: yes      Studies performed:    VF Interpretation  7/25   OD OS   Reliability Good Good   Defect Superior arcuate   MD -13.8 enlarged blind spot  MD -5.19   Deterioration Baseline Baseline     OCT Glaucoma Interpretation  7/25   OD OS   Quality Good Good   Interpretation R 76  R 67  R 69 R 72  Y 75  G 108   Change Baseline Baseline       Assessment and Plan:  1.   POAG OD>OS s/p trabeculectomy OU ~2000's  Continue off drops  Request outside records  Large blebs graham negative  More artificial tears  Moderate ON and VF changes  Monitor  Pseudophakic OU      2.   Hypotony  Monitor  Folds     3.   Dry eyes  Increase AT's to QID OU, finds qAM use to be insufficient     4.   Temporal arteritis by hx  On steroids  No bx done  ? Bone mets instead    5.   Cancer  Anticipated possible pituitary gamma knife treatment  Baseline fields today    The patient is asked to return in 4 months for IOP.    Risks, benefits, and alternatives of treatment discussed with the patient. All questions regarding diagnosis and treatment answered to satisfaction.          Physician Attestation     Attending Physician Attestation:  Complete documentation of historical and exam elements from today's encounter can be found in the full encounter summary report (not reduplicated in this progress note). I personally obtained the chief complaint(s) and history of present illness. I confirmed and edited as necessary the review of systems, past medical/surgical history, family history, social history, and examination findings as documented by others; and I examined the patient myself. I personally reviewed the relevant tests, images, and reports as documented above. I personally reviewed the ophthalmic test(s) associated with this encounter, agree with the interpretation(s) as documented by the resident/fellow and have edited the corresponding report(s) as necessary. I formulated and edited as necessary the assessment and plan and discussed the findings and management plan with the patient and any family members present at the time of the visit.      Ada  MD Santa  Professor, Department of Ophthalmology and Neuroscience  [facility]

## 2025-07-24 ENCOUNTER — OFFICE VISIT (OUTPATIENT)
Dept: OPHTHALMOLOGY | Facility: CLINIC | Age: 65
End: 2025-07-24
Attending: RADIOLOGY
Payer: COMMERCIAL

## 2025-07-24 DIAGNOSIS — Z96.1 PSEUDOPHAKIA, BOTH EYES: ICD-10-CM

## 2025-07-24 DIAGNOSIS — R29.898: ICD-10-CM

## 2025-07-24 DIAGNOSIS — G89.3 CANCER ASSOCIATED PAIN: ICD-10-CM

## 2025-07-24 DIAGNOSIS — M54.41 ACUTE RIGHT-SIDED LOW BACK PAIN WITH RIGHT-SIDED SCIATICA: ICD-10-CM

## 2025-07-24 DIAGNOSIS — M31.6 TEMPORAL ARTERITIS SYNDROME (H): ICD-10-CM

## 2025-07-24 DIAGNOSIS — H40.1132 PRIMARY OPEN ANGLE GLAUCOMA OF BOTH EYES, MODERATE STAGE: Primary | ICD-10-CM

## 2025-07-24 PROCEDURE — 76514 ECHO EXAM OF EYE THICKNESS: CPT | Performed by: OPHTHALMOLOGY

## 2025-07-24 PROCEDURE — 92133 CPTRZD OPH DX IMG PST SGM ON: CPT | Performed by: OPHTHALMOLOGY

## 2025-07-24 PROCEDURE — 92020 GONIOSCOPY: CPT | Performed by: OPHTHALMOLOGY

## 2025-07-24 PROCEDURE — 92083 EXTENDED VISUAL FIELD XM: CPT | Performed by: OPHTHALMOLOGY

## 2025-07-24 PROCEDURE — 99213 OFFICE O/P EST LOW 20 MIN: CPT | Performed by: OPHTHALMOLOGY

## 2025-07-24 ASSESSMENT — TONOMETRY
OS_IOP_MMHG: 3
IOP_METHOD: TONOPEN
IOP_METHOD: APPLANATION
OD_IOP_MMHG: 4
OS_IOP_MMHG: 2
OD_IOP_MMHG: 5
OD_IOP_MMHG: 06
OS_IOP_MMHG: 05

## 2025-07-24 ASSESSMENT — VISUAL ACUITY
OD_CC+: -1
OS_CC: 20/50
CORRECTION_TYPE: GLASSES
OD_CC: 20/40
METHOD: SNELLEN - LINEAR
OD_PH_CC: 20/30
METHOD_MR: DECLINES MR TODAY

## 2025-07-24 ASSESSMENT — REFRACTION_WEARINGRX
OS_ADD: +2.75
OS_SPHERE: -1.75
OS_CYLINDER: +2.25
OD_SPHERE: -1.75
OD_ADD: +2.75
OD_CYLINDER: +1.00
OD_AXIS: 155
OS_AXIS: 090

## 2025-07-24 ASSESSMENT — PACHYMETRY
OS_CT(UM): 444
OD_CT(UM): 465

## 2025-07-24 ASSESSMENT — CONF VISUAL FIELD
OS_SUPERIOR_TEMPORAL_RESTRICTION: 0
OD_INFERIOR_TEMPORAL_RESTRICTION: 0
OS_INFERIOR_TEMPORAL_RESTRICTION: 0
OS_NORMAL: 1
OD_NORMAL: 1
OD_SUPERIOR_NASAL_RESTRICTION: 0
METHOD: COUNTING FINGERS
OS_INFERIOR_NASAL_RESTRICTION: 0
OD_INFERIOR_NASAL_RESTRICTION: 0
OS_SUPERIOR_NASAL_RESTRICTION: 0
OD_SUPERIOR_TEMPORAL_RESTRICTION: 0

## 2025-07-24 ASSESSMENT — GONIOSCOPY
OS_NASAL: D40R
OS_TEMPORAL: D40R
OD_TEMPORAL: D40R
OD_NASAL: D40R

## 2025-07-24 ASSESSMENT — EXTERNAL EXAM - LEFT EYE: OS_EXAM: NORMAL

## 2025-07-24 ASSESSMENT — CUP TO DISC RATIO
OS_RATIO: 0.5
OD_RATIO: 0.7

## 2025-07-24 ASSESSMENT — EXTERNAL EXAM - RIGHT EYE: OD_EXAM: NORMAL

## 2025-07-24 ASSESSMENT — SLIT LAMP EXAM - LIDS
COMMENTS: NORMAL
COMMENTS: NORMAL

## 2025-07-30 ENCOUNTER — PRE VISIT (OUTPATIENT)
Dept: ENDOCRINOLOGY | Facility: CLINIC | Age: 65
End: 2025-07-30

## 2025-07-30 ENCOUNTER — VIRTUAL VISIT (OUTPATIENT)
Dept: ENDOCRINOLOGY | Facility: CLINIC | Age: 65
End: 2025-07-30
Payer: COMMERCIAL

## 2025-07-30 VITALS — BODY MASS INDEX: 31.82 KG/M2 | HEIGHT: 66 IN | WEIGHT: 198 LBS

## 2025-07-30 DIAGNOSIS — C50.919: Primary | ICD-10-CM

## 2025-07-30 DIAGNOSIS — C79.51: Primary | ICD-10-CM

## 2025-07-30 DIAGNOSIS — E23.7 PITUITARY ABNORMALITY: ICD-10-CM

## 2025-07-30 PROCEDURE — 98003 SYNCH AUDIO-VIDEO NEW HI 60: CPT | Performed by: INTERNAL MEDICINE

## 2025-07-30 PROCEDURE — 1125F AMNT PAIN NOTED PAIN PRSNT: CPT | Mod: 95 | Performed by: INTERNAL MEDICINE

## 2025-07-30 ASSESSMENT — PAIN SCALES - GENERAL: PAINLEVEL_OUTOF10: MODERATE PAIN (4)

## 2025-07-30 NOTE — NURSING NOTE
Current patient location: [address] [place] [id number]    Is the patient currently in the state of [place]? YES    Visit mode: VIDEO    If the visit is dropped, the patient can be reconnected by:VIDEO VISIT: Text to cell phone:   Telephone Information:   Mobile [phone]       Will anyone else be joining the visit? NO  (If patient encounters technical issues they should call [phone]    Are changes needed to the allergy or medication list? No    Are refills needed on medications prescribed by this physician? NO    Rooming Documentation:  Not applicable    Reason for visit: Consult    Lianne RIVERO

## 2025-07-30 NOTE — LETTER
7/30/2025       RE: Kesha Zacarias  [id number] [address] [place] [id number]     Dear Colleague,    Thank you for referring your patient, Kesha Zacarias, to the [facility] [place] at [facility]. Please see a copy of my visit note below.    Video visit  Start 1:18 pm  End 2:05 pm   Amwell                                                                               - Endocrinology Initial Consultation -    Reason for visit/consult: pituitary endocrine assessment    Primary care provider: Schoen, Gregory G    HPI: 65 year old female is here for endocrinology clinic visit for pre treatment assessment for stereotactic pituitary radiosurgery.   She has metastatic breast Ca, initially found in 2023 when she noted a lump on her left breast.  It was lobular carcinoma of the left breast ER positive, OH positive, HER2 negative with positive lymph nodes and diffuse bony disease on diagnosis.  She started work with ribociclib with letrozole on 11/27/2023.  She received palliative radiation therapy to the sacrum to 30 Mortensen in 10 fractions completed on 1/22/2024 with Dr. Mandujano (3,000 cGy to sacrum from 1/9/2024 to 1/22/2024).  She transitioned her systemic therapy to everolimus with exemestane on 6/14/2024 until srping 2025. Then now she was switched to trastuzumal (Evertu) first dose on 7/15/2025.   She started to have severe pain in her right jaw, which found to have metastatic lesion since 5/2025 (mandibular imaging demonstrates metastatic disease of the bilateral mandibular rami since initial staging workup with PET/CT on 10/6/2023 with greater FDG avidity of the left side versus the right), and around the same time, she started to notice worsening blurry vision, she has baseline glaucoma as well prior to diagsed breast cancer.  She was seen by ophthalmolgy last week.   She is currently under the evaluation/plan for stereotactic radiosurgery of  pituitary gland     Patient with history of radiation therapy at [facility] and received    Patient reports taking Plaquenil.      Considerations for radiation treatment   Pregnancy status: Female age 55+ , patient reports menopause at age 47.  Implanted Cardiac Devices: No   Any previous radiation therapy: Yes - see above.    Today, her pain is tolerable with tylenol and advir, and eating well, BW stable, sleeps 7 hours night. Her  lb.     Past Medical/Surgical History:  Past Medical History:   Diagnosis Date     Arthritis 2006     Breast cancer metastasized to bone (H) 10/12/2023     Chronic depressive personality disorder      Dysplasia of cervix (uteri) 1988    Cryotherapy     Female infertility of unspecified origin      Glaucoma      Nonsenile cataract 2008     Rheumatoid arthritis (H)      Past Surgical History:   Procedure Laterality Date     CATARACT IOL, RT/LT       COLONOSCOPY       COLONOSCOPY N/A 01/14/2022    Procedure: COLONOSCOPY, WITH POLYPECTOMY AND BIOPSY;  Surgeon: Gagandeep Patterson MD;  Location:  GI     GLAUCOMA SURGERY       HC INTRODUCE CATH FALLOPIAN TUBE, RE-OPEN/DIAGNOSIS       HERNIA REPAIR, INCISIONAL  11/11/2009     JOINT REPLACEMENT Right 09/2017    knee     KNEE SURGERY  9/2018     TONSILLECTOMY       ZZC APPENDECTOMY  06/14/2003     [facility] REMV CATARACT INTRACAP,INSERT LENS  02/13/2003    right       Allergies:  Allergies   Allergen Reactions     Ciprofloxacin      hives and was on flagyl too     Metronidazole      hives and was on cipro too       Current Medications   Current Outpatient Medications   Medication Sig Dispense Refill     apixaban ANTICOAGULANT (ELIQUIS) 5 MG tablet Take 1 tablet (5 mg) by mouth 2 times daily. 60 tablet 11     atorvastatin (LIPITOR) 10 MG tablet Take 1 tablet (10 mg) by mouth daily. 90 tablet 3     betamethasone dipropionate (DIPROSONE) 0.05 % external lotion Apply topically 2 times daily 60 mL 3     CALCIUM PO         cyclobenzaprine (FLEXERIL) 5 MG tablet TAKE 1 TABLET(5 MG) BY MOUTH AT BEDTIME 90 tablet 3     dexAMETHasone (DECADRON) 4 MG tablet Take 1 tablet (4 mg) by mouth See Admin Instructions. Take for 3 days, starting the day after chemo. Take with food. 6 tablet 2     diazepam (VALIUM) 5 MG tablet Take 1 tablet (5 mg) by mouth every 6 hours as needed for anxiety, sleep or pain. 20 tablet 5     HYDROmorphone (DILAUDID) 2 MG tablet Take 1 tablet (2 mg) by mouth every 6 hours as needed for pain. 12 tablet 0     hydroxychloroquine (PLAQUENIL) 200 MG tablet TAKE 2 AND 1/2 TABLETS BY MOUTH EVERY  tablet 3     methylphenidate (RITALIN) 5 MG tablet Take 1 tablet (5 mg) by mouth 2 times daily. (Patient not taking: Reported on 7/2/2025) 60 tablet 0     ondansetron (ZOFRAN) 8 MG tablet Take 1 tablet (8 mg) by mouth every 8 hours as needed for nausea. 30 tablet 2     predniSONE (DELTASONE) 20 MG tablet Take 3 tabs by mouth daily x 3 days, then 2 tabs daily x 3 days, then 1 tab daily x 3 days, then 1/2 tab daily x 3 days. (Patient not taking: Reported on 7/18/2025) 20 tablet 0     prochlorperazine (COMPAZINE) 10 MG tablet Take 1 tablet (10 mg) by mouth every 6 hours as needed for nausea or vomiting. 30 tablet 2     venlafaxine (EFFEXOR XR) 75 MG 24 hr capsule Take 1 capsule (75 mg) by mouth daily. 30 capsule 11     VITAMIN D PO        No current facility-administered medications for this visit.       Family History:  Family History   Problem Relation Age of Onset     Heart Disease Mother      Lipids Mother      Hyperlipidemia Mother      Genitourinary Problems Father         prostate     Genetic Disorder Father         ulcer     Hypertension Father      Lipids Father      Prostate Cancer Father      Cancer Father      Hyperlipidemia Sister      Hyperlipidemia Brother      Other Cancer Brother         Neck Cancer HPV (+)     Heart Disease Maternal Grandmother      Cerebrovascular Disease Maternal Grandmother      Heart Disease  "Maternal Grandfather      Heart Disease Maternal Uncle      Heart Disease Maternal Uncle         x  3     Cancer Nephew         Sister's Son     Hyperlipidemia Sister      Cancer Brother      Hyperlipidemia Brother      Other Cancer Brother        Social History:  Social History     Tobacco Use     Smoking status: Former     Current packs/day: 0.00     Average packs/day: 0.5 packs/day for 25.0 years (12.5 ttl pk-yrs)     Types: Cigarettes     Start date: 1992     Quit date: 2017     Years since quittin.8     Smokeless tobacco: Never     Tobacco comments:     Cigar once in a while   Substance Use Topics     Alcohol use: Not Currently     Comment: very little       ROS:  Full review of systems taken with the help of the intake sheet. Otherwise a complete 14 point review of systems was taken and is negative unless stated in the history above.      Physical Exam:   Vitals: Ht 1.676 m (5' 6\")   Wt 89.8 kg (198 lb)   LMP 2011 (Exact Date)   BMI 31.96 kg/m    BMI= Body mass index is 31.96 kg/m .   General: well appearing, no acute distress, pleasant and conversant,   Mental Status/neuro: alert and oriented  Face: symmetrical, normal facial color  Eyes: anicteric, no proptosis or lid lag  Resp: normally breathing      Labs : I reviewed data from epic and extract and summarize the pertinent data here.   Lab Results   Component Value Date     07/15/2025     10/24/2020      Lab Results   Component Value Date    POTASSIUM 3.9 07/15/2025    POTASSIUM 4.3 10/31/2021    POTASSIUM 4.1 10/24/2020     Lab Results   Component Value Date    CHLORIDE 107 07/15/2025    CHLORIDE 111 10/31/2021    CHLORIDE 108 10/24/2020     Lab Results   Component Value Date    NITISH 8.9 07/15/2025    NITISH 9.2 10/24/2020     Lab Results   Component Value Date    CO2 28 07/15/2025    CO2 32 10/31/2021    CO2 29 10/24/2020     Lab Results   Component Value Date    BUN 19.1 07/15/2025    BUN 12 10/31/2021    BUN 20 10/24/2020 " "    Lab Results   Component Value Date    CR 1.06 07/15/2025    CR 0.86 10/24/2020     Lab Results   Component Value Date     07/15/2025     10/31/2021    GLC 93 10/24/2020     Lab Results   Component Value Date    TSH 1.19 05/30/2024    TSH 1.44 05/11/2019     No results found for: \"T4\"  Lab Results   Component Value Date    A1C 5.6 06/26/2024       No results found for: \"IGF1\"  Lab Results   Component Value Date    LH 10.5 05/27/2005     Lab Results   Component Value Date    FSH 17.6 04/14/2006     No results found for: \"ESTROGEN\"  No results found for: \"PROLACTIN\"        MRI Brain: I personally reviewed the original images and agree with the below reports.   Results for orders placed during the hospital encounter of 07/16/25    MR Brain w/o & w Contrast    Narrative  MR BRAIN W/O and W CONTRAST  [facility]  7/16/2025 9:52 AM CDT    INDICATION: History of metastatic breast cancer with new and worsening debilitating headaches and vision changes.  TECHNIQUE: Noncontrast and contrast enhanced MRI of the brain.  CONTRAST: 9 mL Gadavist.  COMPARISON: Head and neck CTA 07/16/2025, brain MRI 05/24/2024.    FINDINGS: There is no restricted diffusion. T1 hypointense lesions within the occipital condyles and upper visualized cervical spine are stable to diminished versus prior. Progressive low T1 signal within the rami of the mandible bilaterally with some  osseous enhancement and  space T2 hyperintensity and enhancement. No intra-axial metastasis. Polypoid mucosal thickening within the small left maxillary sinus. Mastoid air cells appear free from significant disease. Intraorbital contents are  unremarkable. Ventricles are within normal limits in size for the patient's age. Intracranial flow voids are intact. There is no mass effect, midline shift, or extraaxial collection. A few foci of nonspecific T2/FLAIR hyperintensity within the white  matter probably reflect minor changes " of chronic small vessel ischemic injury and are within the range of expected for a patient of this age. No evidence for acute or chronic intracranial blood products.    Impression  IMPRESSION:  1.  No evidence for intracranial metastatic disease.  2.  Progressive low T1 signal within the rami of the mandible bilaterally with some osseous enhancement and  space edema and enhancement. This could reflect some combination of osseous metastatic disease and treatment changes (early  osteoradionecrosis if there is a clinical history of prior radiation to this area) with adjacent soft tissue tumor infiltration or inflammation.  3.  Lgyrrc-lo-udffsxbjao metastases within the occipital condyles and upper visualized cervical spine.  4.  No acute intracranial finding. No evidence for recent ischemia, intracranial hemorrhage, or hydrocephalus.          Assessment and Plan  65 year old female with     Pituitary hormone panel  She just completed prednisone course on 7/29/2025, lab scheduled next week    - IGF1  - TSH  - free T4  - PRL  - cortisol  - ACTH     SRS pituitary     - I will communicate with Dr. Mandujano for further information and clinical input from her as well       Return to clinic with me in PRN    60 minutes spent on the date of the encounter doing chart review, history and exam, personal review of imaging, outside record, documentation and further activities as noted above.    Note: Chart documentation done in part with Dragon Voice Recognition software. Although reviewed after completion, some word and grammatical errors may remain.  Please consider this when interpreting information in this chart     Beba Coy MD  Staff Physician  Endocrinology and Metabolism  License: [id number]     Again, thank you for allowing me to participate in the care of your patient.      Sincerely,    Beba Coy MD

## 2025-07-30 NOTE — PROGRESS NOTES
Video visit  Start 1:18 pm  End 2:05 pm   [facility]                                                                               - Endocrinology Initial Consultation -    Reason for visit/consult: pituitary endocrine assessment    Primary care provider: Schoen, Gregory G    HPI: 65 year old female is here for endocrinology clinic visit for pre treatment assessment for stereotactic pituitary radiosurgery.   She has metastatic breast Ca, initially found in 2023 when she noted a lump on her left breast.  It was lobular carcinoma of the left breast ER positive, UT positive, HER2 negative with positive lymph nodes and diffuse bony disease on diagnosis.  She started work with ribociclib with letrozole on 11/27/2023.  She received palliative radiation therapy to the sacrum to 30 Mortensen in 10 fractions completed on 1/22/2024 with Dr. Mandujano (3,000 cGy to sacrum from 1/9/2024 to 1/22/2024).  She transitioned her systemic therapy to everolimus with exemestane on 6/14/2024 until srping 2025. Then now she was switched to trastuzumal (Evertu) first dose on 7/15/2025.   She started to have severe pain in her right jaw, which found to have metastatic lesion since 5/2025 (mandibular imaging demonstrates metastatic disease of the bilateral mandibular rami since initial staging workup with PET/CT on 10/6/2023 with greater FDG avidity of the left side versus the right), and around the same time, she started to notice worsening blurry vision, she has baseline glaucoma as well prior to diagsed breast cancer.  She was seen by ophthalmolgy last week.   She is currently under the evaluation/plan for stereotactic radiosurgery of pituitary gland     Patient with history of radiation therapy at [facility] and received    Patient reports taking Plaquenil.      Considerations for radiation treatment   Pregnancy status: Female age 55+ , patient reports menopause at age 47.  Implanted Cardiac Devices: No   Any previous radiation  therapy: Yes - see above.    Today, her pain is tolerable with tylenol and advir, and eating well, BW stable, sleeps 7 hours night. Her  lb.     Past Medical/Surgical History:  Past Medical History:   Diagnosis Date    Arthritis 2006    Breast cancer metastasized to bone (H) 10/12/2023    Chronic depressive personality disorder     Dysplasia of cervix (uteri) 1988    Cryotherapy    Female infertility of unspecified origin     Glaucoma     Nonsenile cataract 2008    Rheumatoid arthritis (H)      Past Surgical History:   Procedure Laterality Date    CATARACT IOL, RT/LT      COLONOSCOPY      COLONOSCOPY N/A 01/14/2022    Procedure: COLONOSCOPY, WITH POLYPECTOMY AND BIOPSY;  Surgeon: Gagandeep Patterson MD;  Location:  GI    GLAUCOMA SURGERY      HC INTRODUCE CATH FALLOPIAN TUBE, RE-OPEN/DIAGNOSIS      HERNIA REPAIR, INCISIONAL  11/11/2009    JOINT REPLACEMENT Right 09/2017    knee    KNEE SURGERY  9/2018    TONSILLECTOMY      C APPENDECTOMY  06/14/2003    [facility] REMV CATARACT INTRACAP,INSERT LENS  02/13/2003    right       Allergies:  Allergies   Allergen Reactions    Ciprofloxacin      hives and was on flagyl too    Metronidazole      hives and was on cipro too       Current Medications   Current Outpatient Medications   Medication Sig Dispense Refill    apixaban ANTICOAGULANT (ELIQUIS) 5 MG tablet Take 1 tablet (5 mg) by mouth 2 times daily. 60 tablet 11    atorvastatin (LIPITOR) 10 MG tablet Take 1 tablet (10 mg) by mouth daily. 90 tablet 3    betamethasone dipropionate (DIPROSONE) 0.05 % external lotion Apply topically 2 times daily 60 mL 3    CALCIUM PO       cyclobenzaprine (FLEXERIL) 5 MG tablet TAKE 1 TABLET(5 MG) BY MOUTH AT BEDTIME 90 tablet 3    dexAMETHasone (DECADRON) 4 MG tablet Take 1 tablet (4 mg) by mouth See Admin Instructions. Take for 3 days, starting the day after chemo. Take with food. 6 tablet 2    diazepam (VALIUM) 5 MG tablet Take 1 tablet (5 mg) by mouth every 6 hours as needed for  anxiety, sleep or pain. 20 tablet 5    HYDROmorphone (DILAUDID) 2 MG tablet Take 1 tablet (2 mg) by mouth every 6 hours as needed for pain. 12 tablet 0    hydroxychloroquine (PLAQUENIL) 200 MG tablet TAKE 2 AND 1/2 TABLETS BY MOUTH EVERY  tablet 3    methylphenidate (RITALIN) 5 MG tablet Take 1 tablet (5 mg) by mouth 2 times daily. (Patient not taking: Reported on 7/2/2025) 60 tablet 0    ondansetron (ZOFRAN) 8 MG tablet Take 1 tablet (8 mg) by mouth every 8 hours as needed for nausea. 30 tablet 2    predniSONE (DELTASONE) 20 MG tablet Take 3 tabs by mouth daily x 3 days, then 2 tabs daily x 3 days, then 1 tab daily x 3 days, then 1/2 tab daily x 3 days. (Patient not taking: Reported on 7/18/2025) 20 tablet 0    prochlorperazine (COMPAZINE) 10 MG tablet Take 1 tablet (10 mg) by mouth every 6 hours as needed for nausea or vomiting. 30 tablet 2    venlafaxine (EFFEXOR XR) 75 MG 24 hr capsule Take 1 capsule (75 mg) by mouth daily. 30 capsule 11    VITAMIN D PO        No current facility-administered medications for this visit.       Family History:  Family History   Problem Relation Age of Onset    Heart Disease Mother     Lipids Mother     Hyperlipidemia Mother     Genitourinary Problems Father         prostate    Genetic Disorder Father         ulcer    Hypertension Father     Lipids Father     Prostate Cancer Father     Cancer Father     Hyperlipidemia Sister     Hyperlipidemia Brother     Other Cancer Brother         Neck Cancer HPV (+)    Heart Disease Maternal Grandmother     Cerebrovascular Disease Maternal Grandmother     Heart Disease Maternal Grandfather     Heart Disease Maternal Uncle     Heart Disease Maternal Uncle         x  3    Cancer Nephew         Sister's Son    Hyperlipidemia Sister     Cancer Brother     Hyperlipidemia Brother     Other Cancer Brother        Social History:  Social History     Tobacco Use    Smoking status: Former     Current packs/day: 0.00     Average packs/day: 0.5  "packs/day for 25.0 years (12.5 ttl pk-yrs)     Types: Cigarettes     Start date: 1992     Quit date: 2017     Years since quittin.8    Smokeless tobacco: Never    Tobacco comments:     Cigar once in a while   Substance Use Topics    Alcohol use: Not Currently     Comment: very little       ROS:  Full review of systems taken with the help of the intake sheet. Otherwise a complete 14 point review of systems was taken and is negative unless stated in the history above.      Physical Exam:   Vitals: Ht 1.676 m (5' 6\")   Wt 89.8 kg (198 lb)   LMP 2011 (Exact Date)   BMI 31.96 kg/m    BMI= Body mass index is 31.96 kg/m .   General: well appearing, no acute distress, pleasant and conversant,   Mental Status/neuro: alert and oriented  Face: symmetrical, normal facial color  Eyes: anicteric, no proptosis or lid lag  Resp: normally breathing      Labs : I reviewed data from epic and extract and summarize the pertinent data here.   Lab Results   Component Value Date     07/15/2025     10/24/2020      Lab Results   Component Value Date    POTASSIUM 3.9 07/15/2025    POTASSIUM 4.3 10/31/2021    POTASSIUM 4.1 10/24/2020     Lab Results   Component Value Date    CHLORIDE 107 07/15/2025    CHLORIDE 111 10/31/2021    CHLORIDE 108 10/24/2020     Lab Results   Component Value Date    NITISH 8.9 07/15/2025    NITISH 9.2 10/24/2020     Lab Results   Component Value Date    CO2 28 07/15/2025    CO2 32 10/31/2021    CO2 29 10/24/2020     Lab Results   Component Value Date    BUN 19.1 07/15/2025    BUN 12 10/31/2021    BUN 20 10/24/2020     Lab Results   Component Value Date    CR 1.06 07/15/2025    CR 0.86 10/24/2020     Lab Results   Component Value Date     07/15/2025     10/31/2021    GLC 93 10/24/2020     Lab Results   Component Value Date    TSH 1.19 2024    TSH 1.44 2019     No results found for: \"T4\"  Lab Results   Component Value Date    A1C 5.6 2024       No results " "found for: \"IGF1\"  Lab Results   Component Value Date    LH 10.5 05/27/2005     Lab Results   Component Value Date    FSH 17.6 04/14/2006     No results found for: \"ESTROGEN\"  No results found for: \"PROLACTIN\"        MRI Brain: I personally reviewed the original images and agree with the below reports.   Results for orders placed during the hospital encounter of 07/16/25    MR Brain w/o & w Contrast    Narrative  MR BRAIN W/O and W CONTRAST  [facility]  7/16/2025 9:52 AM CDT    INDICATION: History of metastatic breast cancer with new and worsening debilitating headaches and vision changes.  TECHNIQUE: Noncontrast and contrast enhanced MRI of the brain.  CONTRAST: 9 mL Gadavist.  COMPARISON: Head and neck CTA 07/16/2025, brain MRI 05/24/2024.    FINDINGS: There is no restricted diffusion. T1 hypointense lesions within the occipital condyles and upper visualized cervical spine are stable to diminished versus prior. Progressive low T1 signal within the rami of the mandible bilaterally with some  osseous enhancement and  space T2 hyperintensity and enhancement. No intra-axial metastasis. Polypoid mucosal thickening within the small left maxillary sinus. Mastoid air cells appear free from significant disease. Intraorbital contents are  unremarkable. Ventricles are within normal limits in size for the patient's age. Intracranial flow voids are intact. There is no mass effect, midline shift, or extraaxial collection. A few foci of nonspecific T2/FLAIR hyperintensity within the white  matter probably reflect minor changes of chronic small vessel ischemic injury and are within the range of expected for a patient of this age. No evidence for acute or chronic intracranial blood products.    Impression  IMPRESSION:  1.  No evidence for intracranial metastatic disease.  2.  Progressive low T1 signal within the rami of the mandible bilaterally with some osseous enhancement and  space edema " and enhancement. This could reflect some combination of osseous metastatic disease and treatment changes (early  osteoradionecrosis if there is a clinical history of prior radiation to this area) with adjacent soft tissue tumor infiltration or inflammation.  3.  [id number] metastases within the occipital condyles and upper visualized cervical spine.  4.  No acute intracranial finding. No evidence for recent ischemia, intracranial hemorrhage, or hydrocephalus.          Assessment and Plan  65 year old female with     Pituitary hormone panel  She just completed prednisone course on 7/29/2025, lab scheduled next week    - IGF1  - TSH  - free T4  - PRL  - cortisol  - ACTH     SRS pituitary     - I will communicate with Dr. Mandujano for further information and clinical input from her as well       Return to clinic with me in PRN    60 minutes spent on the date of the encounter doing chart review, history and exam, personal review of imaging, outside record, documentation and further activities as noted above.    Note: Chart documentation done in part with Dragon Voice Recognition software. Although reviewed after completion, some word and grammatical errors may remain.  Please consider this when interpreting information in this chart     Beba Coy MD  Staff Physician  Endocrinology and Metabolism  License: [id number]

## 2025-08-05 ENCOUNTER — OFFICE VISIT (OUTPATIENT)
Dept: RADIATION ONCOLOGY | Facility: CLINIC | Age: 65
End: 2025-08-05
Attending: RADIOLOGY
Payer: COMMERCIAL

## 2025-08-05 VITALS
BODY MASS INDEX: 33.04 KG/M2 | DIASTOLIC BLOOD PRESSURE: 77 MMHG | HEART RATE: 73 BPM | SYSTOLIC BLOOD PRESSURE: 122 MMHG | RESPIRATION RATE: 16 BRPM | WEIGHT: 204.7 LBS | OXYGEN SATURATION: 98 %

## 2025-08-05 DIAGNOSIS — R68.84 JAW PAIN: Primary | ICD-10-CM

## 2025-08-05 DIAGNOSIS — C79.51 CARCINOMA OF LEFT BREAST METASTATIC TO BONE (H): ICD-10-CM

## 2025-08-05 DIAGNOSIS — C79.51 CARCINOMA OF LEFT BREAST METASTATIC TO BONE (H): Primary | ICD-10-CM

## 2025-08-05 DIAGNOSIS — C50.912 CARCINOMA OF LEFT BREAST METASTATIC TO BONE (H): Primary | ICD-10-CM

## 2025-08-05 DIAGNOSIS — C50.912 CARCINOMA OF LEFT BREAST METASTATIC TO BONE (H): ICD-10-CM

## 2025-08-05 PROCEDURE — 3078F DIAST BP <80 MM HG: CPT | Performed by: RADIOLOGY

## 2025-08-05 PROCEDURE — 77334 RADIATION TREATMENT AID(S): CPT | Mod: 26 | Performed by: RADIOLOGY

## 2025-08-05 PROCEDURE — 77334 RADIATION TREATMENT AID(S): CPT | Performed by: RADIOLOGY

## 2025-08-05 PROCEDURE — 1125F AMNT PAIN NOTED PAIN PRSNT: CPT | Performed by: RADIOLOGY

## 2025-08-05 PROCEDURE — 3074F SYST BP LT 130 MM HG: CPT | Performed by: RADIOLOGY

## 2025-08-05 PROCEDURE — 99213 OFFICE O/P EST LOW 20 MIN: CPT | Performed by: RADIOLOGY

## 2025-08-05 ASSESSMENT — PAIN SCALES - GENERAL: PAINLEVEL_OUTOF10: MILD PAIN (3)

## 2025-08-06 ENCOUNTER — LAB (OUTPATIENT)
Dept: LAB | Facility: CLINIC | Age: 65
End: 2025-08-06
Payer: COMMERCIAL

## 2025-08-06 ENCOUNTER — TELEPHONE (OUTPATIENT)
Dept: ONCOLOGY | Facility: CLINIC | Age: 65
End: 2025-08-06

## 2025-08-06 ENCOUNTER — HOSPITAL ENCOUNTER (OUTPATIENT)
Dept: CARDIOLOGY | Facility: CLINIC | Age: 65
Discharge: HOME | End: 2025-08-06
Attending: INTERNAL MEDICINE
Payer: COMMERCIAL

## 2025-08-06 ENCOUNTER — INFUSION THERAPY VISIT (OUTPATIENT)
Dept: INFUSION THERAPY | Facility: CLINIC | Age: 65
End: 2025-08-06
Attending: INTERNAL MEDICINE
Payer: COMMERCIAL

## 2025-08-06 ENCOUNTER — ONCOLOGY VISIT (OUTPATIENT)
Dept: ONCOLOGY | Facility: CLINIC | Age: 65
End: 2025-08-06
Payer: COMMERCIAL

## 2025-08-06 VITALS
OXYGEN SATURATION: 96 % | SYSTOLIC BLOOD PRESSURE: 120 MMHG | DIASTOLIC BLOOD PRESSURE: 66 MMHG | RESPIRATION RATE: 16 BRPM | TEMPERATURE: 97.2 F | WEIGHT: 206.13 LBS | BODY MASS INDEX: 33.13 KG/M2 | HEART RATE: 73 BPM | HEIGHT: 66 IN

## 2025-08-06 VITALS
HEART RATE: 67 BPM | SYSTOLIC BLOOD PRESSURE: 112 MMHG | OXYGEN SATURATION: 97 % | DIASTOLIC BLOOD PRESSURE: 52 MMHG | TEMPERATURE: 97.7 F | RESPIRATION RATE: 16 BRPM

## 2025-08-06 DIAGNOSIS — C79.51 CARCINOMA OF LEFT BREAST METASTATIC TO BONE (H): ICD-10-CM

## 2025-08-06 DIAGNOSIS — K13.79 MOUTH SORE SECONDARY TO CHEMOTHERAPY: ICD-10-CM

## 2025-08-06 DIAGNOSIS — I42.7 CHEMOTHERAPY INDUCED CARDIOMYOPATHY: ICD-10-CM

## 2025-08-06 DIAGNOSIS — T45.1X5A CHEMOTHERAPY INDUCED CARDIOMYOPATHY: ICD-10-CM

## 2025-08-06 DIAGNOSIS — C50.912 LOBULAR CARCINOMA OF LEFT BREAST (H): ICD-10-CM

## 2025-08-06 DIAGNOSIS — C50.919 CARCINOMA OF BREAST METASTATIC TO BONE, UNSPECIFIED LATERALITY (H): ICD-10-CM

## 2025-08-06 DIAGNOSIS — C50.912 CARCINOMA OF LEFT BREAST METASTATIC TO BONE (H): Primary | ICD-10-CM

## 2025-08-06 DIAGNOSIS — E23.7 PITUITARY ABNORMALITY: ICD-10-CM

## 2025-08-06 DIAGNOSIS — C50.912 CARCINOMA OF LEFT BREAST METASTATIC TO BONE (H): ICD-10-CM

## 2025-08-06 DIAGNOSIS — C79.51 CARCINOMA OF LEFT BREAST METASTATIC TO BONE (H): Primary | ICD-10-CM

## 2025-08-06 DIAGNOSIS — T45.1X5A MOUTH SORE SECONDARY TO CHEMOTHERAPY: ICD-10-CM

## 2025-08-06 DIAGNOSIS — R68.84 JAW PAIN: ICD-10-CM

## 2025-08-06 DIAGNOSIS — C79.51 CARCINOMA OF BREAST METASTATIC TO BONE, UNSPECIFIED LATERALITY (H): ICD-10-CM

## 2025-08-06 DIAGNOSIS — C50.912 LOBULAR CARCINOMA OF LEFT BREAST (H): Primary | ICD-10-CM

## 2025-08-06 LAB
ALBUMIN SERPL BCG-MCNC: 3.7 G/DL (ref 3.5–5.2)
ALP SERPL-CCNC: 80 U/L (ref 40–150)
ALT SERPL W P-5'-P-CCNC: 26 U/L (ref 0–50)
ANION GAP SERPL CALCULATED.3IONS-SCNC: 10 MMOL/L (ref 7–15)
AST SERPL W P-5'-P-CCNC: 19 U/L (ref 0–45)
BASOPHILS # BLD AUTO: 0 10E3/UL (ref 0–0.2)
BASOPHILS NFR BLD AUTO: 1 %
BILIRUB SERPL-MCNC: 0.2 MG/DL
BUN SERPL-MCNC: 18.7 MG/DL (ref 8–23)
CALCIUM SERPL-MCNC: 8.6 MG/DL (ref 8.8–10.4)
CANCER AG15-3 SERPL-ACNC: 64 U/ML
CHLORIDE SERPL-SCNC: 106 MMOL/L (ref 98–107)
CORTIS SERPL-MCNC: 9.5 UG/DL
CREAT SERPL-MCNC: 0.85 MG/DL (ref 0.51–0.95)
EGFRCR SERPLBLD CKD-EPI 2021: 76 ML/MIN/1.73M2
EOSINOPHIL # BLD AUTO: 0.2 10E3/UL (ref 0–0.7)
EOSINOPHIL NFR BLD AUTO: 4 %
ERYTHROCYTE [DISTWIDTH] IN BLOOD BY AUTOMATED COUNT: 25.7 % (ref 10–15)
GLUCOSE SERPL-MCNC: 106 MG/DL (ref 70–99)
HCO3 SERPL-SCNC: 25 MMOL/L (ref 22–29)
HCT VFR BLD AUTO: 31.2 % (ref 35–47)
HGB BLD-MCNC: 9.3 G/DL (ref 11.7–15.7)
IMM GRANULOCYTES # BLD: 0 10E3/UL
IMM GRANULOCYTES NFR BLD: 1 %
LVEF ECHO: NORMAL
LYMPHOCYTES # BLD AUTO: 0.7 10E3/UL (ref 0.8–5.3)
LYMPHOCYTES NFR BLD AUTO: 17 %
MCH RBC QN AUTO: 26.1 PG (ref 26.5–33)
MCHC RBC AUTO-ENTMCNC: 29.8 G/DL (ref 31.5–36.5)
MCV RBC AUTO: 87 FL (ref 78–100)
MONOCYTES # BLD AUTO: 0.4 10E3/UL (ref 0–1.3)
MONOCYTES NFR BLD AUTO: 10 %
NEUTROPHILS # BLD AUTO: 2.8 10E3/UL (ref 1.6–8.3)
NEUTROPHILS NFR BLD AUTO: 68 %
NRBC # BLD AUTO: 0 10E3/UL
NRBC BLD AUTO-RTO: 0 /100
PLATELET # BLD AUTO: 184 10E3/UL (ref 150–450)
POTASSIUM SERPL-SCNC: 3.5 MMOL/L (ref 3.4–5.3)
PROLACTIN SERPL 3RD IS-MCNC: 13 NG/ML (ref 5–23)
PROT SERPL-MCNC: 6.3 G/DL (ref 6.4–8.3)
RBC # BLD AUTO: 3.57 10E6/UL (ref 3.8–5.2)
SODIUM SERPL-SCNC: 141 MMOL/L (ref 135–145)
T4 FREE SERPL-MCNC: 0.94 NG/DL (ref 0.9–1.7)
TSH SERPL DL<=0.005 MIU/L-ACNC: 2.3 UIU/ML (ref 0.3–4.2)
WBC # BLD AUTO: 4.2 10E3/UL (ref 4–11)

## 2025-08-06 PROCEDURE — 84146 ASSAY OF PROLACTIN: CPT

## 2025-08-06 PROCEDURE — 93325 DOPPLER ECHO COLOR FLOW MAPG: CPT

## 2025-08-06 PROCEDURE — 96375 TX/PRO/DX INJ NEW DRUG ADDON: CPT

## 2025-08-06 PROCEDURE — 84439 ASSAY OF FREE THYROXINE: CPT

## 2025-08-06 PROCEDURE — 80053 COMPREHEN METABOLIC PANEL: CPT

## 2025-08-06 PROCEDURE — 96367 TX/PROPH/DG ADDL SEQ IV INF: CPT

## 2025-08-06 PROCEDURE — 258N000003 HC RX IP 258 OP 636

## 2025-08-06 PROCEDURE — 99214 OFFICE O/P EST MOD 30 MIN: CPT

## 2025-08-06 PROCEDURE — 96413 CHEMO IV INFUSION 1 HR: CPT

## 2025-08-06 PROCEDURE — 93321 DOPPLER ECHO F-UP/LMTD STD: CPT | Mod: 26 | Performed by: INTERNAL MEDICINE

## 2025-08-06 PROCEDURE — 250N000011 HC RX IP 250 OP 636: Mod: JZ

## 2025-08-06 PROCEDURE — 84443 ASSAY THYROID STIM HORMONE: CPT

## 2025-08-06 PROCEDURE — 36415 COLL VENOUS BLD VENIPUNCTURE: CPT

## 2025-08-06 PROCEDURE — 85025 COMPLETE CBC W/AUTO DIFF WBC: CPT

## 2025-08-06 PROCEDURE — 82533 TOTAL CORTISOL: CPT

## 2025-08-06 PROCEDURE — G2211 COMPLEX E/M VISIT ADD ON: HCPCS

## 2025-08-06 PROCEDURE — 82024 ASSAY OF ACTH: CPT

## 2025-08-06 PROCEDURE — 93356 MYOCRD STRAIN IMG SPCKL TRCK: CPT | Performed by: INTERNAL MEDICINE

## 2025-08-06 PROCEDURE — 93325 DOPPLER ECHO COLOR FLOW MAPG: CPT | Mod: 26 | Performed by: INTERNAL MEDICINE

## 2025-08-06 PROCEDURE — 86300 IMMUNOASSAY TUMOR CA 15-3: CPT

## 2025-08-06 PROCEDURE — 93308 TTE F-UP OR LMTD: CPT | Mod: 26 | Performed by: INTERNAL MEDICINE

## 2025-08-06 RX ORDER — DIPHENHYDRAMINE HYDROCHLORIDE 50 MG/ML
25 INJECTION, SOLUTION INTRAMUSCULAR; INTRAVENOUS
Status: CANCELLED
Start: 2025-08-06

## 2025-08-06 RX ORDER — ALBUTEROL SULFATE 90 UG/1
1-2 INHALANT RESPIRATORY (INHALATION)
Status: CANCELLED
Start: 2025-08-06

## 2025-08-06 RX ORDER — HEPARIN SODIUM,PORCINE 10 UNIT/ML
5-20 VIAL (ML) INTRAVENOUS DAILY PRN
Status: CANCELLED | OUTPATIENT
Start: 2025-08-06

## 2025-08-06 RX ORDER — PALONOSETRON 0.05 MG/ML
0.25 INJECTION, SOLUTION INTRAVENOUS ONCE
Status: COMPLETED | OUTPATIENT
Start: 2025-08-06 | End: 2025-08-06

## 2025-08-06 RX ORDER — ZOLEDRONIC ACID 0.04 MG/ML
4 INJECTION, SOLUTION INTRAVENOUS ONCE
Status: DISCONTINUED | OUTPATIENT
Start: 2025-08-06 | End: 2025-08-06

## 2025-08-06 RX ORDER — ALBUTEROL SULFATE 0.83 MG/ML
2.5 SOLUTION RESPIRATORY (INHALATION)
Status: CANCELLED | OUTPATIENT
Start: 2025-08-06

## 2025-08-06 RX ORDER — OXYCODONE HYDROCHLORIDE 5 MG/1
5-10 TABLET ORAL EVERY 6 HOURS PRN
Qty: 30 TABLET | Refills: 0 | Status: SHIPPED | OUTPATIENT
Start: 2025-08-06 | End: 2025-08-09

## 2025-08-06 RX ORDER — PALONOSETRON 0.05 MG/ML
0.25 INJECTION, SOLUTION INTRAVENOUS ONCE
Status: CANCELLED | OUTPATIENT
Start: 2025-08-06

## 2025-08-06 RX ORDER — METHYLPREDNISOLONE SODIUM SUCCINATE 40 MG/ML
40 INJECTION INTRAMUSCULAR; INTRAVENOUS
Status: CANCELLED
Start: 2025-08-06

## 2025-08-06 RX ORDER — EPINEPHRINE 1 MG/ML
0.3 INJECTION, SOLUTION, CONCENTRATE INTRAVENOUS EVERY 5 MIN PRN
Status: CANCELLED | OUTPATIENT
Start: 2025-08-06

## 2025-08-06 RX ORDER — MEPERIDINE HYDROCHLORIDE 25 MG/ML
25 INJECTION INTRAMUSCULAR; INTRAVENOUS; SUBCUTANEOUS
Status: CANCELLED | OUTPATIENT
Start: 2025-08-06

## 2025-08-06 RX ORDER — OXYCODONE HCL 10 MG/1
10 TABLET, FILM COATED, EXTENDED RELEASE ORAL EVERY 12 HOURS
Qty: 60 TABLET | Refills: 0 | Status: SHIPPED | OUTPATIENT
Start: 2025-08-06 | End: 2025-09-05

## 2025-08-06 RX ORDER — LORAZEPAM 2 MG/ML
0.5 INJECTION INTRAMUSCULAR EVERY 4 HOURS PRN
Status: CANCELLED | OUTPATIENT
Start: 2025-08-06

## 2025-08-06 RX ORDER — HEPARIN SODIUM (PORCINE) LOCK FLUSH IV SOLN 100 UNIT/ML 100 UNIT/ML
5 SOLUTION INTRAVENOUS
Status: CANCELLED | OUTPATIENT
Start: 2025-08-06

## 2025-08-06 RX ORDER — DIPHENHYDRAMINE HYDROCHLORIDE 50 MG/ML
50 INJECTION, SOLUTION INTRAMUSCULAR; INTRAVENOUS
Status: CANCELLED
Start: 2025-08-06

## 2025-08-06 RX ADMIN — DEXTROSE 250 ML: 5 SOLUTION INTRAVENOUS at 14:21

## 2025-08-06 RX ADMIN — FOSAPREPITANT: 150 INJECTION, POWDER, LYOPHILIZED, FOR SOLUTION INTRAVENOUS at 14:30

## 2025-08-06 RX ADMIN — PALONOSETRON HYDROCHLORIDE 0.25 MG: 0.25 INJECTION INTRAVENOUS at 14:46

## 2025-08-06 RX ADMIN — FAM-TRASTUZUMAB DERUXTECAN-NXKI 500 MG: 100 INJECTION, POWDER, LYOPHILIZED, FOR SOLUTION INTRAVENOUS at 14:54

## 2025-08-06 ASSESSMENT — PAIN SCALES - GENERAL: PAINLEVEL_OUTOF10: MODERATE PAIN (5)

## 2025-08-07 LAB — ACTH PLAS-MCNC: 11 PG/ML

## 2025-08-13 ENCOUNTER — MYC MEDICAL ADVICE (OUTPATIENT)
Dept: ONCOLOGY | Facility: CLINIC | Age: 65
End: 2025-08-13
Payer: COMMERCIAL

## 2025-08-13 DIAGNOSIS — C50.912 CARCINOMA OF LEFT BREAST METASTATIC TO BONE (H): ICD-10-CM

## 2025-08-13 DIAGNOSIS — C79.51 CARCINOMA OF LEFT BREAST METASTATIC TO BONE (H): ICD-10-CM

## 2025-08-13 DIAGNOSIS — R68.84 JAW PAIN: ICD-10-CM

## 2025-08-13 RX ORDER — PREDNISONE 20 MG/1
TABLET ORAL
Qty: 20 TABLET | Refills: 0 | Status: SHIPPED | OUTPATIENT
Start: 2025-08-13

## 2025-08-20 ENCOUNTER — OFFICE VISIT (OUTPATIENT)
Dept: RADIATION ONCOLOGY | Facility: CLINIC | Age: 65
End: 2025-08-20
Attending: RADIOLOGY
Payer: COMMERCIAL

## 2025-08-20 VITALS
HEART RATE: 67 BPM | SYSTOLIC BLOOD PRESSURE: 118 MMHG | OXYGEN SATURATION: 97 % | BODY MASS INDEX: 34.06 KG/M2 | RESPIRATION RATE: 18 BRPM | DIASTOLIC BLOOD PRESSURE: 74 MMHG | WEIGHT: 209.4 LBS

## 2025-08-20 DIAGNOSIS — C50.912 CARCINOMA OF LEFT BREAST METASTATIC TO BONE (H): ICD-10-CM

## 2025-08-20 DIAGNOSIS — C79.51 CARCINOMA OF LEFT BREAST METASTATIC TO BONE (H): ICD-10-CM

## 2025-08-20 DIAGNOSIS — R68.84 JAW PAIN: Primary | ICD-10-CM

## 2025-08-20 PROCEDURE — 77386 HC IMRT TREATMENT DELIVERY, COMPLEX: CPT | Performed by: RADIOLOGY

## 2025-08-21 ENCOUNTER — APPOINTMENT (OUTPATIENT)
Dept: RADIATION ONCOLOGY | Facility: CLINIC | Age: 65
End: 2025-08-21
Attending: RADIOLOGY
Payer: COMMERCIAL

## 2025-08-21 PROCEDURE — 77386 HC IMRT TREATMENT DELIVERY, COMPLEX: CPT | Performed by: RADIOLOGY

## 2025-08-21 PROCEDURE — 77014 PR CT GUIDE FOR PLACEMENT RADIATION THERAPY FIELDS: CPT | Mod: 26 | Performed by: STUDENT IN AN ORGANIZED HEALTH CARE EDUCATION/TRAINING PROGRAM

## 2025-08-22 ENCOUNTER — APPOINTMENT (OUTPATIENT)
Dept: RADIATION ONCOLOGY | Facility: CLINIC | Age: 65
End: 2025-08-22
Attending: RADIOLOGY
Payer: COMMERCIAL

## 2025-08-25 ENCOUNTER — APPOINTMENT (OUTPATIENT)
Dept: RADIATION ONCOLOGY | Facility: CLINIC | Age: 65
End: 2025-08-25
Attending: RADIOLOGY
Payer: COMMERCIAL

## 2025-08-25 DIAGNOSIS — C79.51 CARCINOMA OF LEFT BREAST METASTATIC TO BONE (H): ICD-10-CM

## 2025-08-25 DIAGNOSIS — R68.84 JAW PAIN: Primary | ICD-10-CM

## 2025-08-25 DIAGNOSIS — C50.912 CARCINOMA OF LEFT BREAST METASTATIC TO BONE (H): ICD-10-CM

## 2025-08-25 PROCEDURE — 77386 HC IMRT TREATMENT DELIVERY, COMPLEX: CPT | Performed by: RADIOLOGY

## 2025-08-25 PROCEDURE — 77014 PR CT GUIDE FOR PLACEMENT RADIATION THERAPY FIELDS: CPT | Mod: 26 | Performed by: RADIOLOGY

## 2025-08-26 ENCOUNTER — OFFICE VISIT (OUTPATIENT)
Dept: RADIATION ONCOLOGY | Facility: CLINIC | Age: 65
End: 2025-08-26
Attending: RADIOLOGY
Payer: COMMERCIAL

## 2025-08-26 VITALS
SYSTOLIC BLOOD PRESSURE: 137 MMHG | BODY MASS INDEX: 34.4 KG/M2 | OXYGEN SATURATION: 98 % | RESPIRATION RATE: 16 BRPM | WEIGHT: 211.5 LBS | DIASTOLIC BLOOD PRESSURE: 82 MMHG | HEART RATE: 76 BPM

## 2025-08-26 PROCEDURE — 77336 RADIATION PHYSICS CONSULT: CPT | Performed by: RADIOLOGY

## 2025-08-26 PROCEDURE — 77386 HC IMRT TREATMENT DELIVERY, COMPLEX: CPT | Performed by: RADIOLOGY

## 2025-08-26 ASSESSMENT — PAIN SCALES - GENERAL: PAINLEVEL_OUTOF10: SEVERE PAIN (8)

## 2025-08-27 ENCOUNTER — INFUSION THERAPY VISIT (OUTPATIENT)
Dept: INFUSION THERAPY | Facility: CLINIC | Age: 65
End: 2025-08-27
Attending: INTERNAL MEDICINE
Payer: COMMERCIAL

## 2025-08-27 VITALS
SYSTOLIC BLOOD PRESSURE: 122 MMHG | TEMPERATURE: 97.1 F | RESPIRATION RATE: 16 BRPM | OXYGEN SATURATION: 96 % | HEART RATE: 65 BPM | BODY MASS INDEX: 34.01 KG/M2 | WEIGHT: 209.1 LBS | DIASTOLIC BLOOD PRESSURE: 66 MMHG

## 2025-08-27 DIAGNOSIS — C50.912 CARCINOMA OF LEFT BREAST METASTATIC TO BONE (H): Primary | ICD-10-CM

## 2025-08-27 DIAGNOSIS — C50.919 CARCINOMA OF BREAST METASTATIC TO BONE, UNSPECIFIED LATERALITY (H): ICD-10-CM

## 2025-08-27 DIAGNOSIS — C79.51 CARCINOMA OF LEFT BREAST METASTATIC TO BONE (H): Primary | ICD-10-CM

## 2025-08-27 DIAGNOSIS — C79.51 CARCINOMA OF BREAST METASTATIC TO BONE, UNSPECIFIED LATERALITY (H): ICD-10-CM

## 2025-08-27 LAB
ALBUMIN SERPL BCG-MCNC: 3.7 G/DL (ref 3.5–5.2)
ALP SERPL-CCNC: 75 U/L (ref 40–150)
ALT SERPL W P-5'-P-CCNC: 30 U/L (ref 0–50)
ANION GAP SERPL CALCULATED.3IONS-SCNC: 12 MMOL/L (ref 7–15)
AST SERPL W P-5'-P-CCNC: 20 U/L (ref 0–45)
BASOPHILS # BLD AUTO: 0.03 10E3/UL (ref 0–0.2)
BASOPHILS NFR BLD AUTO: 0.7 %
BILIRUB SERPL-MCNC: 0.3 MG/DL
BUN SERPL-MCNC: 16.2 MG/DL (ref 8–23)
CALCIUM SERPL-MCNC: 8.6 MG/DL (ref 8.8–10.4)
CHLORIDE SERPL-SCNC: 100 MMOL/L (ref 98–107)
CREAT SERPL-MCNC: 0.91 MG/DL (ref 0.51–0.95)
EGFRCR SERPLBLD CKD-EPI 2021: 70 ML/MIN/1.73M2
EOSINOPHIL # BLD AUTO: 0.06 10E3/UL (ref 0–0.7)
EOSINOPHIL NFR BLD AUTO: 1.3 %
ERYTHROCYTE [DISTWIDTH] IN BLOOD BY AUTOMATED COUNT: 28 % (ref 10–15)
GLUCOSE SERPL-MCNC: 126 MG/DL (ref 70–99)
HCO3 SERPL-SCNC: 28 MMOL/L (ref 22–29)
HCT VFR BLD AUTO: 32.9 % (ref 35–47)
HGB BLD-MCNC: 10.1 G/DL (ref 11.7–15.7)
IMM GRANULOCYTES # BLD: <0.03 10E3/UL
IMM GRANULOCYTES NFR BLD: 0.4 %
LYMPHOCYTES # BLD AUTO: 0.54 10E3/UL (ref 0.8–5.3)
LYMPHOCYTES NFR BLD AUTO: 11.9 %
MCH RBC QN AUTO: 28.4 PG (ref 26.5–33)
MCHC RBC AUTO-ENTMCNC: 30.7 G/DL (ref 31.5–36.5)
MCV RBC AUTO: 92.4 FL (ref 78–100)
MONOCYTES # BLD AUTO: 0.52 10E3/UL (ref 0–1.3)
MONOCYTES NFR BLD AUTO: 11.5 %
NEUTROPHILS # BLD AUTO: 3.35 10E3/UL (ref 1.6–8.3)
NEUTROPHILS NFR BLD AUTO: 74.2 %
NRBC # BLD AUTO: <0.03 10E3/UL
NRBC BLD AUTO-RTO: 0 /100
PLATELET # BLD AUTO: 220 10E3/UL (ref 150–450)
POTASSIUM SERPL-SCNC: 4.1 MMOL/L (ref 3.4–5.3)
PROT SERPL-MCNC: 6.1 G/DL (ref 6.4–8.3)
RBC # BLD AUTO: 3.56 10E6/UL (ref 3.8–5.2)
SODIUM SERPL-SCNC: 140 MMOL/L (ref 135–145)
WBC # BLD AUTO: 4.52 10E3/UL (ref 4–11)

## 2025-08-27 PROCEDURE — 85004 AUTOMATED DIFF WBC COUNT: CPT | Performed by: INTERNAL MEDICINE

## 2025-08-27 PROCEDURE — 96367 TX/PROPH/DG ADDL SEQ IV INF: CPT

## 2025-08-27 PROCEDURE — 250N000011 HC RX IP 250 OP 636: Mod: JZ | Performed by: INTERNAL MEDICINE

## 2025-08-27 PROCEDURE — 258N000003 HC RX IP 258 OP 636: Performed by: INTERNAL MEDICINE

## 2025-08-27 PROCEDURE — 250N000011 HC RX IP 250 OP 636: Performed by: INTERNAL MEDICINE

## 2025-08-27 PROCEDURE — 96375 TX/PRO/DX INJ NEW DRUG ADDON: CPT

## 2025-08-27 PROCEDURE — 36415 COLL VENOUS BLD VENIPUNCTURE: CPT | Performed by: INTERNAL MEDICINE

## 2025-08-27 PROCEDURE — 84155 ASSAY OF PROTEIN SERUM: CPT | Performed by: INTERNAL MEDICINE

## 2025-08-27 PROCEDURE — 96413 CHEMO IV INFUSION 1 HR: CPT

## 2025-08-27 RX ORDER — HEPARIN SODIUM (PORCINE) LOCK FLUSH IV SOLN 100 UNIT/ML 100 UNIT/ML
5 SOLUTION INTRAVENOUS
Status: CANCELLED | OUTPATIENT
Start: 2025-08-27

## 2025-08-27 RX ORDER — ALBUTEROL SULFATE 0.83 MG/ML
2.5 SOLUTION RESPIRATORY (INHALATION)
Status: CANCELLED | OUTPATIENT
Start: 2025-08-27

## 2025-08-27 RX ORDER — HEPARIN SODIUM,PORCINE 10 UNIT/ML
5-20 VIAL (ML) INTRAVENOUS DAILY PRN
Status: CANCELLED | OUTPATIENT
Start: 2025-08-27

## 2025-08-27 RX ORDER — ALBUTEROL SULFATE 90 UG/1
1-2 INHALANT RESPIRATORY (INHALATION)
Status: CANCELLED
Start: 2025-08-27

## 2025-08-27 RX ORDER — EPINEPHRINE 1 MG/ML
0.3 INJECTION, SOLUTION, CONCENTRATE INTRAVENOUS EVERY 5 MIN PRN
Status: CANCELLED | OUTPATIENT
Start: 2025-08-27

## 2025-08-27 RX ORDER — DIPHENHYDRAMINE HYDROCHLORIDE 50 MG/ML
50 INJECTION, SOLUTION INTRAMUSCULAR; INTRAVENOUS
Status: CANCELLED
Start: 2025-08-27

## 2025-08-27 RX ORDER — PALONOSETRON 0.05 MG/ML
0.25 INJECTION, SOLUTION INTRAVENOUS ONCE
Status: COMPLETED | OUTPATIENT
Start: 2025-08-27 | End: 2025-08-27

## 2025-08-27 RX ORDER — ZOLEDRONIC ACID 0.04 MG/ML
4 INJECTION, SOLUTION INTRAVENOUS ONCE
Status: COMPLETED | OUTPATIENT
Start: 2025-08-27 | End: 2025-08-27

## 2025-08-27 RX ORDER — LORAZEPAM 2 MG/ML
0.5 INJECTION INTRAMUSCULAR EVERY 4 HOURS PRN
Status: CANCELLED | OUTPATIENT
Start: 2025-08-27

## 2025-08-27 RX ORDER — DIPHENHYDRAMINE HYDROCHLORIDE 50 MG/ML
25 INJECTION, SOLUTION INTRAMUSCULAR; INTRAVENOUS
Status: CANCELLED
Start: 2025-08-27

## 2025-08-27 RX ORDER — MEPERIDINE HYDROCHLORIDE 25 MG/ML
25 INJECTION INTRAMUSCULAR; INTRAVENOUS; SUBCUTANEOUS
Status: CANCELLED | OUTPATIENT
Start: 2025-08-27

## 2025-08-27 RX ORDER — METHYLPREDNISOLONE SODIUM SUCCINATE 40 MG/ML
40 INJECTION INTRAMUSCULAR; INTRAVENOUS
Status: CANCELLED
Start: 2025-08-27

## 2025-08-27 RX ADMIN — FAM-TRASTUZUMAB DERUXTECAN-NXKI 500 MG: 100 INJECTION, POWDER, LYOPHILIZED, FOR SOLUTION INTRAVENOUS at 10:16

## 2025-08-27 RX ADMIN — FOSAPREPITANT: 150 INJECTION, POWDER, LYOPHILIZED, FOR SOLUTION INTRAVENOUS at 09:49

## 2025-08-27 RX ADMIN — ZOLEDRONIC ACID 4 MG: 0.04 INJECTION, SOLUTION INTRAVENOUS at 09:25

## 2025-08-27 RX ADMIN — DEXTROSE 250 ML: 5 SOLUTION INTRAVENOUS at 09:45

## 2025-08-27 RX ADMIN — SODIUM CHLORIDE 100 ML: 9 INJECTION, SOLUTION INTRAVENOUS at 09:21

## 2025-08-27 RX ADMIN — PALONOSETRON HYDROCHLORIDE 0.25 MG: 0.25 INJECTION INTRAVENOUS at 09:47

## 2025-08-27 ASSESSMENT — PAIN SCALES - GENERAL: PAINLEVEL_OUTOF10: MODERATE PAIN (4)

## 2025-09-04 ENCOUNTER — PATIENT OUTREACH (OUTPATIENT)
Dept: CARE COORDINATION | Facility: CLINIC | Age: 65
End: 2025-09-04

## 2025-09-04 ENCOUNTER — MYC MEDICAL ADVICE (OUTPATIENT)
Dept: ONCOLOGY | Facility: CLINIC | Age: 65
End: 2025-09-04

## 2025-09-04 ENCOUNTER — VIRTUAL VISIT (OUTPATIENT)
Dept: ONCOLOGY | Facility: CLINIC | Age: 65
End: 2025-09-04
Payer: COMMERCIAL

## 2025-09-04 DIAGNOSIS — C79.51 CARCINOMA OF LEFT BREAST METASTATIC TO BONE (H): Primary | ICD-10-CM

## 2025-09-04 DIAGNOSIS — T45.1X5A CHEMOTHERAPY INDUCED NAUSEA AND VOMITING: Primary | ICD-10-CM

## 2025-09-04 DIAGNOSIS — C50.912 CARCINOMA OF LEFT BREAST METASTATIC TO BONE (H): Primary | ICD-10-CM

## 2025-09-04 DIAGNOSIS — R68.84 JAW PAIN: ICD-10-CM

## 2025-09-04 DIAGNOSIS — R11.2 CHEMOTHERAPY INDUCED NAUSEA AND VOMITING: Primary | ICD-10-CM

## 2025-09-04 RX ORDER — LORAZEPAM 2 MG/ML
0.5 INJECTION INTRAMUSCULAR EVERY 4 HOURS PRN
OUTPATIENT
Start: 2025-10-09

## 2025-09-04 RX ORDER — ALBUTEROL SULFATE 0.83 MG/ML
2.5 SOLUTION RESPIRATORY (INHALATION)
OUTPATIENT
Start: 2025-09-04

## 2025-09-04 RX ORDER — ALBUTEROL SULFATE 0.83 MG/ML
2.5 SOLUTION RESPIRATORY (INHALATION)
OUTPATIENT
Start: 2025-09-18

## 2025-09-04 RX ORDER — HEPARIN SODIUM,PORCINE 10 UNIT/ML
5-20 VIAL (ML) INTRAVENOUS DAILY PRN
OUTPATIENT
Start: 2025-09-04

## 2025-09-04 RX ORDER — HEPARIN SODIUM (PORCINE) LOCK FLUSH IV SOLN 100 UNIT/ML 100 UNIT/ML
5 SOLUTION INTRAVENOUS
OUTPATIENT
Start: 2025-09-18

## 2025-09-04 RX ORDER — HEPARIN SODIUM (PORCINE) LOCK FLUSH IV SOLN 100 UNIT/ML 100 UNIT/ML
5 SOLUTION INTRAVENOUS
OUTPATIENT
Start: 2025-09-04

## 2025-09-04 RX ORDER — MEPERIDINE HYDROCHLORIDE 25 MG/ML
25 INJECTION INTRAMUSCULAR; INTRAVENOUS; SUBCUTANEOUS
OUTPATIENT
Start: 2025-09-18

## 2025-09-04 RX ORDER — DIPHENHYDRAMINE HYDROCHLORIDE 50 MG/ML
50 INJECTION INTRAMUSCULAR; INTRAVENOUS
Start: 2025-09-04

## 2025-09-04 RX ORDER — ONDANSETRON 2 MG/ML
4-8 INJECTION INTRAMUSCULAR; INTRAVENOUS EVERY 6 HOURS PRN
Start: 2025-09-04

## 2025-09-04 RX ORDER — PALONOSETRON 0.05 MG/ML
0.25 INJECTION, SOLUTION INTRAVENOUS ONCE
OUTPATIENT
Start: 2025-09-04

## 2025-09-04 RX ORDER — MEPERIDINE HYDROCHLORIDE 25 MG/ML
25 INJECTION INTRAMUSCULAR; INTRAVENOUS; SUBCUTANEOUS
OUTPATIENT
Start: 2025-10-09

## 2025-09-04 RX ORDER — METHYLPREDNISOLONE SODIUM SUCCINATE 40 MG/ML
40 INJECTION INTRAMUSCULAR; INTRAVENOUS
Start: 2025-10-09

## 2025-09-04 RX ORDER — HEPARIN SODIUM,PORCINE 10 UNIT/ML
5-20 VIAL (ML) INTRAVENOUS DAILY PRN
OUTPATIENT
Start: 2025-09-18

## 2025-09-04 RX ORDER — LIDOCAINE HYDROCHLORIDE 20 MG/ML
SOLUTION OROPHARYNGEAL
COMMUNITY
Start: 2025-08-20

## 2025-09-04 RX ORDER — EPINEPHRINE 1 MG/ML
0.3 INJECTION, SOLUTION, CONCENTRATE INTRAVENOUS EVERY 5 MIN PRN
OUTPATIENT
Start: 2025-09-04

## 2025-09-04 RX ORDER — DIPHENHYDRAMINE HYDROCHLORIDE 50 MG/ML
25 INJECTION INTRAMUSCULAR; INTRAVENOUS
Start: 2025-09-04

## 2025-09-04 RX ORDER — EPINEPHRINE 1 MG/ML
0.3 INJECTION, SOLUTION, CONCENTRATE INTRAVENOUS EVERY 5 MIN PRN
OUTPATIENT
Start: 2025-10-09

## 2025-09-04 RX ORDER — DIPHENHYDRAMINE HYDROCHLORIDE 50 MG/ML
50 INJECTION INTRAMUSCULAR; INTRAVENOUS
Start: 2025-09-18

## 2025-09-04 RX ORDER — EPINEPHRINE 1 MG/ML
0.3 INJECTION, SOLUTION, CONCENTRATE INTRAVENOUS EVERY 5 MIN PRN
OUTPATIENT
Start: 2025-09-18

## 2025-09-04 RX ORDER — ALBUTEROL SULFATE 90 UG/1
1-2 INHALANT RESPIRATORY (INHALATION)
Start: 2025-09-18

## 2025-09-04 RX ORDER — DIPHENHYDRAMINE HYDROCHLORIDE 50 MG/ML
25 INJECTION INTRAMUSCULAR; INTRAVENOUS
Start: 2025-09-18

## 2025-09-04 RX ORDER — ALBUTEROL SULFATE 90 UG/1
1-2 INHALANT RESPIRATORY (INHALATION)
Start: 2025-10-09

## 2025-09-04 RX ORDER — DIPHENHYDRAMINE HYDROCHLORIDE 50 MG/ML
25 INJECTION INTRAMUSCULAR; INTRAVENOUS
Start: 2025-10-09

## 2025-09-04 RX ORDER — LORAZEPAM 2 MG/ML
0.5 INJECTION INTRAMUSCULAR EVERY 4 HOURS PRN
OUTPATIENT
Start: 2025-09-18

## 2025-09-04 RX ORDER — HEPARIN SODIUM,PORCINE 10 UNIT/ML
5-20 VIAL (ML) INTRAVENOUS DAILY PRN
OUTPATIENT
Start: 2025-10-09

## 2025-09-04 RX ORDER — METHYLPREDNISOLONE SODIUM SUCCINATE 40 MG/ML
40 INJECTION INTRAMUSCULAR; INTRAVENOUS
Start: 2025-09-18

## 2025-09-04 RX ORDER — MEPERIDINE HYDROCHLORIDE 25 MG/ML
25 INJECTION INTRAMUSCULAR; INTRAVENOUS; SUBCUTANEOUS
OUTPATIENT
Start: 2025-09-04

## 2025-09-04 RX ORDER — DIPHENHYDRAMINE HYDROCHLORIDE 50 MG/ML
50 INJECTION INTRAMUSCULAR; INTRAVENOUS
Start: 2025-10-09

## 2025-09-04 RX ORDER — ALBUTEROL SULFATE 0.83 MG/ML
2.5 SOLUTION RESPIRATORY (INHALATION)
OUTPATIENT
Start: 2025-10-09

## 2025-09-04 RX ORDER — PALONOSETRON 0.05 MG/ML
0.25 INJECTION, SOLUTION INTRAVENOUS ONCE
OUTPATIENT
Start: 2025-09-18

## 2025-09-04 RX ORDER — HYDROMORPHONE HCL IN WATER/PF 6 MG/30 ML
.2-.3 PATIENT CONTROLLED ANALGESIA SYRINGE INTRAVENOUS EVERY 6 HOURS PRN
Start: 2025-09-04

## 2025-09-04 RX ORDER — ALBUTEROL SULFATE 90 UG/1
1-2 INHALANT RESPIRATORY (INHALATION)
Start: 2025-09-04

## 2025-09-04 RX ORDER — METHYLPREDNISOLONE SODIUM SUCCINATE 40 MG/ML
40 INJECTION INTRAMUSCULAR; INTRAVENOUS
Start: 2025-09-04

## 2025-09-04 RX ORDER — PALONOSETRON 0.05 MG/ML
0.25 INJECTION, SOLUTION INTRAVENOUS ONCE
OUTPATIENT
Start: 2025-10-09

## 2025-09-04 RX ORDER — LORAZEPAM 2 MG/ML
0.5 INJECTION INTRAMUSCULAR EVERY 4 HOURS PRN
OUTPATIENT
Start: 2025-09-04

## 2025-09-04 RX ORDER — HEPARIN SODIUM (PORCINE) LOCK FLUSH IV SOLN 100 UNIT/ML 100 UNIT/ML
5 SOLUTION INTRAVENOUS
OUTPATIENT
Start: 2025-10-09

## (undated) DEVICE — TUBING SUCTION 12"X1/4" N612

## (undated) DEVICE — SOL WATER IRRIG 1000ML BOTTLE 07139-09

## (undated) DEVICE — GLOVE EXAM NITRILE LG

## (undated) DEVICE — KIT ENDO TURNOVER/PROCEDURE CARRY-ON 101822

## (undated) DEVICE — LUBRICATING JELLY 4.25OZ

## (undated) RX ORDER — LIDOCAINE HYDROCHLORIDE 10 MG/ML
INJECTION, SOLUTION EPIDURAL; INFILTRATION; INTRACAUDAL; PERINEURAL
Status: DISPENSED
Start: 2024-07-15

## (undated) RX ORDER — FENTANYL CITRATE 50 UG/ML
INJECTION, SOLUTION INTRAMUSCULAR; INTRAVENOUS
Status: DISPENSED
Start: 2024-07-15

## (undated) RX ORDER — SODIUM CHLORIDE 9 MG/ML
INJECTION, SOLUTION INTRAVENOUS
Status: DISPENSED
Start: 2024-07-15

## (undated) RX ORDER — BUPIVACAINE HYDROCHLORIDE 5 MG/ML
INJECTION, SOLUTION EPIDURAL; INTRACAUDAL
Status: DISPENSED
Start: 2024-07-15